# Patient Record
Sex: FEMALE | Race: WHITE | NOT HISPANIC OR LATINO | URBAN - METROPOLITAN AREA
[De-identification: names, ages, dates, MRNs, and addresses within clinical notes are randomized per-mention and may not be internally consistent; named-entity substitution may affect disease eponyms.]

---

## 2019-11-22 ENCOUNTER — OUTPATIENT (OUTPATIENT)
Dept: OUTPATIENT SERVICES | Facility: HOSPITAL | Age: 34
LOS: 1 days | End: 2019-11-22

## 2019-11-22 ENCOUNTER — INPATIENT (INPATIENT)
Facility: HOSPITAL | Age: 34
LOS: 1 days | Discharge: ANOTHER HOSPITAL | End: 2019-11-24
Attending: INTERNAL MEDICINE
Payer: MEDICAID

## 2019-11-22 PROCEDURE — 99291 CRITICAL CARE FIRST HOUR: CPT

## 2019-11-22 PROCEDURE — 71045 X-RAY EXAM CHEST 1 VIEW: CPT | Mod: 26

## 2019-11-22 PROCEDURE — 93010 ELECTROCARDIOGRAM REPORT: CPT

## 2019-11-22 PROCEDURE — 71045 X-RAY EXAM CHEST 1 VIEW: CPT | Mod: 26,77

## 2019-11-22 PROCEDURE — 71275 CT ANGIOGRAPHY CHEST: CPT | Mod: 26

## 2019-11-23 ENCOUNTER — OUTPATIENT (OUTPATIENT)
Dept: OUTPATIENT SERVICES | Facility: HOSPITAL | Age: 34
LOS: 1 days | End: 2019-11-23

## 2019-11-23 PROCEDURE — 71260 CT THORAX DX C+: CPT | Mod: 26

## 2019-11-23 PROCEDURE — 74177 CT ABD & PELVIS W/CONTRAST: CPT | Mod: 26

## 2019-11-23 PROCEDURE — 71045 X-RAY EXAM CHEST 1 VIEW: CPT | Mod: 26

## 2019-11-24 ENCOUNTER — OUTPATIENT (OUTPATIENT)
Dept: OUTPATIENT SERVICES | Facility: HOSPITAL | Age: 34
LOS: 1 days | End: 2019-11-24

## 2019-11-24 ENCOUNTER — INPATIENT (INPATIENT)
Facility: HOSPITAL | Age: 34
LOS: 43 days | Discharge: HOME | DRG: 163 | End: 2020-01-07
Attending: HOSPITALIST | Admitting: THORACIC SURGERY (CARDIOTHORACIC VASCULAR SURGERY)
Payer: MEDICAID

## 2019-11-24 VITALS
HEIGHT: 66 IN | DIASTOLIC BLOOD PRESSURE: 63 MMHG | OXYGEN SATURATION: 99 % | RESPIRATION RATE: 26 BRPM | SYSTOLIC BLOOD PRESSURE: 94 MMHG | HEART RATE: 93 BPM | WEIGHT: 121.7 LBS

## 2019-11-24 DIAGNOSIS — J90 PLEURAL EFFUSION, NOT ELSEWHERE CLASSIFIED: ICD-10-CM

## 2019-11-24 LAB
ALBUMIN SERPL ELPH-MCNC: 2 G/DL — LOW (ref 3.3–5.2)
ALP SERPL-CCNC: 250 U/L — HIGH (ref 40–120)
ALT FLD-CCNC: 34 U/L — HIGH
ANION GAP SERPL CALC-SCNC: 14 MMOL/L — SIGNIFICANT CHANGE UP (ref 5–17)
ANISOCYTOSIS BLD QL: SLIGHT — SIGNIFICANT CHANGE UP
APTT BLD: 52.4 SEC — HIGH (ref 27.5–36.3)
AST SERPL-CCNC: 51 U/L — HIGH
BASOPHILS # BLD AUTO: 0 K/UL — SIGNIFICANT CHANGE UP (ref 0–0.2)
BASOPHILS NFR BLD AUTO: 0 % — SIGNIFICANT CHANGE UP (ref 0–2)
BILIRUB SERPL-MCNC: 0.4 MG/DL — SIGNIFICANT CHANGE UP (ref 0.4–2)
BLD GP AB SCN SERPL QL: SIGNIFICANT CHANGE UP
BUN SERPL-MCNC: 7 MG/DL — LOW (ref 8–20)
CALCIUM SERPL-MCNC: 6.3 MG/DL — CRITICAL LOW (ref 8.6–10.2)
CHLORIDE SERPL-SCNC: 92 MMOL/L — LOW (ref 98–107)
CO2 SERPL-SCNC: 25 MMOL/L — SIGNIFICANT CHANGE UP (ref 22–29)
CREAT SERPL-MCNC: 0.5 MG/DL — SIGNIFICANT CHANGE UP (ref 0.5–1.3)
EOSINOPHIL # BLD AUTO: 0 K/UL — SIGNIFICANT CHANGE UP (ref 0–0.5)
EOSINOPHIL NFR BLD AUTO: 0 % — SIGNIFICANT CHANGE UP (ref 0–6)
GIANT PLATELETS BLD QL SMEAR: PRESENT — SIGNIFICANT CHANGE UP
GLUCOSE SERPL-MCNC: 85 MG/DL — SIGNIFICANT CHANGE UP (ref 70–115)
HCT VFR BLD CALC: 30.1 % — LOW (ref 34.5–45)
HGB BLD-MCNC: 10.1 G/DL — LOW (ref 11.5–15.5)
LYMPHOCYTES # BLD AUTO: 2.29 K/UL — SIGNIFICANT CHANGE UP (ref 1–3.3)
LYMPHOCYTES # BLD AUTO: 9.6 % — LOW (ref 13–44)
MACROCYTES BLD QL: SLIGHT — SIGNIFICANT CHANGE UP
MAGNESIUM SERPL-MCNC: 1.8 MG/DL — SIGNIFICANT CHANGE UP (ref 1.6–2.6)
MANUAL SMEAR VERIFICATION: SIGNIFICANT CHANGE UP
MCHC RBC-ENTMCNC: 33.4 PG — SIGNIFICANT CHANGE UP (ref 27–34)
MCHC RBC-ENTMCNC: 33.6 GM/DL — SIGNIFICANT CHANGE UP (ref 32–36)
MCV RBC AUTO: 99.7 FL — SIGNIFICANT CHANGE UP (ref 80–100)
MONOCYTES # BLD AUTO: 1.67 K/UL — HIGH (ref 0–0.9)
MONOCYTES NFR BLD AUTO: 7 % — SIGNIFICANT CHANGE UP (ref 2–14)
MYELOCYTES NFR BLD: 1.7 % — HIGH (ref 0–0)
NEUTROPHILS # BLD AUTO: 19.46 K/UL — HIGH (ref 1.8–7.4)
NEUTROPHILS NFR BLD AUTO: 76.5 % — SIGNIFICANT CHANGE UP (ref 43–77)
NEUTS BAND # BLD: 5.2 % — SIGNIFICANT CHANGE UP (ref 0–8)
OVALOCYTES BLD QL SMEAR: SLIGHT — SIGNIFICANT CHANGE UP
PHOSPHATE SERPL-MCNC: 2.4 MG/DL — SIGNIFICANT CHANGE UP (ref 2.4–4.7)
PLAT MORPH BLD: NORMAL — SIGNIFICANT CHANGE UP
PLATELET # BLD AUTO: 415 K/UL — HIGH (ref 150–400)
POLYCHROMASIA BLD QL SMEAR: SLIGHT — SIGNIFICANT CHANGE UP
POTASSIUM SERPL-MCNC: 4.5 MMOL/L — SIGNIFICANT CHANGE UP (ref 3.5–5.3)
POTASSIUM SERPL-SCNC: 4.5 MMOL/L — SIGNIFICANT CHANGE UP (ref 3.5–5.3)
PROT SERPL-MCNC: 4.7 G/DL — LOW (ref 6.6–8.7)
RBC # BLD: 3.02 M/UL — LOW (ref 3.8–5.2)
RBC # FLD: 13.1 % — SIGNIFICANT CHANGE UP (ref 10.3–14.5)
RBC BLD AUTO: SIGNIFICANT CHANGE UP
SODIUM SERPL-SCNC: 131 MMOL/L — LOW (ref 135–145)
TROPONIN T SERPL-MCNC: <0.01 NG/ML — SIGNIFICANT CHANGE UP (ref 0–0.06)
WBC # BLD: 23.82 K/UL — HIGH (ref 3.8–10.5)
WBC # FLD AUTO: 23.82 K/UL — HIGH (ref 3.8–10.5)

## 2019-11-24 PROCEDURE — 93970 EXTREMITY STUDY: CPT | Mod: 26

## 2019-11-24 PROCEDURE — 32557 INSERT CATH PLEURA W/ IMAGE: CPT | Mod: RT

## 2019-11-24 PROCEDURE — 71045 X-RAY EXAM CHEST 1 VIEW: CPT | Mod: 26,77

## 2019-11-24 PROCEDURE — 71045 X-RAY EXAM CHEST 1 VIEW: CPT | Mod: 26

## 2019-11-24 RX ORDER — HEPARIN SODIUM 5000 [USP'U]/ML
500 INJECTION INTRAVENOUS; SUBCUTANEOUS
Qty: 25000 | Refills: 0 | Status: DISCONTINUED | OUTPATIENT
Start: 2019-11-24 | End: 2019-11-27

## 2019-11-24 RX ORDER — CHLORHEXIDINE GLUCONATE 213 G/1000ML
1 SOLUTION TOPICAL EVERY 12 HOURS
Refills: 0 | Status: COMPLETED | OUTPATIENT
Start: 2019-11-24 | End: 2019-11-25

## 2019-11-24 RX ORDER — AZITHROMYCIN 500 MG/1
TABLET, FILM COATED ORAL
Refills: 0 | Status: DISCONTINUED | OUTPATIENT
Start: 2019-11-24 | End: 2019-11-24

## 2019-11-24 RX ORDER — ACETAMINOPHEN 500 MG
1000 TABLET ORAL ONCE
Refills: 0 | Status: COMPLETED | OUTPATIENT
Start: 2019-11-24 | End: 2019-11-24

## 2019-11-24 RX ORDER — AZITHROMYCIN 500 MG/1
TABLET, FILM COATED ORAL
Refills: 0 | Status: DISCONTINUED | OUTPATIENT
Start: 2019-11-24 | End: 2019-11-25

## 2019-11-24 RX ORDER — PIPERACILLIN AND TAZOBACTAM 4; .5 G/20ML; G/20ML
3.38 INJECTION, POWDER, LYOPHILIZED, FOR SOLUTION INTRAVENOUS ONCE
Refills: 0 | Status: COMPLETED | OUTPATIENT
Start: 2019-11-24 | End: 2019-11-24

## 2019-11-24 RX ORDER — AZITHROMYCIN 500 MG/1
500 TABLET, FILM COATED ORAL EVERY 24 HOURS
Refills: 0 | Status: DISCONTINUED | OUTPATIENT
Start: 2019-11-25 | End: 2019-11-25

## 2019-11-24 RX ORDER — PIPERACILLIN AND TAZOBACTAM 4; .5 G/20ML; G/20ML
3.38 INJECTION, POWDER, LYOPHILIZED, FOR SOLUTION INTRAVENOUS EVERY 8 HOURS
Refills: 0 | Status: DISCONTINUED | OUTPATIENT
Start: 2019-11-24 | End: 2019-11-25

## 2019-11-24 RX ORDER — AZITHROMYCIN 500 MG/1
500 TABLET, FILM COATED ORAL ONCE
Refills: 0 | Status: COMPLETED | OUTPATIENT
Start: 2019-11-24 | End: 2019-11-24

## 2019-11-24 RX ORDER — SACCHAROMYCES BOULARDII 250 MG
250 POWDER IN PACKET (EA) ORAL
Refills: 0 | Status: DISCONTINUED | OUTPATIENT
Start: 2019-11-24 | End: 2019-11-27

## 2019-11-24 RX ORDER — AZITHROMYCIN 500 MG/1
500 TABLET, FILM COATED ORAL ONCE
Refills: 0 | Status: DISCONTINUED | OUTPATIENT
Start: 2019-11-24 | End: 2019-11-24

## 2019-11-24 RX ADMIN — Medication 400 MILLIGRAM(S): at 20:00

## 2019-11-24 RX ADMIN — Medication 1000 MILLIGRAM(S): at 20:15

## 2019-11-25 DIAGNOSIS — R07.89 OTHER CHEST PAIN: ICD-10-CM

## 2019-11-25 DIAGNOSIS — I82.C11 ACUTE EMBOLISM AND THROMBOSIS OF RIGHT INTERNAL JUGULAR VEIN: ICD-10-CM

## 2019-11-25 DIAGNOSIS — F41.9 ANXIETY DISORDER, UNSPECIFIED: ICD-10-CM

## 2019-11-25 DIAGNOSIS — J86.9 PYOTHORAX WITHOUT FISTULA: ICD-10-CM

## 2019-11-25 DIAGNOSIS — E87.1 HYPO-OSMOLALITY AND HYPONATREMIA: ICD-10-CM

## 2019-11-25 DIAGNOSIS — K86.2 CYST OF PANCREAS: ICD-10-CM

## 2019-11-25 LAB
ABO RH CONFIRMATION: SIGNIFICANT CHANGE UP
ALBUMIN SERPL ELPH-MCNC: 1.8 G/DL — LOW (ref 3.3–5.2)
ALP SERPL-CCNC: 252 U/L — HIGH (ref 40–120)
ALT FLD-CCNC: 32 U/L — SIGNIFICANT CHANGE UP
ANION GAP SERPL CALC-SCNC: 11 MMOL/L — SIGNIFICANT CHANGE UP (ref 5–17)
ANISOCYTOSIS BLD QL: SLIGHT — SIGNIFICANT CHANGE UP
APPEARANCE UR: CLEAR — SIGNIFICANT CHANGE UP
APTT BLD: 51.3 SEC — HIGH (ref 27.5–36.3)
APTT BLD: 71 SEC — HIGH (ref 27.5–36.3)
APTT BLD: 71.8 SEC — HIGH (ref 27.5–36.3)
AST SERPL-CCNC: 46 U/L — HIGH
BACTERIA # UR AUTO: ABNORMAL
BASOPHILS # BLD AUTO: 0 K/UL — SIGNIFICANT CHANGE UP (ref 0–0.2)
BASOPHILS NFR BLD AUTO: 0 % — SIGNIFICANT CHANGE UP (ref 0–2)
BILIRUB SERPL-MCNC: 0.4 MG/DL — SIGNIFICANT CHANGE UP (ref 0.4–2)
BILIRUB UR-MCNC: NEGATIVE — SIGNIFICANT CHANGE UP
BUN SERPL-MCNC: 5 MG/DL — LOW (ref 8–20)
CALCIUM SERPL-MCNC: 6.6 MG/DL — LOW (ref 8.6–10.2)
CHLORIDE SERPL-SCNC: 95 MMOL/L — LOW (ref 98–107)
CHOLEST SERPL-MCNC: 68 MG/DL — LOW (ref 110–199)
CO2 SERPL-SCNC: 26 MMOL/L — SIGNIFICANT CHANGE UP (ref 22–29)
COLOR SPEC: YELLOW — SIGNIFICANT CHANGE UP
CREAT SERPL-MCNC: 0.37 MG/DL — LOW (ref 0.5–1.3)
CRP SERPL-MCNC: 17.92 MG/DL — HIGH (ref 0–0.4)
DIFF PNL FLD: ABNORMAL
EOSINOPHIL # BLD AUTO: 0.38 K/UL — SIGNIFICANT CHANGE UP (ref 0–0.5)
EOSINOPHIL NFR BLD AUTO: 1.7 % — SIGNIFICANT CHANGE UP (ref 0–6)
EPI CELLS # UR: SIGNIFICANT CHANGE UP
ERYTHROCYTE [SEDIMENTATION RATE] IN BLOOD: 56 MM/HR — HIGH (ref 0–20)
GIANT PLATELETS BLD QL SMEAR: PRESENT — SIGNIFICANT CHANGE UP
GLUCOSE SERPL-MCNC: 83 MG/DL — SIGNIFICANT CHANGE UP (ref 70–115)
GLUCOSE UR QL: NEGATIVE MG/DL — SIGNIFICANT CHANGE UP
HCT VFR BLD CALC: 30.8 % — LOW (ref 34.5–45)
HDLC SERPL-MCNC: 27 MG/DL — LOW
HGB BLD-MCNC: 10.2 G/DL — LOW (ref 11.5–15.5)
HIV 1 & 2 AB SERPL IA.RAPID: SIGNIFICANT CHANGE UP
KETONES UR-MCNC: NEGATIVE — SIGNIFICANT CHANGE UP
LACTATE BLDV-MCNC: 2 MMOL/L — SIGNIFICANT CHANGE UP (ref 0.5–2)
LACTATE SERPL-SCNC: 1.7 MMOL/L — SIGNIFICANT CHANGE UP (ref 0.5–2)
LDH SERPL L TO P-CCNC: 353 U/L — HIGH (ref 98–192)
LEUKOCYTE ESTERASE UR-ACNC: NEGATIVE — SIGNIFICANT CHANGE UP
LIPID PNL WITH DIRECT LDL SERPL: 12 MG/DL — SIGNIFICANT CHANGE UP
LYMPHOCYTES # BLD AUTO: 12.2 % — LOW (ref 13–44)
LYMPHOCYTES # BLD AUTO: 2.73 K/UL — SIGNIFICANT CHANGE UP (ref 1–3.3)
MACROCYTES BLD QL: SLIGHT — SIGNIFICANT CHANGE UP
MAGNESIUM SERPL-MCNC: 1.5 MG/DL — LOW (ref 1.6–2.6)
MANUAL SMEAR VERIFICATION: SIGNIFICANT CHANGE UP
MCHC RBC-ENTMCNC: 33.1 GM/DL — SIGNIFICANT CHANGE UP (ref 32–36)
MCHC RBC-ENTMCNC: 33.2 PG — SIGNIFICANT CHANGE UP (ref 27–34)
MCV RBC AUTO: 100.3 FL — HIGH (ref 80–100)
METAMYELOCYTES # FLD: 1.7 % — HIGH (ref 0–0)
MONOCYTES # BLD AUTO: 1.56 K/UL — HIGH (ref 0–0.9)
MONOCYTES NFR BLD AUTO: 7 % — SIGNIFICANT CHANGE UP (ref 2–14)
MRSA PCR RESULT.: SIGNIFICANT CHANGE UP
MYELOCYTES NFR BLD: 1.7 % — HIGH (ref 0–0)
NEUTROPHILS # BLD AUTO: 16.14 K/UL — HIGH (ref 1.8–7.4)
NEUTROPHILS NFR BLD AUTO: 72.2 % — SIGNIFICANT CHANGE UP (ref 43–77)
NITRITE UR-MCNC: NEGATIVE — SIGNIFICANT CHANGE UP
NRBC # BLD: 1 /100 — HIGH (ref 0–0)
OSMOLALITY SERPL: 276 MOSMOL/KG — SIGNIFICANT CHANGE UP (ref 275–300)
PH UR: 7 — SIGNIFICANT CHANGE UP (ref 5–8)
PHOSPHATE SERPL-MCNC: 1.2 MG/DL — LOW (ref 2.4–4.7)
PLAT MORPH BLD: NORMAL — SIGNIFICANT CHANGE UP
PLATELET # BLD AUTO: 448 K/UL — HIGH (ref 150–400)
POLYCHROMASIA BLD QL SMEAR: SLIGHT — SIGNIFICANT CHANGE UP
POTASSIUM SERPL-MCNC: 3.8 MMOL/L — SIGNIFICANT CHANGE UP (ref 3.5–5.3)
POTASSIUM SERPL-SCNC: 3.8 MMOL/L — SIGNIFICANT CHANGE UP (ref 3.5–5.3)
PROCALCITONIN SERPL-MCNC: 4.75 NG/ML — HIGH (ref 0.02–0.1)
PROMYELOCYTES # FLD: 3.5 % — HIGH (ref 0–0)
PROT SERPL-MCNC: 5 G/DL — LOW (ref 6.6–8.7)
PROT UR-MCNC: NEGATIVE MG/DL — SIGNIFICANT CHANGE UP
RAPID RVP RESULT: SIGNIFICANT CHANGE UP
RBC # BLD: 3.07 M/UL — LOW (ref 3.8–5.2)
RBC # FLD: 13.2 % — SIGNIFICANT CHANGE UP (ref 10.3–14.5)
RBC BLD AUTO: ABNORMAL
RBC CASTS # UR COMP ASSIST: SIGNIFICANT CHANGE UP /HPF (ref 0–4)
S AUREUS DNA NOSE QL NAA+PROBE: DETECTED
SODIUM SERPL-SCNC: 132 MMOL/L — LOW (ref 135–145)
SP GR SPEC: 1 — LOW (ref 1.01–1.02)
TOTAL CHOLESTEROL/HDL RATIO MEASUREMENT: 3 RATIO — LOW (ref 3.3–7.1)
TRIGL SERPL-MCNC: 144 MG/DL — SIGNIFICANT CHANGE UP (ref 10–200)
TSH SERPL-MCNC: 4.76 UIU/ML — HIGH (ref 0.27–4.2)
UROBILINOGEN FLD QL: 1 MG/DL
WBC # BLD: 22.35 K/UL — HIGH (ref 3.8–10.5)
WBC # FLD AUTO: 22.35 K/UL — HIGH (ref 3.8–10.5)
WBC UR QL: SIGNIFICANT CHANGE UP

## 2019-11-25 PROCEDURE — 99222 1ST HOSP IP/OBS MODERATE 55: CPT

## 2019-11-25 PROCEDURE — 71045 X-RAY EXAM CHEST 1 VIEW: CPT | Mod: 26

## 2019-11-25 PROCEDURE — 71250 CT THORAX DX C-: CPT | Mod: 26

## 2019-11-25 PROCEDURE — 99221 1ST HOSP IP/OBS SF/LOW 40: CPT

## 2019-11-25 PROCEDURE — 90792 PSYCH DIAG EVAL W/MED SRVCS: CPT

## 2019-11-25 RX ORDER — ACETAMINOPHEN 500 MG
650 TABLET ORAL EVERY 6 HOURS
Refills: 0 | Status: DISCONTINUED | OUTPATIENT
Start: 2019-11-25 | End: 2019-11-27

## 2019-11-25 RX ORDER — CEFTRIAXONE 500 MG/1
1000 INJECTION, POWDER, FOR SOLUTION INTRAMUSCULAR; INTRAVENOUS EVERY 24 HOURS
Refills: 0 | Status: DISCONTINUED | OUTPATIENT
Start: 2019-11-25 | End: 2019-11-27

## 2019-11-25 RX ORDER — OXYCODONE AND ACETAMINOPHEN 5; 325 MG/1; MG/1
1 TABLET ORAL EVERY 4 HOURS
Refills: 0 | Status: DISCONTINUED | OUTPATIENT
Start: 2019-11-25 | End: 2019-11-25

## 2019-11-25 RX ORDER — OXYCODONE AND ACETAMINOPHEN 5; 325 MG/1; MG/1
1 TABLET ORAL EVERY 6 HOURS
Refills: 0 | Status: DISCONTINUED | OUTPATIENT
Start: 2019-11-25 | End: 2019-11-27

## 2019-11-25 RX ORDER — THIAMINE MONONITRATE (VIT B1) 100 MG
100 TABLET ORAL DAILY
Refills: 0 | Status: DISCONTINUED | OUTPATIENT
Start: 2019-11-25 | End: 2019-11-27

## 2019-11-25 RX ORDER — FOLIC ACID 0.8 MG
1 TABLET ORAL DAILY
Refills: 0 | Status: DISCONTINUED | OUTPATIENT
Start: 2019-11-25 | End: 2019-11-27

## 2019-11-25 RX ORDER — PSYLLIUM SEED (WITH DEXTROSE)
1 POWDER (GRAM) ORAL
Refills: 0 | Status: DISCONTINUED | OUTPATIENT
Start: 2019-11-25 | End: 2019-11-27

## 2019-11-25 RX ORDER — METHOCARBAMOL 500 MG/1
500 TABLET, FILM COATED ORAL THREE TIMES A DAY
Refills: 0 | Status: DISCONTINUED | OUTPATIENT
Start: 2019-11-25 | End: 2019-11-27

## 2019-11-25 RX ORDER — TRAMADOL HYDROCHLORIDE 50 MG/1
50 TABLET ORAL EVERY 6 HOURS
Refills: 0 | Status: DISCONTINUED | OUTPATIENT
Start: 2019-11-25 | End: 2019-11-27

## 2019-11-25 RX ORDER — INFLUENZA VIRUS VACCINE 15; 15; 15; 15 UG/.5ML; UG/.5ML; UG/.5ML; UG/.5ML
0.5 SUSPENSION INTRAMUSCULAR ONCE
Refills: 0 | Status: COMPLETED | OUTPATIENT
Start: 2019-11-25 | End: 2019-11-25

## 2019-11-25 RX ORDER — MAGNESIUM SULFATE 500 MG/ML
2 VIAL (ML) INJECTION ONCE
Refills: 0 | Status: COMPLETED | OUTPATIENT
Start: 2019-11-25 | End: 2019-11-25

## 2019-11-25 RX ORDER — LIDOCAINE 4 G/100G
1 CREAM TOPICAL DAILY
Refills: 0 | Status: DISCONTINUED | OUTPATIENT
Start: 2019-11-25 | End: 2019-11-27

## 2019-11-25 RX ORDER — THIAMINE MONONITRATE (VIT B1) 100 MG
100 TABLET ORAL ONCE
Refills: 0 | Status: COMPLETED | OUTPATIENT
Start: 2019-11-25 | End: 2019-11-26

## 2019-11-25 RX ORDER — POTASSIUM CHLORIDE 20 MEQ
20 PACKET (EA) ORAL ONCE
Refills: 0 | Status: COMPLETED | OUTPATIENT
Start: 2019-11-25 | End: 2019-11-25

## 2019-11-25 RX ORDER — KETOROLAC TROMETHAMINE 30 MG/ML
15 SYRINGE (ML) INJECTION EVERY 6 HOURS
Refills: 0 | Status: DISCONTINUED | OUTPATIENT
Start: 2019-11-25 | End: 2019-11-25

## 2019-11-25 RX ORDER — KETOROLAC TROMETHAMINE 30 MG/ML
15 SYRINGE (ML) INJECTION EVERY 6 HOURS
Refills: 0 | Status: DISCONTINUED | OUTPATIENT
Start: 2019-11-25 | End: 2019-11-27

## 2019-11-25 RX ADMIN — OXYCODONE AND ACETAMINOPHEN 1 TABLET(S): 5; 325 TABLET ORAL at 21:41

## 2019-11-25 RX ADMIN — CHLORHEXIDINE GLUCONATE 1 APPLICATION(S): 213 SOLUTION TOPICAL at 05:39

## 2019-11-25 RX ADMIN — OXYCODONE AND ACETAMINOPHEN 1 TABLET(S): 5; 325 TABLET ORAL at 06:00

## 2019-11-25 RX ADMIN — Medication 250 MILLIGRAM(S): at 05:41

## 2019-11-25 RX ADMIN — Medication 15 MILLIGRAM(S): at 14:50

## 2019-11-25 RX ADMIN — LIDOCAINE 1 PATCH: 4 CREAM TOPICAL at 19:41

## 2019-11-25 RX ADMIN — Medication 50 GRAM(S): at 06:55

## 2019-11-25 RX ADMIN — Medication 15 MILLIGRAM(S): at 14:35

## 2019-11-25 RX ADMIN — LIDOCAINE 1 PATCH: 4 CREAM TOPICAL at 17:36

## 2019-11-25 RX ADMIN — OXYCODONE AND ACETAMINOPHEN 1 TABLET(S): 5; 325 TABLET ORAL at 11:25

## 2019-11-25 RX ADMIN — OXYCODONE AND ACETAMINOPHEN 1 TABLET(S): 5; 325 TABLET ORAL at 10:25

## 2019-11-25 RX ADMIN — Medication 1 PACKET(S): at 17:38

## 2019-11-25 RX ADMIN — HEPARIN SODIUM 11 UNIT(S)/HR: 5000 INJECTION INTRAVENOUS; SUBCUTANEOUS at 06:42

## 2019-11-25 RX ADMIN — METHOCARBAMOL 500 MILLIGRAM(S): 500 TABLET, FILM COATED ORAL at 17:38

## 2019-11-25 RX ADMIN — Medication 15 MILLIGRAM(S): at 20:45

## 2019-11-25 RX ADMIN — OXYCODONE AND ACETAMINOPHEN 1 TABLET(S): 5; 325 TABLET ORAL at 05:00

## 2019-11-25 RX ADMIN — OXYCODONE AND ACETAMINOPHEN 1 TABLET(S): 5; 325 TABLET ORAL at 20:45

## 2019-11-25 RX ADMIN — CEFTRIAXONE 100 MILLIGRAM(S): 500 INJECTION, POWDER, FOR SOLUTION INTRAMUSCULAR; INTRAVENOUS at 17:37

## 2019-11-25 RX ADMIN — OXYCODONE AND ACETAMINOPHEN 1 TABLET(S): 5; 325 TABLET ORAL at 14:35

## 2019-11-25 RX ADMIN — PIPERACILLIN AND TAZOBACTAM 25 GRAM(S): 4; .5 INJECTION, POWDER, LYOPHILIZED, FOR SOLUTION INTRAVENOUS at 09:45

## 2019-11-25 RX ADMIN — OXYCODONE AND ACETAMINOPHEN 1 TABLET(S): 5; 325 TABLET ORAL at 15:35

## 2019-11-25 RX ADMIN — Medication 20 MILLIEQUIVALENT(S): at 06:46

## 2019-11-25 RX ADMIN — AZITHROMYCIN 255 MILLIGRAM(S): 500 TABLET, FILM COATED ORAL at 05:37

## 2019-11-25 RX ADMIN — Medication 15 MILLIGRAM(S): at 21:40

## 2019-11-25 RX ADMIN — Medication 250 MILLIGRAM(S): at 17:38

## 2019-11-25 NOTE — DIETITIAN INITIAL EVALUATION ADULT. - MALNUTRITION
Severe-acute NFPE: muscle mass loss in shoulder/clavicle and subcutaneous fat loss in triceps Severe-acute

## 2019-11-25 NOTE — H&P ADULT - ASSESSMENT
[place] [facility]  [place] with weakness, 25lb weight loss of several months, flu like symptoms, on work up found to have a large right pleural effusion.  Pulmonology contact for thoracentesis, 1.5 L of dark serous fluid was removed.  Chest tube placed shortly thereafter, 3 liters was removed at that point in time, due to air leak.  Patient had some abnormalities in their lab work, found to have a leukocytosis with wbc of 40, platelet count of 793, Na 120, chloride 73, lactate of 4.7, amylase 268,  and CTA imaging also revealed a 2.1cm partially cystic mass in the body/tail of the pancreas with possible upstream ductal dilatation. 11/23 patient had CT chest/abdomen/pelvis and was also found to have a clot in the right IJ and was started on a heparin gtt. Patient does state that she has had a recent cough, and complains of right sided neck pain. 11/14 Patient transferred to [facility]. Comes in to [facility]  11/22 with weakness, 25lb weight loss of several months, flu like symptoms, on work up found to have a large right pleural effusion.  Pulmonology contact for thoracentesis, 1.5 L of dark serous fluid was removed.  Chest tube placed shortly thereafter, 3 liters was removed at that point in time. Patient had some abnormalities in their lab work, found to have a leukocytosis with wbc of 40, platelet count of 793, Na 120, chloride 73, lactate of 4.7, amylase 268,  and CTA imaging also revealed a 2.1cm partially cystic mass in the body/tail of the pancreas with possible upstream ductal dilatation. 11/23 patient had CT chest/abdomen/pelvis and was also found to have a clot in the right IJ and was started on a heparin gtt. Patient does state that she has had a recent cough, and complains of right sided neck pain. 11/14 Patient transferred to [facility].

## 2019-11-25 NOTE — BEHAVIORAL HEALTH ASSESSMENT NOTE - NSBHCHARTREVIEWLAB_PSY_A_CORE FT
Comprehensive Metabolic Panel (11.25.19 @ 05:29)    Sodium, Serum: 132 mmol/L    Potassium, Serum: 3.8 mmol/L    Chloride, Serum: 95 mmol/L    Carbon Dioxide, Serum: 26.0 mmol/L    Anion Gap, Serum: 11 mmol/L    Blood Urea Nitrogen, Serum: 5.0 mg/dL    Creatinine, Serum: 0.37 mg/dL    Glucose, Serum: 83 mg/dL    Calcium, Total Serum: 6.6 mg/dL    Protein Total, Serum: 5.0 g/dL    Albumin, Serum: 1.8 g/dL    Bilirubin Total, Serum: 0.4 mg/dL    Alkaline Phosphatase, Serum: 252 U/L    Aspartate Aminotransferase (AST/SGOT): 46 U/L    Alanine Aminotransferase (ALT/SGPT): 32 U/L    eGFR if Non : 139: Interpretative comment  The units for eGFR are mL/min/1.73M2 (normalized body surface area). The  eGFR is calculated from a serum creatinine using the CKD-EPI equation.  Other variables required for calculation are race, age and sex. Among  patients with chronic kidney disease (CKD), the eGFR is useful in  determining the stage of disease according to KDOQI CKD classification.  All eGFR results are reported numerically with the following  interpretation.          GFR                    With                 Without     (ml/min/1.73 m2)    Kidney Damage       Kidney Damage        >= 90                    Stage 1                     Normal        60-89                    Stage 2                     Decreased GFR        30-59     Stage 3                     Stage 3        15-29                    Stage 4                     Stage 4        < 15                      Stage 5                     Stage 5  Each stage of CKD assumes that the associated GFR level has been in  effect for at least 3 months. Determination of stages one and two (with  eGFR > 59 ml/min/m2) requires estimation of kidney damage for at least 3  months as defined by structural or functional abnormalities.  Limitations: All estimates of GFR will be less accurate for patients at  extremes of muscle mass (including but not limited to frail elderly,  critically ill, or cancer patients), those with unusual diets, and those  with conditions associated with reduced secretion or extrarenal  elimination of creatinine. The eGFR equation is not recommended for use  in patients with unstable creatinine levels. mL/min/1.73M2    eGFR if African American: 162 mL/min/1.73M2

## 2019-11-25 NOTE — H&P ADULT - NSHPLABSRESULTS_GEN_ALL_CORE
Labs              10.1   23.82 )-----------( 415      ( 24 Nov 2019 20:27 )             30.1   11-24    131<L>  |  92<L>  |  7.0<L>  ----------------------------<  85  4.5   |  25.0  |  0.50    Ca    6.3<LL>      24 Nov 2019 20:27  Phos  2.4     11-24  Mg     1.8     11-24    TPro  4.7<L>  /  Alb  2.0<L>  /  TBili  0.4  /  DBili  x   /  AST  51<H>  /  ALT  34<H>  /  AlkPhos  250<H>  11-24    Radiology    Xray 11/23- shows interval decrease in the volume of right sided pneumothorax. Continued advanced consolidative change of the right lung parenchyma, with patchy airspace opacities identified within the left lower lung field. Bilateral pleural effusions, right greater than left     CT 11/23- Right internal jugular vein thrombosis, right pneumothorax, trace right pleural effusion, extensive right lung consolidation. Small infiltrates in the left upper lobe and left lower lobe. Cystic lesions in the body and the tail of the pancrease

## 2019-11-25 NOTE — DIETITIAN INITIAL EVALUATION ADULT. - ETIOLOGY
Related to inadequate protein energy intake with decreased appetite and altered GI function in setting of large right pleural effusion/Empyema

## 2019-11-25 NOTE — BEHAVIORAL HEALTH ASSESSMENT NOTE - [employer]IVE FACTORS:
ANTICOAGULATION FOLLOW-UP CLINIC VISIT    Patient Name:  Shanika Stahl  Date:  2019  Contact Type:  Fax [facility]    SUBJECTIVE:  Patient Findings     Positives:   Extra doses    Comments:   Patient will continue weekly maintenance dose. INR is therapeutic.   Recheck in 1 week.        Clinical Outcomes     Comments:   Patient will continue weekly maintenance dose. INR is therapeutic.   Recheck in 1 week.           OBJECTIVE    INR   Date Value Ref Range Status   2019 2.17 (A) 0.86 - 1.14 Final       ASSESSMENT / PLAN  INR assessment THER    Recheck INR In: 1 WEEK    INR Location Outside lab      Anticoagulation Summary  As of 2019    INR goal:   2.0-3.0   TTR:   84.5 % (4.1 y)   INR used for dosin.17 (2019)   Warfarin maintenance plan:   2.5 mg (2.5 mg x 1) every Mon, Fri; 5 mg (2.5 mg x 2) all other days   Full warfarin instructions:   2.5 mg every Mon, Fri; 5 mg all other days   Weekly warfarin total:   30 mg   No change documented:   Dre Phillips RN   Plan last modified:   Verenice Morillo RN (10/12/2018)   Next INR check:   2019   Priority:   INR   Target end date:   Indefinite    Indications    Atrial fibrillation (H) (Resolved) [I48.91]  History of cerebral embolism with cerebral infarction -  [I63.40]  Long term current use of anticoagulant therapy [Z79.01]             Anticoagulation Episode Summary     INR check location:       Preferred lab:       Send INR reminders to:   [place] PHONE ANTICOAG POOL    Comments:   Keep on low end of therapeutic range. Uses 2.5mg tablets. Resides at [facility] [phone]. Fax AVS to [phone] (facility administers meds but cannot do half tabs)      Anticoagulation Care Providers     Provider Role Specialty Phone number    Milena Jaime,  [facility] [phone]            See the Encounter Report to view Anticoagulation Flowsheet and Dosing Calendar (Go to Encounters tab  in chart review, and find the Anticoagulation Therapy Visit)        Dre Phillips RN                  None Known

## 2019-11-25 NOTE — CONSULT NOTE ADULT - PROBLEM SELECTOR RECOMMENDATION 9
1. Offer Ketorolac 15mg IV q6hrs ATC x 2 days  - Unless otherwise contraindicated  2. Tylenol 650mg PO q6hrs PRN mild pain  3. Tramadol 50mg PO q6hrs PRN moderate pain  4. Percocet 1 tablet q6 hrs PRN severe pain  5. Robaxin 500mg PO TID PRN spasms  6. Lidoderm Patches adjacent to affected area, over intact skin only.

## 2019-11-25 NOTE — CONSULT NOTE ADULT - CONSULT REASON
HPI:  Com[place] in [place] [facility]  [place]22 with weakness, 25lb weight loss of several months, flu like symptoms, on work up found to have a large right pleural effusion.  Pulmonology contact for thoracentesis, 1.5 L of dark serous fluid was removed.  Chest tube placed shortly thereafter, 3 liters was removed at that point in time. Patient had some abnormalities in their lab work, found to have a leukocytosis with wbc of 40, platelet count of 793, Na 120, chloride 73, lactate of 4.7, amylase 268,  and CTA imaging also revealed a 2.1cm partially cystic mass in the body/tail of the pancreas with possible upstream ductal dilatation. 11/23 patient had CT chest/abdomen/pelvis and was also found to have a clot in the right IJ and was started on a heparin gtt. Patient does state that she has had a recent cough, and complains of right sided neck pain. 11/14 Patient transferred to [facility]. (25 Nov 2019 00:10)      Pain Service:  MICU team requests assistance with her pain regimen. She has a chest tube, being offered Percocet PRN. Suggestions for alternatives requested.

## 2019-11-25 NOTE — H&P ADULT - PROBLEM SELECTOR PLAN 2
Patient does complains of some right neck pain   Continue on heparin gtt, patient specific, PTT q6 hours

## 2019-11-25 NOTE — CONSULT NOTE ADULT - SUBJECTIVE AND OBJECTIVE BOX
Surgical Oncology Consult Note    HPI:   35yo female who [place] [facility] [place] w/ complaint of weight loss and SOB found to have right pleural effusion and subsequently transferred to [facility] on 11/22 on whom surgical oncology is consulted for evaluation of pancreatic cyst. Pt initially [place] [facility] [place] workup of patient's SOB revealed large right pleural effusion. CTA of the chest done to r/o PE incidentally revealed a 2.1 cm cystic mass in body of pancreas w/ upstream PD dilatation.  In addition, patient was found to have RIJ thrombus and was hyponatremic to 120. Patient effusion treated w/ chest tube, which drained multiple liters. Pt reports 25lbs of unintended weight loss over last several months. 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  Pt denies abdominal pain, diarrhea, or constipation.       PMH:  Denies any significant past medical history    PSH:  denies any significant past surgical history    Home Medications:  Denies taking any home medications    ALL:  Ensure TID (Unknown)    FMH:  Denies family history of gastrointestinal malignancy     SOC Hx:   Denies tobacco or drug use. Drinks 5-6 drinks per night.     Physical Exam:   Gen: adult female, NAD  Neuro: AOx3, EOMI, PERRLA, no gross deficit on exam  RESP: coarse breath sounds w/ Right < Left, nonlabored breathing. right chest tube to pleurovac w/ suction  CVS: RRR,   GI: abd soft, NT, ND, no rebound/guarding  Extremities: 2+ peripheral pulses

## 2019-11-25 NOTE — H&P ADULT - PROBLEM SELECTOR PLAN 1
Status post chest tube placement, drainage 11/22  No air leak noted, small residual pneumothorax   Continue to keep chest tube to suction

## 2019-11-25 NOTE — DIETITIAN INITIAL EVALUATION ADULT. - OTHER INFO
Pt admitted with  weakness, 25lb weight loss of several months, flu like symptoms, on work up found to have a large right pleural effusion.  Pulmonology contact for thoracentesis, 1.5 L of dark serous fluid was removed.  Chest tube placed shortly thereafter, 3 liters was removed at that point in time. Patient had some abnormalities in their lab work, found to have a leukocytosis with wbc of 40, platelet count of 793, Na 120, chloride 73, lactate of 4.7, amylase 268,  and CTA imaging also revealed a 2.1cm partially cystic mass in the body/tail of the pancreas with possible upstream ductal dilatation. 11/23 patient had CT chest/abdomen/pelvis and was also found to have a clot in the right IJ and was started on a heparin gtt. Patient does state that she has had a recent cough, and complains of right sided neck pain. 11/14 Patient transferred to [facility].   Pt reports having poor appetite over past 3 months, wt loss of 25# noted. Pt complains of mild abd discomfort and diarrhea. Pt reports having poor dietary habits, does not at breakfast and some of the time skips lunch and snacks at night. Pt continues to complain of "upset" stomach and poor appetite. Pt agreeable to trying Ensure.  NFPE conducted.

## 2019-11-25 NOTE — CONSULT NOTE ADULT - SUBJECTIVE AND OBJECTIVE BOX
VERBAL REPORT: "I don't want the Percocet during the day when I have to get up."  Patient describes thoracic pain, mostly surrounding her chest tube site, but it intensifies to other areas with movement. She admits adequate analgesia with Percocet being offered. She denies side effect. She inquires about alternative medications to help reduce her opioid use.    PAIN SCORE: 3-4/10 at rest                  SCALE USED: VNRS    Allergies  No Known Allergies      PAST MEDICAL & SURGICAL HISTORY:      MEDICATIONS  (STANDING):  azithromycin  IVPB      azithromycin  IVPB 500 milliGRAM(s) IV Intermittent every 24 hours  chlorhexidine 2% Cloths 1 Application(s) Topical every 12 hours  chlorhexidine 4% Liquid 1 Application(s) Topical every 12 hours  heparin  Infusion 500 Unit(s)/Hr (11 mL/Hr) IV Continuous <Continuous>  piperacillin/tazobactam IVPB.. 3.375 Gram(s) IV Intermittent every 8 hours  saccharomyces boulardii 250 milliGRAM(s) Oral two times a day    MEDICATIONS  (PRN):  ketorolac   Injectable 15 milliGRAM(s) IV Push every 6 hours PRN Mild Pain (1 - 3)  oxycodone    5 mG/acetaminophen 325 mG 1 Tablet(s) Oral every 4 hours PRN Moderate Pain (4 - 6)      PHYSICAL EXAM:  GENERAL: NAD, well-developed  HEAD:  Atraumatic, Normocephalic  EYES: EOMI, PERRLA, conjunctiva and sclera clear  NERVOUS SYSTEM:  Alert & Oriented X3, Good concentration; Motor Strength 5/5 B/L upper and lower extremities  CHEST/LUNG: Clear speech, no SOB evident at rest; +right sided chest tube  ABDOMEN: Nondistended; Bowel sounds present  SKIN: hematoma on the interior aspect of right elbow      Vital Signs Last 24 Hrs  T(C): 36.7 (25 Nov 2019 07:00), Max: 36.7 (24 Nov 2019 20:27)  T(F): 98.1 (25 Nov 2019 07:00), Max: 98.1 (25 Nov 2019 07:00)  HR: 94 (25 Nov 2019 11:00) (77 - 98)  BP: 79/47 (25 Nov 2019 11:00) (78/49 - 126/79)  BP(mean): 59 (25 Nov 2019 11:00) (58 - 75)  RR: 25 (25 Nov 2019 11:00) (16 - 39)  SpO2: 93% (25 Nov 2019 11:00) (92% - 99%)                          10.2   22.35 )-----------( 448      ( 25 Nov 2019 05:29 )             30.8       LIVER FUNCTIONS - ( 25 Nov 2019 05:29 )  Alb: 1.8 g/dL / Pro: 5.0 g/dL / ALK PHOS: 252 U/L / ALT: 32 U/L / AST: 46 U/L / GGT: x             PTT - ( 25 Nov 2019 05:30 )  PTT:51.3 sec    Pain Service   Text Page via [place]  PIN: [phone]

## 2019-11-25 NOTE — BEHAVIORAL HEALTH ASSESSMENT NOTE - SUMMARY
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 Psych referral requested for anxiety.  Due to hyponatremia, SSRI or antidepressant not appropriate at this time as can worsen Hyponatremia.  Benzo is not recommended secondary to alcohol abuse and respiratory depression, however for severe anxiety or panic give low dose Ativan 0.25 prn in hospital only bid.  Pt would benefit from [facility] referral after discharged from hospital.  Psych to follow prn.

## 2019-11-25 NOTE — H&P ADULT - PROBLEM SELECTOR PLAN 3
Hyponatremic, hypochloremic, euvolemic on admission   Consider r/o of SIADH will send urine lytes, serum osms, UA for specific gravity

## 2019-11-25 NOTE — CONSULT NOTE ADULT - SUBJECTIVE AND OBJECTIVE BOX
[facility]  INFECTIOUS DISEASES AND INTERNAL MEDICINE at [place]  =======================================================  Carlos Alberto Soto MD  Diplomates American Board of Internal Medicine and Infectious Diseases  Telephone [phone]  Fax            [phone]  =======================================================    [id number]  HERACLIO MOLINA   HPI:  This 34 y.o. F who was admitted to [facility] 11/22 with weakness, 25lb weight loss of several months, flu like symptoms, on work up found to have a large right pleural effusion.  Pulmonology contact for thoracentesis, 1.5 L of dark serous fluid was removed.  Chest tube placed shortly thereafter, 3 liters was removed at that point in time. Patient had some abnormalities in their lab work, found to have a leukocytosis with wbc of 40, platelet count of 793, Na 120, chloride 73, lactate of 4.7, amylase 268,  and CTA imaging also revealed a 2.1cm partially cystic mass in the body/tail of the pancreas with possible upstream ductal dilatation. 11/23 patient had CT chest/abdomen/pelvis and was also found to have a clot in the right IJ and was started on a heparin gtt. Patient does state that she has had a recent cough, and complains of right sided neck pain. 11/14 Patient transferred to [facility] for further care (25 Nov 2019 00:10)    at [facility] she had WBC of 40 K on 11/22, but since down to 22K here.  She had hyponatremia of 120 on admission to [facility], now at 132 at [facility].     Cultures reviewed on [facility] HIE:  11/22/19 blood cx negative x 2  11/22/19 - body fluid cx with Moderate Alpha hemolytic strep x 2 sets  Alpha hemolytic strep  		Sierra-Sood	E Test  Ceftriaxone  			S (0.19)   Clindamycin 	S    Erythromycin 	S   Penicillin 	S   Vancomycin 	S       I have personally reviewed the labs and data; pertinent labs and data are listed in this note; please see below.   =======================================================  Past Medical & Surgical Hx:  =====================  PAST MEDICAL & SURGICAL HISTORY:    Problem List:  ==========  HEALTH ISSUES - PROBLEM Dx:  Right-sided chest wall pain: Right-sided chest wall pain  Pancreatic cyst: Pancreatic cyst  Hyponatremia: Hyponatremia  Internal jugular vein thrombosis, right: Internal jugular vein thrombosis, right  Empyema: Empyema        Social Hx:  =======  no toxic habits currently    FAMILY HISTORY:  no significant family history of immunosuppressive disorders in mother or father   =======================================================  REVIEW OF SYSTEMS:  CONSTITUTIONAL:  No Fever or chills  HEENT:  No diplopia or blurred vision.  No earache, sore throat or runny nose.  CARDIOVASCULAR:  No pressure, squeezing, strangling, tightness, heaviness or aching about the chest, neck, axilla or epigastrium.  RESPIRATORY:  No cough, shortness of breath  GASTROINTESTINAL:  No nausea, vomiting or diarrhea.  GENITOURINARY:  No dysuria, frequency or urgency. No Blood in urine  MUSCULOSKELETAL:  no joint aches, no muscle pain  SKIN:  No change in skin, hair or nails.  NEUROLOGIC:  No Headaches, seizures or weakness.  PSYCHIATRIC:  No disorder of thought or mood.  ENDOCRINE:  No heat or cold intolerance  HEMATOLOGICAL:  No easy bruising or bleeding.   =======================================================  Allergies    No Known Allergies    Intolerances    Ensure TID (Unknown)  Antibiotics:  azithromycin  IVPB      azithromycin  IVPB 500 milliGRAM(s) IV Intermittent every 24 hours  piperacillin/tazobactam IVPB.. 3.375 Gram(s) IV Intermittent every 8 hours    Other medications:  chlorhexidine 2% Cloths 1 Application(s) Topical every 12 hours  chlorhexidine 4% Liquid 1 Application(s) Topical every 12 hours  heparin  Infusion 500 Unit(s)/Hr IV Continuous <Continuous>  saccharomyces boulardii 250 milliGRAM(s) Oral two times a day     azithromycin  IVPB   255 mL/Hr IV Intermittent (11-25-19 @ 05:37)    piperacillin/tazobactam IVPB..   25 mL/Hr IV Intermittent (11-25-19 @ 09:45)      ======================================================  Physical Exam:  ============  T(F): 97.5 (25 Nov 2019 12:00), Max: 98.1 (25 Nov 2019 07:00)  HR: 84 (25 Nov 2019 12:00)  BP: 79/45 (25 Nov 2019 12:00)  RR: 16 (25 Nov 2019 12:00)  SpO2: 94% (25 Nov 2019 12:00) (92% - 99%)  temp max in last 48H T(F): , Max: 98.1 (11-25-19 @ 07:00)Height (cm): 167.6 (11-24-19 @ 18:43)  Weight (kg): 55.2 (11-24-19 @ 18:43)  BMI (kg/m2): 19.7 (11-24-19 @ 18:43)  BSA (m2): 1.62 (11-24-19 @ 18:43)    General:  No acute distress.   Eye: Pupils are equal, round and reactive to light, Extraocular movements are intact, Normal conjunctiva.  HENT: Normocephalic, Oral mucosa is moist, No pharyngeal erythema, No sinus tenderness.  Neck: Supple, No lymphadenopathy.  Respiratory: decreased breath sounds right lung  RIGHT chest with chest tube in place; YELLOW fluid  Cardiovascular: Normal rate, Regular rhythm,   TRACE edema of legs  Gastrointestinal: Soft, Non-tender, Non-distended, Normal bowel sounds.  Genitourinary: No costovertebral angle tenderness.  Lymphatics: No lymphadenopathy neck,   Musculoskeletal: Normal range of motion, Normal strength.  Integumentary: No rash.  Neurologic: Alert, Oriented, No focal deficits, Cranial Nerves II-XII are grossly intact.  Psychiatric: Appropriate mood & affect.    =======================================================  11/22/19 blood cx negative x 2  11/22/19 - body fluid cx with Moderate Alpha hemolytic strep x 2 sets  Alpha hemolytic strep  		Sierra-Sood	E Test  Ceftriaxone  			S (0.19)   Clindamycin 	S    Erythromycin 	S   Penicillin 	S   Vancomycin 	S     ======================================================  Labs:                        10.2   22.35 )-----------( 448      ( 25 Nov 2019 05:29 )             30.8       WBC Count: 22.35 K/uL (11-25-19 @ 05:29)  WBC Count: 23.82 K/uL (11-24-19 @ 20:27)      11-25    132<L>  |  95<L>  |  5.0<L>  ----------------------------<  83  3.8   |  26.0  |  0.37<L>    Ca    6.6<L>      25 Nov 2019 05:29  Phos  1.2     11-25  Mg     1.5     11-25    TPro  5.0<L>  /  Alb  1.8<L>  /  TBili  0.4  /  DBili  x   /  AST  46<H>  /  ALT  32  /  AlkPhos  252<H>  11-25      Creatinine, Serum: 0.37 mg/dL (11-25-19 @ 05:29)  Creatinine, Serum: 0.50 mg/dL (11-24-19 @ 20:27)    C-Reactive Protein, Serum: 17.92 mg/dL (11-25-19 @ 05:29)    Sedimentation Rate, Erythrocyte: 56 mm/hr (11-25-19 @ 05:29)

## 2019-11-25 NOTE — CHART NOTE - NSCHARTNOTEFT_GEN_A_CORE
Upon Nutritional Assessment by the Registered Dietitian your patient was determined to meet criteria / has evidence of the following diagnosis/diagnoses:          [ ]  Mild Protein Calorie Malnutrition        [ ]  Moderate Protein Calorie Malnutrition        [x ] Severe Protein Calorie Malnutrition        [ ] Unspecified Protein Calorie Malnutrition        [ ] Underweight / BMI <19        [ ] Morbid Obesity / BMI > 40      Findings as based on:  •  Comprehensive nutrition assessment and consultation  •  Calorie counts (nutrient intake analysis)  •  Food acceptance and intake status from observations by staff  •  Follow up  •  Patient education  •  Intervention secondary to interdisciplinary rounds  •   concerns      Treatment:    The following diet has been recommended:    Ensure TID     PROVIDER Section:     By signing this assessment you are acknowledging and agree with the diagnosis/diagnoses assigned by the Registered Dietitian    Comments:

## 2019-11-25 NOTE — BEHAVIORAL HEALTH ASSESSMENT NOTE - NSBHCHARTREVIEWVS_PSY_A_CORE FT
ICU Vital Signs Last 24 Hrs  T(C): 36.4 (25 Nov 2019 12:00), Max: 36.7 (24 Nov 2019 20:27)  T(F): 97.5 (25 Nov 2019 12:00), Max: 98.1 (25 Nov 2019 07:00)  HR: 90 (25 Nov 2019 16:00) (77 - 98)  BP: 88/55 (25 Nov 2019 16:00) (78/49 - 126/79)  BP(mean): 67 (25 Nov 2019 16:00) (56 - 75)  ABP: --  ABP(mean): --  RR: 29 (25 Nov 2019 16:00) (16 - 39)  SpO2: 95% (25 Nov 2019 16:00) (92% - 99%)

## 2019-11-25 NOTE — BEHAVIORAL HEALTH ASSESSMENT NOTE - HPI (INCLUDE ILLNESS QUALITY, SEVERITY, DURATION, TIMING, CONTEXT, MODIFYING FACTORS, ASSOCIATED SIGNS AND SYMPTOMS)
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 Psych referral requested for anxiety.  Pt reports h/o "panic  attacks", untreated and not diagnosed.  She reports h/o avoidant and maladaptive coping mechanistics, ie alcohol use, avoided certain classes and avoids treatment.  Denies depressed mood SI or h/o self harm.  Pt minimizes alcohol use and  admits to smoking cigarettes and vaping.   Pt reports she has tried Xanax in past which is the only effective meds for anxiety.  Pt c/o impaired sleep but denies other depressive symptoms.  States she feel happy to be taking care of her needs first as she has always placed others needs ahead of her own, specifically family needs.   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  Pt is well related, good eye contact, pale, denies depression, SI/HI/AH and no evidence of merline or psychotic symptoms.  Pt does not present evidence of acute anxiety at this time and  agrees anxiety is now manageable.  Discussed options of starting SSRI for anxiety, however cannot due to hyponatremia.

## 2019-11-25 NOTE — BEHAVIORAL HEALTH ASSESSMENT NOTE - NSBHREFERDETAILS_PSY_A_CORE_FT
c/p from h and p:  "History of Present Illness:   Comes in to [facility] [place]  11/22 with weakness, 25lb weight loss of several months, flu like symptoms, on work up found to have a large right pleural effusion.  Pulmonology contact for thoracentesis, 1.5 L of dark serous fluid was removed.  Chest tube placed shortly thereafter, 3 liters was removed at that point in time. Patient had some abnormalities in their lab work, found to have a leukocytosis with wbc of 40, platelet count of 793, Na 120, chloride 73, lactate of 4.7, amylase 268,  and CTA imaging also revealed a 2.1cm partially cystic mass in the body/tail of the pancreas with possible upstream ductal dilatation. 11/23 patient had CT chest/abdomen/pelvis and was also found to have a clot in the right IJ and was started on a heparin gtt. Patient does state that she has had a recent cough, and complains of right sided neck pain. "

## 2019-11-25 NOTE — H&P ADULT - HISTORY OF PRESENT ILLNESS
Comes in to [facility] [place]  11/22 with weakness, 25lb weight loss of several months, flu like symptoms, on work up found to have a large right pleural effusion.  Pulmonology contact for thoracentesis, 1.5 L of dark serous fluid was removed.  Chest tube placed shortly thereafter, 3 liters was removed at that point in time, due to air leak.  Patient had some abnormalities in their lab work, found to have a leukocytosis with wbc of 40, platelet count of 793, Na 120, chloride 73, lactate of 4.7, amylase 268,  and CTA imaging also revealed a 2.1cm partially cystic mass in the body/tail of the pancreas with possible upstream ductal dilatation. 11/23 patient had CT chest/abdomen/pelvis and was also found to have a clot in the right IJ and was started on a heparin gtt. Patient does state that she has had a recent cough, and complains of right sided neck pain. 11/14 Patient transferred to [facility]. Comes in to [facility] [place]  11/22 with weakness, 25lb weight loss of several months, flu like symptoms, on work up found to have a large right pleural effusion.  Pulmonology contact for thoracentesis, 1.5 L of dark serous fluid was removed.  Chest tube placed shortly thereafter, 3 liters was removed at that point in time. Patient had some abnormalities in their lab work, found to have a leukocytosis with wbc of 40, platelet count of 793, Na 120, chloride 73, lactate of 4.7, amylase 268,  and CTA imaging also revealed a 2.1cm partially cystic mass in the body/tail of the pancreas with possible upstream ductal dilatation. 11/23 patient had CT chest/abdomen/pelvis and was also found to have a clot in the right IJ and was started on a heparin gtt. Patient does state that she has had a recent cough, and complains of right sided neck pain. 11/14 Patient transferred to [facility].

## 2019-11-25 NOTE — DIETITIAN INITIAL EVALUATION ADULT. - PERTINENT LABORATORY DATA
11-25 Na132 mmol/L<L> Glu 83 mg/dL K+ 3.8 mmol/L Cr  0.37 mg/dL<L> BUN 5.0 mg/dL<L> Phos 1.2 mg/dL<L> Alb 1.8 g/dL<L> PAB n/a

## 2019-11-26 ENCOUNTER — TRANSCRIPTION ENCOUNTER (OUTPATIENT)
Age: 34
End: 2019-11-26

## 2019-11-26 PROBLEM — Z00.00 ENCOUNTER FOR PREVENTIVE HEALTH EXAMINATION: Status: ACTIVE | Noted: 2019-11-26

## 2019-11-26 LAB
ANION GAP SERPL CALC-SCNC: 12 MMOL/L — SIGNIFICANT CHANGE UP (ref 5–17)
ANISOCYTOSIS BLD QL: SIGNIFICANT CHANGE UP
APPEARANCE UR: CLEAR — SIGNIFICANT CHANGE UP
APTT BLD: 75 SEC — HIGH (ref 27.5–36.3)
BACTERIA # UR AUTO: ABNORMAL
BASOPHILS # BLD AUTO: 0 K/UL — SIGNIFICANT CHANGE UP (ref 0–0.2)
BASOPHILS NFR BLD AUTO: 0 % — SIGNIFICANT CHANGE UP (ref 0–2)
BILIRUB UR-MCNC: NEGATIVE — SIGNIFICANT CHANGE UP
BUN SERPL-MCNC: 6 MG/DL — LOW (ref 8–20)
CALCIUM SERPL-MCNC: 7.2 MG/DL — LOW (ref 8.6–10.2)
CANCER AG19-9 SERPL-ACNC: 31 U/ML — SIGNIFICANT CHANGE UP
CHLORIDE SERPL-SCNC: 97 MMOL/L — LOW (ref 98–107)
CHLORIDE UR-SCNC: <27 MMOL/L — SIGNIFICANT CHANGE UP
CO2 SERPL-SCNC: 24 MMOL/L — SIGNIFICANT CHANGE UP (ref 22–29)
COLOR SPEC: YELLOW — SIGNIFICANT CHANGE UP
CREAT SERPL-MCNC: 0.38 MG/DL — LOW (ref 0.5–1.3)
DIFF PNL FLD: ABNORMAL
EOSINOPHIL # BLD AUTO: 0.24 K/UL — SIGNIFICANT CHANGE UP (ref 0–0.5)
EOSINOPHIL NFR BLD AUTO: 0.9 % — SIGNIFICANT CHANGE UP (ref 0–6)
EPI CELLS # UR: SIGNIFICANT CHANGE UP
GLUCOSE SERPL-MCNC: 96 MG/DL — SIGNIFICANT CHANGE UP (ref 70–115)
GLUCOSE UR QL: NEGATIVE MG/DL — SIGNIFICANT CHANGE UP
HCG UR QL: NEGATIVE — SIGNIFICANT CHANGE UP
HCT VFR BLD CALC: 33.7 % — LOW (ref 34.5–45)
HGB BLD-MCNC: 11 G/DL — LOW (ref 11.5–15.5)
INR BLD: 1.06 RATIO — SIGNIFICANT CHANGE UP (ref 0.88–1.16)
KETONES UR-MCNC: NEGATIVE — SIGNIFICANT CHANGE UP
LEUKOCYTE ESTERASE UR-ACNC: NEGATIVE — SIGNIFICANT CHANGE UP
LYMPHOCYTES # BLD AUTO: 19.1 % — SIGNIFICANT CHANGE UP (ref 13–44)
LYMPHOCYTES # BLD AUTO: 4.99 K/UL — HIGH (ref 1–3.3)
MACROCYTES BLD QL: SIGNIFICANT CHANGE UP
MAGNESIUM SERPL-MCNC: 1.6 MG/DL — SIGNIFICANT CHANGE UP (ref 1.6–2.6)
MANUAL SMEAR VERIFICATION: SIGNIFICANT CHANGE UP
MCHC RBC-ENTMCNC: 32.6 GM/DL — SIGNIFICANT CHANGE UP (ref 32–36)
MCHC RBC-ENTMCNC: 33.3 PG — SIGNIFICANT CHANGE UP (ref 27–34)
MCV RBC AUTO: 102.1 FL — HIGH (ref 80–100)
MONOCYTES # BLD AUTO: 2.72 K/UL — HIGH (ref 0–0.9)
MONOCYTES NFR BLD AUTO: 10.4 % — SIGNIFICANT CHANGE UP (ref 2–14)
NEUTROPHILS # BLD AUTO: 17.72 K/UL — HIGH (ref 1.8–7.4)
NEUTROPHILS NFR BLD AUTO: 67.8 % — SIGNIFICANT CHANGE UP (ref 43–77)
NITRITE UR-MCNC: NEGATIVE — SIGNIFICANT CHANGE UP
OSMOLALITY UR: 261 MOSM/KG — LOW (ref 300–1000)
OVALOCYTES BLD QL SMEAR: SLIGHT — SIGNIFICANT CHANGE UP
PH UR: 8 — SIGNIFICANT CHANGE UP (ref 5–8)
PLAT MORPH BLD: NORMAL — SIGNIFICANT CHANGE UP
PLATELET # BLD AUTO: 431 K/UL — HIGH (ref 150–400)
PLATELET COUNT - ESTIMATE: ABNORMAL
POLYCHROMASIA BLD QL SMEAR: SIGNIFICANT CHANGE UP
POTASSIUM SERPL-MCNC: 3.8 MMOL/L — SIGNIFICANT CHANGE UP (ref 3.5–5.3)
POTASSIUM SERPL-SCNC: 3.8 MMOL/L — SIGNIFICANT CHANGE UP (ref 3.5–5.3)
POTASSIUM UR-SCNC: 18 MMOL/L — SIGNIFICANT CHANGE UP
PROCALCITONIN SERPL-MCNC: 2.24 NG/ML — HIGH (ref 0.02–0.1)
PROMYELOCYTES # FLD: 1.8 % — HIGH (ref 0–0)
PROT UR-MCNC: NEGATIVE MG/DL — SIGNIFICANT CHANGE UP
PROTHROM AB SERPL-ACNC: 12.2 SEC — SIGNIFICANT CHANGE UP (ref 10–12.9)
RBC # BLD: 3.3 M/UL — LOW (ref 3.8–5.2)
RBC # FLD: 13.6 % — SIGNIFICANT CHANGE UP (ref 10.3–14.5)
RBC BLD AUTO: ABNORMAL
RBC CASTS # UR COMP ASSIST: SIGNIFICANT CHANGE UP /HPF (ref 0–4)
RHEUMATOID FACT SERPL-ACNC: <10 IU/ML — SIGNIFICANT CHANGE UP (ref 0–13)
SODIUM SERPL-SCNC: 133 MMOL/L — LOW (ref 135–145)
SODIUM UR-SCNC: 56 MMOL/L — SIGNIFICANT CHANGE UP
SP GR SPEC: 1.01 — SIGNIFICANT CHANGE UP (ref 1.01–1.02)
T3 SERPL-MCNC: 70 NG/DL — LOW (ref 80–200)
T4 AB SER-ACNC: 5.2 UG/DL — SIGNIFICANT CHANGE UP (ref 4.5–12)
THYROPEROXIDASE AB SERPL-ACNC: <10 IU/ML — SIGNIFICANT CHANGE UP
UROBILINOGEN FLD QL: NEGATIVE MG/DL — SIGNIFICANT CHANGE UP
WBC # BLD: 26.13 K/UL — HIGH (ref 3.8–10.5)
WBC # FLD AUTO: 26.13 K/UL — HIGH (ref 3.8–10.5)
WBC UR QL: SIGNIFICANT CHANGE UP

## 2019-11-26 PROCEDURE — 93010 ELECTROCARDIOGRAM REPORT: CPT

## 2019-11-26 PROCEDURE — 99233 SBSQ HOSP IP/OBS HIGH 50: CPT

## 2019-11-26 PROCEDURE — 71045 X-RAY EXAM CHEST 1 VIEW: CPT | Mod: 26

## 2019-11-26 PROCEDURE — 93970 EXTREMITY STUDY: CPT | Mod: 26

## 2019-11-26 PROCEDURE — 74183 MRI ABD W/O CNTR FLWD CNTR: CPT | Mod: 26

## 2019-11-26 PROCEDURE — 73620 X-RAY EXAM OF FOOT: CPT | Mod: 26,RT

## 2019-11-26 PROCEDURE — 99223 1ST HOSP IP/OBS HIGH 75: CPT

## 2019-11-26 PROCEDURE — 99232 SBSQ HOSP IP/OBS MODERATE 35: CPT

## 2019-11-26 RX ORDER — MAGNESIUM SULFATE 500 MG/ML
2 VIAL (ML) INJECTION
Refills: 0 | Status: COMPLETED | OUTPATIENT
Start: 2019-11-26 | End: 2019-11-26

## 2019-11-26 RX ORDER — MAGNESIUM SULFATE 500 MG/ML
2 VIAL (ML) INJECTION ONCE
Refills: 0 | Status: DISCONTINUED | OUTPATIENT
Start: 2019-11-26 | End: 2019-11-26

## 2019-11-26 RX ADMIN — Medication 100 MILLIGRAM(S): at 15:20

## 2019-11-26 RX ADMIN — OXYCODONE AND ACETAMINOPHEN 1 TABLET(S): 5; 325 TABLET ORAL at 22:00

## 2019-11-26 RX ADMIN — OXYCODONE AND ACETAMINOPHEN 1 TABLET(S): 5; 325 TABLET ORAL at 04:32

## 2019-11-26 RX ADMIN — CEFTRIAXONE 100 MILLIGRAM(S): 500 INJECTION, POWDER, FOR SOLUTION INTRAMUSCULAR; INTRAVENOUS at 16:53

## 2019-11-26 RX ADMIN — OXYCODONE AND ACETAMINOPHEN 1 TABLET(S): 5; 325 TABLET ORAL at 15:18

## 2019-11-26 RX ADMIN — Medication 15 MILLIGRAM(S): at 23:00

## 2019-11-26 RX ADMIN — TRAMADOL HYDROCHLORIDE 50 MILLIGRAM(S): 50 TABLET ORAL at 20:48

## 2019-11-26 RX ADMIN — Medication 15 MILLIGRAM(S): at 08:31

## 2019-11-26 RX ADMIN — OXYCODONE AND ACETAMINOPHEN 1 TABLET(S): 5; 325 TABLET ORAL at 16:18

## 2019-11-26 RX ADMIN — TRAMADOL HYDROCHLORIDE 50 MILLIGRAM(S): 50 TABLET ORAL at 00:30

## 2019-11-26 RX ADMIN — TRAMADOL HYDROCHLORIDE 50 MILLIGRAM(S): 50 TABLET ORAL at 21:48

## 2019-11-26 RX ADMIN — Medication 100 MILLIGRAM(S): at 04:33

## 2019-11-26 RX ADMIN — OXYCODONE AND ACETAMINOPHEN 1 TABLET(S): 5; 325 TABLET ORAL at 23:00

## 2019-11-26 RX ADMIN — Medication 50 GRAM(S): at 15:19

## 2019-11-26 RX ADMIN — Medication 15 MILLIGRAM(S): at 22:00

## 2019-11-26 RX ADMIN — Medication 50 GRAM(S): at 11:23

## 2019-11-26 RX ADMIN — Medication 0.5 MILLIGRAM(S): at 13:20

## 2019-11-26 RX ADMIN — Medication 15 MILLIGRAM(S): at 04:00

## 2019-11-26 RX ADMIN — Medication 1 MILLIGRAM(S): at 15:19

## 2019-11-26 RX ADMIN — Medication 15 MILLIGRAM(S): at 17:09

## 2019-11-26 RX ADMIN — OXYCODONE AND ACETAMINOPHEN 1 TABLET(S): 5; 325 TABLET ORAL at 05:32

## 2019-11-26 RX ADMIN — Medication 15 MILLIGRAM(S): at 03:00

## 2019-11-26 RX ADMIN — LIDOCAINE 1 PATCH: 4 CREAM TOPICAL at 07:01

## 2019-11-26 RX ADMIN — METHOCARBAMOL 500 MILLIGRAM(S): 500 TABLET, FILM COATED ORAL at 00:30

## 2019-11-26 RX ADMIN — METHOCARBAMOL 500 MILLIGRAM(S): 500 TABLET, FILM COATED ORAL at 19:41

## 2019-11-26 RX ADMIN — Medication 15 MILLIGRAM(S): at 16:54

## 2019-11-26 RX ADMIN — Medication 15 MILLIGRAM(S): at 08:45

## 2019-11-26 RX ADMIN — TRAMADOL HYDROCHLORIDE 50 MILLIGRAM(S): 50 TABLET ORAL at 01:30

## 2019-11-26 RX ADMIN — Medication 1 TABLET(S): at 15:18

## 2019-11-26 RX ADMIN — Medication 250 MILLIGRAM(S): at 15:19

## 2019-11-26 NOTE — CONSULT NOTE ADULT - SUBJECTIVE AND OBJECTIVE BOX
HPI:  Comes in to [facility] [place]   with weakness, 25lb weight loss of several months, flu like symptoms, on work up found to have a large right pleural effusion.  Pulmonology contact for thoracentesis, 1.5 L of dark serous fluid was removed.  Chest tube placed shortly thereafter, 3 liters was removed at that point in time. Patient had some abnormalities in their lab work, found to have a leukocytosis with wbc of 40, platelet count of 793, Na 120, chloride 73, lactate of 4.7, amylase 268,  and CTA imaging also revealed a 2.1cm partially cystic mass in the body/tail of the pancreas with possible upstream ductal dilatation.  patient had CT chest/abdomen/pelvis and was also found to have a clot in the right IJ and was started on a heparin gtt. Patient does state that she has had a recent cough, and complains of right sided neck pain.  Patient transferred to [facility]. (2019 00:10)      PAST MEDICAL & SURGICAL HISTORY:      FAMILY HISTORY:    SOCIAL HISTORY:    REVIEW OF SYSTEMS:  Constitutional: lost 25 lbs in the last 2 mos, fatigue, fevers/chills, weakness+  Eyes: No eye swelling,no  blurry vision, no redness, no loss of vision.  Neck: No neck pain, no change in voice.  Lungs: HAS shortness of breath, has cough.   CV: No chest pain, no palpitations  GI: No nausea, no vomiting, no constipation, no diarrhea, no abdominal pain  : No urinary frequency, no blood in urine  Skin: No rash  Neurologic: No headaches, no weakness, no burning or pain in feet, no tremor.  Endocrine: Always hot and sweating, no cold intolerance  Psych: No depression, no anxiety    MEDICATIONS  (STANDING):  cefTRIAXone   IVPB 1000 milliGRAM(s) IV Intermittent every 24 hours  folic acid 1 milliGRAM(s) Oral daily  heparin  Infusion 500 Unit(s)/Hr (11 mL/Hr) IV Continuous <Continuous>  influenza   Vaccine 0.5 milliLiter(s) IntraMuscular once  ketorolac   Injectable 15 milliGRAM(s) IV Push every 6 hours  lidocaine   Patch 1 Patch Transdermal daily  LORazepam     Tablet 0.5 milliGRAM(s) Oral once  magnesium sulfate  IVPB 2 Gram(s) IV Intermittent every 1 hour  multivitamin 1 Tablet(s) Oral daily  psyllium Powder 1 Packet(s) Oral two times a day  saccharomyces boulardii 250 milliGRAM(s) Oral two times a day  thiamine 100 milliGRAM(s) Oral daily    MEDICATIONS  (PRN):  acetaminophen   Tablet .. 650 milliGRAM(s) Oral every 6 hours PRN Mild Pain (1 - 3)  methocarbamol 500 milliGRAM(s) Oral three times a day PRN Muscle Spasm  oxycodone    5 mG/acetaminophen 325 mG 1 Tablet(s) Oral every 6 hours PRN Severe Pain (7 - 10)  traMADol 50 milliGRAM(s) Oral every 6 hours PRN Moderate Pain (4 - 6)    Allergies  No Known Allergies    Intolerances  Ensure TID (Unknown)    PHYSICAL EXAM:    Vital Signs Last 24 Hrs  T(C): 36.7 (2019 14:00), Max: 36.8 (2019 00:30)  T(F): 98.1 (2019 14:00), Max: 98.3 (2019 00:30)  HR: 75 (2019 12:00) (70 - 100)  BP: 98/68 (2019 12:00) (82/51 - 98/68)  BP(mean): 72 (2019 10:00) (61 - 79)  RR: 16 (2019 12:00) (12 - 53)  SpO2: 95% (2019 12:00) (92% - 100%)  Gen: NAD, thin, pale  	HEENT: supple neck, clear oropharynx  	Pulm; absent breath sounds in rt lung field  	CV: RRR, S1S2; no rub  	Back: No spinal or CVA tenderness; no sacral edema  	Abd: +BS, soft, nontender/nondistended  	: No suprapubic tenderness  	UE: Warm, no edema; no asterixis  	LE: Warm,  no edema  	Neuro: No focal deficits  	Psych: Normal affect and mood  	Skin: Warm, Rt arm bruising  	Vascular access: none            LABS:                        11.0   26.13 )-----------( 431      ( 2019 05:17 )             33.7         133<L>  |  97<L>  |  6.0<L>  ----------------------------<  96  3.8   |  24.0  |  0.38<L>    Ca    7.2<L>      2019 05:17  Phos  1.2       Mg     1.6         TPro  5.0<L>  /  Alb  1.8<L>  /  TBili  0.4  /  DBili  x   /  AST  46<H>  /  ALT  32  /  AlkPhos  252<H>      Urinalysis Basic - ( 2019 06:39 )    Color: Yellow / Appearance: Clear / S.005 / pH: x  Gluc: x / Ketone: Negative  / Bili: Negative / Urobili: 1 mg/dL   Blood: x / Protein: Negative mg/dL / Nitrite: Negative   Leuk Esterase: Negative / RBC: 0-2 /HPF / WBC 3-5   Sq Epi: x / Non Sq Epi: Occasional / Bacteria: Occasional      LIVER FUNCTIONS - ( 2019 05:29 )  Alb: 1.8 g/dL / Pro: 5.0 g/dL / ALK PHOS: 252 U/L / ALT: 32 U/L / AST: 46 U/L / GGT: x             T4, Serum: 5.2 ug/dL ( @ 05:17)      CAPILLARY BLOOD GLUCOSE      RADIOLOGY & ADDITIONAL STUDIES:

## 2019-11-26 NOTE — PROGRESS NOTE ADULT - PROBLEM SELECTOR PLAN 1
1. Continue Ketorolac 15mg IV q6hrs ATC x 2 days  - Unless otherwise contraindicated  2. Tylenol 650mg PO q6hrs PRN mild pain  3. Tramadol 50mg PO q6hrs PRN moderate pain  4. Percocet 1 tablet q6 hrs PRN severe pain  5. Robaxin 500mg PO TID PRN spasms  6. Lidoderm Patches adjacent to affected area, over intact skin only.

## 2019-11-26 NOTE — PROGRESS NOTE ADULT - SUBJECTIVE AND OBJECTIVE BOX
HPI/OVERNIGHT EVENTS:    Pt was seen and examined overnight. No acute complaints, and pt is in NAD. Pt is a transfer from [facility] [place] with a right pleural effusion. Pt is being evaluated at [facility] for a pancreatic cyst by surgical oncology. Pt's chest tube drained 480mL over past 24 hrs. Patient denies dizziness, headache, fever, chills, nausea, vomiting, tremors, chest pain, Sob.     MEDICATIONS  (STANDING):  cefTRIAXone   IVPB 1000 milliGRAM(s) IV Intermittent every 24 hours  folic acid 1 milliGRAM(s) Oral daily  heparin  Infusion 500 Unit(s)/Hr (11 mL/Hr) IV Continuous <Continuous>  influenza   Vaccine 0.5 milliLiter(s) IntraMuscular once  ketorolac   Injectable 15 milliGRAM(s) IV Push every 6 hours  lidocaine   Patch 1 Patch Transdermal daily  multivitamin 1 Tablet(s) Oral daily  psyllium Powder 1 Packet(s) Oral two times a day  saccharomyces boulardii 250 milliGRAM(s) Oral two times a day  thiamine 100 milliGRAM(s) Oral daily    MEDICATIONS  (PRN):  acetaminophen   Tablet .. 650 milliGRAM(s) Oral every 6 hours PRN Mild Pain (1 - 3)  methocarbamol 500 milliGRAM(s) Oral three times a day PRN Muscle Spasm  oxycodone    5 mG/acetaminophen 325 mG 1 Tablet(s) Oral every 6 hours PRN Severe Pain (7 - 10)  traMADol 50 milliGRAM(s) Oral every 6 hours PRN Moderate Pain (4 - 6)      Vital Signs Last 24 Hrs  T(C): 36.7 (2019 07:19), Max: 36.8 (2019 00:30)  T(F): 98.1 (2019 07:19), Max: 98.3 (2019 00:30)  HR: 74 (2019 08:00) (70 - 100)  BP: 86/56 (2019 08:00) (78/49 - 96/63)  BP(mean): 66 (2019 08:00) (56 - 79)  RR: 16 (2019 08:00) (12 - 34)  SpO2: 98% (2019 08:00) (92% - 100%)      Physical Exam:   Constitutional: NAD, well-groomed, well-developed  HEENT: PERRLA, EOMI, no drainage or redness  Neck: supple,  No JVD  Respiratory: Breath Sounds equal & clear bilaterally to auscultation, no rales/rhonchi/wheezing, no accessory muscle use noted, right chest tube with drainage   Cardiovascular: Regular rate, regular rhythm, normal S1, S2; no murmurs or rub  Gastrointestinal: Soft, non-tender, non distended, + bowel sounds  Extremities: THACKER x 4, no peripheral edema, no cyanosis, no clubbing   Neurological: A+O x 3  Skin: warm, dry, well perfused      I&O's Detail    2019 07:01  -  2019 07:00  --------------------------------------------------------  IN:    heparin Infusion: 264 mL    Oral Fluid: 720 mL    Solution: 50 mL    Solution: 100 mL  Total IN: 1134 mL    OUT:    Chest Tube: 480 mL    Voided: 600 mL  Total OUT: 1080 mL    Total NET: 54 mL          LABS:                        11.0   26.13 )-----------( 431      ( 2019 05:17 )             33.7         133<L>  |  97<L>  |  6.0<L>  ----------------------------<  96  3.8   |  24.0  |  0.38<L>    Ca    7.2<L>      2019 05:17  Phos  1.2       Mg     1.6         TPro  5.0<L>  /  Alb  1.8<L>  /  TBili  0.4  /  DBili  x   /  AST  46<H>  /  ALT  32  /  AlkPhos  252<H>      PT/INR - ( 2019 05:17 )   PT: 12.2 sec;   INR: 1.06 ratio         PTT - ( 2019 05:17 )  PTT:75.0 sec  Urinalysis Basic - ( 2019 06:39 )    Color: Yellow / Appearance: Clear / S.005 / pH: x  Gluc: x / Ketone: Negative  / Bili: Negative / Urobili: 1 mg/dL   Blood: x / Protein: Negative mg/dL / Nitrite: Negative   Leuk Esterase: Negative / RBC: 0-2 /HPF / WBC 3-5   Sq Epi: x / Non Sq Epi: Occasional / Bacteria: Occasional HPI/OVERNIGHT EVENTS:    Pt was seen and examined overnight. No acute complaints, patient in NAD. Pt is a transfer from [facility] [place] with a right pleural effusion. Pt is being evaluated at [facility] for a pancreatic cyst by surgical oncology. Pt's chest tube drained 480mL over past 24 hrs. Patient denies dizziness, headache, fever, chills, nausea, vomiting, tremors, chest pain, Sob.     MEDICATIONS  (STANDING):  cefTRIAXone   IVPB 1000 milliGRAM(s) IV Intermittent every 24 hours  folic acid 1 milliGRAM(s) Oral daily  heparin  Infusion 500 Unit(s)/Hr (11 mL/Hr) IV Continuous <Continuous>  influenza   Vaccine 0.5 milliLiter(s) IntraMuscular once  ketorolac   Injectable 15 milliGRAM(s) IV Push every 6 hours  lidocaine   Patch 1 Patch Transdermal daily  multivitamin 1 Tablet(s) Oral daily  psyllium Powder 1 Packet(s) Oral two times a day  saccharomyces boulardii 250 milliGRAM(s) Oral two times a day  thiamine 100 milliGRAM(s) Oral daily    MEDICATIONS  (PRN):  acetaminophen   Tablet .. 650 milliGRAM(s) Oral every 6 hours PRN Mild Pain (1 - 3)  methocarbamol 500 milliGRAM(s) Oral three times a day PRN Muscle Spasm  oxycodone    5 mG/acetaminophen 325 mG 1 Tablet(s) Oral every 6 hours PRN Severe Pain (7 - 10)  traMADol 50 milliGRAM(s) Oral every 6 hours PRN Moderate Pain (4 - 6)      Vital Signs Last 24 Hrs  T(C): 36.7 (2019 07:19), Max: 36.8 (2019 00:30)  T(F): 98.1 (2019 07:19), Max: 98.3 (2019 00:30)  HR: 74 (2019 08:00) (70 - 100)  BP: 86/56 (2019 08:00) (78/49 - 96/63)  BP(mean): 66 (2019 08:00) (56 - 79)  RR: 16 (2019 08:00) (12 - 34)  SpO2: 98% (2019 08:00) (92% - 100%)      Physical Exam:   Constitutional: NAD, well-groomed, well-developed  HEENT: PERRLA, EOMI, no drainage or redness  Neck: supple,  No JVD  Respiratory: Breath Sounds equal & clear bilaterally to auscultation, no rales/rhonchi/wheezing, no accessory muscle use noted, right chest tube with drainage   Cardiovascular: Regular rate, regular rhythm, normal S1, S2; no murmurs or rub  Gastrointestinal: Soft, non-tender, non distended, + bowel sounds  Extremities: THACKER x 4, no peripheral edema, no cyanosis, no clubbing   Neurological: A+O x 3  Skin: warm, dry, well perfused      I&O's Detail    2019 07:01  -  2019 07:00  --------------------------------------------------------  IN:    heparin Infusion: 264 mL    Oral Fluid: 720 mL    Solution: 50 mL    Solution: 100 mL  Total IN: 1134 mL    OUT:    Chest Tube: 480 mL    Voided: 600 mL  Total OUT: 1080 mL    Total NET: 54 mL          LABS:                        11.0   26.13 )-----------( 431      ( 2019 05:17 )             33.7         133<L>  |  97<L>  |  6.0<L>  ----------------------------<  96  3.8   |  24.0  |  0.38<L>    Ca    7.2<L>      2019 05:17  Phos  1.2       Mg     1.6         TPro  5.0<L>  /  Alb  1.8<L>  /  TBili  0.4  /  DBili  x   /  AST  46<H>  /  ALT  32  /  AlkPhos  252<H>      PT/INR - ( 2019 05:17 )   PT: 12.2 sec;   INR: 1.06 ratio         PTT - ( 2019 05:17 )  PTT:75.0 sec  Urinalysis Basic - ( 2019 06:39 )    Color: Yellow / Appearance: Clear / S.005 / pH: x  Gluc: x / Ketone: Negative  / Bili: Negative / Urobili: 1 mg/dL   Blood: x / Protein: Negative mg/dL / Nitrite: Negative   Leuk Esterase: Negative / RBC: 0-2 /HPF / WBC 3-5   Sq Epi: x / Non Sq Epi: Occasional / Bacteria: Occasional

## 2019-11-26 NOTE — CONSULT NOTE ADULT - SUBJECTIVE AND OBJECTIVE BOX
HPI: 34 year old woman who is transferred from [facility] [place]. She comes in to [facility] [place]   with weakness, 25lb weight loss of several months, flu like symptoms, on work up found to have a large right pleural effusion.  Pulmonology contact for thoracentesis, 1.5 L of dark serous fluid was removed.  Chest tube placed shortly thereafter, 3 liters was removed at that point in time. Patient had some abnormalities in their lab work, found to have a leukocytosis with wbc of 40, platelet count of 793, Na 120, chloride 73, lactate of 4.7, amylase 268,  and CTA imaging also revealed a 2.1cm partially cystic mass in the body/tail of the pancreas with possible upstream ductal dilatation.  patient had CT chest/abdomen/pelvis and was also found to have a clot in the right IJ and was started on a heparin gtt. Patient does state that she has had a recent cough, and complains of right sided neck pain.  Patient transferred to [facility].       PAST MEDICAL & SURGICAL HISTORY:      ROS:  No Heartburn, regurgitation, dysphagia, odynophagia.  No dyspepsia  No abdominal pain.    No Nausea, vomiting.  No Bleeding.  No hematemesis.   No diarrhea.    No hematochesia.  No weight loss, anorexia.  No edema.      MEDICATIONS  (STANDING):  cefTRIAXone   IVPB 1000 milliGRAM(s) IV Intermittent every 24 hours  folic acid 1 milliGRAM(s) Oral daily  heparin  Infusion 500 Unit(s)/Hr (11 mL/Hr) IV Continuous <Continuous>  influenza   Vaccine 0.5 milliLiter(s) IntraMuscular once  ketorolac   Injectable 15 milliGRAM(s) IV Push every 6 hours  lidocaine   Patch 1 Patch Transdermal daily  multivitamin 1 Tablet(s) Oral daily  psyllium Powder 1 Packet(s) Oral two times a day  saccharomyces boulardii 250 milliGRAM(s) Oral two times a day  thiamine 100 milliGRAM(s) Oral daily    MEDICATIONS  (PRN):  acetaminophen   Tablet .. 650 milliGRAM(s) Oral every 6 hours PRN Mild Pain (1 - 3)  methocarbamol 500 milliGRAM(s) Oral three times a day PRN Muscle Spasm  oxycodone    5 mG/acetaminophen 325 mG 1 Tablet(s) Oral every 6 hours PRN Severe Pain (7 - 10)  traMADol 50 milliGRAM(s) Oral every 6 hours PRN Moderate Pain (4 - 6)      Allergies    No Known Allergies    Intolerances    Ensure TID (Unknown)      SOCIAL HISTORY:    ENDOSCOPIC/GI HISTORY:    FAMILY HISTORY:      Vital Signs Last 24 Hrs  T(C): 36.8 (2019 00:30), Max: 36.8 (2019 00:30)  T(F): 98.3 (2019 00:30), Max: 98.3 (2019 00:30)  HR: 98 (2019 05:20) (71 - 100)  BP: 95/72 (2019 05:20) (78/49 - 96/63)  BP(mean): 79 (2019 05:20) (56 - 79)  RR: 32 (2019 05:20) (14 - 34)  SpO2: 99% (2019 05:20) (92% - 100%)    PHYSICAL EXAM:    GENERAL: NAD, well-groomed, well-developed  HEAD:  Atraumatic, Normocephalic  EYES: EOMI, PERRLA, conjunctiva and sclera clear  ENMT: No tonsillar erythema, exudates, or enlargement; Moist mucous membranes, Good dentition, No lesions  NECK: Supple, No JVD, Normal thyroid  CHEST/LUNG: Clear to percussion bilaterally; No rales, rhonchi, wheezing, or rubs  HEART: Regular rate and rhythm; No murmurs, rubs, or gallops  ABDOMEN: Soft, Nontender, Nondistended; Bowel sounds present  EXTREMITIES:  2+ Peripheral Pulses, No clubbing, cyanosis, or edema  LYMPH: No lymphadenopathy noted  SKIN: No rashes or lesions      LABS:                        11.0   .13 )-----------( 431      ( 2019 05:17 )             33.7         133<L>  |  97<L>  |  6.0<L>  ----------------------------<  96  3.8   |  24.0  |  0.38<L>    Ca    7.2<L>      2019 05:17  Phos  1.2     11-  Mg     1.6         TPro  5.0<L>  /  Alb  1.8<L>  /  TBili  0.4  /  DBili  x   /  AST  46<H>  /  ALT  32  /  AlkPhos  252<H>  11-25    PT/INR - ( 2019 05:17 )   PT: 12.2 sec;   INR: 1.06 ratio         PTT - ( 2019 05:17 )  PTT:75.0 sec   Urinalysis Basic - ( 2019 06:39 )    Color: Yellow / Appearance: Clear / S.005 / pH: x  Gluc: x / Ketone: Negative  / Bili: Negative / Urobili: 1 mg/dL   Blood: x / Protein: Negative mg/dL / Nitrite: Negative   Leuk Esterase: Negative / RBC: 0-2 /HPF / WBC 3-5   Sq Epi: x / Non Sq Epi: Occasional / Bacteria: Occasional        LIVER FUNCTIONS - ( 2019 05:29 )  Alb: 1.8 g/dL / Pro: 5.0 g/dL / ALK PHOS: 252 U/L / ALT: 32 U/L / AST: 46 U/L / GGT: x               RADIOLOGY & ADDITIONAL STUDIES: HPI: 34 year old woman who is transferred from [facility] [place]. She comes in to [facility] [place]   with weakness, 25lb weight loss of several months, flu like symptoms, on work up found to have a large right pleural effusion.  Pulmonology contact for thoracentesis, 1.5 L of dark serous fluid was removed.  Chest tube placed shortly thereafter, 3 liters was removed at that point in time. Patient had some abnormalities in their lab work, found to have a leukocytosis with wbc of 40, platelet count of 793, Na 120, chloride 73, lactate of 4.7, amylase 268,  and CTA imaging also revealed a 2.1cm partially cystic mass in the body/tail of the pancreas with possible upstream ductal dilatation.  patient had CT chest/abdomen/pelvis and was also found to have a clot in the right IJ and was started on a heparin gtt. Patient does state that she has had a recent cough, and complains of right sided neck pain.     She was evaluated by surgical oncology and MRI being planned.      PAST MEDICAL & SURGICAL HISTORY:  No significant surgical history.  Alcohol abuse  Tobacco use.      ROS:  No Heartburn, regurgitation, dysphagia, odynophagia.  No dyspepsia  No abdominal pain.    No Nausea, vomiting.  No Bleeding.  No hematemesis.   No diarrhea.    No hematochezia  +weight loss, anorexia.  No edema.      MEDICATIONS  (STANDING):  cefTRIAXone   IVPB 1000 milliGRAM(s) IV Intermittent every 24 hours  folic acid 1 milliGRAM(s) Oral daily  heparin  Infusion 500 Unit(s)/Hr (11 mL/Hr) IV Continuous <Continuous>  influenza   Vaccine 0.5 milliLiter(s) IntraMuscular once  ketorolac   Injectable 15 milliGRAM(s) IV Push every 6 hours  lidocaine   Patch 1 Patch Transdermal daily  multivitamin 1 Tablet(s) Oral daily  psyllium Powder 1 Packet(s) Oral two times a day  saccharomyces boulardii 250 milliGRAM(s) Oral two times a day  thiamine 100 milliGRAM(s) Oral daily    MEDICATIONS  (PRN):  acetaminophen   Tablet .. 650 milliGRAM(s) Oral every 6 hours PRN Mild Pain (1 - 3)  methocarbamol 500 milliGRAM(s) Oral three times a day PRN Muscle Spasm  oxycodone    5 mG/acetaminophen 325 mG 1 Tablet(s) Oral every 6 hours PRN Severe Pain (7 - 10)  traMADol 50 milliGRAM(s) Oral every 6 hours PRN Moderate Pain (4 - 6)      Allergies    No Known Allergies    Intolerances    Ensure TID (Unknown)      SOCIAL HISTORY:Alcohol use 5-6 drinks/day. Smoker-tobacco and vaping. Remote marijuana use.    ENDOSCOPIC/GI HISTORY:No previous EGD or colonoscopy    FAMILY HISTORY:Denies any GI pathology      Vital Signs Last 24 Hrs  T(C): 36.8 (2019 00:30), Max: 36.8 (2019 00:30)  T(F): 98.3 (2019 00:30), Max: 98.3 (2019 00:30)  HR: 98 (2019 05:20) (71 - 100)  BP: 95/72 (2019 05:20) (78/49 - 96/63)  BP(mean): 79 (2019 05:20) (56 - 79)  RR: 32 (2019 05:20) (14 - 34)  SpO2: 99% (2019 05:20) (92% - 100%)    PHYSICAL EXAM:    GENERAL: NAD, well-groomed, well-developed  HEAD:  Atraumatic, Normocephalic  EYES: EOMI, PERRLA, conjunctiva and sclera clear  ENMT: No tonsillar erythema, exudates, or enlargement; Moist mucous membranes, Good dentition, No lesions  NECK: Supple, No JVD, Normal thyroid  CHEST/LUNG: Clear to percussion bilaterally; No rales, rhonchi, wheezing, or rubs. Decreased breath sounds on right side. Right sided chest tube in place  HEART: Regular rate and rhythm; No murmurs, rubs, or gallops  ABDOMEN: Soft, Nontender, Nondistended; Bowel sounds present  EXTREMITIES:  2+ Peripheral Pulses, No clubbing, cyanosis, or edema  LYMPH: No lymphadenopathy noted  SKIN: No rashes or lesions      LABS:                        11.0   26.13 )-----------( 431      ( 2019 05:17 )             33.7     11-26    133<L>  |  97<L>  |  6.0<L>  ----------------------------<  96  3.8   |  24.0  |  0.38<L>    Ca    7.2<L>      2019 05:17  Phos  1.2       Mg     1.6         TPro  5.0<L>  /  Alb  1.8<L>  /  TBili  0.4  /  DBili  x   /  AST  46<H>  /  ALT  32  /  AlkPhos  252<H>      PT/INR - ( 2019 05:17 )   PT: 12.2 sec;   INR: 1.06 ratio         PTT - ( 2019 05:17 )  PTT:75.0 sec   Urinalysis Basic - ( 2019 06:39 )    Color: Yellow / Appearance: Clear / S.005 / pH: x  Gluc: x / Ketone: Negative  / Bili: Negative / Urobili: 1 mg/dL   Blood: x / Protein: Negative mg/dL / Nitrite: Negative   Leuk Esterase: Negative / RBC: 0-2 /HPF / WBC 3-5   Sq Epi: x / Non Sq Epi: Occasional / Bacteria: Occasional        LIVER FUNCTIONS - ( 2019 05:29 )  Alb: 1.8 g/dL / Pro: 5.0 g/dL / ALK PHOS: 252 U/L / ALT: 32 U/L / AST: 46 U/L / GGT: x               RADIOLOGY & ADDITIONAL STUDIES:< from: CT Chest No Cont (19 @ 01:14) >     EXAM:  CT CHEST                          PROCEDURE DATE:  2019          INTERPRETATION:  CLINICAL INFORMATION: Pleural effusion follow-up    COMPARISON: None.    PROCEDURE:   CT of the Chest was performed without intravenous contrast.  Sagittal and coronal reformats were performed.      FINDINGS:    LUNGS AND AIRWAYS: Patent central airways.  Right upper, middle, and   lower lobe consolidations. Left upper and lower lobe airspace opacities.    PLEURA: Small right hydropneumothorax. Rightlateral chest tube. Trace   left pleural fluid.    MEDIASTINUM AND TETO: Several prominent lymph nodes.    VESSELS: Within normal limits.    HEART: Heart size is normal. Small pericardial effusion.    CHEST WALL AND LOWER NECK: Right lateral chest wall subcutaneous edema.    VISUALIZED UPPER ABDOMEN: Trace ascites. Nonspecific perinephric   stranding.    BONES: Within normal limits.    IMPRESSION:     Left lung opacities and right lung consolidations, suspicious for   multifocal pneumonia.  Smallright hydropneumothorax. Right chest tube noted.                TERESA HARRINGTON M.D., ATTENDING RADIOLOGIST  This document has been electronically signed. Nov 25 2019  8:52AM                  < end of copied text >    CT abdomen/pelvis: Images reviewed from [facility]. 2.1 cm cystic lesion along with other small cystic lesions in the body.

## 2019-11-26 NOTE — PHYSICAL THERAPY INITIAL EVALUATION ADULT - DISCHARGE DISPOSITION, PT EVAL
anticipate home, no skilled PT needs. Pt deferred stair assessment at this time. Pt will benefit from daily ambulation with RN/floor staff.

## 2019-11-26 NOTE — PROGRESS NOTE ADULT - ASSESSMENT
34y old Female transferred to [facility] for management of empyema. She has a right sided chest tube and ongoing pain, relieved with use of Percocet. Found A&Ox3, NAD, sitting up in bed. she presently reports intense pain s/p lying on MRI table. Discussed continued use of the current. Patient agreeable to plan.

## 2019-11-26 NOTE — PROGRESS NOTE ADULT - SUBJECTIVE AND OBJECTIVE BOX
34y Female    Ensure TID (Unknown)  No Known Allergies      HPI:  [place] [facility] [place], 25lb weight loss of several months, flu like symptoms, on work up found to have a large right pleural effusion.  Pulmonology contact for thoracentesis, 1.5 L of dark serous fluid was removed.  Chest tube placed shortly thereafter, 3 liters was removed at that point in time. Patient had some abnormalities in their lab work, found to have a leukocytosis with wbc of 40, platelet count of 793, Na 120, chloride 73, lactate of 4.7, amylase 268,  and CTA imaging also revealed a 2.1cm partially cystic mass in the body/tail of the pancreas with possible upstream ductal dilatation.  patient had CT chest/abdomen/pelvis and was also found to have a clot in the right IJ and was started on a heparin gtt. Patient does state that she has had a recent cough, and complains of right sided neck pain.  Patient transferred to [facility]. (2019 00:10)      PAST MEDICAL & SURGICAL HISTORY:        MEDICATIONS  (STANDING):  cefTRIAXone   IVPB 1000 milliGRAM(s) IV Intermittent every 24 hours  folic acid 1 milliGRAM(s) Oral daily  heparin  Infusion 500 Unit(s)/Hr (11 mL/Hr) IV Continuous <Continuous>  influenza   Vaccine 0.5 milliLiter(s) IntraMuscular once  ketorolac   Injectable 15 milliGRAM(s) IV Push every 6 hours  lidocaine   Patch 1 Patch Transdermal daily  LORazepam     Tablet 0.5 milliGRAM(s) Oral once  magnesium sulfate  IVPB 2 Gram(s) IV Intermittent every 1 hour  multivitamin 1 Tablet(s) Oral daily  psyllium Powder 1 Packet(s) Oral two times a day  saccharomyces boulardii 250 milliGRAM(s) Oral two times a day  thiamine 100 milliGRAM(s) Oral daily    MEDICATIONS  (PRN):  acetaminophen   Tablet .. 650 milliGRAM(s) Oral every 6 hours PRN Mild Pain (1 - 3)  methocarbamol 500 milliGRAM(s) Oral three times a day PRN Muscle Spasm  oxycodone    5 mG/acetaminophen 325 mG 1 Tablet(s) Oral every 6 hours PRN Severe Pain (7 - 10)  traMADol 50 milliGRAM(s) Oral every 6 hours PRN Moderate Pain (4 - 6)      Allergies    No Known Allergies    Intolerances    Ensure TID (Unknown)      SOCIAL HISTORY:    FAMILY HISTORY:      Vital Signs Last 24 Hrs  T(C): 36.7 (2019 14:00), Max: 36.8 (2019 00:30)  T(F): 98.1 (2019 14:00), Max: 98.3 (2019 00:30)  HR: 75 (2019 12:00) (70 - 100)  BP: 98/68 (2019 12:00) (82/51 - 98/68)  BP(mean): 72 (2019 10:00) (61 - 79)  RR: 16 (2019 12:00) (12 - 53)  SpO2: 95% (2019 12:00) (92% - 100%)    Exam:  MP:  Teeth:  Mouth opening:    LABS:                        11.0   26.13 )-----------( 431      ( 2019 05:17 )             33.7         133<L>  |  97<L>  |  6.0<L>  ----------------------------<  96  3.8   |  24.0  |  0.38<L>    Ca    7.2<L>      2019 05:17  Phos  1.2       Mg     1.6         TPro  5.0<L>  /  Alb  1.8<L>  /  TBili  0.4  /  DBili  x   /  AST  46<H>  /  ALT  32  /  AlkPhos  252<H>  1125    PT/INR - ( 2019 05:17 )   PT: 12.2 sec;   INR: 1.06 ratio         PTT - ( 2019 05:17 )  PTT:75.0 sec  Urinalysis Basic - ( 2019 06:39 )    Color: Yellow / Appearance: Clear / S.005 / pH: x  Gluc: x / Ketone: Negative  / Bili: Negative / Urobili: 1 mg/dL   Blood: x / Protein: Negative mg/dL / Nitrite: Negative   Leuk Esterase: Negative / RBC: 0-2 /HPF / WBC 3-5   Sq Epi: x / Non Sq Epi: Occasional / Bacteria: Occasional        RADIOLOGY & ADDITIONAL STUDIES:

## 2019-11-26 NOTE — PROGRESS NOTE ADULT - SUBJECTIVE AND OBJECTIVE BOX
[facility]  INFECTIOUS DISEASES AND INTERNAL MEDICINE at [place]  =======================================================  Carlos Alberto Miller MD [facility] HealthBHAVIK Soto MD  Diplomates American Board of Internal Medicine and Infectious Diseases  Telephone [phone]  Fax            [phone]  =======================================================    [facility]-[id number]  HERACLIO MOLINA   follow up:  infected pleural effusion, empyema    still with chest tube in place  no fevers  pending MRCP      I have personally reviewed the labs and data; pertinent labs and data are listed in this note; please see below.   =======================================================    REVIEW OF SYSTEMS:  CONSTITUTIONAL:  No Fever or chills  HEENT:  No diplopia or blurred vision.  No earache, sore throat or runny nose.  CARDIOVASCULAR:  No pressure, squeezing, strangling, tightness, heaviness or aching about the chest, neck, axilla or epigastrium.  RESPIRATORY:  No cough, shortness of breath  GASTROINTESTINAL:  No nausea, vomiting or diarrhea.  GENITOURINARY:  No dysuria, frequency or urgency. No Blood in urine  MUSCULOSKELETAL:  no joint aches, no muscle pain  SKIN:  No change in skin, hair or nails.  NEUROLOGIC:  No Headaches, seizures or weakness.  PSYCHIATRIC:  No disorder of thought or mood.  ENDOCRINE:  No heat or cold intolerance  HEMATOLOGICAL:  No easy bruising or bleeding.   =======================================================  Allergies  No Known Allergies      Antibiotics:  cefTRIAXone   IVPB 1000 milliGRAM(s) IV Intermittent every 24 hours    Other medications:  folic acid 1 milliGRAM(s) Oral daily  heparin  Infusion 500 Unit(s)/Hr IV Continuous <Continuous>  influenza   Vaccine 0.5 milliLiter(s) IntraMuscular once  ketorolac   Injectable 15 milliGRAM(s) IV Push every 6 hours  lidocaine   Patch 1 Patch Transdermal daily  multivitamin 1 Tablet(s) Oral daily  psyllium Powder 1 Packet(s) Oral two times a day  saccharomyces boulardii 250 milliGRAM(s) Oral two times a day  thiamine 100 milliGRAM(s) Oral daily      ======================================================  Physical Exam:  ============  T(F): 98.1 (26 Nov 2019 07:19), Max: 98.3 (26 Nov 2019 00:30)  HR: 98 (26 Nov 2019 05:20)  BP: 95/72 (26 Nov 2019 05:20)  RR: 32 (26 Nov 2019 05:20)  SpO2: 99% (26 Nov 2019 05:20) (92% - 100%)  temp max in last 48H T(F): , Max: 98.3 (11-26-19 @ 00:30)    General:  No acute distress.   Eye: Pupils are equal, round and reactive to light, Extraocular movements are intact, Normal conjunctiva.  HENT: Normocephalic, Oral mucosa is moist, No pharyngeal erythema, No sinus tenderness.  Neck: Supple, No lymphadenopathy.  Respiratory: decreased breath sounds right lung  RIGHT chest with chest tube in place; YELLOW fluid  Cardiovascular: Normal rate, Regular rhythm,   TRACE edema of legs  Gastrointestinal: Soft, Non-tender, Non-distended, Normal bowel sounds.  Genitourinary: No costovertebral angle tenderness.  Lymphatics: No lymphadenopathy neck,   Musculoskeletal: Normal range of motion, Normal strength.  Integumentary: No rash.  Neurologic: Alert, Oriented, No focal deficits, Cranial Nerves II-XII are grossly intact.  Psychiatric: Appropriate mood & affect.    =======================================================  11/22/19 blood cx negative x 2  11/22/19 - body fluid cx with Moderate Alpha hemolytic strep x 2 sets  Alpha hemolytic strep  		[facility]	E Test  Ceftriaxone  			S (0.19)   Clindamycin 	S    Erythromycin 	S   Penicillin 	S   Vancomycin 	S     ======================================================  Labs:                        11.0   26.13 )-----------( 431      ( 26 Nov 2019 05:17 )             33.7       WBC Count: 26.13 K/uL (11-26-19 @ 05:17)  WBC Count: 22.35 K/uL (11-25-19 @ 05:29)  WBC Count: 23.82 K/uL (11-24-19 @ 20:27)      11-26    133<L>  |  97<L>  |  6.0<L>  ----------------------------<  96  3.8   |  24.0  |  0.38<L>    Ca    7.2<L>      26 Nov 2019 05:17  Phos  1.2     11-25  Mg     1.6     11-26    TPro  5.0<L>  /  Alb  1.8<L>  /  TBili  0.4  /  DBili  x   /  AST  46<H>  /  ALT  32  /  AlkPhos  252<H>  11-25      < from: CT Chest No Cont (11.25.19 @ 01:14) >     EXAM:  CT CHEST                          PROCEDURE DATE:  11/25/2019          INTERPRETATION:  CLINICAL INFORMATION: Pleural effusion follow-up    COMPARISON: None.    PROCEDURE:   CT of the Chest was performed without intravenous contrast.  Sagittal and coronal reformats were performed.      FINDINGS:    LUNGS AND AIRWAYS: Patent central airways.  Right upper, middle, and   lower lobe consolidations. Left upper and lower lobe airspace opacities.    PLEURA: Small right hydropneumothorax. Rightlateral chest tube. Trace   left pleural fluid.    MEDIASTINUM AND TETO: Several prominent lymph nodes.    VESSELS: Within normal limits.    HEART: Heart size is normal. Small pericardial effusion.    CHEST WALL AND LOWER NECK: Right lateral chest wall subcutaneous edema.    VISUALIZED UPPER ABDOMEN: Trace ascites. Nonspecific perinephric   stranding.    BONES: Within normal limits.    IMPRESSION:     Left lung opacities and right lung consolidations, suspicious for multifocal pneumonia.  Smallright hydropneumothorax. Right chest tube noted.        TERESA HARRINGTON M.D., ATTENDING RADIOLOGIST  This document has been electronically signed. Nov 25 2019  8:52AM          < end of copied text >

## 2019-11-26 NOTE — CONSULT NOTE ADULT - SUBJECTIVE AND OBJECTIVE BOX
[facility] DIVISION OF KIDNEY DISEASES AND HYPERTENSION -- INITIAL CONSULT NOTE  --------------------------------------------------------------------------------  HPI:    34 year old woman  with no medical follow up for ~15 years  transferred from [facility]. She initially presented to [facility] (11/22) with weakness, 25lb weight loss of several months, flu like symptoms. On work up,  found to have a large right pleural effusion.  Pulmonology contact for thoracentesis, 1.5 L of dark serous fluid was removed.  Chest tube placed shortly thereafter, 3 liters was removed at that point in time. Patient had some abnormalities in their lab work, found to have a leukocytosis with wbc of 40, platelet count of 793, Na 120, chloride 73, lactate of 4.7, amylase 268,  and CTA imaging also revealed a 2.1cm partially cystic mass in the body/tail of the pancreas with possible upstream ductal dilatation. 11/23 patient had CT chest/abdomen/pelvis and was also found to have a clot in the right IJ and was started on a heparin gtt. Patient does state that she has had a recent cough, and complains of right sided neck pain. Pt evaluated by surgical oncology and is planned for MRCP. Nephrology consulted for hyponatremia. Pt with serum Na+ 121 on presentation, now corrected to 133 over the past 4 days. Pt seen and examined; does not have any acute complaint. Reports poor food intake for weeks prior to presentation but was drinking water. Pt  has a history of alcohol abuse. Reports last drink was a few hours before presenting to hospital. Denies diuretics use; endorses daily Ibuprofen use for ongoing symptoms for over a month.          PAST HISTORY  --------------------------------------------------------------------------------  PAST MEDICAL & SURGICAL HISTORY:    FAMILY HISTORY:    PAST SOCIAL HISTORY:    ALLERGIES & MEDICATIONS  --------------------------------------------------------------------------------  Allergies    No Known Allergies    Intolerances    Ensure TID (Unknown)    Standing Inpatient Medications  cefTRIAXone   IVPB 1000 milliGRAM(s) IV Intermittent every 24 hours  folic acid 1 milliGRAM(s) Oral daily  heparin  Infusion 500 Unit(s)/Hr IV Continuous <Continuous>  influenza   Vaccine 0.5 milliLiter(s) IntraMuscular once  ketorolac   Injectable 15 milliGRAM(s) IV Push every 6 hours  lidocaine   Patch 1 Patch Transdermal daily  LORazepam     Tablet 0.5 milliGRAM(s) Oral once  magnesium sulfate  IVPB 2 Gram(s) IV Intermittent every 1 hour  multivitamin 1 Tablet(s) Oral daily  psyllium Powder 1 Packet(s) Oral two times a day  saccharomyces boulardii 250 milliGRAM(s) Oral two times a day  thiamine 100 milliGRAM(s) Oral daily    PRN Inpatient Medications  acetaminophen   Tablet .. 650 milliGRAM(s) Oral every 6 hours PRN  methocarbamol 500 milliGRAM(s) Oral three times a day PRN  oxycodone    5 mG/acetaminophen 325 mG 1 Tablet(s) Oral every 6 hours PRN  traMADol 50 milliGRAM(s) Oral every 6 hours PRN      REVIEW OF SYSTEMS  --------------------------------------------------------------------------------  Gen: Weight loss, fatigue, fevers/chills, weakness+  Skin: No rashes  Head/Eyes/Ears/Mouth: No headache; Normal hearing; Normal vision w/o blurriness; No sinus pain/discomfort, sore throat  Respiratory: dyspnea, cough+  CV: No chest pain, PND, orthopnea  GI: No abdominal pain, diarrhea+ no nausea, vomiting  : No increased frequency, dysuria, hematuria, nocturia  MSK: No joint pain/swelling; no back pain; no edema  Neuro: No dizziness/lightheadedness  Heme: No easy bruising or bleeding  Endo: No heat/cold intolerance  Psych: No significant nervousness, anxiety, stress, depression    All other systems were reviewed and are negative, except as noted.    VITALS/PHYSICAL EXAM  --------------------------------------------------------------------------------  T(C): 36.7 (11-26-19 @ 07:19), Max: 36.8 (11-26-19 @ 00:30)  HR: 75 (11-26-19 @ 12:00) (70 - 100)  BP: 93/60 (11-26-19 @ 10:00) (82/51 - 96/63)  RR: 16 (11-26-19 @ 12:00) (12 - 53)  SpO2: 95% (11-26-19 @ 12:00) (92% - 100%)  Wt(kg): --  Height (cm): 167.6 (11-24-19 @ 18:43)  Weight (kg): 55.2 (11-24-19 @ 18:43)  BMI (kg/m2): 19.7 (11-24-19 @ 18:43)  BSA (m2): 1.62 (11-24-19 @ 18:43)      11-25-19 @ 07:01  -  11-26-19 @ 07:00  --------------------------------------------------------  IN: 1134 mL / OUT: 1080 mL / NET: 54 mL    11-26-19 @ 07:01  -  11-26-19 @ 13:04  --------------------------------------------------------  IN: 0 mL / OUT: 110 mL / NET: -110 mL      Physical Exam:  	Gen: NAD, thin, pale  	HEENT: supple neck, clear oropharynx  	Pulm; absent breath sounds in rt lung field  	CV: RRR, S1S2; no rub  	Back: No spinal or CVA tenderness; no sacral edema  	Abd: +BS, soft, nontender/nondistended  	: No suprapubic tenderness  	UE: Warm, no edema; no asterixis  	LE: Warm,  no edema  	Neuro: No focal deficits  	Psych: Normal affect and mood  	Skin: Warm, Rt arm bruising  	Vascular access: none    LABS/STUDIES  --------------------------------------------------------------------------------              11.0   26.13 >-----------<  431      [11-26-19 @ 05:17]              33.7     133  |  97  |  6.0  ----------------------------<  96      [11-26-19 @ 05:17]  3.8   |  24.0  |  0.38        Ca     7.2     [11-26-19 @ 05:17]      Mg     1.6     [11-26-19 @ 05:17]      Phos  1.2     [11-25-19 @ 05:29]    TPro  5.0  /  Alb  1.8  /  TBili  0.4  /  DBili  x   /  AST  46  /  ALT  32  /  AlkPhos  252  [11-25-19 @ 05:29]    PT/INR: PT 12.2 , INR 1.06       [11-26-19 @ 05:17]  PTT: 75.0       [11-26-19 @ 05:17]    Troponin <0.01      [11-24-19 @ 20:27]        [11-25-19 @ 05:29]  Serum Osmolality 276      [11-25-19 @ 05:31]    Creatinine Trend:  SCr 0.38 [11-26 @ 05:17]  SCr 0.37 [11-25 @ 05:29]  SCr 0.50 [11-24 @ 20:27]    Urinalysis - [11-25-19 @ 06:39]      Color Yellow / Appearance Clear / SG 1.005 / pH 7.0      Gluc Negative / Ketone Negative  / Bili Negative / Urobili 1       Blood Trace / Protein Negative / Leuk Est Negative / Nitrite Negative      RBC 0-2 / WBC 3-5 / Hyaline  / Gran  / Sq Epi  / Non Sq Epi Occasional / Bacteria Occasional      TSH 4.76      [11-25-19 @ 05:29]  Lipid: chol 68, , HDL 27, LDL 12      [11-25-19 @ 05:29]    HIV Nonreact      [11-25-19 @ 14:08]

## 2019-11-26 NOTE — PROGRESS NOTE ADULT - ASSESSMENT
Comes in to [facility]  11/22 with weakness, 25lb weight loss of several months, flu like symptoms, on work up found to have a large right pleural effusion.  Pulmonology contact for thoracentesis, 1.5 L of dark serous fluid was removed.  Chest tube placed shortly thereafter, 3 liters was removed at that point in time. Patient had some abnormalities in their lab work, found to have a leukocytosis with wbc of 40, platelet count of 793, Na 120, chloride 73, lactate of 4.7, amylase 268,  and CTA imaging also revealed a 2.1cm partially cystic mass in the body/tail of the pancreas with possible upstream ductal dilatation. 11/23 patient had CT chest/abdomen/pelvis and was also found to have a clot in the right IJ and was started on a heparin gtt. Patient does state that she has had a recent cough, and complains of right sided neck pain. 11/14 Patient transferred to [facility].

## 2019-11-26 NOTE — PROGRESS NOTE ADULT - SUBJECTIVE AND OBJECTIVE BOX
Significant recent/past 24 hr events: Patient has no acute events during the day. Seen and evaluated by both pysch and pain management. We added a CIWA protocol based on that she admits drinking 4-5 drinks a day. May also explain her hyponatremia. Patient     Subjective:  Review of Systems no complaints       Patient is a 34y old  Female who presents with a chief complaint of   HPI:  Comes in to [facility] [place]   with weakness, 25lb weight loss of several months, flu like symptoms, on work up found to have a large right pleural effusion.  Pulmonology contact for thoracentesis, 1.5 L of dark serous fluid was removed.  Chest tube placed shortly thereafter, 3 liters was removed at that point in time. Patient had some abnormalities in their lab work, found to have a leukocytosis with wbc of 40, platelet count of 793, Na 120, chloride 73, lactate of 4.7, amylase 268,  and CTA imaging also revealed a 2.1cm partially cystic mass in the body/tail of the pancreas with possible upstream ductal dilatation.  patient had CT chest/abdomen/pelvis and was also found to have a clot in the right IJ and was started on a heparin gtt. Patient does state that she has had a recent cough, and complains of right sided neck pain.  Patient transferred to [facility]. (2019 00:10)    PAST MEDICAL & SURGICAL HISTORY:    FAMILY HISTORY:      Vitals   ICU Vital Signs Last 24 Hrs  T(C): 36.8 (2019 00:30), Max: 36.8 (2019 00:30)  T(F): 98.3 (2019 00:30), Max: 98.3 (2019 00:30)  HR: 80 (2019 00:00) (71 - 97)  BP: 86/54 (2019 00:00) (78/49 - 119/69)  BP(mean): 66 (2019 00:00) (56 - 74)  ABP: --  ABP(mean): --  RR: 16 (2019 00:00) (14 - 29)  SpO2: 96% (2019 00:00) (92% - 99%)      VENT SETTINGS         I&O's Detail    2019 07:01  -  2019 07:00  --------------------------------------------------------  IN:    heparin Infusion: 121 mL    IV PiggyBack: 250 mL    Oral Fluid: 360 mL  Total IN: 731 mL    OUT:    Chest Tube: 200 mL    Voided: 550 mL  Total OUT: 750 mL    Total NET: -19 mL      2019 07:01  -  2019 01:34  --------------------------------------------------------  IN:    heparin Infusion: 198 mL    Oral Fluid: 720 mL    Solution: 100 mL    Solution: 50 mL  Total IN: 1068 mL    OUT:    Chest Tube: 360 mL    Voided: 300 mL  Total OUT: 660 mL    Total NET: 408 mL          LABS                        10.2   22.35 )-----------( 448      ( 2019 05:29 )             30.8         132<L>  |  95<L>  |  5.0<L>  ----------------------------<  83  3.8   |  26.0  |  0.37<L>    Ca    6.6<L>      2019 05:29  Phos  1.2       Mg     1.5         TPro  5.0<L>  /  Alb  1.8<L>  /  TBili  0.4  /  DBili  x   /  AST  46<H>  /  ALT  32  /  AlkPhos  252<H>      PTT - ( 2019 20:42 )  PTT:71.8 sec        Urinalysis Basic - ( 2019 06:39 )    Color: Yellow / Appearance: Clear / S.005 / pH: x  Gluc: x / Ketone: Negative  / Bili: Negative / Urobili: 1 mg/dL   Blood: x / Protein: Negative mg/dL / Nitrite: Negative   Leuk Esterase: Negative / RBC: 0-2 /HPF / WBC 3-5   Sq Epi: x / Non Sq Epi: Occasional / Bacteria: Occasional            MEDICATIONS  (STANDING):  cefTRIAXone   IVPB 1000 milliGRAM(s) IV Intermittent every 24 hours  folic acid 1 milliGRAM(s) Oral daily  heparin  Infusion 500 Unit(s)/Hr (11 mL/Hr) IV Continuous <Continuous>  influenza   Vaccine 0.5 milliLiter(s) IntraMuscular once  ketorolac   Injectable 15 milliGRAM(s) IV Push every 6 hours  lidocaine   Patch 1 Patch Transdermal daily  multivitamin 1 Tablet(s) Oral daily  psyllium Powder 1 Packet(s) Oral two times a day  saccharomyces boulardii 250 milliGRAM(s) Oral two times a day  thiamine 100 milliGRAM(s) Oral daily  thiamine Injectable 100 milliGRAM(s) IntraMuscular once    MEDICATIONS  (PRN):  acetaminophen   Tablet .. 650 milliGRAM(s) Oral every 6 hours PRN Mild Pain (1 - 3)  methocarbamol 500 milliGRAM(s) Oral three times a day PRN Muscle Spasm  oxycodone    5 mG/acetaminophen 325 mG 1 Tablet(s) Oral every 6 hours PRN Severe Pain (7 - 10)  traMADol 50 milliGRAM(s) Oral every 6 hours PRN Moderate Pain (4 - 6)    Allergies:  Ensure TID (Unknown)  No Known Allergies      Physical Exam:   Constitutional: NAD, well-groomed, well-developed  HEENT: PERRLA, EOMI, no drainage or redness  Neck: supple,  No JVD  Respiratory: Breath Sounds equal & clear bilaterally to auscultation, no rales/rhonchi/wheezing, no accessory muscle use noted, right chest tube with drainage   Cardiovascular: Regular rate, regular rhythm, normal S1, S2; no murmurs or rub  Gastrointestinal: Soft, non-tender, non distended, + bowel sounds  Extremities: THACKER x 4, no peripheral edema, no cyanosis, no clubbing   Neurological: A+O x 3  Skin: warm, dry, well perfused

## 2019-11-26 NOTE — PROGRESS NOTE ADULT - PROBLEM SELECTOR PLAN 1
Status post chest tube placement, drainage 11/22  No air leak noted, small residual pneumothorax   Continue to keep chest tube to suction  continue ceftriaxone

## 2019-11-26 NOTE — PROGRESS NOTE ADULT - ASSESSMENT
34 Female scheduled for Flex Bronch Right VATS Decortication tommorow  ASA2 Mallampati 2  Anesthetic plan to be discussed with patient by Anesthesia DOS.  RN notified to get Pregnancy test if not done yet

## 2019-11-26 NOTE — PROGRESS NOTE ADULT - ASSESSMENT
This 34 y.o. F who was [place] [facility] [place] with weakness,   found with large right pleural effusion   s/p chest tube drainage    Hyponatremia - resolving  WBC elevation - resolving  alpha hemolytic strep infection  multi focal PNA  infected pleural effusion - empyema  Pancreatic cyst    - continue Ceftriaxone 1 gram Q 24H  - Legionella Ag from [facility] 11/24/19 - negative, off azithromycin    - chest tube per ICU team.         Workup for pancreatic cyst found incidentally in progress    - follow up all outstanding cultures (FROM [facility])  - trend temperature and WBC curve  - repeat cultures from blood and all sources if febrile.

## 2019-11-26 NOTE — PHYSICAL THERAPY INITIAL EVALUATION ADULT - ADDITIONAL COMMENTS
per reporting that she lives with her mother and is her primary caretaker. Pt has ~3 steps to enter the home with a single hand rail. Pt owns a RW that was a family member's but she does not use an AD prior.

## 2019-11-26 NOTE — PROGRESS NOTE ADULT - PROBLEM SELECTOR PLAN 3
Hyponatremic, hypochloremic, euvolemic on admission   Consider r/o of SIADH will send urine lytes, serum osms, UA for specific gravity  May be component from ETOH abuse, patient does admit to drinking  Ammonia tomorrow  Thyroid studies sent as well

## 2019-11-26 NOTE — PROGRESS NOTE ADULT - SUBJECTIVE AND OBJECTIVE BOX
Patient is a 34y old  Female who presents with a chief complaint of SOB (26 Nov 2019 15:02)  She was transferred from an OSH for management of empyema. MICU team requested assistance with her pain regimen. She has a chest tube, was being offered Percocet PRN. Tramadol. Toradol and Methocarbamol suggested as alternatives to Percocet. Patient has utilized a dose of each since last seen.    VERBAL REPORT: "The combination works well. I can breathe a little more easily."  Patient reports relief with Tramadol and Methocarbamol. She denies any difference with use of Toradol and Lidoderm Patch.   PAIN SCORE: see charted pain scores                  SCALE USED: VNRS    Allergies  No Known Allergies      MEDICATIONS  (STANDING):  cefTRIAXone   IVPB 1000 milliGRAM(s) IV Intermittent every 24 hours  folic acid 1 milliGRAM(s) Oral daily  heparin  Infusion 500 Unit(s)/Hr (11 mL/Hr) IV Continuous <Continuous>  influenza   Vaccine 0.5 milliLiter(s) IntraMuscular once  ketorolac   Injectable 15 milliGRAM(s) IV Push every 6 hours  lidocaine   Patch 1 Patch Transdermal daily  multivitamin 1 Tablet(s) Oral daily  psyllium Powder 1 Packet(s) Oral two times a day  saccharomyces boulardii 250 milliGRAM(s) Oral two times a day  thiamine 100 milliGRAM(s) Oral daily    MEDICATIONS  (PRN):  acetaminophen   Tablet .. 650 milliGRAM(s) Oral every 6 hours PRN Mild Pain (1 - 3)  methocarbamol 500 milliGRAM(s) Oral three times a day PRN Muscle Spasm  oxycodone    5 mG/acetaminophen 325 mG 1 Tablet(s) Oral every 6 hours PRN Severe Pain (7 - 10)  traMADol 50 milliGRAM(s) Oral every 6 hours PRN Moderate Pain (4 - 6)      PHYSICAL EXAM:  GENERAL: NAD, well-developed  HEAD:  Atraumatic, Normocephalic  EYES: EOMI, PERRLA, conjunctiva and sclera clear  NERVOUS SYSTEM:  Alert & Oriented X3, Good concentration; Motor Strength 5/5 B/L upper and lower extremities  CHEST/LUNG: Clear speech, no SOB evident at rest; +right sided chest tube  ABDOMEN: Nondistended; Bowel sounds present  SKIN: hematoma on the interior aspect of right elbow      Vital Signs Last 24 Hrs  T(C): 36.7 (26 Nov 2019 14:00), Max: 36.8 (26 Nov 2019 00:30)  T(F): 98.1 (26 Nov 2019 14:00), Max: 98.3 (26 Nov 2019 00:30)  HR: 75 (26 Nov 2019 12:00) (70 - 100)  BP: 98/68 (26 Nov 2019 12:00) (82/51 - 98/68)  BP(mean): 72 (26 Nov 2019 10:00) (61 - 79)  RR: 16 (26 Nov 2019 12:00) (12 - 53)  SpO2: 95% (26 Nov 2019 12:00) (92% - 100%)                          11.0   26.13 )-----------( 431      ( 26 Nov 2019 05:17 )             33.7       LIVER FUNCTIONS - ( 25 Nov 2019 05:29 )  Alb: 1.8 g/dL / Pro: 5.0 g/dL / ALK PHOS: 252 U/L / ALT: 32 U/L / AST: 46 U/L / GGT: x             PT/INR - ( 26 Nov 2019 05:17 )   PT: 12.2 sec;   INR: 1.06 ratio    PTT - ( 26 Nov 2019 05:17 )  PTT:75.0 sec      Pain Service   Text Page via [employer]  PIN: [phone]

## 2019-11-27 ENCOUNTER — APPOINTMENT (OUTPATIENT)
Dept: THORACIC SURGERY | Facility: HOSPITAL | Age: 34
End: 2019-11-27

## 2019-11-27 LAB
ANION GAP SERPL CALC-SCNC: 11 MMOL/L — SIGNIFICANT CHANGE UP (ref 5–17)
AUTO DIFF PNL BLD: NEGATIVE — SIGNIFICANT CHANGE UP
BUN SERPL-MCNC: 7 MG/DL — LOW (ref 8–20)
C-ANCA SER-ACNC: NEGATIVE — SIGNIFICANT CHANGE UP
CALCIUM SERPL-MCNC: 7.5 MG/DL — LOW (ref 8.6–10.2)
CCP IGG SERPL-ACNC: <8 UNITS — SIGNIFICANT CHANGE UP (ref 0–19)
CHLORIDE SERPL-SCNC: 99 MMOL/L — SIGNIFICANT CHANGE UP (ref 98–107)
CO2 SERPL-SCNC: 24 MMOL/L — SIGNIFICANT CHANGE UP (ref 22–29)
CREAT SERPL-MCNC: 0.3 MG/DL — LOW (ref 0.5–1.3)
GLUCOSE SERPL-MCNC: 74 MG/DL — SIGNIFICANT CHANGE UP (ref 70–115)
GRAM STN FLD: SIGNIFICANT CHANGE UP
HCG SERPL QL: NEGATIVE — SIGNIFICANT CHANGE UP
HCT VFR BLD CALC: 28.7 % — LOW (ref 34.5–45)
HGB BLD-MCNC: 9.2 G/DL — LOW (ref 11.5–15.5)
MAGNESIUM SERPL-MCNC: 1.9 MG/DL — SIGNIFICANT CHANGE UP (ref 1.6–2.6)
MCHC RBC-ENTMCNC: 32.1 GM/DL — SIGNIFICANT CHANGE UP (ref 32–36)
MCHC RBC-ENTMCNC: 33.2 PG — SIGNIFICANT CHANGE UP (ref 27–34)
MCV RBC AUTO: 103.6 FL — HIGH (ref 80–100)
P-ANCA SER-ACNC: NEGATIVE — SIGNIFICANT CHANGE UP
PLATELET # BLD AUTO: 367 K/UL — SIGNIFICANT CHANGE UP (ref 150–400)
POTASSIUM SERPL-MCNC: 4.5 MMOL/L — SIGNIFICANT CHANGE UP (ref 3.5–5.3)
POTASSIUM SERPL-SCNC: 4.5 MMOL/L — SIGNIFICANT CHANGE UP (ref 3.5–5.3)
RBC # BLD: 2.77 M/UL — LOW (ref 3.8–5.2)
RBC # FLD: 13.9 % — SIGNIFICANT CHANGE UP (ref 10.3–14.5)
RF+CCP IGG SER-IMP: NEGATIVE — SIGNIFICANT CHANGE UP
SODIUM SERPL-SCNC: 134 MMOL/L — LOW (ref 135–145)
SPECIMEN SOURCE: SIGNIFICANT CHANGE UP
UUN UR-MCNC: 164 MG/DL — SIGNIFICANT CHANGE UP
WBC # BLD: 26.98 K/UL — HIGH (ref 3.8–10.5)
WBC # FLD AUTO: 26.98 K/UL — HIGH (ref 3.8–10.5)

## 2019-11-27 PROCEDURE — 71045 X-RAY EXAM CHEST 1 VIEW: CPT | Mod: 26

## 2019-11-27 PROCEDURE — 99232 SBSQ HOSP IP/OBS MODERATE 35: CPT

## 2019-11-27 PROCEDURE — 32652 THORACOSCOPY REM TOTL CORTEX: CPT | Mod: AS

## 2019-11-27 PROCEDURE — 31622 DX BRONCHOSCOPE/WASH: CPT

## 2019-11-27 PROCEDURE — 32652 THORACOSCOPY REM TOTL CORTEX: CPT

## 2019-11-27 PROCEDURE — 99233 SBSQ HOSP IP/OBS HIGH 50: CPT

## 2019-11-27 RX ORDER — ONDANSETRON 8 MG/1
4 TABLET, FILM COATED ORAL ONCE
Refills: 0 | Status: DISCONTINUED | OUTPATIENT
Start: 2019-11-27 | End: 2019-11-27

## 2019-11-27 RX ORDER — ONDANSETRON 8 MG/1
4 TABLET, FILM COATED ORAL EVERY 6 HOURS
Refills: 0 | Status: DISCONTINUED | OUTPATIENT
Start: 2019-11-27 | End: 2019-11-28

## 2019-11-27 RX ORDER — SODIUM CHLORIDE 9 MG/ML
1000 INJECTION, SOLUTION INTRAVENOUS
Refills: 0 | Status: DISCONTINUED | OUTPATIENT
Start: 2019-11-27 | End: 2019-11-27

## 2019-11-27 RX ORDER — SACCHAROMYCES BOULARDII 250 MG
250 POWDER IN PACKET (EA) ORAL
Refills: 0 | Status: DISCONTINUED | OUTPATIENT
Start: 2019-11-27 | End: 2019-12-11

## 2019-11-27 RX ORDER — FENTANYL CITRATE 50 UG/ML
30 INJECTION INTRAVENOUS
Refills: 0 | Status: DISCONTINUED | OUTPATIENT
Start: 2019-11-27 | End: 2019-11-28

## 2019-11-27 RX ORDER — HEPARIN SODIUM 5000 [USP'U]/ML
1100 INJECTION INTRAVENOUS; SUBCUTANEOUS
Qty: 25000 | Refills: 0 | Status: DISCONTINUED | OUTPATIENT
Start: 2019-11-27 | End: 2019-12-02

## 2019-11-27 RX ORDER — PSYLLIUM SEED (WITH DEXTROSE)
1 POWDER (GRAM) ORAL
Refills: 0 | Status: DISCONTINUED | OUTPATIENT
Start: 2019-11-27 | End: 2019-11-30

## 2019-11-27 RX ORDER — THIAMINE MONONITRATE (VIT B1) 100 MG
100 TABLET ORAL DAILY
Refills: 0 | Status: COMPLETED | OUTPATIENT
Start: 2019-11-27 | End: 2019-11-30

## 2019-11-27 RX ORDER — METHOCARBAMOL 500 MG/1
500 TABLET, FILM COATED ORAL THREE TIMES A DAY
Refills: 0 | Status: DISCONTINUED | OUTPATIENT
Start: 2019-11-27 | End: 2019-11-28

## 2019-11-27 RX ORDER — LIDOCAINE 4 G/100G
1 CREAM TOPICAL DAILY
Refills: 0 | Status: DISCONTINUED | OUTPATIENT
Start: 2019-11-27 | End: 2020-01-02

## 2019-11-27 RX ORDER — TRAMADOL HYDROCHLORIDE 50 MG/1
50 TABLET ORAL EVERY 6 HOURS
Refills: 0 | Status: DISCONTINUED | OUTPATIENT
Start: 2019-11-27 | End: 2019-11-30

## 2019-11-27 RX ORDER — CEFAZOLIN SODIUM 1 G
2000 VIAL (EA) INJECTION ONCE
Refills: 0 | Status: DISCONTINUED | OUTPATIENT
Start: 2019-11-27 | End: 2019-11-27

## 2019-11-27 RX ORDER — SODIUM CHLORIDE 9 MG/ML
3 INJECTION INTRAMUSCULAR; INTRAVENOUS; SUBCUTANEOUS EVERY 8 HOURS
Refills: 0 | Status: DISCONTINUED | OUTPATIENT
Start: 2019-11-27 | End: 2019-11-27

## 2019-11-27 RX ORDER — NALOXONE HYDROCHLORIDE 4 MG/.1ML
0.1 SPRAY NASAL
Refills: 0 | Status: DISCONTINUED | OUTPATIENT
Start: 2019-11-27 | End: 2019-11-28

## 2019-11-27 RX ORDER — FOLIC ACID 0.8 MG
1 TABLET ORAL DAILY
Refills: 0 | Status: DISCONTINUED | OUTPATIENT
Start: 2019-11-27 | End: 2019-12-24

## 2019-11-27 RX ORDER — CEFTRIAXONE 500 MG/1
1000 INJECTION, POWDER, FOR SOLUTION INTRAMUSCULAR; INTRAVENOUS EVERY 12 HOURS
Refills: 0 | Status: DISCONTINUED | OUTPATIENT
Start: 2019-11-27 | End: 2019-12-03

## 2019-11-27 RX ORDER — ACETAMINOPHEN 500 MG
650 TABLET ORAL EVERY 6 HOURS
Refills: 0 | Status: DISCONTINUED | OUTPATIENT
Start: 2019-11-27 | End: 2019-12-03

## 2019-11-27 RX ORDER — FENTANYL CITRATE 50 UG/ML
25 INJECTION INTRAVENOUS
Refills: 0 | Status: DISCONTINUED | OUTPATIENT
Start: 2019-11-27 | End: 2019-11-27

## 2019-11-27 RX ORDER — OXYCODONE AND ACETAMINOPHEN 5; 325 MG/1; MG/1
1 TABLET ORAL EVERY 6 HOURS
Refills: 0 | Status: DISCONTINUED | OUTPATIENT
Start: 2019-11-27 | End: 2019-11-28

## 2019-11-27 RX ORDER — DIPHENHYDRAMINE HCL 50 MG
12.5 CAPSULE ORAL EVERY 4 HOURS
Refills: 0 | Status: DISCONTINUED | OUTPATIENT
Start: 2019-11-27 | End: 2019-11-28

## 2019-11-27 RX ADMIN — OXYCODONE AND ACETAMINOPHEN 1 TABLET(S): 5; 325 TABLET ORAL at 09:26

## 2019-11-27 RX ADMIN — Medication 250 MILLIGRAM(S): at 09:30

## 2019-11-27 RX ADMIN — CEFTRIAXONE 100 MILLIGRAM(S): 500 INJECTION, POWDER, FOR SOLUTION INTRAMUSCULAR; INTRAVENOUS at 19:49

## 2019-11-27 RX ADMIN — Medication 1 MILLIGRAM(S): at 09:30

## 2019-11-27 RX ADMIN — FENTANYL CITRATE 25 MICROGRAM(S): 50 INJECTION INTRAVENOUS at 17:55

## 2019-11-27 RX ADMIN — FENTANYL CITRATE 25 MICROGRAM(S): 50 INJECTION INTRAVENOUS at 20:30

## 2019-11-27 RX ADMIN — Medication 1 TABLET(S): at 09:30

## 2019-11-27 RX ADMIN — METHOCARBAMOL 500 MILLIGRAM(S): 500 TABLET, FILM COATED ORAL at 05:11

## 2019-11-27 RX ADMIN — OXYCODONE AND ACETAMINOPHEN 1 TABLET(S): 5; 325 TABLET ORAL at 13:59

## 2019-11-27 RX ADMIN — Medication 250 MILLIGRAM(S): at 21:34

## 2019-11-27 RX ADMIN — TRAMADOL HYDROCHLORIDE 50 MILLIGRAM(S): 50 TABLET ORAL at 03:00

## 2019-11-27 RX ADMIN — FENTANYL CITRATE 30 MILLILITER(S): 50 INJECTION INTRAVENOUS at 21:16

## 2019-11-27 RX ADMIN — Medication 15 MILLIGRAM(S): at 06:00

## 2019-11-27 RX ADMIN — Medication 15 MILLIGRAM(S): at 05:00

## 2019-11-27 RX ADMIN — METHOCARBAMOL 500 MILLIGRAM(S): 500 TABLET, FILM COATED ORAL at 21:34

## 2019-11-27 RX ADMIN — FENTANYL CITRATE 30 MILLILITER(S): 50 INJECTION INTRAVENOUS at 17:59

## 2019-11-27 RX ADMIN — SODIUM CHLORIDE 100 MILLILITER(S): 9 INJECTION, SOLUTION INTRAVENOUS at 17:55

## 2019-11-27 RX ADMIN — FENTANYL CITRATE 30 MILLILITER(S): 50 INJECTION INTRAVENOUS at 20:24

## 2019-11-27 RX ADMIN — TRAMADOL HYDROCHLORIDE 50 MILLIGRAM(S): 50 TABLET ORAL at 04:00

## 2019-11-27 RX ADMIN — Medication 100 MILLIGRAM(S): at 09:29

## 2019-11-27 NOTE — PROGRESS NOTE ADULT - SUBJECTIVE AND OBJECTIVE BOX
[facility] DIVISION OF KIDNEY DISEASES AND HYPERTENSION -- FOLLOW UP NOTE  --------------------------------------------------------------------------------  Chief Complaint:  Hyponatremia    24 hour events/subjective:  Seen/examined this AM;  Na improving;      PAST HISTORY  --------------------------------------------------------------------------------  No significant changes to PMH, PSH, FHx, SHx, unless otherwise noted    ALLERGIES & MEDICATIONS  --------------------------------------------------------------------------------  Allergies    No Known Allergies    Intolerances    Ensure TID (Unknown)    Standing Inpatient Medications  cefTRIAXone   IVPB 1000 milliGRAM(s) IV Intermittent every 24 hours  folic acid 1 milliGRAM(s) Oral daily  influenza   Vaccine 0.5 milliLiter(s) IntraMuscular once  ketorolac   Injectable 15 milliGRAM(s) IV Push every 6 hours  lidocaine   Patch 1 Patch Transdermal daily  multivitamin 1 Tablet(s) Oral daily  psyllium Powder 1 Packet(s) Oral two times a day  saccharomyces boulardii 250 milliGRAM(s) Oral two times a day  thiamine 100 milliGRAM(s) Oral daily    PRN Inpatient Medications  acetaminophen   Tablet .. 650 milliGRAM(s) Oral every 6 hours PRN  methocarbamol 500 milliGRAM(s) Oral three times a day PRN  oxycodone    5 mG/acetaminophen 325 mG 1 Tablet(s) Oral every 6 hours PRN  traMADol 50 milliGRAM(s) Oral every 6 hours PRN      REVIEW OF SYSTEMS  --------------------------------------------------------------------------------  Gen: Weight loss, fatigue, fevers/chills, weakness+  Skin: No rashes  Head/Eyes/Ears/Mouth: No headache; Normal hearing; Normal vision w/o blurriness; No sinus pain/discomfort, sore throat  Respiratory: dyspnea, cough+  CV: No chest pain, PND, orthopnea  GI: No abdominal pain, diarrhea+ no nausea, vomiting  : No increased frequency, dysuria, hematuria, nocturia  MSK: No joint pain/swelling; no back pain; no edema  Neuro: No dizziness/lightheadedness  Heme: No easy bruising or bleeding  Endo: No heat/cold intolerance  Psych: No significant nervousness, anxiety, stress, depression    All other systems were reviewed and are negative, except as noted.    VITALS/PHYSICAL EXAM  --------------------------------------------------------------------------------  T(C): 36.4 (11-27-19 @ 10:45), Max: 36.7 (11-26-19 @ 14:00)  HR: 86 (11-27-19 @ 10:45) (64 - 97)  BP: 96/65 (11-27-19 @ 10:45) (89/56 - 100/66)  RR: 16 (11-27-19 @ 10:45) (11 - 31)  SpO2: 99% (11-27-19 @ 10:45) (96% - 100%)  Wt(kg): --  Height (cm): 167 (11-27-19 @ 10:14)  Weight (kg): 55.2 (11-27-19 @ 10:14)  BMI (kg/m2): 19.8 (11-27-19 @ 10:14)  BSA (m2): 1.61 (11-27-19 @ 10:14)      11-26-19 @ 07:01  -  11-27-19 @ 07:00  --------------------------------------------------------  IN: 872 mL / OUT: 1120 mL / NET: -248 mL    11-27-19 @ 07:01  -  11-27-19 @ 13:22  --------------------------------------------------------  IN: 0 mL / OUT: 60 mL / NET: -60 mL      Physical Exam:              Gen: NAD, thin, pale  	HEENT: supple neck, clear oropharynx  	Pulm; absent breath sounds in rt lung field  	CV: RRR, S1S2; no rub  	Back: No spinal or CVA tenderness; no sacral edema  	Abd: +BS, soft, nontender/nondistended  	: No suprapubic tenderness  	UE: Warm, no edema; no asterixis  	LE: Warm,  no edema  	Neuro: No focal deficits  	Psych: Normal affect and mood  	Skin: Warm, Rt arm bruising  	Vascular access: none      LABS/STUDIES  --------------------------------------------------------------------------------              9.2    26.98 >-----------<  367      [11-27-19 @ 04:23]              28.7     134  |  99  |  7.0  ----------------------------<  74      [11-27-19 @ 04:23]  4.5   |  24.0  |  0.30        Ca     7.5     [11-27-19 @ 04:23]      Mg     1.9     [11-27-19 @ 04:23]      PT/INR: PT 12.2 , INR 1.06       [11-26-19 @ 05:17]  PTT: 75.0       [11-26-19 @ 05:17]      Creatinine Trend:  SCr 0.30 [11-27 @ 04:23]  SCr 0.38 [11-26 @ 05:17]  SCr 0.37 [11-25 @ 05:29]  SCr 0.50 [11-24 @ 20:27]    Urinalysis - [11-26-19 @ 19:42]      Color Yellow / Appearance Clear / SG 1.015 / pH 8.0      Gluc Negative / Ketone Negative  / Bili Negative / Urobili Negative       Blood Trace / Protein Negative / Leuk Est Negative / Nitrite Negative      RBC 0-2 / WBC 0-2 / Hyaline  / Gran  / Sq Epi  / Non Sq Epi Occasional / Bacteria Occasional    Urine Sodium 56      [11-26-19 @ 19:42]  Urine Urea Nitrogen 164      [11-27-19 @ 01:53]  Urine Potassium 18      [11-26-19 @ 19:42]  Urine Chloride <27      [11-26-19 @ 19:42]  Urine Osmolality 261      [11-26-19 @ 19:42]    TSH 4.76      [11-25-19 @ 05:29]  Lipid: chol 68, , HDL 27, LDL 12      [11-25-19 @ 05:29]    HIV Nonreact      [11-25-19 @ 14:08]    Rheumatoid Factor <10      [11-26-19 @ 17:34]

## 2019-11-27 NOTE — PROGRESS NOTE ADULT - SUBJECTIVE AND OBJECTIVE BOX
INTERVAL HPI/OVERNIGHT EVENTS:FU for pancreatic cyst. No GI complaints. MRCP performed.     MEDICATIONS  (STANDING):  ceFAZolin   IVPB 2000 milliGRAM(s) IV Intermittent once  cefTRIAXone   IVPB 1000 milliGRAM(s) IV Intermittent every 24 hours  folic acid 1 milliGRAM(s) Oral daily  influenza   Vaccine 0.5 milliLiter(s) IntraMuscular once  lidocaine   Patch 1 Patch Transdermal daily  multivitamin 1 Tablet(s) Oral daily  psyllium Powder 1 Packet(s) Oral two times a day  saccharomyces boulardii 250 milliGRAM(s) Oral two times a day  thiamine 100 milliGRAM(s) Oral daily    MEDICATIONS  (PRN):  acetaminophen   Tablet .. 650 milliGRAM(s) Oral every 6 hours PRN Mild Pain (1 - 3)  methocarbamol 500 milliGRAM(s) Oral three times a day PRN Muscle Spasm  oxycodone    5 mG/acetaminophen 325 mG 1 Tablet(s) Oral every 6 hours PRN Severe Pain (7 - 10)  traMADol 50 milliGRAM(s) Oral every 6 hours PRN Moderate Pain (4 - 6)      Allergies    No Known Allergies    Intolerances    Ensure TID (Unknown)      Vital Signs Last 24 Hrs  T(C): 36.4 (2019 10:45), Max: 36.6 (2019 18:00)  T(F): 97.5 (2019 10:45), Max: 97.9 (2019 18:00)  HR: 92 (2019 14:41) (64 - 97)  BP: 102/50 (2019 14:41) (89/56 - 102/50)  BP(mean): 78 (2019 08:45) (68 - 79)  RR: 16 (2019 14:41) (11 - 31)  SpO2: 90% (2019 14:41) (90% - 100%)    LABS:                        9.2    26.98 )-----------( 367      ( 2019 04:23 )             28.7     11    134<L>  |  99  |  7.0<L>  ----------------------------<  74  4.5   |  24.0  |  0.30<L>    Ca    7.5<L>      2019 04:23  Mg     1.9           PT/INR - ( 2019 05:17 )   PT: 12.2 sec;   INR: 1.06 ratio         PTT - ( 2019 05:17 )  PTT:75.0 sec  Urinalysis Basic - ( 2019 19:42 )    Color: Yellow / Appearance: Clear / S.015 / pH: x  Gluc: x / Ketone: Negative  / Bili: Negative / Urobili: Negative mg/dL   Blood: x / Protein: Negative mg/dL / Nitrite: Negative   Leuk Esterase: Negative / RBC: 0-2 /HPF / WBC 0-2   Sq Epi: x / Non Sq Epi: Occasional / Bacteria: Occasional        RADIOLOGY & ADDITIONAL TESTS:< from: MR MRCP w/wo IV Cont (19 @ 15:50) >     EXAM:  MR MRCP WAW IC                          PROCEDURE DATE:  2019          INTERPRETATION:  MR CHOLANGIOPANCREATOGRAPHY WITHOUT AND WITH IV CONTRAST    CLINICAL INFORMATION: Pancreatic Cyst/Pseudocyst    TECHNIQUE: Multiplanar T1-weighted, T2-weighted, and diffusion weighted   sequences were obtained of the abdomen, including dynamic post-contrast   T1-weighted sequences.  3D heavily T2-weighted thin-section MRCP was also   performed.    Field Strength: 1.5T    Contrast: 10 cc Gadavist.    COMPARISON: Chest CT 2019.    FINDINGS:    LOWER CHEST: Please refer to separately reported chest CT from one day   prior.    LIVER: Diffuse steatosis.  BILE DUCTS: Nondilated. Normal distal tapering. No filling defect. No   intrahepatic dilatation.  GALLBLADDER: Normal.  SPLEEN: Normal.    PANCREAS: Nonenhancing cystic lesion in the mid body is suggested   measuring 1.1 cm (1104; 47). Additional 2 nonenhancing lesions in the   head measuring 0.8 cm and 0.6 cm showing focal restricted diffusion   (1104; 60, 10; 49) are indeterminate. No main duct dilatation.    ADRENALS: Normal.  KIDNEYS/URETERS: Symmetric nephrograms. No hydronephrosis. Mild   nonspecific left perinephric stranding again noted.    VISUALIZED PORTIONS:    BOWEL: Nobowel-related abnormality.  PERITONEUM: No ascites.  VESSELS:  Normal caliber aorta.  RETROPERITONEUM/LYMPH NODES: No adenopathy or hematoma.    ABDOMINAL WALL: Normal.  BONES: No aggressive lesion.    IMPRESSION:    Study limited by motion.    Indeterminate pancreatic lesions as above without main duct dilatation.   Repeat short interval follow-up exam is recommended when the patient is   able to maintain adequate breath holds. The patient's underlying lung   disease may be prohibitive.                 SARAH BAÑUELOS M.D., ATTENDING RADIOLOGIST  This document has been electronically signed. 2019  5:00PM                  < end of copied text >

## 2019-11-27 NOTE — BRIEF OPERATIVE NOTE - NSICDXBRIEFPROCEDURE_GEN_ALL_CORE_FT
PROCEDURES:  Debridement, wound, with dressing replacement 27-Nov-2019 17:24:08 right chest tube site- with chest tube in situ Nickie Jones  Wound VAC placement 27-Nov-2019 17:21:25  Nickie Jones  Pleural biopsy 27-Nov-2019 17:21:13  Nickie Jones  Lung decortication 27-Nov-2019 17:21:06  Nickie Jones  Bronchoscopy, with lung decortication using VATS 27-Nov-2019 17:20:40  Nickie Jones

## 2019-11-27 NOTE — BRIEF OPERATIVE NOTE - COMMENTS
Patient tx to PACU    Gtts: none   Blood products given: none  Chest tube: RIGHT x 2 -- suction   Extubated     report given to thoracic midlevel   CXR ordered in PACU / to be followed up by team

## 2019-11-27 NOTE — PROGRESS NOTE ADULT - ASSESSMENT
1) Hyponatremia  2) Rt empyema  3) Pancreatic mass  4) RIJ thrombosis    Serum Na improving daily;  Recent UA with low sp gravity; goes against SIADH  ? low solute intake; UCl < 27;  Increase protein intake ; improving;    Discussed with 4 TWR DONNIE PA

## 2019-11-27 NOTE — PROGRESS NOTE ADULT - SUBJECTIVE AND OBJECTIVE BOX
HPI/OVERNIGHT EVENTS:    Pt was seen and examined overnight. No acute complaints, patient in NAD. Pt is a transfer from [facility] [place] with a right pleural effusion. Pt is being evaluated at [facility] for a pancreatic cyst by surgical oncology. Pt's chest tube drained 480mL over past 24 hrs. Patient denies dizziness, headache, fever, chills, nausea, vomiting, tremors, chest pain, Sob.       MEDICATIONS  (STANDING):  cefTRIAXone   IVPB 1000 milliGRAM(s) IV Intermittent every 24 hours  folic acid 1 milliGRAM(s) Oral daily  heparin  Infusion 500 Unit(s)/Hr (11 mL/Hr) IV Continuous <Continuous>  influenza   Vaccine 0.5 milliLiter(s) IntraMuscular once  ketorolac   Injectable 15 milliGRAM(s) IV Push every 6 hours  lidocaine   Patch 1 Patch Transdermal daily  multivitamin 1 Tablet(s) Oral daily  psyllium Powder 1 Packet(s) Oral two times a day  saccharomyces boulardii 250 milliGRAM(s) Oral two times a day  thiamine 100 milliGRAM(s) Oral daily    MEDICATIONS  (PRN):  acetaminophen   Tablet .. 650 milliGRAM(s) Oral every 6 hours PRN Mild Pain (1 - 3)  methocarbamol 500 milliGRAM(s) Oral three times a day PRN Muscle Spasm  oxycodone    5 mG/acetaminophen 325 mG 1 Tablet(s) Oral every 6 hours PRN Severe Pain (7 - 10)  traMADol 50 milliGRAM(s) Oral every 6 hours PRN Moderate Pain (4 - 6)      Vital Signs Last 24 Hrs  T(C): 36.6 (2019 18:00), Max: 36.7 (2019 07:19)  T(F): 97.9 (2019 18:00), Max: 98.1 (2019 07:19)  HR: 80 (2019 01:53) (70 - 100)  BP: 95/62 (2019 01:53) (82/56 - 99/64)  BP(mean): 74 (2019 01:53) (61 - 79)  RR: 19 (2019 01:53) (12 - 53)  SpO2: 99% (2019 01:53) (95% - 99%)    Constitutional: patient resting comfortably in bed, in no acute distress  HEENT: EOMI / PERRL b/l, no active drainage or redness  Neck: No JVD, full ROM without pain  Respiratory: respirations are unlabored, no accessory muscle use, no conversational dyspnea  Cardiovascular: regular rate & rhythm  Gastrointestinal: Abdomen soft, non-tender, non-distended, no rebound tenderness / guarding  Neurological: GCS: 15 (4/5/6). A&O x 3; no gross sensory / motor / coordination deficits  Psychiatric: Normal mood, normal affect  Musculoskeletal: No joint pain, swelling or deformity; no limitation of movement    I&O's Detail    2019 07:01  -  2019 07:00  --------------------------------------------------------  IN:    heparin Infusion: 264 mL    Oral Fluid: 720 mL    Solution: 50 mL    Solution: 100 mL  Total IN: 1134 mL    OUT:    Chest Tube: 480 mL    Voided: 600 mL  Total OUT: 1080 mL    Total NET: 54 mL      2019 07:01  -  2019 03:01  --------------------------------------------------------  IN:    heparin Infusion: 220 mL    Oral Fluid: 480 mL    Solution: 100 mL    Solution: 50 mL  Total IN: 850 mL    OUT:    Chest Tube: 350 mL    Voided: 700 mL  Total OUT: 1050 mL    Total NET: -200 mL          LABS:                        11.0   .13 )-----------( 431      ( 2019 05:17 )             33.7         133<L>  |  97<L>  |  6.0<L>  ----------------------------<  96  3.8   |  24.0  |  0.38<L>    Ca    7.2<L>      2019 05:17  Phos  1.2       Mg     1.6         TPro  5.0<L>  /  Alb  1.8<L>  /  TBili  0.4  /  DBili  x   /  AST  46<H>  /  ALT  32  /  AlkPhos  252<H>  11-25    PT/INR - ( 2019 05:17 )   PT: 12.2 sec;   INR: 1.06 ratio         PTT - ( 2019 05:17 )  PTT:75.0 sec  Urinalysis Basic - ( 2019 19:42 )    Color: Yellow / Appearance: Clear / S.015 / pH: x  Gluc: x / Ketone: Negative  / Bili: Negative / Urobili: Negative mg/dL   Blood: x / Protein: Negative mg/dL / Nitrite: Negative   Leuk Esterase: Negative / RBC: 0-2 /HPF / WBC 0-2   Sq Epi: x / Non Sq Epi: Occasional / Bacteria: Occasional HPI/OVERNIGHT EVENTS:    Pt was seen and examined overnight. No acute complaints, patient in NAD. Pt is a transfer from [facility] [place] with a right pleural effusion. Pt is being evaluated at [facility] for a pancreatic cyst by surgical oncology. Patient has a chest tube in, which we are monitoring. Patient denies dizziness, headache, fever, chills, nausea, vomiting, tremors, chest pain, Sob.       MEDICATIONS  (STANDING):  cefTRIAXone   IVPB 1000 milliGRAM(s) IV Intermittent every 24 hours  folic acid 1 milliGRAM(s) Oral daily  heparin  Infusion 500 Unit(s)/Hr (11 mL/Hr) IV Continuous <Continuous>  influenza   Vaccine 0.5 milliLiter(s) IntraMuscular once  ketorolac   Injectable 15 milliGRAM(s) IV Push every 6 hours  lidocaine   Patch 1 Patch Transdermal daily  multivitamin 1 Tablet(s) Oral daily  psyllium Powder 1 Packet(s) Oral two times a day  saccharomyces boulardii 250 milliGRAM(s) Oral two times a day  thiamine 100 milliGRAM(s) Oral daily    MEDICATIONS  (PRN):  acetaminophen   Tablet .. 650 milliGRAM(s) Oral every 6 hours PRN Mild Pain (1 - 3)  methocarbamol 500 milliGRAM(s) Oral three times a day PRN Muscle Spasm  oxycodone    5 mG/acetaminophen 325 mG 1 Tablet(s) Oral every 6 hours PRN Severe Pain (7 - 10)  traMADol 50 milliGRAM(s) Oral every 6 hours PRN Moderate Pain (4 - 6)      Vital Signs Last 24 Hrs  T(C): 36.6 (2019 18:00), Max: 36.7 (2019 07:19)  T(F): 97.9 (2019 18:00), Max: 98.1 (2019 07:19)  HR: 80 (2019 01:53) (70 - 100)  BP: 95/62 (2019 01:53) (82/56 - 99/64)  BP(mean): 74 (2019 01:53) (61 - 79)  RR: 19 (2019 01:53) (12 - 53)  SpO2: 99% (2019 01:53) (95% - 99%)    Constitutional: patient resting comfortably in bed, in no acute distress  HEENT: EOMI / PERRL b/l, no active drainage or redness  Neck: No JVD, full ROM without pain  Respiratory: respirations are unlabored, no accessory muscle use, no conversational dyspnea  Cardiovascular: regular rate & rhythm  Gastrointestinal: Abdomen soft, non-tender, non-distended, no rebound tenderness / guarding  Neurological: GCS: 15 (4/5/6). A&O x 3; no gross sensory / motor / coordination deficits  Psychiatric: Normal mood, normal affect  Musculoskeletal: No joint pain, swelling or deformity; no limitation of movement    I&O's Detail    2019 07:01  -  2019 07:00  --------------------------------------------------------  IN:    heparin Infusion: 264 mL    Oral Fluid: 720 mL    Solution: 50 mL    Solution: 100 mL  Total IN: 1134 mL    OUT:    Chest Tube: 480 mL    Voided: 600 mL  Total OUT: 1080 mL    Total NET: 54 mL      2019 07:01  -  2019 03:01  --------------------------------------------------------  IN:    heparin Infusion: 220 mL    Oral Fluid: 480 mL    Solution: 100 mL    Solution: 50 mL  Total IN: 850 mL    OUT:    Chest Tube: 350 mL    Voided: 700 mL  Total OUT: 1050 mL    Total NET: -200 mL          LABS:                        11.0   26.13 )-----------( 431      ( 2019 05:17 )             33.7         133<L>  |  97<L>  |  6.0<L>  ----------------------------<  96  3.8   |  24.0  |  0.38<L>    Ca    7.2<L>      2019 05:17  Phos  1.2       Mg     1.6         TPro  5.0<L>  /  Alb  1.8<L>  /  TBili  0.4  /  DBili  x   /  AST  46<H>  /  ALT  32  /  AlkPhos  252<H>  11-25    PT/INR - ( 2019 05:17 )   PT: 12.2 sec;   INR: 1.06 ratio         PTT - ( 2019 05:17 )  PTT:75.0 sec  Urinalysis Basic - ( 2019 19:42 )    Color: Yellow / Appearance: Clear / S.015 / pH: x  Gluc: x / Ketone: Negative  / Bili: Negative / Urobili: Negative mg/dL   Blood: x / Protein: Negative mg/dL / Nitrite: Negative   Leuk Esterase: Negative / RBC: 0-2 /HPF / WBC 0-2   Sq Epi: x / Non Sq Epi: Occasional / Bacteria: Occasional

## 2019-11-27 NOTE — BRIEF OPERATIVE NOTE - NSICDXBRIEFPOSTOP_GEN_ALL_CORE_FT
POST-OP DIAGNOSIS:  Lung infection draining through chest wall 27-Nov-2019 17:23:17  Nickie Jones  Empyema 27-Nov-2019 17:22:27  Nickie Jones

## 2019-11-27 NOTE — PROGRESS NOTE ADULT - SUBJECTIVE AND OBJECTIVE BOX
HPI/OVERNIGHT EVENTS:    Pt was seen and examined overnight. No acute complaints, patient in NAD. [place] [facility] [place]/ Right empyema. Pt is being evaluated at [facility] for a pancreatic cyst by surgical oncology. Patient has a chest tube in, which we are monitoring. Patient denies dizziness, headache, fever, chills, nausea, vomiting, tremors, chest pain, Sob.       MEDICATIONS  (STANDING):  cefTRIAXone   IVPB 1000 milliGRAM(s) IV Intermittent every 24 hours  folic acid 1 milliGRAM(s) Oral daily  heparin  Infusion 500 Unit(s)/Hr (11 mL/Hr) IV Continuous <Continuous>  influenza   Vaccine 0.5 milliLiter(s) IntraMuscular once  ketorolac   Injectable 15 milliGRAM(s) IV Push every 6 hours  lidocaine   Patch 1 Patch Transdermal daily  multivitamin 1 Tablet(s) Oral daily  psyllium Powder 1 Packet(s) Oral two times a day  saccharomyces boulardii 250 milliGRAM(s) Oral two times a day  thiamine 100 milliGRAM(s) Oral daily    MEDICATIONS  (PRN):  acetaminophen   Tablet .. 650 milliGRAM(s) Oral every 6 hours PRN Mild Pain (1 - 3)  methocarbamol 500 milliGRAM(s) Oral three times a day PRN Muscle Spasm  oxycodone    5 mG/acetaminophen 325 mG 1 Tablet(s) Oral every 6 hours PRN Severe Pain (7 - 10)  traMADol 50 milliGRAM(s) Oral every 6 hours PRN Moderate Pain (4 - 6)      Vital Signs Last 24 Hrs  T(C): 36.6 (2019 18:00), Max: 36.7 (2019 07:19)  T(F): 97.9 (2019 18:00), Max: 98.1 (2019 07:19)  HR: 80 (2019 01:53) (70 - 100)  BP: 95/62 (2019 01:53) (82/56 - 99/64)  BP(mean): 74 (2019 01:53) (61 - 79)  RR: 19 (2019 01:53) (12 - 53)  SpO2: 99% (2019 01:53) (95% - 99%)    Constitutional: patient resting comfortably in bed, in no acute distress  HEENT: EOMI / PERRL b/l, no active drainage or redness  Neck: No JVD, full ROM without pain  Respiratory: respirations are unlabored, no accessory muscle use, no conversational dyspnea  Cardiovascular: regular rate & rhythm  Chest: Chest tube in place  Gastrointestinal: Abdomen soft, non-tender, non-distended, no rebound tenderness / guarding  Neurological: GCS: 15 (4/5/6). A&O x 3; no gross sensory / motor / coordination deficits  Psychiatric: Normal mood, normal affect  Musculoskeletal: No joint pain, swelling or deformity; no limitation of movement    I&O's Detail    2019 07:01  -  2019 07:00  --------------------------------------------------------  IN:    heparin Infusion: 264 mL    Oral Fluid: 720 mL    Solution: 50 mL    Solution: 100 mL  Total IN: 1134 mL    OUT:    Chest Tube: 480 mL    Voided: 600 mL  Total OUT: 1080 mL    Total NET: 54 mL      2019 07:01  -  2019 03:22  --------------------------------------------------------  IN:    heparin Infusion: 220 mL    Oral Fluid: 480 mL    Solution: 100 mL    Solution: 50 mL  Total IN: 850 mL    OUT:    Chest Tube: 350 mL    Voided: 700 mL  Total OUT: 1050 mL    Total NET: -200 mL          LABS:                        11.0   26.13 )-----------( 431      ( 2019 05:17 )             33.7         133<L>  |  97<L>  |  6.0<L>  ----------------------------<  96  3.8   |  24.0  |  0.38<L>    Ca    7.2<L>      2019 05:17  Phos  1.2       Mg     1.6         TPro  5.0<L>  /  Alb  1.8<L>  /  TBili  0.4  /  DBili  x   /  AST  46<H>  /  ALT  32  /  AlkPhos  252<H>  11-25    PT/INR - ( 2019 05:17 )   PT: 12.2 sec;   INR: 1.06 ratio         PTT - ( 2019 05:17 )  PTT:75.0 sec  Urinalysis Basic - ( 2019 19:42 )    Color: Yellow / Appearance: Clear / S.015 / pH: x  Gluc: x / Ketone: Negative  / Bili: Negative / Urobili: Negative mg/dL   Blood: x / Protein: Negative mg/dL / Nitrite: Negative   Leuk Esterase: Negative / RBC: 0-2 /HPF / WBC 0-2   Sq Epi: x / Non Sq Epi: Occasional / Bacteria: Occasional

## 2019-11-27 NOTE — PROGRESS NOTE ADULT - ASSESSMENT
Assessment:  35yo female admitted for tx of empyema w/ recent imaging showing a cystic lesion in her pancreas. Patient was in need of further imaging to characterize her pancreatic cyst. MRCP done yesterday: showed Indeterminate pancreatic lesions without main duct dilatation. Repeat short interval follow-up exam is recommended when the patient is able to maintain adequate breath holds. The patient's underlying lung disease may be prohibitive.     Plan:    -Continue to monitar WBC  -Continue to monitor chest tube output  -Follow vitals   -Recommend lifestyle modification, regarding reduction of ETOH intake  -F/u Ca 19-9 level   -Repeat short interval follow-up exam is recommended when the patient is able   to maintain adequate breath holds. The patient's underlying lung disease may   be prohibitive.   -Surgical oncology will continue to follow

## 2019-11-27 NOTE — PROGRESS NOTE ADULT - ASSESSMENT
Assessment:  35yo female admitted for tx of empyema w/ recent imaging showing a cystic lesion in her pancreas. Patient was in need of further imaging to characterize her pancreatic cyst. MRCP done yesterday: showed Indeterminate pancreatic lesions without main duct dilatation. Repeat short interval follow-up exam is recommended when the patient is able to maintain adequate breath holds. The patient's underlying lung disease may be prohibitive.     Plan:    -Continue to monitor WBC  -Continue to monitor chest tube output  -Follow vitals   -Recommend lifestyle modification, regarding reduction of ETOH intake  -F/u Ca 19-9 level   -Repeat short interval follow-up exam is recommended when the patient is able   to maintain adequate breath holds. The patient's underlying lung disease may   be prohibitive.   -Surgical oncology will continue to follow

## 2019-11-27 NOTE — PROGRESS NOTE ADULT - ASSESSMENT
Patient with pleural effusion, multifocal pneumonia and now with pancreatic cystic lesion. Most likely pancreatic pseudocyst in setting of alcohol abuse. Will recommend outpatient FU with imaging.  No inpatient GI work up planned.  Call as on needed basis

## 2019-11-28 ENCOUNTER — RESULT REVIEW (OUTPATIENT)
Age: 34
End: 2019-11-28

## 2019-11-28 DIAGNOSIS — R52 PAIN, UNSPECIFIED: ICD-10-CM

## 2019-11-28 LAB
ALBUMIN SERPL ELPH-MCNC: 1.9 G/DL — LOW (ref 3.3–5.2)
ALP SERPL-CCNC: 211 U/L — HIGH (ref 40–120)
ALT FLD-CCNC: 19 U/L — SIGNIFICANT CHANGE UP
ANION GAP SERPL CALC-SCNC: 10 MMOL/L — SIGNIFICANT CHANGE UP (ref 5–17)
APTT BLD: 66.7 SEC — HIGH (ref 27.5–36.3)
AST SERPL-CCNC: 27 U/L — SIGNIFICANT CHANGE UP
BILIRUB DIRECT SERPL-MCNC: 0.1 MG/DL — SIGNIFICANT CHANGE UP (ref 0–0.3)
BILIRUB INDIRECT FLD-MCNC: 0.1 MG/DL — LOW (ref 0.2–1)
BILIRUB SERPL-MCNC: 0.2 MG/DL — LOW (ref 0.4–2)
BUN SERPL-MCNC: 5 MG/DL — LOW (ref 8–20)
CALCIUM SERPL-MCNC: 7.5 MG/DL — LOW (ref 8.6–10.2)
CHLORIDE SERPL-SCNC: 100 MMOL/L — SIGNIFICANT CHANGE UP (ref 98–107)
CO2 SERPL-SCNC: 21 MMOL/L — LOW (ref 22–29)
CREAT SERPL-MCNC: 0.38 MG/DL — LOW (ref 0.5–1.3)
GLUCOSE SERPL-MCNC: 143 MG/DL — HIGH (ref 70–115)
HCT VFR BLD CALC: 27 % — LOW (ref 34.5–45)
HGB BLD-MCNC: 8.9 G/DL — LOW (ref 11.5–15.5)
MAGNESIUM SERPL-MCNC: 1.3 MG/DL — LOW (ref 1.6–2.6)
MCHC RBC-ENTMCNC: 33 GM/DL — SIGNIFICANT CHANGE UP (ref 32–36)
MCHC RBC-ENTMCNC: 34 PG — SIGNIFICANT CHANGE UP (ref 27–34)
MCV RBC AUTO: 103.1 FL — HIGH (ref 80–100)
OSMOLALITY UR: 339 MOSM/KG — SIGNIFICANT CHANGE UP (ref 300–1000)
PHOSPHATE 24H UR-MCNC: 6.6 MG/DL — SIGNIFICANT CHANGE UP
PLATELET # BLD AUTO: 326 K/UL — SIGNIFICANT CHANGE UP (ref 150–400)
POTASSIUM SERPL-MCNC: 4.6 MMOL/L — SIGNIFICANT CHANGE UP (ref 3.5–5.3)
POTASSIUM SERPL-SCNC: 4.6 MMOL/L — SIGNIFICANT CHANGE UP (ref 3.5–5.3)
PROT SERPL-MCNC: 5 G/DL — LOW (ref 6.6–8.7)
RBC # BLD: 2.62 M/UL — LOW (ref 3.8–5.2)
RBC # FLD: 14.4 % — SIGNIFICANT CHANGE UP (ref 10.3–14.5)
SODIUM SERPL-SCNC: 131 MMOL/L — LOW (ref 135–145)
SODIUM UR-SCNC: <30 MMOL/L — SIGNIFICANT CHANGE UP
WBC # BLD: 42.42 K/UL — CRITICAL HIGH (ref 3.8–10.5)
WBC # FLD AUTO: 42.42 K/UL — CRITICAL HIGH (ref 3.8–10.5)

## 2019-11-28 PROCEDURE — 88305 TISSUE EXAM BY PATHOLOGIST: CPT | Mod: 26

## 2019-11-28 PROCEDURE — 93971 EXTREMITY STUDY: CPT | Mod: 26,RT

## 2019-11-28 PROCEDURE — 93010 ELECTROCARDIOGRAM REPORT: CPT

## 2019-11-28 PROCEDURE — 71045 X-RAY EXAM CHEST 1 VIEW: CPT | Mod: 26

## 2019-11-28 PROCEDURE — 99232 SBSQ HOSP IP/OBS MODERATE 35: CPT

## 2019-11-28 PROCEDURE — 99233 SBSQ HOSP IP/OBS HIGH 50: CPT

## 2019-11-28 RX ORDER — SODIUM CHLORIDE 9 MG/ML
1000 INJECTION, SOLUTION INTRAVENOUS
Refills: 0 | Status: DISCONTINUED | OUTPATIENT
Start: 2019-11-28 | End: 2019-11-29

## 2019-11-28 RX ORDER — MAGNESIUM SULFATE 500 MG/ML
4 VIAL (ML) INJECTION ONCE
Refills: 0 | Status: COMPLETED | OUTPATIENT
Start: 2019-11-28 | End: 2019-11-28

## 2019-11-28 RX ORDER — OXYCODONE AND ACETAMINOPHEN 5; 325 MG/1; MG/1
1 TABLET ORAL EVERY 4 HOURS
Refills: 0 | Status: DISCONTINUED | OUTPATIENT
Start: 2019-11-28 | End: 2019-11-30

## 2019-11-28 RX ORDER — METHOCARBAMOL 500 MG/1
500 TABLET, FILM COATED ORAL EVERY 6 HOURS
Refills: 0 | Status: DISCONTINUED | OUTPATIENT
Start: 2019-11-28 | End: 2019-11-30

## 2019-11-28 RX ORDER — OXYCODONE AND ACETAMINOPHEN 5; 325 MG/1; MG/1
2 TABLET ORAL EVERY 4 HOURS
Refills: 0 | Status: DISCONTINUED | OUTPATIENT
Start: 2019-11-28 | End: 2019-11-30

## 2019-11-28 RX ORDER — HYDROMORPHONE HYDROCHLORIDE 2 MG/ML
0.5 INJECTION INTRAMUSCULAR; INTRAVENOUS; SUBCUTANEOUS EVERY 6 HOURS
Refills: 0 | Status: DISCONTINUED | OUTPATIENT
Start: 2019-11-28 | End: 2019-11-30

## 2019-11-28 RX ADMIN — OXYCODONE AND ACETAMINOPHEN 2 TABLET(S): 5; 325 TABLET ORAL at 17:32

## 2019-11-28 RX ADMIN — Medication 650 MILLIGRAM(S): at 10:38

## 2019-11-28 RX ADMIN — Medication 1 PACKET(S): at 17:02

## 2019-11-28 RX ADMIN — OXYCODONE AND ACETAMINOPHEN 2 TABLET(S): 5; 325 TABLET ORAL at 17:02

## 2019-11-28 RX ADMIN — LIDOCAINE 1 PATCH: 4 CREAM TOPICAL at 22:41

## 2019-11-28 RX ADMIN — OXYCODONE AND ACETAMINOPHEN 2 TABLET(S): 5; 325 TABLET ORAL at 22:39

## 2019-11-28 RX ADMIN — Medication 250 MILLIGRAM(S): at 17:04

## 2019-11-28 RX ADMIN — Medication 100 GRAM(S): at 10:39

## 2019-11-28 RX ADMIN — LIDOCAINE 1 PATCH: 4 CREAM TOPICAL at 10:40

## 2019-11-28 RX ADMIN — SODIUM CHLORIDE 75 MILLILITER(S): 9 INJECTION, SOLUTION INTRAVENOUS at 10:37

## 2019-11-28 RX ADMIN — CEFTRIAXONE 100 MILLIGRAM(S): 500 INJECTION, POWDER, FOR SOLUTION INTRAMUSCULAR; INTRAVENOUS at 18:45

## 2019-11-28 RX ADMIN — Medication 1 MILLIGRAM(S): at 10:39

## 2019-11-28 RX ADMIN — FENTANYL CITRATE 30 MILLILITER(S): 50 INJECTION INTRAVENOUS at 07:13

## 2019-11-28 RX ADMIN — LIDOCAINE 1 PATCH: 4 CREAM TOPICAL at 19:00

## 2019-11-28 RX ADMIN — Medication 1 TABLET(S): at 10:39

## 2019-11-28 RX ADMIN — OXYCODONE AND ACETAMINOPHEN 2 TABLET(S): 5; 325 TABLET ORAL at 23:39

## 2019-11-28 RX ADMIN — Medication 250 MILLIGRAM(S): at 05:48

## 2019-11-28 RX ADMIN — Medication 650 MILLIGRAM(S): at 04:49

## 2019-11-28 RX ADMIN — Medication 100 MILLIGRAM(S): at 10:38

## 2019-11-28 RX ADMIN — CEFTRIAXONE 100 MILLIGRAM(S): 500 INJECTION, POWDER, FOR SOLUTION INTRAMUSCULAR; INTRAVENOUS at 07:02

## 2019-11-28 RX ADMIN — Medication 650 MILLIGRAM(S): at 03:18

## 2019-11-28 RX ADMIN — Medication 650 MILLIGRAM(S): at 11:08

## 2019-11-28 RX ADMIN — HEPARIN SODIUM 11 UNIT(S)/HR: 5000 INJECTION INTRAVENOUS; SUBCUTANEOUS at 00:15

## 2019-11-28 NOTE — PROGRESS NOTE ADULT - SUBJECTIVE AND OBJECTIVE BOX
[facility] DIVISION OF KIDNEY DISEASES AND HYPERTENSION -- FOLLOW UP NOTE  --------------------------------------------------------------------------------  Chief Complaint: hyponatremia    24 hour events/subjective:  Pt seen and examined  She admits to drinking a lot of water+ juices        PAST HISTORY  --------------------------------------------------------------------------------  No significant changes to PMH, PSH, FHx, SHx, unless otherwise noted    ALLERGIES & MEDICATIONS  --------------------------------------------------------------------------------  Allergies    No Known Allergies    Intolerances    Ensure TID (Unknown)    Standing Inpatient Medications  cefTRIAXone   IVPB 1000 milliGRAM(s) IV Intermittent every 12 hours  folic acid 1 milliGRAM(s) Oral daily  heparin  Infusion 1100 Unit(s)/Hr IV Continuous <Continuous>  influenza   Vaccine 0.5 milliLiter(s) IntraMuscular once  lactated ringers. 1000 milliLiter(s) IV Continuous <Continuous>  lidocaine   Patch 1 Patch Transdermal daily  multivitamin 1 Tablet(s) Oral daily  psyllium Powder 1 Packet(s) Oral two times a day  saccharomyces boulardii 250 milliGRAM(s) Oral two times a day  thiamine 100 milliGRAM(s) Oral daily    PRN Inpatient Medications  acetaminophen   Tablet .. 650 milliGRAM(s) Oral every 6 hours PRN  HYDROmorphone  Injectable 0.5 milliGRAM(s) IV Push every 6 hours PRN  methocarbamol 500 milliGRAM(s) Oral every 6 hours PRN  oxycodone    5 mG/acetaminophen 325 mG 1 Tablet(s) Oral every 4 hours PRN  traMADol 50 milliGRAM(s) Oral every 6 hours PRN      REVIEW OF SYSTEMS  --------------------------------------------------------------------------------  Gen: Weight loss, fatigue, fevers/chills, weakness+  Skin: No rashes  Head/Eyes/Ears/Mouth: No headache; Normal hearing; Normal vision w/o blurriness; No sinus pain/discomfort, sore throat  Respiratory: dyspnea, cough+  CV: No chest pain, PND, orthopnea  GI: No abdominal pain, diarrhea+ no nausea, vomiting  : No increased frequency, dysuria, hematuria, nocturia  MSK: No joint pain/swelling; no back pain; no edema  Neuro: No dizziness/lightheadedness  Heme: No easy bruising or bleeding  Endo: No heat/cold intolerance  Psych: No significant nervousness, anxiety, stress, depression    VITALS/PHYSICAL EXAM  --------------------------------------------------------------------------------  T(C): 36.7 (11-28-19 @ 10:00), Max: 36.8 (11-28-19 @ 05:43)  HR: 100 (11-28-19 @ 10:00) (75 - 100)  BP: 100/67 (11-28-19 @ 10:00) (82/36 - 107/69)  RR: 20 (11-28-19 @ 10:00) (15 - 20)  SpO2: 100% (11-28-19 @ 10:00) (90% - 100%)  Wt(kg): --  Height (cm): 167 (11-27-19 @ 10:14)  Weight (kg): 55.2 (11-27-19 @ 10:14)  BMI (kg/m2): 19.8 (11-27-19 @ 10:14)  BSA (m2): 1.61 (11-27-19 @ 10:14)      11-27-19 @ 07:01  -  11-28-19 @ 07:00  --------------------------------------------------------  IN: 706 mL / OUT: 1330 mL / NET: -624 mL    11-28-19 @ 07:01  -  11-28-19 @ 13:41  --------------------------------------------------------  IN: 180 mL / OUT: 60 mL / NET: 120 mL      Physical Exam:  	          Gen: NAD, thin, pale  	HEENT: supple neck  	Pulm; decreases bs in rt lung field  	CV: RRR, S1S2; no rub  	Back: No spinal or CVA tenderness; no sacral edema  	Abd: +BS, soft, nontender/nondistended  	: No suprapubic tenderness  	UE: Warm, no edema; no asterixis  	LE: Warm,  no edema  	Neuro: No focal deficits  	Psych: Normal affect and mood  	Skin: Warm, Rt arm bruise (improving)  	Vascular access: none  LABS/STUDIES  --------------------------------------------------------------------------------              8.9    42.42 >-----------<  326      [11-28-19 @ 06:12]              27.0     131  |  100  |  5.0  ----------------------------<  143      [11-28-19 @ 06:09]  4.6   |  21.0  |  0.38        Ca     7.5     [11-28-19 @ 06:09]      Mg     1.3     [11-28-19 @ 06:09]    TPro  5.0  /  Alb  1.9  /  TBili  0.2  /  DBili  0.1  /  AST  27  /  ALT  19  /  AlkPhos  211  [11-28-19 @ 06:09]      PTT: 66.7       [11-28-19 @ 06:12]      Creatinine Trend:  SCr 0.38 [11-28 @ 06:09]  SCr 0.30 [11-27 @ 04:23]  SCr 0.38 [11-26 @ 05:17]  SCr 0.37 [11-25 @ 05:29]  SCr 0.50 [11-24 @ 20:27]    Urinalysis - [11-26-19 @ 19:42]      Color Yellow / Appearance Clear / SG 1.015 / pH 8.0      Gluc Negative / Ketone Negative  / Bili Negative / Urobili Negative       Blood Trace / Protein Negative / Leuk Est Negative / Nitrite Negative      RBC 0-2 / WBC 0-2 / Hyaline  / Gran  / Sq Epi  / Non Sq Epi Occasional / Bacteria Occasional    Urine Sodium 56      [11-26-19 @ 19:42]  Urine Urea Nitrogen 164      [11-27-19 @ 01:53]  Urine Potassium 18      [11-26-19 @ 19:42]  Urine Chloride <27      [11-26-19 @ 19:42]  Urine Phosphorus 6.6      [11-27-19 @ 22:17]  Urine Osmolality 261      [11-26-19 @ 19:42]    TSH 4.76      [11-25-19 @ 05:29]  Lipid: chol 68, , HDL 27, LDL 12      [11-25-19 @ 05:29]    HIV Nonreact      [11-25-19 @ 14:08]    Rheumatoid Factor <10      [11-26-19 @ 17:34]  ANCA: cANCA Negative, pANCA Negative, atypical ANCA Negative      [11-26-19 @ 18:05]

## 2019-11-28 NOTE — PROGRESS NOTE ADULT - ASSESSMENT
This 34 y.o. F who was admitted to [facility] 11/22 with weakness,   found with large right pleural effusion   s/p chest tube drainage    Hyponatremia - resolving  WBC elevation - elevated  alpha hemolytic strep infection  multi focal PNA  infected pleural effusion - empyema  Pancreatic cyst    - continue Ceftriaxone 1 gram Q 24H  - Legionella Ag from [facility] 11/24/19 - negative, off azithromycin    s/p VATS 11/27/19  - follow up on cultures    - WBC elevation post op, reactive.       Workup for pancreatic cyst found incidentally in progress    - follow up all outstanding cultures (FROM [facility])  - trend temperature and WBC curve  - repeat cultures from blood and all sources if febrile.

## 2019-11-28 NOTE — PROGRESS NOTE ADULT - ASSESSMENT
Assessment   Patient is a 34y old  Female who presents with a chief complaint of weakness, weight loss and flu like symptoms found to have large right pleural effusion on 11/22. Pleural fluid drained and culture + for group A strep. Additional preop findings includ a partially cystic mass in pancreas and RIJ thrombus, started on anticoagulation. 11/24 patient transferred to [facility] for surgical evaluation. MRCP complete.    On 11/27, patient underwent Debridement, wound, with dressing replacement  Wound VAC placement  Pleural biopsy  Lung decortication  Bronchoscopy, with lung decortication using VATS.    Postoperative course/issues:    PLAN  Neuro: Pain management, Fentanyl PCA until tubes removed  Pulm: Encourage coughing, deep breathing and use of incentive spirometry. Wean off supplemental oxygen as able. Daily CXR.   Cardio: Monitor telemetry/alarms  GI: Tolerating diet. Continue stool softeners.  Renal: Daily BMP, supplement electrolytes as needed  Vasc: On Heparin gtt for RIJ thrombus. Ultrasound complete.  Heme: Stable H/H. Trend CBC daily.   ID: Rocephin for empyema/PNA  Therapy: OOB/ambulate  Tubes: Maintain anterior and posterior tubes  Disposition: No discharge plan at this time  Discussed with Cardiothoracic Team at AM rounds. Assessment   Patient is a 34y old  Female who presents with a chief complaint of weakness, weight loss and flu like symptoms found to have large right pleural effusion on 11/22. Pleural fluid drained and culture + for group A strep. Additional preop findings includ a partially cystic mass in pancreas and RIJ thrombus, started on anticoagulation. 11/24 patient transferred to [facility] for surgical evaluation. MRCP complete.    On 11/27, patient underwent Debridement, wound, with dressing replacement  Wound VAC placement  Pleural biopsy  Lung decortication  Bronchoscopy, with lung decortication using VATS.    Postoperative course/issues: Pain control    PLAN  Neuro: Pain management, Fentanyl PCA d/c per Pain Mgmt, start percocet, dilaudid IV, robaxin  Pulm: Encourage coughing, deep breathing and use of incentive spirometry. Wean off supplemental oxygen as able. Daily CXR.   Cardio: Monitor telemetry/alarms  GI: Tolerating diet. Continue stool softeners.  Renal: Daily BMP, supplement electrolytes as needed  Vasc: On Heparin gtt for RIJ thrombus. Ultrasound complete.  Heme: Stable H/H. Trend CBC daily.   ID: Rocephin for empyema/PNA  Therapy: OOB/ambulate  Tubes: Maintain anterior and posterior tubes  Disposition: No discharge plan at this time  Discussed with Cardiothoracic Team at AM rounds.

## 2019-11-28 NOTE — PROGRESS NOTE ADULT - PROBLEM SELECTOR PLAN 2
1. Consider resuming Ketorolac 15mg IV q6hrs ATC x 2 days  - Unless otherwise contraindicated  2. Tylenol 650mg PO q6hrs PRN mild pain  3. Tramadol 50mg PO q6hrs PRN moderate pain  4. Percocet 1 tablet q6 hrs PRN severe pain  - May increase to 2 tablets if lower dose insufficient for pos-op pain  5. Robaxin 500mg PO TID PRN spasms  6. Lidoderm Patches adjacent to affected area, over intact skin only.  7. Offer IV Dilaudid 0.5mg q4hrs PRN severe breakthrough pain, 1 hour after oral opioids

## 2019-11-28 NOTE — PROGRESS NOTE ADULT - SUBJECTIVE AND OBJECTIVE BOX
HPI:  Patient is a 34y old  Female who presents with a chief complaint of SOB (26 Nov 2019 15:02)  She was transferred from an OSH for management of empyema. MICU team requested assistance with her pain regimen. She was being offered Percocet PRN. Tramadol, Toradol and Methocarbamol suggested as alternatives to Percocet. Patient is now POD #1 VATS, lung decortication, pleural biopsy.    VERBAL REPORT: "The pump isn't helping."  Patient describes post-surgical pain, worst at chest tube sites, radiating into her abdomen. She reports best relief with oral analgesics.   PAIN SCORE: 5-6/10 at rest   (VNRS)      Allergies  No Known Allergies      THERAPY:  [ ] IV PCA Morphine		[ ] 5 mg/mL	[ ] 1 mg/mL  [ ] IV PCA Hydromorphone	[ ] 5 mg/mL	[ ] 1 mg/mL  [x] IV PCA Fentanyl		[ ] 50 micrograms/mL  Demand dose _10mcg_ lockout _6_ (minutes) Continuous Rate _0_ Total: _590mcg_  Daily      MEDICATIONS  (STANDING):  cefTRIAXone   IVPB 1000 milliGRAM(s) IV Intermittent every 12 hours  fentaNYL PCA (50 MICROgram(s)/mL) 30 milliLiter(s) PCA Continuous PCA Continuous  folic acid 1 milliGRAM(s) Oral daily  heparin  Infusion 1100 Unit(s)/Hr (11 mL/Hr) IV Continuous <Continuous>  influenza   Vaccine 0.5 milliLiter(s) IntraMuscular once  lactated ringers. 1000 milliLiter(s) (75 mL/Hr) IV Continuous <Continuous>  lidocaine   Patch 1 Patch Transdermal daily  multivitamin 1 Tablet(s) Oral daily  psyllium Powder 1 Packet(s) Oral two times a day  saccharomyces boulardii 250 milliGRAM(s) Oral two times a day  thiamine 100 milliGRAM(s) Oral daily    MEDICATIONS  (PRN):  acetaminophen   Tablet .. 650 milliGRAM(s) Oral every 6 hours PRN Mild Pain (1 - 3)  diphenhydrAMINE   Injectable 12.5 milliGRAM(s) IV Push every 4 hours PRN Pruritus  methocarbamol 500 milliGRAM(s) Oral three times a day PRN Muscle Spasm  naloxone Injectable 0.1 milliGRAM(s) IV Push every 3 minutes PRN For ANY of the following changes in patient status:  A. RR LESS THAN 10 breaths per minute, B. Oxygen saturation LESS THAN 90%, C. Sedation score of 6  ondansetron Injectable 4 milliGRAM(s) IV Push every 6 hours PRN Nausea  oxycodone    5 mG/acetaminophen 325 mG 1 Tablet(s) Oral every 6 hours PRN Severe Pain (7 - 10)  traMADol 50 milliGRAM(s) Oral every 6 hours PRN Moderate Pain (4 - 6)        OBJECTIVE:  Sedation Score:	[X] Alert	[ ] Drowsy	[ ] Arousable	[ ] Asleep	[ ] Unresponsive  Side Effects:	[X] None	[ ] Nausea	[ ] Vomiting	[ ] Pruritus	  [ ] Weakness		[ ] Numbness	[ ] Other:      PHYSICAL EXAM:  GENERAL: NAD, well-developed  HEAD:  Atraumatic, Normocephalic  EYES: EOMI, PERRLA, conjunctiva and sclera clear  NERVOUS SYSTEM:  Alert & Oriented X3, Good concentration; Motor Strength 5/5 B/L upper and lower extremities  CHEST/LUNG: Clear speech, no SOB evident at rest; +right sided chest tube  ABDOMEN: Nondistended; Bowel sounds present  SKIN: hematoma on the interior aspect of right elbow        LABS:                        8.9    42.42 )-----------( 326      ( 28 Nov 2019 06:12 )             27.0       11-28    131<L>  |  100  |  5.0<L>  ----------------------------<  143<H>  4.6   |  21.0<L>  |  0.38<L>    Ca    7.5<L>      28 Nov 2019 06:09  Mg     1.3     11-28    TPro  5.0<L>  /  Alb  1.9<L>  /  TBili  0.2<L>  /  DBili  0.1  /  AST  27  /  ALT  19  /  AlkPhos  211<H>  11-28

## 2019-11-28 NOTE — PROGRESS NOTE ADULT - SUBJECTIVE AND OBJECTIVE BOX
INTERVAL HPI/OVERNIGHT EVENTS:    No acute events overnight. Patient hemodynamically stable and in no acute distress. No acute complaints at the moment. Denies fevers, chills, nausea, vomiting.    SUBJECTIVE:    MEDICATIONS  (STANDING):  cefTRIAXone   IVPB 1000 milliGRAM(s) IV Intermittent every 12 hours  folic acid 1 milliGRAM(s) Oral daily  heparin  Infusion 1100 Unit(s)/Hr (11 mL/Hr) IV Continuous <Continuous>  influenza   Vaccine 0.5 milliLiter(s) IntraMuscular once  lactated ringers. 1000 milliLiter(s) (75 mL/Hr) IV Continuous <Continuous>  lidocaine   Patch 1 Patch Transdermal daily  multivitamin 1 Tablet(s) Oral daily  psyllium Powder 1 Packet(s) Oral two times a day  saccharomyces boulardii 250 milliGRAM(s) Oral two times a day  thiamine 100 milliGRAM(s) Oral daily    MEDICATIONS  (PRN):  acetaminophen   Tablet .. 650 milliGRAM(s) Oral every 6 hours PRN Mild Pain (1 - 3)  HYDROmorphone  Injectable 0.5 milliGRAM(s) IV Push every 6 hours PRN Severe Pain persisting 1 hour after oral opioids  methocarbamol 500 milliGRAM(s) Oral every 6 hours PRN Muscle Spasm  oxycodone    5 mG/acetaminophen 325 mG 1 Tablet(s) Oral every 4 hours PRN Moderate Pain (4 - 6)  oxycodone    5 mG/acetaminophen 325 mG 2 Tablet(s) Oral every 4 hours PRN Severe Pain (7 - 10)  traMADol 50 milliGRAM(s) Oral every 6 hours PRN Moderate Pain (4 - 6)      Vital Signs Last 24 Hrs  T(C): 36.7 (2019 10:00), Max: 36.8 (2019 05:43)  T(F): 98.1 (2019 10:00), Max: 98.3 (2019 05:43)  HR: 100 (2019 10:00) (75 - 100)  BP: 100/67 (2019 10:00) (82/36 - 107/69)  BP(mean): --  RR: 20 (2019 10:00) (15 - 20)  SpO2: 100% (2019 10:00) (94% - 100%)    PHYSICAL EXAM:    General: lying comfortably in bed in no acute distress  Chest: nonlabored breathing CT in place  Abdomen: soft, non-tender, non-distended, no rebound tenderness / guarding        I&O's Detail    2019 07:  -  2019 07:00  --------------------------------------------------------  IN:    heparin Infusion: 66 mL    lactated ringers.: 400 mL    Oral Fluid: 240 mL  Total IN: 706 mL    OUT:    Chest Tube: 120 mL    Chest Tube: 125 mL    Chest Tube: 185 mL    Voided: 900 mL  Total OUT: 1330 mL    Total NET: -624 mL      2019 07:  -  2019 16:38  --------------------------------------------------------  IN:    Oral Fluid: 360 mL  Total IN: 360 mL    OUT:    Chest Tube: 60 mL    Voided: 500 mL  Total OUT: 560 mL    Total NET: -200 mL          LABS:                        8.9    42.42 )-----------( 326      ( 2019 06:12 )             27.0         131<L>  |  100  |  5.0<L>  ----------------------------<  143<H>  4.6   |  21.0<L>  |  0.38<L>    Ca    7.5<L>      2019 06:09  Mg     1.3         TPro  5.0<L>  /  Alb  1.9<L>  /  TBili  0.2<L>  /  DBili  0.1  /  AST  27  /  ALT  19  /  AlkPhos  211<H>      PTT - ( 2019 06:12 )  PTT:66.7 sec  Urinalysis Basic - ( 2019 19:42 )    Color: Yellow / Appearance: Clear / S.015 / pH: x  Gluc: x / Ketone: Negative  / Bili: Negative / Urobili: Negative mg/dL   Blood: x / Protein: Negative mg/dL / Nitrite: Negative   Leuk Esterase: Negative / RBC: 0-2 /HPF / WBC 0-2   Sq Epi: x / Non Sq Epi: Occasional / Bacteria: Occasional        RADIOLOGY & ADDITIONAL STUDIES:    ASSESSMENT AND PLAN:

## 2019-11-28 NOTE — PROGRESS NOTE ADULT - ASSESSMENT
34y old Female transferred to [facility] for management of empyema. Now POD #1 Thoracic surgery with wound vac placement. She has right sided chest tubes and ongoing post-op pain with use of Fentanyl PCA. Found A&Ox3, NAD, sitting up in bed. She inquires about an oral regimen instead of IV PCA use. Discussed changes below with CT surgery team. Patient agreeable to plan.      PLAN    Therapy to  be:	[X] Continue   [X] Discontinued   [X] Change to prn Analgesics    Documentation and Verification of current medications:  [X] Done	[ ] Not done, not eligible  [ ] Not done, reason not given    [x]  [place]  Reviewed

## 2019-11-28 NOTE — PROGRESS NOTE ADULT - ASSESSMENT
33yo female admitted for tx of empyema w/ recent imaging pancreatic cyst. MRCP showed Indeterminate pancreatic lesions without main duct dilatation.     Plan:  -Care by primary team   -Recommend lifestyle modification, regarding reduction of ETOH intake  -Pancreatic cyst most likely 2/2 due to EtOH abuse, no surgical intervention required   -Will sign off, please call with questions or concerns

## 2019-11-28 NOTE — PROGRESS NOTE ADULT - SUBJECTIVE AND OBJECTIVE BOX
[facility]  INFECTIOUS DISEASES AND INTERNAL MEDICINE at [facility]  =======================================================  Carlos Alberto Soto MD  Diplomates American Board of Internal Medicine and Infectious Diseases  Telephone [phone]  Fax            [phone]  =======================================================    [id number]  HERACLIO MOLINA   follow up:  infected pleural effusion, empyema    s/p VATS on 11/27/19  pus from Right side chest site, and in thorax cavity  c/o pain this AM     I have personally reviewed the labs and data; pertinent labs and data are listed in this note; please see below.   =======================================================    REVIEW OF SYSTEMS:  CONSTITUTIONAL:  No Fever or chills  HEENT:  No diplopia or blurred vision.  No earache, sore throat or runny nose.  CARDIOVASCULAR:  No pressure, squeezing, strangling, tightness, heaviness or aching about the chest, neck, axilla or epigastrium.  RESPIRATORY:  No cough, shortness of breath  GASTROINTESTINAL:  No nausea, vomiting or diarrhea.  GENITOURINARY:  No dysuria, frequency or urgency. No Blood in urine  MUSCULOSKELETAL:  no joint aches, no muscle pain  SKIN:  No change in skin, hair or nails.  NEUROLOGIC:  No Headaches, seizures or weakness.  PSYCHIATRIC:  No disorder of thought or mood.  ENDOCRINE:  No heat or cold intolerance  HEMATOLOGICAL:  No easy bruising or bleeding.   =======================================================  Allergies  No Known Allergies    Antibiotics:  cefTRIAXone   IVPB 1000 milliGRAM(s) IV Intermittent every 12 hours    Other medications:  fentaNYL PCA (50 MICROgram(s)/mL) 30 milliLiter(s) PCA Continuous PCA Continuous  folic acid 1 milliGRAM(s) Oral daily  heparin  Infusion 1100 Unit(s)/Hr IV Continuous <Continuous>  influenza   Vaccine 0.5 milliLiter(s) IntraMuscular once  lidocaine   Patch 1 Patch Transdermal daily  multivitamin 1 Tablet(s) Oral daily  psyllium Powder 1 Packet(s) Oral two times a day  saccharomyces boulardii 250 milliGRAM(s) Oral two times a day  thiamine 100 milliGRAM(s) Oral daily      ======================================================  Physical Exam:  ============  T(F): 98.3 (28 Nov 2019 05:43), Max: 98.3 (28 Nov 2019 05:43)  HR: 92 (28 Nov 2019 05:43)  BP: 96/67 (28 Nov 2019 05:56)  RR: 18 (28 Nov 2019 05:43)  SpO2: 100% (28 Nov 2019 05:43) (90% - 100%)  temp max in last 48H T(F): , Max: 98.3 (11-28-19 @ 05:43)    General:  No acute distress.   Eye: Pupils are equal, round and reactive to light, Extraocular movements are intact, Normal conjunctiva.  HENT: Normocephalic, Oral mucosa is moist, No pharyngeal erythema, No sinus tenderness.  Neck: Supple, No lymphadenopathy.  Respiratory: decreased breath sounds right lung  RIGHT chest tube x 2  + right chest wound vac  Cardiovascular: Normal rate, Regular rhythm,   TRACE edema of legs  Gastrointestinal: Soft, Non-tender, Non-distended, Normal bowel sounds.  Genitourinary: No costovertebral angle tenderness.  Lymphatics: No lymphadenopathy neck,   Musculoskeletal: Normal range of motion, Normal strength.  Integumentary: No rash.  Neurologic: Alert, Oriented, No focal deficits, Cranial Nerves II-XII are grossly intact.  Psychiatric: Appropriate mood & affect.    =======================================================  11/22/19 blood cx negative x 2  11/22/19 - body fluid cx with Moderate Alpha hemolytic strep x 2 sets  Alpha hemolytic strep  		[facility]	E Test  Ceftriaxone  			S (0.19)   Clindamycin 	S    Erythromycin 	S   Penicillin 	S   Vancomycin 	S     ======================================================  Labs:                        8.9    42.42 )-----------( 326      ( 28 Nov 2019 06:12 )             27.0       WBC Count: 42.42 K/uL (11-28-19 @ 06:12)  WBC Count: 26.98 K/uL (11-27-19 @ 04:23)  WBC Count: 26.13 K/uL (11-26-19 @ 05:17)  WBC Count: 22.35 K/uL (11-25-19 @ 05:29)  WBC Count: 23.82 K/uL (11-24-19 @ 20:27)      11-28    131<L>  |  100  |  5.0<L>  ----------------------------<  143<H>  4.6   |  21.0<L>  |  0.38<L>    Ca    7.5<L>      28 Nov 2019 06:09  Mg     1.3     11-28        Culture - Tissue with Gram Stain (collected 11-27-19 @ 20:12)  Source: .Tissue chest wound  Gram Stain (11-27-19 @ 21:27):    Few White blood cells    No organisms seen    Culture - Blood (collected 11-25-19 @ 05:20)  Source: .Blood    Culture - Blood (collected 11-25-19 @ 05:19)  Source: .Blood

## 2019-11-28 NOTE — PROGRESS NOTE ADULT - SUBJECTIVE AND OBJECTIVE BOX
Subjective: "I have so much pain" Patient in tears, appears in distress.    Vital Signs:  Vital Signs Last 24 Hrs  T(C): 36.7 (11-28-19 @ 10:00), Max: 36.8 (11-28-19 @ 05:43)  T(F): 98.1 (11-28-19 @ 10:00), Max: 98.3 (11-28-19 @ 05:43)  HR: 100 (11-28-19 @ 10:00) (75 - 100)  BP: 100/67 (11-28-19 @ 10:00) (82/36 - 107/69)  RR: 20 (11-28-19 @ 10:00) (15 - 20)  SpO2: 100% (11-28-19 @ 10:00) (90% - 100%) on RA    Telemetry/Alarms: SR PACs, desats to 80s    Relevant labs, radiology and Medications reviewed    Pertinent Physical Exam  General: WN/WD NAD  Neurology: A&Ox3, nonfocal, THACKER x 4  Respiratory: diminished right base  CV: RRR, S1S2, no murmurs, rubs or gallops  Abdominal: Soft, NT, ND +BS, Last BM preop  Extremities: No edema, + peripheral pulses  Tubes: dark suly fluid noted no air leak    11-27 @ 07:01  -  11-28 @ 07:00  --------------------------------------------------------  IN:    heparin Infusion: 66 mL    lactated ringers.: 400 mL    Oral Fluid: 240 mL  Total IN: 706 mL    OUT:    Chest Tube: 125 mL    Chest Tube: 185 mL    Chest Tube: 120 mL    Voided: 900 mL  Total OUT: 1330 mL    Total NET: -624 mL      11-28 @ 07:01  -  11-28 @ 14:05  --------------------------------------------------------  IN:    Oral Fluid: 180 mL  Total IN: 180 mL    OUT:    Chest Tube: 60 mL  Total OUT: 60 mL    Total NET: 120 mL Subjective: "I have so much pain" Patient in tears, appears in distress.    Vital Signs:  Vital Signs Last 24 Hrs  T(C): 36.7 (11-28-19 @ 10:00), Max: 36.8 (11-28-19 @ 05:43)  T(F): 98.1 (11-28-19 @ 10:00), Max: 98.3 (11-28-19 @ 05:43)  HR: 100 (11-28-19 @ 10:00) (75 - 100)  BP: 100/67 (11-28-19 @ 10:00) (82/36 - 107/69)  RR: 20 (11-28-19 @ 10:00) (15 - 20)  SpO2: 100% (11-28-19 @ 10:00) (90% - 100%) on RA    Telemetry/Alarms: SR PACs, desats to 80s    Relevant labs, radiology and Medications reviewed    Pertinent Physical Exam  General: WN/WD NAD  Neurology: A&Ox3, nonfocal, THACKER x 4  Respiratory: diminished right base  CV: RRR, S1S2, no murmurs, rubs or gallops  Abdominal: Soft, NT, ND +BS, Last BM preop  Extremities: No edema, + peripheral pulses  Tubes: dark suly fluid noted no air leak  wound vac intact    11-27 @ 07:01  -  11-28 @ 07:00  --------------------------------------------------------  IN:    heparin Infusion: 66 mL    lactated ringers.: 400 mL    Oral Fluid: 240 mL  Total IN: 706 mL    OUT:    Chest Tube: 125 mL    Chest Tube: 185 mL    Chest Tube: 120 mL    Voided: 900 mL  Total OUT: 1330 mL    Total NET: -624 mL      11-28 @ 07:01  -  11-28 @ 14:05  --------------------------------------------------------  IN:    Oral Fluid: 180 mL  Total IN: 180 mL    OUT:    Chest Tube: 60 mL  Total OUT: 60 mL    Total NET: 120 mL

## 2019-11-28 NOTE — PROGRESS NOTE ADULT - ASSESSMENT
1) Hyponatremia; in setting of low solute and increased free water intake  2) Rt empyema  3) Pancreatic mass  4) RIJ thrombosis    Serum Na + worse at 130 today  Continue protein supplementation  Free water restriction to 1 L  Repeat Uosm and Tyler+(ordered)  Will follow

## 2019-11-29 LAB
ANA TITR SER: NEGATIVE — SIGNIFICANT CHANGE UP
ANION GAP SERPL CALC-SCNC: 12 MMOL/L — SIGNIFICANT CHANGE UP (ref 5–17)
APTT BLD: 58.5 SEC — HIGH (ref 27.5–36.3)
BUN SERPL-MCNC: 5 MG/DL — LOW (ref 8–20)
CALCIUM SERPL-MCNC: 7.5 MG/DL — LOW (ref 8.6–10.2)
CHLORIDE SERPL-SCNC: 100 MMOL/L — SIGNIFICANT CHANGE UP (ref 98–107)
CO2 SERPL-SCNC: 21 MMOL/L — LOW (ref 22–29)
CREAT SERPL-MCNC: 0.42 MG/DL — LOW (ref 0.5–1.3)
GLUCOSE SERPL-MCNC: 96 MG/DL — SIGNIFICANT CHANGE UP (ref 70–115)
HCT VFR BLD CALC: 29.3 % — LOW (ref 34.5–45)
HGB BLD-MCNC: 9.3 G/DL — LOW (ref 11.5–15.5)
MAGNESIUM SERPL-MCNC: 1.7 MG/DL — SIGNIFICANT CHANGE UP (ref 1.6–2.6)
MCHC RBC-ENTMCNC: 31.7 GM/DL — LOW (ref 32–36)
MCHC RBC-ENTMCNC: 33 PG — SIGNIFICANT CHANGE UP (ref 27–34)
MCV RBC AUTO: 103.9 FL — HIGH (ref 80–100)
NIGHT BLUE STAIN TISS: SIGNIFICANT CHANGE UP
PHOSPHATE SERPL-MCNC: 2.7 MG/DL — SIGNIFICANT CHANGE UP (ref 2.4–4.7)
PLATELET # BLD AUTO: 339 K/UL — SIGNIFICANT CHANGE UP (ref 150–400)
POTASSIUM SERPL-MCNC: 4.2 MMOL/L — SIGNIFICANT CHANGE UP (ref 3.5–5.3)
POTASSIUM SERPL-SCNC: 4.2 MMOL/L — SIGNIFICANT CHANGE UP (ref 3.5–5.3)
RBC # BLD: 2.82 M/UL — LOW (ref 3.8–5.2)
RBC # FLD: 14.6 % — HIGH (ref 10.3–14.5)
SODIUM SERPL-SCNC: 133 MMOL/L — LOW (ref 135–145)
SPECIMEN SOURCE: SIGNIFICANT CHANGE UP
WBC # BLD: 35.3 K/UL — HIGH (ref 3.8–10.5)
WBC # FLD AUTO: 35.3 K/UL — HIGH (ref 3.8–10.5)

## 2019-11-29 PROCEDURE — 71045 X-RAY EXAM CHEST 1 VIEW: CPT | Mod: 26

## 2019-11-29 PROCEDURE — 99233 SBSQ HOSP IP/OBS HIGH 50: CPT

## 2019-11-29 PROCEDURE — 99232 SBSQ HOSP IP/OBS MODERATE 35: CPT

## 2019-11-29 PROCEDURE — 99024 POSTOP FOLLOW-UP VISIT: CPT

## 2019-11-29 RX ORDER — MAGNESIUM SULFATE 500 MG/ML
2 VIAL (ML) INJECTION ONCE
Refills: 0 | Status: COMPLETED | OUTPATIENT
Start: 2019-11-29 | End: 2019-11-29

## 2019-11-29 RX ORDER — SODIUM CHLORIDE 9 MG/ML
1000 INJECTION INTRAMUSCULAR; INTRAVENOUS; SUBCUTANEOUS
Refills: 0 | Status: DISCONTINUED | OUTPATIENT
Start: 2019-11-29 | End: 2019-11-29

## 2019-11-29 RX ORDER — POLYETHYLENE GLYCOL 3350 17 G/17G
17 POWDER, FOR SOLUTION ORAL AT BEDTIME
Refills: 0 | Status: DISCONTINUED | OUTPATIENT
Start: 2019-11-29 | End: 2019-12-03

## 2019-11-29 RX ORDER — SENNA PLUS 8.6 MG/1
2 TABLET ORAL AT BEDTIME
Refills: 0 | Status: DISCONTINUED | OUTPATIENT
Start: 2019-11-29 | End: 2019-12-03

## 2019-11-29 RX ADMIN — OXYCODONE AND ACETAMINOPHEN 2 TABLET(S): 5; 325 TABLET ORAL at 03:15

## 2019-11-29 RX ADMIN — OXYCODONE AND ACETAMINOPHEN 1 TABLET(S): 5; 325 TABLET ORAL at 12:44

## 2019-11-29 RX ADMIN — SODIUM CHLORIDE 30 MILLILITER(S): 9 INJECTION INTRAMUSCULAR; INTRAVENOUS; SUBCUTANEOUS at 09:29

## 2019-11-29 RX ADMIN — LIDOCAINE 1 PATCH: 4 CREAM TOPICAL at 22:15

## 2019-11-29 RX ADMIN — Medication 250 MILLIGRAM(S): at 17:51

## 2019-11-29 RX ADMIN — SENNA PLUS 2 TABLET(S): 8.6 TABLET ORAL at 21:31

## 2019-11-29 RX ADMIN — Medication 1 PACKET(S): at 17:50

## 2019-11-29 RX ADMIN — HYDROMORPHONE HYDROCHLORIDE 0.5 MILLIGRAM(S): 2 INJECTION INTRAMUSCULAR; INTRAVENOUS; SUBCUTANEOUS at 14:24

## 2019-11-29 RX ADMIN — Medication 100 MILLIGRAM(S): at 09:36

## 2019-11-29 RX ADMIN — OXYCODONE AND ACETAMINOPHEN 1 TABLET(S): 5; 325 TABLET ORAL at 13:14

## 2019-11-29 RX ADMIN — HYDROMORPHONE HYDROCHLORIDE 0.5 MILLIGRAM(S): 2 INJECTION INTRAMUSCULAR; INTRAVENOUS; SUBCUTANEOUS at 23:35

## 2019-11-29 RX ADMIN — LIDOCAINE 1 PATCH: 4 CREAM TOPICAL at 09:34

## 2019-11-29 RX ADMIN — Medication 250 MILLIGRAM(S): at 05:47

## 2019-11-29 RX ADMIN — OXYCODONE AND ACETAMINOPHEN 2 TABLET(S): 5; 325 TABLET ORAL at 18:20

## 2019-11-29 RX ADMIN — OXYCODONE AND ACETAMINOPHEN 2 TABLET(S): 5; 325 TABLET ORAL at 04:15

## 2019-11-29 RX ADMIN — Medication 50 GRAM(S): at 06:53

## 2019-11-29 RX ADMIN — OXYCODONE AND ACETAMINOPHEN 1 TABLET(S): 5; 325 TABLET ORAL at 23:39

## 2019-11-29 RX ADMIN — HYDROMORPHONE HYDROCHLORIDE 0.5 MILLIGRAM(S): 2 INJECTION INTRAMUSCULAR; INTRAVENOUS; SUBCUTANEOUS at 07:56

## 2019-11-29 RX ADMIN — CEFTRIAXONE 100 MILLIGRAM(S): 500 INJECTION, POWDER, FOR SOLUTION INTRAMUSCULAR; INTRAVENOUS at 05:49

## 2019-11-29 RX ADMIN — OXYCODONE AND ACETAMINOPHEN 2 TABLET(S): 5; 325 TABLET ORAL at 09:34

## 2019-11-29 RX ADMIN — HYDROMORPHONE HYDROCHLORIDE 0.5 MILLIGRAM(S): 2 INJECTION INTRAMUSCULAR; INTRAVENOUS; SUBCUTANEOUS at 07:41

## 2019-11-29 RX ADMIN — Medication 1 TABLET(S): at 09:34

## 2019-11-29 RX ADMIN — OXYCODONE AND ACETAMINOPHEN 2 TABLET(S): 5; 325 TABLET ORAL at 10:04

## 2019-11-29 RX ADMIN — LIDOCAINE 1 PATCH: 4 CREAM TOPICAL at 18:22

## 2019-11-29 RX ADMIN — METHOCARBAMOL 500 MILLIGRAM(S): 500 TABLET, FILM COATED ORAL at 03:17

## 2019-11-29 RX ADMIN — Medication 1 MILLIGRAM(S): at 09:34

## 2019-11-29 RX ADMIN — CEFTRIAXONE 100 MILLIGRAM(S): 500 INJECTION, POWDER, FOR SOLUTION INTRAMUSCULAR; INTRAVENOUS at 17:51

## 2019-11-29 RX ADMIN — HEPARIN SODIUM 11 UNIT(S)/HR: 5000 INJECTION INTRAVENOUS; SUBCUTANEOUS at 06:54

## 2019-11-29 RX ADMIN — OXYCODONE AND ACETAMINOPHEN 1 TABLET(S): 5; 325 TABLET ORAL at 22:22

## 2019-11-29 RX ADMIN — OXYCODONE AND ACETAMINOPHEN 2 TABLET(S): 5; 325 TABLET ORAL at 17:50

## 2019-11-29 RX ADMIN — HYDROMORPHONE HYDROCHLORIDE 0.5 MILLIGRAM(S): 2 INJECTION INTRAMUSCULAR; INTRAVENOUS; SUBCUTANEOUS at 14:09

## 2019-11-29 NOTE — PROGRESS NOTE ADULT - SUBJECTIVE AND OBJECTIVE BOX
[facility]  INFECTIOUS DISEASES AND INTERNAL MEDICINE at [place]  =======================================================  Carlos Alberto Soto MD  Diplomates American Board of Internal Medicine and Infectious Diseases  Telephone [phone]  Fax            [phone]  =======================================================    [id number]  HERACLIO MOLINA   follow up:  infected pleural effusion, empyema    s/p VATS on 11/27/19  cultures from OR in process.     I have personally reviewed the labs and data; pertinent labs and data are listed in this note; please see below.   =======================================================    REVIEW OF SYSTEMS:  CONSTITUTIONAL:  No Fever or chills  HEENT:  No diplopia or blurred vision.  No earache, sore throat or runny nose.  CARDIOVASCULAR:  No pressure, squeezing, strangling, tightness, heaviness or aching about the chest, neck, axilla or epigastrium.  RESPIRATORY:  No cough, shortness of breath  GASTROINTESTINAL:  No nausea, vomiting or diarrhea.  GENITOURINARY:  No dysuria, frequency or urgency. No Blood in urine  MUSCULOSKELETAL:  no joint aches, no muscle pain  SKIN:  No change in skin, hair or nails.  NEUROLOGIC:  No Headaches, seizures or weakness.  PSYCHIATRIC:  No disorder of thought or mood.  ENDOCRINE:  No heat or cold intolerance  HEMATOLOGICAL:  No easy bruising or bleeding.   =======================================================  Allergies  No Known Allergies    Antibiotics:  cefTRIAXone   IVPB 1000 milliGRAM(s) IV Intermittent every 12 hours    Other medications:  folic acid 1 milliGRAM(s) Oral daily  heparin  Infusion 1100 Unit(s)/Hr IV Continuous <Continuous>  influenza   Vaccine 0.5 milliLiter(s) IntraMuscular once  lidocaine   Patch 1 Patch Transdermal daily  multivitamin 1 Tablet(s) Oral daily  polyethylene glycol 3350 17 Gram(s) Oral at bedtime  psyllium Powder 1 Packet(s) Oral two times a day  saccharomyces boulardii 250 milliGRAM(s) Oral two times a day  senna 2 Tablet(s) Oral at bedtime  thiamine 100 milliGRAM(s) Oral daily  ureNa 15 Gram(s) 15 Gram(s) Oral two times a day      ======================================================  Physical Exam:  ============  T(F): 98.4 (29 Nov 2019 10:48), Max: 98.6 (29 Nov 2019 05:44)  HR: 68 (29 Nov 2019 10:48)  BP: 96/62 (29 Nov 2019 10:48)  RR: 18 (29 Nov 2019 10:48)  SpO2: 99% (29 Nov 2019 10:48) (95% - 100%)  temp max in last 48H T(F): , Max: 98.6 (11-29-19 @ 05:44)    General:  No acute distress.   Eye: Pupils are equal, round and reactive to light, Extraocular movements are intact, Normal conjunctiva.  HENT: Normocephalic, Oral mucosa is moist, No pharyngeal erythema, No sinus tenderness.  Neck: Supple, No lymphadenopathy.  Respiratory: decreased breath sounds right lung  RIGHT chest tube x 2  + right chest wound vac  Cardiovascular: Normal rate, Regular rhythm,   TRACE edema of legs  Gastrointestinal: Soft, Non-tender, Non-distended, Normal bowel sounds.  Genitourinary: No costovertebral angle tenderness.  Lymphatics: No lymphadenopathy neck,   Musculoskeletal: Normal range of motion, Normal strength.  Integumentary: No rash.  Neurologic: Alert, Oriented, No focal deficits, Cranial Nerves II-XII are grossly intact.  Psychiatric: Appropriate mood & affect.    =======================================================  11/22/19 blood cx negative x 2  11/22/19 - body fluid cx with Moderate Alpha hemolytic strep x 2 sets  Alpha hemolytic strep  		[facility]	E Test  Ceftriaxone  			S (0.19)   Clindamycin 	S    Erythromycin 	S   Penicillin 	S   Vancomycin 	S     ======================================================  Labs:                        9.3    35.30 )-----------( 339      ( 29 Nov 2019 06:02 )             29.3       WBC Count: 35.30 K/uL (11-29-19 @ 06:02)  WBC Count: 42.42 K/uL (11-28-19 @ 06:12)  WBC Count: 26.98 K/uL (11-27-19 @ 04:23)  WBC Count: 26.13 K/uL (11-26-19 @ 05:17)  WBC Count: 22.35 K/uL (11-25-19 @ 05:29)  WBC Count: 23.82 K/uL (11-24-19 @ 20:27)      11-29    133<L>  |  100  |  5.0<L>  ----------------------------<  96  4.2   |  21.0<L>  |  0.42<L>    Ca    7.5<L>      29 Nov 2019 06:02  Phos  2.7     11-29  Mg     1.7     11-29    TPro  5.0<L>  /  Alb  1.9<L>  /  TBili  0.2<L>  /  DBili  0.1  /  AST  27  /  ALT  19  /  AlkPhos  211<H>  11-28      Culture - Tissue with Gram Stain (collected 11-27-19 @ 20:12)  Source: .Tissue chest wound  Gram Stain (11-27-19 @ 21:27):    Few White blood cells    No organisms seen  Culture - Tissue with Gram Stain (11.27.19 @ 20:12)    Gram Stain:   Few White blood cells  No organisms seen    Specimen Source: .Tissue chest wound    Culture Results:   No growth at 1 day.  Culture in progress          Culture - Blood (collected 11-25-19 @ 05:20)  Source: .Blood    Culture - Blood (collected 11-25-19 @ 05:19)  Source: .Blood

## 2019-11-29 NOTE — PROGRESS NOTE ADULT - SUBJECTIVE AND OBJECTIVE BOX
[facility] DIVISION OF KIDNEY DISEASES AND HYPERTENSION -- FOLLOW UP NOTE  --------------------------------------------------------------------------------  Chief Complaint:  hyponatremia    24 hour events/subjective:  Pt seen and examined  She admits to drinking a lot of water+ juices      PAST HISTORY  --------------------------------------------------------------------------------  No significant changes to PMH, PSH, FHx, SHx, unless otherwise noted    ALLERGIES & MEDICATIONS  --------------------------------------------------------------------------------  Allergies    No Known Allergies    Intolerances    Ensure TID (Unknown)    Standing Inpatient Medications  cefTRIAXone   IVPB 1000 milliGRAM(s) IV Intermittent every 12 hours  folic acid 1 milliGRAM(s) Oral daily  heparin  Infusion 1100 Unit(s)/Hr IV Continuous <Continuous>  influenza   Vaccine 0.5 milliLiter(s) IntraMuscular once  lidocaine   Patch 1 Patch Transdermal daily  multivitamin 1 Tablet(s) Oral daily  polyethylene glycol 3350 17 Gram(s) Oral at bedtime  psyllium Powder 1 Packet(s) Oral two times a day  saccharomyces boulardii 250 milliGRAM(s) Oral two times a day  senna 2 Tablet(s) Oral at bedtime  thiamine 100 milliGRAM(s) Oral daily    PRN Inpatient Medications  acetaminophen   Tablet .. 650 milliGRAM(s) Oral every 6 hours PRN  HYDROmorphone  Injectable 0.5 milliGRAM(s) IV Push every 6 hours PRN  methocarbamol 500 milliGRAM(s) Oral every 6 hours PRN  oxycodone    5 mG/acetaminophen 325 mG 1 Tablet(s) Oral every 4 hours PRN  oxycodone    5 mG/acetaminophen 325 mG 2 Tablet(s) Oral every 4 hours PRN  traMADol 50 milliGRAM(s) Oral every 6 hours PRN      REVIEW OF SYSTEMS  --------------------------------------------------------------------------------  Gen: No weight changes, fatigue, fevers/chills, weakness  Skin: No rashes  Head/Eyes/Ears/Mouth: No headache; Normal hearing; Normal vision w/o blurriness; No sinus pain/discomfort, sore throat  Respiratory: No dyspnea, cough, wheezing, hemoptysis  CV: No chest pain, PND, orthopnea  GI: No abdominal pain, diarrhea, constipation, nausea, vomiting, melena, hematochezia  : No increased frequency, dysuria, hematuria, nocturia  MSK: No joint pain/swelling; no back pain; no edema  Neuro: No dizziness/lightheadedness, weakness, seizures, numbness, tingling  Heme: No easy bruising or bleeding  Endo: No heat/cold intolerance  Psych: No significant nervousness, anxiety, stress, depression    All other systems were reviewed and are negative, except as noted.    VITALS/PHYSICAL EXAM  --------------------------------------------------------------------------------  T(C): 36.9 (11-29-19 @ 10:48), Max: 37 (11-29-19 @ 05:44)  HR: 68 (11-29-19 @ 10:48) (68 - 101)  BP: 96/62 (11-29-19 @ 10:48) (94/59 - 107/70)  RR: 18 (11-29-19 @ 10:48) (17 - 20)  SpO2: 99% (11-29-19 @ 10:48) (95% - 100%)  Wt(kg): --        11-28-19 @ 07:01  -  11-29-19 @ 07:00  --------------------------------------------------------  IN: 693 mL / OUT: 1842 mL / NET: -1149 mL      Physical Exam:  	 Gen: NAD, thin, pale  	HEENT: supple neck  	Pulm; decreases bs in rt lung field  	CV: RRR, S1S2; no rub  	Back: No spinal or CVA tenderness; no sacral edema  	Abd: +BS, soft, nontender/nondistended  	: No suprapubic tenderness  	UE: Warm, no edema; no asterixis  	LE: Warm,  no edema  	Neuro: No focal deficits  	Psych: Normal affect and mood  LABS/STUDIES  --------------------------------------------------------------------------------              9.3    35.30 >-----------<  339      [11-29-19 @ 06:02]              29.3     133  |  100  |  5.0  ----------------------------<  96      [11-29-19 @ 06:02]  4.2   |  21.0  |  0.42        Ca     7.5     [11-29-19 @ 06:02]      Mg     1.7     [11-29-19 @ 06:02]      Phos  2.7     [11-29-19 @ 06:02]    TPro  5.0  /  Alb  1.9  /  TBili  0.2  /  DBili  0.1  /  AST  27  /  ALT  19  /  AlkPhos  211  [11-28-19 @ 06:09]      PTT: 58.5       [11-29-19 @ 06:10]      Creatinine Trend:  SCr 0.42 [11-29 @ 06:02]  SCr 0.38 [11-28 @ 06:09]  SCr 0.30 [11-27 @ 04:23]  SCr 0.38 [11-26 @ 05:17]  SCr 0.37 [11-25 @ 05:29]    Urinalysis - [11-26-19 @ 19:42]      Color Yellow / Appearance Clear / SG 1.015 / pH 8.0      Gluc Negative / Ketone Negative  / Bili Negative / Urobili Negative       Blood Trace / Protein Negative / Leuk Est Negative / Nitrite Negative      RBC 0-2 / WBC 0-2 / Hyaline  / Gran  / Sq Epi  / Non Sq Epi Occasional / Bacteria Occasional    Urine Sodium <30      [11-28-19 @ 22:14]  Urine Urea Nitrogen 164      [11-27-19 @ 01:53]  Urine Potassium 18      [11-26-19 @ 19:42]  Urine Chloride <27      [11-26-19 @ 19:42]  Urine Phosphorus 6.6      [11-27-19 @ 22:17]  Urine Osmolality 339      [11-28-19 @ 22:15]    TSH 4.76      [11-25-19 @ 05:29]  Lipid: chol 68, , HDL 27, LDL 12      [11-25-19 @ 05:29]    HIV Nonreact      [11-25-19 @ 14:08]    ROMAN: titer Negative, pattern --      [11-26-19 @ 18:05]  Rheumatoid Factor <10      [11-26-19 @ 17:34]  ANCA: cANCA Negative, pANCA Negative, atypical ANCA Negative      [11-26-19 @ 18:05]

## 2019-11-29 NOTE — PROGRESS NOTE ADULT - ASSESSMENT
This 34 y.o. F who was [place] [facility] [place],   found with large right pleural effusion   s/p chest tube drainage    Hyponatremia - resolving  WBC elevation   alpha hemolytic strep infection  multi focal PNA  infected pleural effusion - empyema  Pancreatic cyst    - continue Ceftriaxone 1 gram Q 24H  - Legionella Ag from [facility] [place]24[place]19 - negative, off azithromycin    s/p VATS 11/27/19  - follow up on cultures    - WBC elevation post op, reactive.   - will follow    Workup for pancreatic cyst found incidentally in progress    - follow up all outstanding cultures (FROM [facility])  - trend temperature and WBC curve  - repeat cultures from blood and all sources if febrile.

## 2019-11-29 NOTE — PHYSICAL THERAPY INITIAL EVALUATION ADULT - PHYSICAL ASSIST/NONPHYSICAL ASSIST: STAND/SIT, REHAB EVAL
2 person assist for tubes and lines Assist needed to manage tubes and lines and supervision secondary to pain to maintain safety

## 2019-11-29 NOTE — CHART NOTE - NSCHARTNOTEFT_GEN_A_CORE
Source: Patient [x ]  Family [ ]   other [ ]    Current Diet: Diet, Regular (11-27-19 @ 19:00)    PO intake:  Pt reports good po intake at meals     Current Weight:   (11/29) 129#  (11/24) 121#    % Weight Change - no wt loss noted-- pt states she has gained wt since admission, will continue to monitor.  Aware 1+ trace edema b/l legs    Pertinent Medications: MEDICATIONS  (STANDING):  cefTRIAXone   IVPB 1000 milliGRAM(s) IV Intermittent every 12 hours  folic acid 1 milliGRAM(s) Oral daily  heparin  Infusion 1100 Unit(s)/Hr (11 mL/Hr) IV Continuous <Continuous>  influenza   Vaccine 0.5 milliLiter(s) IntraMuscular once  lidocaine   Patch 1 Patch Transdermal daily  multivitamin 1 Tablet(s) Oral daily  psyllium Powder 1 Packet(s) Oral two times a day  saccharomyces boulardii 250 milliGRAM(s) Oral two times a day  sodium chloride 0.9%. 1000 milliLiter(s) (30 mL/Hr) IV Continuous <Continuous>  thiamine 100 milliGRAM(s) Oral daily    MEDICATIONS  (PRN):  acetaminophen   Tablet .. 650 milliGRAM(s) Oral every 6 hours PRN Mild Pain (1 - 3)  HYDROmorphone  Injectable 0.5 milliGRAM(s) IV Push every 6 hours PRN Severe Pain persisting 1 hour after oral opioids  methocarbamol 500 milliGRAM(s) Oral every 6 hours PRN Muscle Spasm  oxycodone    5 mG/acetaminophen 325 mG 1 Tablet(s) Oral every 4 hours PRN Moderate Pain (4 - 6)  oxycodone    5 mG/acetaminophen 325 mG 2 Tablet(s) Oral every 4 hours PRN Severe Pain (7 - 10)  traMADol 50 milliGRAM(s) Oral every 6 hours PRN Moderate Pain (4 - 6)    Pertinent Labs: CBC Full  -  ( 29 Nov 2019 06:02 )  WBC Count : 35.30 K/uL  RBC Count : 2.82 M/uL  Hemoglobin : 9.3 g/dL  Hematocrit : 29.3 %  Platelet Count - Automated : 339 K/uL  Mean Cell Volume : 103.9 fl  Mean Cell Hemoglobin : 33.0 pg  Mean Cell Hemoglobin Concentration : 31.7 gm/dL  11-29 Na133 mmol/L<L> Glu 96 mg/dL K+ 4.2 mmol/L Cr  0.42 mg/dL<L> BUN 5.0 mg/dL<L> Phos 2.7 mg/dL Alb n/a   PAB n/a       Skin: s/p VATS 11/27 with debridement and wound vac    Nutrition focused physical exam conducted - found signs of malnutrition [ ]absent [x ]present    Subcutaneous fat loss: MILD [ ] Orbital fat pads region, [ ]Buccal fat region, [x ]Triceps region,  [ ]Ribs region    Muscle wasting: MILD [ ]Temples region, [x ]Clavicle region, [x ]Shoulder region, [ ]Scapula region, [ ]Interosseous region,  [ ]thigh region, [ ]Calf region    Estimated Needs:   [x ] no change since previous assessment  [ ] recalculated:     Current Nutrition Diagnosis: Pt remains at nutrition risk secondary to severe protein calorie malnutrition (acute) related to inadequate protein energy intake with decreased appetite and altered GI function in setting of large right pleural effusion/Empyema as evidenced by pt with 25# unintentional wt loss (17%) x3 months, previously meeting <50% estimated energy intake > 7 days.  Pt also with mild muscle wasting and mild fat loss.  Pt reports improving po intake at meals, trying to consume Ensure.      Recommendations:   RX: Ensure TID  Continue with MVI, Thiamine and Folic Acid daily  Monitor daily wts    Monitoring and Evaluation:   [ x] PO intake [x] Tolerance to diet prescription [X] Weights  [X] Follow up per protocol [X] Labs:

## 2019-11-29 NOTE — PHYSICAL THERAPY INITIAL EVALUATION ADULT - PHYSICAL ASSIST/NONPHYSICAL ASSIST: SIT/STAND, REHAB EVAL
2 person assist with tubes and lines Assist needed to manage tubes and lines and supervision secondary to pain to maintain safety

## 2019-11-29 NOTE — PROGRESS NOTE ADULT - SUBJECTIVE AND OBJECTIVE BOX
Subjective:  Patient has poor appetite.  C/O constipation and mild abdominal bloating.      Vital Signs:  Vital Signs Last 24 Hrs  T(F): 98.6  HR: 94   BP: 94/59   RR: 20  SpO2: 100% on room air    Telemetry/Alarms:  sinus rhythm    Relevant labs, radiology and Medications reviewed                        9.3    35.30 )-----------( 339      ( 29 Nov 2019 06:02 )             29.3     11-29    133<L>  |  100  |  5.0<L>  ----------------------------<  96  4.2   |  21.0<L>  |  0.42<L>    Ca    7.5<L>      29 Nov 2019 06:02  Phos  2.7     11-29  Mg     1.7     11-29    TPro  5.0<L>  /  Alb  1.9<L>  /  TBili  0.2<L>  /  DBili  0.1  /  AST  27  /  ALT  19  /  AlkPhos  211<H>  11-28    PTT - ( 29 Nov 2019 06:10 )  PTT:58.5 sec  MEDICATIONS  (STANDING):  cefTRIAXone   IVPB 1000 milliGRAM(s) IV Intermittent every 12 hours  folic acid 1 milliGRAM(s) Oral daily  heparin  Infusion 1100 Unit(s)/Hr (11 mL/Hr) IV Continuous <Continuous>  influenza   Vaccine 0.5 milliLiter(s) IntraMuscular once  lidocaine   Patch 1 Patch Transdermal daily  multivitamin 1 Tablet(s) Oral daily  polyethylene glycol 3350 17 Gram(s) Oral at bedtime  psyllium Powder 1 Packet(s) Oral two times a day  saccharomyces boulardii 250 milliGRAM(s) Oral two times a day  senna 2 Tablet(s) Oral at bedtime  thiamine 100 milliGRAM(s) Oral daily    MEDICATIONS  (PRN):  acetaminophen   Tablet .. 650 milliGRAM(s) Oral every 6 hours PRN Mild Pain (1 - 3)  HYDROmorphone  Injectable 0.5 milliGRAM(s) IV Push every 6 hours PRN Severe Pain persisting 1 hour after oral opioids  methocarbamol 500 milliGRAM(s) Oral every 6 hours PRN Muscle Spasm  oxycodone    5 mG/acetaminophen 325 mG 1 Tablet(s) Oral every 4 hours PRN Moderate Pain (4 - 6)  oxycodone    5 mG/acetaminophen 325 mG 2 Tablet(s) Oral every 4 hours PRN Severe Pain (7 - 10)  traMADol 50 milliGRAM(s) Oral every 6 hours PRN Moderate Pain (4 - 6)      Physical exam  Gen: NAD breathing comfortably  Neuro: AO x 3  Card: S1 S2  Pulm: Decreased right basilar breath wounds with mild expiratory wheeze  Abd: Softly distended, nontender  Ext: no edema    Tubes:  right chest tubes to suction - moderate serous drainage.  No air leak.  VAC on right chest wall wound.    I&O's Summary    28 Nov 2019 07:01  -  29 Nov 2019 07:00  --------------------------------------------------------  IN: 693 mL / OUT: 1842 mL / NET: -1149 mL        Assessment  34y Female  w/ PAST MEDICAL & SURGICAL HISTORY:  ETOH abuse  Presented to [facility] for weakness and unintentional weight loss.  Found to have right pleural effusion, underwent thoracentesis converted to PTC, with group A hemolytic strep.  CT abdomen shows cystic mass in body/tail of pancreas.    Transferred to [facility] on 11/24/19.  MRCP performed on 11/26.  Underwent Debridement, wound, with dressing replacement  Wound VAC placement  Pleural biopsy  Lung decortication  Bronchoscopy, with lung decortication using VATS on 11/27/19.    . Postoperative course/issues:  Postoperative ileus, continued leukocytosis, hyponatremia and current full anticoagulation for Right internal jugular thrombus.        PLAN  Neuro: Pain management with PO pain medications with dilaudid for breakthrough.  Pulm: Encourage coughing, deep breathing and use of incentive spirometry.  Daily CXR.   Cardio: Monitor telemetry/alarms  GI: Tolerating diet. Continue stool softeners - added senna and miralax.  Renal: monitor urine output, supplement electrolytes as needed - tolerating PO diet - d/c'd IVF.  Vasc: awaiting hematology consult for recommendations regarding anticoagulation.   Heme: Stable H/H. Trend WBC.  ID: On Rocephin for group A hemolytic strep in pleural fluid  Therapy: OOB/ambulate, PT  Tubes: Monitor Chest tube output, continue suction - but will transition to water seal tomorrow.    Spoke with social work today.  Patient is pending medicaid - will need to be discharged on A/C for RIJ thrombus - most likely coumadin.  VAC will not be covered.  Will transition to wet-dry before discharge on old chest tube site.    Discussed with Cardiothoracic Team at AM rounds.

## 2019-11-29 NOTE — PHYSICAL THERAPY INITIAL EVALUATION ADULT - GENERAL OBSERVATIONS, REHAB EVAL
Pt received in bed supine, +telemetry, +, +chest tube x2 to LSW, +wound vac, +IV, pt c/o 8/10 incisional chest pain pre and post PT, pt states she is due for pain meds, however pt agreeable to PT.

## 2019-11-29 NOTE — PHYSICAL THERAPY INITIAL EVALUATION ADULT - DISCHARGE DISPOSITION, PT EVAL
rehabilitation facility/Home w/ home PT, pending progress Home w/ home PT, pending progress pt may benefit from outpatient thoracic PT

## 2019-11-29 NOTE — CONSULT NOTE ADULT - SUBJECTIVE AND OBJECTIVE BOX
34 year old woman with no known major comorbidities (had not gone to see a doctor for 15 years) who presented to [facility] on 11/22/2019 with progressive sob, weakness, weight loss, She was found to have a large pleural effusion. Initially 1.5 L of dark serous fluid was removed.  Chest tube placed shortly thereafter, 3 additional liters was removed. CBC showed a leukocytosis of 40k and   CTA imaging revealed a 2.1cm partially cystic mass in the body/tail of the pancreas with possible upstream ductal dilatation. On 11/23/2019 the patient had a CT chest/abdomen/pelvis that found to have a clot in the right IJ and was started on a heparin gtt.         Patient does state that she has had a recent cough, and complains of right sided neck pain. Pt evaluated by surgical oncology and is planned for MRCP. Nephrology consulted for hyponatremia. Pt with serum Na+ 121 on presentation, now corrected to 133 over the past 4 days. Pt seen and examined; does not have any acute complaint. Reports poor food intake for weeks prior to presentation but was drinking water. Pt  has a history of alcohol abuse. Reports last drink was a few hours before presenting to hospital. Denies diuretics use; endorses daily Ibuprofen use for ongoing symptoms for over a month. 34 year old woman with no known major comorbidities (had not gone to see a doctor for 15 years) who presented to [facility] on 11/22/2019 with progressive sob, weakness, weight loss, She was found to have a large pleural effusion. Initially 1.5 L of dark serous fluid was removed.  Chest tube placed shortly thereafter, 3 additional liters was removed. CBC showed a leukocytosis of 40k and   CTA imaging revealed a 2.1cm partially cystic mass in the body/tail of the pancreas with possible upstream ductal dilatation. On 11/23/2019 the patient had a CT chest/abdomen/pelvis that found to have a clot in the right IJ and was started on a heparin gtt.     Transferred to [facility] on 11/24/19.  MRCP performed on 11/26.  Underwent Debridement, wound, with dressing replacement  Wound VAC placement  Pleural biopsy  Lung decortication  Bronchoscopy, with lung decortication using VATS on 11/27/19.  Pt  has a history of alcohol abuse.     We were consulted for evaluation and management of the RIJ DVT.  Clinically, she says that she is feeling somewhat better and is breathing comfortably on room air. Doppler study of her LEs was negative.     MEDICATIONS  (STANDING):  cefTRIAXone   IVPB 1000 milliGRAM(s) IV Intermittent every 12 hours  folic acid 1 milliGRAM(s) Oral daily  heparin  Infusion 1100 Unit(s)/Hr (11 mL/Hr) IV Continuous <Continuous>  influenza   Vaccine 0.5 milliLiter(s) IntraMuscular once  lidocaine   Patch 1 Patch Transdermal daily  multivitamin 1 Tablet(s) Oral daily  polyethylene glycol 3350 17 Gram(s) Oral at bedtime  psyllium Powder 1 Packet(s) Oral two times a day  saccharomyces boulardii 250 milliGRAM(s) Oral two times a day  senna 2 Tablet(s) Oral at bedtime  thiamine 100 milliGRAM(s) Oral daily  ureNa 15 Gram(s) 15 Gram(s) Oral two times a day    MEDICATIONS  (PRN):  acetaminophen   Tablet .. 650 milliGRAM(s) Oral every 6 hours PRN Mild Pain (1 - 3)  HYDROmorphone  Injectable 0.5 milliGRAM(s) IV Push every 6 hours PRN Severe Pain persisting 1 hour after oral opioids  methocarbamol 500 milliGRAM(s) Oral every 6 hours PRN Muscle Spasm  oxycodone    5 mG/acetaminophen 325 mG 1 Tablet(s) Oral every 4 hours PRN Moderate Pain (4 - 6)  oxycodone    5 mG/acetaminophen 325 mG 2 Tablet(s) Oral every 4 hours PRN Severe Pain (7 - 10)  traMADol 50 milliGRAM(s) Oral every 6 hours PRN Moderate Pain (4 - 6)      Vital Signs Last 24 Hrs  T(C): 37.2 (29 Nov 2019 21:24), Max: 37.2 (29 Nov 2019 21:24)  T(F): 98.9 (29 Nov 2019 21:24), Max: 98.9 (29 Nov 2019 21:24)  HR: 99 (29 Nov 2019 21:24) (68 - 99)  BP: 98/60 (29 Nov 2019 21:24) (94/59 - 102/69)  BP(mean): --  RR: 19 (29 Nov 2019 21:24) (18 - 20)  SpO2: 100% (29 Nov 2019 21:24) (97% - 100%)  Gen: nad  Chest: chest tube right side, drain  CV: rrr  Abd: soft, nd, nt                          9.3    35.30 )-----------( 339      ( 29 Nov 2019 06:02 )             29.3     11-29    133<L>  |  100  |  5.0<L>  ----------------------------<  96  4.2   |  21.0<L>  |  0.42<L>    Ca    7.5<L>      29 Nov 2019 06:02  Phos  2.7     11-29  Mg     1.7     11-29    TPro  5.0<L>  /  Alb  1.9<L>  /  TBili  0.2<L>  /  DBili  0.1  /  AST  27  /  ALT  19  /  AlkPhos  211<H>  11-28

## 2019-11-29 NOTE — PROGRESS NOTE ADULT - ASSESSMENT
1) Hyponatremia; in setting of low solute and increased free water intake  2) Rt empyema  3) Pancreatic mass  4) RIJ thrombosis    Na 133;  Ulytes reviewed  Continue protein supplementation  Start urea 15gm BID  Spoke with pharmacy to dispense  Will follow

## 2019-11-29 NOTE — SBIRT NOTE ADULT - NSSBIRTALCPOSREINDET_GEN_A_CORE
LEANDRA met with pt at bedside to complete SBIRT. Pt reports that she was drinking up to 4 drinks of ETOH (shots of vodka) nearly everyday, however stopped all use approx 2 months ago. LEANDRA encouraged pt to continue sobriety and provided pt with community of solutions resource list should pt decide to pursue treatment in the future

## 2019-11-29 NOTE — PHYSICAL THERAPY INITIAL EVALUATION ADULT - ADDITIONAL COMMENTS
Pt lives with boyfriend and mother (when she is not in rehab) in a two story home with 1 step to the front door, and 11 steps to the second floor where her bedroom is, both with hand rails going up. 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 Pt states to have never used or owned any DME. Pt lives with boyfriend and mother "when she is not in rehab" (unknown type of rehab) in a two story home with 1 step to the front door, and 11 steps to the second floor where her bedroom is, both with hand rails going up. 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 Pt states to have never used or owned any DME.

## 2019-11-29 NOTE — PHYSICAL THERAPY INITIAL EVALUATION ADULT - PERTINENT HX OF CURRENT PROBLEM, REHAB EVAL
35 y/o female who presented to [facility] w/ weakness, weight loss and flu like symptoms. Found to have large right pleural effusion on 11/22. Pleural fluid drained and culture + for group A strep. 11/24 patient transferred to [facility] for surgical evaluation. MRCP complete. 11/27 s/p debridement, wound with dressing replacement, wound VAC placement, pleural biopsy, lung decortication with VATS, bronchoscopy. 33 y/o female who presented to [facility] w/ weakness, weight loss and flu like symptoms. Found to have large right pleural effusion on 11/22. Pleural fluid drained and culture + for group A strep. 11/24 patient transferred to [facility] for surgical evaluation. MRCP complete. 11/27 s/p debridement, wound with dressing replacement, wound VAC placement, pleural biopsy, lung decortication with VATS, bronchoscopy. Work up for pancreatic mass found.

## 2019-11-30 LAB
ANION GAP SERPL CALC-SCNC: 13 MMOL/L — SIGNIFICANT CHANGE UP (ref 5–17)
APTT BLD: 68.7 SEC — HIGH (ref 27.5–36.3)
BUN SERPL-MCNC: 12 MG/DL — SIGNIFICANT CHANGE UP (ref 8–20)
CALCIUM SERPL-MCNC: 8 MG/DL — LOW (ref 8.6–10.2)
CHLORIDE SERPL-SCNC: 97 MMOL/L — LOW (ref 98–107)
CO2 SERPL-SCNC: 23 MMOL/L — SIGNIFICANT CHANGE UP (ref 22–29)
CREAT SERPL-MCNC: 0.45 MG/DL — LOW (ref 0.5–1.3)
CULTURE RESULTS: SIGNIFICANT CHANGE UP
CULTURE RESULTS: SIGNIFICANT CHANGE UP
GLUCOSE SERPL-MCNC: 85 MG/DL — SIGNIFICANT CHANGE UP (ref 70–115)
HCT VFR BLD CALC: 26.6 % — LOW (ref 34.5–45)
HGB BLD-MCNC: 8.8 G/DL — LOW (ref 11.5–15.5)
INR BLD: 1.19 RATIO — HIGH (ref 0.88–1.16)
MCHC RBC-ENTMCNC: 33.1 GM/DL — SIGNIFICANT CHANGE UP (ref 32–36)
MCHC RBC-ENTMCNC: 33.7 PG — SIGNIFICANT CHANGE UP (ref 27–34)
MCV RBC AUTO: 101.9 FL — HIGH (ref 80–100)
PLATELET # BLD AUTO: 379 K/UL — SIGNIFICANT CHANGE UP (ref 150–400)
POTASSIUM SERPL-MCNC: 4.3 MMOL/L — SIGNIFICANT CHANGE UP (ref 3.5–5.3)
POTASSIUM SERPL-SCNC: 4.3 MMOL/L — SIGNIFICANT CHANGE UP (ref 3.5–5.3)
PROTHROM AB SERPL-ACNC: 13.7 SEC — HIGH (ref 10–12.9)
RBC # BLD: 2.61 M/UL — LOW (ref 3.8–5.2)
RBC # FLD: 14.6 % — HIGH (ref 10.3–14.5)
SODIUM SERPL-SCNC: 133 MMOL/L — LOW (ref 135–145)
SPECIMEN SOURCE: SIGNIFICANT CHANGE UP
SPECIMEN SOURCE: SIGNIFICANT CHANGE UP
WBC # BLD: 33.37 K/UL — HIGH (ref 3.8–10.5)
WBC # FLD AUTO: 33.37 K/UL — HIGH (ref 3.8–10.5)

## 2019-11-30 PROCEDURE — 99233 SBSQ HOSP IP/OBS HIGH 50: CPT

## 2019-11-30 PROCEDURE — 71045 X-RAY EXAM CHEST 1 VIEW: CPT | Mod: 26

## 2019-11-30 PROCEDURE — 99024 POSTOP FOLLOW-UP VISIT: CPT

## 2019-11-30 RX ORDER — KETOROLAC TROMETHAMINE 30 MG/ML
15 SYRINGE (ML) INJECTION
Refills: 0 | Status: DISCONTINUED | OUTPATIENT
Start: 2019-11-30 | End: 2019-11-30

## 2019-11-30 RX ORDER — OXYCODONE HYDROCHLORIDE 5 MG/1
10 TABLET ORAL
Refills: 0 | Status: DISCONTINUED | OUTPATIENT
Start: 2019-11-30 | End: 2019-12-03

## 2019-11-30 RX ORDER — HYDROMORPHONE HYDROCHLORIDE 2 MG/ML
0.5 INJECTION INTRAMUSCULAR; INTRAVENOUS; SUBCUTANEOUS ONCE
Refills: 0 | Status: DISCONTINUED | OUTPATIENT
Start: 2019-11-30 | End: 2019-11-30

## 2019-11-30 RX ORDER — WARFARIN SODIUM 2.5 MG/1
5 TABLET ORAL ONCE
Refills: 0 | Status: COMPLETED | OUTPATIENT
Start: 2019-11-30 | End: 2019-11-30

## 2019-11-30 RX ORDER — ACETAMINOPHEN 500 MG
1000 TABLET ORAL ONCE
Refills: 0 | Status: COMPLETED | OUTPATIENT
Start: 2019-11-30 | End: 2019-11-30

## 2019-11-30 RX ORDER — OXYCODONE HYDROCHLORIDE 5 MG/1
5 TABLET ORAL
Refills: 0 | Status: DISCONTINUED | OUTPATIENT
Start: 2019-11-30 | End: 2019-12-03

## 2019-11-30 RX ADMIN — HYDROMORPHONE HYDROCHLORIDE 0.5 MILLIGRAM(S): 2 INJECTION INTRAMUSCULAR; INTRAVENOUS; SUBCUTANEOUS at 11:04

## 2019-11-30 RX ADMIN — Medication 250 MILLIGRAM(S): at 17:34

## 2019-11-30 RX ADMIN — LIDOCAINE 1 PATCH: 4 CREAM TOPICAL at 19:00

## 2019-11-30 RX ADMIN — OXYCODONE AND ACETAMINOPHEN 2 TABLET(S): 5; 325 TABLET ORAL at 03:50

## 2019-11-30 RX ADMIN — Medication 100 MILLIGRAM(S): at 08:50

## 2019-11-30 RX ADMIN — CEFTRIAXONE 100 MILLIGRAM(S): 500 INJECTION, POWDER, FOR SOLUTION INTRAMUSCULAR; INTRAVENOUS at 17:33

## 2019-11-30 RX ADMIN — Medication 400 MILLIGRAM(S): at 13:13

## 2019-11-30 RX ADMIN — OXYCODONE HYDROCHLORIDE 10 MILLIGRAM(S): 5 TABLET ORAL at 21:02

## 2019-11-30 RX ADMIN — OXYCODONE HYDROCHLORIDE 5 MILLIGRAM(S): 5 TABLET ORAL at 13:50

## 2019-11-30 RX ADMIN — OXYCODONE HYDROCHLORIDE 10 MILLIGRAM(S): 5 TABLET ORAL at 22:02

## 2019-11-30 RX ADMIN — LIDOCAINE 1 PATCH: 4 CREAM TOPICAL at 08:50

## 2019-11-30 RX ADMIN — OXYCODONE HYDROCHLORIDE 10 MILLIGRAM(S): 5 TABLET ORAL at 18:03

## 2019-11-30 RX ADMIN — HYDROMORPHONE HYDROCHLORIDE 0.5 MILLIGRAM(S): 2 INJECTION INTRAMUSCULAR; INTRAVENOUS; SUBCUTANEOUS at 00:35

## 2019-11-30 RX ADMIN — HEPARIN SODIUM 11 UNIT(S)/HR: 5000 INJECTION INTRAVENOUS; SUBCUTANEOUS at 07:12

## 2019-11-30 RX ADMIN — Medication 250 MILLIGRAM(S): at 06:27

## 2019-11-30 RX ADMIN — OXYCODONE AND ACETAMINOPHEN 2 TABLET(S): 5; 325 TABLET ORAL at 09:14

## 2019-11-30 RX ADMIN — OXYCODONE AND ACETAMINOPHEN 2 TABLET(S): 5; 325 TABLET ORAL at 02:54

## 2019-11-30 RX ADMIN — Medication 1 MILLIGRAM(S): at 08:50

## 2019-11-30 RX ADMIN — HYDROMORPHONE HYDROCHLORIDE 0.5 MILLIGRAM(S): 2 INJECTION INTRAMUSCULAR; INTRAVENOUS; SUBCUTANEOUS at 11:19

## 2019-11-30 RX ADMIN — OXYCODONE HYDROCHLORIDE 10 MILLIGRAM(S): 5 TABLET ORAL at 17:33

## 2019-11-30 RX ADMIN — WARFARIN SODIUM 5 MILLIGRAM(S): 2.5 TABLET ORAL at 21:01

## 2019-11-30 RX ADMIN — OXYCODONE HYDROCHLORIDE 5 MILLIGRAM(S): 5 TABLET ORAL at 13:20

## 2019-11-30 RX ADMIN — HYDROMORPHONE HYDROCHLORIDE 0.5 MILLIGRAM(S): 2 INJECTION INTRAMUSCULAR; INTRAVENOUS; SUBCUTANEOUS at 14:40

## 2019-11-30 RX ADMIN — LIDOCAINE 1 PATCH: 4 CREAM TOPICAL at 20:53

## 2019-11-30 RX ADMIN — CEFTRIAXONE 100 MILLIGRAM(S): 500 INJECTION, POWDER, FOR SOLUTION INTRAMUSCULAR; INTRAVENOUS at 06:28

## 2019-11-30 RX ADMIN — Medication 1 TABLET(S): at 08:50

## 2019-11-30 RX ADMIN — METHOCARBAMOL 500 MILLIGRAM(S): 500 TABLET, FILM COATED ORAL at 08:51

## 2019-11-30 RX ADMIN — HYDROMORPHONE HYDROCHLORIDE 0.5 MILLIGRAM(S): 2 INJECTION INTRAMUSCULAR; INTRAVENOUS; SUBCUTANEOUS at 14:55

## 2019-11-30 RX ADMIN — Medication 1 PACKET(S): at 06:35

## 2019-11-30 RX ADMIN — Medication 1000 MILLIGRAM(S): at 13:50

## 2019-11-30 RX ADMIN — OXYCODONE AND ACETAMINOPHEN 2 TABLET(S): 5; 325 TABLET ORAL at 09:44

## 2019-11-30 NOTE — PROGRESS NOTE ADULT - ASSESSMENT
35 yo M PMH right pleural effusion, empyema with group A hemolytic strep. Right IJ thrombus, on heparin bridge to coumadin. Pt underwent bronchoscopy with lung decortication and VATs, VAC placement on 11/27.  CT abdomen shows cystic mass in body/tail of pancreas, now concern for postop ileus.  11/30 coumadin added will bridge off heparin, daily INR      PLAN  Neuro: Pain management with oxy prn with IV/PO tylenol for break through pain   Pulm: Encourage coughing, deep breathing and use of incentive spirometry. Wean off supplemental oxygen as able. Daily CXR. plan for removal of at least one chest tube tomorrow 12/1  Cardio: Monitor telemetry/alarms  GI: Tolerating diet. Continue stool softeners.  Renal: Monitor urine output, supplement electrolytes as needed  Vasc: Heparin gtt and coumadin SC/SCDs for DVT prophylaxis. Daily INR check   Heme: Stable H/H.  ID: continue ceftriaxone . Stable.  Therapy: OOB/ambulate. PT eval placed.   Tubes: Monitor Chest tube output, maintain to suction

## 2019-11-30 NOTE — PROGRESS NOTE ADULT - SUBJECTIVE AND OBJECTIVE BOX
HPI:  34 y.o. female admitted to [facility] with weakness, weight loss,  Found to have right pleural effusion, underwent thoracentesis converted to PTC, with group A hemolytic strep.  CT abdomen shows cystic mass in body/tail of pancreas.  Transferred to [facility], MRCP performed on 11/26, Underwent Debridement, wound, with dressing replacement, Wound VAC placement, Pleural biopsy, Lung decortication, now POD #3 s/p Bronchoscopy, with lung decortication using VATS on 11/27/19.  Patient describes pain at CT and incision sites.  Easily SOB however able to take deep breaths.  She is using percocet 2 tabs q4-5 hours with 0.5mg IVP dilaudid, states percocet is highly effective and dilaudid "only helps if it is given at the same time as the percocet".  Denies adverse effects with these meds. Reports no relief "at all" with tramadol, robaxin.  Reports +effects during hospitalization with toradol, IV APAP.  Patient also describes worsening apetite, bloating, constipation.  She is prescribed senna, miralax, and metamucil. Concern for ileus noted.  Followed by Nephrology, has decreased Na today.      Vital Signs Last 24 Hrs  T(F): 98.6  HR: 94   BP: 94/59   RR: 20  SpO2: 100% on room air                        9.3    35.30 )-----------( 339      ( 29 Nov 2019 06:02 )             29.3       133<L>  |  100  |  5.0<L>  ----------------------------<  96  4.2   |  21.0<L>  |  0.42<L>    Ca    7.5<L>      29 Nov 2019 06:02  Phos  2.7     11-29  Mg     1.7     11-29    TPro  5.0<L>  /  Alb  1.9<L>  /  TBili  0.2<L>  /  DBili  0.1  /  AST  27  /  ALT  19  /  AlkPhos  211<H>  11-28    PTT - ( 29 Nov 2019 06:10 )  PTT:58.5 sec  MEDICATIONS  (STANDING):  cefTRIAXone   IVPB 1000 milliGRAM(s) IV Intermittent every 12 hours  folic acid 1 milliGRAM(s) Oral daily  heparin  Infusion  lidocaine   Patch 1 Patch Transdermal daily  multivitamin 1 Tablet(s) Oral daily  polyethylene glycol 3350 17 Gram(s) Oral at bedtime  psyllium Powder 1 Packet(s) Oral two times a day  saccharomyces boulardii 250 milliGRAM(s) Oral two times a day  senna 2 Tablet(s) Oral at bedtime  thiamine 100 milliGRAM(s) Oral daily    MEDICATIONS  (PRN):  acetaminophen   Tablet .. 650 milliGRAM(s) Oral every 6 hours PRN Mild Pain (1 - 3)  HYDROmorphone  Injectable 0.5 milliGRAM(s) IV Push every 6 hours PRN Severe Pain persisting 1 hour after oral opioids  methocarbamol 500 milliGRAM(s) Oral every 6 hours PRN Muscle Spasm  oxycodone    5 mG/acetaminophen 325 mG 1 Tablet(s) Oral every 4 hours PRN Moderate Pain (4 - 6)  oxycodone    5 mG/acetaminophen 325 mG 2 Tablet(s) Oral every 4 hours PRN Severe Pain (7 - 10)  traMADol 50 milliGRAM(s) Oral every 6 hours PRN Moderate Pain (4 - 6)    Physical exam  Gen: NAD, walking with PT, appears tired but comfortable.  Neuro: AO x 3  Card: S1 S2  Pulm: Decreased right basilar breath wounds with mild expiratory wheeze  Abd: Softly distended, nontender  Ext: no edema    Assessment:  34y Female, hx ETOH abuse, right pleural effusion, empyema with group A hemolytic strep. Right IJ thrombus, on anti-coagulation.   POD #3 s/p bronchoscopy with lung decortication and VATs, VAC placement.  CT abdomen shows cystic mass in body/tail of pancreas, now concern for postop ileus.    Recommendations:  Pain:  d/c dilaudid and tramadol (both can add to constipation and are not reducing pain).           stop percocet, start oxycodone sans tylenol at same dose 5 or 10mg but q3hrs prn.           Ofirmev, IV tylenol, once/day for additional pain reduction without additional narcotic.           Avoid toradol r/t decortication and renal status but consider oral anti-inflammatory if allowable per surgery:  ibuprofen 600mg QID prn to give additional pain support without additional narcotic.    GI: reports constipation but also tolerating diet. Continue stool softeners, continue senna and miralax, d/c metamucil which adds bulk and tends to slow down bowel.    Will f/u, thank-you!  -Yasemin LEGERP-c

## 2019-11-30 NOTE — PROGRESS NOTE ADULT - SUBJECTIVE AND OBJECTIVE BOX
[facility] DIVISION OF KIDNEY DISEASES AND HYPERTENSION -- FOLLOW UP NOTE  --------------------------------------------------------------------------------  Chief Complaint: Hyponatremia    24 hour events/subjective:  Pt seen and examined  continues to have abdominal pain        PAST HISTORY  --------------------------------------------------------------------------------  No significant changes to PMH, PSH, FHx, SHx, unless otherwise noted    ALLERGIES & MEDICATIONS  --------------------------------------------------------------------------------  Allergies    No Known Allergies    Intolerances    Ensure TID (Unknown)    Standing Inpatient Medications  cefTRIAXone   IVPB 1000 milliGRAM(s) IV Intermittent every 12 hours  folic acid 1 milliGRAM(s) Oral daily  heparin  Infusion 1100 Unit(s)/Hr IV Continuous <Continuous>  influenza   Vaccine 0.5 milliLiter(s) IntraMuscular once  lidocaine   Patch 1 Patch Transdermal daily  multivitamin 1 Tablet(s) Oral daily  polyethylene glycol 3350 17 Gram(s) Oral at bedtime  saccharomyces boulardii 250 milliGRAM(s) Oral two times a day  senna 2 Tablet(s) Oral at bedtime  ureNa 15 Gram(s) 15 Gram(s) Oral two times a day  warfarin 5 milliGRAM(s) Oral once    PRN Inpatient Medications  acetaminophen   Tablet .. 650 milliGRAM(s) Oral every 6 hours PRN  oxyCODONE    IR 5 milliGRAM(s) Oral every 3 hours PRN  oxyCODONE    IR 10 milliGRAM(s) Oral every 3 hours PRN      REVIEW OF SYSTEMS  --------------------------------------------------------------------------------  Gen: No weight changes, fatigue, fevers/chills, weakness+  Skin: No rashes  Head/Eyes/Ears/Mouth: No headache; Normal hearing; Normal vision w/o blurriness  Respiratory: No dyspnea, cough, wheezing, hemoptysis  CV: No chest pain, PND, orthopnea  GI: abdominal pain+, diarrhea, constipation, nausea, vomiting, melena, hematochezia  : No increased frequency, dysuria, hematuria, nocturia  MSK: No joint pain/swelling; no back pain; no edema  Neuro: No dizziness/lightheadedness  Heme: No easy bruising or bleeding  Endo: No heat/cold intolerance  Psych: No significant nervousness  All other systems were reviewed and are negative, except as noted.    VITALS/PHYSICAL EXAM  --------------------------------------------------------------------------------  T(C): 36.4 (11-30-19 @ 15:46), Max: 37.2 (11-29-19 @ 21:24)  HR: 88 (11-30-19 @ 15:46) (88 - 102)  BP: 108/73 (11-30-19 @ 15:46) (97/65 - 122/471)  RR: 18 (11-30-19 @ 15:46) (16 - 19)  SpO2: 99% (11-30-19 @ 15:46) (99% - 100%)  Wt(kg): --        11-29-19 @ 07:01  -  11-30-19 @ 07:00  --------------------------------------------------------  IN: 841 mL / OUT: 1770 mL / NET: -929 mL    11-30-19 @ 07:01  -  11-30-19 @ 19:24  --------------------------------------------------------  IN: 480 mL / OUT: 890 mL / NET: -410 mL      Physical Exam:  	 Gen: NAD, thin, pale  	HEENT: supple neck  	Pulm; decreases bs in rt lung field  	CV: RRR, S1S2; no rub  	Back: No spinal or CVA tenderness; no sacral edema  	Abd: +BS, soft, nontender/nondistended  	: No suprapubic tenderness  	UE: Warm, no edema; no asterixis  	LE: Warm,  no edema  	Neuro: No focal deficits  	Psych: Normal affect and mood    LABS/STUDIES  --------------------------------------------------------------------------------              8.8    33.37 >-----------<  379      [11-30-19 @ 06:08]              26.6     133  |  97  |  12.0  ----------------------------<  85      [11-30-19 @ 06:08]  4.3   |  23.0  |  0.45        Ca     8.0     [11-30-19 @ 06:08]      Mg     1.7     [11-29-19 @ 06:02]      Phos  2.7     [11-29-19 @ 06:02]      PT/INR: PT 13.7 , INR 1.19       [11-30-19 @ 06:08]  PTT: 68.7       [11-30-19 @ 06:08]      Creatinine Trend:  SCr 0.45 [11-30 @ 06:08]  SCr 0.42 [11-29 @ 06:02]  SCr 0.38 [11-28 @ 06:09]  SCr 0.30 [11-27 @ 04:23]  SCr 0.38 [11-26 @ 05:17]    Urinalysis - [11-26-19 @ 19:42]      Color Yellow / Appearance Clear / SG 1.015 / pH 8.0      Gluc Negative / Ketone Negative  / Bili Negative / Urobili Negative       Blood Trace / Protein Negative / Leuk Est Negative / Nitrite Negative      RBC 0-2 / WBC 0-2 / Hyaline  / Gran  / Sq Epi  / Non Sq Epi Occasional / Bacteria Occasional    Urine Sodium <30      [11-28-19 @ 22:14]  Urine Urea Nitrogen 164      [11-27-19 @ 01:53]  Urine Potassium 18      [11-26-19 @ 19:42]  Urine Chloride <27      [11-26-19 @ 19:42]  Urine Phosphorus 6.6      [11-27-19 @ 22:17]  Urine Osmolality 339      [11-28-19 @ 22:15]    TSH 4.76      [11-25-19 @ 05:29]  Lipid: chol 68, , HDL 27, LDL 12      [11-25-19 @ 05:29]    HIV Nonreact      [11-25-19 @ 14:08]    ROMAN: titer Negative, pattern --      [11-26-19 @ 18:05]  Rheumatoid Factor <10      [11-26-19 @ 17:34]  ANCA: cANCA Negative, pANCA Negative, atypical ANCA Negative      [11-26-19 @ 18:05]

## 2019-11-30 NOTE — PROGRESS NOTE ADULT - ASSESSMENT
1) Hyponatremia; multifactorial in setting of low solute and increased free water intake, ? SIADH from pain  2) Rt empyema  3) Pancreatic mass  4) RIJ thrombosis    Na 133;  Continue protein supplementation  Continue urea 15gm BID  Will follow

## 2019-11-30 NOTE — PROGRESS NOTE ADULT - SUBJECTIVE AND OBJECTIVE BOX
Subjective: no c/o incisional pain at this time. Denies CP, SOB, palpitations, N/V, other c/o.    T(C): 36.6 (11-30-19 @ 11:00), Max: 37.2 (11-29-19 @ 21:24)  HR: 102 (11-30-19 @ 11:00) (90 - 102)  BP: 122/471 (11-30-19 @ 11:00) (97/65 - 122/471)  ABP: --  ABP(mean): --  RR: 18 (11-30-19 @ 11:00) (16 - 20)  SpO2: 99% (11-30-19 @ 11:00) (97% - 100%)  Wt(kg): --  CVP(mm Hg): --  CO: --  CI: --  PA: --       I&O's Detail    29 Nov 2019 07:01  -  30 Nov 2019 07:00  --------------------------------------------------------  IN:    heparin Infusion: 121 mL    Oral Fluid: 720 mL  Total IN: 841 mL    OUT:    Chest Tube: 260 mL    Chest Tube: 135 mL    Voided: 1375 mL  Total OUT: 1770 mL    Total NET: -929 mL      30 Nov 2019 07:01  -  30 Nov 2019 13:34  --------------------------------------------------------  IN:  Total IN: 0 mL    OUT:    Chest Tube: 60 mL    Chest Tube: 60 mL  Total OUT: 120 mL    Total NET: -120 mL          LABS: All Lab data reviewed and analyzed                        8.8    33.37 )-----------( 379      ( 30 Nov 2019 06:08 )             26.6     11-30    133<L>  |  97<L>  |  12.0  ----------------------------<  85  4.3   |  23.0  |  0.45<L>    Ca    8.0<L>      30 Nov 2019 06:08  Phos  2.7     11-29  Mg     1.7     11-29      PT/INR - ( 30 Nov 2019 06:08 )   PT: 13.7 sec;   INR: 1.19 ratio         PTT - ( 30 Nov 2019 06:08 )  PTT:68.7 sec        CAPILLARY BLOOD GLUCOSE               RADIOLOGY: - Reviewed and analyzed   CXR: pending    HOSPITAL MEDICATIONS: All medications reviewed and analyzed  MEDICATIONS  (STANDING):  cefTRIAXone   IVPB 1000 milliGRAM(s) IV Intermittent every 12 hours  folic acid 1 milliGRAM(s) Oral daily  heparin  Infusion 1100 Unit(s)/Hr (11 mL/Hr) IV Continuous <Continuous>  influenza   Vaccine 0.5 milliLiter(s) IntraMuscular once  lidocaine   Patch 1 Patch Transdermal daily  multivitamin 1 Tablet(s) Oral daily  polyethylene glycol 3350 17 Gram(s) Oral at bedtime  saccharomyces boulardii 250 milliGRAM(s) Oral two times a day  senna 2 Tablet(s) Oral at bedtime  ureNa 15 Gram(s) 15 Gram(s) Oral two times a day  warfarin 5 milliGRAM(s) Oral once    MEDICATIONS  (PRN):  acetaminophen   Tablet .. 650 milliGRAM(s) Oral every 6 hours PRN Mild Pain (1 - 3)  oxyCODONE    IR 5 milliGRAM(s) Oral every 3 hours PRN Moderate Pain (4 - 6)  oxyCODONE    IR 10 milliGRAM(s) Oral every 3 hours PRN Severe Pain (7 - 10)          Neuro: A+O x 3, non-focal, speech clear and intact  HEENT: PERRL, EOMI, oral mucosa pink and moist  Neck: supple, no JVD  CV: regular rate, regular rhythm, +S1S2, no murmurs or rub  Pulm/chest: lung sounds CTA and equal bilaterally, no accessory muscle use noted, right chest tube x2 in place dressing c.d.i   Abd: soft, NT, ND, +BS  Ext: THACKER x 4, no C/C/E  Skin: warm, well perfused         Case including assessment/plan of care discussed with   CT surgery attending.  Care time: 30     minutes of noncontinuos critical care time spent evaluating/treating, reviewing imaging, labs, discussing case with multidisciplinary team, discussing plans/goals of care with patient/family to prevent further life threatening depreciation of the patient's condition. Non-inclusive is procedure time.       34yFemale with PMH     Debridement, wound, with dressing replacement  Wound VAC placement  Pleural biopsy  Lung decortication  Bronchoscopy, with lung decortication using VATS      PAST MEDICAL & SURGICAL HISTORY:

## 2019-12-01 DIAGNOSIS — E83.42 HYPOMAGNESEMIA: ICD-10-CM

## 2019-12-01 DIAGNOSIS — F10.10 ALCOHOL ABUSE, UNCOMPLICATED: ICD-10-CM

## 2019-12-01 DIAGNOSIS — J90 PLEURAL EFFUSION, NOT ELSEWHERE CLASSIFIED: ICD-10-CM

## 2019-12-01 DIAGNOSIS — D50.0 IRON DEFICIENCY ANEMIA SECONDARY TO BLOOD LOSS (CHRONIC): ICD-10-CM

## 2019-12-01 DIAGNOSIS — Z29.9 ENCOUNTER FOR PROPHYLACTIC MEASURES, UNSPECIFIED: ICD-10-CM

## 2019-12-01 LAB
ANION GAP SERPL CALC-SCNC: 15 MMOL/L — SIGNIFICANT CHANGE UP (ref 5–17)
APTT BLD: 57.7 SEC — HIGH (ref 27.5–36.3)
BUN SERPL-MCNC: 8 MG/DL — SIGNIFICANT CHANGE UP (ref 8–20)
CALCIUM SERPL-MCNC: 7.9 MG/DL — LOW (ref 8.6–10.2)
CHLORIDE SERPL-SCNC: 96 MMOL/L — LOW (ref 98–107)
CO2 SERPL-SCNC: 23 MMOL/L — SIGNIFICANT CHANGE UP (ref 22–29)
CREAT SERPL-MCNC: 0.31 MG/DL — LOW (ref 0.5–1.3)
DSDNA AB SER-ACNC: <12 IU/ML — SIGNIFICANT CHANGE UP
GLUCOSE SERPL-MCNC: 84 MG/DL — SIGNIFICANT CHANGE UP (ref 70–115)
HCT VFR BLD CALC: 27.9 % — LOW (ref 34.5–45)
HGB BLD-MCNC: 8.8 G/DL — LOW (ref 11.5–15.5)
INR BLD: 1.25 RATIO — HIGH (ref 0.88–1.16)
MAGNESIUM SERPL-MCNC: 1.5 MG/DL — LOW (ref 1.6–2.6)
MCHC RBC-ENTMCNC: 31.5 GM/DL — LOW (ref 32–36)
MCHC RBC-ENTMCNC: 32.2 PG — SIGNIFICANT CHANGE UP (ref 27–34)
MCV RBC AUTO: 102.2 FL — HIGH (ref 80–100)
PHOSPHATE SERPL-MCNC: 3.1 MG/DL — SIGNIFICANT CHANGE UP (ref 2.4–4.7)
PLATELET # BLD AUTO: 449 K/UL — HIGH (ref 150–400)
POTASSIUM SERPL-MCNC: 3.9 MMOL/L — SIGNIFICANT CHANGE UP (ref 3.5–5.3)
POTASSIUM SERPL-SCNC: 3.9 MMOL/L — SIGNIFICANT CHANGE UP (ref 3.5–5.3)
PROTHROM AB SERPL-ACNC: 14.5 SEC — HIGH (ref 10–12.9)
RBC # BLD: 2.73 M/UL — LOW (ref 3.8–5.2)
RBC # FLD: 14.7 % — HIGH (ref 10.3–14.5)
SODIUM SERPL-SCNC: 134 MMOL/L — LOW (ref 135–145)
WBC # BLD: 26.95 K/UL — HIGH (ref 3.8–10.5)
WBC # FLD AUTO: 26.95 K/UL — HIGH (ref 3.8–10.5)

## 2019-12-01 PROCEDURE — 99233 SBSQ HOSP IP/OBS HIGH 50: CPT

## 2019-12-01 PROCEDURE — 71045 X-RAY EXAM CHEST 1 VIEW: CPT | Mod: 26

## 2019-12-01 PROCEDURE — 99024 POSTOP FOLLOW-UP VISIT: CPT

## 2019-12-01 RX ORDER — MAGNESIUM SULFATE 500 MG/ML
2 VIAL (ML) INJECTION ONCE
Refills: 0 | Status: COMPLETED | OUTPATIENT
Start: 2019-12-01 | End: 2019-12-01

## 2019-12-01 RX ORDER — FERROUS SULFATE 325(65) MG
325 TABLET ORAL THREE TIMES A DAY
Refills: 0 | Status: DISCONTINUED | OUTPATIENT
Start: 2019-12-01 | End: 2019-12-31

## 2019-12-01 RX ORDER — PANTOPRAZOLE SODIUM 20 MG/1
40 TABLET, DELAYED RELEASE ORAL
Refills: 0 | Status: DISCONTINUED | OUTPATIENT
Start: 2019-12-01 | End: 2020-01-02

## 2019-12-01 RX ORDER — ASCORBIC ACID 60 MG
500 TABLET,CHEWABLE ORAL DAILY
Refills: 0 | Status: DISCONTINUED | OUTPATIENT
Start: 2019-12-01 | End: 2019-12-24

## 2019-12-01 RX ORDER — WARFARIN SODIUM 2.5 MG/1
5 TABLET ORAL ONCE
Refills: 0 | Status: COMPLETED | OUTPATIENT
Start: 2019-12-01 | End: 2019-12-01

## 2019-12-01 RX ORDER — MAGNESIUM OXIDE 400 MG ORAL TABLET 241.3 MG
400 TABLET ORAL
Refills: 0 | Status: COMPLETED | OUTPATIENT
Start: 2019-12-01 | End: 2019-12-02

## 2019-12-01 RX ORDER — THIAMINE MONONITRATE (VIT B1) 100 MG
100 TABLET ORAL DAILY
Refills: 0 | Status: DISCONTINUED | OUTPATIENT
Start: 2019-12-01 | End: 2019-12-18

## 2019-12-01 RX ADMIN — MAGNESIUM OXIDE 400 MG ORAL TABLET 400 MILLIGRAM(S): 241.3 TABLET ORAL at 18:26

## 2019-12-01 RX ADMIN — Medication 325 MILLIGRAM(S): at 21:22

## 2019-12-01 RX ADMIN — OXYCODONE HYDROCHLORIDE 10 MILLIGRAM(S): 5 TABLET ORAL at 05:23

## 2019-12-01 RX ADMIN — Medication 650 MILLIGRAM(S): at 07:32

## 2019-12-01 RX ADMIN — Medication 500 MILLIGRAM(S): at 14:16

## 2019-12-01 RX ADMIN — Medication 650 MILLIGRAM(S): at 08:02

## 2019-12-01 RX ADMIN — OXYCODONE HYDROCHLORIDE 10 MILLIGRAM(S): 5 TABLET ORAL at 06:23

## 2019-12-01 RX ADMIN — HEPARIN SODIUM 11 UNIT(S)/HR: 5000 INJECTION INTRAVENOUS; SUBCUTANEOUS at 07:20

## 2019-12-01 RX ADMIN — Medication 1 MILLIGRAM(S): at 09:40

## 2019-12-01 RX ADMIN — OXYCODONE HYDROCHLORIDE 10 MILLIGRAM(S): 5 TABLET ORAL at 13:16

## 2019-12-01 RX ADMIN — OXYCODONE HYDROCHLORIDE 10 MILLIGRAM(S): 5 TABLET ORAL at 23:49

## 2019-12-01 RX ADMIN — OXYCODONE HYDROCHLORIDE 10 MILLIGRAM(S): 5 TABLET ORAL at 19:40

## 2019-12-01 RX ADMIN — OXYCODONE HYDROCHLORIDE 10 MILLIGRAM(S): 5 TABLET ORAL at 01:55

## 2019-12-01 RX ADMIN — Medication 650 MILLIGRAM(S): at 22:43

## 2019-12-01 RX ADMIN — OXYCODONE HYDROCHLORIDE 10 MILLIGRAM(S): 5 TABLET ORAL at 22:49

## 2019-12-01 RX ADMIN — OXYCODONE HYDROCHLORIDE 10 MILLIGRAM(S): 5 TABLET ORAL at 16:02

## 2019-12-01 RX ADMIN — LIDOCAINE 1 PATCH: 4 CREAM TOPICAL at 18:38

## 2019-12-01 RX ADMIN — Medication 650 MILLIGRAM(S): at 21:22

## 2019-12-01 RX ADMIN — HEPARIN SODIUM 11 UNIT(S)/HR: 5000 INJECTION INTRAVENOUS; SUBCUTANEOUS at 19:27

## 2019-12-01 RX ADMIN — OXYCODONE HYDROCHLORIDE 10 MILLIGRAM(S): 5 TABLET ORAL at 10:15

## 2019-12-01 RX ADMIN — CEFTRIAXONE 100 MILLIGRAM(S): 500 INJECTION, POWDER, FOR SOLUTION INTRAMUSCULAR; INTRAVENOUS at 18:24

## 2019-12-01 RX ADMIN — SENNA PLUS 2 TABLET(S): 8.6 TABLET ORAL at 21:21

## 2019-12-01 RX ADMIN — OXYCODONE HYDROCHLORIDE 10 MILLIGRAM(S): 5 TABLET ORAL at 12:46

## 2019-12-01 RX ADMIN — LIDOCAINE 1 PATCH: 4 CREAM TOPICAL at 18:37

## 2019-12-01 RX ADMIN — OXYCODONE HYDROCHLORIDE 10 MILLIGRAM(S): 5 TABLET ORAL at 20:40

## 2019-12-01 RX ADMIN — PANTOPRAZOLE SODIUM 40 MILLIGRAM(S): 20 TABLET, DELAYED RELEASE ORAL at 14:21

## 2019-12-01 RX ADMIN — OXYCODONE HYDROCHLORIDE 10 MILLIGRAM(S): 5 TABLET ORAL at 00:55

## 2019-12-01 RX ADMIN — WARFARIN SODIUM 5 MILLIGRAM(S): 2.5 TABLET ORAL at 21:22

## 2019-12-01 RX ADMIN — OXYCODONE HYDROCHLORIDE 10 MILLIGRAM(S): 5 TABLET ORAL at 09:40

## 2019-12-01 RX ADMIN — Medication 325 MILLIGRAM(S): at 14:21

## 2019-12-01 RX ADMIN — CEFTRIAXONE 100 MILLIGRAM(S): 500 INJECTION, POWDER, FOR SOLUTION INTRAMUSCULAR; INTRAVENOUS at 05:23

## 2019-12-01 RX ADMIN — Medication 250 MILLIGRAM(S): at 18:24

## 2019-12-01 RX ADMIN — Medication 50 GRAM(S): at 14:13

## 2019-12-01 RX ADMIN — Medication 1 TABLET(S): at 09:40

## 2019-12-01 RX ADMIN — Medication 50 GRAM(S): at 14:21

## 2019-12-01 RX ADMIN — MAGNESIUM OXIDE 400 MG ORAL TABLET 400 MILLIGRAM(S): 241.3 TABLET ORAL at 14:15

## 2019-12-01 RX ADMIN — Medication 100 MILLIGRAM(S): at 14:15

## 2019-12-01 RX ADMIN — Medication 250 MILLIGRAM(S): at 05:23

## 2019-12-01 NOTE — PROGRESS NOTE ADULT - PROBLEM SELECTOR PLAN 6
Protonix ordered for GI prophylaxis  Continue senna & miralax  Continue Heparin for DVT for prophylaxis

## 2019-12-01 NOTE — PROGRESS NOTE ADULT - PROBLEM SELECTOR PLAN 1
s/p Right VATS decortication & washout on 11/27/19. Debridement of CT site & VAC placement  Maintain right chest tubes to suction  Monitor chest tube drainage  Repeat chest xray in morning  Encourage the use of I/S, CDBE, OOB to chair, daily PT, ambulate as tolerated  Discussed plan with Dr. Casatnon

## 2019-12-01 NOTE — PROGRESS NOTE ADULT - PROBLEM SELECTOR PLAN 4
Heme consult appreciated  Follow up with heme as out patient.  Will discharge home on Coumadin  Maintain Heparin drip, will bridge to Coumadin

## 2019-12-01 NOTE — PROGRESS NOTE ADULT - ASSESSMENT
34 year old female with no known major comorbidities who is being treated for right pleural effusion/empyema/multifocal pna found to have a RIJ thrombus currently on heparin drip.    1) RIJ venous thrombosis - History c/w a provoked event that occurred in context of her severe and complex lung illness/infection/empyema that may have led to developing the thrombus. I agree with continuing anticoagulation and for a duration of at least 3 months.  Continue with coumadin as planned.   I will see her as an outpatient for follow up after discharge.

## 2019-12-01 NOTE — PROGRESS NOTE ADULT - PROBLEM SELECTOR PLAN 7
Magnesium replaced, repeat serum mag tomorrow Magnesium replaced, repeat serum magnesium level tomorrow

## 2019-12-01 NOTE — PROGRESS NOTE ADULT - SUBJECTIVE AND OBJECTIVE BOX
34 year old woman with no known major comorbidities (had not gone to see a doctor for 15 years) who presented to [facility] on 11/22/2019 with progressive sob, weakness, weight loss, She was found to have a large pleural effusion. Initially 1.5 L of dark serous fluid was removed.  Chest tube placed shortly thereafter, 3 additional liters was removed. CBC showed a leukocytosis of 40k and   CTA imaging revealed a 2.1cm partially cystic mass in the body/tail of the pancreas with possible upstream ductal dilatation. On 11/23/2019 the patient had a CT chest/abdomen/pelvis that found to have a clot in the right IJ and was started on a heparin gtt.     Transferred to [facility] on 11/24/19.  MRCP performed on 11/26.  Underwent Debridement, wound, with dressing replacement  Wound VAC placement  Pleural biopsy  Lung decortication  Bronchoscopy, with lung decortication using VATS on 11/27/19.  Pt  has a history of alcohol abuse.     Consulted for evaluation and management of the RIJ DVT.      INTERVAL HISTORY:  Clinically, she is improving significantly. 	  No new events.      MEDICATIONS  (STANDING):  ascorbic acid 500 milliGRAM(s) Oral daily  cefTRIAXone   IVPB 1000 milliGRAM(s) IV Intermittent every 12 hours  ferrous    sulfate 325 milliGRAM(s) Oral three times a day  folic acid 1 milliGRAM(s) Oral daily  heparin  Infusion 1100 Unit(s)/Hr (11 mL/Hr) IV Continuous <Continuous>  influenza   Vaccine 0.5 milliLiter(s) IntraMuscular once  lidocaine   Patch 1 Patch Transdermal daily  magnesium oxide 400 milliGRAM(s) Oral three times a day with meals  multivitamin 1 Tablet(s) Oral daily  pantoprazole    Tablet 40 milliGRAM(s) Oral before breakfast  polyethylene glycol 3350 17 Gram(s) Oral at bedtime  saccharomyces boulardii 250 milliGRAM(s) Oral two times a day  senna 2 Tablet(s) Oral at bedtime  thiamine 100 milliGRAM(s) Oral daily  ureNa 15 Gram(s) 15 Gram(s) Oral two times a day  warfarin 5 milliGRAM(s) Oral once    MEDICATIONS  (PRN):  acetaminophen   Tablet .. 650 milliGRAM(s) Oral every 6 hours PRN Mild Pain (1 - 3)  oxyCODONE    IR 5 milliGRAM(s) Oral every 3 hours PRN Moderate Pain (4 - 6)  oxyCODONE    IR 10 milliGRAM(s) Oral every 3 hours PRN Severe Pain (7 - 10)      Vital Signs Last 24 Hrs  T(C): 36.9 (01 Dec 2019 05:21), Max: 37.3 (30 Nov 2019 20:39)  T(F): 98.4 (01 Dec 2019 05:21), Max: 99.1 (30 Nov 2019 20:39)  HR: 103 (01 Dec 2019 05:21) (88 - 103)  BP: 103/70 (01 Dec 2019 05:21) (94/64 - 108/73)  BP(mean): --  RR: 18 (01 Dec 2019 05:21) (18 - 18)  SpO2: 94% (01 Dec 2019 05:21) (94% - 100%)  Gen: nad  Chest: chest tube right side, drain  CV: rrr  Abd: soft, nd, nt                                     8.8    26.95 )-----------( 449      ( 01 Dec 2019 06:38 )             27.9     12-01    134<L>  |  96<L>  |  8.0  ----------------------------<  84  3.9   |  23.0  |  0.31<L>    Ca    7.9<L>      01 Dec 2019 06:38  Phos  3.1     12-01  Mg     1.5     12-01

## 2019-12-01 NOTE — PROGRESS NOTE ADULT - SUBJECTIVE AND OBJECTIVE BOX
[facility] DIVISION OF KIDNEY DISEASES AND HYPERTENSION -- FOLLOW UP NOTE  --------------------------------------------------------------------------------  Chief Complaint: hyponatremia    24 hour events/subjective:  no acute events  pt has no new complaint        PAST HISTORY  --------------------------------------------------------------------------------  No significant changes to PMH, PSH, FHx, SHx, unless otherwise noted    ALLERGIES & MEDICATIONS  --------------------------------------------------------------------------------  Allergies    No Known Allergies    Intolerances    Ensure TID (Unknown)    Standing Inpatient Medications  ascorbic acid 500 milliGRAM(s) Oral daily  cefTRIAXone   IVPB 1000 milliGRAM(s) IV Intermittent every 12 hours  ferrous    sulfate 325 milliGRAM(s) Oral three times a day  folic acid 1 milliGRAM(s) Oral daily  heparin  Infusion 1100 Unit(s)/Hr IV Continuous <Continuous>  influenza   Vaccine 0.5 milliLiter(s) IntraMuscular once  lidocaine   Patch 1 Patch Transdermal daily  magnesium oxide 400 milliGRAM(s) Oral three times a day with meals  multivitamin 1 Tablet(s) Oral daily  pantoprazole    Tablet 40 milliGRAM(s) Oral before breakfast  polyethylene glycol 3350 17 Gram(s) Oral at bedtime  saccharomyces boulardii 250 milliGRAM(s) Oral two times a day  senna 2 Tablet(s) Oral at bedtime  thiamine 100 milliGRAM(s) Oral daily  ureNa 15 Gram(s) 15 Gram(s) Oral two times a day  warfarin 5 milliGRAM(s) Oral once    PRN Inpatient Medications  acetaminophen   Tablet .. 650 milliGRAM(s) Oral every 6 hours PRN  oxyCODONE    IR 5 milliGRAM(s) Oral every 3 hours PRN  oxyCODONE    IR 10 milliGRAM(s) Oral every 3 hours PRN      REVIEW OF SYSTEMS  --------------------------------------------------------------------------------  Gen: No weight changes, fatigue, fevers/chills, weakness+  Skin: No rashes  Head/Eyes/Ears/Mouth: No headache; Normal hearing; Normal vision w/o blurriness  Respiratory: No dyspnea, cough, wheezing, hemoptysis  CV: No chest pain, PND, orthopnea  GI: abdominal pain+, diarrhea, constipation, nausea, vomiting, melena, hematochezia  : No increased frequency, dysuria, hematuria, nocturia  MSK: No joint pain/swelling; no back pain; no edema  Neuro: No dizziness/lightheadedness  Heme: No easy bruising or bleeding  Endo: No heat/cold intolerance  Psych: No significant nervousness  All other systems were reviewed and are negative, except as noted.    VITALS/PHYSICAL EXAM  --------------------------------------------------------------------------------  T(C): 36.9 (12-01-19 @ 05:21), Max: 37.3 (11-30-19 @ 20:39)  HR: 103 (12-01-19 @ 05:21) (88 - 103)  BP: 103/70 (12-01-19 @ 05:21) (94/64 - 108/73)  RR: 18 (12-01-19 @ 05:21) (18 - 18)  SpO2: 94% (12-01-19 @ 05:21) (94% - 100%)  Wt(kg): --        11-30-19 @ 07:01  -  12-01-19 @ 07:00  --------------------------------------------------------  IN: 913 mL / OUT: 1671 mL / NET: -758 mL    12-01-19 @ 07:01  -  12-01-19 @ 14:47  --------------------------------------------------------  IN: 0 mL / OUT: 170 mL / NET: -170 mL      Physical Exam:  	 Gen: NAD, thin, pale  	HEENT: supple neck  	Pulm; decreases bs in rt lung field, chest tube+  	CV: RRR, S1S2; no rub  	Back: No spinal or CVA tenderness; no sacral edema  	Abd: +BS, soft, nontender/nondistended  	: No suprapubic tenderness  	UE: Warm, no edema; no asterixis  	LE: Warm,  no edema  	Neuro: No focal deficits  	Psych: Normal affect and mood    LABS/STUDIES  --------------------------------------------------------------------------------              8.8    26.95 >-----------<  449      [12-01-19 @ 06:38]              27.9     134  |  96  |  8.0  ----------------------------<  84      [12-01-19 @ 06:38]  3.9   |  23.0  |  0.31        Ca     7.9     [12-01-19 @ 06:38]      Mg     1.5     [12-01-19 @ 06:38]      Phos  3.1     [12-01-19 @ 06:38]      PT/INR: PT 14.5 , INR 1.25       [12-01-19 @ 06:38]  PTT: 57.7       [12-01-19 @ 06:38]      Creatinine Trend:  SCr 0.31 [12-01 @ 06:38]  SCr 0.45 [11-30 @ 06:08]  SCr 0.42 [11-29 @ 06:02]  SCr 0.38 [11-28 @ 06:09]  SCr 0.30 [11-27 @ 04:23]    Urinalysis - [11-26-19 @ 19:42]      Color Yellow / Appearance Clear / SG 1.015 / pH 8.0      Gluc Negative / Ketone Negative  / Bili Negative / Urobili Negative       Blood Trace / Protein Negative / Leuk Est Negative / Nitrite Negative      RBC 0-2 / WBC 0-2 / Hyaline  / Gran  / Sq Epi  / Non Sq Epi Occasional / Bacteria Occasional    Urine Sodium <30      [11-28-19 @ 22:14]  Urine Urea Nitrogen 164      [11-27-19 @ 01:53]  Urine Potassium 18      [11-26-19 @ 19:42]  Urine Chloride <27      [11-26-19 @ 19:42]  Urine Phosphorus 6.6      [11-27-19 @ 22:17]  Urine Osmolality 339      [11-28-19 @ 22:15]    TSH 4.76      [11-25-19 @ 05:29]  Lipid: chol 68, , HDL 27, LDL 12      [11-25-19 @ 05:29]    HIV Nonreact      [11-25-19 @ 14:08]    ROMAN: titer Negative, pattern --      [11-26-19 @ 18:05]  dsDNA <12      [11-26-19 @ 18:05]  Rheumatoid Factor <10      [11-26-19 @ 17:34]  ANCA: cANCA Negative, pANCA Negative, atypical ANCA Negative      [11-26-19 @ 18:05]

## 2019-12-01 NOTE — PROGRESS NOTE ADULT - SUBJECTIVE AND OBJECTIVE BOX
Subjective: "I'm feeling much better, hopefully I can go home this week."  Pt. lying in bed, denies any chest pain, NAD noted.    VITAL SIGNS  Vital Signs Last 24 Hrs  T(C): 36.9 (19 @ 05:21), Max: 37.3 (19 @ 20:39)  T(F): 98.4 (19 @ 05:21), Max: 99.1 (19 @ 20:39)  HR: 103 (19 @ 05:21) (88 - 103)  BP: 103/70 (19 @ 05:21) (94/64 - 122/471)  RR: 18 (19 @ 05:21) (18 - 18)  SpO2: 94% (19 @ 05:21) (94% - 100%)  on (O2)              Telemetry:    LVEF:     MEDICATIONS  acetaminophen   Tablet .. 650 milliGRAM(s) Oral every 6 hours PRN  ascorbic acid 500 milliGRAM(s) Oral daily  cefTRIAXone   IVPB 1000 milliGRAM(s) IV Intermittent every 12 hours  ferrous    sulfate 325 milliGRAM(s) Oral three times a day  folic acid 1 milliGRAM(s) Oral daily  heparin  Infusion 1100 Unit(s)/Hr IV Continuous <Continuous>  influenza   Vaccine 0.5 milliLiter(s) IntraMuscular once  lidocaine   Patch 1 Patch Transdermal daily  magnesium oxide 400 milliGRAM(s) Oral three times a day with meals  magnesium sulfate  IVPB 2 Gram(s) IV Intermittent once  magnesium sulfate  IVPB 2 Gram(s) IV Intermittent once  multivitamin 1 Tablet(s) Oral daily  oxyCODONE    IR 5 milliGRAM(s) Oral every 3 hours PRN  oxyCODONE    IR 10 milliGRAM(s) Oral every 3 hours PRN  polyethylene glycol 3350 17 Gram(s) Oral at bedtime  saccharomyces boulardii 250 milliGRAM(s) Oral two times a day  senna 2 Tablet(s) Oral at bedtime  ureNa 15 Gram(s) 15 Gram(s) Oral two times a day      PHYSICAL EXAM  General: well nourished, well developed, no acute distress  Neurology: alert and oriented x 3, nonfocal, no gross deficits  Respiratory: Diminished at the right base  CV: regular rate and rhythm, normal S1, S2  Abdomen: soft, nontender, nondistended, positive bowel sounds, last bowel movement 19  Extremities: warm, well perfused. no edema. + DP pulses  Incisions: Right thoracotomy incision CDI with 4x4 dressing intact  Chest tubes: right chest tube to suction, no air leak detected      I&O's Detail    2019 07:01  -  01 Dec 2019 07:00  --------------------------------------------------------  IN:    heparin Infusion: 143 mL    Oral Fluid: 720 mL    Solution: 50 mL  Total IN: 913 mL    OUT:    Chest Tube: 678 mL    Chest Tube: 243 mL    Voided: 750 mL  Total OUT: 1671 mL    Total NET: -758 mL      01 Dec 2019 07:01  -  01 Dec 2019 10:50  --------------------------------------------------------  IN:  Total IN: 0 mL    OUT:    Chest Tube: 10 mL    Chest Tube: 160 mL  Total OUT: 170 mL    Total NET: -170 mL          Weights:  Daily     Daily Weight in k.1 (01 Dec 2019 04:57)  Admit Wt: Drug Dosing Weight  Height (cm): 167 (2019 10:14)  Weight (kg): 55.2 (2019 10:14)  BMI (kg/m2): 19.8 (2019 10:14)  BSA (m2): 1.61 (2019 10:14)    LABS      134<L>  |  96<L>  |  8.0  ----------------------------<  84  3.9   |  23.0  |  0.31<L>    Ca    7.9<L>      01 Dec 2019 06:38  Mg     1.5                                        8.8    26.95 )-----------( 449      ( 01 Dec 2019 06:38 )             27.9          PT/INR - ( 01 Dec 2019 06:38 )   PT: 14.5 sec;   INR: 1.25 ratio         PTT - ( 01 Dec 2019 06:38 )  PTT:57.7 sec        CAPILLARY BLOOD GLUCOSE               Today's CXR:    Today's EKG:  < from: 12 Lead ECG (19 @ 20:13) >  Ventricular Rate 85 BPM    Atrial Rate 85 BPM    P-R Interval 128 ms    QRS Duration 86 ms    Q-T Interval 330 ms    QTC Calculation(Bezet) 392 ms    P Axis -7 degrees    R Axis 65 degrees    T Axis -19 degrees    Diagnosis Line Normal sinus rhythmwith sinus arrhythmia  Cannot rule out Anterior infarct , age undetermined  T wave abnormality, consider inferior ischemia    Confirmed by NAVEED HOLDEN ([id number]) on 2019 3:55:03 PM    < end of copied text >      PAST MEDICAL & SURGICAL HISTORY: Subjective: "I'm feeling much better, hopefully I can go home this week."  Pt. lying in bed, denies any chest pain, NAD noted.    VITAL SIGNS  Vital Signs Last 24 Hrs  T(C): 36.9 (19 @ 05:21), Max: 37.3 (19 @ 20:39)  T(F): 98.4 (19 @ 05:21), Max: 99.1 (19 @ 20:39)  HR: 103 (19 @ 05:21) (88 - 103)  BP: 103/70 (19 @ 05:21) (94/64 - 122/471)  RR: 18 (19 @ 05:21) (18 - 18)  SpO2: 94% (19 @ 05:21) (94% - 100%)  on (O2)              Telemetry:  Sinus rhythm      MEDICATIONS  acetaminophen   Tablet .. 650 milliGRAM(s) Oral every 6 hours PRN  ascorbic acid 500 milliGRAM(s) Oral daily  cefTRIAXone   IVPB 1000 milliGRAM(s) IV Intermittent every 12 hours  ferrous    sulfate 325 milliGRAM(s) Oral three times a day  folic acid 1 milliGRAM(s) Oral daily  heparin  Infusion 1100 Unit(s)/Hr IV Continuous <Continuous>  influenza   Vaccine 0.5 milliLiter(s) IntraMuscular once  lidocaine   Patch 1 Patch Transdermal daily  magnesium oxide 400 milliGRAM(s) Oral three times a day with meals  magnesium sulfate  IVPB 2 Gram(s) IV Intermittent once  magnesium sulfate  IVPB 2 Gram(s) IV Intermittent once  multivitamin 1 Tablet(s) Oral daily  oxyCODONE    IR 5 milliGRAM(s) Oral every 3 hours PRN  oxyCODONE    IR 10 milliGRAM(s) Oral every 3 hours PRN  polyethylene glycol 3350 17 Gram(s) Oral at bedtime  saccharomyces boulardii 250 milliGRAM(s) Oral two times a day  senna 2 Tablet(s) Oral at bedtime  ureNa 15 Gram(s) 15 Gram(s) Oral two times a day      PHYSICAL EXAM  General: well nourished, well developed, no acute distress  Neurology: alert and oriented x 3, nonfocal, no gross deficits  Respiratory: Diminished at the right base  CV: regular rate and rhythm, normal S1, S2  Abdomen: soft, nontender, nondistended, positive bowel sounds, last bowel movement 19  Extremities: warm, well perfused. no edema. + DP pulses  Incisions: Right thoracotomy incision CDI with 4x4 dressing intact, +right wound VAC CDI  Chest tubes: right chest tube to suction, no air leak detected      I&O's Detail    2019 07:01  -  01 Dec 2019 07:00  --------------------------------------------------------  IN:    heparin Infusion: 143 mL    Oral Fluid: 720 mL    Solution: 50 mL  Total IN: 913 mL    OUT:    Chest Tube: 678 mL    Chest Tube: 243 mL    Voided: 750 mL  Total OUT: 1671 mL    Total NET: -758 mL      01 Dec 2019 07:01  -  01 Dec 2019 10:50  --------------------------------------------------------  IN:  Total IN: 0 mL    OUT:    Chest Tube: 10 mL    Chest Tube: 160 mL  Total OUT: 170 mL    Total NET: -170 mL          Weights:  Daily     Daily Weight in k.1 (01 Dec 2019 04:57)  Admit Wt: Drug Dosing Weight  Height (cm): 167 (2019 10:14)  Weight (kg): 55.2 (2019 10:14)  BMI (kg/m2): 19.8 (2019 10:14)  BSA (m2): 1.61 (2019 10:14)    LABS      134<L>  |  96<L>  |  8.0  ----------------------------<  84  3.9   |  23.0  |  0.31<L>    Ca    7.9<L>      01 Dec 2019 06:38  Mg     1.5                                        8.8    26.95 )-----------( 449      ( 01 Dec 2019 06:38 )             27.9          PT/INR - ( 01 Dec 2019 06:38 )   PT: 14.5 sec;   INR: 1.25 ratio         PTT - ( 01 Dec 2019 06:38 )  PTT:57.7 sec        CAPILLARY BLOOD GLUCOSE               Today's CXR:< from: Xray Chest 1 View- PORTABLE-Routine (19 @ 06:00) >  EXAM:  XR CHEST PORTABLE ROUTINE 1V                          PROCEDURE DATE:  2019        Culture - Acid Fast - Tissue w/Smear (19 @ 20:40)    Specimen Source: .Tissue chest wound    Acid Fast Bacilli Smear:   No acid fast bacilli seen by fluorochrome stain    Culture Results:   Culture is being performed.      INTERPRETATION:  CHEST AP PORTABLE:    History: s/p multiple CT placement.     Date and time of exam: 2019 5:37 AM.    Technique: A single AP view of the chest was obtained.    Comparison exam: 2019 5:39 AM.    Findings:  Again noted are 2 right chest tubes unchanged in position since the prior   study. No evidence of pneumothorax on the current study. Pleural   thickening along the lateral right chest wall and atelectasis or fibrosis   at the right lung base. The left lung remains clear..    Impression:  Stable exam without significant change since the previous study..      DOUGLAS MYERS M.D., ATTENDING RADIOLOGIST  This document has been electronically signed. Dec  1 2019  8:08AM    < end of copied text >    Culture - Acid Fast - Tissue w/Smear (19 @ 20:40)    Specimen Source: .Tissue chest wound    Acid Fast Bacilli Smear:   No acid fast bacilli seen by fluorochrome stain    Culture Results:   Culture is being performed.    Culture - Blood (19 @ 05:19)    Specimen Source: .Blood    Culture Results:   No growth at 5 days.    Culture - Tissue with Gram Stain (19 @ 20:12)    Gram Stain:   Few White blood cells  No organisms seen    Specimen Source: .Tissue chest wound    Culture Results:   No growth at 3 days.  Culture in progress    Culture - Blood in AM (19 @ 05:20)    Specimen Source: .Blood    Culture Results:   No growth at 5 days.      Today's EKG:  < from: 12 Lead ECG (19 @ 20:13) >  Ventricular Rate 85 BPM    Atrial Rate 85 BPM    P-R Interval 128 ms    QRS Duration 86 ms    Q-T Interval 330 ms    QTC Calculation(Bezet) 392 ms    P Axis -7 degrees    R Axis 65 degrees    T Axis -19 degrees    Diagnosis Line Normal sinus rhythmwith sinus arrhythmia  Cannot rule out Anterior infarct , age undetermined  T wave abnormality, consider inferior ischemia    Confirmed by NAVEED HOLDEN ([id number]) on 2019 3:55:03 PM    < end of copied text >      PAST MEDICAL & SURGICAL HISTORY: Subjective: "I'm feeling much better, hopefully I can go home this week."  Pt. lying in bed, denies any chest pain, NAD noted.    VITAL SIGNS  Vital Signs Last 24 Hrs  T(C): 36.9 (19 @ 05:21), Max: 37.3 (19 @ 20:39)  T(F): 98.4 (19 @ 05:21), Max: 99.1 (19 @ 20:39)  HR: 103 (19 @ 05:21) (88 - 103)  BP: 103/70 (19 @ 05:21) (94/64 - 122/471)  RR: 18 (19 @ 05:21) (18 - 18)  SpO2: 94% (19 @ 05:21) (94% - 100%)  on (O2)              Telemetry:  Sinus rhythm      MEDICATIONS  acetaminophen   Tablet .. 650 milliGRAM(s) Oral every 6 hours PRN  ascorbic acid 500 milliGRAM(s) Oral daily  cefTRIAXone   IVPB 1000 milliGRAM(s) IV Intermittent every 12 hours  ferrous    sulfate 325 milliGRAM(s) Oral three times a day  folic acid 1 milliGRAM(s) Oral daily  heparin  Infusion 1100 Unit(s)/Hr IV Continuous <Continuous>  influenza   Vaccine 0.5 milliLiter(s) IntraMuscular once  lidocaine   Patch 1 Patch Transdermal daily  magnesium oxide 400 milliGRAM(s) Oral three times a day with meals  magnesium sulfate  IVPB 2 Gram(s) IV Intermittent once  magnesium sulfate  IVPB 2 Gram(s) IV Intermittent once  multivitamin 1 Tablet(s) Oral daily  oxyCODONE    IR 5 milliGRAM(s) Oral every 3 hours PRN  oxyCODONE    IR 10 milliGRAM(s) Oral every 3 hours PRN  polyethylene glycol 3350 17 Gram(s) Oral at bedtime  saccharomyces boulardii 250 milliGRAM(s) Oral two times a day  senna 2 Tablet(s) Oral at bedtime  ureNa 15 Gram(s) 15 Gram(s) Oral two times a day      PHYSICAL EXAM  General: well nourished, well developed, no acute distress  Neurology: alert and oriented x 3, nonfocal, no gross deficits  Respiratory: Diminished at the right base  CV: regular rate and rhythm, normal S1, S2  Abdomen: soft, nontender, nondistended, positive bowel sounds, last bowel movement 19  Extremities: warm, well perfused. Trace edema LLE>RLE. + DP pulses  Incisions: Right thoracotomy incision CDI with 4x4 dressing intact, +right wound VAC CDI  Chest tubes: right chest tube to suction, no air leak detected      I&O's Detail    2019 07:01  -  01 Dec 2019 07:00  --------------------------------------------------------  IN:    heparin Infusion: 143 mL    Oral Fluid: 720 mL    Solution: 50 mL  Total IN: 913 mL    OUT:    Chest Tube: 678 mL    Chest Tube: 243 mL    Voided: 750 mL  Total OUT: 1671 mL    Total NET: -758 mL      01 Dec 2019 07:01  -  01 Dec 2019 10:50  --------------------------------------------------------  IN:  Total IN: 0 mL    OUT:    Chest Tube: 10 mL    Chest Tube: 160 mL  Total OUT: 170 mL    Total NET: -170 mL          Weights:  Daily     Daily Weight in k.1 (01 Dec 2019 04:57)  Admit Wt: Drug Dosing Weight  Height (cm): 167 (2019 10:14)  Weight (kg): 55.2 (2019 10:14)  BMI (kg/m2): 19.8 (2019 10:14)  BSA (m2): 1.61 (2019 10:14)    LABS      134<L>  |  96<L>  |  8.0  ----------------------------<  84  3.9   |  23.0  |  0.31<L>    Ca    7.9<L>      01 Dec 2019 06:38  Mg     1.5                                        8.8    26.95 )-----------( 449      ( 01 Dec 2019 06:38 )             27.9          PT/INR - ( 01 Dec 2019 06:38 )   PT: 14.5 sec;   INR: 1.25 ratio         PTT - ( 01 Dec 2019 06:38 )  PTT:57.7 sec        CAPILLARY BLOOD GLUCOSE               Today's CXR:< from: Xray Chest 1 View- PORTABLE-Routine (19 @ 06:00) >  EXAM:  XR CHEST PORTABLE ROUTINE 1V                          PROCEDURE DATE:  2019        Culture - Acid Fast - Tissue w/Smear (19 @ 20:40)    Specimen Source: .Tissue chest wound    Acid Fast Bacilli Smear:   No acid fast bacilli seen by fluorochrome stain    Culture Results:   Culture is being performed.      INTERPRETATION:  CHEST AP PORTABLE:    History: s/p multiple CT placement.     Date and time of exam: 2019 5:37 AM.    Technique: A single AP view of the chest was obtained.    Comparison exam: 2019 5:39 AM.    Findings:  Again noted are 2 right chest tubes unchanged in position since the prior   study. No evidence of pneumothorax on the current study. Pleural   thickening along the lateral right chest wall and atelectasis or fibrosis   at the right lung base. The left lung remains clear..    Impression:  Stable exam without significant change since the previous study..      DOUGLAS MYERS M.D., ATTENDING RADIOLOGIST  This document has been electronically signed. Dec  1 2019  8:08AM    < end of copied text >    Culture - Acid Fast - Tissue w/Smear (19 @ 20:40)    Specimen Source: .Tissue chest wound    Acid Fast Bacilli Smear:   No acid fast bacilli seen by fluorochrome stain    Culture Results:   Culture is being performed.    Culture - Blood (19 @ 05:19)    Specimen Source: .Blood    Culture Results:   No growth at 5 days.    Culture - Tissue with Gram Stain (19 @ 20:12)    Gram Stain:   Few White blood cells  No organisms seen    Specimen Source: .Tissue chest wound    Culture Results:   No growth at 3 days.  Culture in progress    Culture - Blood in AM (19 @ 05:20)    Specimen Source: .Blood    Culture Results:   No growth at 5 days.      Today's EKG:  < from: 12 Lead ECG (19 @ 20:13) >  Ventricular Rate 85 BPM    Atrial Rate 85 BPM    P-R Interval 128 ms    QRS Duration 86 ms    Q-T Interval 330 ms    QTC Calculation(Bezet) 392 ms    P Axis -7 degrees    R Axis 65 degrees    T Axis -19 degrees    Diagnosis Line Normal sinus rhythmwith sinus arrhythmia  Cannot rule out Anterior infarct , age undetermined  T wave abnormality, consider inferior ischemia    Confirmed by NAVEED HOLDEN ([id number]) on 2019 3:55:03 PM    < end of copied text >      PAST MEDICAL & SURGICAL HISTORY:

## 2019-12-01 NOTE — PROGRESS NOTE ADULT - ASSESSMENT
1) Hyponatremia; multifactorial in setting of low solute and increased free water intake, ? SIADH from pain  2) Rt empyema  3) Pancreatic mass  4) RIJ thrombosis    Na 134  Continue protein supplementation  Continue urea 15gm BID  Will follow

## 2019-12-01 NOTE — PROGRESS NOTE ADULT - ASSESSMENT
34 year old female who [place] [facility] [place] [facility] on 11/22 for complaints of weakness, 25 pound weight loss & flu like symptoms & found to have a large right pleural effusion. Pt. has a PMH of empyema with group A hemolytic strep & has 4-5 drinks of alcohol per day.  On 11/23 CT of the chest/abd/pelvis indicated a clot in the Right IJ. Pt. on heparin bridge to coumadin. CT abdomen shows cystic mass in body/tail of pancreas, now concern for postop ileus.    Post-op course:  Pt s/p right VATS decort & washout debridement of CT site & VAC placement on 11/27/19.

## 2019-12-02 ENCOUNTER — TRANSCRIPTION ENCOUNTER (OUTPATIENT)
Age: 34
End: 2019-12-02

## 2019-12-02 DIAGNOSIS — F43.0 ACUTE STRESS REACTION: ICD-10-CM

## 2019-12-02 DIAGNOSIS — E87.6 HYPOKALEMIA: ICD-10-CM

## 2019-12-02 DIAGNOSIS — R12 HEARTBURN: ICD-10-CM

## 2019-12-02 LAB
ALBUMIN SERPL ELPH-MCNC: 1.9 G/DL — LOW (ref 3.3–5.2)
ALP SERPL-CCNC: 188 U/L — HIGH (ref 40–120)
ALT FLD-CCNC: 9 U/L — SIGNIFICANT CHANGE UP
ANION GAP SERPL CALC-SCNC: 10 MMOL/L — SIGNIFICANT CHANGE UP (ref 5–17)
APTT BLD: 101.4 SEC — HIGH (ref 27.5–36.3)
APTT BLD: 43.5 SEC — HIGH (ref 27.5–36.3)
AST SERPL-CCNC: 13 U/L — SIGNIFICANT CHANGE UP
BILIRUB SERPL-MCNC: <0.2 MG/DL — LOW (ref 0.4–2)
BUN SERPL-MCNC: 3 MG/DL — LOW (ref 8–20)
CALCIUM SERPL-MCNC: 7.7 MG/DL — LOW (ref 8.6–10.2)
CHLORIDE SERPL-SCNC: 97 MMOL/L — LOW (ref 98–107)
CO2 SERPL-SCNC: 25 MMOL/L — SIGNIFICANT CHANGE UP (ref 22–29)
CREAT SERPL-MCNC: 0.33 MG/DL — LOW (ref 0.5–1.3)
CULTURE RESULTS: SIGNIFICANT CHANGE UP
GLUCOSE SERPL-MCNC: 101 MG/DL — SIGNIFICANT CHANGE UP (ref 70–115)
HCT VFR BLD CALC: 26.1 % — LOW (ref 34.5–45)
HGB BLD-MCNC: 8.3 G/DL — LOW (ref 11.5–15.5)
INR BLD: 3.37 RATIO — HIGH (ref 0.88–1.16)
INR BLD: 3.83 RATIO — HIGH (ref 0.88–1.16)
MAGNESIUM SERPL-MCNC: 1.8 MG/DL — SIGNIFICANT CHANGE UP (ref 1.6–2.6)
MCHC RBC-ENTMCNC: 31.8 GM/DL — LOW (ref 32–36)
MCHC RBC-ENTMCNC: 32 PG — SIGNIFICANT CHANGE UP (ref 27–34)
MCV RBC AUTO: 100.8 FL — HIGH (ref 80–100)
PHOSPHATE SERPL-MCNC: 2.8 MG/DL — SIGNIFICANT CHANGE UP (ref 2.4–4.7)
PLATELET # BLD AUTO: 442 K/UL — HIGH (ref 150–400)
POTASSIUM SERPL-MCNC: 3.5 MMOL/L — SIGNIFICANT CHANGE UP (ref 3.5–5.3)
POTASSIUM SERPL-SCNC: 3.5 MMOL/L — SIGNIFICANT CHANGE UP (ref 3.5–5.3)
PROT SERPL-MCNC: 4.6 G/DL — LOW (ref 6.6–8.7)
PROTHROM AB SERPL-ACNC: 40.3 SEC — HIGH (ref 10–12.9)
PROTHROM AB SERPL-ACNC: 45.9 SEC — HIGH (ref 10–12.9)
RBC # BLD: 2.59 M/UL — LOW (ref 3.8–5.2)
RBC # FLD: 14.6 % — HIGH (ref 10.3–14.5)
SODIUM SERPL-SCNC: 132 MMOL/L — LOW (ref 135–145)
SPECIMEN SOURCE: SIGNIFICANT CHANGE UP
SURGICAL PATHOLOGY STUDY: SIGNIFICANT CHANGE UP
WBC # BLD: 19.33 K/UL — HIGH (ref 3.8–10.5)
WBC # FLD AUTO: 19.33 K/UL — HIGH (ref 3.8–10.5)

## 2019-12-02 PROCEDURE — 71045 X-RAY EXAM CHEST 1 VIEW: CPT | Mod: 26,77

## 2019-12-02 PROCEDURE — 71045 X-RAY EXAM CHEST 1 VIEW: CPT | Mod: 26

## 2019-12-02 PROCEDURE — 99232 SBSQ HOSP IP/OBS MODERATE 35: CPT

## 2019-12-02 PROCEDURE — 99233 SBSQ HOSP IP/OBS HIGH 50: CPT

## 2019-12-02 PROCEDURE — 99024 POSTOP FOLLOW-UP VISIT: CPT

## 2019-12-02 RX ORDER — APIXABAN 2.5 MG/1
1 TABLET, FILM COATED ORAL
Qty: 60 | Refills: 0
Start: 2019-12-02 | End: 2019-12-31

## 2019-12-02 RX ORDER — MAGNESIUM SULFATE 500 MG/ML
2 VIAL (ML) INJECTION ONCE
Refills: 0 | Status: COMPLETED | OUTPATIENT
Start: 2019-12-02 | End: 2019-12-02

## 2019-12-02 RX ORDER — HEPARIN SODIUM 5000 [USP'U]/ML
1000 INJECTION INTRAVENOUS; SUBCUTANEOUS
Qty: 25000 | Refills: 0 | Status: DISCONTINUED | OUTPATIENT
Start: 2019-12-02 | End: 2019-12-02

## 2019-12-02 RX ORDER — ACETAMINOPHEN 500 MG
1000 TABLET ORAL ONCE
Refills: 0 | Status: COMPLETED | OUTPATIENT
Start: 2019-12-02 | End: 2019-12-02

## 2019-12-02 RX ORDER — POTASSIUM CHLORIDE 20 MEQ
40 PACKET (EA) ORAL ONCE
Refills: 0 | Status: COMPLETED | OUTPATIENT
Start: 2019-12-02 | End: 2019-12-02

## 2019-12-02 RX ORDER — PANTOPRAZOLE SODIUM 20 MG/1
40 TABLET, DELAYED RELEASE ORAL ONCE
Refills: 0 | Status: COMPLETED | OUTPATIENT
Start: 2019-12-02 | End: 2019-12-02

## 2019-12-02 RX ADMIN — Medication 650 MILLIGRAM(S): at 04:30

## 2019-12-02 RX ADMIN — OXYCODONE HYDROCHLORIDE 10 MILLIGRAM(S): 5 TABLET ORAL at 03:43

## 2019-12-02 RX ADMIN — Medication 400 MILLIGRAM(S): at 08:16

## 2019-12-02 RX ADMIN — Medication 650 MILLIGRAM(S): at 03:42

## 2019-12-02 RX ADMIN — LIDOCAINE 1 PATCH: 4 CREAM TOPICAL at 20:07

## 2019-12-02 RX ADMIN — OXYCODONE HYDROCHLORIDE 10 MILLIGRAM(S): 5 TABLET ORAL at 10:41

## 2019-12-02 RX ADMIN — LIDOCAINE 1 PATCH: 4 CREAM TOPICAL at 13:22

## 2019-12-02 RX ADMIN — OXYCODONE HYDROCHLORIDE 10 MILLIGRAM(S): 5 TABLET ORAL at 07:23

## 2019-12-02 RX ADMIN — Medication 325 MILLIGRAM(S): at 06:02

## 2019-12-02 RX ADMIN — SENNA PLUS 2 TABLET(S): 8.6 TABLET ORAL at 22:41

## 2019-12-02 RX ADMIN — Medication 250 MILLIGRAM(S): at 06:02

## 2019-12-02 RX ADMIN — OXYCODONE HYDROCHLORIDE 10 MILLIGRAM(S): 5 TABLET ORAL at 18:32

## 2019-12-02 RX ADMIN — Medication 30 MILLILITER(S): at 20:14

## 2019-12-02 RX ADMIN — PANTOPRAZOLE SODIUM 40 MILLIGRAM(S): 20 TABLET, DELAYED RELEASE ORAL at 13:49

## 2019-12-02 RX ADMIN — OXYCODONE HYDROCHLORIDE 10 MILLIGRAM(S): 5 TABLET ORAL at 22:40

## 2019-12-02 RX ADMIN — CEFTRIAXONE 100 MILLIGRAM(S): 500 INJECTION, POWDER, FOR SOLUTION INTRAMUSCULAR; INTRAVENOUS at 06:04

## 2019-12-02 RX ADMIN — Medication 50 GRAM(S): at 08:16

## 2019-12-02 RX ADMIN — PANTOPRAZOLE SODIUM 40 MILLIGRAM(S): 20 TABLET, DELAYED RELEASE ORAL at 06:02

## 2019-12-02 RX ADMIN — OXYCODONE HYDROCHLORIDE 10 MILLIGRAM(S): 5 TABLET ORAL at 06:03

## 2019-12-02 RX ADMIN — OXYCODONE HYDROCHLORIDE 10 MILLIGRAM(S): 5 TABLET ORAL at 06:40

## 2019-12-02 RX ADMIN — OXYCODONE HYDROCHLORIDE 10 MILLIGRAM(S): 5 TABLET ORAL at 09:42

## 2019-12-02 RX ADMIN — HEPARIN SODIUM 10 UNIT(S)/HR: 5000 INJECTION INTRAVENOUS; SUBCUTANEOUS at 07:33

## 2019-12-02 RX ADMIN — Medication 40 MILLIEQUIVALENT(S): at 18:48

## 2019-12-02 RX ADMIN — CEFTRIAXONE 100 MILLIGRAM(S): 500 INJECTION, POWDER, FOR SOLUTION INTRAMUSCULAR; INTRAVENOUS at 18:46

## 2019-12-02 RX ADMIN — OXYCODONE HYDROCHLORIDE 10 MILLIGRAM(S): 5 TABLET ORAL at 01:50

## 2019-12-02 RX ADMIN — Medication 325 MILLIGRAM(S): at 22:42

## 2019-12-02 RX ADMIN — Medication 1000 MILLIGRAM(S): at 10:41

## 2019-12-02 RX ADMIN — LIDOCAINE 1 PATCH: 4 CREAM TOPICAL at 06:07

## 2019-12-02 NOTE — PROGRESS NOTE ADULT - ASSESSMENT
34 year old female who transferred from [facility] [place] [facility] on 11/22 for complaints of weakness, 25 pound weight loss & flu like symptoms & found to have a large right pleural effusion. Pt. has a PMH of empyema with group A hemolytic strep & has 4-5 drinks of alcohol per day.  On 11/23 CT of the chest/abd/pelvis indicated a clot in the Right IJ. Pt. on heparin bridge to coumadin. CT abdomen shows cystic mass in body/tail of pancreas, now concern for postop ileus.    Post-op course:  Pt s/p right VATS decort & washout debridement of CT site & VAC placement on 11/27/19.

## 2019-12-02 NOTE — PROGRESS NOTE ADULT - PROBLEM SELECTOR PLAN 1
1. Hold NSIAIDs  2. Tylenol 650mg PO q6hrs PRN mild pain  3. Continue Oxycodone IR 5/10mg PO PRN as ordered  - Available in solution if patient unable to swallow tablets  - Offer minimal effective dose  - IV opioids at discretion of primary team  4. Robaxin 500mg PO TID PRN spasms  5. Lidoderm Patches adjacent to affected area, over intact skin only.

## 2019-12-02 NOTE — PROGRESS NOTE ADULT - PROBLEM SELECTOR PLAN 2
Pain consult appreciated  Continue Oxy PRN, Tylenol PRN & lidoderm patch for pain management. Pain consult appreciated  Tylenol IV times one dose given prior to D/C of chest tube.  Continue Oxy PRN, Tylenol PRN & lidoderm patch for pain management.

## 2019-12-02 NOTE — PROGRESS NOTE ADULT - SUBJECTIVE AND OBJECTIVE BOX
HPI:  Patient is a 34y old  Female who presents with a chief complaint of SOB (26 Nov 2019 15:02)  She was transferred from an OSH for management of empyema. MICU team requested assistance with her pain regimen. She was being offered Percocet PRN. Tramadol, Toradol and Methocarbamol suggested as alternatives to Percocet. Patient is now s/p VATS, lung decortication, pleural biopsy. She was changed from IV PCA to Oxycodone IR PRN, with good analgesia. Per MAR, she is utilizing every scheduled dose of Oxycodone.       VERBAL REPORT: "I'm not feeling well."  Patient describes a burning pain in her chest that started this morning, as well as mid back pain. She admits prior episode of heartburn, but never this intense. She also has ongoing pain at the chest tube site. She's requesting IV Dilaudid for relief, as she's having a tough time swallowing the pills.  Per RN, patient has been complaining of pain since this morning and has not been eating. The removal of one chest tube has made no difference in her symptoms    PAIN SCORE:  10/10                 SCALE USED: VNRS    Allergies  No Known Allergies      MEDICATIONS  (STANDING):  ascorbic acid 500 milliGRAM(s) Oral daily  cefTRIAXone   IVPB 1000 milliGRAM(s) IV Intermittent every 12 hours  ferrous    sulfate 325 milliGRAM(s) Oral three times a day  folic acid 1 milliGRAM(s) Oral daily  influenza   Vaccine 0.5 milliLiter(s) IntraMuscular once  lidocaine   Patch 1 Patch Transdermal daily  multivitamin 1 Tablet(s) Oral daily  pantoprazole    Tablet 40 milliGRAM(s) Oral before breakfast  polyethylene glycol 3350 17 Gram(s) Oral at bedtime  potassium chloride   Powder 40 milliEquivalent(s) Oral once  saccharomyces boulardii 250 milliGRAM(s) Oral two times a day  senna 2 Tablet(s) Oral at bedtime  thiamine 100 milliGRAM(s) Oral daily  ureNa 15 Gram(s) 15 Gram(s) Oral two times a day    MEDICATIONS  (PRN):  acetaminophen   Tablet .. 650 milliGRAM(s) Oral every 6 hours PRN Mild Pain (1 - 3)  oxyCODONE    IR 5 milliGRAM(s) Oral every 3 hours PRN Moderate Pain (4 - 6)  oxyCODONE    IR 10 milliGRAM(s) Oral every 3 hours PRN Severe Pain (7 - 10)      PHYSICAL EXAM:  GENERAL: NAD, well-developed  HEAD:  Atraumatic, Normocephalic  EYES: EOMI, PERRLA, conjunctiva and sclera clear  NERVOUS SYSTEM:  Alert & Oriented X3, Good concentration; Motor Strength 5/5 B/L upper and lower extremities  CHEST/LUNG: Clear speech, no SOB evident at rest; +right sided chest tube  ABDOMEN: Nondistended; Bowel sounds present  SKIN: resolving hematoma on the interior aspect of right elbow      Vital Signs Last 24 Hrs  T(C): 36.4 (02 Dec 2019 07:45), Max: 37.2 (01 Dec 2019 21:17)  T(F): 97.6 (02 Dec 2019 07:45), Max: 98.9 (01 Dec 2019 21:17)  HR: 114 (02 Dec 2019 07:45) (90 - 114)  BP: 110/76 (02 Dec 2019 07:45) (92/58 - 110/76)  BP(mean): --  RR: 16 (02 Dec 2019 07:45) (16 - 18)  SpO2: 100% (02 Dec 2019 07:45) (100% - 100%)                          8.3    19.33 )-----------( 442      ( 02 Dec 2019 06:11 )             26.1       LIVER FUNCTIONS - ( 02 Dec 2019 06:11 )  Alb: 1.9 g/dL / Pro: 4.6 g/dL / ALK PHOS: 188 U/L / ALT: 9 U/L / AST: 13 U/L / GGT: x             PT/INR - ( 02 Dec 2019 12:47 )   PT: 45.9 sec;   INR: 3.83 ratio    PTT - ( 02 Dec 2019 12:47 )  PTT:43.5 sec      Pain Service   Text Page via [employer]  PIN: [phone]

## 2019-12-02 NOTE — PROGRESS NOTE BEHAVIORAL HEALTH - NSBHFUPINTERVALHXFT_PSY_A_CORE
Psych was requested for reeval secondary to "psychosis", inability to focus, argumentative, agitation and refusing meds/treatment.  Pt interviewed and is much more irritable than initial eval and reports is secondary to pain.  Pt is upset she cannot tolerate her pain meds due to burning pain and was reported to have had 8 dosed of Oxycodone yesterday.  Pt endorsing depression in context of pain/discomfort.  Denies SI/HI and became irritated with questions.  Denies AH/VH and no evidence of psychotic symptoms.  Pt reports poor po intake and weakness.  States she is not refusing meds.    Pt is poorly related today, appears like she is in acute pain.  Unable to recommend SSRI due to hyponatremia and Pt declined on initial eval and today.

## 2019-12-02 NOTE — DISCHARGE NOTE PROVIDER - NSDCMRMEDTOKEN_GEN_ALL_CORE_FT
Eliquis 5 mg oral tablet: 1 tab(s) orally 2 times a day cholecalciferol oral tablet: 2000 unit(s) orally once a day  Eliquis 5 mg oral tablet: 1 tab(s) orally 2 times a day   escitalopram 10 mg oral tablet: 1 tab(s) orally once a day  folic acid 1 mg oral tablet: 1 tab(s) orally once a day  gabapentin 100 mg oral capsule: 1 cap(s) orally 3 times a day  metoprolol tartrate 25 mg oral tablet: 1 tab(s) orally 2 times a day  sucralfate 1 g oral tablet: 1 tab(s) orally 2 times a day  traZODone 50 mg oral tablet: 1 tab(s) orally once a day (at bedtime)  warfarin 6 mg oral tablet: 1 tab(s) orally once a day cholecalciferol oral tablet: 2000 unit(s) orally once a day  escitalopram 10 mg oral tablet: 1 tab(s) orally once a day  folic acid 1 mg oral tablet: 1 tab(s) orally once a day  gabapentin 100 mg oral capsule: 1 cap(s) orally 3 times a day  metoprolol tartrate 25 mg oral tablet: 1 tab(s) orally 2 times a day  sucralfate 1 g oral tablet: 1 tab(s) orally 2 times a day  traZODone 50 mg oral tablet: 1 tab(s) orally once a day (at bedtime)  warfarin 6 mg oral tablet: 1 tab(s) orally once a day

## 2019-12-02 NOTE — PROGRESS NOTE ADULT - PROBLEM SELECTOR PLAN 4
Heme consult appreciated  Follow up with heme as out patient.  Will discharge home on Coumadin  Maintain Heparin drip, will bridge to Coumadin Heme consult appreciated  Follow up with heme as out patient.  Will discharge home on Coumadin.  Heparin drip discontinued as per Dr. Aponte, INR 3.37 .4, will repeat PTT/INR at noon.

## 2019-12-02 NOTE — PROGRESS NOTE ADULT - PROBLEM SELECTOR PLAN 5
FeSO4 & vitamin C ordered  Continue MVI, thiamine, & folate  Trend HCT/HGB Continue FeSO4, vitamin C, MVI, thiamine, & folate  Trend HCT/HGB

## 2019-12-02 NOTE — PROGRESS NOTE ADULT - ASSESSMENT
This 34 y.o. F who was admitted to [facility] 11/22 with weakness,   found with large right pleural effusion   s/p chest tube drainage    Hyponatremia - resolving  WBC elevation   alpha hemolytic strep infection  multi focal PNA  infected pleural effusion - empyema  Pancreatic cyst    - continue Ceftriaxone 1 gram Q 24H  - Legionella Ag from [facility] 11/24/19 - negative, off azithromycin  - remainder of cultures from [facility] are negative    s/p VATS 11/27/19  - cultures remain negative    - WBC elevation post op, reactive. - down trending  WBC Count: 19.33 K/uL (12-02-19 @ 06:11)  WBC Count: 26.95 K/uL (12-01-19 @ 06:38)  WBC Count: 33.37 K/uL (11-30-19 @ 06:08)  WBC Count: 35.30 K/uL (11-29-19 @ 06:02)  WBC Count: 42.42 K/uL (11-28-19 @ 06:12)       Will plan for midline for antibiotics administration    - Case management involved in dispo planning.

## 2019-12-02 NOTE — PROGRESS NOTE ADULT - ASSESSMENT
34y old Female transferred to [facility] for management of empyema. Now s/p Thoracic surgery with wound vac placement. She has a right sided chest tube, one removed this morning, and ongoing post-op pain. Found asleep, supine in bed with head covered under pillow. She awakens to verbal stimuli and moans in discomfort. She inquires about IV Dilaudid for burning chest pain that started this morning. As her symptoms mirror heartburn, not post surgical pain, she was advised that opioids was not the best method of management. She reports difficulty swallowing, and is refusing all oral medications. Discussed use of an IV dose of PPI with CT surgery team. Oxycodone may be changed to solution to allow easier administration.

## 2019-12-02 NOTE — PROGRESS NOTE ADULT - PROBLEM SELECTOR PLAN 8
Chronic lung disease Nephrology following  Trend Sodium levels  Continue protein supplementation  Continue Urena

## 2019-12-02 NOTE — DISCHARGE NOTE PROVIDER - HOSPITAL COURSE
34 year old woman with no known major comorbidities (had not gone to see a doctor for 15 years) who presented to [facility] on 11/22/2019 with progressive sob, weakness, weight loss, She was found to have a large pleural effusion. Initially 1.5 L of dark serous fluid was removed. Chest tube placed shortly thereafter, 3 additional liters was removed. pleural fluid cultures + for group A hemolytic strep, CBC showed a leukocytosis of 40k and CTA imaging revealed a 2.1cm partially cystic mass in the body/tail of the pancreas with possible upstream ductal dilatation. On 11/23/2019 the patient had a CT chest/abdomen/pelvis that found to have a clot in the right IJ and was started on a heparin gtt     s/p right VATS decort & washout debridement of CT site & VAC placement on 11/27/19    Persistent drainage from chest tube site so repeat CT C/A/P performed 12/4 which showed new cystic mass in pancreas and liver concerning for pancreatitis    Zosyn changed to meropenem on 12/6  for broader coverage given pancreatitis    Copious drainage from anterior chest tube site, so VAC removed and ostomy appliance applied for high output on 12/6.     12/7 Right chest tube removed.     Repeat CT scan showed a persistent Right hydropneumothorax with air anteriorly    12/8 Ostomy appliance removed and dressing applied.     12/9 s.p PICC placement & Right anterior pig tail catheter & right lateral wound vac placement by thoracic surgery     MRCP significant for Peripancreatic fluid and edema consistent with pancreatitis. Cystic lesions in the pancreatic head with the largest measuring 2.1 cm, likely acute peripancreatic collections, increased in size since 12/4/2019, with a tract of fluid extending from the 2.1 cm cystic lesion into the mediastinum and towards the pleural space on the right suspicious for a pancreatic pleural fistula.    MRI showed  pancreatic / pleural fistula and Multifocal PNA    Octreotide/ TPN started by GI with pt being on clears PO, Heme- transitioned to Coumadin with  iv heparin bridging     12/9/19 PICC line placed,     antr chest wall- chest tube site wound vac removed on 12/30/19    runs of NSVT on tele for which aggressive lyte supplement started and lopressor started. Seen by EP metoprolol dose increased , stable Rpt MR abdomen showed improved pancreas status and full liq diet started on 1/2/20 and reg on 1/3. , advanced to regular tolerating , kept on TPN till discharge follow up CXR remains stable , INR therepeutic pt is discharged home in stable condition         total time spend coordinating care 35 min

## 2019-12-02 NOTE — PROGRESS NOTE ADULT - SUBJECTIVE AND OBJECTIVE BOX
Subjective: "I'm in so much pain, I didn't feel like this yesterday."  Pt. lying in bed, denies any SOB or chest pain, NAD noted.    VITAL SIGNS  Vital Signs Last 24 Hrs  T(C): 36.9 (19 @ 06:00), Max: 37.2 (19 @ 21:17)  T(F): 98.4 (19 @ 06:00), Max: 98.9 (19 @ 21:17)  HR: 90 (19 @ 06:00) (90 - 109)  BP: 105/71 (19 @ 06:00) (92/58 - 105/71)  RR: 17 (19 @ 06:00) (17 - 18)  SpO2: 100% (19 @ 06:00) (100% - 100%)  on (O2)              Telemetry: Sinus rhythm     MEDICATIONS  acetaminophen   Tablet .. 650 milliGRAM(s) Oral every 6 hours PRN  ascorbic acid 500 milliGRAM(s) Oral daily  cefTRIAXone   IVPB 1000 milliGRAM(s) IV Intermittent every 12 hours  ferrous    sulfate 325 milliGRAM(s) Oral three times a day  folic acid 1 milliGRAM(s) Oral daily  influenza   Vaccine 0.5 milliLiter(s) IntraMuscular once  lidocaine   Patch 1 Patch Transdermal daily  magnesium oxide 400 milliGRAM(s) Oral three times a day with meals  multivitamin 1 Tablet(s) Oral daily  oxyCODONE    IR 5 milliGRAM(s) Oral every 3 hours PRN  oxyCODONE    IR 10 milliGRAM(s) Oral every 3 hours PRN  pantoprazole    Tablet 40 milliGRAM(s) Oral before breakfast  polyethylene glycol 3350 17 Gram(s) Oral at bedtime  potassium chloride   Powder 40 milliEquivalent(s) Oral once  saccharomyces boulardii 250 milliGRAM(s) Oral two times a day  senna 2 Tablet(s) Oral at bedtime  thiamine 100 milliGRAM(s) Oral daily  ureNa 15 Gram(s) 15 Gram(s) Oral two times a day      PHYSICAL EXAM  General: well nourished, well developed, no acute distress  Neurology: alert and oriented x 3, nonfocal, no gross deficits  Respiratory: Diminished at the right base  CV: regular rate and rhythm, normal S1, S2  Abdomen: soft, nontender, nondistended, positive bowel sounds, last bowel movement 19  Extremities: warm, well perfused. Trace edema LLE>RLE. + DP pulses  Incisions: Right thoracotomy incision CDI with 4x4 dressing intact, +right wound VAC CDI  Chest tubes: right chest tube to suction, no air leak detected      Epicardial Wires:    > EPM (settings) / isolated    I&O's Detail    01 Dec 2019 07:01  -  02 Dec 2019 07:00  --------------------------------------------------------  IN:    heparin Infusion: 121 mL  Total IN: 121 mL    OUT:    Chest Tube: 420 mL    Chest Tube: 40 mL  Total OUT: 460 mL    Total NET: -339 mL          Weights:  Daily     Daily Weight in k.8 (02 Dec 2019 04:45)  Admit Wt: Drug Dosing Weight  Height (cm): 167 (2019 10:14)  Weight (kg): 55.2 (2019 10:14)  BMI (kg/m2): 19.8 (2019 10:14)  BSA (m2): 1.61 (2019 10:14)    LABS      132<L>  |  97<L>  |  3.0<L>  ----------------------------<  101  3.5   |  25.0  |  0.33<L>    Ca    7.7<L>      02 Dec 2019 06:11  Phos  2.8       Mg     1.8         TPro  4.6<L>  /  Alb  1.9<L>  /  TBili  <0.2<L>  /  DBili  x   /  AST  13  /  ALT  9   /  AlkPhos  188<H>                                   8.3    19.33 )-----------( 442      ( 02 Dec 2019 06:11 )             26.1          PT/INR - ( 02 Dec 2019 06:11 )   PT: 40.3 sec;   INR: 3.37 ratio         PTT - ( 02 Dec 2019 06:11 )  PTT:101.4 sec      Bilirubin Total, Serum: <0.2 mg/dL ( @ 06:11)    CAPILLARY BLOOD GLUCOSE      CXR:    < from: Xray Chest 1 View- PORTABLE-Routine (19 @ 06:00) >  EXAM:  XR CHEST PORTABLE ROUTINE 1V                          PROCEDURE DATE:  2019          INTERPRETATION:  CHEST AP PORTABLE:    History: s/p multiple CT placement.     Date and time of exam: 2019 5:37 AM.    Technique: A single AP view of the chest was obtained.    Comparison exam: 2019 5:39 AM.    Findings:  Again noted are 2 right chest tubes unchanged in position since the prior   study. No evidence of pneumothorax on the current study. Pleural   thickening along the lateral right chest wall and atelectasis or fibrosis   at the right lung base. The left lung remains clear..    Impression:  Stable exam without significant change since the previous study..        DOUGLAS MYERS M.D., ATTENDING RADIOLOGIST  This document has been electronically signed. Dec  1 2019  8:08AM    < end of copied text >      PAST MEDICAL & SURGICAL HISTORY:

## 2019-12-02 NOTE — DISCHARGE NOTE PROVIDER - CARE PROVIDER_API CALL
Kris Prince (MD)  Gastroenterology; Internal Medicine  [address] [facility], [place] [address]  Phone: [phone]  Fax: [phone]  Follow Up Time: 2 weeks

## 2019-12-02 NOTE — PROGRESS NOTE ADULT - PROBLEM SELECTOR PLAN 2
1. Maximize GI regimen  - r/o underlying pathology if symptoms persist  - GI consult as indicated  2. Psychiatry consultation

## 2019-12-02 NOTE — PROGRESS NOTE ADULT - ASSESSMENT
34 year old female with no known major comorbidities who is being treated for right pleural effusion/empyema/multifocal pna found to have a RIJ thrombus currently on heparin drip.    1) RIJ venous thrombosis - History c/w a provoked event that occurred in context of her severe and complex lung illness/infection/empyema that may have led to developing the thrombus. I agree with continuing anticoagulation and for a duration of at least 3 months.  Continue with coumadin as planned.   Her pain seems to be more related to her thoracic issues, chest tube, positioning. On pain regimen as per CT team.    2) Anemia - multifactorial, blood loss, infection. Transfuse if hgb<7.0.      I will see her as an outpatient for follow up after discharge.

## 2019-12-02 NOTE — PROGRESS NOTE ADULT - SUBJECTIVE AND OBJECTIVE BOX
[facility] DIVISION OF KIDNEY DISEASES AND HYPERTENSION -- FOLLOW UP NOTE  --------------------------------------------------------------------------------  Chief Complaint:  Hyponatremia    24 hour events/subjective:  Seen/examined;   Pt refusing urea; did not take any of her meds besides pain meds last 48 hours  Yelling/crying in room; drinking water in excess;       PAST HISTORY  --------------------------------------------------------------------------------  No significant changes to PMH, PSH, FHx, SHx, unless otherwise noted    ALLERGIES & MEDICATIONS  --------------------------------------------------------------------------------  Allergies    No Known Allergies    Intolerances    Ensure TID (Unknown)    Standing Inpatient Medications  ascorbic acid 500 milliGRAM(s) Oral daily  cefTRIAXone   IVPB 1000 milliGRAM(s) IV Intermittent every 12 hours  ferrous    sulfate 325 milliGRAM(s) Oral three times a day  folic acid 1 milliGRAM(s) Oral daily  influenza   Vaccine 0.5 milliLiter(s) IntraMuscular once  lidocaine   Patch 1 Patch Transdermal daily  magnesium oxide 400 milliGRAM(s) Oral three times a day with meals  multivitamin 1 Tablet(s) Oral daily  pantoprazole    Tablet 40 milliGRAM(s) Oral before breakfast  polyethylene glycol 3350 17 Gram(s) Oral at bedtime  potassium chloride   Powder 40 milliEquivalent(s) Oral once  saccharomyces boulardii 250 milliGRAM(s) Oral two times a day  senna 2 Tablet(s) Oral at bedtime  thiamine 100 milliGRAM(s) Oral daily  ureNa 15 Gram(s) 15 Gram(s) Oral two times a day    PRN Inpatient Medications  acetaminophen   Tablet .. 650 milliGRAM(s) Oral every 6 hours PRN  oxyCODONE    IR 5 milliGRAM(s) Oral every 3 hours PRN  oxyCODONE    IR 10 milliGRAM(s) Oral every 3 hours PRN      REVIEW OF SYSTEMS  --------------------------------------------------------------------------------  Unable to obtain; crying/yelling;    VITALS/PHYSICAL EXAM  --------------------------------------------------------------------------------  T(C): 36.4 (12-02-19 @ 07:45), Max: 37.2 (12-01-19 @ 21:17)  HR: 114 (12-02-19 @ 07:45) (90 - 114)  BP: 110/76 (12-02-19 @ 07:45) (92/58 - 110/76)  RR: 16 (12-02-19 @ 07:45) (16 - 18)  SpO2: 100% (12-02-19 @ 07:45) (100% - 100%)  Wt(kg): --        12-01-19 @ 07:01  -  12-02-19 @ 07:00  --------------------------------------------------------  IN: 121 mL / OUT: 460 mL / NET: -339 mL      Physical Exam:  	Gen: NAD   	HEENT: PERRL, supple neck, clear oropharynx  	Pulm: CTA B/L  	CV: RRR, S1S2; no rub  	Back: No spinal or CVA tenderness; no sacral edema  	Abd: +BS, soft, nontender/nondistended  	: No suprapubic tenderness  	UE: Warm, FROM, no clubbing, intact strength; no edema; no asterixis  	LE: Warm, FROM, no clubbing, intact strength; no edema  	Neuro: No focal deficits, intact gait  	Psych: abnormal affect;  	Skin: Warm, without rashes      LABS/STUDIES  --------------------------------------------------------------------------------              8.3    19.33 >-----------<  442      [12-02-19 @ 06:11]              26.1     132  |  97  |  3.0  ----------------------------<  101      [12-02-19 @ 06:11]  3.5   |  25.0  |  0.33        Ca     7.7     [12-02-19 @ 06:11]      Mg     1.8     [12-02-19 @ 06:11]      Phos  2.8     [12-02-19 @ 06:11]    TPro  4.6  /  Alb  1.9  /  TBili  <0.2  /  DBili  x   /  AST  13  /  ALT  9   /  AlkPhos  188  [12-02-19 @ 06:11]    PT/INR: PT 40.3 , INR 3.37       [12-02-19 @ 06:11]  PTT: 101.4      [12-02-19 @ 06:11]      Creatinine Trend:  SCr 0.33 [12-02 @ 06:11]  SCr 0.31 [12-01 @ 06:38]  SCr 0.45 [11-30 @ 06:08]  SCr 0.42 [11-29 @ 06:02]  SCr 0.38 [11-28 @ 06:09]    Urinalysis - [11-26-19 @ 19:42]      Color Yellow / Appearance Clear / SG 1.015 / pH 8.0      Gluc Negative / Ketone Negative  / Bili Negative / Urobili Negative       Blood Trace / Protein Negative / Leuk Est Negative / Nitrite Negative      RBC 0-2 / WBC 0-2 / Hyaline  / Gran  / Sq Epi  / Non Sq Epi Occasional / Bacteria Occasional    Urine Sodium <30      [11-28-19 @ 22:14]  Urine Urea Nitrogen 164      [11-27-19 @ 01:53]  Urine Potassium 18      [11-26-19 @ 19:42]  Urine Chloride <27      [11-26-19 @ 19:42]  Urine Phosphorus 6.6      [11-27-19 @ 22:17]  Urine Osmolality 339      [11-28-19 @ 22:15]    TSH 4.76      [11-25-19 @ 05:29]  Lipid: chol 68, , HDL 27, LDL 12      [11-25-19 @ 05:29]    HIV Nonreact      [11-25-19 @ 14:08]    ROMAN: titer Negative, pattern --      [11-26-19 @ 18:05]  dsDNA <12      [11-26-19 @ 18:05]  Rheumatoid Factor <10      [11-26-19 @ 17:34]  ANCA: cANCA Negative, pANCA Negative, atypical ANCA Negative      [11-26-19 @ 18:05]

## 2019-12-02 NOTE — PROGRESS NOTE ADULT - SUBJECTIVE AND OBJECTIVE BOX
34 year old woman with no known major comorbidities (had not gone to see a doctor for 15 years) who presented to [facility] on 11/22/2019 with progressive sob, weakness, weight loss, She was found to have a large pleural effusion. Initially 1.5 L of dark serous fluid was removed.  Chest tube placed shortly thereafter, 3 additional liters was removed. CBC showed a leukocytosis of 40k and   CTA imaging revealed a 2.1cm partially cystic mass in the body/tail of the pancreas with possible upstream ductal dilatation. On 11/23/2019 the patient had a CT chest/abdomen/pelvis that found to have a clot in the right IJ and was started on a heparin gtt.     Transferred to [facility] on 11/24/19.  MRCP performed on 11/26.  Underwent Debridement, wound, with dressing replacement  Wound VAC placement  Pleural biopsy  Lung decortication  Bronchoscopy, with lung decortication using VATS on 11/27/19.  Pt  has a history of alcohol abuse.     Consulted for evaluation and management of the RIJ DVT.      INTERVAL HISTORY:  INR reached >2.0 and heparin drop discontinued.  She is complaining of severe back and right side chest pain. Chest tube was removed this morning. She is receiving pain regimen, CT surgery managing. Otherwise, stable.       MEDICATIONS  (STANDING):  ascorbic acid 500 milliGRAM(s) Oral daily  cefTRIAXone   IVPB 1000 milliGRAM(s) IV Intermittent every 12 hours  ferrous    sulfate 325 milliGRAM(s) Oral three times a day  folic acid 1 milliGRAM(s) Oral daily  influenza   Vaccine 0.5 milliLiter(s) IntraMuscular once  lidocaine   Patch 1 Patch Transdermal daily  magnesium oxide 400 milliGRAM(s) Oral three times a day with meals  multivitamin 1 Tablet(s) Oral daily  pantoprazole    Tablet 40 milliGRAM(s) Oral before breakfast  polyethylene glycol 3350 17 Gram(s) Oral at bedtime  potassium chloride   Powder 40 milliEquivalent(s) Oral once  saccharomyces boulardii 250 milliGRAM(s) Oral two times a day  senna 2 Tablet(s) Oral at bedtime  thiamine 100 milliGRAM(s) Oral daily  ureNa 15 Gram(s) 15 Gram(s) Oral two times a day    MEDICATIONS  (PRN):  acetaminophen   Tablet .. 650 milliGRAM(s) Oral every 6 hours PRN Mild Pain (1 - 3)  oxyCODONE    IR 5 milliGRAM(s) Oral every 3 hours PRN Moderate Pain (4 - 6)  oxyCODONE    IR 10 milliGRAM(s) Oral every 3 hours PRN Severe Pain (7 - 10)      Vital Signs Last 24 Hrs  T(C): 36.9 (02 Dec 2019 06:00), Max: 37.2 (01 Dec 2019 21:17)  T(F): 98.4 (02 Dec 2019 06:00), Max: 98.9 (01 Dec 2019 21:17)  HR: 90 (02 Dec 2019 06:00) (90 - 109)  BP: 105/71 (02 Dec 2019 06:00) (92/58 - 105/71)  BP(mean): --  RR: 17 (02 Dec 2019 06:00) (17 - 18)  SpO2: 100% (02 Dec 2019 06:00) (100% - 100%)Gen: nad  Chest: 1 chest tube right side removed, still has 1 in place  CV: rrr  Abd: soft, nd, nt                                  8.3    19.33 )-----------( 442      ( 02 Dec 2019 06:11 )             26.1     12-02    132<L>  |  97<L>  |  3.0<L>  ----------------------------<  101  3.5   |  25.0  |  0.33<L>    Ca    7.7<L>      02 Dec 2019 06:11  Phos  2.8     12-02  Mg     1.8     12-02    TPro  4.6<L>  /  Alb  1.9<L>  /  TBili  <0.2<L>  /  DBili  x   /  AST  13  /  ALT  9   /  AlkPhos  188<H>  12-02

## 2019-12-02 NOTE — PROGRESS NOTE ADULT - ASSESSMENT
1) Hyponatremia; multifactorial in setting of low solute and increased free water intake, ? 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

## 2019-12-02 NOTE — PROGRESS NOTE BEHAVIORAL HEALTH - NSBHCHARTREVIEWINVESTIGATE_PSY_A_CORE FT
Ventricular Rate 85 BPM    Atrial Rate 85 BPM    P-R Interval 128 ms    QRS Duration 86 ms    Q-T Interval 330 ms    QTC Calculation(Bezet) 392 ms    P Axis -7 degrees    R Axis 65 degrees    T Axis -19 degrees    Diagnosis Line Normal sinus rhythmwith sinus arrhythmia  Cannot rule out Anterior infarct , age undetermined  T wave abnormality, consider inferior ischemia    Confirmed by NAVEED HOLDEN ([id number]) on 11/29/2019 3:55:03 PM

## 2019-12-02 NOTE — PROGRESS NOTE ADULT - PROBLEM SELECTOR PLAN 1
s/p Right VATS decortication & washout on 11/27/19. Debridement of CT site & VAC placement  Maintain right chest tubes to suction  Monitor chest tube drainage  Repeat chest xray in morning  Encourage the use of I/S, CDBE, OOB to chair, daily PT, ambulate as tolerated  Discussed plan with Dr. Castanon s/p Right VATS decortication & washout on 11/27/19. Debridement of CT site & VAC placement  D/C #1 chest tube, post removal xray ordered.  Maintain right #2 chest tubes to water seal.  Monitor chest tube drainage  Repeat chest xray in morning  Encourage the use of I/S, CDBE, OOB to chair, daily PT, ambulate as tolerated  Discussed plan with Dr. Castanon

## 2019-12-02 NOTE — PROGRESS NOTE ADULT - SUBJECTIVE AND OBJECTIVE BOX
[facility]  INFECTIOUS DISEASES AND INTERNAL MEDICINE at [place]  =======================================================  Carlos Alberto Soto MD  Diplomates American Board of Internal Medicine and Infectious Diseases  Telephone [phone]  Fax            [phone]  =======================================================    [facility]-[id number]  HERACLIO MOLINA   follow up:  infected pleural effusion, empyema    s/p VATS on 11/27/19  cultures from OR in process.     1 chest tube removed this AM; remaining tube to water seal .   c/o pain in the chest tube site      I have personally reviewed the labs and data; pertinent labs and data are listed in this note; please see below.   =======================================================    REVIEW OF SYSTEMS:  CONSTITUTIONAL:  No Fever or chills  HEENT:  No diplopia or blurred vision.  No earache, sore throat or runny nose.  CARDIOVASCULAR:  No pressure, squeezing, strangling, tightness, heaviness or aching about the chest, neck, axilla or epigastrium.  RESPIRATORY:  No cough, shortness of breath  GASTROINTESTINAL:  No nausea, vomiting or diarrhea.  GENITOURINARY:  No dysuria, frequency or urgency. No Blood in urine  MUSCULOSKELETAL:  no joint aches, no muscle pain  SKIN:  No change in skin, hair or nails.  NEUROLOGIC:  No Headaches, seizures or weakness.  PSYCHIATRIC:  No disorder of thought or mood.  ENDOCRINE:  No heat or cold intolerance  HEMATOLOGICAL:  No easy bruising or bleeding.   =======================================================  Allergies  No Known Allergies    Antibiotics:  cefTRIAXone   IVPB 1000 milliGRAM(s) IV Intermittent every 12 hours    Other medications:  ascorbic acid 500 milliGRAM(s) Oral daily  ferrous    sulfate 325 milliGRAM(s) Oral three times a day  folic acid 1 milliGRAM(s) Oral daily  influenza   Vaccine 0.5 milliLiter(s) IntraMuscular once  lidocaine   Patch 1 Patch Transdermal daily  multivitamin 1 Tablet(s) Oral daily  pantoprazole    Tablet 40 milliGRAM(s) Oral before breakfast  pantoprazole  Injectable 40 milliGRAM(s) IV Push once  polyethylene glycol 3350 17 Gram(s) Oral at bedtime  potassium chloride   Powder 40 milliEquivalent(s) Oral once  saccharomyces boulardii 250 milliGRAM(s) Oral two times a day  senna 2 Tablet(s) Oral at bedtime  thiamine 100 milliGRAM(s) Oral daily  ureNa 15 Gram(s) 15 Gram(s) Oral two times a day        ======================================================  Physical Exam:  ============  T(F): 97.6 (02 Dec 2019 07:45), Max: 98.9 (01 Dec 2019 21:17)  HR: 114 (02 Dec 2019 07:45)  BP: 110/76 (02 Dec 2019 07:45)  RR: 16 (02 Dec 2019 07:45)  SpO2: 100% (02 Dec 2019 07:45) (100% - 100%)  temp max in last 48H T(F): , Max: 99.1 (11-30-19 @ 20:39)    General:  No acute distress.   Eye: Pupils are equal, round and reactive to light, Extraocular movements are intact, Normal conjunctiva.  HENT: Normocephalic, Oral mucosa is moist, No pharyngeal erythema, No sinus tenderness.  Neck: Supple, No lymphadenopathy.  Respiratory: decreased breath sounds right lung  RIGHT chest tube x 1 to water seal  + right chest wound vac  Cardiovascular: Normal rate, Regular rhythm,   TRACE edema of legs  Gastrointestinal: Soft, Non-tender, Non-distended, Normal bowel sounds.  Genitourinary: No costovertebral angle tenderness.  Lymphatics: No lymphadenopathy neck,   Musculoskeletal: Normal range of motion, Normal strength.  Integumentary: No rash.  Neurologic: Alert, Oriented, No focal deficits, Cranial Nerves II-XII are grossly intact.  Psychiatric: Appropriate mood & affect.    =======================================================  11/22/19 blood cx negative x 2  11/22/19 - body fluid cx with Moderate Alpha hemolytic strep x 2 sets  Alpha hemolytic strep  		[facility]	E Test  Ceftriaxone  			S (0.19)   Clindamycin 	S    Erythromycin 	S   Penicillin 	S   Vancomycin 	S     ======================================================  Labs:                        8.3    19.33 )-----------( 442      ( 02 Dec 2019 06:11 )             26.1       WBC Count: 19.33 K/uL (12-02-19 @ 06:11)  WBC Count: 26.95 K/uL (12-01-19 @ 06:38)  WBC Count: 33.37 K/uL (11-30-19 @ 06:08)  WBC Count: 35.30 K/uL (11-29-19 @ 06:02)  WBC Count: 42.42 K/uL (11-28-19 @ 06:12)      12-02    132<L>  |  97<L>  |  3.0<L>  ----------------------------<  101  3.5   |  25.0  |  0.33<L>    Ca    7.7<L>      02 Dec 2019 06:11  Phos  2.8     12-02  Mg     1.8     12-02    TPro  4.6<L>  /  Alb  1.9<L>  /  TBili  <0.2<L>  /  DBili  x   /  AST  13 /  ALT  9   /  AlkPhos  188<H>  12-02      Culture - Acid Fast - Tissue w/Smear (collected 11-28-19 @ 20:40)  Source: .Tissue chest wound    Culture - Tissue with Gram Stain (collected 11-27-19 @ 20:12)  Source: .Tissue chest wound  Gram Stain (11-27-19 @ 21:27):    Few White blood cells    No organisms seen    Culture - Blood (collected 11-25-19 @ 05:20)  Source: .Blood  Final Report (11-30-19 @ 07:00):    No growth at 5 days.    Culture - Blood (collected 11-25-19 @ 05:19)  Source: .Blood  Final Report (11-30-19 @ 07:00):    No growth at 5 days.

## 2019-12-02 NOTE — DISCHARGE NOTE PROVIDER - NSDCCPCAREPLAN_GEN_ALL_CORE_FT
PRINCIPAL DISCHARGE DIAGNOSIS  Diagnosis: Pancreatic pleural effusion  Assessment and Plan of Treatment: follow up with thoracic surgery and gastroenterology team      SECONDARY DISCHARGE DIAGNOSES  Diagnosis: Jugular vein thrombosis  Assessment and Plan of Treatment: continue warfarin   get blood test for INR in 2 days and as needed , see your doctor to manage the warfarin dose and prescrition   need warfarin for 3 months    Diagnosis: Protein-calorie malnutrition  Assessment and Plan of Treatment: Protein-calorie malnutrition    Diagnosis: RONAK (acute kidney injury)  Assessment and Plan of Treatment: RONAK (acute kidney injury)

## 2019-12-03 DIAGNOSIS — G89.18 OTHER ACUTE POSTPROCEDURAL PAIN: ICD-10-CM

## 2019-12-03 LAB
ALBUMIN SERPL ELPH-MCNC: 1.7 G/DL — LOW (ref 3.3–5.2)
ALBUMIN SERPL ELPH-MCNC: 1.7 G/DL — LOW (ref 3.3–5.2)
ALP SERPL-CCNC: 182 U/L — HIGH (ref 40–120)
ALP SERPL-CCNC: 209 U/L — HIGH (ref 40–120)
ALT FLD-CCNC: 7 U/L — SIGNIFICANT CHANGE UP
ALT FLD-CCNC: 8 U/L — SIGNIFICANT CHANGE UP
ANION GAP SERPL CALC-SCNC: 10 MMOL/L — SIGNIFICANT CHANGE UP (ref 5–17)
ANION GAP SERPL CALC-SCNC: 12 MMOL/L — SIGNIFICANT CHANGE UP (ref 5–17)
AST SERPL-CCNC: 11 U/L — SIGNIFICANT CHANGE UP
AST SERPL-CCNC: 16 U/L — SIGNIFICANT CHANGE UP
BILIRUB DIRECT SERPL-MCNC: 0.1 MG/DL — SIGNIFICANT CHANGE UP (ref 0–0.3)
BILIRUB INDIRECT FLD-MCNC: 0.1 MG/DL — LOW (ref 0.2–1)
BILIRUB SERPL-MCNC: 0.2 MG/DL — LOW (ref 0.4–2)
BILIRUB SERPL-MCNC: 0.3 MG/DL — LOW (ref 0.4–2)
BUN SERPL-MCNC: 4 MG/DL — LOW (ref 8–20)
BUN SERPL-MCNC: 4 MG/DL — LOW (ref 8–20)
CALCIUM SERPL-MCNC: 7.8 MG/DL — LOW (ref 8.6–10.2)
CALCIUM SERPL-MCNC: 8.1 MG/DL — LOW (ref 8.6–10.2)
CHLORIDE SERPL-SCNC: 94 MMOL/L — LOW (ref 98–107)
CHLORIDE SERPL-SCNC: 97 MMOL/L — LOW (ref 98–107)
CO2 SERPL-SCNC: 25 MMOL/L — SIGNIFICANT CHANGE UP (ref 22–29)
CO2 SERPL-SCNC: 26 MMOL/L — SIGNIFICANT CHANGE UP (ref 22–29)
CREAT SERPL-MCNC: 0.32 MG/DL — LOW (ref 0.5–1.3)
CREAT SERPL-MCNC: 0.34 MG/DL — LOW (ref 0.5–1.3)
GLUCOSE SERPL-MCNC: 139 MG/DL — HIGH (ref 70–115)
GLUCOSE SERPL-MCNC: 96 MG/DL — SIGNIFICANT CHANGE UP (ref 70–115)
HCT VFR BLD CALC: 26.5 % — LOW (ref 34.5–45)
HCT VFR BLD CALC: 27 % — LOW (ref 34.5–45)
HGB BLD-MCNC: 8.3 G/DL — LOW (ref 11.5–15.5)
HGB BLD-MCNC: 8.7 G/DL — LOW (ref 11.5–15.5)
INR BLD: 5.57 RATIO — CRITICAL HIGH (ref 0.88–1.16)
MAGNESIUM SERPL-MCNC: 1.7 MG/DL — SIGNIFICANT CHANGE UP (ref 1.6–2.6)
MCHC RBC-ENTMCNC: 31.3 GM/DL — LOW (ref 32–36)
MCHC RBC-ENTMCNC: 31.6 PG — SIGNIFICANT CHANGE UP (ref 27–34)
MCHC RBC-ENTMCNC: 32.2 GM/DL — SIGNIFICANT CHANGE UP (ref 32–36)
MCHC RBC-ENTMCNC: 32.7 PG — SIGNIFICANT CHANGE UP (ref 27–34)
MCV RBC AUTO: 100.8 FL — HIGH (ref 80–100)
MCV RBC AUTO: 101.5 FL — HIGH (ref 80–100)
OSMOLALITY UR: 181 MOSM/KG — LOW (ref 300–1000)
PHOSPHATE SERPL-MCNC: 4.2 MG/DL — SIGNIFICANT CHANGE UP (ref 2.4–4.7)
PLATELET # BLD AUTO: 519 K/UL — HIGH (ref 150–400)
PLATELET # BLD AUTO: 521 K/UL — HIGH (ref 150–400)
POTASSIUM SERPL-MCNC: 5.3 MMOL/L — SIGNIFICANT CHANGE UP (ref 3.5–5.3)
POTASSIUM SERPL-MCNC: 5.5 MMOL/L — HIGH (ref 3.5–5.3)
POTASSIUM SERPL-SCNC: 5.3 MMOL/L — SIGNIFICANT CHANGE UP (ref 3.5–5.3)
POTASSIUM SERPL-SCNC: 5.5 MMOL/L — HIGH (ref 3.5–5.3)
PROT SERPL-MCNC: 4.7 G/DL — LOW (ref 6.6–8.7)
PROT SERPL-MCNC: 4.9 G/DL — LOW (ref 6.6–8.7)
PROTHROM AB SERPL-ACNC: 67.6 SEC — HIGH (ref 10–12.9)
RBC # BLD: 2.63 M/UL — LOW (ref 3.8–5.2)
RBC # BLD: 2.66 M/UL — LOW (ref 3.8–5.2)
RBC # FLD: 15 % — HIGH (ref 10.3–14.5)
RBC # FLD: 15.1 % — HIGH (ref 10.3–14.5)
SODIUM SERPL-SCNC: 130 MMOL/L — LOW (ref 135–145)
SODIUM SERPL-SCNC: 134 MMOL/L — LOW (ref 135–145)
SODIUM UR-SCNC: <30 MMOL/L — SIGNIFICANT CHANGE UP
WBC # BLD: 39.58 K/UL — HIGH (ref 3.8–10.5)
WBC # BLD: 43.01 K/UL — CRITICAL HIGH (ref 3.8–10.5)
WBC # FLD AUTO: 39.58 K/UL — HIGH (ref 3.8–10.5)
WBC # FLD AUTO: 43.01 K/UL — CRITICAL HIGH (ref 3.8–10.5)

## 2019-12-03 PROCEDURE — 99233 SBSQ HOSP IP/OBS HIGH 50: CPT

## 2019-12-03 PROCEDURE — 71045 X-RAY EXAM CHEST 1 VIEW: CPT | Mod: 26

## 2019-12-03 PROCEDURE — 99255 IP/OBS CONSLTJ NEW/EST HI 80: CPT

## 2019-12-03 PROCEDURE — 99232 SBSQ HOSP IP/OBS MODERATE 35: CPT

## 2019-12-03 RX ORDER — ACETAMINOPHEN 500 MG
975 TABLET ORAL EVERY 8 HOURS
Refills: 0 | Status: COMPLETED | OUTPATIENT
Start: 2019-12-03 | End: 2019-12-06

## 2019-12-03 RX ORDER — AMITRIPTYLINE HCL 25 MG
10 TABLET ORAL AT BEDTIME
Refills: 0 | Status: DISCONTINUED | OUTPATIENT
Start: 2019-12-03 | End: 2019-12-30

## 2019-12-03 RX ORDER — ALPRAZOLAM 0.25 MG
0.25 TABLET ORAL EVERY 8 HOURS
Refills: 0 | Status: DISCONTINUED | OUTPATIENT
Start: 2019-12-03 | End: 2019-12-10

## 2019-12-03 RX ORDER — HYDROMORPHONE HYDROCHLORIDE 2 MG/ML
2 INJECTION INTRAMUSCULAR; INTRAVENOUS; SUBCUTANEOUS EVERY 4 HOURS
Refills: 0 | Status: DISCONTINUED | OUTPATIENT
Start: 2019-12-03 | End: 2019-12-05

## 2019-12-03 RX ORDER — PHYTONADIONE (VIT K1) 5 MG
5 TABLET ORAL ONCE
Refills: 0 | Status: COMPLETED | OUTPATIENT
Start: 2019-12-03 | End: 2019-12-03

## 2019-12-03 RX ORDER — SODIUM POLYSTYRENE SULFONATE 4.1 MEQ/G
30 POWDER, FOR SUSPENSION ORAL ONCE
Refills: 0 | Status: DISCONTINUED | OUTPATIENT
Start: 2019-12-03 | End: 2019-12-03

## 2019-12-03 RX ORDER — MAGNESIUM SULFATE 500 MG/ML
2 VIAL (ML) INJECTION ONCE
Refills: 0 | Status: COMPLETED | OUTPATIENT
Start: 2019-12-03 | End: 2019-12-03

## 2019-12-03 RX ORDER — HYDROMORPHONE HYDROCHLORIDE 2 MG/ML
4 INJECTION INTRAMUSCULAR; INTRAVENOUS; SUBCUTANEOUS EVERY 4 HOURS
Refills: 0 | Status: DISCONTINUED | OUTPATIENT
Start: 2019-12-03 | End: 2019-12-04

## 2019-12-03 RX ORDER — TRAMADOL HYDROCHLORIDE 50 MG/1
50 TABLET ORAL EVERY 8 HOURS
Refills: 0 | Status: DISCONTINUED | OUTPATIENT
Start: 2019-12-03 | End: 2019-12-10

## 2019-12-03 RX ORDER — METHOCARBAMOL 500 MG/1
500 TABLET, FILM COATED ORAL EVERY 6 HOURS
Refills: 0 | Status: DISCONTINUED | OUTPATIENT
Start: 2019-12-03 | End: 2020-01-01

## 2019-12-03 RX ORDER — PIPERACILLIN AND TAZOBACTAM 4; .5 G/20ML; G/20ML
3.38 INJECTION, POWDER, LYOPHILIZED, FOR SOLUTION INTRAVENOUS ONCE
Refills: 0 | Status: COMPLETED | OUTPATIENT
Start: 2019-12-03 | End: 2019-12-03

## 2019-12-03 RX ORDER — VANCOMYCIN HCL 1 G
1000 VIAL (EA) INTRAVENOUS EVERY 8 HOURS
Refills: 0 | Status: DISCONTINUED | OUTPATIENT
Start: 2019-12-03 | End: 2019-12-05

## 2019-12-03 RX ORDER — GABAPENTIN 400 MG/1
100 CAPSULE ORAL THREE TIMES A DAY
Refills: 0 | Status: DISCONTINUED | OUTPATIENT
Start: 2019-12-03 | End: 2020-01-02

## 2019-12-03 RX ORDER — PIPERACILLIN AND TAZOBACTAM 4; .5 G/20ML; G/20ML
3.38 INJECTION, POWDER, LYOPHILIZED, FOR SOLUTION INTRAVENOUS EVERY 8 HOURS
Refills: 0 | Status: DISCONTINUED | OUTPATIENT
Start: 2019-12-03 | End: 2019-12-05

## 2019-12-03 RX ADMIN — OXYCODONE HYDROCHLORIDE 10 MILLIGRAM(S): 5 TABLET ORAL at 03:07

## 2019-12-03 RX ADMIN — Medication 10 MILLIGRAM(S): at 22:31

## 2019-12-03 RX ADMIN — GABAPENTIN 100 MILLIGRAM(S): 400 CAPSULE ORAL at 22:30

## 2019-12-03 RX ADMIN — OXYCODONE HYDROCHLORIDE 10 MILLIGRAM(S): 5 TABLET ORAL at 10:10

## 2019-12-03 RX ADMIN — TRAMADOL HYDROCHLORIDE 50 MILLIGRAM(S): 50 TABLET ORAL at 15:30

## 2019-12-03 RX ADMIN — LIDOCAINE 1 PATCH: 4 CREAM TOPICAL at 02:06

## 2019-12-03 RX ADMIN — Medication 975 MILLIGRAM(S): at 14:52

## 2019-12-03 RX ADMIN — Medication 325 MILLIGRAM(S): at 22:31

## 2019-12-03 RX ADMIN — OXYCODONE HYDROCHLORIDE 10 MILLIGRAM(S): 5 TABLET ORAL at 06:21

## 2019-12-03 RX ADMIN — HYDROMORPHONE HYDROCHLORIDE 4 MILLIGRAM(S): 2 INJECTION INTRAMUSCULAR; INTRAVENOUS; SUBCUTANEOUS at 20:27

## 2019-12-03 RX ADMIN — PIPERACILLIN AND TAZOBACTAM 200 GRAM(S): 4; .5 INJECTION, POWDER, LYOPHILIZED, FOR SOLUTION INTRAVENOUS at 14:44

## 2019-12-03 RX ADMIN — TRAMADOL HYDROCHLORIDE 50 MILLIGRAM(S): 50 TABLET ORAL at 22:30

## 2019-12-03 RX ADMIN — Medication 325 MILLIGRAM(S): at 06:20

## 2019-12-03 RX ADMIN — OXYCODONE HYDROCHLORIDE 10 MILLIGRAM(S): 5 TABLET ORAL at 13:09

## 2019-12-03 RX ADMIN — OXYCODONE HYDROCHLORIDE 10 MILLIGRAM(S): 5 TABLET ORAL at 02:07

## 2019-12-03 RX ADMIN — PANTOPRAZOLE SODIUM 40 MILLIGRAM(S): 20 TABLET, DELAYED RELEASE ORAL at 06:20

## 2019-12-03 RX ADMIN — LIDOCAINE 1 PATCH: 4 CREAM TOPICAL at 23:00

## 2019-12-03 RX ADMIN — Medication 250 MILLIGRAM(S): at 06:20

## 2019-12-03 RX ADMIN — OXYCODONE HYDROCHLORIDE 10 MILLIGRAM(S): 5 TABLET ORAL at 14:48

## 2019-12-03 RX ADMIN — Medication 650 MILLIGRAM(S): at 02:09

## 2019-12-03 RX ADMIN — Medication 250 MILLIGRAM(S): at 16:04

## 2019-12-03 RX ADMIN — CEFTRIAXONE 100 MILLIGRAM(S): 500 INJECTION, POWDER, FOR SOLUTION INTRAMUSCULAR; INTRAVENOUS at 06:22

## 2019-12-03 RX ADMIN — Medication 1 TABLET(S): at 11:12

## 2019-12-03 RX ADMIN — TRAMADOL HYDROCHLORIDE 50 MILLIGRAM(S): 50 TABLET ORAL at 23:00

## 2019-12-03 RX ADMIN — LIDOCAINE 1 PATCH: 4 CREAM TOPICAL at 11:12

## 2019-12-03 RX ADMIN — Medication 1 MILLIGRAM(S): at 11:12

## 2019-12-03 RX ADMIN — LIDOCAINE 1 PATCH: 4 CREAM TOPICAL at 17:29

## 2019-12-03 RX ADMIN — Medication 250 MILLIGRAM(S): at 17:31

## 2019-12-03 RX ADMIN — GABAPENTIN 100 MILLIGRAM(S): 400 CAPSULE ORAL at 13:17

## 2019-12-03 RX ADMIN — Medication 650 MILLIGRAM(S): at 00:40

## 2019-12-03 RX ADMIN — Medication 325 MILLIGRAM(S): at 11:12

## 2019-12-03 RX ADMIN — Medication 975 MILLIGRAM(S): at 22:30

## 2019-12-03 RX ADMIN — Medication 50 GRAM(S): at 12:32

## 2019-12-03 RX ADMIN — OXYCODONE HYDROCHLORIDE 10 MILLIGRAM(S): 5 TABLET ORAL at 11:03

## 2019-12-03 RX ADMIN — HYDROMORPHONE HYDROCHLORIDE 4 MILLIGRAM(S): 2 INJECTION INTRAMUSCULAR; INTRAVENOUS; SUBCUTANEOUS at 21:30

## 2019-12-03 RX ADMIN — Medication 250 MILLIGRAM(S): at 22:55

## 2019-12-03 RX ADMIN — TRAMADOL HYDROCHLORIDE 50 MILLIGRAM(S): 50 TABLET ORAL at 14:48

## 2019-12-03 RX ADMIN — OXYCODONE HYDROCHLORIDE 10 MILLIGRAM(S): 5 TABLET ORAL at 00:47

## 2019-12-03 RX ADMIN — Medication 5 MILLIGRAM(S): at 20:28

## 2019-12-03 RX ADMIN — Medication 975 MILLIGRAM(S): at 15:28

## 2019-12-03 RX ADMIN — Medication 500 MILLIGRAM(S): at 11:12

## 2019-12-03 RX ADMIN — Medication 975 MILLIGRAM(S): at 23:00

## 2019-12-03 NOTE — PROGRESS NOTE BEHAVIORAL HEALTH - NSBHFUPINTERVALHXFT_PSY_A_CORE
33 y/o  female c/o mild anxiety and sleeplessness due to physical symptoms in hospital. Psych was requested as a consult due to fear of "psychosis" due to family hx of Schizophrenia. The patient reported that she has burning when she breathes in her chest and all the pain is making it difficult to sleep at night. The patient is irritable but cooperative. There were no signs of "psychosis". The patient denies any suicidal ideation. Her only psychiatric complaint is  anxiety with difficulty sleeping. 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

## 2019-12-03 NOTE — PROGRESS NOTE ADULT - PROBLEM SELECTOR PLAN 1
1. Discontinue Oxycodone IR  - Offer Dilaudid 2/4mg PO q4hrs PRN moderate/severe pain, respectively  - Ok to offer Dilaudid 0.5mg IV q8hrs PRN severe BTP x 2-3 days  2. Modify Tylenol to 975mg PO TID x 3 days ATC  3. Add Robaxin 500mg PO q6hrs PRN spasms

## 2019-12-03 NOTE — PROGRESS NOTE ADULT - PROBLEM SELECTOR PLAN 4
Heme consult appreciated  Follow up with heme as out patient.  Will discharge home on Coumadin however INR now over 5 so will hold coumadin.

## 2019-12-03 NOTE — PROGRESS NOTE ADULT - PROBLEM SELECTOR PLAN 2
1. Tramadol 50mg TID for neuropathic pain  2. Continue Gabapentin TID as ordered  3. Add Elavil 10mg PO qhs for neuropathic pain

## 2019-12-03 NOTE — PROGRESS NOTE ADULT - SUBJECTIVE AND OBJECTIVE BOX
Subjective: "I still have a lot of pain in my back. I'm not doing great. Everything hurts." Patient also reports that she feels like she is hyperventilating at time to catch her breath.    Vital Signs:  Vital Signs Last 24 Hrs  T(C): 36.4 (12-03-19 @ 10:42), Max: 37 (12-03-19 @ 06:03)  T(F): 97.5 (12-03-19 @ 10:42), Max: 98.6 (12-03-19 @ 06:03)  HR: 118 (12-03-19 @ 10:42) (110 - 125)  BP: 143/71 (12-03-19 @ 10:42) (100/74 - 143/71)  RR: 18 (12-03-19 @ 10:42) (17 - 20)  SpO2: 95% (12-03-19 @ 10:42) (94% - 97%) on RA    Telemetry/Alarms:  (120s-130s overnight)    Relevant labs, radiology and Medications reviewed    Pertinent Physical Exam  General: WN/WD NAD  Neurology: A&Ox3, nonfocal, THACKER x 4  Respiratory: few rales on the right  CV: RRR, S1S2, no murmurs, rubs or gallops  Abdominal: Soft, NT, ND +BS, Last BM today, large and soft  Extremities: No edema, + peripheral pulses  #2 chest tube dark serous fluid without air leak    12-02 @ 07:01  -  12-03 @ 07:00  --------------------------------------------------------  IN:  Total IN: 0 mL    OUT:    Chest Tube: 120 mL    Voided: 1550 mL  Total OUT: 1670 mL    Total NET: -1670 mL      12-03 @ 07:01  -  12-03 @ 12:16  --------------------------------------------------------  IN:  Total IN: 0 mL    OUT:    Chest Tube: 20 mL  Total OUT: 20 mL    Total NET: -20 mL Subjective: "I still have a lot of pain in my back. I'm not doing great. Everything hurts." Patient also reports that she feels like she is hyperventilating at time to catch her breath.    Vital Signs:  Vital Signs Last 24 Hrs  T(C): 36.4 (12-03-19 @ 10:42), Max: 37 (12-03-19 @ 06:03)  T(F): 97.5 (12-03-19 @ 10:42), Max: 98.6 (12-03-19 @ 06:03)  HR: 118 (12-03-19 @ 10:42) (110 - 125)  BP: 143/71 (12-03-19 @ 10:42) (100/74 - 143/71)  RR: 18 (12-03-19 @ 10:42) (17 - 20)  SpO2: 95% (12-03-19 @ 10:42) (94% - 97%) on RA    Telemetry/Alarms:  (120s-130s overnight)    Relevant labs, radiology and Medications reviewed    Pertinent Physical Exam  General: WN/WD NAD  Neurology: A&Ox3, nonfocal, THACKER x 4  Respiratory: few rales on the right  CV: RRR, S1S2, no murmurs, rubs or gallops  Abdominal: Soft, NT, ND +BS, Last BM today, large and soft  Extremities: mild pedal edema, + peripheral pulses, tender on palpation throughout  #2 chest tube dark serous fluid without air leak    12-02 @ 07:01  -  12-03 @ 07:00  --------------------------------------------------------  IN:  Total IN: 0 mL    OUT:    Chest Tube: 120 mL    Voided: 1550 mL  Total OUT: 1670 mL    Total NET: -1670 mL      12-03 @ 07:01  -  12-03 @ 12:16  --------------------------------------------------------  IN:  Total IN: 0 mL    OUT:    Chest Tube: 20 mL  Total OUT: 20 mL    Total NET: -20 mL

## 2019-12-03 NOTE — CONSULT NOTE ADULT - CONSULT REASON
Multiple medical issues , empyema , multifocal PNA , pain all over her body ,  medicine asked to evaluate

## 2019-12-03 NOTE — PROGRESS NOTE ADULT - ASSESSMENT
34y old Female transferred to [facility] for management of empyema. Now s/p Thoracic surgery with wound vac placement. She has a right sided chest tube and ongoing post-op pain. Found awake, supine in bed, NAD. She appears fatigued. She is s/p wound VAC change and cultures this morning for continued purulent drainage; WBC 43 this AM. Discussed rotation to oral Dilaudid with addition of Elavil QHS. Patient and team agreeable to plan.

## 2019-12-03 NOTE — CHART NOTE - NSCHARTNOTEFT_GEN_A_CORE
CT Surgery    Procedure: Wound VAC change    Patient positioned left lateral position in bed. Wound VAC dressing removed from right lateral chest, revealing a 3x0.5x1 cm open wound with 20 % granluation tissue. No bleeding encountered. No tunneling noted. Further findings: scant mucoid drainage noted from site with sloughing at base of wound. Right existing chest tube also noted to have mucoid yellow-tan drainage from entrance site. Right removed chest tube site with prolene stitch and scant serous drainage.    Chloraprep used on skin surrounding wound. A BLACK SPONGE was carved to the shape and depth of wound and secured to wound with a clear adhesive dressing. Wound VAC connection attached to small opening in top of dressing. Wound VAC suction initiated with good suction, seal and no leak. All edges of the wound are covered. The patient tolerated the procedure well.     Plan for next VAC change in 3 days.

## 2019-12-03 NOTE — PROGRESS NOTE ADULT - SUBJECTIVE AND OBJECTIVE BOX
HPI:  Patient is a 34y old  Female who presents with a chief complaint of SOB (26 Nov 2019 15:02)  She was transferred from an OSH for management of empyema. MICU team requested assistance with her pain regimen. She was being offered Percocet PRN. Tramadol, Toradol and Methocarbamol suggested as alternatives to Percocet. Patient is now s/p VATS, lung decortication, pleural biopsy. She was changed from IV PCA to Oxycodone IR PRN, with good analgesia. Per MAR, she continues to require almost every scheduled dose of Oxycodone IR.      VERBAL REPORT: "I still have a lot of pain in my back."  Patient continues to report insufficient analgesia with use of Oxycodone IR.   PAIN SCORE: 5-6/10 at rest                   SCALE USED: VNRS    Allergies  No Known Allergies      PAST MEDICAL & SURGICAL HISTORY:      MEDICATIONS  (STANDING):  acetaminophen   Tablet .. 975 milliGRAM(s) Oral every 8 hours  amitriptyline 10 milliGRAM(s) Oral at bedtime  ascorbic acid 500 milliGRAM(s) Oral daily  ferrous    sulfate 325 milliGRAM(s) Oral three times a day  folic acid 1 milliGRAM(s) Oral daily  gabapentin 100 milliGRAM(s) Oral three times a day  influenza   Vaccine 0.5 milliLiter(s) IntraMuscular once  lidocaine   Patch 1 Patch Transdermal daily  multivitamin 1 Tablet(s) Oral daily  pantoprazole    Tablet 40 milliGRAM(s) Oral before breakfast  piperacillin/tazobactam IVPB. 3.375 Gram(s) IV Intermittent once  piperacillin/tazobactam IVPB.. 3.375 Gram(s) IV Intermittent every 8 hours  polyethylene glycol 3350 17 Gram(s) Oral at bedtime  saccharomyces boulardii 250 milliGRAM(s) Oral two times a day  senna 2 Tablet(s) Oral at bedtime  sodium polystyrene sulfonate Suspension 30 Gram(s) Oral once  thiamine 100 milliGRAM(s) Oral daily  traMADol 50 milliGRAM(s) Oral every 8 hours  vancomycin  IVPB 1000 milliGRAM(s) IV Intermittent every 8 hours    MEDICATIONS  (PRN):  ALPRAZolam 0.25 milliGRAM(s) Oral every 8 hours PRN anxiety  HYDROmorphone   Tablet 2 milliGRAM(s) Oral every 4 hours PRN Moderate Pain (4 - 6)  HYDROmorphone   Tablet 4 milliGRAM(s) Oral every 4 hours PRN Severe Pain (7 - 10)      PHYSICAL EXAM:  GENERAL: NAD, well-developed  HEAD:  Atraumatic, Normocephalic  EYES: EOMI, PERRLA, conjunctiva and sclera clear  NERVOUS SYSTEM:  Alert & Oriented X3, Good concentration; Motor Strength 5/5 B/L upper and lower extremities  CHEST/LUNG: Clear speech, no SOB evident at rest; +right sided chest tube  ABDOMEN: Nondistended; Bowel sounds present  SKIN: resolving hematoma on the interior aspect of right elbow      Vital Signs Last 24 Hrs  T(C): 36.4 (03 Dec 2019 10:42), Max: 37 (03 Dec 2019 06:03)  T(F): 97.5 (03 Dec 2019 10:42), Max: 98.6 (03 Dec 2019 06:03)  HR: 118 (03 Dec 2019 10:42) (110 - 125)  BP: 143/71 (03 Dec 2019 10:42) (100/74 - 143/71)  BP(mean): --  RR: 18 (03 Dec 2019 10:42) (17 - 20)  SpO2: 95% (03 Dec 2019 10:42) (94% - 97%)                          8.3    39.58 )-----------( 521      ( 03 Dec 2019 10:10 )             26.5       LIVER FUNCTIONS - ( 03 Dec 2019 06:24 )  Alb: 1.7 g/dL / Pro: 4.7 g/dL / ALK PHOS: 182 U/L / ALT: 7 U/L / AST: 11 U/L / GGT: x             PT/INR - ( 03 Dec 2019 10:10 )   PT: 67.6 sec;   INR: 5.57 ratio    PTT - ( 02 Dec 2019 12:47 )  PTT:43.5 sec      Pain Service   Text Page via [employer]  PIN: [phone]

## 2019-12-03 NOTE — PROGRESS NOTE ADULT - ASSESSMENT
34 year old female [place] [facility] [place] [facility] on [place]22 for complaints of weakness, 25 pound weight loss & flu like symptoms & found to have a large right pleural effusion. Pt. has a PMH of empyema with group A hemolytic strep & has 4-5 drinks of alcohol per day.  On 11/23 CT of the chest/abd/pelvis indicated a clot in the Right IJ. Pt. on heparin bridge to coumadin. CT abdomen shows cystic mass in body/tail of pancreas, now concern for postop ileus.    Post-op course:  Pt s/p right VATS decort & washout debridement of CT site & VAC placement on 11/27/19.       Coumadin started for RIJ thrombus x 2 days then INR spike > held, postop anemia > on iron, folate, vit c. Today, patient had elevated WBC, hyperkalemia, hypomagnesemia and reactive thrombocytosis. Patient remains afebrile and pain complaints appear constant. Psych and Pain evaluation requested. Kayexalate ordered for hyperkalemia but held for large BM and recheck bmp.

## 2019-12-03 NOTE — CONSULT NOTE ADULT - SUBJECTIVE AND OBJECTIVE BOX
PMD : None ( last seen by MD 17 yrs ago     Patient is a 34y old  Female who presents with a chief complaint of pleural effusion .   Patient seen and examined , chart reviewed . Patient c/o R sided chest pain , chronic loose stools for about 1 month , non bloody , loose , today had 4 loose BM , C/O B/L  feet swelling  and pain , urine pinkish this am , vac is leaking     HPI:  34 yrs female with no significant PMH , last time seen by doctor 17 yrs ago , smoker , alcohol dependent , lives with mother , has boyfriend , unemployed .   Comes in to [facility] [place]   with weakness, 25lb weight loss of several months, flu like symptoms, on work up found to have a large right pleural effusion.  Pulmonology contact for thoracentesis, 1.5 L of dark serous fluid was removed.  Chest tube placed shortly thereafter, 3 liters was removed at that point in time. Patient had some abnormalities in their lab work, found to have a leukocytosis with wbc of 40, platelet count of 793, Na 120, chloride 73, lactate of 4.7, amylase 268,  and CTA imaging also revealed a 2.1cm partially cystic mass in the body/tail of the pancreas with possible upstream ductal dilatation.  patient had CT chest/abdomen/pelvis and was also found to have a clot in the right IJ and was started on a heparin gtt. Patient does state that she has had a recent cough, and complains of right sided neck pain.  Patient transferred to [facility]. (2019 00:10)  Patient was in usual state till 1 month ago when she started having pain all over her body , back pain / shoulders , then started feeling sob , states having loose stools , appetite wax and wane and her wt too , C/O some abdominal discomfort on and off for last couple months .   Today patient with elevated WBC , noted increased drainage around Vac , c/o b/l  feet and swelling , loose frequent BM       PAST MEDICAL & SURGICAL HISTORY:    Smoker , vapor user , alcohol dependance     Social History:  Tobacco - smokes 1'4 PPD X 15 yrs , use Vapor   ETOH - drinks 4 drinks ( wine / vodka )   Illicit drug abuse - denies    FAMILY HISTORY:    Father with stomach cancer ,   Mother with CVA , bone tumor       Allergies    No Known Allergies    Intolerances    Ensure TID (Unknown)      HOME MEDICATIONS :   None   REVIEW OF SYSTEMS:    As above , all other systems reviewed and are negative     MEDICATIONS  (STANDING):  acetaminophen   Tablet .. 975 milliGRAM(s) Oral every 8 hours  amitriptyline 10 milliGRAM(s) Oral at bedtime  ascorbic acid 500 milliGRAM(s) Oral daily  ferrous    sulfate 325 milliGRAM(s) Oral three times a day  folic acid 1 milliGRAM(s) Oral daily  gabapentin 100 milliGRAM(s) Oral three times a day  influenza   Vaccine 0.5 milliLiter(s) IntraMuscular once  lidocaine   Patch 1 Patch Transdermal daily  multivitamin 1 Tablet(s) Oral daily  pantoprazole    Tablet 40 milliGRAM(s) Oral before breakfast  piperacillin/tazobactam IVPB.. 3.375 Gram(s) IV Intermittent every 8 hours  polyethylene glycol 3350 17 Gram(s) Oral at bedtime  saccharomyces boulardii 250 milliGRAM(s) Oral two times a day  senna 2 Tablet(s) Oral at bedtime  thiamine 100 milliGRAM(s) Oral daily  traMADol 50 milliGRAM(s) Oral every 8 hours  vancomycin  IVPB 1000 milliGRAM(s) IV Intermittent every 8 hours    MEDICATIONS  (PRN):  ALPRAZolam 0.25 milliGRAM(s) Oral every 8 hours PRN anxiety  HYDROmorphone   Tablet 2 milliGRAM(s) Oral every 4 hours PRN Moderate Pain (4 - 6)  HYDROmorphone   Tablet 4 milliGRAM(s) Oral every 4 hours PRN Severe Pain (7 - 10)  methocarbamol 500 milliGRAM(s) Oral every 6 hours PRN Muscle Spasm      Vital Signs Last 24 Hrs  T(C): 36.8 (03 Dec 2019 15:14), Max: 37 (03 Dec 2019 06:03)  T(F): 98.2 (03 Dec 2019 15:14), Max: 98.6 (03 Dec 2019 06:03)  HR: 122 (03 Dec 2019 15:14) (110 - 125)  BP: 96/62 (03 Dec 2019 15:14) (96/62 - 143/71)  BP(mean): --  RR: 18 (03 Dec 2019 15:14) (18 - 20)  SpO2: 93% (03 Dec 2019 15:14) (93% - 95%)    PHYSICAL EXAM:    GENERAL: NAD, well-groomed, well-developed  HEAD:  Atraumatic, Normocephalic  EYES: EOMI, PERRLA, conjunctiva and sclera clear  NECK: Supple, No JVD, Normal thyroid  NERVOUS SYSTEM:  Alert & Oriented X3,  CHEST/LUNG: R base rales + , no  rhonchi, wheezing, or rubs , R chest tube + . Wound vac+ , leaking   HEART: Regular rate and rhythm; No murmurs, rubs, or gallops  ABDOMEN: Soft, mid abdominal tenderness _ , no rebound , no guarding  Nondistended; Bowel sounds present  EXTREMITIES:  2+ Peripheral Pulses, No clubbing, cyanosis, b/l feet non pitting swelling up to ankles / warmer than other part of body , very tender to touch , B/L UE ecchymoses   LYMPH: No lymphadenopathy noted  SKIN: No rashes or lesions    LABS:                        8.3    Sodium, Random Urine (19 @ 04:19)    Sodium, Random Urine: <30: Reference Ranges have NOT been established for random urine analytes due  to variability in fluid intake and concentration. mmol/L    39.58 )-----------( 521      ( 03 Dec 2019 10:10 )             26.5     12-    130<L>  |  94<L>  |  4.0<L>  ----------------------------<  139<H>  5.3   |  26.0  |  0.32<L>    Ca    7.8<L>      03 Dec 2019 13:28  Phos  4.2       Mg     1.7         TPro  4.9<L>  /  Alb  1.7<L>  /  TBili  0.2<L>  /  DBili  0.1  /  AST  16  /  ALT  8   /  AlkPhos  209<H>      PT/INR - ( 03 Dec 2019 10:10 )   PT: 67.6 sec;   INR: 5.57 ratio         PTT - ( 02 Dec 2019 12:47 )  PTT:43.5 sec  C-Reactive Protein, Serum (19 @ 05:29)    C-Reactive Protein, Serum: 17.92 mg/dL  Rapid HIV-1/2 Antibody (19 @ 14:08)    Rapid HIV-1/2 Antibody: Nonreact  Thyroid Stimulating Hormone, Serum (19 @ 05:29)    Thyroid Stimulating Hormone, Serum: 4.76 uIU/mL    Osmolality, Random Urine (19 @ 04:20)    Osmolality, Random Urine: 181: test repeated mosm/kg    Osmolality, Random Urine (19 @ 22:15)    Osmolality, Random Urine: 339 mosm/kg    Culture - Acid Fast - Tissue w/Smear (19 @ 20:40)    Specimen Source: .Tissue chest wound    Acid Fast Bacilli Smear:   No acid fast bacilli seen by fluorochrome stain    Culture Results:   Culture is being performed.    Culture - Tissue with Gram Stain (19 @ 20:12)    Gram Stain:   Few White blood cells  No organisms seen    Specimen Source: .Tissue chest wound    Culture Results:   No growth at 5 days.    Culture - Tissue with Gram Stain (19 @ 20:12)    Gram Stain:   Few White blood cells  No organisms seen    Specimen Source: .Tissue chest wound    Culture Results:   No growth at 5 days.    Culture - Blood in AM (19 @ 05:20)    Specimen Source: .Blood    Culture Results:   No growth at 5 days.    Culture - Blood (19 @ 05:19)    Specimen Source: .Blood    Culture Results:   No growth at 5 days.      RADIOLOGY & ADDITIONAL STUDIES:    < from: Xray Chest 1 View- PORTABLE-Routine (19 @ 06:07) >     EXAM:  XR CHEST PORTABLE ROUTINE 1V                          PROCEDURE DATE:  2019          INTERPRETATION:  Portable chest radiograph        CLINICAL INFORMATION:   RIGHT effusion. Follow-up.    TECHNIQUE:  Portable  AP view of the chest was obtained.    COMPARISON: 2019 available for review.    FINDINGS: There is no significant change.  The lungs  show RIGHT chest tube tip overlying the upper hemithorax.   There is a RIGHT lower lobe airspace consolidation. There is a LEFT lower   lobe retrocardiac airspace consolidation. Upper lobes clear. Heart size   normal.      Visualized osseous structures are intact.        IMPRESSION:   RIGHT chest tube in place with bibasilar infiltrates..      AISHWARYA BUCKLEY M.D., ATTENDING RADIOLOGIST  This document has been electronically signed. Dec  3 2019  6:38AM    < end of copied text >    < from: US Duplex Venous Upper Ext Ltd, Right (19 @ 13:13) >   EXAM:  US DPLX UPR EXT VEINS LTD RT                          PROCEDURE DATE:  2019          INTERPRETATION:  CLINICAL INFORMATION: History of IJ clot.    COMPARISON: None available.    TECHNIQUE: Duplex sonography of the RIGHT UPPER extremity veins with   color and spectral Doppler, with and without compression.      FINDINGS:    Distal right internal jugular vein is noncompressible suggesting   thrombus, with partial flow identified.    The right subclavian, axillary and brachial veins are patent and   compressible where applicable.  The basilic and cephalic veins   (superficial veins) are patent and without thrombus.     Doppler examination shows normal spontaneous and phasic flow.    IMPRESSION:     Distal right internal jugular vein is noncompressible suggesting   thrombus, with partial flow identified.    These findings were discussed with Dr. Carrera at 2019 3:50 PM by Dr. Ryan Griggs with read back confirmation.        RYAN GRIGGS   This document has been electronically signed. 2019  3:50PM    < end of copied text >    < from: MR MRCP w/wo IV Cont (19 @ 15:50) >     EXAM:  MR MRCP WAW IC                          PROCEDURE DATE:  2019          INTERPRETATION:  MR CHOLANGIOPANCREATOGRAPHY WITHOUT AND WITH IV CONTRAST    CLINICAL INFORMATION: Pancreatic Cyst/Pseudocyst    TECHNIQUE: Multiplanar T1-weighted, T2-weighted, and diffusion weighted   sequences were obtained of the abdomen, including dynamic post-contrast   T1-weighted sequences.  3D heavily T2-weighted thin-section MRCP was also   performed.    Field Strength: 1.5T    Contrast: 10 cc Gadavist.    COMPARISON: Chest CT 2019.    FINDINGS:    LOWER CHEST: Please refer to separately reported chest CT from one day   prior.    LIVER: Diffuse steatosis.  BILE DUCTS: Nondilated. Normal distal tapering. No filling defect. No   intrahepatic dilatation.  GALLBLADDER: Normal.  SPLEEN: Normal.    PANCREAS: Nonenhancing cystic lesion in the mid body is suggested   measuring 1.1 cm (1104; 47). Additional 2 nonenhancing lesions in the   head measuring 0.8 cm and 0.6 cm showing focal restricted diffusion   (1104; 60, 10; 49) are indeterminate. No main duct dilatation.    ADRENALS: Normal.  KIDNEYS/URETERS: Symmetric nephrograms. No hydronephrosis. Mild   nonspecific left perinephric stranding again noted.    VISUALIZED PORTIONS:    BOWEL: Nobowel-related abnormality.  PERITONEUM: No ascites.  VESSELS:  Normal caliber aorta.  RETROPERITONEUM/LYMPH NODES: No adenopathy or hematoma.    ABDOMINAL WALL: Normal.  BONES: No aggressive lesion.    IMPRESSION:    Study limited by motion.    Indeterminate pancreatic lesions as above without main duct dilatation.   Repeat short interval follow-up exam is recommended when the patient is   able to maintain adequate breath holds. The patient's underlying lung   disease may be prohibitive.         SARAH BAÑEULOS M.D., ATTENDING RADIOLOGIST  This document has been electronically signed. 2019  5:00PM        < end of copied text >    < from: CT Chest No Cont (19 @ 01:14) >   EXAM:  CT CHEST                          PROCEDURE DATE:  2019          INTERPRETATION:  CLINICAL INFORMATION: Pleural effusion follow-up    COMPARISON: None.    PROCEDURE:   CT of the Chest was performed without intravenous contrast.  Sagittal and coronal reformats were performed.      FINDINGS:    LUNGS AND AIRWAYS: Patent central airways.  Right upper, middle, and   lower lobe consolidations. Left upper and lower lobe airspace opacities.    PLEURA: Small right hydropneumothorax. Rightlateral chest tube. Trace   left pleural fluid.    MEDIASTINUM AND TETO: Several prominent lymph nodes.    VESSELS: Within normal limits.    HEART: Heart size is normal. Small pericardial effusion.    CHEST WALL AND LOWER NECK: Right lateral chest wall subcutaneous edema.    VISUALIZED UPPER ABDOMEN: Trace ascites. Nonspecific perinephric   stranding.    BONES: Within normal limits.    IMPRESSION:     Left lung opacities and right lung consolidations, suspicious for   multifocal pneumonia.  Smallright hydropneumothorax. Right chest tube noted.      TERESA HARRINGTON M.D., ATTENDING RADIOLOGIST  This document has been electronically signed. 2019  8:52AM        < end of copied text >    < from: TTE Echo Complete w/Doppler (19 @ 12:44) >  EXAM:  ECHO TRANSTHORACIC COMP W DOPP      PROCEDURE DATE:  2019   .      INTERPRETATION:  REPORT:    TRANSTHORACIC ECHOCARDIOGRAM REPORT         Patient Name:   HERACLIO MOLINA Patient Location: Inpatient  Medical Rec #:  [id number]      Accession #:      [id number]  Account #:                    Height:           66.1 in 168.0 cm  YOB: 1985     Weight:           121.3 lb 55.00 kg  Patient Age:    34 years      BSA:              1.62 m²  Patient Gender: F             BP:        83/51 mmHg       Date of Exam:        2019 12:44:26 PM  Sonographer:         Eris Saucedo  Referring Physician: Magdi Castanon MD    Procedure:     2D Echo/Doppler/Color Doppler Complete.  Indications:   Palpitations - R00.2  Diagnosis:  Palpitations - R00.2  Study Details: Technically adequate study.         2D AND M-MODE MEASUREMENTS (normal ranges within parentheses):  Left                 Normal   Aorta/Left            Normal  Ventricle:                    Atrium:  IVSd (2D): 0.95  (0.7-1.1) Left Atrium  2.54 cm (1.9-4.0)                 cm             (2D):  LVPWd (2D):   0.98  (0.7-1.1) LA Volume     23.6                 cm             Index         ml/m²  LVIDd (2D):   3.61  (3.4-5.7) Right Ventricle:                 cm             RVd (2D):        2.77 cm  LVIDs (2D):   2.48                 cm  LV FS (2D):   31.3   (>25%)                  %  Relative Wall 0.55   (<0.42)  Thickness    LV SYSTOLIC FUNCTION BY 2D PLANIMETRY (MOD):  EF-Biplane: 80 %    LV DIASTOLIC FUNCTION:  MV Peak E: 0.68 m/s e', MV Shannon: 0.13 m/s  MV Peak A: 0.86 m/s E/e' Ratio: 5.23  E/A Ratio: 0.80    SPECTRAL DOPPLER ANALYSIS (where applicable):  Aortic Valve: AoV Max Bunny: 1.15 m/s AoV Peak P.3 mmHg AoV Mean P.7 mmHg    LVOT Vmax:0.94 m/s LVOT VTI: 0.175 m LVOT Diameter:    AoV Area, Vmax:  AoV Area, VTI:  AoV Area, Vmn:  Ao VTI: 0.194     PHYSICIAN INTERPRETATION:  Left Ventricle: The left ventricular internal cavity size is normal.  Global LV systolic function was hyperdynamic. Left ventricular ejection   fraction, by visual estimation, is >75%. Spectral Doppler shows impaired   relaxation pattern of left ventricular myocardial filling (Grade I   diastolic dysfunction).  Right Ventricle: Normal right ventricular size and function.  Left Atrium: Normal left atrial size.  Right Atrium: Normal right atrial size.  Pericardium: A small pericardial effusion is present. The pericardial   effusion is posterior to the left ventricle. There is no evidence of   cardiac tamponade. There is a small pleural effusion in the right lateral   region.  Mitral Valve: Mild thickening of the anterior and posterior mitral valve   leaflets. Trace mitral valve regurgitation is seen.  Tricuspid Valve: Trivial tricuspid regurgitation is visualized.  Aortic Valve: The aortic valve is trileaflet. No evidence of aortic valve   regurgitation is seen.  Pulmonic Valve: Structurally normal pulmonic valve, with normal leaflet   excursion. Trace pulmonic valve regurgitation.  Aorta: The aorticroot is normal in size and structure.  Pulmonary Artery: The main pulmonary artery is normal in size.  Venous: The inferior vena cava was normal sized, with respiratory size   variation greater than 50%.       Summary:   1. Left ventricular ejection fraction, by visual estimation, is >75%.   2. Hyperdynamic global left ventricular systolic function.   3. Spectral Doppler shows impaired relaxation pattern of left   ventricular myocardial filling (Grade I diastolic dysfunction).   4. Mild thickening of the anterior and posterior mitral valve leaflets.   5. Trace mitral valve regurgitation.    MD Joseph Electronically signed on 2019 at 6:26:21 PM              *** Final ***         NAVEED HOLDEN MD  This document has been electronically signed. 2019 12:44PM        < end of copied text >

## 2019-12-03 NOTE — PROGRESS NOTE ADULT - SUBJECTIVE AND OBJECTIVE BOX
[facility] DIVISION OF KIDNEY DISEASES AND HYPERTENSION -- FOLLOW UP NOTE  --------------------------------------------------------------------------------  Chief Complaint:  Hyponatremia    24 hour events/subjective:  Seen/examined;   Pt refusing urea; did not take any of her meds besides pain meds last 48 hours  Yelling/crying in room; drinking water in excess;       PAST HISTORY  --------------------------------------------------------------------------------  No significant changes to PMH, PSH, FHx, SHx, unless otherwise noted    ALLERGIES & MEDICATIONS  --------------------------------------------------------------------------------  Allergies    No Known Allergies    Intolerances    Ensure TID (Unknown)    Standing Inpatient Medications  ascorbic acid 500 milliGRAM(s) Oral daily  cefTRIAXone   IVPB 1000 milliGRAM(s) IV Intermittent every 12 hours  ferrous    sulfate 325 milliGRAM(s) Oral three times a day  folic acid 1 milliGRAM(s) Oral daily  influenza   Vaccine 0.5 milliLiter(s) IntraMuscular once  lidocaine   Patch 1 Patch Transdermal daily  magnesium oxide 400 milliGRAM(s) Oral three times a day with meals  multivitamin 1 Tablet(s) Oral daily  pantoprazole    Tablet 40 milliGRAM(s) Oral before breakfast  polyethylene glycol 3350 17 Gram(s) Oral at bedtime  potassium chloride   Powder 40 milliEquivalent(s) Oral once  saccharomyces boulardii 250 milliGRAM(s) Oral two times a day  senna 2 Tablet(s) Oral at bedtime  thiamine 100 milliGRAM(s) Oral daily  ureNa 15 Gram(s) 15 Gram(s) Oral two times a day    PRN Inpatient Medications  acetaminophen   Tablet .. 650 milliGRAM(s) Oral every 6 hours PRN  oxyCODONE    IR 5 milliGRAM(s) Oral every 3 hours PRN  oxyCODONE    IR 10 milliGRAM(s) Oral every 3 hours PRN      REVIEW OF SYSTEMS  --------------------------------------------------------------------------------  Unable to obtain; crying/yelling;    VITALS/PHYSICAL EXAM  --------------------------------------------------------------------------------    Vital Signs Last 24 Hrs  T(C): 36.4 (19 @ 10:42), Max: 37 (19 @ 06:03)  T(F): 97.5 (19 @ 10:42), Max: 98.6 (19 @ 06:03)  HR: 118 (19 @ 10:42) (110 - 125)  BP: 143/71 (19 @ 10:42) (100/74 - 143/71)      19 @ 07:01  -  19 @ 07:00  --------------------------------------------------------  IN: 121 mL / OUT: 460 mL / NET: -339 mL      Physical Exam:  Gen: NAD   HEENT: PERRL, supple neck, clear oropharynx  Pulm: crackles bilaterally. chest tube to water seal. +wound vac  CV: RRR, S1S2; no rub  Back: No spinal or CVA tenderness; no sacral edema  Abd: +BS, soft, nontender/nondistended  : No suprapubic tenderness  UE: Warm, FROM, no clubbing, intact strength; no edema; no asterixis  LE: Warm, FROM, no clubbing, intact strength; no edema  Neuro: No focal deficits, intact gait  Psych: abnormal affect;  Skin: Warm, without rashes      LABS/STUDIES  --------------------------------------------------------------------------------    134<L>  |  97<L>  |  4.0<L>  ----------------------------<  96     Ca:8.1<L> (03 Dec 2019 06:24)  5.5<H>   |  25.0   |  0.34<L>      eGFR if Non : 143  eGFR if : 166    TPro  4.7<L>  /  Alb  1.7<L>  /  TBili  0.3<L>  /  DBili  x      /  AST  11     /  ALT  7      /  AlkPhos  182<H>  03 Dec 2019 06:24                               8.3<L>  39.58<H> )-----------( 521<H>    ( 03 Dec 2019 10:10 )                    26.5<L>    Phos:4.2 mg/dL M.7 mg/dL PTH:-- Uric acid:-- Serum Osm:--  Ferritin:-- Iron:-- TIBC:-- Tsat:--  B12:-- TSH:-- ( @ 06:24)

## 2019-12-03 NOTE — PROGRESS NOTE ADULT - ASSESSMENT
34 year old female with no known major comorbidities who is being treated for right pleural effusion/empyema/multifocal pna found to have a RIJ thrombus currently on heparin drip.    1) RIJ venous thrombosis - History c/w a provoked event that occurred in context of her severe and complex lung illness/infection/empyema that may have led to developing the thrombus. I agree with continuing anticoagulation and for a duration of at least 3 months.  Her INR inccreased quickly likely due to relative vit K deficiency. would give oral vitk. resume heparin gtt and overlap heparin and coumadin for at least 5 days with goal INR 2-3  Her pain seems to be more related to her thoracic issues, chest tube, positioning. On pain regimen as per CT team.    2) Anemia - multifactorial, blood loss, infection. Transfuse if hgb<7.0.    3) Leukocytosis: reactive. ID following. will trend. wound vac being changed today per nurse.

## 2019-12-03 NOTE — PROGRESS NOTE ADULT - SUBJECTIVE AND OBJECTIVE BOX
[facility]  INFECTIOUS DISEASES AND INTERNAL MEDICINE at [place]  =======================================================  Carlos Alberto Soto MD  Diplomates American Board of Internal Medicine and Infectious Diseases  Telephone [phone]  Fax            [phone]  =======================================================    [facility]-[id number]  HERACLIO MOLINA   follow up:  infected pleural effusion, empyema    s/p VATS on 11/27/19  cultures from OR in process, negative so far    labs reviewed.  WBC markedly elevated today  no fevers  moving bowels  denies dysuria.        I have personally reviewed the labs and data; pertinent labs and data are listed in this note; please see below.   =======================================================    REVIEW OF SYSTEMS:  CONSTITUTIONAL:  No Fever or chills  HEENT:  No diplopia or blurred vision.  No earache, sore throat or runny nose.  CARDIOVASCULAR:  No pressure, squeezing, strangling, tightness, heaviness or aching about the chest, neck, axilla or epigastrium.  RESPIRATORY:  No cough, shortness of breath  GASTROINTESTINAL:  No nausea, vomiting or diarrhea.  GENITOURINARY:  No dysuria, frequency or urgency. No Blood in urine  MUSCULOSKELETAL:  no joint aches, no muscle pain  SKIN:  No change in skin, hair or nails.  NEUROLOGIC:  No Headaches, seizures or weakness.  PSYCHIATRIC:  No disorder of thought or mood.  ENDOCRINE:  No heat or cold intolerance  HEMATOLOGICAL:  No easy bruising or bleeding.   =======================================================  Allergies  No Known Allergies    Antibiotics:  piperacillin/tazobactam IVPB. 3.375 Gram(s) IV Intermittent once  piperacillin/tazobactam IVPB.. 3.375 Gram(s) IV Intermittent every 8 hours  vancomycin  IVPB 1000 milliGRAM(s) IV Intermittent every 8 hours    Other medications:  acetaminophen   Tablet .. 975 milliGRAM(s) Oral every 8 hours  amitriptyline 10 milliGRAM(s) Oral at bedtime  ascorbic acid 500 milliGRAM(s) Oral daily  ferrous    sulfate 325 milliGRAM(s) Oral three times a day  folic acid 1 milliGRAM(s) Oral daily  gabapentin 100 milliGRAM(s) Oral three times a day  influenza   Vaccine 0.5 milliLiter(s) IntraMuscular once  lidocaine   Patch 1 Patch Transdermal daily  multivitamin 1 Tablet(s) Oral daily  pantoprazole    Tablet 40 milliGRAM(s) Oral before breakfast  polyethylene glycol 3350 17 Gram(s) Oral at bedtime  saccharomyces boulardii 250 milliGRAM(s) Oral two times a day  senna 2 Tablet(s) Oral at bedtime  sodium polystyrene sulfonate Suspension 30 Gram(s) Oral once  thiamine 100 milliGRAM(s) Oral daily  traMADol 50 milliGRAM(s) Oral every 8 hours    ======================================================  Physical Exam:  ============  T(F): 97.5 (03 Dec 2019 10:42), Max: 98.6 (03 Dec 2019 06:03)  HR: 118 (03 Dec 2019 10:42)  BP: 143/71 (03 Dec 2019 10:42)  RR: 18 (03 Dec 2019 10:42)  SpO2: 95% (03 Dec 2019 10:42) (94% - 97%)  temp max in last 48H T(F): , Max: 98.9 (12-01-19 @ 21:17)    General:  No acute distress.   Eye: Pupils are equal, round and reactive to light, Extraocular movements are intact, Normal conjunctiva.  HENT: Normocephalic, Oral mucosa is moist, No pharyngeal erythema, No sinus tenderness.  Neck: Supple, No lymphadenopathy.  Respiratory: decreased breath sounds right lung  RIGHT chest tube insertion site with purulent, mucoid drainage.   + right chest wound vac to prior chest tube site.   Cardiovascular: Normal rate, Regular rhythm,   TRACE edema of legs  Gastrointestinal: Soft, Non-tender, Non-distended, Normal bowel sounds.  Genitourinary: No costovertebral angle tenderness.  Lymphatics: No lymphadenopathy neck,   Musculoskeletal: Normal range of motion, Normal strength.  Integumentary: No rash.  Neurologic: Alert, Oriented, No focal deficits, Cranial Nerves II-XII are grossly intact.  Psychiatric: Appropriate mood & affect.    =======================================================  11/22/19 blood cx negative x 2  11/22/19 - body fluid cx with Moderate Alpha hemolytic strep x 2 sets  Alpha hemolytic strep  		Sierra-Sood	E Test  Ceftriaxone  			S (0.19)   Clindamycin 	S    Erythromycin 	S   Penicillin 	S   Vancomycin 	S     ======================================================  Labs:                        8.3    39.58 )-----------( 521      ( 03 Dec 2019 10:10 )             26.5       WBC Count: 39.58 K/uL (12-03-19 @ 10:10)  WBC Count: 43.01 K/uL (12-03-19 @ 06:24)  WBC Count: 19.33 K/uL (12-02-19 @ 06:11)  WBC Count: 26.95 K/uL (12-01-19 @ 06:38)  WBC Count: 33.37 K/uL (11-30-19 @ 06:08)  WBC Count: 35.30 K/uL (11-29-19 @ 06:02)      12-03    130<L>  |  94<L>  |  4.0<L>  ----------------------------<  139<H>  5.3   |  26.0  |  0.32<L>    Ca    7.8<L>      03 Dec 2019 13:28  Phos  4.2     12-03  Mg     1.7     12-03    TPro  4.9<L>  /  Alb  1.7<L>  /  TBili  0.2<L>  /  DBili  0.1  /  AST  16  /  ALT  8   /  AlkPhos  209<H>  12-03      Culture - Acid Fast - Tissue w/Smear (collected 11-28-19 @ 20:40)  Source: .Tissue chest wound    Culture - Tissue with Gram Stain (collected 11-27-19 @ 20:12)  Source: .Tissue chest wound  Gram Stain (11-27-19 @ 21:27):    Few White blood cells    No organisms seen  Final Report (12-02-19 @ 16:00):    No growth at 5 days.    Culture - Blood (collected 11-25-19 @ 05:20)  Source: .Blood  Final Report (11-30-19 @ 07:00):    No growth at 5 days.    Culture - Blood (collected 11-25-19 @ 05:19)  Source: .Blood  Final Report (11-30-19 @ 07:00):    No growth at 5 days.

## 2019-12-03 NOTE — PROGRESS NOTE ADULT - SUBJECTIVE AND OBJECTIVE BOX
34 year old woman with no known major comorbidities (had not gone to see a doctor for 15 years) who presented to [facility] on 11/22/2019 with progressive sob, weakness, weight loss, She was found to have a large pleural effusion. Initially 1.5 L of dark serous fluid was removed.  Chest tube placed shortly thereafter, 3 additional liters was removed. CBC showed a leukocytosis of 40k and   CTA imaging revealed a 2.1cm partially cystic mass in the body/tail of the pancreas with possible upstream ductal dilatation. On 11/23/2019 the patient had a CT chest/abdomen/pelvis that found to have a clot in the right IJ and was started on a heparin gtt.     Transferred to [facility] on 11/24/19.  MRCP performed on 11/26.  Underwent Debridement, wound, with dressing replacement  Wound VAC placement  Pleural biopsy  Lung decortication  Bronchoscopy, with lung decortication using VATS on 11/27/19.  Pt  has a history of alcohol abuse.     Consulted for evaluation and management of the RIJ DVT.      INTERVAL HISTORY:  INR reached >2.0, with one dose of coumadin and heparin drop discontinued.  She reports her back and right side chest pain have imporved. One Chest tube rmoved. has wound vac. no diarrhea. She is receiving pain regimen, CT surgery managing. WBC increased today      MEDICATIONS  (STANDING):  ascorbic acid 500 milliGRAM(s) Oral daily  cefTRIAXone   IVPB 1000 milliGRAM(s) IV Intermittent every 12 hours  ferrous    sulfate 325 milliGRAM(s) Oral three times a day  folic acid 1 milliGRAM(s) Oral daily  influenza   Vaccine 0.5 milliLiter(s) IntraMuscular once  lidocaine   Patch 1 Patch Transdermal daily  multivitamin 1 Tablet(s) Oral daily  pantoprazole    Tablet 40 milliGRAM(s) Oral before breakfast  polyethylene glycol 3350 17 Gram(s) Oral at bedtime  saccharomyces boulardii 250 milliGRAM(s) Oral two times a day  senna 2 Tablet(s) Oral at bedtime  thiamine 100 milliGRAM(s) Oral daily  ureNa 15 Gram(s) 15 Gram(s) Oral two times a day      Vital Signs Last 24 Hrs  T(C): 37 (03 Dec 2019 06:03), Max: 37 (03 Dec 2019 06:03)  T(F): 98.6 (03 Dec 2019 06:03), Max: 98.6 (03 Dec 2019 06:03)  HR: 110 (03 Dec 2019 06:03) (110 - 125)  BP: 104/73 (03 Dec 2019 06:03) (100/74 - 104/73)  BP(mean): --  RR: 18 (03 Dec 2019 06:03) (17 - 20)  SpO2: 94% (03 Dec 2019 06:03) (94% - 97%)  Chest: 1 chest tube right side removed, still has 1 in place  CV: rrr  Abd: soft, nd, nt                8.7                  134  | 25.0 | 4.0          43.01 >-----------< 519     ------------------------< 96                    27.0                 5.5  | 97   | 0.34                                         Ca 8.1   Mg 1.7   Ph 4.2

## 2019-12-03 NOTE — PROGRESS NOTE ADULT - ASSESSMENT
ASSESSMENT/PLAN    1) Hyponatremia; multifactorial in setting of low solute and increased free water intake, ? 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

## 2019-12-03 NOTE — PROGRESS NOTE BEHAVIORAL HEALTH - NSBHCHARTREVIEWLAB_PSY_A_CORE FT
Comprehensive Metabolic Panel (12.02.19 @ 06:11)    Sodium, Serum: 132 mmol/L    Potassium, Serum: 3.5 mmol/L    Chloride, Serum: 97 mmol/L    Carbon Dioxide, Serum: 25.0 mmol/L    Anion Gap, Serum: 10 mmol/L    Blood Urea Nitrogen, Serum: 3.0 mg/dL    Creatinine, Serum: 0.33 mg/dL    Glucose, Serum: 101 mg/dL    Calcium, Total Serum: 7.7 mg/dL    Protein Total, Serum: 4.6 g/dL    Albumin, Serum: 1.9 g/dL    Bilirubin Total, Serum: <0.2 mg/dL    Alkaline Phosphatase, Serum: 188 U/L    Aspartate Aminotransferase (AST/SGOT): 13 U/L    Alanine Aminotransferase (ALT/SGPT): 9 U/L    eGFR if Non : 145: Interpretative comment  The units for eGFR are mL/min/1.73M2 (normalized body surface area). The  eGFR is calculated from a serum creatinine using the CKD-EPI equation.  Other variables required for calculation are race, age and sex. Among  patients with chronic kidney disease (CKD), the eGFR is useful in  determining the stage of disease according to KDOQI CKD classification.  All eGFR results are reported numerically with the following  interpretation.          GFR                    With                 Without     (ml/min/1.73 m2)    Kidney Damage       Kidney Damage        >= 90                    Stage 1                     Normal        60-89                    Stage 2                     Decreased GFR        30-59     Stage 3                     Stage 3        15-29                    Stage 4                     Stage 4        < 15                      Stage 5                     Stage 5  Each stage of CKD assumes that the associated GFR level has been in  effect for at least 3 months. Determination of stages one and two (with  eGFR > 59 ml/min/m2) requires estimation of kidney damage for at least 3  months as defined by structural or functional abnormalities.  Limitations: All estimates of GFR will be less accurate for patients at  extremes of muscle mass (including but not limited to frail elderly,  critically ill, or cancer patients), those with unusual diets, and those  with conditions associated with reduced secretion or extrarenal  elimination of creatinine. The eGFR equation is not recommended for use  in patients with unstable creatinine levels. mL/min/1.73M2    eGFR if : 168 mL/min/1.73M2
8.3    39.58 )-----------( 521      ( 03 Dec 2019 10:10 )             26.5     12-03    134<L>  |  97<L>  |  4.0<L>  ----------------------------<  96  5.5<H>   |  25.0  |  0.34<L>    Ca    8.1<L>      03 Dec 2019 06:24  Phos  4.2     12-03  Mg     1.7     12-03    TPro  4.7<L>  /  Alb  1.7<L>  /  TBili  0.3<L>  /  DBili  x   /  AST  11  /  ALT  7   /  AlkPhos  182<H>  12-03    LIVER FUNCTIONS - ( 03 Dec 2019 06:24 )  Alb: 1.7 g/dL / Pro: 4.7 g/dL / ALK PHOS: 182 U/L / ALT: 7 U/L / AST: 11 U/L / GGT: x           PT/INR - ( 03 Dec 2019 10:10 )   PT: 67.6 sec;   INR: 5.57 ratio         PTT - ( 02 Dec 2019 12:47 )  PTT:43.5 sec

## 2019-12-03 NOTE — PROGRESS NOTE ADULT - PROBLEM SELECTOR PLAN 6
Continue Protonix for GI prophylaxis  Continue senna & miralax  Continue Heparin for DVT for prophylaxis

## 2019-12-03 NOTE — PROGRESS NOTE ADULT - PROBLEM SELECTOR PLAN 1
s/p Right VATS decortication & washout on 11/27/19. Debridement of CT site & VAC placement  #2 chest tube remains in situ  Maintain right #2 chest tubes to water seal.  Monitor chest tube drainage  Repeat chest xray in morning  Encourage the use of I/S, CDBE, OOB to chair, daily PT, ambulate as tolerated  Discussed plan with Dr. Castanon s/p Right VATS decortication & washout on 11/27/19. Debridement of CT site & VAC placement  #2 chest tube remains in situ, maintain to WS  Monitor chest tube drainage  Repeat chest xray in morning  Encourage the use of I/S, CDBE, OOB to chair, daily PT, ambulate as tolerated  Trend WBC/temps. If fever > blood cultures. May be heading towards septic episode with tachycardia/elevated wbc although tachycardia may also be pain related.  Discussed plan with Dr. Castanon s/p Right VATS decortication & washout on 11/27/19. Debridement of CT site & VAC placement  #2 chest tube remains in situ, maintain to WS  Monitor chest tube drainage  Repeat chest xray in morning  Encourage the use of I/S, CDBE, OOB to chair, daily PT, ambulate as tolerated  Trend WBC/temps. If fever > blood cultures. May be heading towards septic episode with tachycardia/elevated wbc although tachycardia may also be pain related.  Added low dose neurontin for pain/possible fibromyalgia  Change wound VAC today  Discussed plan with Dr. Castanon

## 2019-12-03 NOTE — PROGRESS NOTE ADULT - PROBLEM SELECTOR PLAN 8
Nephrology following  Trend Sodium levels  Continue protein supplementation  Urena > renal recommending to stop.

## 2019-12-03 NOTE — PROGRESS NOTE ADULT - ASSESSMENT
This 34 y.o. F who was admitted to [facility] 11/22 with weakness,   found with large right pleural effusion   s/p chest tube drainage    Hyponatremia - resolving  WBC elevation   alpha hemolytic strep infection  multi focal PNA  infected pleural effusion - empyema  Pancreatic cyst    - Legionella Ag from [facility] 11/24/19 - negative, off azithromycin  - remainder of cultures from [facility] are negative    s/p VATS 11/27/19  - cultures remain negative    - WBC elevation   now markedly elevated  - Chest tube insertion site with drainage.  CULTURES SENT  - blood culture x 2 sets today  -  d/c Ceftriaxone  - start Vancomycin 1 gram Q12H  - start Zosyn 3.375 grams Q 8H    -defer midline until WBC resolving/ resolved  - will follow up on cultures.

## 2019-12-04 LAB
24R-OH-CALCIDIOL SERPL-MCNC: 21.9 NG/ML — LOW (ref 30–80)
ALBUMIN SERPL ELPH-MCNC: 1.6 G/DL — LOW (ref 3.3–5.2)
ALP SERPL-CCNC: 192 U/L — HIGH (ref 40–120)
ALT FLD-CCNC: 8 U/L — SIGNIFICANT CHANGE UP
ANION GAP SERPL CALC-SCNC: 12 MMOL/L — SIGNIFICANT CHANGE UP (ref 5–17)
APPEARANCE UR: CLEAR — SIGNIFICANT CHANGE UP
APTT BLD: 45 SEC — HIGH (ref 27.5–36.3)
AST SERPL-CCNC: 14 U/L — SIGNIFICANT CHANGE UP
BACTERIA # UR AUTO: NEGATIVE — SIGNIFICANT CHANGE UP
BILIRUB SERPL-MCNC: 0.4 MG/DL — SIGNIFICANT CHANGE UP (ref 0.4–2)
BILIRUB UR-MCNC: NEGATIVE — SIGNIFICANT CHANGE UP
BUN SERPL-MCNC: 4 MG/DL — LOW (ref 8–20)
CALCIUM SERPL-MCNC: 7.8 MG/DL — LOW (ref 8.6–10.2)
CHLORIDE SERPL-SCNC: 95 MMOL/L — LOW (ref 98–107)
CO2 SERPL-SCNC: 25 MMOL/L — SIGNIFICANT CHANGE UP (ref 22–29)
COLOR SPEC: YELLOW — SIGNIFICANT CHANGE UP
COMMENT - URINE: SIGNIFICANT CHANGE UP
CREAT SERPL-MCNC: 0.3 MG/DL — LOW (ref 0.5–1.3)
DIFF PNL FLD: ABNORMAL
EPI CELLS # UR: NEGATIVE — SIGNIFICANT CHANGE UP
FOLATE SERPL-MCNC: 6.8 NG/ML — SIGNIFICANT CHANGE UP
GLUCOSE SERPL-MCNC: 117 MG/DL — HIGH (ref 70–115)
GLUCOSE UR QL: NEGATIVE MG/DL — SIGNIFICANT CHANGE UP
HCT VFR BLD CALC: 24.8 % — LOW (ref 34.5–45)
HGB BLD-MCNC: 7.9 G/DL — LOW (ref 11.5–15.5)
INR BLD: 1.88 RATIO — HIGH (ref 0.88–1.16)
IRON SATN MFR SERPL: 22 % — SIGNIFICANT CHANGE UP (ref 14–50)
IRON SATN MFR SERPL: 23 UG/DL — LOW (ref 37–145)
KETONES UR-MCNC: ABNORMAL
LEUKOCYTE ESTERASE UR-ACNC: NEGATIVE — SIGNIFICANT CHANGE UP
MAGNESIUM SERPL-MCNC: 1.6 MG/DL — SIGNIFICANT CHANGE UP (ref 1.6–2.6)
MCHC RBC-ENTMCNC: 31.9 GM/DL — LOW (ref 32–36)
MCHC RBC-ENTMCNC: 31.9 PG — SIGNIFICANT CHANGE UP (ref 27–34)
MCV RBC AUTO: 100 FL — SIGNIFICANT CHANGE UP (ref 80–100)
NITRITE UR-MCNC: NEGATIVE — SIGNIFICANT CHANGE UP
PH UR: 6 — SIGNIFICANT CHANGE UP (ref 5–8)
PLATELET # BLD AUTO: 382 K/UL — SIGNIFICANT CHANGE UP (ref 150–400)
POTASSIUM SERPL-MCNC: 4 MMOL/L — SIGNIFICANT CHANGE UP (ref 3.5–5.3)
POTASSIUM SERPL-SCNC: 4 MMOL/L — SIGNIFICANT CHANGE UP (ref 3.5–5.3)
PREALB SERPL-MCNC: 4 MG/DL — LOW (ref 18–38)
PROCALCITONIN SERPL-MCNC: 17.03 NG/ML — HIGH (ref 0.02–0.1)
PROT SERPL-MCNC: 4.8 G/DL — LOW (ref 6.6–8.7)
PROT UR-MCNC: 30 MG/DL
PROTHROM AB SERPL-ACNC: 22 SEC — HIGH (ref 10–12.9)
RBC # BLD: 2.48 M/UL — LOW (ref 3.8–5.2)
RBC # FLD: 15.4 % — HIGH (ref 10.3–14.5)
RBC CASTS # UR COMP ASSIST: SIGNIFICANT CHANGE UP /HPF (ref 0–4)
SODIUM SERPL-SCNC: 132 MMOL/L — LOW (ref 135–145)
SP GR SPEC: 1.01 — SIGNIFICANT CHANGE UP (ref 1.01–1.02)
T4 AB SER-ACNC: 5.7 UG/DL — SIGNIFICANT CHANGE UP (ref 4.5–12)
TIBC SERPL-MCNC: 104 UG/DL — LOW (ref 220–430)
TRANSFERRIN SERPL-MCNC: <80 MG/DL — LOW (ref 192–382)
TSH SERPL-MCNC: 2.2 UIU/ML — SIGNIFICANT CHANGE UP (ref 0.27–4.2)
UROBILINOGEN FLD QL: NEGATIVE MG/DL — SIGNIFICANT CHANGE UP
VANCOMYCIN TROUGH SERPL-MCNC: 32.1 UG/ML — CRITICAL HIGH (ref 10–20)
VIT B12 SERPL-MCNC: 584 PG/ML — SIGNIFICANT CHANGE UP (ref 232–1245)
WBC # BLD: 27.94 K/UL — HIGH (ref 3.8–10.5)
WBC # FLD AUTO: 27.94 K/UL — HIGH (ref 3.8–10.5)
WBC UR QL: SIGNIFICANT CHANGE UP

## 2019-12-04 PROCEDURE — 99232 SBSQ HOSP IP/OBS MODERATE 35: CPT

## 2019-12-04 PROCEDURE — 71045 X-RAY EXAM CHEST 1 VIEW: CPT | Mod: 26

## 2019-12-04 PROCEDURE — 71260 CT THORAX DX C+: CPT | Mod: 26

## 2019-12-04 PROCEDURE — 99233 SBSQ HOSP IP/OBS HIGH 50: CPT

## 2019-12-04 PROCEDURE — 74177 CT ABD & PELVIS W/CONTRAST: CPT | Mod: 26

## 2019-12-04 RX ORDER — HYDROMORPHONE HYDROCHLORIDE 2 MG/ML
4 INJECTION INTRAMUSCULAR; INTRAVENOUS; SUBCUTANEOUS EVERY 6 HOURS
Refills: 0 | Status: DISCONTINUED | OUTPATIENT
Start: 2019-12-04 | End: 2019-12-10

## 2019-12-04 RX ORDER — ONDANSETRON 8 MG/1
4 TABLET, FILM COATED ORAL ONCE
Refills: 0 | Status: COMPLETED | OUTPATIENT
Start: 2019-12-04 | End: 2019-12-04

## 2019-12-04 RX ORDER — WARFARIN SODIUM 2.5 MG/1
2.5 TABLET ORAL ONCE
Refills: 0 | Status: COMPLETED | OUTPATIENT
Start: 2019-12-04 | End: 2019-12-04

## 2019-12-04 RX ORDER — SODIUM CHLORIDE 9 MG/ML
500 INJECTION INTRAMUSCULAR; INTRAVENOUS; SUBCUTANEOUS ONCE
Refills: 0 | Status: COMPLETED | OUTPATIENT
Start: 2019-12-04 | End: 2019-12-04

## 2019-12-04 RX ADMIN — HYDROMORPHONE HYDROCHLORIDE 4 MILLIGRAM(S): 2 INJECTION INTRAMUSCULAR; INTRAVENOUS; SUBCUTANEOUS at 23:07

## 2019-12-04 RX ADMIN — Medication 100 MILLIGRAM(S): at 13:33

## 2019-12-04 RX ADMIN — TRAMADOL HYDROCHLORIDE 50 MILLIGRAM(S): 50 TABLET ORAL at 14:30

## 2019-12-04 RX ADMIN — TRAMADOL HYDROCHLORIDE 50 MILLIGRAM(S): 50 TABLET ORAL at 22:11

## 2019-12-04 RX ADMIN — TRAMADOL HYDROCHLORIDE 50 MILLIGRAM(S): 50 TABLET ORAL at 13:31

## 2019-12-04 RX ADMIN — GABAPENTIN 100 MILLIGRAM(S): 400 CAPSULE ORAL at 21:12

## 2019-12-04 RX ADMIN — WARFARIN SODIUM 2.5 MILLIGRAM(S): 2.5 TABLET ORAL at 21:12

## 2019-12-04 RX ADMIN — Medication 250 MILLIGRAM(S): at 05:12

## 2019-12-04 RX ADMIN — PIPERACILLIN AND TAZOBACTAM 25 GRAM(S): 4; .5 INJECTION, POWDER, LYOPHILIZED, FOR SOLUTION INTRAVENOUS at 13:33

## 2019-12-04 RX ADMIN — Medication 325 MILLIGRAM(S): at 05:13

## 2019-12-04 RX ADMIN — Medication 975 MILLIGRAM(S): at 21:09

## 2019-12-04 RX ADMIN — GABAPENTIN 100 MILLIGRAM(S): 400 CAPSULE ORAL at 05:12

## 2019-12-04 RX ADMIN — HYDROMORPHONE HYDROCHLORIDE 2 MILLIGRAM(S): 2 INJECTION INTRAMUSCULAR; INTRAVENOUS; SUBCUTANEOUS at 06:32

## 2019-12-04 RX ADMIN — HYDROMORPHONE HYDROCHLORIDE 4 MILLIGRAM(S): 2 INJECTION INTRAMUSCULAR; INTRAVENOUS; SUBCUTANEOUS at 08:51

## 2019-12-04 RX ADMIN — Medication 250 MILLIGRAM(S): at 13:34

## 2019-12-04 RX ADMIN — OXYCODONE HYDROCHLORIDE 10 MILLIGRAM(S): 5 TABLET ORAL at 14:55

## 2019-12-04 RX ADMIN — Medication 1 TABLET(S): at 13:32

## 2019-12-04 RX ADMIN — PIPERACILLIN AND TAZOBACTAM 25 GRAM(S): 4; .5 INJECTION, POWDER, LYOPHILIZED, FOR SOLUTION INTRAVENOUS at 00:04

## 2019-12-04 RX ADMIN — Medication 975 MILLIGRAM(S): at 13:30

## 2019-12-04 RX ADMIN — Medication 250 MILLIGRAM(S): at 15:44

## 2019-12-04 RX ADMIN — TRAMADOL HYDROCHLORIDE 50 MILLIGRAM(S): 50 TABLET ORAL at 06:32

## 2019-12-04 RX ADMIN — Medication 975 MILLIGRAM(S): at 14:29

## 2019-12-04 RX ADMIN — METHOCARBAMOL 500 MILLIGRAM(S): 500 TABLET, FILM COATED ORAL at 20:05

## 2019-12-04 RX ADMIN — SODIUM CHLORIDE 1000 MILLILITER(S): 9 INJECTION INTRAMUSCULAR; INTRAVENOUS; SUBCUTANEOUS at 18:36

## 2019-12-04 RX ADMIN — HYDROMORPHONE HYDROCHLORIDE 2 MILLIGRAM(S): 2 INJECTION INTRAMUSCULAR; INTRAVENOUS; SUBCUTANEOUS at 04:40

## 2019-12-04 RX ADMIN — HYDROMORPHONE HYDROCHLORIDE 4 MILLIGRAM(S): 2 INJECTION INTRAMUSCULAR; INTRAVENOUS; SUBCUTANEOUS at 09:50

## 2019-12-04 RX ADMIN — Medication 10 MILLIGRAM(S): at 21:12

## 2019-12-04 RX ADMIN — PANTOPRAZOLE SODIUM 40 MILLIGRAM(S): 20 TABLET, DELAYED RELEASE ORAL at 05:12

## 2019-12-04 RX ADMIN — TRAMADOL HYDROCHLORIDE 50 MILLIGRAM(S): 50 TABLET ORAL at 05:12

## 2019-12-04 RX ADMIN — Medication 975 MILLIGRAM(S): at 06:31

## 2019-12-04 RX ADMIN — GABAPENTIN 100 MILLIGRAM(S): 400 CAPSULE ORAL at 13:33

## 2019-12-04 RX ADMIN — Medication 500 MILLIGRAM(S): at 13:33

## 2019-12-04 RX ADMIN — Medication 975 MILLIGRAM(S): at 05:13

## 2019-12-04 RX ADMIN — TRAMADOL HYDROCHLORIDE 50 MILLIGRAM(S): 50 TABLET ORAL at 21:11

## 2019-12-04 RX ADMIN — Medication 1 MILLIGRAM(S): at 13:32

## 2019-12-04 RX ADMIN — Medication 325 MILLIGRAM(S): at 21:12

## 2019-12-04 RX ADMIN — HYDROMORPHONE HYDROCHLORIDE 4 MILLIGRAM(S): 2 INJECTION INTRAMUSCULAR; INTRAVENOUS; SUBCUTANEOUS at 17:10

## 2019-12-04 RX ADMIN — PIPERACILLIN AND TAZOBACTAM 25 GRAM(S): 4; .5 INJECTION, POWDER, LYOPHILIZED, FOR SOLUTION INTRAVENOUS at 08:52

## 2019-12-04 RX ADMIN — Medication 325 MILLIGRAM(S): at 13:32

## 2019-12-04 RX ADMIN — ONDANSETRON 4 MILLIGRAM(S): 8 TABLET, FILM COATED ORAL at 09:15

## 2019-12-04 RX ADMIN — PIPERACILLIN AND TAZOBACTAM 25 GRAM(S): 4; .5 INJECTION, POWDER, LYOPHILIZED, FOR SOLUTION INTRAVENOUS at 21:10

## 2019-12-04 RX ADMIN — Medication 975 MILLIGRAM(S): at 20:06

## 2019-12-04 RX ADMIN — HYDROMORPHONE HYDROCHLORIDE 4 MILLIGRAM(S): 2 INJECTION INTRAMUSCULAR; INTRAVENOUS; SUBCUTANEOUS at 22:07

## 2019-12-04 RX ADMIN — HYDROMORPHONE HYDROCHLORIDE 4 MILLIGRAM(S): 2 INJECTION INTRAMUSCULAR; INTRAVENOUS; SUBCUTANEOUS at 16:15

## 2019-12-04 NOTE — PROGRESS NOTE ADULT - SUBJECTIVE AND OBJECTIVE BOX
[facility]  INFECTIOUS DISEASES AND INTERNAL MEDICINE at [place]  =======================================================  Carlos Alberto Soto MD  Diplomates American Board of Internal Medicine and Infectious Diseases  Telephone [phone]  Fax            [phone]  =======================================================    [facility]-[id number]  HERACLIO MOLINA   follow up:  infected pleural effusion, empyema    s/p VATS on 11/27/19  cultures from OR in process, negative so far    WBC down trending today  taking oral contrast for CT scan of chest abd pelvis.    I have personally reviewed the labs and data; pertinent labs and data are listed in this note; please see below.   =======================================================    REVIEW OF SYSTEMS:  CONSTITUTIONAL:  No Fever or chills  HEENT:  No diplopia or blurred vision.  No earache, sore throat or runny nose.  CARDIOVASCULAR:  No pressure, squeezing, strangling, tightness, heaviness or aching about the chest, neck, axilla or epigastrium.  RESPIRATORY:  No cough, shortness of breath  GASTROINTESTINAL:  No nausea, vomiting or diarrhea.  GENITOURINARY:  No dysuria, frequency or urgency. No Blood in urine  MUSCULOSKELETAL:  no joint aches, no muscle pain  SKIN:  No change in skin, hair or nails.  NEUROLOGIC:  No Headaches, seizures or weakness.  PSYCHIATRIC:  No disorder of thought or mood.  ENDOCRINE:  No heat or cold intolerance  HEMATOLOGICAL:  No easy bruising or bleeding.   =======================================================  Allergies  No Known Allergies      Antibiotics:  piperacillin/tazobactam IVPB.. 3.375 Gram(s) IV Intermittent every 8 hours  vancomycin  IVPB 1000 milliGRAM(s) IV Intermittent every 8 hours    Other medications:  acetaminophen   Tablet .. 975 milliGRAM(s) Oral every 8 hours  amitriptyline 10 milliGRAM(s) Oral at bedtime  ascorbic acid 500 milliGRAM(s) Oral daily  ferrous    sulfate 325 milliGRAM(s) Oral three times a day  folic acid 1 milliGRAM(s) Oral daily  gabapentin 100 milliGRAM(s) Oral three times a day  influenza   Vaccine 0.5 milliLiter(s) IntraMuscular once  lidocaine   Patch 1 Patch Transdermal daily  multivitamin 1 Tablet(s) Oral daily  pantoprazole    Tablet 40 milliGRAM(s) Oral before breakfast  saccharomyces boulardii 250 milliGRAM(s) Oral two times a day  thiamine 100 milliGRAM(s) Oral daily  traMADol 50 milliGRAM(s) Oral every 8 hours    ======================================================  Physical Exam:  ============  T(F): 98.7 (04 Dec 2019 05:07), Max: 98.7 (04 Dec 2019 05:07)  HR: 114 (04 Dec 2019 05:07)  BP: 99/66 (04 Dec 2019 05:07)  RR: 19 (04 Dec 2019 05:07)  SpO2: 99% (04 Dec 2019 05:07) (93% - 99%)  temp max in last 48H T(F): , Max: 98.7 (12-04-19 @ 05:07)    General:  No acute distress.   Eye: Pupils are equal, round and reactive to light, Extraocular movements are intact, Normal conjunctiva.  HENT: Normocephalic, Oral mucosa is moist, No pharyngeal erythema, No sinus tenderness.  Neck: Supple, No lymphadenopathy.  Respiratory: decreased breath sounds right lung  RIGHT chest tube in place  Cardiovascular: Normal rate, Regular rhythm,   TRACE edema of legs  Gastrointestinal: Soft, Non-tender, Non-distended, Normal bowel sounds.  Genitourinary: No costovertebral angle tenderness.  Lymphatics: No lymphadenopathy neck,   Musculoskeletal: Normal range of motion, Normal strength.  Integumentary: No rash.  Neurologic: Alert, Oriented, No focal deficits, Cranial Nerves II-XII are grossly intact.  Psychiatric: Appropriate mood & affect.    =======================================================  11/22/19 blood cx negative x 2  11/22/19 - body fluid cx with Moderate Alpha hemolytic strep x 2 sets  Alpha hemolytic strep  		Sierra-Sood	E Test  Ceftriaxone  			S (0.19)   Clindamycin 	S    Erythromycin 	S   Penicillin 	S   Vancomycin 	S     ======================================================  Labs:                        7.9    27.94 )-----------( 382      ( 04 Dec 2019 06:46 )             24.8       WBC Count: 27.94 K/uL (12-04-19 @ 06:46)  WBC Count: 39.58 K/uL (12-03-19 @ 10:10)  WBC Count: 43.01 K/uL (12-03-19 @ 06:24)  WBC Count: 19.33 K/uL (12-02-19 @ 06:11)  WBC Count: 26.95 K/uL (12-01-19 @ 06:38)  WBC Count: 33.37 K/uL (11-30-19 @ 06:08)      12-04    132<L>  |  95<L>  |  4.0<L>  ----------------------------<  117<H>  4.0   |  25.0  |  0.30<L>    Ca    7.8<L>      04 Dec 2019 06:46  Phos  4.2     12-03  Mg     1.6     12-04    TPro  4.8<L>  /  Alb  1.6<L>  /  TBili  0.4  /  DBili  x   /  AST  14  /  ALT  8   /  AlkPhos  192<H>  12-04      Culture - Acid Fast - Tissue w/Smear (collected 11-28-19 @ 20:40)  Source: .Tissue chest wound    Culture - Tissue with Gram Stain (collected 11-27-19 @ 20:12)  Source: .Tissue chest wound  Gram Stain (11-27-19 @ 21:27):    Few White blood cells    No organisms seen  Final Report (12-02-19 @ 16:00):    No growth at 5 days.    Culture - Blood (collected 11-25-19 @ 05:20)  Source: .Blood  Final Report (11-30-19 @ 07:00):    No growth at 5 days.    Culture - Blood (collected 11-25-19 @ 05:19)  Source: .Blood  Final Report (11-30-19 @ 07:00):    No growth at 5 days.

## 2019-12-04 NOTE — PROGRESS NOTE BEHAVIORAL HEALTH - NSBHFUPINTERVALHXFT_PSY_A_CORE
33 y/o  female was seen yesterday complaining of mild anxiety and sleeplessness due to her physical symptoms in hospital. The patient reported that she had burning when she breathed in her chest and all the pain was making it difficult to sleep at night. The patient was ordered Xanax PRN to help her sleep at night. This morning the patient appeared more comfortable and well rested but did not take the Xanax. The patient reported that the pain relief "cocktail "she was given was suitable to control her pain and helped her sleep. She reports no need for the Xanax. The patient had no complaints.

## 2019-12-04 NOTE — PROGRESS NOTE ADULT - PROBLEM SELECTOR PLAN 10
normalized   will trend
now elevated > recheck
Potassium replaced, repeat serum potassium in morning

## 2019-12-04 NOTE — PROGRESS NOTE BEHAVIORAL HEALTH - NSBHATTESTSEENBY_PSY_A_CORE
attending Psychiatrist without NP/Trainee
NP with telephonic supervision from Attending Psychiatrist
attending Psychiatrist without NP/Trainee

## 2019-12-04 NOTE — PROGRESS NOTE BEHAVIORAL HEALTH - SUMMARY
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 Psych referral requested for anxiety and reeval today due to irritability and "psychosis".  Due to hyponatremia, SSRI or antidepressant not appropriate at this time as can worsen Hyponatremia.  Benzo is not recommended secondary to alcohol abuse and respiratory depression, however for severe anxiety or panic give low dose Ativan 0.25- 0.5 mg prn po/IV in hospital only, not to be given after discharge due to addiction history.  There are no acute psych condition which requires in pt psych admissions and her symptoms she is attributing to abdominal burning and pain, not psychiatric.   Pt would benefit from [facility] referral after discharged from hospital.  Pt would benefit from out Pt rehab after discharge.  Psych to follow as needed.
35 y/o  female was seen yesterday c/o of anxiety due to by pain caused by her physical symptoms. The patient was prescribed Xanax PRN to help with the anxiety but the patient denied ever taking it. The patient was instead given a pain medication  and that was suitable to relieve her pain and anxiety to help her sleep. The patient was seen this morning and she appeared comfortable and had no complaints.
judicious use of alprazolam for acute anxiety Pateint is highly anxious H/o ETOH abuse noted ADvise titration of gabapentin with plan not to continue alprazolam after discharge

## 2019-12-04 NOTE — PROGRESS NOTE ADULT - PROBLEM SELECTOR PLAN 9
Monitor for withdrawal  Continue folic, MVI, thiamine, & feSO4  Discussed in length with patient about alcohol cessation, patient verbalizes understanding

## 2019-12-04 NOTE — PROGRESS NOTE ADULT - ASSESSMENT
34 year old female who [place] [facility] [place] [facility] on 11/22 for complaints of weakness, 25 pound weight loss & flu like symptoms & found to have a large right pleural effusion. Pt. has a PMH of empyema with group A hemolytic strep & has 4-5 drinks of alcohol per day.  On 11/23 CT of the chest/abd/pelvis indicated a clot in the Right IJ. Pt. on heparin bridge to coumadin. CT abdomen shows cystic mass in body/tail of pancreas, now concern for postop ileus.  Post-op course:  Pt s/p right VATS decort & washout debridement of CT site & VAC placement on 11/27/19.     Coumadin started for RIJ thrombus x 2 days then INR spike > held, postop anemia > on iron, folate, vit c. Today, patient had elevated WBC, hyperkalemia, hypomagnesemia and reactive thrombocytosis. Patient remains afebrile and pain complaints appear constant. Psych and Pain evaluation requested.   Today noted with elevated WBC , increasing drainage , c/o loose stools , b/l LE pain / swelling . MRI with pancreatic cyst but poor study . Followed by CTS ,  ID , Hem / Psych , seen by GI . CT chest , abd / pelvis noted , ? esophageal fluid collection , ? pancreatitis .            Problem/Plan - 1:  ·  Problem: Pleural effusion on right.  Plan: s/p Right VATS decortication & washout on 11/27/19. Debridement of CT site & VAC placement, as per primary team , increased drainage noted , On iv abx Zosyn / vanco , follow up cultures  , ID on board .         Problem/Plan - 2:  ·  Problem: Empyema.  Plan: On Zosyn / Vanco ,  ID following       Problem/Plan - 3:  ·  Problem: Internal jugular vein thrombosis, right.  Plan: Heme consult appreciated  Follow up with heme as out patient. On Coumadin , keep INR between 2- 3 , follow INR in am      Problem/Plan - 5:  ·  Problem: Anemia likely multifactorial ; Due to anemia of chronic dz , multiple blood drawings , dilutional  , patient is tachycardic , pale  Plan: Transfuse 2 units PRBC      Problem/Plan - 6:  Problem: Prophylactic measure. Plan: Continue Protonix for GI prophylaxis  C     Problem/Plan - 7:  ·  Problem: Hypomagnesemia.  Plan: replaced      Problem/Plan - 8:  ·  Problem: Hyponatremia.  Plan: as per renal , likely SIADH , stable      Problem/Plan - 9:  ·  Problem: Alcohol abuse.  Plan: Monitor for withdrawal  Continue folic, MVI, thiamine, & feSO4    Problem / Plan - 10 : Abdominal tenderness , CT noted , ? pancreatitis , esophageal fluid collection , , check Amylase / Lipase  , recall GI     Problem / Plan - 11; Frequent loose stools x 1 month - will hold on laxatives for now , get stool cultures , O and P , GI PCR ,r/o  C diff     Problem / Plan -12 : B/L feet pain , pain all over her body - consider rheum eval     Problem / Plan -13 ; Hematuria , with blood clots , UTI r/o , likely due to High INR , will monitor     Problem / Plan -14 : Multifocal PNA -  treated with abx     Problem /Plan - 15 : Hypoalbuminemia , prealbumin noted _ 4 , severe protein calorie malnutrition - get Nutrition eval , supplements .

## 2019-12-04 NOTE — PROGRESS NOTE ADULT - PROBLEM SELECTOR PLAN 2
1. Continue Tramadol 50mg TID for neuropathic/ somatic pain  2. Continue Gabapentin TID as ordered  3. Continue Elavil 10mg PO qhs for neuropathic pain.

## 2019-12-04 NOTE — PROGRESS NOTE BEHAVIORAL HEALTH - RISK ASSESSMENT
low risk assessment for dangerous behavior.
no h/o SA, psych tx  supportive family
low imminent risk

## 2019-12-04 NOTE — PROGRESS NOTE ADULT - PROBLEM SELECTOR PLAN 4
Heme consult appreciated  Follow up with heme as out patient.  Will discharge home on Coumadin   2.5 today

## 2019-12-04 NOTE — PROGRESS NOTE BEHAVIORAL HEALTH - PRIMARY DX
Acute stress reaction causing mixed disturbance of emotion and conduct
Anxiety
Acute stress reaction causing mixed disturbance of emotion and conduct

## 2019-12-04 NOTE — PROGRESS NOTE ADULT - SUBJECTIVE AND OBJECTIVE BOX
Subjective:  Pt in bed NAD.  completed Oral contrast.  awaiting to be brought down for CT scan.  WBC down.  afebrile     VITAL SIGNS  T(C): 37.1 (19 @ 05:07), Max: 37.1 (19 @ 05:07)  HR: 114 (19 @ 05:07) (114 - 122)  BP: 99/66 (19 @ 05:07) (96/62 - 143/71)  RR: 19 (19 @ 05:07) (18 - 19)  SpO2: 99% (19 @ 05:07) (93% - 99%) RA         Daily     Daily Weight in k.2 (04 Dec 2019 06:11)  Admit Wt: Drug Dosing Weight  Height (cm): 167 (2019 10:14)  Weight (kg): 55.2 (2019 10:14)    Telemetry:   SR   LVEF:  75%     MEDICATIONS  acetaminophen   Tablet .. 975 milliGRAM(s) Oral every 8 hours  ALPRAZolam 0.25 milliGRAM(s) Oral every 8 hours PRN  amitriptyline 10 milliGRAM(s) Oral at bedtime  ascorbic acid 500 milliGRAM(s) Oral daily  ferrous    sulfate 325 milliGRAM(s) Oral three times a day  folic acid 1 milliGRAM(s) Oral daily  gabapentin 100 milliGRAM(s) Oral three times a day  HYDROmorphone   Tablet 2 milliGRAM(s) Oral every 4 hours PRN  HYDROmorphone   Tablet 4 milliGRAM(s) Oral every 4 hours PRN  influenza   Vaccine 0.5 milliLiter(s) IntraMuscular once  lidocaine   Patch 1 Patch Transdermal daily  methocarbamol 500 milliGRAM(s) Oral every 6 hours PRN  multivitamin 1 Tablet(s) Oral daily  pantoprazole    Tablet 40 milliGRAM(s) Oral before breakfast  piperacillin/tazobactam IVPB.. 3.375 Gram(s) IV Intermittent every 8 hours  saccharomyces boulardii 250 milliGRAM(s) Oral two times a day  thiamine 100 milliGRAM(s) Oral daily  traMADol 50 milliGRAM(s) Oral every 8 hours  vancomycin  IVPB 1000 milliGRAM(s) IV Intermittent every 8 hours  warfarin 2.5 milliGRAM(s) Oral once    MEDICATIONS  (PRN):  ALPRAZolam 0.25 milliGRAM(s) Oral every 8 hours PRN anxiety  HYDROmorphone   Tablet 2 milliGRAM(s) Oral every 4 hours PRN Moderate Pain (4 - 6)  HYDROmorphone   Tablet 4 milliGRAM(s) Oral every 4 hours PRN Severe Pain (7 - 10)  methocarbamol 500 milliGRAM(s) Oral every 6 hours PRN Muscle Spasm        PHYSICAL EXAM  General: Alert Awake NAD  Respiratory: decreased at bases b/l + CT noted with brown/green discharge on dressing   CV:  RRR S1 S2   Abdomen: Soft NT ND + BS + BM  Extremities: 1+ edema b/l LE   Incisions:  Rt Thoracic + VAC noted C/D/I        Chest tubes: RT 475cc x 24 hours       I&O's Detail    03 Dec 2019 07:  -  04 Dec 2019 07:00  --------------------------------------------------------  IN:    Oral Fluid: 237 mL  Total IN: 237 mL    OUT:    Chest Tube: 475 mL  Total OUT: 475 mL    Total NET: -238 mL      04 Dec 2019 07:  -  04 Dec 2019 10:29  --------------------------------------------------------  IN:    Oral Fluid: 450 mL    Solution: 100 mL  Total IN: 550 mL    OUT:    Voided: 400 mL  Total OUT: 400 mL    Total NET: 150 mL          LABS      132<L>  |  95<L>  |  4.0<L>  ----------------------------<  117<H>  4.0   |  25.0  |  0.30<L>    Ca    7.8<L>      04 Dec 2019 06:46  Phos  4.2       Mg     1.6         TPro  4.8<L>  /  Alb  1.6<L>  /  TBili  0.4  /  DBili  x   /  AST  14  /  ALT  8   /  AlkPhos  192<H>                                   7.9    27.94 )-----------( 382      ( 04 Dec 2019 06:46 )             24.8          PT/INR - ( 04 Dec 2019 06:46 )   PT: 22.0 sec;   INR: 1.88 ratio         PTT - ( 04 Dec 2019 06:46 )  PTT:45.0 sec      LIVER FUNCTIONS - ( 04 Dec 2019 06:46 )  Alb: 1.6 g/dL / Pro: 4.8 g/dL / ALK PHOS: 192 U/L / ALT: 8 U/L / AST: 14 U/L / GGT: x              CAPILLARY BLOOD GLUCOSE               Today's CXR: < from: Xray Chest 1 View- PORTABLE-Routine (19 @ 06:49) >  Findings:  Right pleural effusion. Right pleural thickening. Right basilar   atelectasis and/or infiltrate. Right chest tube, unchanged. No evidence   of pneumothorax. Left basilar atelectasis or infiltrate..    Impression:  No change since the prior exam..    < end of copied text >

## 2019-12-04 NOTE — PROGRESS NOTE ADULT - ASSESSMENT
1) Hyponatremia; multifactorial in setting of low solute and increased free water intake, ? 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

## 2019-12-04 NOTE — PROGRESS NOTE ADULT - ASSESSMENT
This 34 y.o. F who was admitted to [facility] [place]22 with weakness,   found with large right pleural effusion   s/p chest tube drainage    Hyponatremia - resolving  WBC elevation   alpha hemolytic strep infection  multi focal PNA  infected pleural effusion - empyema  Pancreatic cyst    - Legionella Ag from [facility] [place] 11/24/19 - negative, off azithromycin  - remainder of cultures from [facility] [place] are negative    s/p VATS 11/27/19  - cultures remain negative    - WBC elevation   now markedly elevated  - Chest tube insertion site with drainage.  CULTURES SENT.; will follow  - blood culture x 2 sets 12/3/19,will follow  - continue Vancomycin 1 gram Q8H  - continue Zosyn 3.375 grams Q 8H    - defer midline until WBC resolving/ resolved  - will follow up on cultures.

## 2019-12-04 NOTE — PROGRESS NOTE ADULT - ASSESSMENT
34 year old female who transferred [place] [facility] [place] to [facility] on 11/22 for complaints of weakness, 25 pound weight loss & flu like symptoms & found to have a large right pleural effusion. Pt. has a PMH of empyema with group A hemolytic strep & has 4-5 drinks of alcohol per day.  On 11/23 CT of the chest/abd/pelvis indicated a clot in the Right IJ. Pt. on heparin bridge to coumadin. CT abdomen shows cystic mass in body/tail of pancreas, now concern for postop ileus.    Post-op course:  Pt s/p right VATS decort & washout debridement of CT site & VAC placement on 11/27/19.       Coumadin started for RIJ thrombus x 2 days then INR spike > held, postop anemia > on iron, folate, vit c. Today, patient had elevated WBC, hyperkalemia, hypomagnesemia and reactive thrombocytosis. Patient remains afebrile and pain complaints appear constant. Psych and Pain evaluation requested. Kayexalate ordered for hyperkalemia but held for large BM and recheck bmp.

## 2019-12-04 NOTE — PROGRESS NOTE ADULT - SUBJECTIVE AND OBJECTIVE BOX
Patient seen and examined . States she feels better , c/o some blood clots in the urine , loose stools , no sob , no cp , afebrile     CC : loose stools , blood clots in urine       MEDICATIONS  (STANDING):  acetaminophen   Tablet .. 975 milliGRAM(s) Oral every 8 hours  amitriptyline 10 milliGRAM(s) Oral at bedtime  ascorbic acid 500 milliGRAM(s) Oral daily  ferrous    sulfate 325 milliGRAM(s) Oral three times a day  folic acid 1 milliGRAM(s) Oral daily  gabapentin 100 milliGRAM(s) Oral three times a day  lidocaine   Patch 1 Patch Transdermal daily  multivitamin 1 Tablet(s) Oral daily  pantoprazole    Tablet 40 milliGRAM(s) Oral before breakfast  piperacillin/tazobactam IVPB.. 3.375 Gram(s) IV Intermittent every 8 hours  saccharomyces boulardii 250 milliGRAM(s) Oral two times a day  thiamine 100 milliGRAM(s) Oral daily  traMADol 50 milliGRAM(s) Oral every 8 hours  vancomycin  IVPB 1000 milliGRAM(s) IV Intermittent every 8 hours  warfarin 2.5 milliGRAM(s) Oral once    MEDICATIONS  (PRN):  ALPRAZolam 0.25 milliGRAM(s) Oral every 8 hours PRN anxiety  HYDROmorphone   Tablet 2 milliGRAM(s) Oral every 4 hours PRN Moderate Pain (4 - 6)  HYDROmorphone   Tablet 4 milliGRAM(s) Oral every 6 hours PRN Severe Pain (7 - 10)  methocarbamol 500 milliGRAM(s) Oral every 6 hours PRN Muscle Spasm      LABS:                          7.9    27.94 )-----------( 382      ( 04 Dec 2019 06:46 )             24.8     12-04    132<L>  |  95<L>  |  4.0<L>  ----------------------------<  117<H>  4.0   |  25.0  |  0.30<L>    Ca    7.8<L>      04 Dec 2019 06:46  Phos  4.2     12-03  Mg     1.6     12-    TPro  4.8<L>  /  Alb  1.6<L>  /  TBili  0.4  /  DBili  x   /  AST  14  /  ALT  8   /  AlkPhos  192<H>  12-04    PT/INR - ( 04 Dec 2019 06:46 )   PT: 22.0 sec;   INR: 1.88 ratio         PTT - ( 04 Dec 2019 06:46 )  PTT:45.0 sec    Culture - Other (19 @ 14:03)    Specimen Source: .Other chest tube insertion site (pus/purulent)    Culture Results:   No growth at 1 day.  Culture in progress      Urinalysis Basic - ( 04 Dec 2019 10:24 )    Color: Yellow / Appearance: Clear / S.010 / pH: x  Gluc: x / Ketone: Trace  / Bili: Negative / Urobili: Negative mg/dL   Blood: x / Protein: 30 mg/dL / Nitrite: Negative   Leuk Esterase: Negative / RBC: 0-2 /HPF / WBC 0-2   Sq Epi: x / Non Sq Epi: Negative / Bacteria: Negative        RADIOLOGY & ADDITIONAL TESTS:    < from: CT Chest w/ IV Cont (19 @ 12:50) >     EXAM:  CT ABDOMEN AND PELVIS OC IC                         EXAM:  CT CHEST IC                          PROCEDURE DATE:  2019          INTERPRETATION:  CLINICAL INFORMATION: Postop. Persistent elevated white   count.    COMPARISON: CT chest 2019 and MRI abdomen 2019 and upper   extremity venous Doppler 2019    PROCEDURE:   CT of the Chest, Abdomen and Pelvis was performed with intravenous   contrast.   Intravenous contrast: 94 ml Omnipaque 300.  Oral contrast: Positive contrast was administered.  Sagittal and coronal reformats were performed.    FINDINGS:    CHEST:     LUNGS AND LARGE AIRWAYS: Patent central airways. Interval improvement in   airspace opacities in the lungs since 2019. Interlobular septal   thickening in the right lung inferior aspect of the left lower lobe,   likely pulmonary edema.  PLEURA: Right chest tube with a small right hydropneumothorax with an   interval increase in the amount of fluid in the pleural space since   2019. There is enhancement of the pleura which can be related to   infection or the prior instrumentation.  VESSELS: Thoracic aorta normal in caliber. Partially imaged nonocclusive   thrombus in the right internal jugular vein.  HEART: Heart size is normal. No pericardial effusion.  MEDIASTINUM AND TETO: Multiple subcentimeter mediastinal lymph nodes.   There is a region of low attenuation with high attenuation peripherally   to the left of the distal esophagus (series 3 image 107) measuring 4.3 x   1.8 cm,likely a fluid collection.  CHEST WALL AND LOWER NECK: No enlarged axillary lymph nodes.    ABDOMEN AND PELVIS:    LIVER: 2 new cystic foci, one at the medial aspect of the caudate and the   second in the posterior aspect of the lateral segment left hepatic lobe   with the larger at the medial aspect of the caudate measuring 1.9 x 1.8   cm, new.  BILE DUCTS: Normal caliber.  GALLBLADDER: Within normal limits.  SPLEEN: Within normal limits.  PANCREAS: Low-attenuation/cystic foci pancreas appear increase in size   and number since 2019 including a 1.6 x 1.3 cm lesion in the   pancreatic body, not previously seen 1.2 x 1.1 cm lesion in the   pancreatic head, previously 0.8 x 0.3 cm.  ADRENALS: Within normal limits.  KIDNEYS/URETERS: No hydronephrosis. Heterogeneous enhancement with no   focal lesion which may be related to the phase of contrast. Infiltration   of the fat at the posterior aspect of the left kidney.    BLADDER: Within normal limits.  REPRODUCTIVE ORGANS: Uterus and adnexa within normal limits.    BOWEL: No bowel obstruction. No evidence of appendicitis.  PERITONEUM: Trace pelvic free fluid.  VESSELS: Abdominal aorta normal in caliber.  RETROPERITONEUM/LYMPH NODES: No lymphadenopathy.    ABDOMINAL WALL: Anasarca.  BONES: No aggressive osseous lesion.    IMPRESSION:     Interval improvement in bilateral airspace disease, greater on the right   since 2019.    Interlobular septal thickening lungs, likely pulmonary edema.    Small right hydropneumothorax with aninterval increase in pleural fluid   since 2019.    Partially imaged nonocclusive deep venous thrombosis in the right   internal jugular vein.    Interval increase in size and number of low-attenuation lesions in the   pancreas since 2019and new cystic lesions at the medial aspect of   the caudate and posterior aspect of the left hepatic lobe of uncertain   etiology; the differential includes pancreatitis with associated acute   peripancreatic fluid collections; correlation is recommended with   pancreatic enzymes.     Region of low density measuring 4.3 cm to the left of the distal   esophagus, likely a fluid collection and  infiltration of the fat at the   posterior aspect of the left kidney which if there is pancreatitis could  also be related to the underlying pancreatitis; follow-up is recommended.          AMIE RAMIREZ   This document has been electronically signed. Dec  4 2019  3:07PM    < end of copied text >        REVIEW OF SYSTEMS:    As above , all other systems are reviewed and are negative .     Vital Signs Last 24 Hrs  T(C): 37.2 (04 Dec 2019 16:14), Max: 37.2 (04 Dec 2019 15:41)  T(F): 98.9 (04 Dec 2019 16:14), Max: 98.9 (04 Dec 2019 15:41)  HR: 112 (04 Dec 2019 16:14) (112 - 127)  BP: 102/64 (04 Dec 2019 16:14) (97/64 - 108/70)  BP(mean): --  RR: 17 (04 Dec 2019 16:14) (17 - 19)  SpO2: 93% (04 Dec 2019 16:14) (93% - 99%)    PHYSICAL EXAM:    GENERAL: NAD, well-groomed, underweight , pale   HEAD:  Atraumatic, Normocephalic  EYES: EOMI, PERRLA, conjunctiva and sclera clear  NECK: Supple, No JVD, Normal thyroid  NERVOUS SYSTEM:  Alert & Oriented X3, no focal deficit  CHEST/LUNG: R lung base decreased b/c with some rales +  , no  rhonchi, wheezing, or rubs , R CT + , vac+   HEART: Regular rate and rhythm; No murmurs, rubs, or gallops  ABDOMEN: Soft, epigastric tenderness , no rebound , no guarding ,  Nondistended; Bowel sounds present  EXTREMITIES:  2+ Peripheral Pulses, No clubbing, cyanosis, or edema  LYMPH: No lymphadenopathy noted  SKIN: No rashes or lesions

## 2019-12-04 NOTE — PROGRESS NOTE ADULT - PROBLEM SELECTOR PLAN 1
s/p Right VATS decortication & washout on 11/27/19. Debridement of CT site & VAC placement  #2 chest tube remains in situ, maintain to WS  will send CT specimen for Bilirubin   Monitor chest tube drainage  Repeat chest xray in morning  Encourage the use of I/S, CDBE, OOB to chair, daily PT, ambulate as tolerated  Trend WBC/temps. If fever > blood cultures.  Added low dose Neurontin for pain/possible fibromyalgia  Change wound VAC 12/6  CT C/A/P IV/PO contrast Pending today   Discussed plan with Dr. Castanon & Fay

## 2019-12-04 NOTE — PROGRESS NOTE ADULT - PROBLEM SELECTOR PLAN 1
1. Continue Dilaudid 2mg PO q4hrs PRN moderate pain  - Offer Dilaudid 4mg PO q6hrs PRN severe pain  2. Continue Tylenol 975mg PO TID x3 days ATC  3. Robaxin 500mg PO q6hrs PRN spasms.

## 2019-12-04 NOTE — PROGRESS NOTE ADULT - SUBJECTIVE AND OBJECTIVE BOX
[facility] DIVISION OF KIDNEY DISEASES AND HYPERTENSION -- FOLLOW UP NOTE  --------------------------------------------------------------------------------  Chief Complaint: HypoNa    24 hour events/subjective:  Seen/examined  Labile hyponatremia; psychogenic polydipsia; noncompliant with meds;      PAST HISTORY  --------------------------------------------------------------------------------  No significant changes to PMH, PSH, FHx, SHx, unless otherwise noted    ALLERGIES & MEDICATIONS  --------------------------------------------------------------------------------  Allergies    No Known Allergies    Intolerances    Ensure TID (Unknown)    Standing Inpatient Medications  acetaminophen   Tablet .. 975 milliGRAM(s) Oral every 8 hours  amitriptyline 10 milliGRAM(s) Oral at bedtime  ascorbic acid 500 milliGRAM(s) Oral daily  ferrous    sulfate 325 milliGRAM(s) Oral three times a day  folic acid 1 milliGRAM(s) Oral daily  gabapentin 100 milliGRAM(s) Oral three times a day  lidocaine   Patch 1 Patch Transdermal daily  multivitamin 1 Tablet(s) Oral daily  pantoprazole    Tablet 40 milliGRAM(s) Oral before breakfast  piperacillin/tazobactam IVPB.. 3.375 Gram(s) IV Intermittent every 8 hours  saccharomyces boulardii 250 milliGRAM(s) Oral two times a day  thiamine 100 milliGRAM(s) Oral daily  traMADol 50 milliGRAM(s) Oral every 8 hours  vancomycin  IVPB 1000 milliGRAM(s) IV Intermittent every 8 hours  warfarin 2.5 milliGRAM(s) Oral once    PRN Inpatient Medications  ALPRAZolam 0.25 milliGRAM(s) Oral every 8 hours PRN  HYDROmorphone   Tablet 2 milliGRAM(s) Oral every 4 hours PRN  HYDROmorphone   Tablet 4 milliGRAM(s) Oral every 6 hours PRN  methocarbamol 500 milliGRAM(s) Oral every 6 hours PRN      REVIEW OF SYSTEMS  --------------------------------------------------------------------------------  Unable to obtain    VITALS/PHYSICAL EXAM  --------------------------------------------------------------------------------  T(C): 36.9 (12-04-19 @ 13:41), Max: 37.1 (12-04-19 @ 05:07)  HR: 118 (12-04-19 @ 13:41) (114 - 127)  BP: 108/70 (12-04-19 @ 13:41) (96/62 - 108/70)  RR: 18 (12-04-19 @ 13:41) (18 - 19)  SpO2: 96% (12-04-19 @ 13:41) (93% - 99%)  Wt(kg): --        12-03-19 @ 07:01  -  12-04-19 @ 07:00  --------------------------------------------------------  IN: 237 mL / OUT: 475 mL / NET: -238 mL    12-04-19 @ 07:01  -  12-04-19 @ 15:08  --------------------------------------------------------  IN: 1350 mL / OUT: 800 mL / NET: 550 mL      Physical Exam:  	Gen: NAD,   	HEENT: PERRL, supple neck, clear oropharynx  	Pulm: CTA B/L  	CV: RRR, S1S2; no rub  	Back: No spinal or CVA tenderness; no sacral edema  	Abd: +BS, soft, nontender/nondistended  	: No suprapubic tenderness  	UE: Warm, FROM, no clubbing, intact strength; no edema; no asterixis  	LE: Warm, FROM, no clubbing, intact strength; no edema  	Neuro: No focal deficits, intact gait  	Psych: Normal affect and mood  	Skin: Warm, without rashes    LABS/STUDIES  --------------------------------------------------------------------------------              7.9    27.94 >-----------<  382      [12-04-19 @ 06:46]              24.8     132  |  95  |  4.0  ----------------------------<  117      [12-04-19 @ 06:46]  4.0   |  25.0  |  0.30        Ca     7.8     [12-04-19 @ 06:46]      Mg     1.6     [12-04-19 @ 06:46]      Phos  4.2     [12-03-19 @ 06:24]    TPro  4.8  /  Alb  1.6  /  TBili  0.4  /  DBili  x   /  AST  14  /  ALT  8   /  AlkPhos  192  [12-04-19 @ 06:46]    PT/INR: PT 22.0 , INR 1.88       [12-04-19 @ 06:46]  PTT: 45.0       [12-04-19 @ 06:46]      Creatinine Trend:  SCr 0.30 [12-04 @ 06:46]  SCr 0.32 [12-03 @ 13:28]  SCr 0.34 [12-03 @ 06:24]  SCr 0.33 [12-02 @ 06:11]  SCr 0.31 [12-01 @ 06:38]    Urinalysis - [12-04-19 @ 10:24]      Color Yellow / Appearance Clear / SG 1.010 / pH 6.0      Gluc Negative / Ketone Trace  / Bili Negative / Urobili Negative       Blood Moderate / Protein 30 / Leuk Est Negative / Nitrite Negative      RBC 0-2 / WBC 0-2 / Hyaline  / Gran  / Sq Epi  / Non Sq Epi Negative / Bacteria Negative    Urine Sodium <30      [12-03-19 @ 04:19]  Urine Phosphorus 6.6      [11-27-19 @ 22:17]  Urine Osmolality 181      [12-03-19 @ 04:20]    Iron 23, TIBC 104, %sat 22      [12-04-19 @ 06:46]  TSH 2.20      [12-04-19 @ 06:46]  Lipid: chol 68, , HDL 27, LDL 12      [11-25-19 @ 05:29]    HIV Nonreact      [11-25-19 @ 14:08]    ROMAN: titer Negative, pattern --      [11-26-19 @ 18:05]  dsDNA <12      [11-26-19 @ 18:05]  Rheumatoid Factor <10      [11-26-19 @ 17:34]  ANCA: cANCA Negative, pANCA Negative, atypical ANCA Negative      [11-26-19 @ 18:05]

## 2019-12-04 NOTE — PROGRESS NOTE BEHAVIORAL HEALTH - NSBHCHARTREVIEWVS_PSY_A_CORE FT
ICU Vital Signs Last 24 Hrs  T(C): 36.3 (02 Dec 2019 15:21), Max: 37.2 (01 Dec 2019 21:17)  T(F): 97.4 (02 Dec 2019 15:21), Max: 98.9 (01 Dec 2019 21:17)  HR: 113 (02 Dec 2019 15:21) (90 - 114)  BP: 104/73 (02 Dec 2019 15:21) (104/71 - 110/76)  BP(mean): --  ABP: --  ABP(mean): --  RR: 17 (02 Dec 2019 15:21) (16 - 17)  SpO2: 97% (02 Dec 2019 15:21) (97% - 100%)
Vital Signs Last 24 Hrs  T(C): 37.1 (04 Dec 2019 05:07), Max: 37.1 (04 Dec 2019 05:07)  T(F): 98.7 (04 Dec 2019 05:07), Max: 98.7 (04 Dec 2019 05:07)  HR: 114 (04 Dec 2019 05:07) (114 - 122)  BP: 99/66 (04 Dec 2019 05:07) (96/62 - 103/73)  BP(mean): --  RR: 19 (04 Dec 2019 05:07) (18 - 19)  SpO2: 99% (04 Dec 2019 05:07) (93% - 99%)
Vital Signs Last 24 Hrs  T(C): 36.4 (03 Dec 2019 10:42), Max: 37 (03 Dec 2019 06:03)  T(F): 97.5 (03 Dec 2019 10:42), Max: 98.6 (03 Dec 2019 06:03)  HR: 118 (03 Dec 2019 10:42) (110 - 125)  BP: 143/71 (03 Dec 2019 10:42) (100/74 - 143/71)  BP(mean): --  RR: 18 (03 Dec 2019 10:42) (17 - 20)  SpO2: 95% (03 Dec 2019 10:42) (94% - 97%)

## 2019-12-04 NOTE — PROGRESS NOTE ADULT - SUBJECTIVE AND OBJECTIVE BOX
HPI:  Patient is a 34y old  Female who presents with a chief complaint of SOB (26 Nov 2019 15:02)  She was transferred from an OSH for management of empyema. Now s/p VATS, lung decortication, pleural biopsy on 11/27/19. She reported worsening pain on 12/02/19, requiring almost every PRN dose of Oxycodone IR. She was transitioned to Tramadol TID ATC, Elavil qhs and oral Dilaudid yesterday. Per MAR, she has used only 3 doses of PRN Dilaudid.      VERBAL REPORT: "The cocktail last night worked very well."  Patient admits much improved analgesia, energy and ability to participate in PT. She reports sleeping well last night. She admits more drowsiness with the Dilaudid tablets than Oxycodone IR, yet her increased energy level allows her to move around more independently. She denies need for any modifications to the current regimen.  PAIN SCORE: 3/10                  SCALE USED: VNRS    Allergies  No Known Allergies      PAST MEDICAL & SURGICAL HISTORY:      MEDICATIONS  (STANDING):  acetaminophen   Tablet .. 975 milliGRAM(s) Oral every 8 hours  amitriptyline 10 milliGRAM(s) Oral at bedtime  ascorbic acid 500 milliGRAM(s) Oral daily  ferrous    sulfate 325 milliGRAM(s) Oral three times a day  folic acid 1 milliGRAM(s) Oral daily  gabapentin 100 milliGRAM(s) Oral three times a day  influenza   Vaccine 0.5 milliLiter(s) IntraMuscular once  lidocaine   Patch 1 Patch Transdermal daily  multivitamin 1 Tablet(s) Oral daily  pantoprazole    Tablet 40 milliGRAM(s) Oral before breakfast  piperacillin/tazobactam IVPB.. 3.375 Gram(s) IV Intermittent every 8 hours  saccharomyces boulardii 250 milliGRAM(s) Oral two times a day  thiamine 100 milliGRAM(s) Oral daily  traMADol 50 milliGRAM(s) Oral every 8 hours  vancomycin  IVPB 1000 milliGRAM(s) IV Intermittent every 8 hours  warfarin 2.5 milliGRAM(s) Oral once    MEDICATIONS  (PRN):  ALPRAZolam 0.25 milliGRAM(s) Oral every 8 hours PRN anxiety  HYDROmorphone   Tablet 2 milliGRAM(s) Oral every 4 hours PRN Moderate Pain (4 - 6)  HYDROmorphone   Tablet 4 milliGRAM(s) Oral every 6 hours PRN Severe Pain (7 - 10)  methocarbamol 500 milliGRAM(s) Oral every 6 hours PRN Muscle Spasm      PHYSICAL EXAM:  GENERAL: NAD, well-developed  HEAD:  Atraumatic, Normocephalic  EYES: EOMI, PERRLA, conjunctiva and sclera clear  NERVOUS SYSTEM:  Alert & Oriented X3, Good concentration; Motor Strength 5/5 B/L upper and lower extremities  CHEST/LUNG: Clear speech, no SOB evident at rest; +right sided chest tube  ABDOMEN: Nondistended; Bowel sounds present  SKIN: resolving bruise on the interior aspect of right elbow      Vital Signs Last 24 Hrs  T(C): 37.1 (04 Dec 2019 05:07), Max: 37.1 (04 Dec 2019 05:07)  T(F): 98.7 (04 Dec 2019 05:07), Max: 98.7 (04 Dec 2019 05:07)  HR: 114 (04 Dec 2019 05:07) (114 - 122)  BP: 99/66 (04 Dec 2019 05:07) (96/62 - 103/73)  BP(mean): --  RR: 19 (04 Dec 2019 05:07) (18 - 19)  SpO2: 99% (04 Dec 2019 05:07) (93% - 99%)                          7.9    27.94 )-----------( 382      ( 04 Dec 2019 06:46 )             24.8       LIVER FUNCTIONS - ( 04 Dec 2019 06:46 )  Alb: 1.6 g/dL / Pro: 4.8 g/dL / ALK PHOS: 192 U/L / ALT: 8 U/L / AST: 14 U/L / GGT: x             PT/INR - ( 04 Dec 2019 06:46 )   PT: 22.0 sec;   INR: 1.88 ratio    PTT - ( 04 Dec 2019 06:46 )  PTT:45.0 sec    Pain Service   Text Page via [employer]  PIN: [phone]

## 2019-12-05 DIAGNOSIS — N17.9 ACUTE KIDNEY FAILURE, UNSPECIFIED: ICD-10-CM

## 2019-12-05 LAB
ALBUMIN SERPL ELPH-MCNC: 1.7 G/DL — LOW (ref 3.3–5.2)
ALBUMIN SERPL ELPH-MCNC: 1.8 G/DL — LOW (ref 3.3–5.2)
ALP SERPL-CCNC: 218 U/L — HIGH (ref 40–120)
ALP SERPL-CCNC: 253 U/L — HIGH (ref 40–120)
ALT FLD-CCNC: 11 U/L — SIGNIFICANT CHANGE UP
ALT FLD-CCNC: 14 U/L — SIGNIFICANT CHANGE UP
AMYLASE P1 CFR SERPL: 69 U/L — SIGNIFICANT CHANGE UP (ref 36–128)
AMYLASE P1 CFR SERPL: 83 U/L — SIGNIFICANT CHANGE UP (ref 36–128)
ANION GAP SERPL CALC-SCNC: 15 MMOL/L — SIGNIFICANT CHANGE UP (ref 5–17)
ANION GAP SERPL CALC-SCNC: 15 MMOL/L — SIGNIFICANT CHANGE UP (ref 5–17)
ANION GAP SERPL CALC-SCNC: 16 MMOL/L — SIGNIFICANT CHANGE UP (ref 5–17)
APPEARANCE UR: CLEAR — SIGNIFICANT CHANGE UP
APTT BLD: 44.5 SEC — HIGH (ref 27.5–36.3)
AST SERPL-CCNC: 31 U/L — SIGNIFICANT CHANGE UP
AST SERPL-CCNC: 33 U/L — HIGH
BACTERIA # UR AUTO: NEGATIVE — SIGNIFICANT CHANGE UP
BILIRUB DIRECT SERPL-MCNC: 0.1 MG/DL — SIGNIFICANT CHANGE UP (ref 0–0.3)
BILIRUB INDIRECT FLD-MCNC: 0.1 MG/DL — LOW (ref 0.2–1)
BILIRUB SERPL-MCNC: 0.2 MG/DL — LOW (ref 0.4–2)
BILIRUB SERPL-MCNC: <0.2 MG/DL — LOW (ref 0.4–2)
BILIRUB UR-MCNC: NEGATIVE — SIGNIFICANT CHANGE UP
BLD GP AB SCN SERPL QL: SIGNIFICANT CHANGE UP
BUN SERPL-MCNC: 13 MG/DL — SIGNIFICANT CHANGE UP (ref 8–20)
BUN SERPL-MCNC: 13 MG/DL — SIGNIFICANT CHANGE UP (ref 8–20)
BUN SERPL-MCNC: 16 MG/DL — SIGNIFICANT CHANGE UP (ref 8–20)
CALCIUM SERPL-MCNC: 7.7 MG/DL — LOW (ref 8.6–10.2)
CALCIUM SERPL-MCNC: 8 MG/DL — LOW (ref 8.6–10.2)
CALCIUM SERPL-MCNC: 8.1 MG/DL — LOW (ref 8.6–10.2)
CHLORIDE SERPL-SCNC: 92 MMOL/L — LOW (ref 98–107)
CHLORIDE SERPL-SCNC: 92 MMOL/L — LOW (ref 98–107)
CHLORIDE SERPL-SCNC: 96 MMOL/L — LOW (ref 98–107)
CHLORIDE UR-SCNC: <27 MMOL/L — SIGNIFICANT CHANGE UP
CO2 SERPL-SCNC: 22 MMOL/L — SIGNIFICANT CHANGE UP (ref 22–29)
CO2 SERPL-SCNC: 23 MMOL/L — SIGNIFICANT CHANGE UP (ref 22–29)
CO2 SERPL-SCNC: 23 MMOL/L — SIGNIFICANT CHANGE UP (ref 22–29)
COLOR SPEC: YELLOW — SIGNIFICANT CHANGE UP
CREAT ?TM UR-MCNC: 26 MG/DL — SIGNIFICANT CHANGE UP
CREAT SERPL-MCNC: 2.11 MG/DL — HIGH (ref 0.5–1.3)
CREAT SERPL-MCNC: 2.17 MG/DL — HIGH (ref 0.5–1.3)
CREAT SERPL-MCNC: 2.62 MG/DL — HIGH (ref 0.5–1.3)
CULTURE RESULTS: NO GROWTH — SIGNIFICANT CHANGE UP
CULTURE RESULTS: SIGNIFICANT CHANGE UP
CULTURE RESULTS: SIGNIFICANT CHANGE UP
DIFF PNL FLD: ABNORMAL
EPI CELLS # UR: SIGNIFICANT CHANGE UP
GLUCOSE SERPL-MCNC: 102 MG/DL — SIGNIFICANT CHANGE UP (ref 70–115)
GLUCOSE SERPL-MCNC: 91 MG/DL — SIGNIFICANT CHANGE UP (ref 70–115)
GLUCOSE SERPL-MCNC: 99 MG/DL — SIGNIFICANT CHANGE UP (ref 70–115)
GLUCOSE UR QL: NEGATIVE MG/DL — SIGNIFICANT CHANGE UP
HCT VFR BLD CALC: 24.6 % — LOW (ref 34.5–45)
HCT VFR BLD CALC: 27.2 % — LOW (ref 34.5–45)
HGB BLD-MCNC: 7.8 G/DL — LOW (ref 11.5–15.5)
HGB BLD-MCNC: 8.4 G/DL — LOW (ref 11.5–15.5)
INR BLD: 1.4 RATIO — HIGH (ref 0.88–1.16)
KETONES UR-MCNC: NEGATIVE — SIGNIFICANT CHANGE UP
LEUKOCYTE ESTERASE UR-ACNC: NEGATIVE — SIGNIFICANT CHANGE UP
LIDOCAIN IGE QN: 152 U/L — HIGH (ref 22–51)
LIDOCAIN IGE QN: 245 U/L — HIGH (ref 22–51)
MAGNESIUM SERPL-MCNC: 1.6 MG/DL — SIGNIFICANT CHANGE UP (ref 1.6–2.6)
MCHC RBC-ENTMCNC: 30.9 GM/DL — LOW (ref 32–36)
MCHC RBC-ENTMCNC: 31.6 PG — SIGNIFICANT CHANGE UP (ref 27–34)
MCHC RBC-ENTMCNC: 31.7 GM/DL — LOW (ref 32–36)
MCHC RBC-ENTMCNC: 32 PG — SIGNIFICANT CHANGE UP (ref 27–34)
MCV RBC AUTO: 100.8 FL — HIGH (ref 80–100)
MCV RBC AUTO: 102.3 FL — HIGH (ref 80–100)
NITRITE UR-MCNC: NEGATIVE — SIGNIFICANT CHANGE UP
OSMOLALITY UR: 116 MOSM/KG — LOW (ref 300–1000)
PH UR: 6 — SIGNIFICANT CHANGE UP (ref 5–8)
PHOSPHATE SERPL-MCNC: 4.1 MG/DL — SIGNIFICANT CHANGE UP (ref 2.4–4.7)
PLATELET # BLD AUTO: 413 K/UL — HIGH (ref 150–400)
PLATELET # BLD AUTO: 575 K/UL — HIGH (ref 150–400)
POTASSIUM SERPL-MCNC: 3.8 MMOL/L — SIGNIFICANT CHANGE UP (ref 3.5–5.3)
POTASSIUM SERPL-MCNC: 3.9 MMOL/L — SIGNIFICANT CHANGE UP (ref 3.5–5.3)
POTASSIUM SERPL-MCNC: 4.3 MMOL/L — SIGNIFICANT CHANGE UP (ref 3.5–5.3)
POTASSIUM SERPL-SCNC: 3.8 MMOL/L — SIGNIFICANT CHANGE UP (ref 3.5–5.3)
POTASSIUM SERPL-SCNC: 3.9 MMOL/L — SIGNIFICANT CHANGE UP (ref 3.5–5.3)
POTASSIUM SERPL-SCNC: 4.3 MMOL/L — SIGNIFICANT CHANGE UP (ref 3.5–5.3)
POTASSIUM UR-SCNC: 17 MMOL/L — SIGNIFICANT CHANGE UP
PROT SERPL-MCNC: 4.9 G/DL — LOW (ref 6.6–8.7)
PROT SERPL-MCNC: 5.1 G/DL — LOW (ref 6.6–8.7)
PROT UR-MCNC: 30 MG/DL
PROTHROM AB SERPL-ACNC: 16.3 SEC — HIGH (ref 10–12.9)
RBC # BLD: 2.44 M/UL — LOW (ref 3.8–5.2)
RBC # BLD: 2.66 M/UL — LOW (ref 3.8–5.2)
RBC # FLD: 15.9 % — HIGH (ref 10.3–14.5)
RBC # FLD: 15.9 % — HIGH (ref 10.3–14.5)
RBC CASTS # UR COMP ASSIST: ABNORMAL /HPF (ref 0–4)
SODIUM SERPL-SCNC: 130 MMOL/L — LOW (ref 135–145)
SODIUM SERPL-SCNC: 131 MMOL/L — LOW (ref 135–145)
SODIUM SERPL-SCNC: 133 MMOL/L — LOW (ref 135–145)
SODIUM UR-SCNC: <30 MMOL/L — SIGNIFICANT CHANGE UP
SP GR SPEC: 1 — LOW (ref 1.01–1.02)
SPECIMEN SOURCE: SIGNIFICANT CHANGE UP
TSH SERPL-MCNC: 2.02 UIU/ML — SIGNIFICANT CHANGE UP (ref 0.27–4.2)
UROBILINOGEN FLD QL: NEGATIVE MG/DL — SIGNIFICANT CHANGE UP
UUN UR-MCNC: 95 MG/DL — SIGNIFICANT CHANGE UP
VANCOMYCIN FLD-MCNC: 21.3 UG/ML — SIGNIFICANT CHANGE UP
VANCOMYCIN TROUGH SERPL-MCNC: 27.5 UG/ML — CRITICAL HIGH (ref 10–20)
WBC # BLD: 20.66 K/UL — HIGH (ref 3.8–10.5)
WBC # BLD: 24.17 K/UL — HIGH (ref 3.8–10.5)
WBC # FLD AUTO: 20.66 K/UL — HIGH (ref 3.8–10.5)
WBC # FLD AUTO: 24.17 K/UL — HIGH (ref 3.8–10.5)
WBC UR QL: SIGNIFICANT CHANGE UP

## 2019-12-05 PROCEDURE — 99233 SBSQ HOSP IP/OBS HIGH 50: CPT

## 2019-12-05 PROCEDURE — 71045 X-RAY EXAM CHEST 1 VIEW: CPT | Mod: 26

## 2019-12-05 PROCEDURE — 99231 SBSQ HOSP IP/OBS SF/LOW 25: CPT

## 2019-12-05 PROCEDURE — 99232 SBSQ HOSP IP/OBS MODERATE 35: CPT

## 2019-12-05 RX ORDER — PIPERACILLIN AND TAZOBACTAM 4; .5 G/20ML; G/20ML
3.38 INJECTION, POWDER, LYOPHILIZED, FOR SOLUTION INTRAVENOUS EVERY 12 HOURS
Refills: 0 | Status: DISCONTINUED | OUTPATIENT
Start: 2019-12-05 | End: 2019-12-06

## 2019-12-05 RX ORDER — HYDROMORPHONE HYDROCHLORIDE 2 MG/ML
2 INJECTION INTRAMUSCULAR; INTRAVENOUS; SUBCUTANEOUS EVERY 6 HOURS
Refills: 0 | Status: DISCONTINUED | OUTPATIENT
Start: 2019-12-05 | End: 2019-12-10

## 2019-12-05 RX ORDER — SODIUM CHLORIDE 9 MG/ML
1000 INJECTION INTRAMUSCULAR; INTRAVENOUS; SUBCUTANEOUS
Refills: 0 | Status: DISCONTINUED | OUTPATIENT
Start: 2019-12-05 | End: 2019-12-07

## 2019-12-05 RX ORDER — HEPARIN SODIUM 5000 [USP'U]/ML
3200 INJECTION INTRAVENOUS; SUBCUTANEOUS EVERY 6 HOURS
Refills: 0 | Status: DISCONTINUED | OUTPATIENT
Start: 2019-12-05 | End: 2019-12-06

## 2019-12-05 RX ORDER — HEPARIN SODIUM 5000 [USP'U]/ML
1100 INJECTION INTRAVENOUS; SUBCUTANEOUS
Qty: 25000 | Refills: 0 | Status: DISCONTINUED | OUTPATIENT
Start: 2019-12-05 | End: 2019-12-06

## 2019-12-05 RX ORDER — HEPARIN SODIUM 5000 [USP'U]/ML
3200 INJECTION INTRAVENOUS; SUBCUTANEOUS ONCE
Refills: 0 | Status: COMPLETED | OUTPATIENT
Start: 2019-12-05 | End: 2019-12-05

## 2019-12-05 RX ADMIN — HYDROMORPHONE HYDROCHLORIDE 4 MILLIGRAM(S): 2 INJECTION INTRAMUSCULAR; INTRAVENOUS; SUBCUTANEOUS at 21:03

## 2019-12-05 RX ADMIN — TRAMADOL HYDROCHLORIDE 50 MILLIGRAM(S): 50 TABLET ORAL at 06:00

## 2019-12-05 RX ADMIN — Medication 325 MILLIGRAM(S): at 05:46

## 2019-12-05 RX ADMIN — Medication 325 MILLIGRAM(S): at 20:13

## 2019-12-05 RX ADMIN — TRAMADOL HYDROCHLORIDE 50 MILLIGRAM(S): 50 TABLET ORAL at 22:47

## 2019-12-05 RX ADMIN — HEPARIN SODIUM 1100 UNIT(S)/HR: 5000 INJECTION INTRAVENOUS; SUBCUTANEOUS at 09:42

## 2019-12-05 RX ADMIN — HYDROMORPHONE HYDROCHLORIDE 4 MILLIGRAM(S): 2 INJECTION INTRAMUSCULAR; INTRAVENOUS; SUBCUTANEOUS at 07:50

## 2019-12-05 RX ADMIN — Medication 0.25 MILLIGRAM(S): at 10:03

## 2019-12-05 RX ADMIN — Medication 100 MILLIGRAM(S): at 11:17

## 2019-12-05 RX ADMIN — Medication 325 MILLIGRAM(S): at 13:06

## 2019-12-05 RX ADMIN — GABAPENTIN 100 MILLIGRAM(S): 400 CAPSULE ORAL at 15:28

## 2019-12-05 RX ADMIN — TRAMADOL HYDROCHLORIDE 50 MILLIGRAM(S): 50 TABLET ORAL at 22:07

## 2019-12-05 RX ADMIN — TRAMADOL HYDROCHLORIDE 50 MILLIGRAM(S): 50 TABLET ORAL at 05:46

## 2019-12-05 RX ADMIN — Medication 975 MILLIGRAM(S): at 13:06

## 2019-12-05 RX ADMIN — Medication 10 MILLIGRAM(S): at 20:13

## 2019-12-05 RX ADMIN — GABAPENTIN 100 MILLIGRAM(S): 400 CAPSULE ORAL at 05:46

## 2019-12-05 RX ADMIN — Medication 975 MILLIGRAM(S): at 22:07

## 2019-12-05 RX ADMIN — Medication 975 MILLIGRAM(S): at 05:46

## 2019-12-05 RX ADMIN — Medication 0.25 MILLIGRAM(S): at 01:45

## 2019-12-05 RX ADMIN — HYDROMORPHONE HYDROCHLORIDE 4 MILLIGRAM(S): 2 INJECTION INTRAMUSCULAR; INTRAVENOUS; SUBCUTANEOUS at 06:55

## 2019-12-05 RX ADMIN — HYDROMORPHONE HYDROCHLORIDE 4 MILLIGRAM(S): 2 INJECTION INTRAMUSCULAR; INTRAVENOUS; SUBCUTANEOUS at 20:13

## 2019-12-05 RX ADMIN — SODIUM CHLORIDE 100 MILLILITER(S): 9 INJECTION INTRAMUSCULAR; INTRAVENOUS; SUBCUTANEOUS at 14:47

## 2019-12-05 RX ADMIN — PIPERACILLIN AND TAZOBACTAM 25 GRAM(S): 4; .5 INJECTION, POWDER, LYOPHILIZED, FOR SOLUTION INTRAVENOUS at 05:53

## 2019-12-05 RX ADMIN — PIPERACILLIN AND TAZOBACTAM 25 GRAM(S): 4; .5 INJECTION, POWDER, LYOPHILIZED, FOR SOLUTION INTRAVENOUS at 15:17

## 2019-12-05 RX ADMIN — PANTOPRAZOLE SODIUM 40 MILLIGRAM(S): 20 TABLET, DELAYED RELEASE ORAL at 05:47

## 2019-12-05 RX ADMIN — Medication 1 MILLIGRAM(S): at 11:17

## 2019-12-05 RX ADMIN — Medication 975 MILLIGRAM(S): at 14:05

## 2019-12-05 RX ADMIN — Medication 975 MILLIGRAM(S): at 12:42

## 2019-12-05 RX ADMIN — HEPARIN SODIUM 1200 UNIT(S)/HR: 5000 INJECTION INTRAVENOUS; SUBCUTANEOUS at 21:19

## 2019-12-05 RX ADMIN — Medication 1 TABLET(S): at 11:16

## 2019-12-05 RX ADMIN — HEPARIN SODIUM 3200 UNIT(S): 5000 INJECTION INTRAVENOUS; SUBCUTANEOUS at 09:44

## 2019-12-05 RX ADMIN — Medication 250 MILLIGRAM(S): at 05:46

## 2019-12-05 RX ADMIN — Medication 500 MILLIGRAM(S): at 11:17

## 2019-12-05 RX ADMIN — Medication 250 MILLIGRAM(S): at 18:04

## 2019-12-05 RX ADMIN — Medication 975 MILLIGRAM(S): at 22:47

## 2019-12-05 RX ADMIN — GABAPENTIN 100 MILLIGRAM(S): 400 CAPSULE ORAL at 20:12

## 2019-12-05 NOTE — PROGRESS NOTE ADULT - PROBLEM SELECTOR PLAN 1
s/p Right VATS decortication & washout on 11/27/19. Debridement of CT site & VAC placement  #2 chest tube remains in situ, maintain to WS  will send CT specimen for Bilirubin   Monitor chest tube drainage  Repeat chest xray in morning  Encourage the use of I/S, CDBE, OOB to chair, daily PT, ambulate as tolerated  Trend WBC/temps. If fever > blood cultures.  Added low dose Neurontin for pain/possible fibromyalgia  Wound VAC changed today  NPO p midnight for VATS washout tomorrow  CT C/A/P IV/PO completed + Collection noted   Discussed plan with Dr. Castanon & Fay

## 2019-12-05 NOTE — PROGRESS NOTE ADULT - ASSESSMENT
1) Hyponatremia; multifactorial in setting of low solute and increased free water intake, ? SIADH from pain  2) RONAK  3)Hyperkalemia- resolved  4) Rt empyema s/p chest tube      Pt with normal baseline kidney function; now with RONAK  Bladder scan without evidence of retention  Vanco trough elevated; Also s/p  IV contrast administration; likely multifactorial RONAK; ATN from Vancomycin and ? CHIQUITA  Obtain UA, urine lytes  Agree with IV fluids  serum sodium slightly worse at 130; restrict free water intake  Will follow  discussed with CTU PA

## 2019-12-05 NOTE — PROGRESS NOTE ADULT - SUBJECTIVE AND OBJECTIVE BOX
34 year old woman with no known major comorbidities (had not gone to see a doctor for 15 years) who presented to [facility] on 11/22/2019 with progressive sob, weakness, weight loss, She was found to have a large pleural effusion. Initially 1.5 L of dark serous fluid was removed.  Chest tube placed shortly thereafter, 3 additional liters was removed. CBC showed a leukocytosis of 40k and   CTA imaging revealed a 2.1cm partially cystic mass in the body/tail of the pancreas with possible upstream ductal dilatation. On 11/23/2019 the patient had a CT chest/abdomen/pelvis that found to have a clot in the right IJ and was started on a heparin gtt.     Transferred to [facility] on 11/24/19.  MRCP performed on 11/26.  Underwent Debridement, wound, with dressing replacement  Wound VAC placement  Pleural biopsy  Lung decortication  Bronchoscopy, with lung decortication using VATS on 11/27/19.  Pt  has a history of alcohol abuse.     We were consulted for evaluation and management of the RIJ DVT.  Clinically, she says that she is feeling somewhat better and is breathing comfortably on room air. Doppler study of her LEs was negative.   Drowsy from medication; otherwise, no new complaints.    MEDICATIONS  (STANDING):  cefTRIAXone   IVPB 1000 milliGRAM(s) IV Intermittent every 12 hours  folic acid 1 milliGRAM(s) Oral daily  heparin  Infusion 1100 Unit(s)/Hr (11 mL/Hr) IV Continuous <Continuous>  influenza   Vaccine 0.5 milliLiter(s) IntraMuscular once  lidocaine   Patch 1 Patch Transdermal daily  multivitamin 1 Tablet(s) Oral daily  polyethylene glycol 3350 17 Gram(s) Oral at bedtime  psyllium Powder 1 Packet(s) Oral two times a day  saccharomyces boulardii 250 milliGRAM(s) Oral two times a day  senna 2 Tablet(s) Oral at bedtime  thiamine 100 milliGRAM(s) Oral daily  ureNa 15 Gram(s) 15 Gram(s) Oral two times a day    MEDICATIONS  (PRN):  acetaminophen   Tablet .. 650 milliGRAM(s) Oral every 6 hours PRN Mild Pain (1 - 3)  HYDROmorphone  Injectable 0.5 milliGRAM(s) IV Push every 6 hours PRN Severe Pain persisting 1 hour after oral opioids  methocarbamol 500 milliGRAM(s) Oral every 6 hours PRN Muscle Spasm  oxycodone    5 mG/acetaminophen 325 mG 1 Tablet(s) Oral every 4 hours PRN Moderate Pain (4 - 6)  oxycodone    5 mG/acetaminophen 325 mG 2 Tablet(s) Oral every 4 hours PRN Severe Pain (7 - 10)  traMADol 50 milliGRAM(s) Oral every 6 hours PRN Moderate Pain (4 - 6)      Vital Signs Last 24 Hrs  T(C): 37.2 (29 Nov 2019 21:24), Max: 37.2 (29 Nov 2019 21:24)  T(F): 98.9 (29 Nov 2019 21:24), Max: 98.9 (29 Nov 2019 21:24)  HR: 99 (29 Nov 2019 21:24) (68 - 99)  BP: 98/60 (29 Nov 2019 21:24) (94/59 - 102/69)  BP(mean): --  RR: 19 (29 Nov 2019 21:24) (18 - 20)  SpO2: 100% (29 Nov 2019 21:24) (97% - 100%)  Gen: nad  Chest: chest tube right side, drain  CV: rrr  Abd: soft, nd, nt    12/5/19:  WBC 24.17,  H/HG 7.8/21.6, plt ct 575,000                        9.3    35.30 )-----------( 339      ( 29 Nov 2019 06:02 )             29.3     11-29    133<L>  |  100  |  5.0<L>  ----------------------------<  96  4.2   |  21.0<L>  |  0.42<L>    Ca    7.5<L>      29 Nov 2019 06:02  Phos  2.7     11-29  Mg     1.7     11-29    TPro  5.0<L>  /  Alb  1.9<L>  /  TBili  0.2<L>  /  DBili  0.1  /  AST  27  /  ALT  19  /  AlkPhos  211<H>  11-28

## 2019-12-05 NOTE — PROGRESS NOTE ADULT - SUBJECTIVE AND OBJECTIVE BOX
Subjective:  Pt in bed.  Noted brown colored fluid draining from old CT insertion site.  Pt c/o pain and feeling tired.  removed old VAC site noted puss in both open wounds right thoracic side. Spoke with Dr Castanon who looked at the wound and the plan is for the patient to go to the OR for a Rt VATS wash out in the am.  Pt is currently on an heparin gtt for which it will be d/c in the am prior to the OR.  pt is currently on NS at 100 cc / hr .  Afbrile      T(C): 36.5 (12-05-19 @ 04:57), Max: 37.2 (12-04-19 @ 15:41)  HR: 111 (12-05-19 @ 04:57) (108 - 148)  BP: 89/58 (12-05-19 @ 04:57) (89/58 - 102/64)  RR: 16 (12-05-19 @ 04:57) (16 - 17)  SpO2: 96% (12-05-19 @ 04:57) (93% - 99%) RA   Tele: ST     CHEST TUBE:     RT                           OUTPUT:     0per 24 hours    AIR LEAKS:  [ ] YES [x ] NO          12-05    131<L>  |  92<L>  |  13.0  ----------------------------<  99  4.3   |  23.0  |  2.17<H>    Ca    8.1<L>      05 Dec 2019 10:37  Phos  4.1     12-05  Mg     1.6     12-05    TPro  4.9<L>  /  Alb  1.8<L>  /  TBili  0.2<L>  /  DBili  0.1  /  AST  31  /  ALT  11  /  AlkPhos  218<H>  12-05                               7.8    24.17 )-----------( 575      ( 05 Dec 2019 06:17 )             24.6        PT/INR - ( 05 Dec 2019 06:17 )   PT: 16.3 sec;   INR: 1.40 ratio         PTT - ( 04 Dec 2019 06:46 )  PTT:45.0 sec         CAPILLARY BLOOD GLUCOSE               CXR:  < from: Xray Chest 1 View- PORTABLE-Routine (12.05.19 @ 05:57) >  RIGHT chest tube overlies RIGHT mid hemithorax.  The lungs  show opacities overlying the RIGHT lower hemithorax. A prior   CT scan showed a RIGHT upper hemithorax anterior extrapleural air fluid   collection.. This is not clearly appreciated on current chest radiograph   however follow-up radiographs and/or CT recommended to confirm resolution   of findings seen on 12/20/2019 chest radiograph.  LEFT medial basilar/paraspinal atelectasis noted.    The heart and mediastinum are within normal limits.    Visualized osseous structures are intact.        IMPRESSION:   No interval change..   Please refer to CT scan 12/4/2019   for further evaluation of lung parenchyma.    < end of copied text >          Assessment  Neuro:  Alert Awake NAD   Pulm:  Decreased at bases b/l R > L + CT Rt side    CV: Tachy S1 S2   Abd:  soft mild distention + BS   Extremities:  trace edema b/l LE   Inc:  Rt Thoracic incision.  + ozzing noted , puss with erythema no odor         Assessment:  34yFemale    with PAST MEDICAL & SURGICAL HISTORY:        Plan:

## 2019-12-05 NOTE — PROGRESS NOTE ADULT - PROBLEM SELECTOR PLAN 4
Heme consult appreciated  Follow up with heme as out patient.  Will discharge home on Coumadin on hold right now for OR in am   On Hep gtt - will be d/c at 2 am 12/6 as per Dr Beach

## 2019-12-05 NOTE — PROGRESS NOTE ADULT - SUBJECTIVE AND OBJECTIVE BOX
[facility] DIVISION OF KIDNEY DISEASES AND HYPERTENSION -- FOLLOW UP NOTE  --------------------------------------------------------------------------------  Chief Complaint: RONAK, hyponatremia    24 hour events/subjective:  s/p CT abdomen with IV contrast  Scr rising  Pt seen and examined; no acute complaint      PAST HISTORY  --------------------------------------------------------------------------------  No significant changes to PMH, PSH, FHx, SHx, unless otherwise noted    ALLERGIES & MEDICATIONS  --------------------------------------------------------------------------------  Allergies    No Known Allergies    Intolerances    Ensure TID (Unknown)    Standing Inpatient Medications  acetaminophen   Tablet .. 975 milliGRAM(s) Oral every 8 hours  amitriptyline 10 milliGRAM(s) Oral at bedtime  ascorbic acid 500 milliGRAM(s) Oral daily  ferrous    sulfate 325 milliGRAM(s) Oral three times a day  folic acid 1 milliGRAM(s) Oral daily  gabapentin 100 milliGRAM(s) Oral three times a day  heparin  Infusion. 1100 Unit(s)/Hr IV Continuous <Continuous>  lidocaine   Patch 1 Patch Transdermal daily  multivitamin 1 Tablet(s) Oral daily  pantoprazole    Tablet 40 milliGRAM(s) Oral before breakfast  piperacillin/tazobactam IVPB.. 3.375 Gram(s) IV Intermittent every 8 hours  saccharomyces boulardii 250 milliGRAM(s) Oral two times a day  sodium chloride 0.9%. 1000 milliLiter(s) IV Continuous <Continuous>  thiamine 100 milliGRAM(s) Oral daily  traMADol 50 milliGRAM(s) Oral every 8 hours    PRN Inpatient Medications  ALPRAZolam 0.25 milliGRAM(s) Oral every 8 hours PRN  heparin  Injectable 3200 Unit(s) IV Push every 6 hours PRN  HYDROmorphone   Tablet 4 milliGRAM(s) Oral every 6 hours PRN  HYDROmorphone   Tablet 2 milliGRAM(s) Oral every 6 hours PRN  methocarbamol 500 milliGRAM(s) Oral every 6 hours PRN      REVIEW OF SYSTEMS  --------------------------------------------------------------------------------  Gen: No weight changes, fatigue, fevers/chills, weakness  Skin: No rashes  Head/Eyes/Ears/Mouth: No headache; Normal hearing; Normal vision w/o blurriness  Respiratory: No dyspnea, cough, wheezing, hemoptysis  CV: No chest pain, PND, orthopnea  GI: No abdominal pain, diarrhea, constipation, nausea, vomiting  : No increased frequency, dysuria, hematuria, nocturia  MSK: No joint pain/swelling; no back pain; no edema  Neuro: No dizziness/lightheadedness, weakness, seizures,  Heme: No easy bruising or bleeding  Endo: No heat/cold intolerance  Psych: No significant nervousness, anxiety, stress, depression    All other systems were reviewed and are negative, except as noted.    VITALS/PHYSICAL EXAM  --------------------------------------------------------------------------------  T(C): 36.5 (12-05-19 @ 04:57), Max: 37.2 (12-04-19 @ 15:41)  HR: 111 (12-05-19 @ 04:57) (108 - 148)  BP: 89/58 (12-05-19 @ 04:57) (89/58 - 102/64)  RR: 16 (12-05-19 @ 04:57) (16 - 17)  SpO2: 96% (12-05-19 @ 04:57) (93% - 99%)  Wt(kg): --        12-04-19 @ 07:01  -  12-05-19 @ 07:00  --------------------------------------------------------  IN: 2670 mL / OUT: 1400 mL / NET: 1270 mL    12-05-19 @ 07:01  -  12-05-19 @ 14:13  --------------------------------------------------------  IN: 240 mL / OUT: 400 mL / NET: -160 mL      Physical Exam:  	Gen:thin, pale  	HEENT:  supple neck  	Pulm: Rt chest tube  	CV: RRR, S1S2; no rub  	Back: No spinal or CVA tenderness; no sacral edema  	Abd: +BS, soft, nontender/nondistended  	: No suprapubic tenderness  	UE: Warm, no edema; no asterixis  	LE: Warm,  no edema  	Neuro: No focal deficits  	Psych: Normal affect and mood  	Skin: Warm, without rashes  	Vascular access: none    LABS/STUDIES  --------------------------------------------------------------------------------              7.8    24.17 >-----------<  575      [12-05-19 @ 06:17]              24.6     131  |  92  |  13.0  ----------------------------<  99      [12-05-19 @ 10:37]  4.3   |  23.0  |  2.17        Ca     8.1     [12-05-19 @ 10:37]      Mg     1.6     [12-05-19 @ 06:17]      Phos  4.1     [12-05-19 @ 06:17]    TPro  4.9  /  Alb  1.8  /  TBili  0.2  /  DBili  0.1  /  AST  31  /  ALT  11  /  AlkPhos  218  [12-05-19 @ 06:17]    PT/INR: PT 16.3 , INR 1.40       [12-05-19 @ 06:17]  PTT: 45.0       [12-04-19 @ 06:46]      Creatinine Trend:  SCr 2.17 [12-05 @ 10:37]  SCr 2.11 [12-05 @ 06:17]  SCr 0.30 [12-04 @ 06:46]  SCr 0.32 [12-03 @ 13:28]  SCr 0.34 [12-03 @ 06:24]    Urinalysis - [12-04-19 @ 10:24]      Color Yellow / Appearance Clear / SG 1.010 / pH 6.0      Gluc Negative / Ketone Trace  / Bili Negative / Urobili Negative       Blood Moderate / Protein 30 / Leuk Est Negative / Nitrite Negative      RBC 0-2 / WBC 0-2 / Hyaline  / Gran  / Sq Epi  / Non Sq Epi Negative / Bacteria Negative    Urine Sodium <30      [12-03-19 @ 04:19]  Urine Osmolality 181      [12-03-19 @ 04:20]    Iron 23, TIBC 104, %sat 22      [12-04-19 @ 06:46]  Vitamin D (25OH) 21.9      [12-04-19 @ 17:51]  TSH 2.20      [12-04-19 @ 06:46]  Lipid: chol 68, , HDL 27, LDL 12      [11-25-19 @ 05:29]    HIV Nonreact      [11-25-19 @ 14:08]    ROMAN: titer Negative, pattern --      [11-26-19 @ 18:05]  dsDNA <12      [11-26-19 @ 18:05]  Rheumatoid Factor <10      [11-26-19 @ 17:34]  ANCA: cANCA Negative, pANCA Negative, atypical ANCA Negative      [11-26-19 @ 18:05]

## 2019-12-05 NOTE — PROGRESS NOTE ADULT - SUBJECTIVE AND OBJECTIVE BOX
HPI:  Patient is a 34y old  Female who presents with a chief complaint of SOB (26 Nov 2019 15:02)  She was transferred from an OSH for management of empyema. Now s/p VATS, lung decortication, pleural biopsy on 11/27/19. She reported worsening pain on 12/02/19, requiring almost every PRN dose of Oxycodone IR. She was transitioned to Tramadol TID ATC, Elavil qhs and oral Dilaudid yesterday. Per MAR, she has used only 3 doses of PRN Dilaudid.      VERBAL REPORT: "I'm doing good, just some abdominal pain."  Patient denies constipation. She reports intermittent daytime drowsiness, but would like to continue with current regimen.  PAIN SCORE: 3/10 with activity                 SCALE USED: VNRS    Allergies  No Known Allergies      PAST MEDICAL & SURGICAL HISTORY:      MEDICATIONS  (STANDING):  acetaminophen   Tablet .. 975 milliGRAM(s) Oral every 8 hours  amitriptyline 10 milliGRAM(s) Oral at bedtime  ascorbic acid 500 milliGRAM(s) Oral daily  ferrous    sulfate 325 milliGRAM(s) Oral three times a day  folic acid 1 milliGRAM(s) Oral daily  gabapentin 100 milliGRAM(s) Oral three times a day  heparin  Infusion. 1100 Unit(s)/Hr (11 mL/Hr) IV Continuous <Continuous>  lidocaine   Patch 1 Patch Transdermal daily  multivitamin 1 Tablet(s) Oral daily  pantoprazole    Tablet 40 milliGRAM(s) Oral before breakfast  piperacillin/tazobactam IVPB.. 3.375 Gram(s) IV Intermittent every 8 hours  saccharomyces boulardii 250 milliGRAM(s) Oral two times a day  sodium chloride 0.9%. 1000 milliLiter(s) (100 mL/Hr) IV Continuous <Continuous>  thiamine 100 milliGRAM(s) Oral daily  traMADol 50 milliGRAM(s) Oral every 8 hours    MEDICATIONS  (PRN):  ALPRAZolam 0.25 milliGRAM(s) Oral every 8 hours PRN anxiety  heparin  Injectable 3200 Unit(s) IV Push every 6 hours PRN For aPTT less than 40  HYDROmorphone   Tablet 4 milliGRAM(s) Oral every 6 hours PRN Severe Pain (7 - 10)  HYDROmorphone   Tablet 2 milliGRAM(s) Oral every 6 hours PRN Moderate Pain (4 - 6)  methocarbamol 500 milliGRAM(s) Oral every 6 hours PRN Muscle Spasm      PHYSICAL EXAM:  GENERAL: NAD, well-developed  HEAD:  Atraumatic, Normocephalic  EYES: EOMI, PERRLA, conjunctiva and sclera clear  NERVOUS SYSTEM:  Alert & Oriented X3, Good concentration; Motor Strength 5/5 B/L upper and lower extremities  CHEST/LUNG: Clear speech, no SOB evident at rest; +right sided chest tube  ABDOMEN: Nondistended; Bowel sounds present  SKIN: Resolving bruise on the interior aspect of right elbow      Vital Signs Last 24 Hrs  T(C): 36.5 (05 Dec 2019 04:57), Max: 37.2 (04 Dec 2019 15:41)  T(F): 97.7 (05 Dec 2019 04:57), Max: 98.9 (04 Dec 2019 15:41)  HR: 111 (05 Dec 2019 04:57) (108 - 148)  BP: 89/58 (05 Dec 2019 04:57) (89/58 - 108/70)  BP(mean): --  RR: 16 (05 Dec 2019 04:57) (16 - 18)  SpO2: 96% (05 Dec 2019 04:57) (93% - 99%)                          7.8    24.17 )-----------( 575      ( 05 Dec 2019 06:17 )             24.6       LIVER FUNCTIONS - ( 05 Dec 2019 06:17 )  Alb: 1.8 g/dL / Pro: 4.9 g/dL / ALK PHOS: 218 U/L / ALT: 11 U/L / AST: 31 U/L / GGT: x             PT/INR - ( 05 Dec 2019 06:17 )   PT: 16.3 sec;   INR: 1.40 ratio     PTT - ( 04 Dec 2019 06:46 )  PTT:45.0 sec      Pain Service Signs off  Text Page via [employer]  PIN: [phone]  Call again as needed

## 2019-12-05 NOTE — PROGRESS NOTE ADULT - ASSESSMENT
This 34 y.o. F who was admitted to [facility] 11/22 with weakness,   found with large right pleural effusion   s/p chest tube drainage    Hyponatremia - resolving  WBC elevation   alpha hemolytic strep infection  multi focal PNA  infected pleural effusion - empyema  Pancreatic cyst    - Legionella Ag from [facility] 11/24/19 - negative, off azithromycin  - remainder of cultures from [facility] are negative    s/p VATS 11/27/19  - cultures remain negative    - WBC elevation   markedly elevated; improving    - Chest tube insertion site with drainage.  CULTURES SENT.; will follow    Labs on 12/5/19 with ACUTE renal failure,.  Cr from 0.3 to 2  - Vanco held  - level elevated, will trend  - watch renal function closely  - IVF hydration    - blood culture x 2 sets 12/3/19,will follow  - HOLD Vancomycin   - continue Zosyn 3.375 grams Q 8H    - defer midline until WBC resolving/ resolved  - will follow up on cultures.

## 2019-12-05 NOTE — PROGRESS NOTE ADULT - ASSESSMENT
34 year old female with no known major comorbidities who is being treated for right pleural effusion/empyema/multifocal pna found to have a RIJ thrombus currently on heparin drip.    1) RIJ venous thrombosis - I feel that this is a provoked event that occurred in context of her severe and complex lung illness/infection/empyema that may have led to developing the thrombus. I agree with continuing anticoagulation and for a duration of at least 3 months. I feel that coumadin or a DOAC can be the agent of choice. If no contraindication from cardiothoracic perspective I would favor a DOAC. The patient said that she would have a difficult time adjusting her diet and that she eats salads/greens "all the time" which would make keeping her INR between 2-3 more difficult. She said that she would prefer not have to have INR checks and modify her diet if not necessary.   Reactive leukocytosis improving  Multifactorial anemia/reactive thrombocytosis-check iron studies

## 2019-12-05 NOTE — PROGRESS NOTE ADULT - PROBLEM SELECTOR PLAN 1
Presently well managed  1. Offer Dilaudid 2mg PO q6hrs PRN moderate pain  - Continue Dilaudid 4mg PO q6hrs PRN severe pain  --- Discontinue higher dose after chest tube removal  2. Continue Tylenol 975mg PO TID x3 days ATC  - Offer PRN mild pain thereafter  3. Continue Robaxin 500mg PO q6hrs PRN spasms.

## 2019-12-05 NOTE — PROGRESS NOTE ADULT - SUBJECTIVE AND OBJECTIVE BOX
[facility]  INFECTIOUS DISEASES AND INTERNAL MEDICINE at [place]  =======================================================  Carlos Alberto Soto MD  Diplomates American Board of Internal Medicine and Infectious Diseases  Telephone [phone]  Fax            [phone]  =======================================================    [facility]-[id number]  HERACLIO MOLINA   follow up:  infected pleural effusion, empyema    s/p VATS on 11/27/19  cultures from OR in process, negative so far    WBC down trending today; no fevers.  Cultures in process    s/p CT scan on 12/4/19:  Interval improvement in bilateral airspace disease, greater on the right since 11/25/2019.  Interlobular septal thickening lungs, likely pulmonary edema.  Small right hydropneumothorax with an interval increase in pleural fluid since 11/25/2019.  Partially imaged nonocclusive deep venous thrombosis in the right internal jugular vein.  Interval increase in size and number of low-attenuation lesions in the pancreas since 11/26/2019 and new cystic lesions at the medial aspect of the caudate and posterior aspect of the left hepatic lobe of uncertain etiology; the differential includes pancreatitis with associated acute peripancreatic fluid collections; correlation is recommended with pancreatic enzymes.   Region of low density measuring 4.3 cm to the left of the distal esophagus, likely a fluid collection and  infiltration of the fat at the posterior aspect of the left kidney which if there is pancreatitis could also be related to the underlying pancreatitis; follow-up is recommended.      I have personally reviewed the labs and data; pertinent labs and data are listed in this note; please see below.   =======================================================    REVIEW OF SYSTEMS:  CONSTITUTIONAL:  No Fever or chills  HEENT:  No diplopia or blurred vision.  No earache, sore throat or runny nose.  CARDIOVASCULAR:  No pressure, squeezing, strangling, tightness, heaviness or aching about the chest, neck, axilla or epigastrium.  RESPIRATORY:  No cough, shortness of breath  GASTROINTESTINAL:  No nausea, vomiting or diarrhea.  GENITOURINARY:  No dysuria, frequency or urgency. No Blood in urine  MUSCULOSKELETAL:  no joint aches, no muscle pain  SKIN:  No change in skin, hair or nails.  NEUROLOGIC:  No Headaches, seizures or weakness.  PSYCHIATRIC:  No disorder of thought or mood.  ENDOCRINE:  No heat or cold intolerance  HEMATOLOGICAL:  No easy bruising or bleeding.   =======================================================  Allergies  No Known Allergies    Antibiotics:  piperacillin/tazobactam IVPB.. 3.375 Gram(s) IV Intermittent every 8 hours    Other medications:  acetaminophen   Tablet .. 975 milliGRAM(s) Oral every 8 hours  amitriptyline 10 milliGRAM(s) Oral at bedtime  ascorbic acid 500 milliGRAM(s) Oral daily  ferrous    sulfate 325 milliGRAM(s) Oral three times a day  folic acid 1 milliGRAM(s) Oral daily  gabapentin 100 milliGRAM(s) Oral three times a day  heparin  Infusion. 1100 Unit(s)/Hr IV Continuous <Continuous>  lidocaine   Patch 1 Patch Transdermal daily  multivitamin 1 Tablet(s) Oral daily  pantoprazole    Tablet 40 milliGRAM(s) Oral before breakfast  saccharomyces boulardii 250 milliGRAM(s) Oral two times a day  thiamine 100 milliGRAM(s) Oral daily  traMADol 50 milliGRAM(s) Oral every 8 hours    ======================================================  Physical Exam:  ============  T(F): 98.7 (04 Dec 2019 05:07), Max: 98.7 (04 Dec 2019 05:07)  HR: 114 (04 Dec 2019 05:07)  BP: 99/66 (04 Dec 2019 05:07)  RR: 19 (04 Dec 2019 05:07)  SpO2: 99% (04 Dec 2019 05:07) (93% - 99%)  temp max in last 48H T(F): , Max: 98.7 (12-04-19 @ 05:07)    General:  No acute distress.   Eye: Pupils are equal, round and reactive to light, Extraocular movements are intact, Normal conjunctiva.  HENT: Normocephalic, Oral mucosa is moist, No pharyngeal erythema, No sinus tenderness.  Neck: Supple, No lymphadenopathy.  Respiratory: decreased breath sounds right lung  RIGHT chest tube in place  Cardiovascular: Normal rate, Regular rhythm,   TRACE edema of legs  Gastrointestinal: Soft, Non-tender, Non-distended, Normal bowel sounds.  Genitourinary: No costovertebral angle tenderness.  Lymphatics: No lymphadenopathy neck,   Musculoskeletal: Normal range of motion, Normal strength.  Integumentary: No rash.  Neurologic: Alert, Oriented, No focal deficits, Cranial Nerves II-XII are grossly intact.  Psychiatric: Appropriate mood & affect.    =======================================================  11/22/19 blood cx negative x 2  11/22/19 - body fluid cx with Moderate Alpha hemolytic strep x 2 sets  Alpha hemolytic strep  		[facility]	E Test  Ceftriaxone  			S (0.19)   Clindamycin 	S    Erythromycin 	S   Penicillin 	S   Vancomycin 	S     ==================================     EXAM:  CT ABDOMEN AND PELVIS OC IC                         EXAM:  CT CHEST IC                          PROCEDURE DATE:  12/04/2019          INTERPRETATION:  CLINICAL INFORMATION: Postop. Persistent elevated white   count.    COMPARISON: CT chest 11/25/2019 and MRI abdomen 11/26/2019 and upper   extremity venous Doppler 11/28/2019    PROCEDURE:   CT of the Chest, Abdomen and Pelvis was performed with intravenous   contrast.   Intravenous contrast: 94 ml Omnipaque 300.  Oral contrast: Positive contrast was administered.  Sagittal and coronal reformats were performed.    FINDINGS:    CHEST:     LUNGS AND LARGE AIRWAYS: Patent central airways. Interval improvement in   airspace opacities in the lungs since 11/25/2019. Interlobular septal   thickening in the right lung inferior aspect of the left lower lobe,   likely pulmonary edema.  PLEURA: Right chest tube with a small right hydropneumothorax with an   interval increase in the amount of fluid in the pleural space since   11/25/2019. There is enhancement of the pleura which can be related to   infection or the prior instrumentation.  VESSELS: Thoracic aorta normal in caliber. Partially imaged nonocclusive   thrombus in the right internal jugular vein.  HEART: Heart size is normal. No pericardial effusion.  MEDIASTINUM AND TETO: Multiple subcentimeter mediastinal lymph nodes.   There is a region of low attenuation with high attenuation peripherally   to the left of the distal esophagus (series 3 image 107) measuring 4.3 x   1.8 cm, likely a fluid collection.  CHEST WALL AND LOWER NECK: No enlarged axillary lymph nodes.    ABDOMEN AND PELVIS:    LIVER: 2 new cystic foci, one at the medial aspect of the caudate and the   second in the posterior aspect of the lateral segment left hepatic lobe   with the larger at the medial aspect of the caudate measuring 1.9 x 1.8   cm, new.  BILE DUCTS: Normal caliber.  GALLBLADDER: Within normal limits.  SPLEEN: Within normal limits.  PANCREAS: Low-attenuation/cystic foci pancreas appear increase in size   and number since 11/26/2019 including a 1.6 x 1.3 cm lesion in the   pancreatic body, not previously seen 1.2 x 1.1 cm lesion in the   pancreatic head, previously 0.8 x 0.3 cm.  ADRENALS: Within normal limits.  KIDNEYS/URETERS: No hydronephrosis. Heterogeneous enhancement with no   focal lesion which may be related to the phase of contrast. Infiltration   of the fat at the posterior aspect of the left kidney.    BLADDER: Within normal limits.  REPRODUCTIVE ORGANS: Uterus and adnexa within normal limits.    BOWEL: No bowel obstruction. No evidence of appendicitis.  PERITONEUM: Trace pelvic free fluid.  VESSELS: Abdominal aorta normal in caliber.  RETROPERITONEUM/LYMPH NODES: No lymphadenopathy.    ABDOMINAL WALL: Anasarca.  BONES: No aggressive osseous lesion.    IMPRESSION:     Interval improvement in bilateral airspace disease, greater on the right since 11/25/2019.    Interlobular septal thickening lungs, likely pulmonary edema.    Small right hydropneumothorax with an interval increase in pleural fluid since 11/25/2019.    Partially imaged nonocclusive deep venous thrombosis in the right internal jugular vein.    Interval increase in size and number of low-attenuation lesions in the pancreas since 11/26/2019 and new cystic lesions at the medial aspect of the caudate and posterior aspect of the left hepatic lobe of uncertain etiology; the differential includes pancreatitis with associated acute peripancreatic fluid collections; correlation is recommended with pancreatic enzymes.     Region of low density measuring 4.3 cm to the left of the distal esophagus, likely a fluid collection and  infiltration of the fat at the posterior aspect of the left kidney which if there is pancreatitis could also be related to the underlying pancreatitis; follow-up is recommended.        MAIE RAMIREZ   This document has been electronically signed. Dec  4 2019  3:07PM      ======================================================  Labs:                        7.8    24.17 )-----------( 575      ( 05 Dec 2019 06:17 )             24.6       WBC Count: 24.17 K/uL (12-05-19 @ 06:17)  WBC Count: 27.94 K/uL (12-04-19 @ 06:46)  WBC Count: 39.58 K/uL (12-03-19 @ 10:10)  WBC Count: 43.01 K/uL (12-03-19 @ 06:24)  WBC Count: 19.33 K/uL (12-02-19 @ 06:11)  WBC Count: 26.95 K/uL (12-01-19 @ 06:38)      12-05    130<L>  |  92<L>  |  13.0  ----------------------------<  91  3.9   |  23.0  |  2.11<H>  Creatinine, Serum: 2.11 mg/dL (12-05-19 @ 06:17)  Creatinine, Serum: 0.30 mg/dL (12-04-19 @ 06:46)  Creatinine, Serum: 0.32 mg/dL (12-03-19 @ 13:28)  Creatinine, Serum: 0.34 mg/dL (12-03-19 @ 06:24)  Creatinine, Serum: 0.33 mg/dL (12-02-19 @ 06:11)  Creatinine, Serum: 0.31 mg/dL (12-01-19 @ 06:38)      Ca    8.0<L>      05 Dec 2019 06:17  Phos  4.1     12-05  Mg     1.6     12-05    TPro  4.9<L>  /  Alb  1.8<L>  /  TBili  0.2<L>  /  DBili  0.1  /  AST  31  /  ALT  11  /  AlkPhos  218<H>  12-05      Culture - Other (collected 12-03-19 @ 14:03)  Source: .Other chest tube insertion site (pus/purulent)  Final Report (12-05-19 @ 09:15):    No growth at 2 days.    Culture - Acid Fast - Tissue w/Smear (collected 11-28-19 @ 20:40)  Source: .Tissue chest wound    Culture - Fungal, Tissue (collected 11-27-19 @ 20:12)  Source: .Tissue chest wound    Culture - Tissue with Gram Stain (collected 11-27-19 @ 20:12)  Source: .Tissue chest wound  Gram Stain (11-27-19 @ 21:27):    Few White blood cells    No organisms seen  Final Report (12-02-19 @ 16:00):    No growth at 5 days.    Culture - Blood (collected 11-25-19 @ 05:20)  Source: .Blood  Final Report (11-30-19 @ 07:00):    No growth at 5 days.    Vancomycin Level, Trough (12.04.19 @ 20:59)    Vancomycin Level, Trough: 32.1: TYPE:(C=Critical, N=Notification, A=Abnormal) C  TESTS: VANCOT  DATE/TIME CALLED: 12/04/19 21:35  CALLED TO: ASHLEY CARPENTER  READ BACK (2 Patient Identifiers)(Y/N): Y  READ BACK VALUES (Y/N): Y  CALLED BY: CT  Vancomycin trough levels should be rapidly reached and maintained at  15-20 ug/ml for life threatening MRSA  infections such as sepsis, endocarditis, osteomyelitis and pneumonia. A  first trough level should be drawn  before the 3rd or 4th dose.  Risk of renal toxicity is increased for levels >15 ug/ml, in patients on  other nephrotoxic drugs, who are  hemodynamically unstable, have unstable renal function, or are on  Vancomycin therapy for >14 days. Renal function with  creatinine levels should be monitored for those patients. ug/mL        Culture - Blood (collected 11-25-19 @ 05:19)  Source: .Blood  Final Report (11-30-19 @ 07:00):    No growth at 5 days.

## 2019-12-05 NOTE — PROGRESS NOTE ADULT - ASSESSMENT
34 year old female who transferred from [facility] to [facility] on 11/22 for complaints of weakness, 25 pound weight loss & flu like symptoms & found to have a large right pleural effusion. Pt. has a PMH of empyema with group A hemolytic strep & has 4-5 drinks of alcohol per day.  On 11/23 CT of the chest/abd/pelvis indicated a clot in the Right IJ. Pt. on heparin bridge to coumadin. CT abdomen shows cystic mass in body/tail of pancreas, now concern for postop ileus.    Post-op course:  Pt s/p right VATS decort & washout debridement of CT site & VAC placement on 11/27/19.       Coumadin started for RIJ thrombus x 2 days then INR spike > held, postop anemia > on iron, folate, vit c. Today, patient had elevated WBC, hyperkalemia, hypomagnesemia and reactive thrombocytosis. Patient remains afebrile and pain complaints appear constant. Psych and Pain evaluation requested. Kayexalate ordered for hyperkalemia but held for large BM and recheck bmp.

## 2019-12-05 NOTE — PROGRESS NOTE ADULT - SUBJECTIVE AND OBJECTIVE BOX
Repeat thyroid function testing normal; prior abnormality either not significant or associated with transient effects of illness. No rx needed.

## 2019-12-05 NOTE — PROGRESS NOTE ADULT - SUBJECTIVE AND OBJECTIVE BOX
Patient seen and examined . S/p  , POD #     CC :     HPI:  Comes in to [facility]   with weakness, 25lb weight loss of several months, flu like symptoms, on work up found to have a large right pleural effusion.  Pulmonology contact for thoracentesis, 1.5 L of dark serous fluid was removed.  Chest tube placed shortly thereafter, 3 liters was removed at that point in time. Patient had some abnormalities in their lab work, found to have a leukocytosis with wbc of 40, platelet count of 793, Na 120, chloride 73, lactate of 4.7, amylase 268,  and CTA imaging also revealed a 2.1cm partially cystic mass in the body/tail of the pancreas with possible upstream ductal dilatation.  patient had CT chest/abdomen/pelvis and was also found to have a clot in the right IJ and was started on a heparin gtt. Patient does state that she has had a recent cough, and complains of right sided neck pain.  Patient transferred to [facility]. (2019 00:10)      PAST MEDICAL & SURGICAL HISTORY:      MEDICATIONS  (STANDING):  acetaminophen   Tablet .. 975 milliGRAM(s) Oral every 8 hours  amitriptyline 10 milliGRAM(s) Oral at bedtime  ascorbic acid 500 milliGRAM(s) Oral daily  ferrous    sulfate 325 milliGRAM(s) Oral three times a day  folic acid 1 milliGRAM(s) Oral daily  gabapentin 100 milliGRAM(s) Oral three times a day  heparin  Infusion. 1100 Unit(s)/Hr (11 mL/Hr) IV Continuous <Continuous>  lidocaine   Patch 1 Patch Transdermal daily  multivitamin 1 Tablet(s) Oral daily  pantoprazole    Tablet 40 milliGRAM(s) Oral before breakfast  piperacillin/tazobactam IVPB.. 3.375 Gram(s) IV Intermittent every 8 hours  saccharomyces boulardii 250 milliGRAM(s) Oral two times a day  sodium chloride 0.9%. 1000 milliLiter(s) (100 mL/Hr) IV Continuous <Continuous>  thiamine 100 milliGRAM(s) Oral daily  traMADol 50 milliGRAM(s) Oral every 8 hours    MEDICATIONS  (PRN):  ALPRAZolam 0.25 milliGRAM(s) Oral every 8 hours PRN anxiety  heparin  Injectable 3200 Unit(s) IV Push every 6 hours PRN For aPTT less than 40  HYDROmorphone   Tablet 4 milliGRAM(s) Oral every 6 hours PRN Severe Pain (7 - 10)  HYDROmorphone   Tablet 2 milliGRAM(s) Oral every 6 hours PRN Moderate Pain (4 - 6)  methocarbamol 500 milliGRAM(s) Oral every 6 hours PRN Muscle Spasm      LABS:                          7.8    24.17 )-----------( 575      ( 05 Dec 2019 06:17 )             24.6     12-    131<L>  |  92<L>  |  13.0  ----------------------------<  99  4.3   |  23.0  |  2.17<H>    Ca    8.1<L>      05 Dec 2019 10:37  Phos  4.1     12-  Mg     1.6     12-    TPro  4.9<L>  /  Alb  1.8<L>  /  TBili  0.2<L>  /  DBili  0.1  /  AST  31  /  ALT  11  /  AlkPhos  218<H>  12-05    PT/INR - ( 05 Dec 2019 06:17 )   PT: 16.3 sec;   INR: 1.40 ratio      Vancomycin Level, Trough (12.04.19 @ 20:59)    Vancomycin Level, Trough: 32.1: TYPE:(C=Critical, N=Notification, A=Abnormal) C    Vancomycin Level, Trough (12.05.19 @ 10:37)    Vancomycin Level, Trough: 27.5: TYPE:(C=Critical, N=Notification, A=Abnormal) C    Vitamin D, 25-Hydroxy (12.04.19 @ 17:51)    Vitamin D, 25-Hydroxy: 21.9: VITD Interpretive Data Result:  30.0-80.0 ng/mL Optimal levels (Reference Range)  >80.0 ng/mL Toxicity possible  20.0-29.0 ng/mL Insufficiency  10.0-19.0 ng/mL Mild to Moderate Deficiency  <10.0 ng/mL Severe Deficiency  Optimal levels for 25-Hydroxy vitamin D are 30.0 ng/mL and above based  upon the Endocrine Society guidelines 2011.  However, there is a lack of  consensus on this and the [place] [employer] recommends 20.0 ng/mL and  above as optimal levels.  Vitamin D results may vary depending on the  method of analysis.  The Roche joy [id number] electrochemiluminescent  immunoassay method measures both D2 and D3. ng/mL    Vitamin B12, Serum in AM (19 @ 06:46)    Vitamin B12, Serum: 584 pg/mL               Culture - Tissue with Gram Stain (19 @ 20:12)    Gram Stain:   Few White blood cells  No organisms seen    Specimen Source: .Tissue chest wound    Culture Results:   No growth at 5 days.    PTT - ( 04 Dec 2019 06:46 )  PTT:45.0 sec  Urinalysis Basic - ( 04 Dec 2019 10:24 )    Ova and Parasites (19 @ 16:28)    Culture Results:   Testing in progress    Culture - Urine (19 @ 10:24)    Specimen Source: .Urine    Culture Results:   No growth    Culture - Other (19 @ 14:03)    Specimen Source: .Other chest tube insertion site (pus/purulent)    Culture Results:   No growth at 2 days.    Culture - Blood (19 @ 05:19)    Specimen Source: .Blood    Culture Results:   No growth at 5 days.      Color: Yellow / Appearance: Clear / S.010 / pH: x  Gluc: x / Ketone: Trace  / Bili: Negative / Urobili: Negative mg/dL   Blood: x / Protein: 30 mg/dL / Nitrite: Negative   Leuk Esterase: Negative / RBC: 0-2 /HPF / WBC 0-2   Sq Epi: x / Non Sq Epi: Negative / Bacteria: Negative        RADIOLOGY & ADDITIONAL TESTS:    < from: Xray Chest 1 View- PORTABLE-Routine (19 @ 05:57) >     EXAM:  XR CHEST PORTABLE ROUTINE 1V                          PROCEDURE DATE:  2019          INTERPRETATION:  Portable chest radiograph        CLINICAL INFORMATION: Status post VATS procedure. Follow-up.    TECHNIQUE:  Portable  AP view of the chest was obtained.    COMPARISON: 2019 available for review.    FINDINGS: There is notable change.  RIGHT chest tube overlies RIGHT mid hemithorax.  The lungs  show opacities overlying the RIGHT lower hemithorax. A prior   CT scan showed a RIGHT upper hemithorax anterior extrapleural air fluid   collection.. This is not clearly appreciated on current chest radiograph   however follow-up radiographs and/or CT recommended to confirm resolution   of findings seen on 2019 chest radiograph.  LEFT medial basilar/paraspinal atelectasis noted.    The heart and mediastinum are within normal limits.    Visualized osseous structures are intact.        IMPRESSION:   No interval change..   Please refer to CT scan 2019   for further evaluation of lung parenchyma.    < end of copied text >    < from: CT Chest w/ IV Cont (19 @ 12:50) >     EXAM:  CT ABDOMEN AND PELVIS OC IC                         EXAM:  CT CHEST IC                          PROCEDURE DATE:  2019          INTERPRETATION:  CLINICAL INFORMATION: Postop. Persistent elevated white   count.    COMPARISON: CT chest 2019 and MRI abdomen 2019 and upper   extremity venous Doppler 2019    PROCEDURE:   CT of the Chest, Abdomen and Pelvis was performed with intravenous   contrast.   Intravenous contrast: 94 ml Omnipaque 300.  Oral contrast: Positive contrast was administered.  Sagittal and coronal reformats were performed.    FINDINGS:    CHEST:     LUNGS AND LARGE AIRWAYS: Patent central airways. Interval improvement in   airspace opacities in the lungs since 2019. Interlobular septal   thickening in the right lung inferior aspect of the left lower lobe,   likely pulmonary edema.  PLEURA: Right chest tube with a small right hydropneumothorax with an   interval increase in the amount of fluid in the pleural space since   2019. There is enhancement of the pleura which can be related to   infection or the prior instrumentation.  VESSELS: Thoracic aorta normal in caliber. Partially imaged nonocclusive   thrombus in the right internal jugular vein.  HEART: Heart size is normal. No pericardial effusion.  MEDIASTINUM AND TETO: Multiple subcentimeter mediastinal lymph nodes.   There is a region of low attenuation with high attenuation peripherally   to the left of the distal esophagus (series 3 image 107) measuring 4.3 x   1.8 cm,likely a fluid collection.  CHEST WALL AND LOWER NECK: No enlarged axillary lymph nodes.    ABDOMEN AND PELVIS:    LIVER: 2 new cystic foci, one at the medial aspect of the caudate and the   second in the posterior aspect of the lateral segment left hepatic lobe   with the larger at the medial aspect of the caudate measuring 1.9 x 1.8   cm, new.  BILE DUCTS: Normal caliber.  GALLBLADDER: Within normal limits.  SPLEEN: Within normal limits.  PANCREAS: Low-attenuation/cystic foci pancreas appear increase in size   and number since 2019 including a 1.6 x 1.3 cm lesion in the   pancreatic body, not previously seen 1.2 x 1.1 cm lesion in the   pancreatic head, previously 0.8 x 0.3 cm.  ADRENALS: Within normal limits.  KIDNEYS/URETERS: No hydronephrosis. Heterogeneous enhancement with no   focal lesion which may be related to the phase of contrast. Infiltration   of the fat at the posterior aspect of the left kidney.    BLADDER: Within normal limits.  REPRODUCTIVE ORGANS: Uterus and adnexa within normal limits.    BOWEL: No bowel obstruction. No evidence of appendicitis.  PERITONEUM: Trace pelvic free fluid.  VESSELS: Abdominal aorta normal in caliber.  RETROPERITONEUM/LYMPH NODES: No lymphadenopathy.    ABDOMINAL WALL: Anasarca.  BONES: No aggressive osseous lesion.    IMPRESSION:     Interval improvement in bilateral airspace disease, greater on the right   since 2019.    Interlobular septal thickening lungs, likely pulmonary edema.    Small right hydropneumothorax with aninterval increase in pleural fluid   since 2019.    Partially imaged nonocclusive deep venous thrombosis in the right   internal jugular vein.    Interval increase in size and number of low-attenuation lesions in the   pancreas since 2019and new cystic lesions at the medial aspect of   the caudate and posterior aspect of the left hepatic lobe of uncertain   etiology; the differential includes pancreatitis with associated acute   peripancreatic fluid collections; correlation is recommended with   pancreatic enzymes.     Region of low density measuring 4.3 cm to the left of the distal   esophagus, likely a fluid collection and  infiltration of the fat at the   posterior aspect of the left kidney which if there is pancreatitis could  also be related to the underlying pancreatitis; follow-up is recommended.      AMIE RAMIREZ   This document has been electronically signed. Dec  4 2019  3:07PM                < end of copied text >        REVIEW OF SYSTEMS:    R sided chest pain , tiered , abdominal discomfort , lose stools x2 , all other systems reviewed and are negative .       Vital Signs Last 24 Hrs  T(C): 36.5 (05 Dec 2019 04:57), Max: 37.2 (04 Dec 2019 15:41)  T(F): 97.7 (05 Dec 2019 04:57), Max: 98.9 (04 Dec 2019 15:41)  HR: 111 (05 Dec 2019 04:57) (108 - 148)  BP: 89/58 (05 Dec 2019 04:57) (89/58 - 102/64)  BP(mean): --  RR: 16 (05 Dec 2019 04:57) (16 - 17)  SpO2: 96% (05 Dec 2019 04:57) (93% - 99%)    PHYSICAL EXAM:    GENERAL: NAD, well-groomed, underweight , pale   HEAD:  Atraumatic, Normocephalic  EYES: EOMI, PERRLA, conjunctiva and sclera clear  NECK: Supple, No JVD, Normal thyroid  NERVOUS SYSTEM:  Alert & Oriented X3, no focal deficit  CHEST/LUNG: b/l bases b/s decreased R > L ,   no  rales, rhonchi, wheezing, or rubs , chest tube + , incision oozing   HEART: Tachycardic ; No murmurs, rubs, or gallops  ABDOMEN: Soft, mid abdominal tenderness , no rebound , no guarding  Nondistended; Bowel sounds present  EXTREMITIES:  2+ Peripheral Pulses, No clubbing, cyanosis, b/l mild non pitting feet edema +   LYMPH: No lymphadenopathy noted  SKIN: No rashes or lesions

## 2019-12-05 NOTE — PROGRESS NOTE ADULT - ASSESSMENT
34 year old female who transferred from [facility] to [facility] on 11/22 for complaints of weakness, 25 pound weight loss & flu like symptoms & found to have a large right pleural effusion. Pt. has a PMH of empyema with group A hemolytic strep & has 4-5 drinks of alcohol per day.  On 11/23 CT of the chest/abd/pelvis indicated a clot in the Right IJ. Pt. on heparin bridge to coumadin. CT abdomen shows cystic mass in body/tail of pancreas, now concern for postop ileus.  Post-op course:  Pt s/p right VATS decort & washout debridement of CT site & VAC placement on 11/27/19.     Coumadin started for RIJ thrombus x 2 days then INR spike > held, postop anemia > on iron, folate, vit c. Today, patient had elevated WBC, hyperkalemia, hypomagnesemia and reactive thrombocytosis. Patient remains afebrile and pain complaints appear constant. Psych and Pain evaluation requested.   Today noted with elevated WBC , increasing drainage , c/o loose stools , b/l LE pain / swelling . MRI with pancreatic cyst but poor study . Followed by CTS ,  ID , Hem / Psych , seen by GI . CT chest , abd / pelvis noted , ? esophageal fluid collection , ? pancreatitis . This am labs noted with RONAK , high Vanco level            Problem/Plan - 1:  ·  Problem: Pleural effusion on right.  Plan: s/p Right VATS decortication & washout on 11/27/19. Debridement of CT site & VAC placement, as per primary team , increased drainage noted , On iv abx Zosyn , Vanco d/c due to high level / RONAK , may consider to decrease Zosyn to BID ,  ID on board . Planned for VATS washout in am .          Problem/Plan - 2:  ·  Problem: Empyema.  Plan: On Zosyn / Vanco  d/c due to high level , BRAD , consider to decrease Zosyn dose .  ID following       Problem/Plan - 3:  ·  Problem: Internal jugular vein thrombosis, right.  Plan: On Heparin drip , Coumadin on hold for planned procedure in am      Problem/Plan - 5:  ·  Problem: Anemia likely multifactorial ; Due to anemia of chronic dz , multiple blood drawings , dilutional  , patient is tachycardic , pale  Plan: Transfuse  PRBC      Problem/Plan - 6:  Problem: Prophylactic measure. Plan: Continue Protonix for GI prophylaxis     Problem/Plan - 7:  ·  Problem: Hypomagnesemia.  Plan: replaced      Problem/Plan - 8:  ·  Problem: Hyponatremia.  Plan: as per renal , likely SIADH , stable      Problem/Plan - 9:  ·  Problem: Alcohol abuse.  Plan: Monitor for withdrawal  Continue folic, MVI, thiamine, & feSO4    Problem / Plan - 10 : Abdominal tenderness , CT noted , ? pancreatitis , esophageal fluid collection ,     Problem / Plan - 11; Frequent loose stools x 1 month - will hold on laxatives for now , follow stool cultures , O and P , GI PCR ,r/o  C diff     Problem / Plan -12 : B/L feet pain , pain all over her body - consider rheum eval     Problem / Plan -13 ; Hematuria , with blood clots , UTI r/o  - resolved     Problem / Plan -14 : Multifocal PNA -  treated with abx     Problem /Plan - 15 : Hypoalbuminemia , prealbumin noted _ 4 , severe protein calorie malnutrition - get Nutrition eval , supplements .     Problem / Plan - 16 : RONAK - likely multifactorial RONAK; ATN from Vancomycin and ? CHIQUITA , on ivf , w/u oin progress , renal noted and appreciated

## 2019-12-05 NOTE — PROGRESS NOTE ADULT - ASSESSMENT
34y old Female transferred to [facility] for management of empyema. Now s/p Thoracic surgery with wound vac placement. Found A&Ox3, NAD, sitting up at edge of bed having a burger with family present. She is in good spirits and visibly comfortable. Right sided chest tube and wound VAC in place; WBC 24.17 this AM. Discussed continuing the current regimen. Patient encouraged to utilize the lowest effective dose of Dilaudid. Goal: to be weaned off prior discharge, with Tramadol remaining PRN. She is agreeable to plan.

## 2019-12-06 ENCOUNTER — APPOINTMENT (OUTPATIENT)
Dept: THORACIC SURGERY | Facility: HOSPITAL | Age: 34
End: 2019-12-06

## 2019-12-06 LAB
ALBUMIN SERPL ELPH-MCNC: 1.9 G/DL — LOW (ref 3.3–5.2)
ALP SERPL-CCNC: 272 U/L — HIGH (ref 40–120)
ALT FLD-CCNC: 13 U/L — SIGNIFICANT CHANGE UP
AMYLASE P1 CFR SERPL: 99 U/L — SIGNIFICANT CHANGE UP (ref 36–128)
ANION GAP SERPL CALC-SCNC: 14 MMOL/L — SIGNIFICANT CHANGE UP (ref 5–17)
APTT BLD: 44.7 SEC — HIGH (ref 27.5–36.3)
AST SERPL-CCNC: 29 U/L — SIGNIFICANT CHANGE UP
BILIRUB DIRECT SERPL-MCNC: 0.2 MG/DL — SIGNIFICANT CHANGE UP (ref 0–0.3)
BILIRUB INDIRECT FLD-MCNC: 0.1 MG/DL — LOW (ref 0.2–1)
BILIRUB SERPL-MCNC: 0.3 MG/DL — LOW (ref 0.4–2)
BUN SERPL-MCNC: 17 MG/DL — SIGNIFICANT CHANGE UP (ref 8–20)
CALCIUM SERPL-MCNC: 8.2 MG/DL — LOW (ref 8.6–10.2)
CHLORIDE SERPL-SCNC: 100 MMOL/L — SIGNIFICANT CHANGE UP (ref 98–107)
CO2 SERPL-SCNC: 22 MMOL/L — SIGNIFICANT CHANGE UP (ref 22–29)
CREAT SERPL-MCNC: 2.42 MG/DL — HIGH (ref 0.5–1.3)
DENV1 AB SER-ACNC: 11.3 UG/DL — LOW (ref 45–270)
FSH SERPL-MCNC: 0.6 IU/L — SIGNIFICANT CHANGE UP
GLUCOSE SERPL-MCNC: 76 MG/DL — SIGNIFICANT CHANGE UP (ref 70–115)
HCG SERPL QL: NEGATIVE — SIGNIFICANT CHANGE UP
HCT VFR BLD CALC: 23.9 % — LOW (ref 34.5–45)
HCT VFR BLD CALC: 26.3 % — LOW (ref 34.5–45)
HGB BLD-MCNC: 7.5 G/DL — LOW (ref 11.5–15.5)
HGB BLD-MCNC: 8.5 G/DL — LOW (ref 11.5–15.5)
INR BLD: 2.29 RATIO — HIGH (ref 0.88–1.16)
LIDOCAIN IGE QN: 95 U/L — HIGH (ref 22–51)
MAGNESIUM SERPL-MCNC: 1.5 MG/DL — LOW (ref 1.6–2.6)
MCHC RBC-ENTMCNC: 31.4 GM/DL — LOW (ref 32–36)
MCHC RBC-ENTMCNC: 31.4 PG — SIGNIFICANT CHANGE UP (ref 27–34)
MCHC RBC-ENTMCNC: 31.7 PG — SIGNIFICANT CHANGE UP (ref 27–34)
MCHC RBC-ENTMCNC: 32.3 GM/DL — SIGNIFICANT CHANGE UP (ref 32–36)
MCV RBC AUTO: 100 FL — SIGNIFICANT CHANGE UP (ref 80–100)
MCV RBC AUTO: 98.1 FL — SIGNIFICANT CHANGE UP (ref 80–100)
PLATELET # BLD AUTO: 536 K/UL — HIGH (ref 150–400)
PLATELET # BLD AUTO: 577 K/UL — HIGH (ref 150–400)
POTASSIUM SERPL-MCNC: 3.7 MMOL/L — SIGNIFICANT CHANGE UP (ref 3.5–5.3)
POTASSIUM SERPL-SCNC: 3.7 MMOL/L — SIGNIFICANT CHANGE UP (ref 3.5–5.3)
PROLACTIN SERPL-MCNC: 40.5 NG/ML — HIGH (ref 3.4–24.1)
PROT SERPL-MCNC: 5.4 G/DL — LOW (ref 6.6–8.7)
PROTHROM AB SERPL-ACNC: 27 SEC — HIGH (ref 10–12.9)
RBC # BLD: 2.39 M/UL — LOW (ref 3.8–5.2)
RBC # BLD: 2.68 M/UL — LOW (ref 3.8–5.2)
RBC # FLD: 16.1 % — HIGH (ref 10.3–14.5)
RBC # FLD: 16.4 % — HIGH (ref 10.3–14.5)
SODIUM SERPL-SCNC: 136 MMOL/L — SIGNIFICANT CHANGE UP (ref 135–145)
WBC # BLD: 15.8 K/UL — HIGH (ref 3.8–10.5)
WBC # BLD: 25.73 K/UL — HIGH (ref 3.8–10.5)
WBC # FLD AUTO: 15.8 K/UL — HIGH (ref 3.8–10.5)
WBC # FLD AUTO: 25.73 K/UL — HIGH (ref 3.8–10.5)

## 2019-12-06 PROCEDURE — 71045 X-RAY EXAM CHEST 1 VIEW: CPT | Mod: 26,77

## 2019-12-06 PROCEDURE — 99233 SBSQ HOSP IP/OBS HIGH 50: CPT

## 2019-12-06 PROCEDURE — 71045 X-RAY EXAM CHEST 1 VIEW: CPT | Mod: 26

## 2019-12-06 PROCEDURE — 99232 SBSQ HOSP IP/OBS MODERATE 35: CPT

## 2019-12-06 PROCEDURE — 93971 EXTREMITY STUDY: CPT | Mod: 26,RT

## 2019-12-06 RX ORDER — MEROPENEM 1 G/30ML
1000 INJECTION INTRAVENOUS EVERY 12 HOURS
Refills: 0 | Status: DISCONTINUED | OUTPATIENT
Start: 2019-12-06 | End: 2019-12-11

## 2019-12-06 RX ADMIN — HYDROMORPHONE HYDROCHLORIDE 4 MILLIGRAM(S): 2 INJECTION INTRAMUSCULAR; INTRAVENOUS; SUBCUTANEOUS at 17:24

## 2019-12-06 RX ADMIN — GABAPENTIN 100 MILLIGRAM(S): 400 CAPSULE ORAL at 13:51

## 2019-12-06 RX ADMIN — PIPERACILLIN AND TAZOBACTAM 25 GRAM(S): 4; .5 INJECTION, POWDER, LYOPHILIZED, FOR SOLUTION INTRAVENOUS at 05:29

## 2019-12-06 RX ADMIN — Medication 100 MILLIGRAM(S): at 17:24

## 2019-12-06 RX ADMIN — Medication 250 MILLIGRAM(S): at 17:24

## 2019-12-06 RX ADMIN — TRAMADOL HYDROCHLORIDE 50 MILLIGRAM(S): 50 TABLET ORAL at 05:28

## 2019-12-06 RX ADMIN — TRAMADOL HYDROCHLORIDE 50 MILLIGRAM(S): 50 TABLET ORAL at 14:50

## 2019-12-06 RX ADMIN — Medication 325 MILLIGRAM(S): at 05:28

## 2019-12-06 RX ADMIN — Medication 1 MILLIGRAM(S): at 17:24

## 2019-12-06 RX ADMIN — Medication 10 MILLIGRAM(S): at 22:10

## 2019-12-06 RX ADMIN — Medication 975 MILLIGRAM(S): at 05:59

## 2019-12-06 RX ADMIN — GABAPENTIN 100 MILLIGRAM(S): 400 CAPSULE ORAL at 05:28

## 2019-12-06 RX ADMIN — TRAMADOL HYDROCHLORIDE 50 MILLIGRAM(S): 50 TABLET ORAL at 13:51

## 2019-12-06 RX ADMIN — Medication 1 TABLET(S): at 17:24

## 2019-12-06 RX ADMIN — Medication 500 MILLIGRAM(S): at 17:24

## 2019-12-06 RX ADMIN — Medication 325 MILLIGRAM(S): at 22:12

## 2019-12-06 RX ADMIN — HYDROMORPHONE HYDROCHLORIDE 4 MILLIGRAM(S): 2 INJECTION INTRAMUSCULAR; INTRAVENOUS; SUBCUTANEOUS at 18:56

## 2019-12-06 RX ADMIN — Medication 325 MILLIGRAM(S): at 17:24

## 2019-12-06 RX ADMIN — TRAMADOL HYDROCHLORIDE 50 MILLIGRAM(S): 50 TABLET ORAL at 22:10

## 2019-12-06 RX ADMIN — Medication 250 MILLIGRAM(S): at 05:28

## 2019-12-06 RX ADMIN — SODIUM CHLORIDE 100 MILLILITER(S): 9 INJECTION INTRAMUSCULAR; INTRAVENOUS; SUBCUTANEOUS at 02:31

## 2019-12-06 RX ADMIN — Medication 975 MILLIGRAM(S): at 05:27

## 2019-12-06 RX ADMIN — TRAMADOL HYDROCHLORIDE 50 MILLIGRAM(S): 50 TABLET ORAL at 06:05

## 2019-12-06 RX ADMIN — MEROPENEM 100 MILLIGRAM(S): 1 INJECTION INTRAVENOUS at 17:26

## 2019-12-06 RX ADMIN — GABAPENTIN 100 MILLIGRAM(S): 400 CAPSULE ORAL at 22:12

## 2019-12-06 NOTE — PROGRESS NOTE ADULT - SUBJECTIVE AND OBJECTIVE BOX
[facility] DIVISION OF KIDNEY DISEASES AND HYPERTENSION -- FOLLOW UP NOTE  --------------------------------------------------------------------------------  Chief Complaint:  RONAK, hyponatremia    24 hour events/subjective:  SCr improving from yesterday;  On IVF;    PAST HISTORY  --------------------------------------------------------------------------------  No significant changes to PMH, PSH, FHx, SHx, unless otherwise noted    ALLERGIES & MEDICATIONS  --------------------------------------------------------------------------------  Allergies    No Known Allergies    Intolerances    Ensure TID (Unknown)    Standing Inpatient Medications  amitriptyline 10 milliGRAM(s) Oral at bedtime  ascorbic acid 500 milliGRAM(s) Oral daily  ferrous    sulfate 325 milliGRAM(s) Oral three times a day  folic acid 1 milliGRAM(s) Oral daily  gabapentin 100 milliGRAM(s) Oral three times a day  heparin  Infusion. 1100 Unit(s)/Hr IV Continuous <Continuous>  lidocaine   Patch 1 Patch Transdermal daily  multivitamin 1 Tablet(s) Oral daily  pantoprazole    Tablet 40 milliGRAM(s) Oral before breakfast  piperacillin/tazobactam IVPB.. 3.375 Gram(s) IV Intermittent every 12 hours  saccharomyces boulardii 250 milliGRAM(s) Oral two times a day  sodium chloride 0.9%. 1000 milliLiter(s) IV Continuous <Continuous>  thiamine 100 milliGRAM(s) Oral daily  traMADol 50 milliGRAM(s) Oral every 8 hours    PRN Inpatient Medications  ALPRAZolam 0.25 milliGRAM(s) Oral every 8 hours PRN  heparin  Injectable 3200 Unit(s) IV Push every 6 hours PRN  HYDROmorphone   Tablet 4 milliGRAM(s) Oral every 6 hours PRN  HYDROmorphone   Tablet 2 milliGRAM(s) Oral every 6 hours PRN  methocarbamol 500 milliGRAM(s) Oral every 6 hours PRN      REVIEW OF SYSTEMS  --------------------------------------------------------------------------------  Gen: No weight changes, fatigue, fevers/chills, weakness  Skin: No rashes  Head/Eyes/Ears/Mouth: No headache; Normal hearing; Normal vision w/o blurriness; No sinus pain/discomfort, sore throat  Respiratory: No dyspnea, cough, wheezing, hemoptysis  CV: No chest pain, PND, orthopnea  GI: No abdominal pain, diarrhea, constipation, nausea, vomiting, melena, hematochezia  : No increased frequency, dysuria, hematuria, nocturia  MSK: No joint pain/swelling; no back pain; no edema  Neuro: No dizziness/lightheadedness, weakness, seizures, numbness, tingling  Heme: No easy bruising or bleeding  Endo: No heat/cold intolerance  Psych: No significant nervousness, anxiety, stress, depression    All other systems were reviewed and are negative, except as noted.    VITALS/PHYSICAL EXAM  --------------------------------------------------------------------------------  T(C): 36.8 (12-06-19 @ 13:23), Max: 36.9 (12-06-19 @ 00:23)  HR: 112 (12-06-19 @ 13:23) (101 - 140)  BP: 108/66 (12-06-19 @ 13:23) (95/67 - 137/84)  RR: 17 (12-06-19 @ 13:23) (16 - 18)  SpO2: 99% (12-06-19 @ 13:23) (93% - 100%)  Wt(kg): --        12-05-19 @ 07:01  -  12-06-19 @ 07:00  --------------------------------------------------------  IN: 2899 mL / OUT: 1055 mL / NET: 1844 mL    12-06-19 @ 07:01  -  12-06-19 @ 13:26  --------------------------------------------------------  IN: 694 mL / OUT: 450 mL / NET: 244 mL      Physical Exam:  	Gen: NAD,   	HEENT: PERRL, supple neck, clear oropharynx  	Pulm: CTA B/L  	CV: RRR, S1S2; no rub  	Back: No spinal or CVA tenderness; no sacral edema  	Abd: +BS, soft, nontender/nondistended  	: No suprapubic tenderness  	UE: Warm, FROM, no clubbing, intact strength; no edema; no asterixis  	LE: Warm, FROM, no clubbing, intact strength; no edema  	Neuro: No focal deficits, intact gait  	Psych: abnormal affect;  	Skin: Warm, without rashes  	Vascular access:    LABS/STUDIES  --------------------------------------------------------------------------------              7.5    25.73 >-----------<  577      [12-06-19 @ 03:55]              23.9     136  |  100  |  17.0  ----------------------------<  76      [12-06-19 @ 11:13]  3.7   |  22.0  |  2.42        Ca     8.2     [12-06-19 @ 11:13]      Mg     1.5     [12-06-19 @ 11:13]      Phos  4.1     [12-05-19 @ 06:17]    TPro  5.4  /  Alb  1.9  /  TBili  0.3  /  DBili  0.2  /  AST  29  /  ALT  13  /  AlkPhos  272  [12-06-19 @ 11:13]    PT/INR: PT 27.0 , INR 2.29       [12-06-19 @ 11:13]  PTT: 44.7       [12-06-19 @ 03:55]      Creatinine Trend:  SCr 2.42 [12-06 @ 11:13]  SCr 2.62 [12-05 @ 19:52]  SCr 2.17 [12-05 @ 10:37]  SCr 2.11 [12-05 @ 06:17]  SCr 0.30 [12-04 @ 06:46]    Urinalysis - [12-05-19 @ 15:15]      Color Yellow / Appearance Clear / SG 1.005 / pH 6.0      Gluc Negative / Ketone Negative  / Bili Negative / Urobili Negative       Blood Moderate / Protein 30 / Leuk Est Negative / Nitrite Negative      RBC 6-10 / WBC 3-5 / Hyaline  / Gran  / Sq Epi  / Non Sq Epi Occasional / Bacteria Negative    Urine Creatinine 26      [12-05-19 @ 15:14]  Urine Sodium <30      [12-05-19 @ 15:14]  Urine Urea Nitrogen 95      [12-05-19 @ 23:30]  Urine Potassium 17      [12-05-19 @ 15:14]  Urine Chloride <27      [12-05-19 @ 15:14]  Urine Osmolality 116      [12-05-19 @ 15:14]    Iron 23, TIBC 104, %sat 22      [12-04-19 @ 06:46]  Vitamin D (25OH) 21.9      [12-04-19 @ 17:51]  TSH 2.02      [12-05-19 @ 19:52]  Lipid: chol 68, , HDL 27, LDL 12      [11-25-19 @ 05:29]    HIV Nonreact      [11-25-19 @ 14:08]    ROMAN: titer Negative, pattern --      [11-26-19 @ 18:05]  dsDNA <12      [11-26-19 @ 18:05]  Rheumatoid Factor <10      [11-26-19 @ 17:34]  ANCA: cANCA Negative, pANCA Negative, atypical ANCA Negative      [11-26-19 @ 18:05]

## 2019-12-06 NOTE — CHART NOTE - NSCHARTNOTEFT_GEN_A_CORE
Source: Patient [x ]  Family [ ]   other [x ] RN    Current Diet: Diet, NPO:   NPO for Procedure/Test     NPO Start Date: 05-Dec-2019,   NPO Start Time: 17:00 (12-05-19 @ 17:01)    PO intake: Pt currently NPO for procedure, but reports good po intake recently.  Pts family brings food from the outside as well.  RN confirmed good po intake at meals.    Current Weight:   (12/6) 121#  (12/5) 119#  (12/1) 114#  (11/30) 127#    % Weight Change - wt fluctuations noted, will continue to monitor.    Pertinent Medications: MEDICATIONS  (STANDING):  amitriptyline 10 milliGRAM(s) Oral at bedtime  ascorbic acid 500 milliGRAM(s) Oral daily  ferrous    sulfate 325 milliGRAM(s) Oral three times a day  folic acid 1 milliGRAM(s) Oral daily  gabapentin 100 milliGRAM(s) Oral three times a day  heparin  Infusion. 1100 Unit(s)/Hr (11 mL/Hr) IV Continuous <Continuous>  lidocaine   Patch 1 Patch Transdermal daily  multivitamin 1 Tablet(s) Oral daily  pantoprazole    Tablet 40 milliGRAM(s) Oral before breakfast  piperacillin/tazobactam IVPB.. 3.375 Gram(s) IV Intermittent every 12 hours  saccharomyces boulardii 250 milliGRAM(s) Oral two times a day  sodium chloride 0.9%. 1000 milliLiter(s) (100 mL/Hr) IV Continuous <Continuous>  thiamine 100 milliGRAM(s) Oral daily  traMADol 50 milliGRAM(s) Oral every 8 hours    MEDICATIONS  (PRN):  ALPRAZolam 0.25 milliGRAM(s) Oral every 8 hours PRN anxiety  heparin  Injectable 3200 Unit(s) IV Push every 6 hours PRN For aPTT less than 40  HYDROmorphone   Tablet 4 milliGRAM(s) Oral every 6 hours PRN Severe Pain (7 - 10)  HYDROmorphone   Tablet 2 milliGRAM(s) Oral every 6 hours PRN Moderate Pain (4 - 6)  methocarbamol 500 milliGRAM(s) Oral every 6 hours PRN Muscle Spasm    Pertinent Labs: CBC Full  -  ( 06 Dec 2019 03:55 )  WBC Count : 25.73 K/uL  RBC Count : 2.39 M/uL  Hemoglobin : 7.5 g/dL  Hematocrit : 23.9 %  Platelet Count - Automated : 577 K/uL  Mean Cell Volume : 100.0 fl  Mean Cell Hemoglobin : 31.4 pg  Mean Cell Hemoglobin Concentration : 31.4 gm/dL  12-05 Na133 mmol/L<L> Glu 102 mg/dL K+ 3.8 mmol/L Cr  2.62 mg/dL<H> BUN 16.0 mg/dL Phos n/a   Alb 1.7 g/dL<L>12/4 prealbumin  4    Skin: no skin breakdown noted    Nutrition focused physical exam conducted - found signs of malnutrition [ ]absent [x ]present    Subcutaneous fat loss: [ ] Orbital fat pads region, [ ]Buccal fat region, [ x]Triceps region,  [ ]Ribs region    Muscle wasting: [ ]Temples region, [x ]Clavicle region, [x ]Shoulder region, [ ]Scapula region, [ ]Interosseous region,  [ ]thigh region, [ ]Calf region    Estimated Needs:   [x ] no change since previous assessment  [ ] recalculated:     Current Nutrition Diagnosis: Pt remains at nutrition risk secondary to severe protein calorie malnutrition (acute) related to inadequate protein energy intake with decreased appetite and altered GI function in setting of large right pleural effusion, s/p Right VATS decortication & washout, empyema as evidenced by pt with previous 25# unintentional wt loss (17%) x3 months, previously meeting <50% estimated energy intake > 7 days.  Pt also with mild muscle wasting and mild fat loss.  Pt reports good po intake at this time- upset that she is NPO.  Aware recent prealbumin 4 on (12/4) and CRP 17.92 (11/25).    Recommendations:   Resume Regular diet with Ensure TID when medically feasible  Continue with MVI, Vit C, Thiamine, Folic Acid and Feso4 daily  Monitor prealbumin q 4 days  Check current CRP  Monitor daily wts     Monitoring and Evaluation:   [x ] PO intake [ x] Tolerance to diet prescription [X] Weights  [X] Follow up per protocol [X] Labs:

## 2019-12-06 NOTE — PROGRESS NOTE ADULT - ASSESSMENT
In short, this is a very acutely ill 34-year-old woman who previously had no significant past medical or surgical history who initially presented to [facility] with weakness, flulike symptoms, and weight loss, was found to have an empyema and pancreatitis.  After reviewing the images and patient's hospital course, it seems that it is possible that pancreatitis may have been the driving force all along, causing her pleural effusion, which became infected.  The lesions on her pancreas and liver are possible small abscesses.      - Recommend repeat noncontrast MRCP (patient is no longer in respiratory distress and can help provide a study with less artifact than her admission MRCP/  - Will discuss the case with our resident pancreatologist about the safety and utility of of EUS  - Antibiosis per ID  - No objection to low fat diet as tolerated    Will follow.  Thank you for the consult.  Please do not hesitate to call with any questions or concerns.    MONE Mendoza MD  [facility]  Division of Gastroenterology  Tel [phone]  Fax [phone]  12-06-19 @ 13:32 In short, this is a very acutely ill 34-year-old woman who previously had no significant past medical or surgical history who initially presented to [facility] with weakness, flulike symptoms, and weight loss, was found to have an empyema and pancreatitis.  After reviewing the images and patient's hospital course, it seems that it is possible that pancreatitis may have been the driving force all along, causing her pleural effusion, which became infected.  The lesions on her pancreas and liver are possible small abscesses.      - Recommend repeat noncontrast MRCP (patient is no longer in respiratory distress and can help provide a study with less artifact than her admission MRCP  - No indication for EUS at this time.  - Antibiosis per ID  - No objection to low fat diet as tolerated    Will follow.  Thank you for the consult.  Please do not hesitate to call with any questions or concerns.    MONE Mendoza MD  [facility]  Division of Gastroenterology  Tel [phone]  Fax [phone]  12-06-19 @ 13:32

## 2019-12-06 NOTE — PROGRESS NOTE ADULT - PROBLEM SELECTOR PLAN 8
Pt on Hydration    will trend Creatinine   Hold all nephrotic medications Pt on Hydration, NS @100cc/hr  cont to trend  hold nephrotoxic agents

## 2019-12-06 NOTE — PROGRESS NOTE ADULT - SUBJECTIVE AND OBJECTIVE BOX
[facility]  INFECTIOUS DISEASES AND INTERNAL MEDICINE at [place]  =======================================================  Carlos Alberto Soto MD  Diplomates American Board of Internal Medicine and Infectious Diseases  Telephone [phone]  Fax            [phone]  =======================================================    [facility]-[id number]  HERACLIO MOLINA   follow up:  infected pleural effusion, empyema    s/p VATS on 11/27/19  culture from OR was negative  still with drainage around Chest tube site.     Concern raised regarding liver lesions, ? infectious        I have personally reviewed the labs and data; pertinent labs and data are listed in this note; please see below.   =======================================================    REVIEW OF SYSTEMS:  CONSTITUTIONAL:  No Fever or chills  HEENT:  No diplopia or blurred vision.  No earache, sore throat or runny nose.  CARDIOVASCULAR:  No pressure, squeezing, strangling, tightness, heaviness or aching about the chest, neck, axilla or epigastrium.  RESPIRATORY:  No cough, shortness of breath  GASTROINTESTINAL:  No nausea, vomiting or diarrhea.  GENITOURINARY:  No dysuria, frequency or urgency. No Blood in urine  MUSCULOSKELETAL:  no joint aches, no muscle pain  SKIN:  No change in skin, hair or nails.  NEUROLOGIC:  No Headaches, seizures or weakness.  PSYCHIATRIC:  No disorder of thought or mood.  ENDOCRINE:  No heat or cold intolerance  HEMATOLOGICAL:  No easy bruising or bleeding.   =======================================================  Allergies  No Known Allergies    Antibiotics:  meropenem  IVPB 1000 milliGRAM(s) IV Intermittent every 12 hours    Other medications:  amitriptyline 10 milliGRAM(s) Oral at bedtime  ascorbic acid 500 milliGRAM(s) Oral daily  ferrous    sulfate 325 milliGRAM(s) Oral three times a day  folic acid 1 milliGRAM(s) Oral daily  gabapentin 100 milliGRAM(s) Oral three times a day  heparin  Infusion. 1100 Unit(s)/Hr IV Continuous <Continuous>  lidocaine   Patch 1 Patch Transdermal daily  multivitamin 1 Tablet(s) Oral daily  pantoprazole    Tablet 40 milliGRAM(s) Oral before breakfast  saccharomyces boulardii 250 milliGRAM(s) Oral two times a day  sodium chloride 0.9%. 1000 milliLiter(s) IV Continuous <Continuous>  thiamine 100 milliGRAM(s) Oral daily  traMADol 50 milliGRAM(s) Oral every 8 hours    ======================================================  Physical Exam:  ============  T(F): 98.3 (06 Dec 2019 13:38), Max: 98.4 (06 Dec 2019 00:23)  HR: 109 (06 Dec 2019 13:38)  BP: 106/62 (06 Dec 2019 13:38)  RR: 17 (06 Dec 2019 13:38)  SpO2: 93% (06 Dec 2019 13:38) (93% - 100%)  temp max in last 48H T(F): , Max: 98.9 (12-04-19 @ 15:41)    General:  No acute distress.   Eye: Pupils are equal, round and reactive to light, Extraocular movements are intact, Normal conjunctiva.  HENT: Normocephalic, Oral mucosa is moist, No pharyngeal erythema, No sinus tenderness.  Neck: Supple, No lymphadenopathy.  Respiratory: decreased breath sounds right lung  RIGHT chest tube in place  Cardiovascular: Normal rate, Regular rhythm,   TRACE edema of legs  Gastrointestinal: Soft, mild right abd tenderness  Normal bowel sounds.  Genitourinary: No costovertebral angle tenderness.  Lymphatics: No lymphadenopathy neck,   Musculoskeletal: Normal range of motion, Normal strength.  Integumentary: No rash.  Neurologic: Alert, Oriented, No focal deficits, Cranial Nerves II-XII are grossly intact.  Psychiatric: Appropriate mood & affect.    =======================================================  11/22/19 blood cx negative x 2  11/22/19 - body fluid cx with Moderate Alpha hemolytic strep x 2 sets  Alpha hemolytic strep  		[facility]	E Test  Ceftriaxone  			S (0.19)   Clindamycin 	S    Erythromycin 	S   Penicillin 	S   Vancomycin 	S     ==================================     EXAM:  CT ABDOMEN AND PELVIS OC IC                         EXAM:  CT CHEST IC                          PROCEDURE DATE:  12/04/2019          INTERPRETATION:  CLINICAL INFORMATION: Postop. Persistent elevated white   count.    COMPARISON: CT chest 11/25/2019 and MRI abdomen 11/26/2019 and upper   extremity venous Doppler 11/28/2019    PROCEDURE:   CT of the Chest, Abdomen and Pelvis was performed with intravenous   contrast.   Intravenous contrast: 94 ml Omnipaque 300.  Oral contrast: Positive contrast was administered.  Sagittal and coronal reformats were performed.    FINDINGS:    CHEST:     LUNGS AND LARGE AIRWAYS: Patent central airways. Interval improvement in   airspace opacities in the lungs since 11/25/2019. Interlobular septal   thickening in the right lung inferior aspect of the left lower lobe,   likely pulmonary edema.  PLEURA: Right chest tube with a small right hydropneumothorax with an   interval increase in the amount of fluid in the pleural space since   11/25/2019. There is enhancement of the pleura which can be related to   infection or the prior instrumentation.  VESSELS: Thoracic aorta normal in caliber. Partially imaged nonocclusive   thrombus in the right internal jugular vein.  HEART: Heart size is normal. No pericardial effusion.  MEDIASTINUM AND TETO: Multiple subcentimeter mediastinal lymph nodes.   There is a region of low attenuation with high attenuation peripherally   to the left of the distal esophagus (series 3 image 107) measuring 4.3 x   1.8 cm, likely a fluid collection.  CHEST WALL AND LOWER NECK: No enlarged axillary lymph nodes.    ABDOMEN AND PELVIS:    LIVER: 2 new cystic foci, one at the medial aspect of the caudate and the second in the posterior aspect of the lateral segment left hepatic lobe with the larger at the medial aspect of the caudate measuring 1.9 x 1.8   cm, new.  BILE DUCTS: Normal caliber.  GALLBLADDER: Within normal limits.  SPLEEN: Within normal limits.  PANCREAS: Low-attenuation/cystic foci pancreas appear increase in size and number since 11/26/2019 including a 1.6 x 1.3 cm lesion in the pancreatic body, not previously seen 1.2 x 1.1 cm lesion in the pancreatic head, previously 0.8 x 0.3 cm.  ADRENALS: Within normal limits.  KIDNEYS/URETERS: No hydronephrosis. Heterogeneous enhancement with no   focal lesion which may be related to the phase of contrast. Infiltration   of the fat at the posterior aspect of the left kidney.    BLADDER: Within normal limits.  REPRODUCTIVE ORGANS: Uterus and adnexa within normal limits.    BOWEL: No bowel obstruction. No evidence of appendicitis.  PERITONEUM: Trace pelvic free fluid.  VESSELS: Abdominal aorta normal in caliber.  RETROPERITONEUM/LYMPH NODES: No lymphadenopathy.    ABDOMINAL WALL: Anasarca.  BONES: No aggressive osseous lesion.    IMPRESSION:     Interval improvement in bilateral airspace disease, greater on the right since 11/25/2019.    Interlobular septal thickening lungs, likely pulmonary edema.    Small right hydropneumothorax with an interval increase in pleural fluid since 11/25/2019.    Partially imaged nonocclusive deep venous thrombosis in the right internal jugular vein.    Interval increase in size and number of low-attenuation lesions in the pancreas since 11/26/2019 and new cystic lesions at the medial aspect of the caudate and posterior aspect of the left hepatic lobe of uncertain etiology; the differential includes pancreatitis with associated acute peripancreatic fluid collections; correlation is recommended with pancreatic enzymes.     Region of low density measuring 4.3 cm to the left of the distal esophagus, likely a fluid collection and  infiltration of the fat at the posterior aspect of the left kidney which if there is pancreatitis could also be related to the underlying pancreatitis; follow-up is recommended.        AMIE RAMIREZ   This document has been electronically signed. Dec  4 2019  3:07PM      ======================================================  Labs:                        7.5    25.73 )-----------( 577      ( 06 Dec 2019 03:55 )             23.9       WBC Count: 25.73 K/uL (12-06-19 @ 03:55)  WBC Count: 20.66 K/uL (12-05-19 @ 19:52)  WBC Count: 24.17 K/uL (12-05-19 @ 06:17)  WBC Count: 27.94 K/uL (12-04-19 @ 06:46)  WBC Count: 39.58 K/uL (12-03-19 @ 10:10)  WBC Count: 43.01 K/uL (12-03-19 @ 06:24)  WBC Count: 19.33 K/uL (12-02-19 @ 06:11)      12-06    136  |  100  |  17.0  ----------------------------<  76  3.7   |  22.0  |  2.42<H>    Creatinine, Serum: 2.42 mg/dL (12-06-19 @ 11:13)  Creatinine, Serum: 2.62 mg/dL (12-05-19 @ 19:52)  Creatinine, Serum: 2.17 mg/dL (12-05-19 @ 10:37)  Creatinine, Serum: 2.11 mg/dL (12-05-19 @ 06:17)  Creatinine, Serum: 0.30 mg/dL (12-04-19 @ 06:46)  Creatinine, Serum: 0.32 mg/dL (12-03-19 @ 13:28)  Creatinine, Serum: 0.34 mg/dL (12-03-19 @ 06:24)  Creatinine, Serum: 0.33 mg/dL (12-02-19 @ 06:11)      Ca    8.2<L>      06 Dec 2019 11:13  Phos  4.1     12-05  Mg     1.5     12-06    TPro  5.4<L>  /  Alb  1.9<L>  /  TBili  0.3<L>  /  DBili  0.2  /  AST  29  /  ALT  13  /  AlkPhos  272<H>  12-06      Culture - Urine (collected 12-04-19 @ 10:24)  Source: .Urine  Final Report (12-05-19 @ 11:51):    No growth    Culture - Other (collected 12-03-19 @ 14:03)  Source: .Other chest tube insertion site (pus/purulent)  Final Report (12-05-19 @ 09:15):    No growth at 2 days.    Culture - Blood (collected 12-03-19 @ 13:30)  Source: .Blood    Culture - Blood (collected 12-03-19 @ 13:30)  Source: .Blood    Culture - Acid Fast - Tissue w/Smear (collected 11-28-19 @ 20:40)  Source: .Tissue chest wound    Culture - Fungal, Tissue (collected 11-27-19 @ 20:12)  Source: .Tissue chest wound    Culture - Tissue with Gram Stain (collected 11-27-19 @ 20:12)  Source: .Tissue chest wound  Gram Stain (11-27-19 @ 21:27):    Few White blood cells    No organisms seen  Final Report (12-02-19 @ 16:00):    No growth at 5 days.    Culture - Blood (collected 11-25-19 @ 05:20)  Source: .Blood  Final Report (11-30-19 @ 07:00):    No growth at 5 days.    Culture - Blood (collected 11-25-19 @ 05:19)  Source: .Blood  Final Report (11-30-19 @ 07:00):    No growth at 5 days.

## 2019-12-06 NOTE — PROGRESS NOTE ADULT - ASSESSMENT
This 34 y.o. F who was admitted to [facility] 11/22 with weakness,   found with large right pleural effusion   s/p chest tube drainage    Hyponatremia - resolving  WBC elevation   alpha hemolytic strep infection  multi focal PNA  infected pleural effusion - empyema  Pancreatic cyst  Liver lesions    - Legionella Ag from [facility] 11/24/19 - negative, off azithromycin  - remainder of cultures from [facility] are negative    s/p VATS 11/27/19  - cultures remain negative    - WBC elevation   markedly elevated; improving    - Chest tube insertion site with drainage.  CULTURES SENT.; will follow    Labs on 12/5/19 with ACUTE renal failure,.  Cr from 0.3 to 2; SLIGHT downtrending  - watch renal function closely    Regarding liver lesions  - change ZOSYN to MERREm 1 gram Q 12H  - SUGGEST MRI of the abd to r/o abscess      - blood culture x 2 sets 12/3/19,will follow

## 2019-12-06 NOTE — PROGRESS NOTE ADULT - SUBJECTIVE AND OBJECTIVE BOX
Chief Complaint: This is a 34y old woman patient being seen in follow-up consultation for pancreatic and liver lesions.    HPI: This a 34-year-old woman on whom GI has been reconsulted for new/worsening pancreas and liver lesions.  Prior to her initial presentation, she had no significant past medical history save alcohol and tobacco abuse who initially presented to [facility] on 22 November with weakness, flulike symptoms and a 25-lb weight loss.  She was found to have a large right-sided pleural effusion for which she underwent a thoracentesis with removal of 1.5 L of dark serous fluid.  Subsequently, a chest tube was placed, by which 3 L were removed.  Her blood work was notable for a leukemoid WBC count of 40K, platelets of 793, serum sodium of 120, chloride of 73, lactate of 4.7, and an amylase of 268.  A CTA revealed a 2.1 cm partially cystic mass in the body and tail of the pancreas with possible upstream ductal dilatation.  She was also found to have an acute thrombus in her RIJ and was started on anticoagulation.  She was consequently transferred to [facility] for a higher level of care.    GI was initially consulted to evaluate the pancreas lesion and we saw the patient on her second hospital day.  At the time, it was thought this was likely pancreatitis/pseduocyst, and endoscopic evaluation including EUS was deferred until the outpatient setting once her respiratory condition had improved.  She underwent an MR/MRCP that showed a nonenhancing cystic lesion in the mid-body, measuring 1.1 cm and two additional nonenhancing lesions in the head measuring 0.8 cm and 0.6 cm showing focal restricted diffusion.  There was no main duct dilatation seen.  Her Ca 19-9 was within normal limits.  On 27 November, she underwent wound debridement, wound vac placement, pleural biopsy, and bronchoscopy with lung decortication via VATS.    On 4 December, she underwent a CT chest/abdomen/pelvis that showed an interval increase in the size and number of low-attenuation lesions in the pancreas since 11/26/2019 and new cystic lesions at the medial aspect of the caudate and posterior aspect of the left hepatic lobe of uncertain etiology but concerning for pancreatitis with associated acute peripancreatic fluid collections.  Additionally noted was a region of low density measuring 4.3 cm to the left of the distal esophagus, likely a fluid collection and infiltration of the fat at the posterior aspect of the left kidney, which if there is pancreatitis, could also be related to the underlying pancreatitis.  After reviewing the images with the interpreting radiologist, a noncontrast MR was recommended to better delineate the overall process.    Currently, the patient denies any nausea or vomiting.  She reports whole body pain and some mild epigastric pain at rest.  She is hungry and frustrated by her NPO status , as she was intended for the OR today.    ROS: A 14-point review of systems was reviewed and was otherwise negative save what was reported in the HPI.    PAST MEDICAL/SURGICAL HISTORY: None prior to hospitalization    MEDICATIONS  (STANDING):  amitriptyline 10 milliGRAM(s) Oral at bedtime  ascorbic acid 500 milliGRAM(s) Oral daily  ferrous    sulfate 325 milliGRAM(s) Oral three times a day  folic acid 1 milliGRAM(s) Oral daily  gabapentin 100 milliGRAM(s) Oral three times a day  heparin  Infusion. 1100 Unit(s)/Hr (11 mL/Hr) IV Continuous <Continuous>  lidocaine   Patch 1 Patch Transdermal daily  multivitamin 1 Tablet(s) Oral daily  pantoprazole    Tablet 40 milliGRAM(s) Oral before breakfast  piperacillin/tazobactam IVPB.. 3.375 Gram(s) IV Intermittent every 12 hours  saccharomyces boulardii 250 milliGRAM(s) Oral two times a day  sodium chloride 0.9%. 1000 milliLiter(s) (100 mL/Hr) IV Continuous <Continuous>  thiamine 100 milliGRAM(s) Oral daily  traMADol 50 milliGRAM(s) Oral every 8 hours    MEDICATIONS  (PRN):  ALPRAZolam 0.25 milliGRAM(s) Oral every 8 hours PRN anxiety  heparin  Injectable 3200 Unit(s) IV Push every 6 hours PRN For aPTT less than 40  HYDROmorphone   Tablet 4 milliGRAM(s) Oral every 6 hours PRN Severe Pain (7 - 10)  HYDROmorphone   Tablet 2 milliGRAM(s) Oral every 6 hours PRN Moderate Pain (4 - 6)  methocarbamol 500 milliGRAM(s) Oral every 6 hours PRN Muscle Spasm    Ensure TID (Unknown)  No Known Allergies    T(C): 36.5 (12-06-19 @ 11:56), Max: 36.9 (12-06-19 @ 00:23)  HR: 113 (12-06-19 @ 11:56) (101 - 140)  BP: 99/66 (12-06-19 @ 11:56) (95/67 - 137/84)  RR: 17 (12-06-19 @ 11:56) (16 - 18)  SpO2: 94% (12-06-19 @ 11:56) (93% - 100%)    I&O's Summary    05 Dec 2019 07:01  -  06 Dec 2019 07:00  --------------------------------------------------------  IN: 2899 mL / OUT: 1055 mL / NET: 1844 mL    06 Dec 2019 07:01  -  06 Dec 2019 12:55  --------------------------------------------------------  IN: 694 mL / OUT: 450 mL / NET: 244 mL      PHYSICAL EXAM:  Constitutional: Poorly nourished, chronically ill-appearing young  woman in no apparent distress  Eyes: Sclerae anicteric, conjunctivae normal  ENMT: Mucus membranes moist, no oropharyngeal thrush noted  Neck: No thyroid nodules appreciated, no significant cervical or supraclavicular lymphadenopathy  Respiratory: Breathing nonlabored  Cardiovascular: Regular rate and rhythm  Gastrointestinal: Soft, diffusely tender to moderate palpation, nondistended, normoactive bowel sounds; no hepatosplenomegaly appreciated; no rebound tenderness or involuntary guarding  Extremities: No clubbing, cyanosis or edema  Neurological: Alert and oriented to person, place and time; no asterixis  Skin: No jaundice  Lymph Nodes: No significant lymphadenopathy  Musculoskeletal: No significant peripheral atrophy  Psychiatric: Affect and mood appropriate                   7.5    25.73 )-----------( 577      ( 12-06 @ 03:55 )             23.9                8.4    20.66 )-----------( 413      ( 12-05 @ 19:52 )             27.2                7.8    24.17 )-----------( 575      ( 12-05 @ 06:17 )             24.6                7.9    27.94 )-----------( 382      ( 12-04 @ 06:46 )             24.8       12-06    136  |  100  |  17.0  ----------------------------<  76  3.7   |  22.0  |  2.42<H>    Ca    8.2<L>      06 Dec 2019 11:13  Phos  4.1     12-05  Mg     1.5     12-06    TPro  5.4<L>  /  Alb  1.9<L>  /  TBili  0.3<L>  /  DBili  0.2  /  AST  29  /  ALT  13  /  AlkPhos  272<H>  12-06    LIVER FUNCTIONS - ( 06 Dec 2019 11:13 )  Alb: 1.9 g/dL / Pro: 5.4 g/dL / ALK PHOS: 272 U/L / ALT: 13 U/L / AST: 29 U/L / GGT: x           Amylase, Serum Total: 99 U/L (12-06-19 @ 11:13)  Amylase, Serum Total: 83 U/L (12-05-19 @ 19:52)  Amylase, Serum Total: 69 U/L (12-05-19 @ 06:17)    Lipase, Serum: 95 U/L (12-06-19 @ 11:13)  Lipase, Serum: 152 U/L (12-05-19 @ 19:52)  Lipase, Serum: 245 U/L (12-05-19 @ 06:17)    PT/INR - ( 06 Dec 2019 11:13 )   PT: 27.0 sec;   INR: 2.29 ratio         PTT - ( 06 Dec 2019 03:55 )  PTT:44.7 sec    Culture - Urine (collected 04 Dec 2019 10:24)  Source: .Urine  Final Report (05 Dec 2019 11:51):    No growth    Culture - Other (collected 03 Dec 2019 14:03)  Source: .Other chest tube insertion site (pus/purulent)  Final Report (05 Dec 2019 09:15):    No growth at 2 days.    Culture - Blood (collected 03 Dec 2019 13:30)  Source: .Blood  Preliminary Report (05 Dec 2019 15:00):    No growth at 48 hours    Culture - Blood (collected 03 Dec 2019 13:30)  Source: .Blood  Preliminary Report (05 Dec 2019 15:00):    No growth at 48 hours      IMAGING: I personally reviewed the CT C/A/P with the interpreting radiologist, and I agree with the radiologist's interpretation as described below:    < from: CT Chest w/ IV Cont (12.04.19 @ 12:50) >  FINDINGS:    CHEST:     LUNGS AND LARGE AIRWAYS: Patent central airways. Interval improvement in airspace opacities in the lungs since 11/25/2019. Interlobular septal thickening in the right lung inferior aspect of the left lower lobe, likely pulmonary edema.  PLEURA: Right chest tube with a small right hydropneumothorax with an interval increase in the amount of fluid in the pleural space since 11/25/2019. There is enhancement of the pleura which can be related to infection or the prior instrumentation.  VESSELS: Thoracic aorta normal in caliber. Partially imaged nonocclusive thrombus in the right internal jugular vein.  HEART: Heart size is normal. No pericardial effusion.  MEDIASTINUM AND TETO: Multiple subcentimeter mediastinal lymph nodes. There is a region of low attenuation with high attenuation peripherally to the left of the distal esophagus (series 3 image 107) measuring 4.3 x 1.8 cm,likely a fluid collection.  CHEST WALL AND LOWER NECK: No enlarged axillary lymph nodes.    ABDOMEN AND PELVIS:    LIVER: 2 new cystic foci, one at the medial aspect of the caudate and the second in the posterior aspect of the lateral segment left hepatic lobe with the larger at the medial aspect of the caudate measuring 1.9 x 1.8 cm, new.  BILE DUCTS: Normal caliber.  GALLBLADDER: Within normal limits.  SPLEEN: Within normal limits.  PANCREAS: Low-attenuation/cystic foci pancreas appear increase in size and number since 11/26/2019 including a 1.6 x 1.3 cm lesion in the pancreatic body, not previously seen 1.2 x 1.1 cm lesion in the pancreatic head, previously 0.8 x 0.3 cm.  ADRENALS: Within normal limits.  KIDNEYS/URETERS: No hydronephrosis. Heterogeneous enhancement with no focal lesion which may be related to the phase of contrast. Infiltration of the fat at the posterior aspect of the left kidney.    BLADDER: Within normal limits.  REPRODUCTIVE ORGANS: Uterus and adnexa within normal limits.    BOWEL: No bowel obstruction. No evidence of appendicitis.  PERITONEUM: Trace pelvic free fluid.  VESSELS: Abdominal aorta normal in caliber.  RETROPERITONEUM/LYMPH NODES: No lymphadenopathy.    ABDOMINAL WALL: Anasarca.  BONES: No aggressive osseous lesion.    IMPRESSION:     Interval improvement in bilateral airspace disease, greater on the right since 11/25/2019.    Interlobular septal thickening lungs, likely pulmonary edema.    Small right hydropneumothorax with aninterval increase in pleural fluid since 11/25/2019.    Partially imaged nonocclusive deep venous thrombosis in the right internal jugular vein.    Interval increase in size and number of low-attenuation lesions in the pancreas since 11/26/2019 and new cystic lesions at the medial aspect of the caudate and posterior aspect of the left hepatic lobe of uncertain etiology; the differential includes pancreatitis with associated acute peripancreatic fluid collections; correlation is recommended with pancreatic enzymes.     Region of low density measuring 4.3 cm to the left of the distal esophagus, likely a fluid collection and  infiltration of the fat at the posterior aspect of the left kidney which if there is pancreatitis could also be related to the underlying pancreatitis; follow-up is recommended.    < end of copied text >

## 2019-12-06 NOTE — PROGRESS NOTE ADULT - ASSESSMENT
1) Hyponatremia; multifactorial in setting of low solute and increased free water intake, ? SIADH from pain  2) RONAK  3)Hyperkalemia- resolved  4) Rt empyema s/p chest tube        Vanco trough elevated; Also s/p  IV contrast administration; likely multifactorial RONAK; ATN from Vancomycin and ? CHIQUITA  Tyler < 30 consistent with CHIQUITA  Continue with IV hydration  dw 4 TWR CTU team

## 2019-12-06 NOTE — PROGRESS NOTE ADULT - SUBJECTIVE AND OBJECTIVE BOX
Patient seen and examined . C/O R sided chest pain at side of chest tube , wound vac removed 2/2 increased drainage around vac , still with loose stools , but less frequent , had on eloose BM this am , urine is clearing , no clots , appetite fair .     CC : weakness , found to have empyema and transferred to [facility] , S/P CT placement , now c/o R sided chest pain , RLE swelling , loose stools         MEDICATIONS  (STANDING):  amitriptyline 10 milliGRAM(s) Oral at bedtime  ascorbic acid 500 milliGRAM(s) Oral daily  ferrous    sulfate 325 milliGRAM(s) Oral three times a day  folic acid 1 milliGRAM(s) Oral daily  gabapentin 100 milliGRAM(s) Oral three times a day  heparin  Infusion. 1100 Unit(s)/Hr (11 mL/Hr) IV Continuous <Continuous>  lidocaine   Patch 1 Patch Transdermal daily  meropenem  IVPB 1000 milliGRAM(s) IV Intermittent every 12 hours  multivitamin 1 Tablet(s) Oral daily  pantoprazole    Tablet 40 milliGRAM(s) Oral before breakfast  saccharomyces boulardii 250 milliGRAM(s) Oral two times a day  sodium chloride 0.9%. 1000 milliLiter(s) (100 mL/Hr) IV Continuous <Continuous>  thiamine 100 milliGRAM(s) Oral daily  traMADol 50 milliGRAM(s) Oral every 8 hours    MEDICATIONS  (PRN):  ALPRAZolam 0.25 milliGRAM(s) Oral every 8 hours PRN anxiety  heparin  Injectable 3200 Unit(s) IV Push every 6 hours PRN For aPTT less than 40  HYDROmorphone   Tablet 4 milliGRAM(s) Oral every 6 hours PRN Severe Pain (7 - 10)  HYDROmorphone   Tablet 2 milliGRAM(s) Oral every 6 hours PRN Moderate Pain (4 - 6)  methocarbamol 500 milliGRAM(s) Oral every 6 hours PRN Muscle Spasm      LABS:                          7.5    25.73 )-----------( 577      ( 06 Dec 2019 03:55 )             23.9     12-    136  |  100  |  17.0  ----------------------------<  76  3.7   |  22.0  |  2.42<H>    Ca    8.2<L>      06 Dec 2019 11:13  Phos  4.1       Mg     1.5         TPro  5.4<L>  /  Alb  1.9<L>  /  TBili  0.3<L>  /  DBili  0.2  /  AST  29  /  ALT  13  /  AlkPhos  272<H>      PT/INR - ( 06 Dec 2019 11:13 )   PT: 27.0 sec;   INR: 2.29 ratio         PTT - ( 06 Dec 2019 03:55 )  PTT:44.7 sec  Urinalysis Basic - ( 05 Dec 2019 15:15 )      Color: Yellow / Appearance: Clear / S.005 / pH: x  Gluc: x / Ketone: Negative  / Bili: Negative / Urobili: Negative mg/dL   Blood: x / Protein: 30 mg/dL / Nitrite: Negative   Leuk Esterase: Negative / RBC: 6-10 /HPF / WBC 3-5   Sq Epi: x / Non Sq Epi: Occasional / Bacteria: Negative        Culture - Urine (collected 19 @ 10:24)  Source: .Urine  Final Report (19 @ 11:51):    No growth    Culture - Other (collected 19 @ 14:03)  Source: .Other chest tube insertion site (pus/purulent)  Final Report (19 @ 09:15):    No growth at 2 days.    Culture - Blood (collected 19 @ 13:30)  Source: .Blood    Culture - Blood (collected 19 @ 13:30)  Source: .Blood    Culture - Acid Fast - Tissue w/Smear (collected 19 @ 20:40)  Source: .Tissue chest wound    Culture - Fungal, Tissue (collected 19 @ 20:12)  Source: .Tissue chest wound    Culture - Tissue with Gram Stain (collected 19 @ 20:12)  Source: .Tissue chest wound  Gram Stain (19 @ 21:27):    Few White blood cells    No organisms seen  Final Report (19 @ 16:00):    No growth at 5 days.    Culture - Blood (collected 19 @ 05:20)  Source: .Blood  Final Report (19 @ 07:00):    No growth at 5 days.    Culture - Blood (collected 19 @ 05:19)  Source: .Blood  Final Report (19 @ 07:00):    No growth at 5 days.      RADIOLOGY & ADDITIONAL TESTS:      < from: US Duplex Venous Lower Ext Ltd, Right (19 @ 12:28) >     EXAM:  US DPLX LWR EXT VEINS LTD RT                          PROCEDURE DATE:  2019          INTERPRETATION:  CLINICAL INFORMATION: Right lower extremity swelling,   right IJ thrombus    COMPARISON: 2019    TECHNIQUE: Duplex sonography of the RIGHT LOWER extremity veins with   color and spectral Doppler, with and without compression.      FINDINGS:    There is normal compressibility of the right common femoral, femoral and   popliteal veins.     The contralateral common femoral veinis patent.    Doppler examination shows normal spontaneous and phasic flow.    No calf vein thrombosis is detected.    There is large subcutaneous edema extending from the right popliteal   fossa into the calf.    IMPRESSION:     No evidence of rightlower extremity deep venous thrombosis.    Large subcutaneous edema extending from the right popliteal fossa into   the calf.          ELISEO SEGOVIA M.D., ATTENDING RADIOLOGIST  This document has been electronically signed. Dec  6 [id number]        < end of copied text >    < from: Xray Chest 1 View- PORTABLE-Routine (19 @ 05:59) >   EXAM:  XR CHEST PORTABLE ROUTINE 1V                          PROCEDURE DATE:  2019          INTERPRETATION:  Clinical information: CHF    Portable study of the chest, 4:30 AM    Comparison study dated 2019, 4:25 AM    Cardiac monitor leads present. Right-sided chest tube present, increasing   opacification right hemithorax. Left lung remains normally expanded.   Heart size could not be evaluated, loss of definition of right cardiac   border. No acute osseous abnormalities.    IMPRESSION: Worsening appearance of the chest, see above report      AMIE CALDERON M.D.,ATTENDING RADIOLOGIST  This document has been electronically signed. Dec  6 2019  2:50PM    < end of copied text >    < from: CT Chest w/ IV Cont (19 @ 12:50) >     EXAM:  CT ABDOMEN AND PELVIS OC IC                         EXAM:  CT CHEST IC                          PROCEDURE DATE:  2019          INTERPRETATION:  CLINICAL INFORMATION: Postop. Persistent elevated white   count.    COMPARISON: CT chest 2019 and MRI abdomen 2019 and upper   extremity venous Doppler 2019    PROCEDURE:   CT of the Chest, Abdomen and Pelvis was performed with intravenous   contrast.   Intravenous contrast: 94 ml Omnipaque 300.  Oral contrast: Positive contrast was administered.  Sagittal and coronal reformats were performed.    FINDINGS:    CHEST:     LUNGS AND LARGE AIRWAYS: Patent central airways. Interval improvement in   airspace opacities in the lungs since 2019. Interlobular septal   thickening in the right lung inferior aspect of the left lower lobe,   likely pulmonary edema.  PLEURA: Right chest tube with a small right hydropneumothorax with an   interval increase in the amount of fluid in the pleural space since   2019. There is enhancement of the pleura which can be related to   infection or the prior instrumentation.  VESSELS: Thoracic aorta normal in caliber. Partially imaged nonocclusive   thrombus in the right internal jugular vein.  HEART: Heart size is normal. No pericardial effusion.  MEDIASTINUM AND TETO: Multiple subcentimeter mediastinal lymph nodes.   There is a region of low attenuation with high attenuation peripherally   to the left of the distal esophagus (series 3 image 107) measuring 4.3 x   1.8 cm,likely a fluid collection.  CHEST WALL AND LOWER NECK: No enlarged axillary lymph nodes.    ABDOMEN AND PELVIS:    LIVER: 2 new cystic foci, one at the medial aspect of the caudate and the   second in the posterior aspect of the lateral segment left hepatic lobe   with the larger at the medial aspect of the caudate measuring 1.9 x 1.8   cm, new.  BILE DUCTS: Normal caliber.  GALLBLADDER: Within normal limits.  SPLEEN: Within normal limits.  PANCREAS: Low-attenuation/cystic foci pancreas appear increase in size   and number since 2019 including a 1.6 x 1.3 cm lesion in the   pancreatic body, not previously seen 1.2 x 1.1 cm lesion in the   pancreatic head, previously 0.8 x 0.3 cm.  ADRENALS: Within normal limits.  KIDNEYS/URETERS: No hydronephrosis. Heterogeneous enhancement with no   focal lesion which may be related to the phase of contrast. Infiltration   of the fat at the posterior aspect of the left kidney.    BLADDER: Within normal limits.  REPRODUCTIVE ORGANS: Uterus and adnexa within normal limits.    BOWEL: No bowel obstruction. No evidence of appendicitis.  PERITONEUM: Trace pelvic free fluid.  VESSELS: Abdominal aorta normal in caliber.  RETROPERITONEUM/LYMPH NODES: No lymphadenopathy.    ABDOMINAL WALL: Anasarca.  BONES: No aggressive osseous lesion.    IMPRESSION:     Interval improvement in bilateral airspace disease, greater on the right   since 2019.    Interlobular septal thickening lungs, likely pulmonary edema.    Small right hydropneumothorax with aninterval increase in pleural fluid   since 2019.    Partially imaged nonocclusive deep venous thrombosis in the right   internal jugular vein.    Interval increase in size and number of low-attenuation lesions in the   pancreas since 2019and new cystic lesions at the medial aspect of   the caudate and posterior aspect of the left hepatic lobe of uncertain   etiology; the differential includes pancreatitis with associated acute   peripancreatic fluid collections; correlation is recommended with   pancreatic enzymes.     Region of low density measuring 4.3 cm to the left of the distal   esophagus, likely a fluid collection and  infiltration of the fat at the   posterior aspect of the left kidney which if there is pancreatitis could  also be related to the underlying pancreatitis; follow-up is recommended.                AMIE RAMIREZ   This document has been electronically signed. Dec  4 2019  3:07PM              < end of copied text >          REVIEW OF SYSTEMS:    As above , all other systems reviewed and are negative     Vital Signs Last 24 Hrs  T(C): 36.8 (06 Dec 2019 13:38), Max: 36.9 (06 Dec 2019 00:23)  T(F): 98.3 (06 Dec 2019 13:38), Max: 98.4 (06 Dec 2019 00:23)  HR: 109 (06 Dec 2019 13:38) (101 - 140)  BP: 106/62 (06 Dec 2019 13:38) (95/67 - 137/84)  BP(mean): --  RR: 17 (06 Dec 2019 13:38) (16 - 18)  SpO2: 93% (06 Dec 2019 13:38) (93% - 100%)    PHYSICAL EXAM:    GENERAL: NAD, well-groomed, underweight , pale   HEAD:  Atraumatic, Normocephalic  EYES: EOMI, PERRLA, conjunctiva and sclera clear  NECK: Supple, No JVD, Normal thyroid  NERVOUS SYSTEM:  Alert & Oriented X3, no focal deficit  CHEST/LUNG: decreased b/s at bases ,   no  rales, rhonchi, wheezing, or rubs , R sided chest tube + , wound vac removed , oozing , dressing soaked , 2 open wounds +   HEART: Regular rate and rhythm; No murmurs, rubs, or gallops  ABDOMEN: Soft, epigastric tenderness , no rebound , no guarding ,  Nondistended; Bowel sounds present  EXTREMITIES:  2+ Peripheral Pulses, No clubbing, cyanosis, LLE non pitting edema + , RLE 1+ pitting edema - new   LYMPH: No lymphadenopathy noted  SKIN: No rashes or lesions

## 2019-12-06 NOTE — PROGRESS NOTE ADULT - ASSESSMENT
34F, pmhx smoker, ETOH use (4-5 drinks/day), transferred from [facility] to [facility] on 11/22 for complaints of weakness, 25 pound weight loss & flu like symptoms & found to have a large right pleural effusion, cultures + for group A hemolytic strep,11/23 CT of the chest/abd/pelvis indicated a clot in the Right IJ, pt started on heparin, 11/23 s/p right VATS decort & washout debridement of CT site & VAC placement on 11/27/19, persistent drainage from CT site so repeat CT C/A/P performed 12/4 which showed new cystic mass in pancreas and liver concerning for pancreatitis, GI consulted (NTD) pt tolerating diet, zosyn changed to meropenem by ID for broader coverage given pancreatitis, pt remains with copious drainage from anterior CT site, additional fluid analysis pending;

## 2019-12-06 NOTE — PROGRESS NOTE ADULT - PROBLEM SELECTOR PLAN 1
s/p Right VATS decortication & washout on 11/27/19. Debridement of CT site & VAC placement  #2 chest tube remains in situ, maintain to WS  CT fluid sent for Bilirubin   minimal drainage but maintain for now  daily CXR  Encourage the use of I/S, CDBE, OOB to chair, daily PT, ambulate as tolerated  Trend WBC/temps. If fever obtain blood cultures.  VAC d/c'd today since not holding seal due to copious drainage, colostomy bag applied with good result  possible return to OR for washout again over weekend  cont supportive care, no tentative d/c at this time  d/w Dr. Aponte

## 2019-12-06 NOTE — PROGRESS NOTE ADULT - PROBLEM SELECTOR PLAN 4
Heme consult appreciated  Follow up with heme as out patient.  Will discharge home on Coumadin, INR 2.3 today, hold coumadin for possible OR over weekend, will need to start heparin gtt if pt becomes subtherapeutic

## 2019-12-06 NOTE — PROGRESS NOTE ADULT - ASSESSMENT
34 year old female who transferred from [facility] to [facility] on 11/22 for complaints of weakness, 25 pound weight loss & flu like symptoms & found to have a large right pleural effusion. Pt. has a PMH of empyema with group A hemolytic strep & has 4-5 drinks of alcohol per day.  On 11/23 CT of the chest/abd/pelvis indicated a clot in the Right IJ. Pt. on heparin bridge to coumadin. CT abdomen shows cystic mass in body/tail of pancreas, now concern for postop ileus.  Post-op course:  Pt s/p right VATS decort & washout debridement of CT site & VAC placement on 11/27/19.  Wound vac was placed and removed today due to increased drainage and leakage around . WBC elevated , abx adjusted by ID , vanco d/c yesterday due to BRAD and high level , procalcitonin high . Was planned for VATS  debridement today ( 12/6) but cancelled . Hg dropped to 7.5 and is getting PRBC ,    MRI with pancreatic cyst but poor study . Followed by CTS ,  ID , Hem / Psych , seen by GI . CT chest , abd / pelvis noted , ? esophageal fluid collection , ? pancreatitis . Yesterday noted with RONAK likely due to Vanco vs CHIQUITA , on ivf .            Problem/Plan - 1:  ·  Problem: Pleural effusion on right.  Plan: s/p Right VATS decortication & washout on 11/27/19. Debridement of CT site & VAC placement , vac removed today due to increased drainage ,  as per primary team . Zosyn / Vanco d/c , started on Meropenem , cultures sent , ID is following .   WBC high , patient is tachycardic , Empyema , procalcitonin high - Sepsis of unknown organism . Follow labs            Problem/Plan - 2:  ·  Problem: Empyema.  Plan: s/p Chest tube , iv abx as per ID ,still with high drainage at surgical site , for VATS debridement as per CTS        Problem/Plan - 3:  ·  Problem: Internal jugular vein thrombosis, right.  Plan: On Heparin drip , Coumadin on hold for planned procedure , Hematology appreciated .      Problem/Plan - 5:  ·  Problem: Anemia likely multifactorial ; Due to anemia of chronic dz , multiple blood drawings , dilutional  , patient is tachycardic , pale  Plan: Transfuse  PRBC        Problem/Plan -6:  ·  Problem: Hyponatremia.  Plan: as per renal , likely SIADH , stable      Problem/Plan - 7:  ·  Problem: Alcohol abuse.  Plan: Monitor for withdrawal  Continue folic, MVI, thiamine    Problem / Plan -8 : Abdominal tenderness , CT noted , ? pancreatitis , esophageal fluid collection  , consider MRI to r/o abscess      Problem / Plan - 9; Frequent loose stools x 1 month - will hold on laxatives for now , follow stool cultures , O and P , GI PCR ,r/o  C diff , less frequent off Miralax     Problem / Plan -10 : B/L feet pain , pain all over her body  , today R foot swollen , u/s negative ror dvt , get XR of foot     Problem / Plan -11 ; Hematuria , with blood clots , UTI r/o  - resolved , likely due to high inr     Problem / Plan -12 : Multifocal PNA -  treated with abx     Problem /Plan - 13 : Hypoalbuminemia , prealbumin noted _ 4 , severe protein calorie malnutrition -  Nutrition eval noted ,supplements .     Problem / Plan - 14 : RONAK - likely multifactorial RONAK; ATN from Vancomycin and ? CHIQUITA , on ivf ,resolving with ivf , renal noted and appreciated , follow BMP in am .     D/W patient / Dr Fay Issa will take over in am .

## 2019-12-06 NOTE — PROGRESS NOTE ADULT - SUBJECTIVE AND OBJECTIVE BOX
Subjective:    T(C): 36.8 (12-06-19 @ 13:38), Max: 36.9 (12-06-19 @ 00:23)  HR: 109 (12-06-19 @ 13:38) (101 - 140)  BP: 106/62 (12-06-19 @ 13:38) (95/67 - 137/84)  RR: 17 (12-06-19 @ 13:38) (16 - 18)  SpO2: 93% (12-06-19 @ 13:38) (93% - 99%)    12-06    136  |  100  |  17.0  ----------------------------<  76  3.7   |  22.0  |  2.42<H>    Ca    8.2<L>      06 Dec 2019 11:13  Phos  4.1     12-05  Mg     1.5     12-06    TPro  5.4<L>  /  Alb  1.9<L>  /  TBili  0.3<L>  /  DBili  0.2  /  AST  29  /  ALT  13  /  AlkPhos  272<H>  12-06                               7.5    25.73 )-----------( 577      ( 06 Dec 2019 03:55 )             23.9   PT/INR - ( 06 Dec 2019 11:13 )   PT: 27.0 sec;   INR: 2.29 ratio    PTT - ( 06 Dec 2019 03:55 )  PTT:44.7 sec            CAPILLARY BLOOD GLUCOSE    I&O's Detail    05 Dec 2019 07:01  -  06 Dec 2019 07:00  --------------------------------------------------------  IN:    heparin  Infusion.: 179 mL    Oral Fluid: 720 mL    sodium chloride 0.9%.: 1800 mL    Solution: 200 mL  Total IN: 2899 mL    OUT:    Chest Tube: 5 mL    VAC (Vacuum Assisted Closure) System: 250 mL    Voided: 800 mL  Total OUT: 1055 mL  Total NET: 1844 mL    06 Dec 2019 07:01  -  06 Dec 2019 16:43  --------------------------------------------------------  IN:    Packed Red Blood Cells: 294 mL    sodium chloride 0.9%.: 800 mL  Total IN: 1094 mL    OUT:    Voided: 750 mL  Total OUT: 750 mL    Total NET: 344 mL    Drug Dosing Weight  Height (cm): 167 (27 Nov 2019 10:14)  Weight (kg): 55.2 (27 Nov 2019 10:14)  BMI (kg/m2): 19.8 (27 Nov 2019 10:14)  BSA (m2): 1.61 (27 Nov 2019 10:14)    MEDICATIONS  (STANDING):  amitriptyline 10 milliGRAM(s) Oral at bedtime  ascorbic acid 500 milliGRAM(s) Oral daily  ferrous    sulfate 325 milliGRAM(s) Oral three times a day  folic acid 1 milliGRAM(s) Oral daily  gabapentin 100 milliGRAM(s) Oral three times a day  heparin  Infusion. 1100 Unit(s)/Hr (11 mL/Hr) IV Continuous <Continuous>  lidocaine   Patch 1 Patch Transdermal daily  meropenem  IVPB 1000 milliGRAM(s) IV Intermittent every 12 hours  multivitamin 1 Tablet(s) Oral daily  pantoprazole    Tablet 40 milliGRAM(s) Oral before breakfast  saccharomyces boulardii 250 milliGRAM(s) Oral two times a day  sodium chloride 0.9%. 1000 milliLiter(s) (100 mL/Hr) IV Continuous <Continuous>  thiamine 100 milliGRAM(s) Oral daily  traMADol 50 milliGRAM(s) Oral every 8 hours    MEDICATIONS  (PRN):  ALPRAZolam 0.25 milliGRAM(s) Oral every 8 hours PRN anxiety  heparin  Injectable 3200 Unit(s) IV Push every 6 hours PRN For aPTT less than 40  HYDROmorphone   Tablet 4 milliGRAM(s) Oral every 6 hours PRN Severe Pain (7 - 10)  HYDROmorphone   Tablet 2 milliGRAM(s) Oral every 6 hours PRN Moderate Pain (4 - 6)  methocarbamol 500 milliGRAM(s) Oral every 6 hours PRN Muscle Spasm      Physical Exam  Neuro:   Pulm:   CV:   Abd:   Extrem/MS:   Incision(s): Subjective: "i didn't sleep well, they changed the dressing 5 times" c/o pain at incision sites, denies CP, palpitations, SOB, cough, itchiness/rash, dizziness/lightheadedness, fever, chills, numbness/tingling, HA, abd pain N/V;    T(C): 36.8 (12-06-19 @ 13:38), Max: 36.9 (12-06-19 @ 00:23)  HR: 109 (12-06-19 @ 13:38) (101 - 140)  BP: 106/62 (12-06-19 @ 13:38) (95/67 - 137/84)  RR: 17 (12-06-19 @ 13:38) (16 - 18)  SpO2: 93% (12-06-19 @ 13:38) (93% - 99%)    12-06    136  |  100  |  17.0  ----------------------------<  76  3.7   |  22.0  |  2.42<H>    Ca    8.2<L>      06 Dec 2019 11:13  Phos  4.1     12-05  Mg     1.5     12-06    TPro  5.4<L>  /  Alb  1.9<L>  /  TBili  0.3<L>  /  DBili  0.2  /  AST  29  /  ALT  13  /  AlkPhos  272<H>  12-06                               7.5    25.73 )-----------( 577      ( 06 Dec 2019 03:55 )             23.9   PT/INR - ( 06 Dec 2019 11:13 )   PT: 27.0 sec;   INR: 2.29 ratio    PTT - ( 06 Dec 2019 03:55 )  PTT:44.7 sec            CAPILLARY BLOOD GLUCOSE    I&O's Detail    05 Dec 2019 07:01  -  06 Dec 2019 07:00  --------------------------------------------------------  IN:    heparin  Infusion.: 179 mL    Oral Fluid: 720 mL    sodium chloride 0.9%.: 1800 mL    Solution: 200 mL  Total IN: 2899 mL    OUT:    Chest Tube: 5 mL    VAC (Vacuum Assisted Closure) System: 250 mL    Voided: 800 mL  Total OUT: 1055 mL  Total NET: 1844 mL    06 Dec 2019 07:01  -  06 Dec 2019 16:43  --------------------------------------------------------  IN:    Packed Red Blood Cells: 294 mL    sodium chloride 0.9%.: 800 mL  Total IN: 1094 mL    OUT:    Voided: 750 mL  Total OUT: 750 mL    Total NET: 344 mL    Drug Dosing Weight  Height (cm): 167 (27 Nov 2019 10:14)  Weight (kg): 55.2 (27 Nov 2019 10:14)  BMI (kg/m2): 19.8 (27 Nov 2019 10:14)  BSA (m2): 1.61 (27 Nov 2019 10:14)    MEDICATIONS  (STANDING):  amitriptyline 10 milliGRAM(s) Oral at bedtime  ascorbic acid 500 milliGRAM(s) Oral daily  ferrous    sulfate 325 milliGRAM(s) Oral three times a day  folic acid 1 milliGRAM(s) Oral daily  gabapentin 100 milliGRAM(s) Oral three times a day  heparin  Infusion. 1100 Unit(s)/Hr (11 mL/Hr) IV Continuous <Continuous>  lidocaine   Patch 1 Patch Transdermal daily  meropenem  IVPB 1000 milliGRAM(s) IV Intermittent every 12 hours  multivitamin 1 Tablet(s) Oral daily  pantoprazole    Tablet 40 milliGRAM(s) Oral before breakfast  saccharomyces boulardii 250 milliGRAM(s) Oral two times a day  sodium chloride 0.9%. 1000 milliLiter(s) (100 mL/Hr) IV Continuous <Continuous>  thiamine 100 milliGRAM(s) Oral daily  traMADol 50 milliGRAM(s) Oral every 8 hours    MEDICATIONS  (PRN):  ALPRAZolam 0.25 milliGRAM(s) Oral every 8 hours PRN anxiety  heparin  Injectable 3200 Unit(s) IV Push every 6 hours PRN For aPTT less than 40  HYDROmorphone   Tablet 4 milliGRAM(s) Oral every 6 hours PRN Severe Pain (7 - 10)  HYDROmorphone   Tablet 2 milliGRAM(s) Oral every 6 hours PRN Moderate Pain (4 - 6)  methocarbamol 500 milliGRAM(s) Oral every 6 hours PRN Muscle Spasm    Physical Exam  Constitutional: NAD, well developed, well nourished  Neuro: A+O x 3, non-focal, speech clear and intact  HEENT: NC/AT, PERRL, EOMI, anicteric sclerae, oral mucosa pink and moist  Neck: supple, no JVD  CV: S1S2 regular, tachy, no murmurs  Pulm/chest: very diminished on right, clear on left, no wheezing  Abd: + BS soft NT ND  Ext: THACKER x 4, 2-3+ pitting edema of ankle/foot, no erythema, no edema of left  Skin: warm, well perfused  Psych: calm, appropriate affect   inc: R chest VATs site open no pus or drainage noted, R anterior CT site open with copious thin tanish liquid draining (soaked multiple gauzes), erythematous and tender to touch  R CT in place to waterseal, no air leak, site intact

## 2019-12-07 DIAGNOSIS — M25.471 EFFUSION, RIGHT ANKLE: ICD-10-CM

## 2019-12-07 LAB
ACTH SER-ACNC: 1.6 PG/ML — LOW (ref 7.2–63.3)
ALBUMIN SERPL ELPH-MCNC: 1.7 G/DL — LOW (ref 3.3–5.2)
ALP SERPL-CCNC: 242 U/L — HIGH (ref 40–120)
ALT FLD-CCNC: 12 U/L — SIGNIFICANT CHANGE UP
AMYLASE FLD-CCNC: SIGNIFICANT CHANGE UP U/L
AMYLASE P1 CFR SERPL: 51 U/L — SIGNIFICANT CHANGE UP (ref 36–128)
ANION GAP SERPL CALC-SCNC: 14 MMOL/L — SIGNIFICANT CHANGE UP (ref 5–17)
ANION GAP SERPL CALC-SCNC: 14 MMOL/L — SIGNIFICANT CHANGE UP (ref 5–17)
APTT BLD: 43.9 SEC — HIGH (ref 27.5–36.3)
AST SERPL-CCNC: 20 U/L — SIGNIFICANT CHANGE UP
BILIRUB DIRECT SERPL-MCNC: 0.1 MG/DL — SIGNIFICANT CHANGE UP (ref 0–0.3)
BILIRUB FLD-MCNC: <0.2 MG/DL — SIGNIFICANT CHANGE UP
BILIRUB FLD-MCNC: <0.2 MG/DL — SIGNIFICANT CHANGE UP
BILIRUB INDIRECT FLD-MCNC: 0.2 MG/DL — SIGNIFICANT CHANGE UP (ref 0.2–1)
BILIRUB SERPL-MCNC: 0.3 MG/DL — LOW (ref 0.4–2)
BUN SERPL-MCNC: 15 MG/DL — SIGNIFICANT CHANGE UP (ref 8–20)
BUN SERPL-MCNC: 17 MG/DL — SIGNIFICANT CHANGE UP (ref 8–20)
CALCIUM SERPL-MCNC: 8.1 MG/DL — LOW (ref 8.6–10.2)
CALCIUM SERPL-MCNC: 8.3 MG/DL — LOW (ref 8.6–10.2)
CHLORIDE SERPL-SCNC: 104 MMOL/L — SIGNIFICANT CHANGE UP (ref 98–107)
CHLORIDE SERPL-SCNC: 105 MMOL/L — SIGNIFICANT CHANGE UP (ref 98–107)
CO2 SERPL-SCNC: 20 MMOL/L — LOW (ref 22–29)
CO2 SERPL-SCNC: 21 MMOL/L — LOW (ref 22–29)
CREAT SERPL-MCNC: 1.98 MG/DL — HIGH (ref 0.5–1.3)
CREAT SERPL-MCNC: 2.29 MG/DL — HIGH (ref 0.5–1.3)
GLUCOSE SERPL-MCNC: 87 MG/DL — SIGNIFICANT CHANGE UP (ref 70–115)
GLUCOSE SERPL-MCNC: 94 MG/DL — SIGNIFICANT CHANGE UP (ref 70–115)
HCT VFR BLD CALC: 28.9 % — LOW (ref 34.5–45)
HCT VFR BLD CALC: 30.2 % — LOW (ref 34.5–45)
HGB BLD-MCNC: 9.2 G/DL — LOW (ref 11.5–15.5)
HGB BLD-MCNC: 9.4 G/DL — LOW (ref 11.5–15.5)
INR BLD: 1.76 RATIO — HIGH (ref 0.88–1.16)
LIDOCAIN IGE QN: 141 U/L — HIGH (ref 22–51)
MAGNESIUM SERPL-MCNC: 1.4 MG/DL — LOW (ref 1.6–2.6)
MAGNESIUM SERPL-MCNC: 2.2 MG/DL — SIGNIFICANT CHANGE UP (ref 1.6–2.6)
MCHC RBC-ENTMCNC: 31.1 GM/DL — LOW (ref 32–36)
MCHC RBC-ENTMCNC: 31.1 PG — SIGNIFICANT CHANGE UP (ref 27–34)
MCHC RBC-ENTMCNC: 31.3 PG — SIGNIFICANT CHANGE UP (ref 27–34)
MCHC RBC-ENTMCNC: 31.8 GM/DL — LOW (ref 32–36)
MCV RBC AUTO: 100 FL — SIGNIFICANT CHANGE UP (ref 80–100)
MCV RBC AUTO: 98.3 FL — SIGNIFICANT CHANGE UP (ref 80–100)
PLATELET # BLD AUTO: 639 K/UL — HIGH (ref 150–400)
PLATELET # BLD AUTO: 679 K/UL — HIGH (ref 150–400)
POTASSIUM SERPL-MCNC: 3.3 MMOL/L — LOW (ref 3.5–5.3)
POTASSIUM SERPL-MCNC: 4.4 MMOL/L — SIGNIFICANT CHANGE UP (ref 3.5–5.3)
POTASSIUM SERPL-SCNC: 3.3 MMOL/L — LOW (ref 3.5–5.3)
POTASSIUM SERPL-SCNC: 4.4 MMOL/L — SIGNIFICANT CHANGE UP (ref 3.5–5.3)
PREALB SERPL-MCNC: 5 MG/DL — LOW (ref 18–38)
PROT SERPL-MCNC: 5.1 G/DL — LOW (ref 6.6–8.7)
PROTHROM AB SERPL-ACNC: 20.6 SEC — HIGH (ref 10–12.9)
RBC # BLD: 2.94 M/UL — LOW (ref 3.8–5.2)
RBC # BLD: 3.02 M/UL — LOW (ref 3.8–5.2)
RBC # FLD: 15.9 % — HIGH (ref 10.3–14.5)
RBC # FLD: 16.5 % — HIGH (ref 10.3–14.5)
SODIUM SERPL-SCNC: 139 MMOL/L — SIGNIFICANT CHANGE UP (ref 135–145)
SODIUM SERPL-SCNC: 139 MMOL/L — SIGNIFICANT CHANGE UP (ref 135–145)
TRIGL FLD-MCNC: <10 MG/DL — SIGNIFICANT CHANGE UP
TRIGL SERPL-MCNC: 181 MG/DL — SIGNIFICANT CHANGE UP (ref 10–200)
WBC # BLD: 13.79 K/UL — HIGH (ref 3.8–10.5)
WBC # BLD: 14.46 K/UL — HIGH (ref 3.8–10.5)
WBC # FLD AUTO: 13.79 K/UL — HIGH (ref 3.8–10.5)
WBC # FLD AUTO: 14.46 K/UL — HIGH (ref 3.8–10.5)

## 2019-12-07 PROCEDURE — 71045 X-RAY EXAM CHEST 1 VIEW: CPT | Mod: 26

## 2019-12-07 PROCEDURE — 99233 SBSQ HOSP IP/OBS HIGH 50: CPT

## 2019-12-07 PROCEDURE — 74220 X-RAY XM ESOPHAGUS 1CNTRST: CPT | Mod: 26

## 2019-12-07 PROCEDURE — 71045 X-RAY EXAM CHEST 1 VIEW: CPT | Mod: 26,77

## 2019-12-07 PROCEDURE — 71250 CT THORAX DX C-: CPT | Mod: 26

## 2019-12-07 PROCEDURE — 99232 SBSQ HOSP IP/OBS MODERATE 35: CPT

## 2019-12-07 RX ORDER — MAGNESIUM SULFATE 500 MG/ML
2 VIAL (ML) INJECTION
Refills: 0 | Status: COMPLETED | OUTPATIENT
Start: 2019-12-07 | End: 2019-12-07

## 2019-12-07 RX ORDER — HEPARIN SODIUM 5000 [USP'U]/ML
2000 INJECTION INTRAVENOUS; SUBCUTANEOUS EVERY 6 HOURS
Refills: 0 | Status: DISCONTINUED | OUTPATIENT
Start: 2019-12-07 | End: 2019-12-29

## 2019-12-07 RX ORDER — HEPARIN SODIUM 5000 [USP'U]/ML
4000 INJECTION INTRAVENOUS; SUBCUTANEOUS EVERY 6 HOURS
Refills: 0 | Status: DISCONTINUED | OUTPATIENT
Start: 2019-12-07 | End: 2019-12-29

## 2019-12-07 RX ORDER — POTASSIUM CHLORIDE 20 MEQ
10 PACKET (EA) ORAL
Refills: 0 | Status: COMPLETED | OUTPATIENT
Start: 2019-12-07 | End: 2019-12-07

## 2019-12-07 RX ORDER — HEPARIN SODIUM 5000 [USP'U]/ML
INJECTION INTRAVENOUS; SUBCUTANEOUS
Qty: 25000 | Refills: 0 | Status: DISCONTINUED | OUTPATIENT
Start: 2019-12-07 | End: 2019-12-08

## 2019-12-07 RX ADMIN — HEPARIN SODIUM 2000 UNIT(S): 5000 INJECTION INTRAVENOUS; SUBCUTANEOUS at 19:38

## 2019-12-07 RX ADMIN — Medication 100 MILLIEQUIVALENT(S): at 10:42

## 2019-12-07 RX ADMIN — MEROPENEM 100 MILLIGRAM(S): 1 INJECTION INTRAVENOUS at 19:00

## 2019-12-07 RX ADMIN — TRAMADOL HYDROCHLORIDE 50 MILLIGRAM(S): 50 TABLET ORAL at 16:44

## 2019-12-07 RX ADMIN — SODIUM CHLORIDE 100 MILLILITER(S): 9 INJECTION INTRAMUSCULAR; INTRAVENOUS; SUBCUTANEOUS at 12:47

## 2019-12-07 RX ADMIN — HYDROMORPHONE HYDROCHLORIDE 4 MILLIGRAM(S): 2 INJECTION INTRAMUSCULAR; INTRAVENOUS; SUBCUTANEOUS at 16:44

## 2019-12-07 RX ADMIN — Medication 325 MILLIGRAM(S): at 16:14

## 2019-12-07 RX ADMIN — LIDOCAINE 1 PATCH: 4 CREAM TOPICAL at 19:00

## 2019-12-07 RX ADMIN — Medication 50 GRAM(S): at 10:07

## 2019-12-07 RX ADMIN — HYDROMORPHONE HYDROCHLORIDE 2 MILLIGRAM(S): 2 INJECTION INTRAMUSCULAR; INTRAVENOUS; SUBCUTANEOUS at 10:37

## 2019-12-07 RX ADMIN — Medication 10 MILLIGRAM(S): at 21:12

## 2019-12-07 RX ADMIN — TRAMADOL HYDROCHLORIDE 50 MILLIGRAM(S): 50 TABLET ORAL at 05:11

## 2019-12-07 RX ADMIN — TRAMADOL HYDROCHLORIDE 50 MILLIGRAM(S): 50 TABLET ORAL at 16:14

## 2019-12-07 RX ADMIN — Medication 0.25 MILLIGRAM(S): at 20:36

## 2019-12-07 RX ADMIN — LIDOCAINE 1 PATCH: 4 CREAM TOPICAL at 22:34

## 2019-12-07 RX ADMIN — HYDROMORPHONE HYDROCHLORIDE 2 MILLIGRAM(S): 2 INJECTION INTRAMUSCULAR; INTRAVENOUS; SUBCUTANEOUS at 05:16

## 2019-12-07 RX ADMIN — Medication 50 GRAM(S): at 11:47

## 2019-12-07 RX ADMIN — TRAMADOL HYDROCHLORIDE 50 MILLIGRAM(S): 50 TABLET ORAL at 00:27

## 2019-12-07 RX ADMIN — HYDROMORPHONE HYDROCHLORIDE 4 MILLIGRAM(S): 2 INJECTION INTRAMUSCULAR; INTRAVENOUS; SUBCUTANEOUS at 21:37

## 2019-12-07 RX ADMIN — GABAPENTIN 100 MILLIGRAM(S): 400 CAPSULE ORAL at 21:12

## 2019-12-07 RX ADMIN — Medication 100 MILLIEQUIVALENT(S): at 11:48

## 2019-12-07 RX ADMIN — GABAPENTIN 100 MILLIGRAM(S): 400 CAPSULE ORAL at 16:14

## 2019-12-07 RX ADMIN — Medication 250 MILLIGRAM(S): at 19:00

## 2019-12-07 RX ADMIN — Medication 100 MILLIGRAM(S): at 11:49

## 2019-12-07 RX ADMIN — Medication 325 MILLIGRAM(S): at 05:11

## 2019-12-07 RX ADMIN — MEROPENEM 100 MILLIGRAM(S): 1 INJECTION INTRAVENOUS at 05:14

## 2019-12-07 RX ADMIN — GABAPENTIN 100 MILLIGRAM(S): 400 CAPSULE ORAL at 05:11

## 2019-12-07 RX ADMIN — HEPARIN SODIUM 1000 UNIT(S)/HR: 5000 INJECTION INTRAVENOUS; SUBCUTANEOUS at 10:39

## 2019-12-07 RX ADMIN — Medication 250 MILLIGRAM(S): at 07:00

## 2019-12-07 RX ADMIN — TRAMADOL HYDROCHLORIDE 50 MILLIGRAM(S): 50 TABLET ORAL at 06:11

## 2019-12-07 RX ADMIN — HEPARIN SODIUM 1100 UNIT(S)/HR: 5000 INJECTION INTRAVENOUS; SUBCUTANEOUS at 19:36

## 2019-12-07 RX ADMIN — Medication 1 TABLET(S): at 11:49

## 2019-12-07 RX ADMIN — Medication 500 MILLIGRAM(S): at 11:49

## 2019-12-07 RX ADMIN — HYDROMORPHONE HYDROCHLORIDE 4 MILLIGRAM(S): 2 INJECTION INTRAMUSCULAR; INTRAVENOUS; SUBCUTANEOUS at 14:57

## 2019-12-07 RX ADMIN — HYDROMORPHONE HYDROCHLORIDE 4 MILLIGRAM(S): 2 INJECTION INTRAMUSCULAR; INTRAVENOUS; SUBCUTANEOUS at 20:37

## 2019-12-07 RX ADMIN — HYDROMORPHONE HYDROCHLORIDE 2 MILLIGRAM(S): 2 INJECTION INTRAMUSCULAR; INTRAVENOUS; SUBCUTANEOUS at 10:07

## 2019-12-07 RX ADMIN — HYDROMORPHONE HYDROCHLORIDE 2 MILLIGRAM(S): 2 INJECTION INTRAMUSCULAR; INTRAVENOUS; SUBCUTANEOUS at 02:04

## 2019-12-07 RX ADMIN — TRAMADOL HYDROCHLORIDE 50 MILLIGRAM(S): 50 TABLET ORAL at 23:52

## 2019-12-07 RX ADMIN — Medication 325 MILLIGRAM(S): at 21:12

## 2019-12-07 RX ADMIN — Medication 1 MILLIGRAM(S): at 11:49

## 2019-12-07 RX ADMIN — LIDOCAINE 1 PATCH: 4 CREAM TOPICAL at 11:48

## 2019-12-07 RX ADMIN — Medication 100 MILLIEQUIVALENT(S): at 12:44

## 2019-12-07 RX ADMIN — PANTOPRAZOLE SODIUM 40 MILLIGRAM(S): 20 TABLET, DELAYED RELEASE ORAL at 05:14

## 2019-12-07 NOTE — PROGRESS NOTE ADULT - ASSESSMENT
34 year old female with no known major comorbidities who is being treated for right pleural effusion/empyema/multifocal pna found to have a RIJ thrombus currently on heparin drip.    1) RIJ venous thrombosis - I feel that this is a provoked event that occurred in context of her severe and complex lung illness/infection/empyema that may have led to developing the thrombus. I agree with continuing anticoagulation and for a duration of at least 3 months. I feel that coumadin or a DOAC can be the agent of choice.     pt currently being transitioned to coumadin  no bleeding reported   goal INR 2-3    Reactive leukocytosis improving    Multifactorial anemia/reactive thrombocytosis-  -cont to monitor     possible return to OR this weekend for washout     will follow prn

## 2019-12-07 NOTE — PROGRESS NOTE ADULT - SUBJECTIVE AND OBJECTIVE BOX
[facility]  INFECTIOUS DISEASES AND INTERNAL MEDICINE at [place]  =======================================================  Carlos Alberto Soto MD  Diplomates American Board of Internal Medicine and Infectious Diseases  Telephone [phone]  Fax            [phone]  =======================================================    [facility]-[id number]  HERACLIO MOLINA   follow up:  infected pleural effusion, empyema    s/p VATS on 11/27/19    chest tube removed  placement of ostomy bag at site of former chest tubes.   copious drainage of fluid.   Elevated fluid amylase    =======================================================    REVIEW OF SYSTEMS:  CONSTITUTIONAL:  No Fever or chills  HEENT:  No diplopia or blurred vision.  No earache, sore throat or runny nose.  CARDIOVASCULAR:  No pressure, squeezing, strangling, tightness, heaviness or aching about the chest, neck, axilla or epigastrium.  RESPIRATORY:  No cough, shortness of breath  GASTROINTESTINAL:  No nausea, vomiting or diarrhea.  GENITOURINARY:  No dysuria, frequency or urgency. No Blood in urine  MUSCULOSKELETAL:  no joint aches, no muscle pain  SKIN:  No change in skin, hair or nails.  NEUROLOGIC:  No Headaches, seizures or weakness.  PSYCHIATRIC:  No disorder of thought or mood.  ENDOCRINE:  No heat or cold intolerance  HEMATOLOGICAL:  No easy bruising or bleeding.   =======================================================  Allergies  No Known Allergies    Antibiotics:  meropenem  IVPB 1000 milliGRAM(s) IV Intermittent every 12 hours    Other medications:  amitriptyline 10 milliGRAM(s) Oral at bedtime  ascorbic acid 500 milliGRAM(s) Oral daily  ferrous    sulfate 325 milliGRAM(s) Oral three times a day  folic acid 1 milliGRAM(s) Oral daily  gabapentin 100 milliGRAM(s) Oral three times a day  heparin  Infusion.  Unit(s)/Hr IV Continuous <Continuous>  lidocaine   Patch 1 Patch Transdermal daily  multivitamin 1 Tablet(s) Oral daily  pantoprazole    Tablet 40 milliGRAM(s) Oral before breakfast  saccharomyces boulardii 250 milliGRAM(s) Oral two times a day  sodium chloride 0.9%. 1000 milliLiter(s) IV Continuous <Continuous>  thiamine 100 milliGRAM(s) Oral daily  traMADol 50 milliGRAM(s) Oral every 8 hours      ======================================================  Physical Exam:  ============  T(F): 97.6 (07 Dec 2019 12:40), Max: 97.9 (06 Dec 2019 20:00)  HR: 107 (07 Dec 2019 12:40)  BP: 10/65 (07 Dec 2019 12:40)  RR: 18 (07 Dec 2019 12:40)  SpO2: 98% (07 Dec 2019 12:40) (96% - 100%)  temp max in last 48H T(F): , Max: 98.4 (12-06-19 @ 00:23)    General:  No acute distress.   Eye: Pupils are equal, round and reactive to light, Extraocular movements are intact, Normal conjunctiva.  HENT: Normocephalic, Oral mucosa is moist, No pharyngeal erythema, No sinus tenderness.  Neck: Supple, No lymphadenopathy.  Respiratory: decreased breath sounds right lung  RIGHT chest tube in place  Cardiovascular: Normal rate, Regular rhythm,   TRACE edema of legs  Gastrointestinal: Soft, mild right abd tenderness  Normal bowel sounds.  Genitourinary: No costovertebral angle tenderness.  Lymphatics: No lymphadenopathy neck,   Musculoskeletal: Normal range of motion, Normal strength.  Integumentary: No rash.  Neurologic: Alert, Oriented, No focal deficits, Cranial Nerves II-XII are grossly intact.  Psychiatric: Appropriate mood & affect.    =======================================================  11/22/19 blood cx negative x 2  11/22/19 - body fluid cx with Moderate Alpha hemolytic strep x 2 sets  Alpha hemolytic strep  		[facility]	E Test  Ceftriaxone  			S (0.19)   Clindamycin 	S    Erythromycin 	S   Penicillin 	S   Vancomycin 	S     ==================================     EXAM:  CT ABDOMEN AND PELVIS OC IC                         EXAM:  CT CHEST IC                          PROCEDURE DATE:  12/04/2019          INTERPRETATION:  CLINICAL INFORMATION: Postop. Persistent elevated white   count.    COMPARISON: CT chest 11/25/2019 and MRI abdomen 11/26/2019 and upper   extremity venous Doppler 11/28/2019    PROCEDURE:   CT of the Chest, Abdomen and Pelvis was performed with intravenous   contrast.   Intravenous contrast: 94 ml Omnipaque 300.  Oral contrast: Positive contrast was administered.  Sagittal and coronal reformats were performed.    FINDINGS:    CHEST:     LUNGS AND LARGE AIRWAYS: Patent central airways. Interval improvement in   airspace opacities in the lungs since 11/25/2019. Interlobular septal   thickening in the right lung inferior aspect of the left lower lobe,   likely pulmonary edema.  PLEURA: Right chest tube with a small right hydropneumothorax with an   interval increase in the amount of fluid in the pleural space since   11/25/2019. There is enhancement of the pleura which can be related to   infection or the prior instrumentation.  VESSELS: Thoracic aorta normal in caliber. Partially imaged nonocclusive   thrombus in the right internal jugular vein.  HEART: Heart size is normal. No pericardial effusion.  MEDIASTINUM AND TETO: Multiple subcentimeter mediastinal lymph nodes.   There is a region of low attenuation with high attenuation peripherally   to the left of the distal esophagus (series 3 image 107) measuring 4.3 x   1.8 cm, likely a fluid collection.  CHEST WALL AND LOWER NECK: No enlarged axillary lymph nodes.    ABDOMEN AND PELVIS:    LIVER: 2 new cystic foci, one at the medial aspect of the caudate and the second in the posterior aspect of the lateral segment left hepatic lobe with the larger at the medial aspect of the caudate measuring 1.9 x 1.8   cm, new.  BILE DUCTS: Normal caliber.  GALLBLADDER: Within normal limits.  SPLEEN: Within normal limits.  PANCREAS: Low-attenuation/cystic foci pancreas appear increase in size and number since 11/26/2019 including a 1.6 x 1.3 cm lesion in the pancreatic body, not previously seen 1.2 x 1.1 cm lesion in the pancreatic head, previously 0.8 x 0.3 cm.  ADRENALS: Within normal limits.  KIDNEYS/URETERS: No hydronephrosis. Heterogeneous enhancement with no   focal lesion which may be related to the phase of contrast. Infiltration   of the fat at the posterior aspect of the left kidney.    BLADDER: Within normal limits.  REPRODUCTIVE ORGANS: Uterus and adnexa within normal limits.    BOWEL: No bowel obstruction. No evidence of appendicitis.  PERITONEUM: Trace pelvic free fluid.  VESSELS: Abdominal aorta normal in caliber.  RETROPERITONEUM/LYMPH NODES: No lymphadenopathy.    ABDOMINAL WALL: Anasarca.  BONES: No aggressive osseous lesion.    IMPRESSION:     Interval improvement in bilateral airspace disease, greater on the right since 11/25/2019.    Interlobular septal thickening lungs, likely pulmonary edema.    Small right hydropneumothorax with an interval increase in pleural fluid since 11/25/2019.    Partially imaged nonocclusive deep venous thrombosis in the right internal jugular vein.    Interval increase in size and number of low-attenuation lesions in the pancreas since 11/26/2019 and new cystic lesions at the medial aspect of the caudate and posterior aspect of the left hepatic lobe of uncertain etiology; the differential includes pancreatitis with associated acute peripancreatic fluid collections; correlation is recommended with pancreatic enzymes.     Region of low density measuring 4.3 cm to the left of the distal esophagus, likely a fluid collection and  infiltration of the fat at the posterior aspect of the left kidney which if there is pancreatitis could also be related to the underlying pancreatitis; follow-up is recommended.        AMIE RAMIREZ   This document has been electronically signed. Dec  4 2019  3:07PM      ======================================================  Labs:                        9.2    13.79 )-----------( [id number]      ( 07 Dec 2019 05:42 )             28.9       WBC Count: 13.79 K/uL (12-07-19 @ 05:42)  WBC Count: 15.80 K/uL (12-06-19 @ 21:13)  WBC Count: 25.73 K/uL (12-06-19 @ 03:55)  WBC Count: 20.66 K/uL (12-05-19 @ 19:52)  WBC Count: 24.17 K/uL (12-05-19 @ 06:17)  WBC Count: 27.94 K/uL (12-04-19 @ 06:46)  WBC Count: 39.58 K/uL (12-03-19 @ 10:10)  WBC Count: 43.01 K/uL (12-03-19 @ 06:24)      12-07    139  |  104  |  17.0  ----------------------------<  87  3.3<L>   |  21.0<L>  |  2.29<H>    Ca    8.1<L>      07 Dec 2019 05:42  Mg     1.4     12-07    TPro  5.1<L>  /  Alb  1.7<L>  /  TBili  0.3<L>  /  DBili  0.1  /  AST  20  /  ALT  12  /  AlkPhos  242<H>  12-07    Amylase, Serum Total (12.07.19 @ 05:42)    Amylase, Serum Total: 51 U/L      Amylase, Body Fluid (12.06.19 @ 22:45)    Amylase, Body Fluid: 83002: Test repeated.  Reference Ranges have NOT been established for analytes in body fluids  because of variability in body fluid composition.  The  has not determined the efficacy of this test when  performed on fluid specimens. The performance characteristics of this  test were determined by [facility]. U/L        Culture - Urine (collected 12-04-19 @ 10:24)  Source: .Urine  Final Report (12-05-19 @ 11:51):    No growth    Culture - Other (collected 12-03-19 @ 14:03)  Source: .Other chest tube insertion site (pus/purulent)  Final Report (12-05-19 @ 09:15):    No growth at 2 days.    Culture - Blood (collected 12-03-19 @ 13:30)  Source: .Blood    Culture - Blood (collected 12-03-19 @ 13:30)  Source: .Blood    Culture - Acid Fast - Tissue w/Smear (collected 11-28-19 @ 20:40)  Source: .Tissue chest wound    Culture - Fungal, Tissue (collected 11-27-19 @ 20:12)  Source: .Tissue chest wound - Fungal culture    Culture - Tissue with Gram Stain (collected 11-27-19 @ 20:12)  Source: .Tissue chest wound  Gram Stain (11-27-19 @ 21:27):    Few White blood cells    No organisms seen  Final Report (12-02-19 @ 16:00):    No growth at 5 days.    Culture - Blood (collected 11-25-19 @ 05:20)  Source: .Blood  Final Report (11-30-19 @ 07:00):    No growth at 5 days.    Culture - Blood (collected 11-25-19 @ 05:19)  Source: .Blood  Final Report (11-30-19 @ 07:00):    No growth at 5 days.    Creatinine, Serum: 2.29 mg/dL (12-07-19 @ 05:42)  Creatinine, Serum: 2.42 mg/dL (12-06-19 @ 11:13)  Creatinine, Serum: 2.62 mg/dL (12-05-19 @ 19:52)  Creatinine, Serum: 2.17 mg/dL (12-05-19 @ 10:37)  Creatinine, Serum: 2.11 mg/dL (12-05-19 @ 06:17)  Creatinine, Serum: 0.30 mg/dL (12-04-19 @ 06:46)  Creatinine, Serum: 0.32 mg/dL (12-03-19 @ 13:28)  Creatinine, Serum: 0.34 mg/dL (12-03-19 @ 06:24)

## 2019-12-07 NOTE — PROGRESS NOTE ADULT - SUBJECTIVE AND OBJECTIVE BOX
MRN-[id number]  HERACLIO MOLINA     follow up: RONAK , Empyema    s/p VATS on 11/27/19    chest tube removed  placement of ostomy bag at the  site of former chest tubes.   copious drainage of fluid.   Elevated fluid amylase  =======================================================    REVIEW OF SYSTEMS:    CONSTITUTIONAL:  No Fever or chills  HEENT:  No diplopia or blurred vision.  No earache, sore throat or runny nose.  CARDIOVASCULAR:  No pressure, squeezing, strangling, tightness, heaviness or aching about the chest, neck, axilla or epigastrium.  RESPIRATORY:  No cough, shortness of breath  GASTROINTESTINAL:  No nausea, vomiting or diarrhea.  GENITOURINARY:  No dysuria, frequency or urgency. No Blood in urine  MUSCULOSKELETAL:  no joint aches, no muscle pain  SKIN:  No change in skin, hair or nails.  NEUROLOGIC:  No Headaches, seizures or weakness.  PSYCHIATRIC:  No disorder of thought or mood.  ENDOCRINE:  No heat or cold intolerance  HEMATOLOGICAL:  No easy bruising or bleeding.   =======================================================  Allergies  No Known Allergies    Antibiotics:  meropenem  IVPB 1000 milliGRAM(s) IV Intermittent every 12 hours    Other medications:  amitriptyline 10 milliGRAM(s) Oral at bedtime  ascorbic acid 500 milliGRAM(s) Oral daily  ferrous    sulfate 325 milliGRAM(s) Oral three times a day  folic acid 1 milliGRAM(s) Oral daily  gabapentin 100 milliGRAM(s) Oral three times a day  heparin  Infusion.  Unit(s)/Hr IV Continuous <Continuous>  lidocaine   Patch 1 Patch Transdermal daily  multivitamin 1 Tablet(s) Oral daily  pantoprazole    Tablet 40 milliGRAM(s) Oral before breakfast  saccharomyces boulardii 250 milliGRAM(s) Oral two times a day  sodium chloride 0.9%. 1000 milliLiter(s) IV Continuous <Continuous>  thiamine 100 milliGRAM(s) Oral daily  traMADol 50 milliGRAM(s) Oral every 8 hours  ======================================================    Physical Exam:  ============  T(F): 97.6 (07 Dec 2019 12:40), Max: 97.9 (06 Dec 2019 20:00)  HR: 107 (07 Dec 2019 12:40)  BP: 10/65 (07 Dec 2019 12:40)  RR: 18 (07 Dec 2019 12:40)  SpO2: 98% (07 Dec 2019 12:40) (96% - 100%)  temp max in last 48H T(F): , Max: 98.4 (12-06-19 @ 00:23)    General:  No acute distress.   Eye: Pupils are equal, round and reactive to light, Extraocular movements are intact, Normal conjunctiva.  HENT: Normocephalic, Oral mucosa is moist, No pharyngeal erythema, No sinus tenderness.  Neck: Supple, No lymphadenopathy.  Respiratory: decreased breath sounds right lung  RIGHT chest tube in place  Cardiovascular: Normal rate, Regular rhythm,   TRACE edema of legs  Gastrointestinal: Soft, mild right abd tenderness  Normal bowel sounds.  Genitourinary: No costovertebral angle tenderness.  Lymphatics: No lymphadenopathy neck,   Musculoskeletal: Normal range of motion, Normal strength.  Integumentary: No rash.  Neurologic: Alert, Oriented, No focal deficits, Cranial Nerves II-XII are grossly intact.  Psychiatric: Appropriate mood & affect.    =======================================================  11/22/19 blood cx negative x 2  11/22/19 - body fluid cx with Moderate Alpha hemolytic strep x 2 sets  Alpha hemolytic strep  		[facility]	E Test  Ceftriaxone  			S (0.19)   Clindamycin 	S    Erythromycin 	S   Penicillin 	S   Vancomycin 	S     ==================================     EXAM:  CT ABDOMEN AND PELVIS OC IC                         EXAM:  CT CHEST IC                          PROCEDURE DATE:  12/04/2019      INTERPRETATION:  CLINICAL INFORMATION: Postop. Persistent elevated white   count.    COMPARISON: CT chest 11/25/2019 and MRI abdomen 11/26/2019 and upper   extremity venous Doppler 11/28/2019    PROCEDURE:   CT of the Chest, Abdomen and Pelvis was performed with intravenous   contrast.   Intravenous contrast: 94 ml Omnipaque 300.  Oral contrast: Positive contrast was administered.  Sagittal and coronal reformats were performed.    FINDINGS:    CHEST:     LUNGS AND LARGE AIRWAYS: Patent central airways. Interval improvement in   airspace opacities in the lungs since 11/25/2019. Interlobular septal   thickening in the right lung inferior aspect of the left lower lobe,   likely pulmonary edema.  PLEURA: Right chest tube with a small right hydropneumothorax with an   interval increase in the amount of fluid in the pleural space since   11/25/2019. There is enhancement of the pleura which can be related to   infection or the prior instrumentation.  VESSELS: Thoracic aorta normal in caliber. Partially imaged nonocclusive   thrombus in the right internal jugular vein.  HEART: Heart size is normal. No pericardial effusion.  MEDIASTINUM AND TETO: Multiple subcentimeter mediastinal lymph nodes.   There is a region of low attenuation with high attenuation peripherally   to the left of the distal esophagus (series 3 image 107) measuring 4.3 x   1.8 cm, likely a fluid collection.  CHEST WALL AND LOWER NECK: No enlarged axillary lymph nodes.    ABDOMEN AND PELVIS:    LIVER: 2 new cystic foci, one at the medial aspect of the caudate and the second in the posterior aspect of the lateral segment left hepatic lobe with the larger at the medial aspect of the caudate measuring 1.9 x 1.8   cm, new.  BILE DUCTS: Normal caliber.  GALLBLADDER: Within normal limits.  SPLEEN: Within normal limits.  PANCREAS: Low-attenuation/cystic foci pancreas appear increase in size and number since 11/26/2019 including a 1.6 x 1.3 cm lesion in the pancreatic body, not previously seen 1.2 x 1.1 cm lesion in the pancreatic head, previously 0.8 x 0.3 cm.  ADRENALS: Within normal limits.  KIDNEYS/URETERS: No hydronephrosis. Heterogeneous enhancement with no   focal lesion which may be related to the phase of contrast. Infiltration   of the fat at the posterior aspect of the left kidney.    BLADDER: Within normal limits.  REPRODUCTIVE ORGANS: Uterus and adnexa within normal limits.    BOWEL: No bowel obstruction. No evidence of appendicitis.  PERITONEUM: Trace pelvic free fluid.  VESSELS: Abdominal aorta normal in caliber.  RETROPERITONEUM/LYMPH NODES: No lymphadenopathy.    ABDOMINAL WALL: Anasarca.  BONES: No aggressive osseous lesion.    IMPRESSION:     Interval improvement in bilateral airspace disease, greater on the right since 11/25/2019.    Interlobular septal thickening lungs, likely pulmonary edema.    Small right hydropneumothorax with an interval increase in pleural fluid since 11/25/2019.    Partially imaged nonocclusive deep venous thrombosis in the right internal jugular vein.    Interval increase in size and number of low-attenuation lesions in the pancreas since 11/26/2019 and new cystic lesions at the medial aspect of the caudate and posterior aspect of the left hepatic lobe of uncertain etiology; the differential includes pancreatitis with associated acute peripancreatic fluid collections; correlation is recommended with pancreatic enzymes.     Region of low density measuring 4.3 cm to the left of the distal esophagus, likely a fluid collection and  infiltration of the fat at the posterior aspect of the left kidney which if there is pancreatitis could also be related to the underlying pancreatitis; follow-up is recommended.    AMIE RAMIREZ   This document has been electronically signed. Dec  4 2019  3:07PM      ======================================================  Labs:                        9.2    13.79 )-----------( 639      ( 07 Dec 2019 05:42 )             28.9     WBC Count: 13.79 K/uL (12-07-19 @ 05:42)  WBC Count: 15.80 K/uL (12-06-19 @ 21:13)  WBC Count: 25.73 K/uL (12-06-19 @ 03:55)  WBC Count: 20.66 K/uL (12-05-19 @ 19:52)  WBC Count: 24.17 K/uL (12-05-19 @ 06:17)  WBC Count: 27.94 K/uL (12-04-19 @ 06:46)  WBC Count: 39.58 K/uL (12-03-19 @ 10:10)  WBC Count: 43.01 K/uL (12-03-19 @ 06:24)    12-07    139  |  104  |  17.0  ----------------------------<  87  3.3<L>   |  21.0<L>  |  2.29<H>    Ca    8.1<L>      07 Dec 2019 05:42  Mg     1.4     12-07    TPro  5.1<L>  /  Alb  1.7<L>  /  TBili  0.3<L>  /  DBili  0.1  /  AST  20  /  ALT  12  /  AlkPhos  242<H>  12-07    Amylase, Serum Total (12.07.19 @ 05:42)    Amylase, Serum Total: 51 U/L  Amylase, Body Fluid (12.06.19 @ 22:45)    Amylase, Body Fluid: 67776: Test repeated.  Reference Ranges have NOT been established for analytes in body fluids  because of variability in body fluid composition.  The  has not determined the efficacy of this test when  performed on fluid specimens. The performance characteristics of this  test were determined by [facility]. U/L    Culture - Urine (collected 12-04-19 @ 10:24)  Source: .Urine  Final Report (12-05-19 @ 11:51):    No growth    Culture - Other (collected 12-03-19 @ 14:03)  Source: .Other chest tube insertion site (pus/purulent)  Final Report (12-05-19 @ 09:15):    No growth at 2 days.    Culture - Blood (collected 12-03-19 @ 13:30)  Source: .Blood    Culture - Blood (collected 12-03-19 @ 13:30)  Source: .Blood    Culture - Acid Fast - Tissue w/Smear (collected 11-28-19 @ 20:40)  Source: .Tissue chest wound    Culture - Fungal, Tissue (collected 11-27-19 @ 20:12)  Source: .Tissue chest wound - Fungal culture    Culture - Tissue with Gram Stain (collected 11-27-19 @ 20:12)  Source: .Tissue chest wound  Gram Stain (11-27-19 @ 21:27):    Few White blood cells    No organisms seen  Final Report (12-02-19 @ 16:00):    No growth at 5 days.    Culture - Blood (collected 11-25-19 @ 05:20)  Source: .Blood  Final Report (11-30-19 @ 07:00):    No growth at 5 days.    Culture - Blood (collected 11-25-19 @ 05:19)  Source: .Blood  Final Report (11-30-19 @ 07:00):    No growth at 5 days.    Creatinine, Serum: 2.29 mg/dL (12-07-19 @ 05:42)  Creatinine, Serum: 2.42 mg/dL (12-06-19 @ 11:13)  Creatinine, Serum: 2.62 mg/dL (12-05-19 @ 19:52)  Creatinine, Serum: 2.17 mg/dL (12-05-19 @ 10:37)  Creatinine, Serum: 2.11 mg/dL (12-05-19 @ 06:17)  Creatinine, Serum: 0.30 mg/dL (12-04-19 @ 06:46)  Creatinine, Serum: 0.32 mg/dL (12-03-19 @ 13:28)  Creatinine, Serum: 0.34 mg/dL (12-03-19 @ 06:24)    This 34 y.o. F who was admitted to [facility] 11/22 with weakness,   found with large right pleural effusion   s/p chest tube drainage    Hyponatremia - resolved,   WBC elevation   alpha hemolytic strep infection  multi focal PNA  infected pleural effusion - empyema  Pancreatic cyst  Liver lesions  chest wall drainage  Acute renal failure    - Legionella Ag from [facility] 11/24/19 - negative, off azithromycin  - remainder of cultures from [facility] are negative    s/p VATS 11/27/19  - cultures remain negative    - WBC elevation   markedly elevated; improving    - Chest incision with copious drainage  elevated amylase level  workup in process  - for Barium studies    Labs on 12/5/19 with ACUTE renal failure,.  Cr from 0.3 to 2; SLIGHT downtrending,   - watch renal function closely    Regarding liver lesions  continue Merrem 1 gram Q 12H    - follow up all outstanding cultures  - trend temperature and WBC curve  - repeat cultures from blood and all sources if febrile.     Plan discussed with CT surgery team.

## 2019-12-07 NOTE — PROGRESS NOTE ADULT - ASSESSMENT
34F, pmhx smoker, ETOH use (4-5 drinks/day), transferred from [facility] to [facility] on 11/22 for complaints of weakness, 25 pound weight loss & flu like symptoms & found to have a large right pleural effusion, cultures + for group A hemolytic strep,11/23 CT of the chest/abd/pelvis indicated a clot in the Right IJ, pt started on heparin, 11/23 s/p right VATS decort & washout debridement of CT site & VAC placement on 11/27/19, persistent drainage from CT site so repeat CT C/A/P performed 12/4 which showed new cystic mass in pancreas and liver concerning for pancreatitis, GI consulted (NTD) pt tolerating diet, zosyn changed to meropenem by ID for broader coverage given pancreatitis, pt remains with copious drainage from anterior CT site. VAC removed and ostomy appliance applied for high output. Pleural fluid amylase 22392 (serum 51). 12/7 CT removed. Esophogram and repeat CT chest pending. All cultures remain NGTD.     Ivana Sahni   Thoracic Sx PA  #[phone]

## 2019-12-07 NOTE — PROGRESS NOTE ADULT - ASSESSMENT
34 year old female with a PMH :  of smoking, ETOH use (4-5 drinks/day), transferred from [facility] [place] to [facility] on 11/22 for complaints of weakness, 25 pound weight loss & flu like symptoms & found to have a large right pleural effusion, cultures + for group A hemolytic strep,11/23 CT of the chest/abd/pelvis indicated a clot in the Right IJ, pt started on heparin gtt, 11/23 s/p right VATS decortication & washout debridement of CT site & VAC placement on 11/27/19, persistent drainage from CT site so repeat CT C/A/P performed 12/4 which showed new cystic mass in pancreas and liver concerning for pancreatitis, GI consulted (NTD). Pt tolerating diet, zosyn changed to meropenem on 12/6 by ID for broader coverage given pancreatitis, pt remains with copious drainage from anterior CT site. VAC removed and ostomy appliance applied for high output on 12/6. Pleural [place] [address] (serum 51) noted. 12/7 CT removed. Esophogram and repeat CT chest pending. All cultures remain negative.     Ostomy appliance removed and dressing applied. Right lower extremity swelling noted to be worsening and patient upgraded to the ICU.     12/8 Venous and arterial USs of b/l LE :      ·  Problem: Pleural effusion on right.  Plan: s/p Right VATS decortication & washout on 11/27/19. Debridement of CT site & VAC placement.  12/6 1 CT removed and VAC removed. Ostomy appliance to anterior incisions placed for high output drainage.   12/7 Remaining Right CT removed. Maintain occlusive dressing to old CT site for 48 H.   Esophogram for high amylase level in fluid as above.       ·  Problem: Empyema. Plan: c/w meropenem.      ·  Problem: Internal jugular vein thrombosis, right.    Continue Heparin gtt for now.     ·  Problem: Anemia due to blood loss.  Plan: Continue FeSO4, vitamin C, MVI, thiamine, & folate.  Trend HCT/HGB.       Problem: RONAK (acute kidney injury). Plan: CT scan from 12/7 showing Persistent renal nephrograms b/l may be seen in acute tubular necrosis given recent intravenous contrast.  Continue to trend BUN/Cr.,         ·  Problem: Right ankle swelling > To CICU,

## 2019-12-07 NOTE — PROGRESS NOTE ADULT - SUBJECTIVE AND OBJECTIVE BOX
Subjective: Patient c/o back, right shoulder and neck pain, as well as incisional pain. VAC D/C'ed and ostomy appliance applied to anterior incisions for high drainage. R Ct without output, removed today. Denies SOB, CP, ABD pain, N/V. Voiding and passing BMs. No fever or chills.     Vital Signs:  Vital Signs Last 24 Hrs  T(C): 36.4 (12-07-19 @ 12:40), Max: 36.8 (12-06-19 @ 13:23)  T(F): 97.6 (12-07-19 @ 12:40), Max: 98.3 (12-06-19 @ 13:38)  HR: 107 (12-07-19 @ 12:40) (107 - 116)  BP: 10/65 (12-07-19 @ 12:40) (10/65 - 116/71)  RR: 18 (12-07-19 @ 12:40) (17 - 18)  SpO2: 98% (12-07-19 @ 12:40) (93% - 100%) on (O2)    I&O's Detail    06 Dec 2019 07:01  -  07 Dec 2019 07:00  --------------------------------------------------------  IN:    Oral Fluid: 360 mL    Other: 400 mL    Packed Red Blood Cells: 294 mL    sodium chloride 0.9%.: 2500 mL    Solution: 100 mL  Total IN: 3654 mL    OUT:    Chest Tube: 5 mL    Other: 500 mL    Voided: 1550 mL  Total OUT: 2055 mL    Total NET: 1599 mL        Telemetry/Alarms: artifact  General: NAD  Neurology: Awake, nonfocal, THACKER x 4  Eyes: Scleras clear, PERRLA/ EOMI, Gross vision intact  ENT: Gross hearing intact, grossly patent pharynx, no stridor  Neck: Neck supple, trachea midline, No JVD  Respiratory: absent BS right mid lung to base  CV: RRR, S1S2, no murmurs, rubs or gallops  Abdominal: Soft, NT, ND  Extremities: R foot edema tracking to popliteal fossa, generalized ankle tenderness but ambulating well without pain, no erythema  Incisions: R chest incisions with merky brown fluid, excessive drainage, communicates with chest   Tubes: R CT with minimal output on WS, no air leak, removed today     Relevant labs, radiology and Medications reviewed                        9.2    13.79 )-----------( 639      ( 07 Dec 2019 05:42 )             28.9     12-07    139  |  104  |  17.0  ----------------------------<  87  3.3<L>   |  21.0<L>  |  2.29<H>    Ca    8.1<L>      07 Dec 2019 05:42  Mg     1.4     12-07    TPro  5.1<L>  /  Alb  1.7<L>  /  TBili  0.3<L>  /  DBili  0.1  /  AST  20  /  ALT  12  /  AlkPhos  242<H>  12-07    PT/INR - ( 07 Dec 2019 05:42 )   PT: 20.6 sec;   INR: 1.76 ratio         PTT - ( 06 Dec 2019 03:55 )  PTT:44.7 sec  MEDICATIONS  (STANDING):  amitriptyline 10 milliGRAM(s) Oral at bedtime  ascorbic acid 500 milliGRAM(s) Oral daily  ferrous    sulfate 325 milliGRAM(s) Oral three times a day  folic acid 1 milliGRAM(s) Oral daily  gabapentin 100 milliGRAM(s) Oral three times a day  heparin  Infusion.  Unit(s)/Hr (10 mL/Hr) IV Continuous <Continuous>  lidocaine   Patch 1 Patch Transdermal daily  meropenem  IVPB 1000 milliGRAM(s) IV Intermittent every 12 hours  multivitamin 1 Tablet(s) Oral daily  pantoprazole    Tablet 40 milliGRAM(s) Oral before breakfast  saccharomyces boulardii 250 milliGRAM(s) Oral two times a day  sodium chloride 0.9%. 1000 milliLiter(s) (100 mL/Hr) IV Continuous <Continuous>  thiamine 100 milliGRAM(s) Oral daily  traMADol 50 milliGRAM(s) Oral every 8 hours    MEDICATIONS  (PRN):  ALPRAZolam 0.25 milliGRAM(s) Oral every 8 hours PRN anxiety  heparin  Injectable 4000 Unit(s) IV Push every 6 hours PRN For aPTT less than 40  heparin  Injectable 2000 Unit(s) IV Push every 6 hours PRN For aPTT between 40 - 57  HYDROmorphone   Tablet 4 milliGRAM(s) Oral every 6 hours PRN Severe Pain (7 - 10)  HYDROmorphone   Tablet 2 milliGRAM(s) Oral every 6 hours PRN Moderate Pain (4 - 6)  methocarbamol 500 milliGRAM(s) Oral every 6 hours PRN Muscle Spasm        Assessment  34y Female  w/ PAST MEDICAL & SURGICAL HISTORY:  admitted with complaints of Patient is a 34y old  Female who presents with a chief complaint of Empyema (06 Dec 2019 16:08)  patient underwent Debridement, wound, with dressing replacement  Wound VAC placement  Pleural biopsy  Lung decortication  Bronchoscopy, with lung decortication using VATS

## 2019-12-07 NOTE — PROGRESS NOTE ADULT - PROBLEM SELECTOR PLAN 4
Heme consult appreciated  Follow up with heme as out patient.  Will discharge home on Coumadin  heparin gtt for now, INR 1.76

## 2019-12-07 NOTE — PROGRESS NOTE ADULT - SUBJECTIVE AND OBJECTIVE BOX
HERACLIO MOLINA    [id number]    34y      Female    Patient is a 34y old  Female who presents with a chief complaint of Empyema (06 Dec 2019 16:08)      INTERVAL HPI/OVERNIGHT EVENTS:    Patient is having pain right chest, where she had chest tube, she also feels like having swelling in both legs but more on the right side, denies fever, chills, chest pain, nausea, vomiting.     REVIEW OF SYSTEMS:    CONSTITUTIONAL: No fever, Some fatigue  RESPIRATORY: No cough, has shortness of breath  CARDIOVASCULAR: No chest pain, palpitations  GASTROINTESTINAL: No abdominal, No nausea, vomiting  NEUROLOGICAL: No headaches,  loss of strength.  MISCELLANEOUS: bodyache, legs swellings       Vital Signs Last 24 Hrs  T(C): 36.4 (07 Dec 2019 12:40), Max: 36.6 (06 Dec 2019 20:00)  T(F): 97.6 (07 Dec 2019 12:40), Max: 97.9 (06 Dec 2019 20:00)  HR: 105 (07 Dec 2019 16:11) (105 - 116)  BP: 112/79 (07 Dec 2019 16:11) (10/65 - 116/71)  RR: 18 (07 Dec 2019 16:11) (17 - 18)  SpO2: 100% (07 Dec 2019 16:11) (96% - 100%)    PHYSICAL EXAM:    GENERAL: Middle age female looking anxious    HEENT: PERRL  NECK: soft, Supple, No JVD,   CHEST/LUNG: Decrease air entry bilaterally; No wheezing  HEART: S1S2+, Regular rate and rhythm; No murmurs  ABDOMEN: Soft, Nontender, Nondistended; Bowel sounds present  EXTREMITIES:  1+ Peripheral Pulses, has edema right more then left.   SKIN: No rashes or lesions  NEURO: AAOX3, no focal deficits, no motor r sensory loss  PSYCH: Anxious mood      LABS:                        9.2    13.79 )-----------( 639      ( 07 Dec 2019 05:42 )             28.9     12-07    139  |  104  |  17.0  ----------------------------<  87  3.3<L>   |  21.0<L>  |  2.29<H>    Ca    8.1<L>      07 Dec 2019 05:42  Mg     1.4     12-07    TPro  5.1<L>  /  Alb  1.7<L>  /  TBili  0.3<L>  /  DBili  0.1  /  AST  20  /  ALT  12  /  AlkPhos  242<H>  12-07    PT/INR - ( 07 Dec 2019 05:42 )   PT: 20.6 sec;   INR: 1.76 ratio         PTT - ( 06 Dec 2019 03:55 )  PTT:44.7 sec        I&O's Summary    06 Dec 2019 07:01  -  07 Dec 2019 07:00  --------------------------------------------------------  IN: 3654 mL / OUT: 2055 mL / NET: 1599 mL    07 Dec 2019 07:01  -  07 Dec 2019 16:44  --------------------------------------------------------  IN: 0 mL / OUT: 225 mL / NET: -225 mL        MEDICATIONS  (STANDING):  amitriptyline 10 milliGRAM(s) Oral at bedtime  ascorbic acid 500 milliGRAM(s) Oral daily  ferrous    sulfate 325 milliGRAM(s) Oral three times a day  folic acid 1 milliGRAM(s) Oral daily  gabapentin 100 milliGRAM(s) Oral three times a day  heparin  Infusion.  Unit(s)/Hr (10 mL/Hr) IV Continuous <Continuous>  lidocaine   Patch 1 Patch Transdermal daily  meropenem  IVPB 1000 milliGRAM(s) IV Intermittent every 12 hours  multivitamin 1 Tablet(s) Oral daily  pantoprazole    Tablet 40 milliGRAM(s) Oral before breakfast  saccharomyces boulardii 250 milliGRAM(s) Oral two times a day  sodium chloride 0.9%. 1000 milliLiter(s) (100 mL/Hr) IV Continuous <Continuous>  thiamine 100 milliGRAM(s) Oral daily  traMADol 50 milliGRAM(s) Oral every 8 hours    MEDICATIONS  (PRN):  ALPRAZolam 0.25 milliGRAM(s) Oral every 8 hours PRN anxiety  heparin  Injectable 4000 Unit(s) IV Push every 6 hours PRN For aPTT less than 40  heparin  Injectable 2000 Unit(s) IV Push every 6 hours PRN For aPTT between 40 - 57  HYDROmorphone   Tablet 4 milliGRAM(s) Oral every 6 hours PRN Severe Pain (7 - 10)  HYDROmorphone   Tablet 2 milliGRAM(s) Oral every 6 hours PRN Moderate Pain (4 - 6)  methocarbamol 500 milliGRAM(s) Oral every 6 hours PRN Muscle Spasm

## 2019-12-07 NOTE — PROGRESS NOTE ADULT - PROBLEM SELECTOR PLAN 1
s/p Right VATS decortication & washout on 11/27/19. Debridement of CT site & VAC placement  CT removed, VAC removed.   maintain ostomy appliance to anterior incisions.   maintain occlusive dressing to old CT site for 48 H.   Esophogram for high amylase level in fluid and repeat CT chest today  Encourage the use of I/S, OOB to chair, daily PT, ambulate as tolerated  cont supportive care, no operative intervention planned at this time  d/w Dr. Aponte

## 2019-12-07 NOTE — PROGRESS NOTE ADULT - ASSESSMENT
This 34 y.o. F who was [facility] 11/22 with weakness,   found with large right pleural effusion   s/p chest tube drainage    Hyponatremia - resolving  WBC elevation   alpha hemolytic strep infection  multi focal PNA  infected pleural effusion - empyema  Pancreatic cyst  Liver lesions  chest wall drainage  Acute renal failure    - Legionella Ag from [facility] 11/24/19 - negative, off azithromycin  - remainder of cultures from [facility] [place] [facility] are negative    s/p VATS 11/27/19  - cultures remain negative    - WBC elevation   markedly elevated; improving    - Chest incision with copious drainage  elevated amylase level  workup in process  - for Barium studies    Labs on 12/5/19 with ACUTE renal failure,.  Cr from 0.3 to 2; SLIGHT downtrending  - watch renal function closely    Regarding liver lesions  continue Merrem 1 gram Q 12H      \  - follow up all outstanding cultures  - trend temperature and WBC curve  - repeat cultures from blood and all sources if febrile.

## 2019-12-07 NOTE — PROGRESS NOTE ADULT - SUBJECTIVE AND OBJECTIVE BOX
34 year old woman with no known major comorbidities (had not gone to see a doctor for 15 years) who presented to [facility] on 11/22/2019 with progressive sob, weakness, weight loss, She was found to have a large pleural effusion. Initially 1.5 L of dark serous fluid was removed.  Chest tube placed shortly thereafter, 3 additional liters was removed. CBC showed a leukocytosis of 40k and   CTA imaging revealed a 2.1cm partially cystic mass in the body/tail of the pancreas with possible upstream ductal dilatation. On 11/23/2019 the patient had a CT chest/abdomen/pelvis that found to have a clot in the right IJ and was started on a heparin gtt.     Transferred to [facility] on 11/24/19.  MRCP performed on 11/26.  Underwent Debridement, wound, with dressing replacement  Wound VAC placement  Pleural biopsy neg for malignancy ; + inflammation   Lung decortication  Bronchoscopy, with lung decortication using VATS on 11/27/19.  Pt  has a history of alcohol abuse.     We were consulted for evaluation and management of the RIJ DVT.  . Doppler study of her LEs was negative.   continues to have right sided chest pain from tube site     MEDICATIONS  (STANDING):  amitriptyline 10 milliGRAM(s) Oral at bedtime  ascorbic acid 500 milliGRAM(s) Oral daily  ferrous    sulfate 325 milliGRAM(s) Oral three times a day  folic acid 1 milliGRAM(s) Oral daily  gabapentin 100 milliGRAM(s) Oral three times a day  heparin  Infusion.  Unit(s)/Hr (10 mL/Hr) IV Continuous <Continuous>  lidocaine   Patch 1 Patch Transdermal daily  meropenem  IVPB 1000 milliGRAM(s) IV Intermittent every 12 hours  multivitamin 1 Tablet(s) Oral daily  pantoprazole    Tablet 40 milliGRAM(s) Oral before breakfast  saccharomyces boulardii 250 milliGRAM(s) Oral two times a day  sodium chloride 0.9%. 1000 milliLiter(s) (100 mL/Hr) IV Continuous <Continuous>  thiamine 100 milliGRAM(s) Oral daily  traMADol 50 milliGRAM(s) Oral every 8 hours    MEDICATIONS  (PRN):  ALPRAZolam 0.25 milliGRAM(s) Oral every 8 hours PRN anxiety  heparin  Injectable 4000 Unit(s) IV Push every 6 hours PRN For aPTT less than 40  heparin  Injectable 2000 Unit(s) IV Push every 6 hours PRN For aPTT between 40 - 57  HYDROmorphone   Tablet 4 milliGRAM(s) Oral every 6 hours PRN Severe Pain (7 - 10)  HYDROmorphone   Tablet 2 milliGRAM(s) Oral every 6 hours PRN Moderate Pain (4 - 6)  methocarbamol 500 milliGRAM(s) Oral every 6 hours PRN Muscle Spasm    Vital Signs Last 24 Hrs    Vital Signs Last 24 Hrs  T(C): 36.4 (07 Dec 2019 12:40), Max: 36.8 (06 Dec 2019 13:23)  T(F): 97.6 (07 Dec 2019 12:40), Max: 98.3 (06 Dec 2019 13:38)  HR: 107 (07 Dec 2019 12:40) (107 - 116)  BP: 10/65 (07 Dec 2019 12:40) (10/65 - 116/71)  BP(mean): --  RR: 18 (07 Dec 2019 12:40) (17 - 18)  SpO2: 98% (07 Dec 2019 12:40) (93% - 100%)  Gen: nad  Chest: chest tube right side, drain  CV: rrr  Abd: soft, nd, nt                          9.2    13.79 )-----------( 639      ( 07 Dec 2019 05:42 )             28.9

## 2019-12-07 NOTE — PROGRESS NOTE ADULT - ASSESSMENT
34 year old female who [place] [facility] [place] [facility] on 11/22 for complaints of weakness, 25 pound weight loss & flu like symptoms & found to have a large right pleural effusion. Pt. has a PMH of empyema with group A hemolytic strep & has 4-5 drinks of alcohol per day.  On 11/23 CT of the chest/abd/pelvis indicated a clot in the Right IJ. Pt. on heparin bridge to coumadin. CT abdomen shows cystic mass in body/tail of pancreas, now concern for postop ileus.  Post-op course:  Pt s/p right VATS decort & washout debridement of CT site & VAC placement on 11/27/19.  Wound vac was placed and removed today due to increased drainage and leakage around . WBC elevated , abx adjusted by ID , vanco d/c yesterday due to BRAD and high level , procalcitonin high . Was planned for VATS  debridement today ( 12/6) but cancelled . Hg dropped to 7.5 and is getting PRBC ,    MRI with pancreatic cyst but poor study . Followed by CTS ,  ID , Hem / Psych , seen by GI . CT chest , abd / pelvis noted , ? esophageal fluid collection , ? pancreatitis, noted with RONAK likely due to Vanco vs CHIQUITA , on ivf.            Problem/Plan - 1:  ·  Problem: Pleural effusion on right.  Plan: s/p Right VATS decortication & washout on 11/27/19. Debridement of CT site & VAC placement , vac removed due to increased drainage ,  as per primary team . Zosyn / Vanco d/c , started on Meropenem , cultures sent and so for negative, ID is following, WBC high , patient is tachycardic , Empyema , procalcitonin high - Sepsis of unknown organism, culture at [facility] +ve for strep as per ID, will continue with current antibiotics, repeat CT chest done and reviewed, will monitor closely.          Problem/Plan - 2:  ·  Problem: Empyema.  Plan: s/p Chest tube removal today, continued with iv abx as per ID, further plan as per CT surgery.        Problem/Plan - 3:  ·  Problem: Internal jugular vein thrombosis, right.  Plan: On Heparin drip , Coumadin on hold for planned procedure , Hematology appreciated.      Problem/Plan - 5:  ·  Problem: Anemia likely multifactorial ; Due to anemia of chronic dz , multiple blood drawings , dilutional  , patient is tachycardic , pale  Plan: s/p PRBCS 2 days ago, Hb is stable, will monitor CBC.        Problem/Plan -6:  ·  Problem: Hyponatremia.  Plan: as per renal , sodium level is ok now.      Problem/Plan - 7:  ·  Problem: Alcohol abuse.  Plan: Monitor for withdrawal, Continue folic, MVI, thiamine.     Problem / Plan -8 : Abdominal tenderness , CT noted, ? pancreatitis , esophageal fluid collection, Esophageogram is normal, CT scan shows thickening, will consider MRI to r/o abscess if spikeing and not improving.       Problem / Plan - 9; Frequent loose stools x 1 month - will hold on laxatives for now , follow stool cultures , O and P , GI PCR ,r/o  C diff , less frequent off Miralax     Problem / Plan -10 : B/L feet pain , pain all over her body  , today R foot swollen , u/s negative ror dvt , get XR of foot     Problem / Plan -11 ; Hematuria , with blood clots , UTI r/o  - resolved , likely due to high inr     Problem / Plan -12 : Multifocal PNA -  treated with abx.     Problem /Plan - 13 : Hypoalbuminemia , prealbumin noted _ 4 , Acute severe protein calorie malnutrition -  Nutrition eval noted ,supplement with high protein shakes.     Problem / Plan - 14 : RONAK - likely multifactorial RONAK; ATN from Vancomycin and ? CHIQUITA , IV fluids are third spacing due to low albumin, would hold off fluids, renal noted and appreciated , follow BMP, I/O and daily weight.     Lower ext edema: Right>left: Will get Doppler US of the lower ext, will get CPK, Uric acid, she has intact pulses, could be from third spacing from the low albumin on in setting of volume over load due to renal failure, will also get an Echo.     D/W patient / CT surgery PA.

## 2019-12-08 LAB
APTT BLD: 40.9 SEC — HIGH (ref 27.5–36.3)
APTT BLD: 52.9 SEC — HIGH (ref 27.5–36.3)
APTT BLD: 91.5 SEC — HIGH (ref 27.5–36.3)
CANCER AG125 SERPL-ACNC: 231 U/ML — HIGH
CULTURE RESULTS: SIGNIFICANT CHANGE UP
CULTURE RESULTS: SIGNIFICANT CHANGE UP
HCT VFR BLD CALC: 27.3 % — LOW (ref 34.5–45)
HGB BLD-MCNC: 8.9 G/DL — LOW (ref 11.5–15.5)
MCHC RBC-ENTMCNC: 31.3 PG — SIGNIFICANT CHANGE UP (ref 27–34)
MCHC RBC-ENTMCNC: 32.6 GM/DL — SIGNIFICANT CHANGE UP (ref 32–36)
MCV RBC AUTO: 96.1 FL — SIGNIFICANT CHANGE UP (ref 80–100)
PLATELET # BLD AUTO: 610 K/UL — HIGH (ref 150–400)
RBC # BLD: 2.84 M/UL — LOW (ref 3.8–5.2)
RBC # FLD: 16.5 % — HIGH (ref 10.3–14.5)
SPECIMEN SOURCE: SIGNIFICANT CHANGE UP
SPECIMEN SOURCE: SIGNIFICANT CHANGE UP
WBC # BLD: 17.07 K/UL — HIGH (ref 3.8–10.5)
WBC # FLD AUTO: 17.07 K/UL — HIGH (ref 3.8–10.5)

## 2019-12-08 PROCEDURE — 99232 SBSQ HOSP IP/OBS MODERATE 35: CPT

## 2019-12-08 PROCEDURE — 99233 SBSQ HOSP IP/OBS HIGH 50: CPT

## 2019-12-08 PROCEDURE — 99024 POSTOP FOLLOW-UP VISIT: CPT

## 2019-12-08 PROCEDURE — 93970 EXTREMITY STUDY: CPT | Mod: 26

## 2019-12-08 PROCEDURE — 93925 LOWER EXTREMITY STUDY: CPT | Mod: 26

## 2019-12-08 PROCEDURE — 97605 NEG PRS WND THER DME<=50SQCM: CPT

## 2019-12-08 PROCEDURE — 71045 X-RAY EXAM CHEST 1 VIEW: CPT | Mod: 26

## 2019-12-08 PROCEDURE — 76775 US EXAM ABDO BACK WALL LIM: CPT | Mod: 26

## 2019-12-08 RX ORDER — ACETAMINOPHEN 500 MG
1000 TABLET ORAL ONCE
Refills: 0 | Status: COMPLETED | OUTPATIENT
Start: 2019-12-08 | End: 2019-12-08

## 2019-12-08 RX ORDER — ONDANSETRON 8 MG/1
4 TABLET, FILM COATED ORAL EVERY 4 HOURS
Refills: 0 | Status: DISCONTINUED | OUTPATIENT
Start: 2019-12-08 | End: 2020-01-07

## 2019-12-08 RX ORDER — ONDANSETRON 8 MG/1
4 TABLET, FILM COATED ORAL ONCE
Refills: 0 | Status: COMPLETED | OUTPATIENT
Start: 2019-12-08 | End: 2019-12-08

## 2019-12-08 RX ORDER — HEPARIN SODIUM 5000 [USP'U]/ML
1300 INJECTION INTRAVENOUS; SUBCUTANEOUS
Qty: 25000 | Refills: 0 | Status: DISCONTINUED | OUTPATIENT
Start: 2019-12-08 | End: 2019-12-29

## 2019-12-08 RX ORDER — HEPARIN SODIUM 5000 [USP'U]/ML
INJECTION INTRAVENOUS; SUBCUTANEOUS
Qty: 25000 | Refills: 0 | Status: DISCONTINUED | OUTPATIENT
Start: 2019-12-08 | End: 2019-12-08

## 2019-12-08 RX ADMIN — HEPARIN SODIUM 2000 UNIT(S): 5000 INJECTION INTRAVENOUS; SUBCUTANEOUS at 11:27

## 2019-12-08 RX ADMIN — METHOCARBAMOL 500 MILLIGRAM(S): 500 TABLET, FILM COATED ORAL at 14:25

## 2019-12-08 RX ADMIN — HYDROMORPHONE HYDROCHLORIDE 4 MILLIGRAM(S): 2 INJECTION INTRAMUSCULAR; INTRAVENOUS; SUBCUTANEOUS at 20:49

## 2019-12-08 RX ADMIN — Medication 400 MILLIGRAM(S): at 19:46

## 2019-12-08 RX ADMIN — LIDOCAINE 1 PATCH: 4 CREAM TOPICAL at 19:15

## 2019-12-08 RX ADMIN — GABAPENTIN 100 MILLIGRAM(S): 400 CAPSULE ORAL at 21:22

## 2019-12-08 RX ADMIN — HYDROMORPHONE HYDROCHLORIDE 4 MILLIGRAM(S): 2 INJECTION INTRAMUSCULAR; INTRAVENOUS; SUBCUTANEOUS at 02:16

## 2019-12-08 RX ADMIN — HEPARIN SODIUM 1000 UNIT(S)/HR: 5000 INJECTION INTRAVENOUS; SUBCUTANEOUS at 11:23

## 2019-12-08 RX ADMIN — Medication 1 TABLET(S): at 11:15

## 2019-12-08 RX ADMIN — TRAMADOL HYDROCHLORIDE 50 MILLIGRAM(S): 50 TABLET ORAL at 23:04

## 2019-12-08 RX ADMIN — TRAMADOL HYDROCHLORIDE 50 MILLIGRAM(S): 50 TABLET ORAL at 21:22

## 2019-12-08 RX ADMIN — HYDROMORPHONE HYDROCHLORIDE 4 MILLIGRAM(S): 2 INJECTION INTRAMUSCULAR; INTRAVENOUS; SUBCUTANEOUS at 12:11

## 2019-12-08 RX ADMIN — METHOCARBAMOL 500 MILLIGRAM(S): 500 TABLET, FILM COATED ORAL at 05:03

## 2019-12-08 RX ADMIN — Medication 10 MILLIGRAM(S): at 21:22

## 2019-12-08 RX ADMIN — Medication 325 MILLIGRAM(S): at 21:22

## 2019-12-08 RX ADMIN — TRAMADOL HYDROCHLORIDE 50 MILLIGRAM(S): 50 TABLET ORAL at 07:29

## 2019-12-08 RX ADMIN — HEPARIN SODIUM 1300 UNIT(S)/HR: 5000 INJECTION INTRAVENOUS; SUBCUTANEOUS at 20:00

## 2019-12-08 RX ADMIN — Medication 1000 MILLIGRAM(S): at 20:04

## 2019-12-08 RX ADMIN — Medication 500 MILLIGRAM(S): at 11:15

## 2019-12-08 RX ADMIN — HYDROMORPHONE HYDROCHLORIDE 4 MILLIGRAM(S): 2 INJECTION INTRAMUSCULAR; INTRAVENOUS; SUBCUTANEOUS at 19:49

## 2019-12-08 RX ADMIN — TRAMADOL HYDROCHLORIDE 50 MILLIGRAM(S): 50 TABLET ORAL at 06:29

## 2019-12-08 RX ADMIN — MEROPENEM 100 MILLIGRAM(S): 1 INJECTION INTRAVENOUS at 06:28

## 2019-12-08 RX ADMIN — Medication 325 MILLIGRAM(S): at 06:29

## 2019-12-08 RX ADMIN — TRAMADOL HYDROCHLORIDE 50 MILLIGRAM(S): 50 TABLET ORAL at 00:28

## 2019-12-08 RX ADMIN — Medication 250 MILLIGRAM(S): at 06:29

## 2019-12-08 RX ADMIN — Medication 30 MILLILITER(S): at 11:42

## 2019-12-08 RX ADMIN — Medication 325 MILLIGRAM(S): at 14:25

## 2019-12-08 RX ADMIN — TRAMADOL HYDROCHLORIDE 50 MILLIGRAM(S): 50 TABLET ORAL at 15:20

## 2019-12-08 RX ADMIN — GABAPENTIN 100 MILLIGRAM(S): 400 CAPSULE ORAL at 06:29

## 2019-12-08 RX ADMIN — ONDANSETRON 4 MILLIGRAM(S): 8 TABLET, FILM COATED ORAL at 02:00

## 2019-12-08 RX ADMIN — LIDOCAINE 1 PATCH: 4 CREAM TOPICAL at 11:14

## 2019-12-08 RX ADMIN — MEROPENEM 100 MILLIGRAM(S): 1 INJECTION INTRAVENOUS at 17:26

## 2019-12-08 RX ADMIN — HEPARIN SODIUM 2000 UNIT(S): 5000 INJECTION INTRAVENOUS; SUBCUTANEOUS at 03:09

## 2019-12-08 RX ADMIN — PANTOPRAZOLE SODIUM 40 MILLIGRAM(S): 20 TABLET, DELAYED RELEASE ORAL at 06:30

## 2019-12-08 RX ADMIN — HEPARIN SODIUM 1200 UNIT(S)/HR: 5000 INJECTION INTRAVENOUS; SUBCUTANEOUS at 03:18

## 2019-12-08 RX ADMIN — TRAMADOL HYDROCHLORIDE 50 MILLIGRAM(S): 50 TABLET ORAL at 14:25

## 2019-12-08 RX ADMIN — Medication 1 MILLIGRAM(S): at 11:15

## 2019-12-08 RX ADMIN — LIDOCAINE 1 PATCH: 4 CREAM TOPICAL at 23:04

## 2019-12-08 RX ADMIN — HYDROMORPHONE HYDROCHLORIDE 4 MILLIGRAM(S): 2 INJECTION INTRAMUSCULAR; INTRAVENOUS; SUBCUTANEOUS at 03:16

## 2019-12-08 RX ADMIN — GABAPENTIN 100 MILLIGRAM(S): 400 CAPSULE ORAL at 14:25

## 2019-12-08 RX ADMIN — Medication 100 MILLIGRAM(S): at 11:15

## 2019-12-08 RX ADMIN — HYDROMORPHONE HYDROCHLORIDE 4 MILLIGRAM(S): 2 INJECTION INTRAMUSCULAR; INTRAVENOUS; SUBCUTANEOUS at 13:10

## 2019-12-08 NOTE — PROGRESS NOTE ADULT - PROBLEM SELECTOR PLAN 4
Heme consult appreciated.  Follow up with heme as out patient.  Will discharge home on Coumadin.  Trending INR.  Continue Heparin gtt for now.

## 2019-12-08 NOTE — PROGRESS NOTE ADULT - PROBLEM SELECTOR PLAN 7
Xray from admission reviewed, no frx  Elevate, wrap  Vascular checks q1hr  F/u stat b/l venous and arterial USs

## 2019-12-08 NOTE — PROGRESS NOTE ADULT - PROBLEM SELECTOR PLAN 3
ID Following, c/w meropenem. Pain consult appreciated.  Continue ultram, elavil, dilaudid, robaxin, gabapentin, xanax as needed.

## 2019-12-08 NOTE — PROGRESS NOTE ADULT - SUBJECTIVE AND OBJECTIVE BOX
[facility]  INFECTIOUS DISEASES AND INTERNAL MEDICINE at [place]  =======================================================  Carlos Alberto Soto MD  Diplomates American Board of Internal Medicine and Infectious Diseases  Telephone [phone]  Fax            [phone]  =======================================================    [id number]  HERACLIO MOLINA   follow up:  infected pleural effusion, empyema    s/p VATS on 11/27/19    patient moved back to CT ICU  s./p placement of Wound vac in old Chest tube site.   c/o pain in chest    =======================================================    REVIEW OF SYSTEMS:  CONSTITUTIONAL:  No Fever or chills  HEENT:  No diplopia or blurred vision.  No earache, sore throat or runny nose.  CARDIOVASCULAR:  No pressure, squeezing, strangling, tightness, heaviness or aching about the chest, neck, axilla or epigastrium.  RESPIRATORY:  No cough, shortness of breath  GASTROINTESTINAL:  No nausea, vomiting or diarrhea.  GENITOURINARY:  No dysuria, frequency or urgency. No Blood in urine  MUSCULOSKELETAL:  no joint aches, no muscle pain  SKIN:  No change in skin, hair or nails.  NEUROLOGIC:  No Headaches, seizures or weakness.  PSYCHIATRIC:  No disorder of thought or mood.  ENDOCRINE:  No heat or cold intolerance  HEMATOLOGICAL:  No easy bruising or bleeding.   =======================================================  Allergies  No Known Allergies    Antibiotics:  meropenem  IVPB 1000 milliGRAM(s) IV Intermittent every 12 hours    Other medications:  amitriptyline 10 milliGRAM(s) Oral at bedtime  ascorbic acid 500 milliGRAM(s) Oral daily  ferrous    sulfate 325 milliGRAM(s) Oral three times a day  folic acid 1 milliGRAM(s) Oral daily  gabapentin 100 milliGRAM(s) Oral three times a day  heparin  Infusion.  Unit(s)/Hr IV Continuous <Continuous>  lidocaine   Patch 1 Patch Transdermal daily  multivitamin 1 Tablet(s) Oral daily  pantoprazole    Tablet 40 milliGRAM(s) Oral before breakfast  saccharomyces boulardii 250 milliGRAM(s) Oral two times a day  thiamine 100 milliGRAM(s) Oral daily  traMADol 50 milliGRAM(s) Oral every 8 hours      ======================================================  Physical Exam:  ============  T(F): 97.7 (08 Dec 2019 08:00), Max: 98.4 (08 Dec 2019 04:00)  HR: 103 (08 Dec 2019 12:00)  BP: 129/87 (08 Dec 2019 12:00)  RR: 20 (08 Dec 2019 12:00)  SpO2: 98% (08 Dec 2019 12:00) (93% - 100%)  temp max in last 48H T(F): , Max: 98.4 (12-08-19 @ 04:00)    General:  No acute distress.   Eye: Pupils are equal, round and reactive to light, Extraocular movements are intact, Normal conjunctiva.  HENT: Normocephalic, Oral mucosa is moist, No pharyngeal erythema, No sinus tenderness.  Neck: Supple, No lymphadenopathy.  Respiratory: decreased breath sounds right lung  RIGHT chest with WOUND VAC in place  Cardiovascular: Normal rate, Regular rhythm,   + edema of legs  Gastrointestinal: Soft, mild right abd tenderness  Normal bowel sounds.  Genitourinary: No costovertebral angle tenderness.  Lymphatics: No lymphadenopathy neck,   Musculoskeletal: Normal range of motion, Normal strength.  Integumentary: No rash.  Neurologic: Alert, Oriented, No focal deficits, Cranial Nerves II-XII are grossly intact.  Psychiatric: Appropriate mood & affect.    =======================================================  11/22/19 blood cx negative x 2  11/22/19 - body fluid cx with Moderate Alpha hemolytic strep x 2 sets  Alpha hemolytic strep  		[facility]	E Test  Ceftriaxone  			S (0.19)   Clindamycin 	S    Erythromycin 	S   Penicillin 	S   Vancomycin 	S     ==================================     EXAM:  CT ABDOMEN AND PELVIS OC IC                         EXAM:  CT CHEST IC                          PROCEDURE DATE:  12/04/2019          INTERPRETATION:  CLINICAL INFORMATION: Postop. Persistent elevated white   count.    COMPARISON: CT chest 11/25/2019 and MRI abdomen 11/26/2019 and upper   extremity venous Doppler 11/28/2019    PROCEDURE:   CT of the Chest, Abdomen and Pelvis was performed with intravenous   contrast.   Intravenous contrast: 94 ml Omnipaque 300.  Oral contrast: Positive contrast was administered.  Sagittal and coronal reformats were performed.    FINDINGS:    CHEST:     LUNGS AND LARGE AIRWAYS: Patent central airways. Interval improvement in   airspace opacities in the lungs since 11/25/2019. Interlobular septal   thickening in the right lung inferior aspect of the left lower lobe,   likely pulmonary edema.  PLEURA: Right chest tube with a small right hydropneumothorax with an   interval increase in the amount of fluid in the pleural space since   11/25/2019. There is enhancement of the pleura which can be related to   infection or the prior instrumentation.  VESSELS: Thoracic aorta normal in caliber. Partially imaged nonocclusive   thrombus in the right internal jugular vein.  HEART: Heart size is normal. No pericardial effusion.  MEDIASTINUM AND TETO: Multiple subcentimeter mediastinal lymph nodes.   There is a region of low attenuation with high attenuation peripherally   to the left of the distal esophagus (series 3 image 107) measuring 4.3 x   1.8 cm, likely a fluid collection.  CHEST WALL AND LOWER NECK: No enlarged axillary lymph nodes.    ABDOMEN AND PELVIS:    LIVER: 2 new cystic foci, one at the medial aspect of the caudate and the second in the posterior aspect of the lateral segment left hepatic lobe with the larger at the medial aspect of the caudate measuring 1.9 x 1.8   cm, new.  BILE DUCTS: Normal caliber.  GALLBLADDER: Within normal limits.  SPLEEN: Within normal limits.  PANCREAS: Low-attenuation/cystic foci pancreas appear increase in size and number since 11/26/2019 including a 1.6 x 1.3 cm lesion in the pancreatic body, not previously seen 1.2 x 1.1 cm lesion in the pancreatic head, previously 0.8 x 0.3 cm.  ADRENALS: Within normal limits.  KIDNEYS/URETERS: No hydronephrosis. Heterogeneous enhancement with no   focal lesion which may be related to the phase of contrast. Infiltration   of the fat at the posterior aspect of the left kidney.    BLADDER: Within normal limits.  REPRODUCTIVE ORGANS: Uterus and adnexa within normal limits.    BOWEL: No bowel obstruction. No evidence of appendicitis.  PERITONEUM: Trace pelvic free fluid.  VESSELS: Abdominal aorta normal in caliber.  RETROPERITONEUM/LYMPH NODES: No lymphadenopathy.    ABDOMINAL WALL: Anasarca.  BONES: No aggressive osseous lesion.    IMPRESSION:     Interval improvement in bilateral airspace disease, greater on the right since 11/25/2019.    Interlobular septal thickening lungs, likely pulmonary edema.    Small right hydropneumothorax with an interval increase in pleural fluid since 11/25/2019.    Partially imaged nonocclusive deep venous thrombosis in the right internal jugular vein.    Interval increase in size and number of low-attenuation lesions in the pancreas since 11/26/2019 and new cystic lesions at the medial aspect of the caudate and posterior aspect of the left hepatic lobe of uncertain etiology; the differential includes pancreatitis with associated acute peripancreatic fluid collections; correlation is recommended with pancreatic enzymes.     Region of low density measuring 4.3 cm to the left of the distal esophagus, likely a fluid collection and  infiltration of the fat at the posterior aspect of the left kidney which if there is pancreatitis could also be related to the underlying pancreatitis; follow-up is recommended.        AMIE RAMIREZ   This document has been electronically signed. Dec  4 2019  3:07PM      ======================================================  Labs:                        9.4    14.46 )-----------( 679      ( 07 Dec 2019 18:53 )             30.2       WBC Count: 14.46 K/uL (12-07-19 @ 18:53)  WBC Count: 13.79 K/uL (12-07-19 @ 05:42)  WBC Count: 15.80 K/uL (12-06-19 @ 21:13)  WBC Count: 25.73 K/uL (12-06-19 @ 03:55)  WBC Count: 20.66 K/uL (12-05-19 @ 19:52)  WBC Count: 24.17 K/uL (12-05-19 @ 06:17)  WBC Count: 27.94 K/uL (12-04-19 @ 06:46)      12-07    139  |  105  |  15.0  ----------------------------<  94  4.4   |  20.0<L>  |  1.98<H>    Ca    8.3<L>      07 Dec 2019 23:07  Mg     2.2     12-07    TPro  5.1<L>  /  Alb  1.7<L>  /  TBili  0.3<L>  /  DBili  0.1  /  AST  20  /  ALT  12  /  AlkPhos  242<H>  12-07      Culture - Urine (collected 12-04-19 @ 10:24)  Source: .Urine  Final Report (12-05-19 @ 11:51):    No growth    Culture - Other (collected 12-03-19 @ 14:03)  Source: .Other chest tube insertion site (pus/purulent)  Final Report (12-05-19 @ 09:15):    No growth at 2 days.    Culture - Blood (collected 12-03-19 @ 13:30)  Source: .Blood  Final Report (12-08-19 @ 15:00):    No growth at 5 days.    Culture - Blood (collected 12-03-19 @ 13:30)  Source: .Blood  Final Report (12-08-19 @ 15:00):    No growth at 5 days.    Culture - Acid Fast - Tissue w/Smear (collected 11-28-19 @ 20:40)  Source: .Tissue chest wound    Culture - Fungal, Tissue (collected 11-27-19 @ 20:12)  Source: .Tissue chest wound - Fungal culture    Culture - Tissue with Gram Stain (collected 11-27-19 @ 20:12)  Source: .Tissue chest wound  Gram Stain (11-27-19 @ 21:27):    Few White blood cells    No organisms seen  Final Report (12-02-19 @ 16:00):    No growth at 5 days.    Culture - Blood (collected 11-25-19 @ 05:20)  Source: .Blood  Final Report (11-30-19 @ 07:00):    No growth at 5 days.    Culture - Blood (collected 11-25-19 @ 05:19)  Source: .Blood  Final Report (11-30-19 @ 07:00):    No growth at 5 days.          EXAM:  ESOPHOGRAM                          PROCEDURE DATE:  12/07/2019          INTERPRETATION:  CLINICAL INDICATION: Assess for tracheoesophageal   fistula or perforation.     TECHNIQUE: Gastrografin Esophagram.     FINDINGS: The patient was given oral contrast (barium) by mouth.   The swallowing reflex is normal. Fluoroscopic and film examination shows   no significant abnormality of the esophagus. No perforation or   tracheoesophageal fistula.  No hiatal hernia is noted.     Impression:   Normal esophagram.                AISHWARYA BUCKLEY M.D., ATTENDING RADIOLOGIST  This document has been electronically signed. Dec  7 2019  1:59PM

## 2019-12-08 NOTE — PROGRESS NOTE ADULT - SUBJECTIVE AND OBJECTIVE BOX
Patient received in ICU late last night in NAD. Upgraded to ICU for neuro checks for concern of worsening Right lower extremity swelling. B/l LE venous and arterial US ordered.  Renal US ordered for worsening kidney function.  Right CT removed yesterday  with dressing in place draining thin brownish discharge. HR sinus with rate in 100s. Patient states pain on right side is not controlled. Denies fevers, chills, lightheadedness, SOB, CP, palpitations, Abd pain, N/V/D. Voiding and passing BMs.     VITAL SIGNS  Vital Signs Last 24 Hrs  T(C): 36.3 (19 @ 20:24), Max: 36.6 (19 @ 05:25)  T(F): 97.4 (19 @ 20:24), Max: 97.8 (19 @ 05:25)  HR: 89 (19 @ 01:00) (89 - 121)  BP: 110/86 (19 @ 01:00) (10/65 - 124/78)  RR: 20 (19 @ 01:00) (17 - 20)  SpO2: 98% (19 @ 01:00) (94% - 100%)  on (O2)                MEDICATIONS  ALPRAZolam 0.25 milliGRAM(s) Oral every 8 hours PRN  amitriptyline 10 milliGRAM(s) Oral at bedtime  ascorbic acid 500 milliGRAM(s) Oral daily  ferrous    sulfate 325 milliGRAM(s) Oral three times a day  folic acid 1 milliGRAM(s) Oral daily  gabapentin 100 milliGRAM(s) Oral three times a day  heparin  Infusion.  Unit(s)/Hr IV Continuous   heparin  Injectable 4000 Unit(s) IV Push every 6 hours PRN  heparin  Injectable 2000 Unit(s) IV Push every 6 hours PRN  HYDROmorphone   Tablet 4 milliGRAM(s) Oral every 6 hours PRN  HYDROmorphone   Tablet 2 milliGRAM(s) Oral every 6 hours PRN  lidocaine   Patch 1 Patch Transdermal daily  meropenem  IVPB 1000 milliGRAM(s) IV Intermittent every 12 hours  methocarbamol 500 milliGRAM(s) Oral every 6 hours PRN  multivitamin 1 Tablet(s) Oral daily  pantoprazole    Tablet 40 milliGRAM(s) Oral before breakfast  saccharomyces boulardii 250 milliGRAM(s) Oral two times a day  thiamine 100 milliGRAM(s) Oral daily  traMADol 50 milliGRAM(s) Oral every 8 hours      PHYSICAL EXAM  General: Lying in bed, NAD  Neurology: Awake, nonfocal, THACKER x 4  Eyes: Scleras clear, PERRLA/ EOMI, Gross vision intact  ENT: Gross hearing intact, grossly patent pharynx, no stridor  Neck: Neck supple, trachea midline, No JVD  Respiratory: absent BS right mid lung to base  CV: RRR, S1S2, no murmurs, rubs or gallops  Abdominal: Soft, NT, ND  Extremities: R foot 1+ pitting edema>left, cool toes on Right, FROM of toes/ankles, no numbness/tingling noted, pulses b/l by doppler, no erythema  On  R CT removed, R chest incisions with thin brownish fluid, excessive drainage, communicates with chest, dressing reinforced.        @ 07:  -   @ 07:00  --------------------------------------------------------  IN: 3654 mL / OUT: 2055 mL / NET: 1599 mL     @ 07:  -  08 @ 01:41  --------------------------------------------------------  IN: 273 mL / OUT: 225 mL / NET: 48 mL        Weights:  Daily     Daily Weight in k.9 (07 Dec 2019 06:20)  Admit Wt: Drug Dosing Weight  Height (cm): 167 (2019 10:14)  Weight (kg): 55.2 (2019 10:14)  BMI (kg/m2): 19.8 (2019 10:14)  BSA (m2): 1.61 (2019 10:14)    All laboratory results, radiology and medications reviewed.    LABS      139  |  105  |  15.0  ----------------------------<  94  4.4   |  20.0<L>  |  1.98<H>    Ca    8.3<L>      07 Dec 2019 23:07  Mg     2.2         TPro  5.1<L>  /  Alb  1.7<L>  /  TBili  0.3<L>  /  DBili  0.1  /  AST  20  /  ALT  12  /  AlkPhos  242<H>                                   9.4    14.46 )-----------( 679      ( 07 Dec 2019 18:53 )             30.2          PT/INR - ( 07 Dec 2019 05:42 )   PT: 20.6 sec;   INR: 1.76 ratio         PTT - ( 07 Dec 2019 18:53 )  PTT:43.9 sec  Bilirubin Total, Serum: 0.3 mg/dL ( @ 05:42)  Bilirubin Direct, Serum: 0.1 mg/dL ( @ 05:42)       CXR: Ordered, pending    < from: Xray Esophagram (19 @ 13:51) >  FINDINGS: The patient was given oral contrast (barium) by mouth. The swallowing reflex is normal. Fluoroscopic and film examination shows no significant abnormality of the esophagus. No perforation or tracheoesophageal fistula. No hiatal hernia is noted.   Impression:   Normal esophagram.  < end of copied text >    < from: CT Chest No Cont (19 @ 13:51) >  IMPRESSION: Interval removal of right chest tube with tract from prior chest tube now containing air.  Right hydropneumothorax again noted. The previously identified loculated fluid and air anteriorly has demonstrated resolution of the fluid component in this region with air now filling the same size space.  Skin defect with subcutaneous air and complex fluid right lateral chest wall again noted.  New skin defect right lower anterolateral chest wallwith air tract to the right pleural space now noted.  Slight increase in fluid tracking in the right major fissure. Areas of atelectasis in the right lung and opacities in the left upper lobe are unchanged.  Thickened distal esophagus again noted may be on the basis of esophagitis. Persistent renal nephrograms bilaterally may be seen in the setting of acute tubular necrosis given recent intravenous contrast administration. Correlation with serum creatinine recommended.  < end of copied text > Patient received in ICU late last night in NAD. Upgraded to ICU for neuro checks for concern of worsening Right lower extremity swelling. B/l LE venous and arterial US ordered.  Renal US ordered for worsening kidney function.  Right CT removed yesterday  with dressing in place draining thin brownish discharge. HR sinus with rate in 100s. Patient states pain on right side is not controlled. Denies fevers, chills, lightheadedness, SOB, CP, palpitations, Abd pain, N/V/D. Voiding and passing BMs.     VITAL SIGNS  Vital Signs Last 24 Hrs  T(C): 36.3 (19 @ 20:24), Max: 36.6 (19 @ 05:25)  T(F): 97.4 (19 @ 20:24), Max: 97.8 (19 @ 05:25)  HR: 89 (19 @ 01:00) (89 - 121)  BP: 110/86 (19 @ 01:00) (10/65 - 124/78)  RR: 20 (19 @ 01:00) (17 - 20)  SpO2: 98% (19 @ 01:00) (94% - 100%)  on (O2)                MEDICATIONS  ALPRAZolam 0.25 milliGRAM(s) Oral every 8 hours PRN  amitriptyline 10 milliGRAM(s) Oral at bedtime  ascorbic acid 500 milliGRAM(s) Oral daily  ferrous    sulfate 325 milliGRAM(s) Oral three times a day  folic acid 1 milliGRAM(s) Oral daily  gabapentin 100 milliGRAM(s) Oral three times a day  heparin  Infusion.  Unit(s)/Hr IV Continuous   heparin  Injectable 4000 Unit(s) IV Push every 6 hours PRN  heparin  Injectable 2000 Unit(s) IV Push every 6 hours PRN  HYDROmorphone   Tablet 4 milliGRAM(s) Oral every 6 hours PRN  HYDROmorphone   Tablet 2 milliGRAM(s) Oral every 6 hours PRN  lidocaine   Patch 1 Patch Transdermal daily  meropenem  IVPB 1000 milliGRAM(s) IV Intermittent every 12 hours  methocarbamol 500 milliGRAM(s) Oral every 6 hours PRN  multivitamin 1 Tablet(s) Oral daily  pantoprazole    Tablet 40 milliGRAM(s) Oral before breakfast  saccharomyces boulardii 250 milliGRAM(s) Oral two times a day  thiamine 100 milliGRAM(s) Oral daily  traMADol 50 milliGRAM(s) Oral every 8 hours      PHYSICAL EXAM  General: Lying in bed, NAD  Neurology: Awake, nonfocal, THACKER x 4  Eyes: Scleras clear, PERRLA/ EOMI, Gross vision intact  ENT: Gross hearing intact, grossly patent pharynx, no stridor  Neck: Neck supple, trachea midline, No JVD  Respiratory: absent BS right mid lung to base  CV: RRR, S1S2, no murmurs, rubs or gallops  Abdominal: Soft, NT, ND  Extremities: R foot 1+ pitting edema>left, cool toes on Right, FROM of toes/ankles, no numbness/tingling noted, pulses b/l by doppler, no erythema  On  R CT removed, R chest incisions with thin brownish fluid, minimal drainage, communicates with chest, dressing reinforced with air tight seal.        @ 07: @ 07:00  --------------------------------------------------------  IN: 3654 mL / OUT: 2055 mL / NET: 1599 mL     @ 07:  -  08 @ 01:41  --------------------------------------------------------  IN: 273 mL / OUT: 225 mL / NET: 48 mL        Weights:  Daily     Daily Weight in k.9 (07 Dec 2019 06:20)  Admit Wt: Drug Dosing Weight  Height (cm): 167 (2019 10:14)  Weight (kg): 55.2 (2019 10:14)  BMI (kg/m2): 19.8 (2019 10:14)  BSA (m2): 1.61 (2019 10:14)    All laboratory results, radiology and medications reviewed.    LABS      139  |  105  |  15.0  ----------------------------<  94  4.4   |  20.0<L>  |  1.98<H>    Ca    8.3<L>      07 Dec 2019 23:07  Mg     2.2         TPro  5.1<L>  /  Alb  1.7<L>  /  TBili  0.3<L>  /  DBili  0.1  /  AST  20  /  ALT  12  /  AlkPhos  242<H>                                   9.4    14.46 )-----------( 679      ( 07 Dec 2019 18:53 )             30.2          PT/INR - ( 07 Dec 2019 05:42 )   PT: 20.6 sec;   INR: 1.76 ratio         PTT - ( 07 Dec 2019 18:53 )  PTT:43.9 sec  Bilirubin Total, Serum: 0.3 mg/dL ( @ 05:42)  Bilirubin Direct, Serum: 0.1 mg/dL ( @ 05:42)       CXR: Ordered, pending    < from: Xray Esophagram (19 @ 13:51) >  FINDINGS: The patient was given oral contrast (barium) by mouth. The swallowing reflex is normal. Fluoroscopic and film examination shows no significant abnormality of the esophagus. No perforation or tracheoesophageal fistula. No hiatal hernia is noted.   Impression:   Normal esophagram.  < end of copied text >    < from: CT Chest No Cont (19 @ 13:51) >  IMPRESSION: Interval removal of right chest tube with tract from prior chest tube now containing air.  Right hydropneumothorax again noted. The previously identified loculated fluid and air anteriorly has demonstrated resolution of the fluid component in this region with air now filling the same size space.  Skin defect with subcutaneous air and complex fluid right lateral chest wall again noted.  New skin defect right lower anterolateral chest wallwith air tract to the right pleural space now noted.  Slight increase in fluid tracking in the right major fissure. Areas of atelectasis in the right lung and opacities in the left upper lobe are unchanged.  Thickened distal esophagus again noted may be on the basis of esophagitis. Persistent renal nephrograms bilaterally may be seen in the setting of acute tubular necrosis given recent intravenous contrast administration. Correlation with serum creatinine recommended.  < end of copied text >

## 2019-12-08 NOTE — PROGRESS NOTE ADULT - PROBLEM SELECTOR PLAN 2
Pain consult appreciated.  Continue ultram, elavil, dilaudid, robaxin, gabapentin, xanax as needed. ID Following, c/w meropenem.

## 2019-12-08 NOTE — PROGRESS NOTE ADULT - PROBLEM SELECTOR PLAN 1
s/p Right VATS decortication & washout on 11/27/19. Debridement of CT site & VAC placement.  12/6 1 CT removed and VAC removed. Ostomy appliance to anterior incisions placed for high output drainage.   12/7 Remaining Right CT removed. Maintain occlusive dressing to old CT site for 48 H.   Esophogram for high amylase level in fluid as above.   Repeat CT chest as above.   Encourage the use of I/S, OOB to chair, daily PT, ambulate as tolerated  Continue supportive care.  Case and plan discussed with Dr. Aponte.

## 2019-12-08 NOTE — PROGRESS NOTE ADULT - ASSESSMENT
34 year old female who transferred from [facility] to [facility] on 11/22 for complaints of weakness, 25 pound weight loss & flu like symptoms & found to have a large right pleural effusion. Pt. has a PMH of empyema with group A hemolytic strep & has 4-5 drinks of alcohol per day.  On 11/23 CT of the chest/abd/pelvis indicated a clot in the Right IJ. Pt. on heparin bridge to coumadin. CT abdomen shows cystic mass in body/tail of pancreas, now concern for postop ileus.  Post-op course:  Pt s/p right VATS decort & washout debridement of CT site & VAC placement on 11/27/19.  Wound vac was placed and removed today due to increased drainage and leakage around . WBC elevated , abx adjusted by ID , vanco d/c yesterday due to BRAD and high level , procalcitonin high . Was planned for VATS  debridement today ( 12/6) but cancelled . Hg dropped to 7.5 and is getting PRBC ,    MRI with pancreatic cyst but poor study . Followed by CTS ,  ID , Hem / Psych , seen by GI . CT chest , abd / pelvis noted , ? esophageal fluid collection , ? pancreatitis, noted with RONAK likely due to Vanco vs CHIQUITA , on ivf.            Problem/Plan - 1:  ·  Problem: Pleural effusion on right.  Plan: s/p Right VATS decortication & washout on 11/27/19. Debridement of CT site & VAC placement , vac removed due to increased drainage ,  as per primary team . Zosyn / Vanco d/c , started on Meropenem , cultures sent and so for negative, ID is following, WBC high , patient is tachycardic , Empyema , procalcitonin high - Sepsis of unknown organism, culture at [facility] +ve for strep as per ID, will continue with current antibiotics, repeat CT chest done and reviewed, will monitor closely, her white count is trending down.          Problem/Plan - 2:  ·  Problem: Empyema.  Plan: s/p Chest tube removal today, continued with iv abx as per ID, further plan as per CT surgery.        Problem/Plan - 3:  ·  Problem: Internal jugular vein thrombosis, right.  Plan: On Heparin drip , Coumadin on hold for planned procedure, Hematology appreciated.      Problem/Plan - 5:  ·  Problem: Anemia likely multifactorial ; Due to anemia of chronic dz , multiple blood drawings , dilutional  , patient is tachycardic , pale  Plan: s/p PRBCS 3 days ago, Hb is stable, will monitor CBC.        Problem/Plan -6:  ·  Problem: Hyponatremia.  Plan: as per renal , sodium level is ok now.      Problem/Plan - 7:  ·  Problem: Alcohol abuse.  Plan: Monitor for withdrawal, Continue folic, MVI, thiamine.     Problem / Plan -8 : Abdominal tenderness , CT noted, ? pancreatitis , esophageal fluid collection, Esophagogram is normal, CT scan shows thickening, will consider MRI to r/o abscess if spiking and not improving.       Problem / Plan - 9; Frequent loose stools x 1 month - will hold on laxatives for now , follow stool cultures , O and P , GI PCR ,r/o  C diff , less frequent off Miralax     Problem / Plan -10 : B/L feet pain , pain all over her body, today R foot swollen , u/s negative for dvt , get XR of foot.     Problem / Plan -11 ; Hematuria , with blood clots , UTI r/o  - resolved , likely due to high inr     Problem / Plan -12 : Multifocal PNA -  treated with abx.     Problem /Plan - 13 : Hypoalbuminemia , prealbumin noted _ 4 , Acute severe protein calorie malnutrition -  Nutrition eval noted ,supplement with high protein shakes.     Problem / Plan - 14 : RONAK - likely multifactorial RONAK; ATN from Vancomycin and ? CHIQUITA , IV fluids are third spacing due to low albumin, would hold off fluids, renal noted and appreciated , follow BMP, I/O and daily weight, creatinine is improving, fluid were held as she was third spacing.     Lower ext edema: Right>left: Doppler US of the lower ext repeat is negative, she has intact pulses, could be from third spacing from the low albumin on in setting of volume over load due to renal failure, with hold IV fluid and keeping the legs up edema is better, Echo pending, will get CPK, Uric acid.     D/W patient / CT surgery PA.

## 2019-12-08 NOTE — PROGRESS NOTE ADULT - PROBLEM SELECTOR PLAN 6
CT scan from 12/7 showing Persistent renal nephrograms b/l may be seen in acute tubular necrosis given recent intravenous contrast.  Continue to trend BUN/Cr  Hold nephrotoxic agents  Continue to encourage PO fluids  Daily assessment for diuretics given LE edema

## 2019-12-08 NOTE — PROGRESS NOTE ADULT - ASSESSMENT
This 34 y.o. F who was admitted to [facility] 11/22 with weakness,   found with large right pleural effusion   s/p chest tube drainage    Hyponatremia - resolving  WBC elevation   alpha hemolytic strep infection  multi focal PNA  infected pleural effusion - empyema  Pancreatic cyst  Liver lesions  chest wall drainage  Acute renal failure    - Legionella Ag from [facility] 11/24/19 - negative, off azithromycin  - remainder of cultures from [facility] are negative  s/p VATS 11/27/19  - cultures remain negative  - WBC elevation   generally improving    - Chest incision with copious drainage  workup in process; Barium swallow showed normal esophogram       ACUTE renal failure, slowly improving  - watch renal function closely  Creatinine, Serum: 1.98 mg/dL (12-07-19 @ 23:07)  Creatinine, Serum: 2.29 mg/dL (12-07-19 @ 05:42)  Creatinine, Serum: 2.42 mg/dL (12-06-19 @ 11:13)  Creatinine, Serum: 2.62 mg/dL (12-05-19 @ 19:52)  Creatinine, Serum: 2.17 mg/dL (12-05-19 @ 10:37)  Creatinine, Serum: 2.11 mg/dL (12-05-19 @ 06:17)  Creatinine, Serum: 0.30 mg/dL (12-04-19 @ 06:46)      Regarding liver lesions  - cultures remain negative.   - possible repeat MRCP  continue Merrem 1 gram Q 12H       Patient will need eventual PICC line to complete course of IV antibiotics.     - follow up all outstanding cultures  - trend temperature and WBC curve  - repeat cultures from blood and all sources if febrile.

## 2019-12-08 NOTE — PROGRESS NOTE ADULT - SUBJECTIVE AND OBJECTIVE BOX
ICU Vital Signs Last 24 Hrs,    T(C): 36.5 (08 Dec 2019 08:00), Max: 36.9 (08 Dec 2019 04:00)  T(F): 97.7 (08 Dec 2019 08:00), Max: 98.4 (08 Dec 2019 04:00)  HR: 122 (08 Dec 2019 09:00) (89 - 122)  BP: 117/84 (08 Dec 2019 09:00) (10/65 - 140/86)  BP(mean): 95 (08 Dec 2019 09:00) (78 - 104)  RR: 18 (08 Dec 2019 09:00) (18 - 20)  SpO2: 99% (08 Dec 2019 09:00) (93% - 100%)    139    |  105    |  15.0   ----------------------------<  94     Ca:8.3<L> (07 Dec 2019 23:07)  4.4     |  20.0<L>  |  1.98<H>    eGFR if Non : 32 <L>    TPro  5.1<L>  /  Alb  1.7<L>  /  TBili  0.3<L>  /  DBili  0.1    /  AST  20     /  ALT  12     /  AlkPhos  242<H>  07 Dec 2019 05:42                               9.4<L>  14.46<H> )-----------( 679<H>    ( 07 Dec 2019 18:53 )                  30.2<L>    M.2 mg/dL     Urinalysis Basic - ( 05 Dec 2019 15:15 )  Color: Yellow / Appearance: Clear / S.005<L> / pH: x  Gluc: x / Ketone: Negative  / Bili: Negative / Urobili: Negative mg/dL   Blood: x / Protein: 30 mg/dL<!> / Nitrite: Negative   Leuk Esterase: Negative / RBC: 6-10 /HPF<!> / WBC 3-5   Sq Epi: x / Non Sq Epi: Occasional / Bacteria: Negative    UCr:26 mg/dL   Tyler:<30 mmol/L UOsm:116 mosm/kg<L> UVol:-- UCl:<27 mmol/L UK:17 mmol/L ( @ 15:14)    Chief Complaint:  RONAK, hyponatremia    24 hour events/subjective:  SCr., Continues to Improve,   On IVF;    PAST HISTORY  --------------------------------------------------------------------------------  No significant changes to PMH, PSH, FHx, SHx, unless otherwise noted    ALLERGIES & MEDICATIONS  --------------------------------------------------------------------------------  Allergies    No Known Allergies    Intolerances    Ensure TID (Unknown)    Standing Inpatient Medications  amitriptyline 10 milliGRAM(s) Oral at bedtime  ascorbic acid 500 milliGRAM(s) Oral daily  ferrous    sulfate 325 milliGRAM(s) Oral three times a day  folic acid 1 milliGRAM(s) Oral daily  gabapentin 100 milliGRAM(s) Oral three times a day  heparin  Infusion. 1100 Unit(s)/Hr IV Continuous <Continuous>  lidocaine   Patch 1 Patch Transdermal daily  multivitamin 1 Tablet(s) Oral daily  pantoprazole    Tablet 40 milliGRAM(s) Oral before breakfast  piperacillin/tazobactam IVPB.. 3.375 Gram(s) IV Intermittent every 12 hours  saccharomyces boulardii 250 milliGRAM(s) Oral two times a day  sodium chloride 0.9%. 1000 milliLiter(s) IV Continuous <Continuous>  thiamine 100 milliGRAM(s) Oral daily  traMADol 50 milliGRAM(s) Oral every 8 hours    PRN Inpatient Medications  ALPRAZolam 0.25 milliGRAM(s) Oral every 8 hours PRN  heparin  Injectable 3200 Unit(s) IV Push every 6 hours PRN  HYDROmorphone   Tablet 4 milliGRAM(s) Oral every 6 hours PRN  HYDROmorphone   Tablet 2 milliGRAM(s) Oral every 6 hours PRN  methocarbamol 500 milliGRAM(s) Oral every 6 hours PRN    REVIEW OF SYSTEMS  --------------------------------------------------------------------------------  Gen: No weight changes, fatigue, fevers/chills, weakness  Skin: No rashes  Head/Eyes/Ears/Mouth: No headache; Normal hearing; Normal vision w/o blurriness; No sinus pain/discomfort, sore throat  Respiratory: No dyspnea, cough, wheezing, hemoptysis  CV: No chest pain, PND, orthopnea  GI: No abdominal pain, diarrhea, constipation, nausea, vomiting, melena, hematochezia  : No increased frequency, dysuria, hematuria, nocturia  MSK: No joint pain/swelling; no back pain; ++  edema  Neuro: No dizziness/lightheadedness, weakness, seizures, numbness, tingling  Heme: No easy bruising or bleeding  Endo: No heat/cold intolerance  Psych: No significant nervousness, anxiety, stress, depression    All other systems were reviewed and are negative, except as noted.    VITALS/PHYSICAL EXAM  --------------------------------------------------------------------------------  T(C): 36.8 (19 @ 13:23), Max: 36.9 (19 @ 00:23)  HR: 112 (19 @ 13:23) (101 - 140)  BP: 108/66 (19 @ 13:23) (95/67 - 137/84)  RR: 17 (19 @ 13:23) (16 - 18)  SpO2: 99% (19 @ 13:23) (93% - 100%)    19 @ 07:01  -  19 @ 07:00  --------------------------------------------------------  IN: 2899 mL / OUT: 1055 mL / NET: 1844 mL    19 @ 07:01  -  19 @ 13:26  --------------------------------------------------------  IN: 694 mL / OUT: 450 mL / NET: 244 mL    Physical Exam:  	Gen: NAD, Pale,   	HEENT: PERRL, supple neck,   	Pulm: CTA B/L  	CV: RRR, S1S2; no rub  	Back: No spinal or CVA tenderness; no sacral edema  	Abd: +BS, soft, nontender/nondistended  	: No suprapubic tenderness  	UE: Warm, FROM, no clubbing, intact strength; no edema; no asterixis  	LE: Warm, FROM, no clubbing, intact strength; no edema  	Neuro: No focal deficits,  	Psych: abnormal affect;  	Skin: Warm, without rashes  	Vascular access: NA,     LABS/STUDIES  --------------------------------------------------------------------------------               7.5    25.73 >-----------<  577      [19 @ 03:55]              23.9     136  |  100  |  17.0  ----------------------------<  76      [19 @ 11:13]  3.7   |  22.0  |  2.42        Ca     8.2     [19 @ 11:13]      Mg     1.5     [19 @ 11:13]      Phos  4.1     [19 @ 06:17]    TPro  5.4  /  Alb  1.9  /  TBili  0.3  /  DBili  0.2  /  AST  29  /  ALT  13  /  AlkPhos  272  [19 @ 11:13]    PT/INR: PT 27.0 , INR 2.29       [19 11:13]  PTT: 44.7       [19 @ 03:55]    Creatinine Trend:  SCr 2.42 [ 11:13]  SCr 2.62 [ 19:52]  SCr 2.17 [ 10:37]  SCr 2.11 [ 06:17]  SCr 0.30 [ 06:46]    Urinalysis - [19 @ 15:15]      Color Yellow / Appearance Clear / SG 1.005 / pH 6.0      Gluc Negative / Ketone Negative  / Bili Negative / Urobili Negative       Blood Moderate / Protein 30 / Leuk Est Negative / Nitrite Negative      RBC 6-10 / WBC 3-5 / Hyaline  / Gran  / Sq Epi  / Non Sq Epi Occasional / Bacteria Negative    Urine Creatinine 26      [19 @ 15:14]  Urine Sodium <30      [19 @ 15:14]  Urine Urea Nitrogen 95      [19 @ 23:30]  Urine Potassium 17      [19 @ 15:14]  Urine Chloride <27      [19 @ 15:14]  Urine Osmolality 116      [19 @ 15:14]    Iron 23, TIBC 104, %sat 22      [19 @ 06:46]  Vitamin D (25OH) 21.9      [19 @ 17:51]  TSH 2.02      [19 @ 19:52]  Lipid: chol 68, , HDL 27, LDL 12      [19 @ 05:29]    HIV Nonreact      [19 @ 14:08]  ROMAN: titer Negative, pattern --      [19 @ 18:05]  dsDNA <12      [19 @ 18:05]  Rheumatoid Factor <10      [19 @ 17:34]  ANCA: cANCA Negative, pANCA Negative, atypical ANCA Negative      [19 @ 18:05]      1) Hyponatremia; multifactorial in setting of low solute and increased free water intake, ? SIADH from pain  2) RONAK  3)Hyperkalemia- resolved  4) R -  empyema s/p chest tube    Vancomycin  trough Level  was  elevated;     Also s/p  IV contrast administration; likely multifactorial RONAK;     Tyler < 30 Meq.,    Continue with IV hydration

## 2019-12-08 NOTE — CHART NOTE - NSCHARTNOTEFT_GEN_A_CORE
vac applied to old R chest tube sites x 2 d/t copious serous drainage as d/w Dr Aponte. CT site #3 with skin approximated and suture in place. None of the 3 sites with erythema, heat of purulent discharge. + Foul odor noted.

## 2019-12-08 NOTE — PROGRESS NOTE ADULT - ASSESSMENT
34 year old female with a PMHx of smoking, ETOH use (4-5 drinks/day), transferred from [facility] to [facility] on 11/22 for complaints of weakness, 25 pound weight loss & flu like symptoms & found to have a large right pleural effusion, cultures + for group A hemolytic strep,11/23 CT of the chest/abd/pelvis indicated a clot in the Right IJ, pt started on heparin gtt, 11/23 s/p right VATS decort & washout debridement of CT site & VAC placement on 11/27/19, persistent drainage from CT site so repeat CT C/A/P performed 12/4 which showed new cystic mass in pancreas and liver concerning for pancreatitis, GI consulted (NTD). Pt tolerating diet, zosyn changed to meropenem on 12/6 by ID for broader coverage given pancreatitis, pt remains with copious drainage from anterior CT site. VAC removed and ostomy appliance applied for high output on 12/6. Pleural fluid [place] [address] (serum 51) noted. 12/7 CT removed. Esophogram and repeat CT chest pending. All cultures remain negative. Ostomy appliance removed and dressing applied. Right lower extremity swelling noted to be worsening and patient upgraded to the ICU. 12/8 Venous and arterial USs of b/l LEs pending. Renal US pending.

## 2019-12-08 NOTE — PROGRESS NOTE ADULT - SUBJECTIVE AND OBJECTIVE BOX
HERACLIO MOLINA    [id number]    34y      Female    Patient is a 34y old  Female who presents with a chief complaint of RONAK (08 Dec 2019 10:44)      INTERVAL HPI/OVERNIGHT EVENTS:    Patient is having pain all around the body and epigastric area, swelling in both legs but more on the right side is improving, denies fever, chills, chest pain, nausea, vomiting.     REVIEW OF SYSTEMS:    CONSTITUTIONAL: No fever, Some fatigue  RESPIRATORY: No cough, has shortness of breath  CARDIOVASCULAR: No chest pain, palpitations  GASTROINTESTINAL: No abdominal, No nausea, vomiting  NEUROLOGICAL: No headaches,  loss of strength.  MISCELLANEOUS: body ache legs swellings     Vital Signs Last 24 Hrs  T(C): 36.5 (08 Dec 2019 08:00), Max: 36.9 (08 Dec 2019 04:00)  T(F): 97.7 (08 Dec 2019 08:00), Max: 98.4 (08 Dec 2019 04:00)  HR: 103 (08 Dec 2019 12:00) (89 - 122)  BP: 129/87 (08 Dec 2019 12:00) (100/66 - 140/86)  BP(mean): 104 (08 Dec 2019 12:00) (78 - 104)  RR: 20 (08 Dec 2019 12:00) (18 - 20)  SpO2: 98% (08 Dec 2019 12:00) (93% - 100%)    PHYSICAL EXAM:    GENERAL: Middle age female looking anxious    HEENT: PERRL  NECK: soft, Supple, No JVD,   CHEST/LUNG: Decrease air entry bilaterally; No wheezing  HEART: S1S2+, Regular rate and rhythm; No murmurs  ABDOMEN: Soft, Nontender, Nondistended; Bowel sounds present  EXTREMITIES:  1+ Peripheral Pulses, has edema right more then left.   SKIN: No rashes or lesions  NEURO: AAOX3, no focal deficits, no motor r sensory loss  PSYCH: Anxious mood    LABS:                        9.4    14.46 )-----------( 679      ( 07 Dec 2019 18:53 )             30.2     12-07    139  |  105  |  15.0  ----------------------------<  94  4.4   |  20.0<L>  |  1.98<H>    Ca    8.3<L>      07 Dec 2019 23:07  Mg     2.2     12-07    TPro  5.1<L>  /  Alb  1.7<L>  /  TBili  0.3<L>  /  DBili  0.1  /  AST  20  /  ALT  12  /  AlkPhos  242<H>  12-07    PT/INR - ( 07 Dec 2019 05:42 )   PT: 20.6 sec;   INR: 1.76 ratio         PTT - ( 08 Dec 2019 10:26 )  PTT:52.9 sec        I&O's Summary    07 Dec 2019 07:01  -  08 Dec 2019 07:00  --------------------------------------------------------  IN: 874 mL / OUT: 225 mL / NET: 649 mL    08 Dec 2019 07:01  -  08 Dec 2019 14:45  --------------------------------------------------------  IN: 542 mL / OUT: 0 mL / NET: 542 mL        MEDICATIONS  (STANDING):  amitriptyline 10 milliGRAM(s) Oral at bedtime  ascorbic acid 500 milliGRAM(s) Oral daily  ferrous    sulfate 325 milliGRAM(s) Oral three times a day  folic acid 1 milliGRAM(s) Oral daily  gabapentin 100 milliGRAM(s) Oral three times a day  heparin  Infusion.  Unit(s)/Hr (10 mL/Hr) IV Continuous <Continuous>  lidocaine   Patch 1 Patch Transdermal daily  meropenem  IVPB 1000 milliGRAM(s) IV Intermittent every 12 hours  multivitamin 1 Tablet(s) Oral daily  pantoprazole    Tablet 40 milliGRAM(s) Oral before breakfast  saccharomyces boulardii 250 milliGRAM(s) Oral two times a day  thiamine 100 milliGRAM(s) Oral daily  traMADol 50 milliGRAM(s) Oral every 8 hours    MEDICATIONS  (PRN):  ALPRAZolam 0.25 milliGRAM(s) Oral every 8 hours PRN anxiety  aluminum hydroxide/magnesium hydroxide/simethicone Suspension 30 milliLiter(s) Oral every 4 hours PRN Dyspepsia  heparin  Injectable 4000 Unit(s) IV Push every 6 hours PRN For aPTT less than 40  heparin  Injectable 2000 Unit(s) IV Push every 6 hours PRN For aPTT between 40 - 57  HYDROmorphone   Tablet 4 milliGRAM(s) Oral every 6 hours PRN Severe Pain (7 - 10)  HYDROmorphone   Tablet 2 milliGRAM(s) Oral every 6 hours PRN Moderate Pain (4 - 6)  methocarbamol 500 milliGRAM(s) Oral every 6 hours PRN Muscle Spasm

## 2019-12-08 NOTE — PROGRESS NOTE ADULT - ASSESSMENT
RONAK - CHIQUITA , Vancomycin Cast Nephropathy ( ? )     In C- ICU - DVT Excluded,      Scr., Trending down,    To Continue to Trend Scr., & U.O    Continue current critical care management per  Yoan - DONNIE,

## 2019-12-09 LAB
ALBUMIN SERPL ELPH-MCNC: 2.1 G/DL — LOW (ref 3.3–5.2)
ALP SERPL-CCNC: 244 U/L — HIGH (ref 40–120)
ALT FLD-CCNC: 12 U/L — SIGNIFICANT CHANGE UP
AMYLASE P1 CFR SERPL: 270 U/L — HIGH (ref 36–128)
ANION GAP SERPL CALC-SCNC: 17 MMOL/L — SIGNIFICANT CHANGE UP (ref 5–17)
ANISOCYTOSIS BLD QL: SLIGHT — SIGNIFICANT CHANGE UP
APTT BLD: 70.9 SEC — HIGH (ref 27.5–36.3)
AST SERPL-CCNC: 20 U/L — SIGNIFICANT CHANGE UP
BASOPHILS # BLD AUTO: 0 K/UL — SIGNIFICANT CHANGE UP (ref 0–0.2)
BASOPHILS NFR BLD AUTO: 0 % — SIGNIFICANT CHANGE UP (ref 0–2)
BILIRUB DIRECT SERPL-MCNC: 0.1 MG/DL — SIGNIFICANT CHANGE UP (ref 0–0.3)
BILIRUB INDIRECT FLD-MCNC: 0.1 MG/DL — LOW (ref 0.2–1)
BILIRUB SERPL-MCNC: 0.2 MG/DL — LOW (ref 0.4–2)
BUN SERPL-MCNC: 13 MG/DL — SIGNIFICANT CHANGE UP (ref 8–20)
CALCIUM SERPL-MCNC: 8.5 MG/DL — LOW (ref 8.6–10.2)
CHLORIDE SERPL-SCNC: 100 MMOL/L — SIGNIFICANT CHANGE UP (ref 98–107)
CHOLEST SERPL-MCNC: 94 MG/DL — LOW (ref 110–199)
CK SERPL-CCNC: 28 U/L — SIGNIFICANT CHANGE UP (ref 25–170)
CO2 SERPL-SCNC: 21 MMOL/L — LOW (ref 22–29)
CREAT SERPL-MCNC: 1.36 MG/DL — HIGH (ref 0.5–1.3)
EOSINOPHIL # BLD AUTO: 0.76 K/UL — HIGH (ref 0–0.5)
EOSINOPHIL NFR BLD AUTO: 3.6 % — SIGNIFICANT CHANGE UP (ref 0–6)
GLUCOSE SERPL-MCNC: 87 MG/DL — SIGNIFICANT CHANGE UP (ref 70–115)
HCT VFR BLD CALC: 33.4 % — LOW (ref 34.5–45)
HDLC SERPL-MCNC: 37 MG/DL — LOW
HGB BLD-MCNC: 10.1 G/DL — LOW (ref 11.5–15.5)
HYPOCHROMIA BLD QL: SLIGHT — SIGNIFICANT CHANGE UP
LIDOCAIN IGE QN: 2104 U/L — HIGH (ref 22–51)
LIPID PNL WITH DIRECT LDL SERPL: 24 MG/DL — SIGNIFICANT CHANGE UP
LYMPHOCYTES # BLD AUTO: 16.8 % — SIGNIFICANT CHANGE UP (ref 13–44)
LYMPHOCYTES # BLD AUTO: 3.55 K/UL — HIGH (ref 1–3.3)
MACROCYTES BLD QL: SLIGHT — SIGNIFICANT CHANGE UP
MAGNESIUM SERPL-MCNC: 1.6 MG/DL — SIGNIFICANT CHANGE UP (ref 1.6–2.6)
MANUAL SMEAR VERIFICATION: SIGNIFICANT CHANGE UP
MCHC RBC-ENTMCNC: 30.2 GM/DL — LOW (ref 32–36)
MCHC RBC-ENTMCNC: 30.3 PG — SIGNIFICANT CHANGE UP (ref 27–34)
MCV RBC AUTO: 100.3 FL — HIGH (ref 80–100)
MONOCYTES # BLD AUTO: 2.05 K/UL — HIGH (ref 0–0.9)
MONOCYTES NFR BLD AUTO: 9.7 % — SIGNIFICANT CHANGE UP (ref 2–14)
MYELOCYTES NFR BLD: 0.9 % — HIGH (ref 0–0)
NEUTROPHILS # BLD AUTO: 14.59 K/UL — HIGH (ref 1.8–7.4)
NEUTROPHILS NFR BLD AUTO: 64.6 % — SIGNIFICANT CHANGE UP (ref 43–77)
NEUTS BAND # BLD: 4.4 % — SIGNIFICANT CHANGE UP (ref 0–8)
PLAT MORPH BLD: NORMAL — SIGNIFICANT CHANGE UP
PLATELET # BLD AUTO: 640 K/UL — HIGH (ref 150–400)
POLYCHROMASIA BLD QL SMEAR: SIGNIFICANT CHANGE UP
POTASSIUM SERPL-MCNC: 3.9 MMOL/L — SIGNIFICANT CHANGE UP (ref 3.5–5.3)
POTASSIUM SERPL-SCNC: 3.9 MMOL/L — SIGNIFICANT CHANGE UP (ref 3.5–5.3)
PROT SERPL-MCNC: 6.1 G/DL — LOW (ref 6.6–8.7)
RBC # BLD: 3.33 M/UL — LOW (ref 3.8–5.2)
RBC # FLD: 16.7 % — HIGH (ref 10.3–14.5)
RBC BLD AUTO: ABNORMAL
SODIUM SERPL-SCNC: 138 MMOL/L — SIGNIFICANT CHANGE UP (ref 135–145)
TOTAL CHOLESTEROL/HDL RATIO MEASUREMENT: 3 RATIO — LOW (ref 3.3–7.1)
TRIGL SERPL-MCNC: 167 MG/DL — SIGNIFICANT CHANGE UP (ref 10–200)
WBC # BLD: 21.14 K/UL — HIGH (ref 3.8–10.5)
WBC # FLD AUTO: 21.14 K/UL — HIGH (ref 3.8–10.5)

## 2019-12-09 PROCEDURE — 99233 SBSQ HOSP IP/OBS HIGH 50: CPT

## 2019-12-09 PROCEDURE — 99024 POSTOP FOLLOW-UP VISIT: CPT

## 2019-12-09 PROCEDURE — 99232 SBSQ HOSP IP/OBS MODERATE 35: CPT

## 2019-12-09 PROCEDURE — 74181 MRI ABDOMEN W/O CONTRAST: CPT | Mod: 26

## 2019-12-09 PROCEDURE — 74018 RADEX ABDOMEN 1 VIEW: CPT | Mod: 26

## 2019-12-09 RX ORDER — MAGNESIUM OXIDE 400 MG ORAL TABLET 241.3 MG
400 TABLET ORAL ONCE
Refills: 0 | Status: COMPLETED | OUTPATIENT
Start: 2019-12-09 | End: 2019-12-09

## 2019-12-09 RX ORDER — CHOLECALCIFEROL (VITAMIN D3) 125 MCG
2000 CAPSULE ORAL DAILY
Refills: 0 | Status: DISCONTINUED | OUTPATIENT
Start: 2019-12-09 | End: 2020-01-07

## 2019-12-09 RX ORDER — POTASSIUM CHLORIDE 20 MEQ
20 PACKET (EA) ORAL ONCE
Refills: 0 | Status: COMPLETED | OUTPATIENT
Start: 2019-12-09 | End: 2019-12-09

## 2019-12-09 RX ADMIN — ONDANSETRON 4 MILLIGRAM(S): 8 TABLET, FILM COATED ORAL at 19:46

## 2019-12-09 RX ADMIN — GABAPENTIN 100 MILLIGRAM(S): 400 CAPSULE ORAL at 16:54

## 2019-12-09 RX ADMIN — TRAMADOL HYDROCHLORIDE 50 MILLIGRAM(S): 50 TABLET ORAL at 17:50

## 2019-12-09 RX ADMIN — Medication 10 MILLIGRAM(S): at 23:36

## 2019-12-09 RX ADMIN — TRAMADOL HYDROCHLORIDE 50 MILLIGRAM(S): 50 TABLET ORAL at 05:35

## 2019-12-09 RX ADMIN — HYDROMORPHONE HYDROCHLORIDE 4 MILLIGRAM(S): 2 INJECTION INTRAMUSCULAR; INTRAVENOUS; SUBCUTANEOUS at 12:50

## 2019-12-09 RX ADMIN — Medication 325 MILLIGRAM(S): at 05:35

## 2019-12-09 RX ADMIN — Medication 500 MILLIGRAM(S): at 16:30

## 2019-12-09 RX ADMIN — HYDROMORPHONE HYDROCHLORIDE 4 MILLIGRAM(S): 2 INJECTION INTRAMUSCULAR; INTRAVENOUS; SUBCUTANEOUS at 11:55

## 2019-12-09 RX ADMIN — TRAMADOL HYDROCHLORIDE 50 MILLIGRAM(S): 50 TABLET ORAL at 06:35

## 2019-12-09 RX ADMIN — Medication 20 MILLIEQUIVALENT(S): at 08:26

## 2019-12-09 RX ADMIN — Medication 1 TABLET(S): at 16:30

## 2019-12-09 RX ADMIN — MEROPENEM 100 MILLIGRAM(S): 1 INJECTION INTRAVENOUS at 05:35

## 2019-12-09 RX ADMIN — MAGNESIUM OXIDE 400 MG ORAL TABLET 400 MILLIGRAM(S): 241.3 TABLET ORAL at 08:26

## 2019-12-09 RX ADMIN — GABAPENTIN 100 MILLIGRAM(S): 400 CAPSULE ORAL at 05:35

## 2019-12-09 RX ADMIN — MEROPENEM 100 MILLIGRAM(S): 1 INJECTION INTRAVENOUS at 18:12

## 2019-12-09 RX ADMIN — Medication 100 MILLIGRAM(S): at 16:30

## 2019-12-09 RX ADMIN — HEPARIN SODIUM 1300 UNIT(S)/HR: 5000 INJECTION INTRAVENOUS; SUBCUTANEOUS at 04:51

## 2019-12-09 RX ADMIN — Medication 250 MILLIGRAM(S): at 05:36

## 2019-12-09 RX ADMIN — TRAMADOL HYDROCHLORIDE 50 MILLIGRAM(S): 50 TABLET ORAL at 16:54

## 2019-12-09 RX ADMIN — Medication 250 MILLIGRAM(S): at 23:35

## 2019-12-09 RX ADMIN — PANTOPRAZOLE SODIUM 40 MILLIGRAM(S): 20 TABLET, DELAYED RELEASE ORAL at 05:35

## 2019-12-09 RX ADMIN — Medication 325 MILLIGRAM(S): at 23:35

## 2019-12-09 RX ADMIN — TRAMADOL HYDROCHLORIDE 50 MILLIGRAM(S): 50 TABLET ORAL at 23:35

## 2019-12-09 RX ADMIN — HYDROMORPHONE HYDROCHLORIDE 4 MILLIGRAM(S): 2 INJECTION INTRAMUSCULAR; INTRAVENOUS; SUBCUTANEOUS at 19:43

## 2019-12-09 RX ADMIN — Medication 325 MILLIGRAM(S): at 16:30

## 2019-12-09 RX ADMIN — GABAPENTIN 100 MILLIGRAM(S): 400 CAPSULE ORAL at 23:35

## 2019-12-09 RX ADMIN — HYDROMORPHONE HYDROCHLORIDE 4 MILLIGRAM(S): 2 INJECTION INTRAMUSCULAR; INTRAVENOUS; SUBCUTANEOUS at 20:20

## 2019-12-09 RX ADMIN — Medication 1 MILLIGRAM(S): at 16:30

## 2019-12-09 NOTE — PHYSICAL THERAPY INITIAL EVALUATION ADULT - PERTINENT HX OF CURRENT PROBLEM, REHAB EVAL
35 y/o female who presented to [facility] w/ weakness, weight loss and flu like symptoms. Found to have large right pleural effusion on 11/22. Pleural fluid drained and culture + for group A strep. 11/24 patient transferred to [facility] for surgical evaluation. MRCP complete. 11/27 s/p debridement, wound with dressing replacement, wound VAC placement, pleural biopsy, lung decortication with VATS, bronchoscopy. Work up for pancreatic mass; transfer to CTICU 12/7 for neuro checks and Right LE swelling

## 2019-12-09 NOTE — PHYSICAL THERAPY INITIAL EVALUATION ADULT - MANUAL MUSCLE TESTING RESULTS, REHAB EVAL
generalized weakness throughout
no strength deficits were identified
no strength deficits were identified

## 2019-12-09 NOTE — CHART NOTE - NSCHARTNOTEFT_GEN_A_CORE
Wound VAC to R chest stab sites x 2 applied.  Sites are clean without erythema or purulent drainage.  Sites packed with drain sponge, tegaderm applied to intermediate area to allow for sponge bridging between sights.  Third CT stab site with stitch intact, no VAC applied.   Pt tolerated procedure well.  Next VAC change 12/12.

## 2019-12-09 NOTE — PROGRESS NOTE ADULT - ASSESSMENT
34 year old female with a PMHx of smoking, ETOH use (4-5 drinks/day), transferred from [facility] to [facility] on 11/22 for complaints of weakness, 25 pound weight loss & flu like symptoms & found to have a large right pleural effusion, cultures + for group A hemolytic strep,11/23 CT of the chest/abd/pelvis indicated a clot in the Right IJ, pt started on heparin gtt. 11/23 s/p right VATS decort & washout debridement of CT site & VAC placement on 11/27/19. Persistent drainage from chest tube site so repeat CT C/A/P performed 12/4 which showed new cystic mass in pancreas and liver concerning for pancreatitis, GI consulted (NTD). Zosyn changed to meropenem on 12/6 by ID for broader coverage given pancreatitis. Plan to continue Merrem for 4 weeks. Copious drainage from anterior chest tube site, so VAC removed and ostomy appliance applied for high output on 12/6. Pleural fluid with an amylase of 88683 (serum 51) noted.  Prealbumin noted to be 5. Pt tolerating diet. Ensure drinks added. 12/7 Right chest tube removed. Esophogram normal. Repeat CT scan showed a persistent Right hydroptx with air anteriorly, right lower anterolateral chest wall with air tract to right pleural space, and Persistent renal nephrograms b/l indicating ATN. All cultures negative. 12/8 Ostomy appliance removed and dressing applied. Right lower extremity swelling noted to be worsening and patient upgraded to the ICU. No acute findings on venous and arterial USs of b/l LEs or Renal US.  12/9 patient scheduled for PICC line placement and pigtail to right anterior air pocket by IR later today.

## 2019-12-09 NOTE — PROGRESS NOTE ADULT - PROBLEM SELECTOR PLAN 7
Xray from admission reviewed, no frx  Elevate, wrap  B/l venous and arterial USs with no acute findings

## 2019-12-09 NOTE — PROGRESS NOTE ADULT - PROBLEM SELECTOR PLAN 1
s/p Right VATS decortication & washout on 11/27/19. Debridement of CT site & VAC placement.  12/6 1 R CT removed and VAC removed. Ostomy appliance to anterior incisions placed for high output drainage.   12/7 Remaining Right CT removed. Maintain occlusive dressing to old CT site for 48 H.   Esophogram for high amylase level in fluid was normal.   Repeat CT chest as above.   Encourage the use of I/S, OOB to chair, daily PT, ambulate as tolerated  Continue supportive care.  Planned for PICC line placement and pigtail to right anterior air pocket by IR later today.   Case and plan discussed with Dr. Aponte.

## 2019-12-09 NOTE — PHYSICAL THERAPY INITIAL EVALUATION ADULT - GENERAL OBSERVATIONS, REHAB EVAL
Pt received in bed supine, +telemetry, +, +chest tube x2 to LSW, +wound vac, +IV, pt c/o 8/10 incisional chest pain pre and post PT, pt states she is due for pain meds, however pt agreeable to PT. Pt received in bed supine, +telemetry, +pulse ox, +IV,  agreeable to PT.

## 2019-12-09 NOTE — PROGRESS NOTE ADULT - ASSESSMENT
1) Hyponatremia; multifactorial in setting of low solute and increased free water intake, ? SIADH from pain  2) RONAK  3)Hyperkalemia- resolved  4) R -  empyema s/p chest tube    Vancomycin  trough Level  was  elevated; ?cast nephropathy    Also s/p  IV contrast administration; likely multifactorial RONAK;     Tyler < 30 Meq.,    Continue with IV hydration    Improving overall;    dw CTS

## 2019-12-09 NOTE — PHYSICAL THERAPY INITIAL EVALUATION ADULT - SHORT TERM MEMORY, REHAB EVAL
Daughter called with concerns about his declining function. She stated she spoke with her father last night and he is willing to do palliative care. Oxygen has been ordered for patient but has not been delivered. Pt has been taking ativan to help with some distress per daughter. Also daughter states they are probably going to take him to [facility] tomorrow if he is not any better. Dr Yesenia Tubbs aware.     Verbal order and read back per Betzaida Hernandes MD  Order palliative care    Pt daughter aware intact

## 2019-12-09 NOTE — PHYSICAL THERAPY INITIAL EVALUATION ADULT - DIAGNOSIS, PT EVAL
Decreased functional mobility
Functional debility s/p VATS
PT will not follow, as patient is independent with all functional skills.

## 2019-12-09 NOTE — PROGRESS NOTE ADULT - SUBJECTIVE AND OBJECTIVE BOX
Patient seen and examined on MICU , comfortable  , C/O B/L feet pain , states swelling decreased , had few small BM     CC : b/l feet pain , swelling decreased  , frequent stool       MEDICATIONS  (STANDING):  amitriptyline 10 milliGRAM(s) Oral at bedtime  ascorbic acid 500 milliGRAM(s) Oral daily  ferrous    sulfate 325 milliGRAM(s) Oral three times a day  folic acid 1 milliGRAM(s) Oral daily  gabapentin 100 milliGRAM(s) Oral three times a day  heparin  Infusion. 1300 Unit(s)/Hr (13 mL/Hr) IV Continuous <Continuous>  lidocaine   Patch 1 Patch Transdermal daily  meropenem  IVPB 1000 milliGRAM(s) IV Intermittent every 12 hours  multivitamin 1 Tablet(s) Oral daily  pantoprazole    Tablet 40 milliGRAM(s) Oral before breakfast  saccharomyces boulardii 250 milliGRAM(s) Oral two times a day  thiamine 100 milliGRAM(s) Oral daily  traMADol 50 milliGRAM(s) Oral every 8 hours    MEDICATIONS  (PRN):  ALPRAZolam 0.25 milliGRAM(s) Oral every 8 hours PRN anxiety  aluminum hydroxide/magnesium hydroxide/simethicone Suspension 30 milliLiter(s) Oral every 4 hours PRN Dyspepsia  heparin  Injectable 4000 Unit(s) IV Push every 6 hours PRN For aPTT less than 40  heparin  Injectable 2000 Unit(s) IV Push every 6 hours PRN For aPTT between 40 - 57  HYDROmorphone   Tablet 4 milliGRAM(s) Oral every 6 hours PRN Severe Pain (7 - 10)  HYDROmorphone   Tablet 2 milliGRAM(s) Oral every 6 hours PRN Moderate Pain (4 - 6)  methocarbamol 500 milliGRAM(s) Oral every 6 hours PRN Muscle Spasm  ondansetron Injectable 4 milliGRAM(s) IV Push every 4 hours PRN Nausea and/or Vomiting      LABS:                          10.1   21.14 )-----------( 640      ( 09 Dec 2019 03:08 )             33.4     12-09    138  |  100  |  13.0  ----------------------------<  87  3.9   |  21.0<L>  |  1.36<H>    Ca    8.5<L>      09 Dec 2019 03:08  Mg     1.6     12-09    TPro  6.1<L>  /  Alb  2.1<L>  /  TBili  0.2<L>  /  DBili  0.1  /  AST  20  /  ALT  12  /  AlkPhos  244<H>  12-09    PTT - ( 09 Dec 2019 03:08 )  PTT:70.9 sec    Prolactin, Serum (12.06.19 @ 16:32)    Prolactin, Serum: 40.5 ng/mL    Lipid Profile in AM (12.09.19 @ 03:08)    Total Cholesterol/HDL Ratio Measurement: 3.0 Ratio    Cholesterol, Serum: 94 mg/dL    Triglycerides, Serum: 167 mg/dL    HDL Cholesterol, Serum: 37: HDL Levels >/= 60 mg/dL are considered beneficial and a "negative" risk  factor.  Effective 08/15/2018: New reference range and interpretive comment. mg/dL  Amylase, Serum Total (12.09.19 @ 03:08)    Amylase, Serum Total: 270 U/L      Direct LDL: 24: LDL Cholesterol (mg/dL) --- Interpretive Comment (for adults 18 and over)  Optimal LDL Level may vary based on clinical situation  Below 70                   Ideal for people at very high risk of heart  disease  Below 100                  Ideal for people at risk of heart disease  100 - 129                    [place]  130 - 159                    Borderline high  160 - 189                    High  190 and Above           Very high mg/dL    Thyroid Stimulating Hormone, Serum (12.05.19 @ 19:52)    Thyroid Stimulating Hormone, Serum: 2.02 uIU/mL    Lipase, Serum (12.09.19 @ 03:08)    Lipase, Serum: 2104 U/LCancer Antigen, 125 (12.08.19 @ 03:20)    Cancer Antigen, 125: 231: Method: Roche Electrochemiluminescence Immuno Assay  Values obtained with different assay methods or kits cannot be  used interchangeably.  Results cannot be interpreted as absolute evidence of the presence  or absence of malignant disease. U/mL        Culture - Urine (collected 12-04-19 @ 10:24)  Source: .Urine  Final Report (12-05-19 @ 11:51):    No growth    Culture - Other (collected 12-03-19 @ 14:03)  Source: .Other chest tube insertion site (pus/purulent)  Final Report (12-05-19 @ 09:15):    No growth at 2 days.    Culture - Blood (collected 12-03-19 @ 13:30)  Source: .Blood  Final Report (12-08-19 @ 15:00):    No growth at 5 days.    Culture - Blood (collected 12-03-19 @ 13:30)  Source: .Blood  Final Report (12-08-19 @ 15:00):    No growth at 5 days.    Culture - Acid Fast - Tissue w/Smear (collected 11-28-19 @ 20:40)  Source: .Tissue chest wound    Culture - Fungal, Tissue (collected 11-27-19 @ 20:12)  Source: .Tissue chest wound - Fungal culture    Culture - Tissue with Gram Stain (collected 11-27-19 @ 20:12)  Source: .Tissue chest wound  Gram Stain (11-27-19 @ 21:27):    Few White blood cells    No organisms seen  Final Report (12-02-19 @ 16:00):    No growth at 5 days.      Cancer Antigen, GI Ca 19-9 (11.26.19 @ 18:05)    Cancer Antigen, GI Ca 19-9: 31: Method: Roche Electrochemiluminescence Immuno Assay  Values obtained with different assay methods or kits cannot be  used interchangeably.  Results cannot be interpreted as absolute evidence of the presence  or absence of malignant disease. U/mL      RADIOLOGY & ADDITIONAL TESTS:    < from: Xray Abdomen 1 View PORTABLE -Routine (12.09.19 @ 05:40) >     EXAM:  XR ABDOMEN PORTABLE ROUTINE 1V                          PROCEDURE DATE:  12/09/2019          INTERPRETATION:  Abdominal pain.  AP portable supine  Nonspecific nonobstructive bowel gas pattern. Residual contrast in the   colon. No opaque calculi.    Impression: As above        JAMAL MONTOYA M.D., ATTENDING RADIOLOGIST  This document has been electronically signed. Dec  9 2019  9:59AM        < end of copied text >    < from: US Renal (12.08.19 @ 02:04) >     EXAM:  US KIDNEY(S)                          PROCEDURE DATE:  12/08/2019          INTERPRETATION:  CLINICAL INFORMATION: Persistent elevated creatinine.    COMPARISON: CT chest/abdomen/pelvis 12/4/2019 and CT chest 12/7/2019    TECHNIQUE: Sonography of the kidneys.     FINDINGS:    Right kidney:  10.6 cm. No renal mass, hydronephrosis or calculi.    Left kidney:  11.6 cm. No renal mass, hydronephrosis or calculi.    IMPRESSION:     No hydronephrosis.      AMIE RAMIREZ   This document has been electronically signed. Dec  8 2019  8:20AM        < end of copied text >    < from: US Duplex Venous Lower Ext Complete, Bilateral (12.08.19 @ 02:01) >    INTERPRETATION:  CLINICAL INFORMATION: Right greater than left leg   swelling.    COMPARISON: Right lower extremity venous Doppler 12/6/2019    TECHNIQUE: Duplex sonography of the BILATERAL LOWER extremity veins with   color and spectral Doppler, with and without compression.      FINDINGS:    There is normal compressibility of the bilateral common femoral, femoral   and popliteal veins.     Doppler examination shows normal spontaneous and phasic flow.    No calf vein thrombosis is detected.    Subcutaneous edema is seen in both calves.    IMPRESSION:     No evidence of deep venous thrombosis in either lower extremity.        AMIE RAMIREZ   This document has been electronically signed. Dec  8 2019  8:16AM    < end of copied text >    < from: US Duplex Arterial Lower Ext Compl, Bilateral (12.08.19 @ 01:55) >     EXAM:  US DPLX LWR EXT ARTS COMPL BI                          PROCEDURE DATE:  12/08/2019          INTERPRETATION:  US LOWER EXTREMITY ARTERIAL DUPLEX COMPLETE BILATERAL    HISTORY:  Right greater than left lower extremity edema, pallor    Comparison: CT chest/abdomen/pelvis 12/4/2019    Real-time sonography of the bilateral lower extremity arterial system was   performed using a high-resolution linear array transducer and including   color and spectral Doppler. The imaged arteries including the distal   external iliac, common femoral, proximal profunda femoral, superficial   femoral, popliteal, posterior tibial, anterior tibial, peroneal, and   dorsalis pedis arteries.    No significant plaque or narrowing seen on the gray scale or color   imaging. Biphasic waveforms are seen throughout the majority of the   imaged lower extremity arteries.    Subcutaneous edema is seen in both calves.    There are elevated peak systolic velocities in the dorsalis pedis   arteries of uncertain etiology, possibly technical, otherwise there are   no abnormal step up of peak systolic velocities to suggest a   hemodynamically significant stenosis.      IMPRESSION:    Elevated peak systolic velocities in the dorsalis pedis arteries,   possibly technical, otherwise no evidence of a hemodynamically   significant stenosis in the visualized lower extremity arteries.    Biphasic waveforms in the majority of the imaged lower extremity arteries   of uncertain etiology.                AMIE RAMIREZ   This document has been electronically signed. Dec  8 2019  8:30AM        < end of copied text >      < from: Xray Esophagram (12.07.19 @ 13:51) >     EXAM:  ESOPHOGRAM                          PROCEDURE DATE:  12/07/2019          INTERPRETATION:  CLINICAL INDICATION: Assess for tracheoesophageal   fistula or perforation.     TECHNIQUE: Gastrografin Esophagram.     FINDINGS: The patient was given oral contrast (barium) by mouth.   The swallowing reflex is normal. Fluoroscopic and film examination shows   no significant abnormality of the esophagus. No perforation or   tracheoesophageal fistula.  No hiatal hernia is noted.     Impression:   Normal esophagram.      AISHWARYA BUCKLEY M.D., ATTENDING RADIOLOGIST  This document has been electronically signed. Dec  7 2019  1:59PM    < end of copied text >  < from: Xray Foot AP + Lateral, Right (11.26.19 @ 05:29) >     EXAM:  FOOT 2VIEWS RT                          PROCEDURE DATE:  11/26/2019          INTERPRETATION:  RIGHT foot     CLINICAL INFORMATION:Injury with  Pain.    TECHNIQUE: AP,lateral and oblique views.    FINDINGS:    The bones and joint spaces are preserved.  There are no fractures or dislocations.  The soft tissues are normal.     IMPRESSION:    No acute radiographic osseous pathology.    If pain persist despite conservative therapy and soft tissue internal   derangement or occult fracture is clinically suspected follow-up MRI   recommended.      AISHWARYA BUCKLEY M.D., ATTENDING RADIOLOGIST  This document has been electronically signed. Nov 26 2019  7:14AM        < end of copied text >                  REVIEW OF SYSTEMS:    As above , all other systems reviewed and are negative .     Vital Signs Last 24 Hrs  T(C): 36.7 (09 Dec 2019 07:00), Max: 36.8 (08 Dec 2019 20:59)  T(F): 98.1 (09 Dec 2019 07:00), Max: 98.3 (09 Dec 2019 00:30)  HR: 108 (09 Dec 2019 12:00) (81 - 126)  BP: 135/90 (09 Dec 2019 12:00) (98/57 - 135/90)  BP(mean): 107 (09 Dec 2019 12:00) (71 - 107)  RR: 16 (09 Dec 2019 12:00) (14 - 45)  SpO2: 97% (09 Dec 2019 12:00) (95% - 100%)    PHYSICAL EXAM:    GENERAL: NAD, well-groomed, well-developed  HEAD:  Atraumatic, Normocephalic  EYES: EOMI, PERRLA, conjunctiva and sclera clear  NECK: Supple, No JVD, Normal thyroid  NERVOUS SYSTEM:  Alert & Oriented X3, no focal deficit  CHEST/LUNG: R base b/s decreased , no  rales, rhonchi, wheezing, or rubs  HEART: Regular rate and rhythm; No murmurs, rubs, or gallops  ABDOMEN: Soft, epigastric tenderness , no rebound or guarding  Nondistended; Bowel sounds present  EXTREMITIES:  2+ Peripheral Pulses, No clubbing, cyanosis, or edema  LYMPH: No lymphadenopathy noted  SKIN: No rashes or lesions

## 2019-12-09 NOTE — PROGRESS NOTE ADULT - SUBJECTIVE AND OBJECTIVE BOX
Interventional Radiology Brief Post-Procedure Note    Procedure: Right chest tube placement and right arm double lumen PICC placement.    Operators: Holden Lincoln MD    Anesthesia (type): Local lidocaine.    Contrast: None.    EBL: Minimal.     Findings/Follow up Plan of Care: Successful 8 Congolese pigtail chest tube placement in right anterior hydropneumothorax. Successful double lumen PICC placement with tip at the cavoatrial junction.     Specimens Removed: None.     Implants: 8 Congolese pigtail in anterior hydropneumthorax. Double lumen PICC measuring 31 cm with tip at cavoatrial junction.     Complications: No immediate complications.     Condition/Disposition: Back to inpatient room.     Please call Interventional Radiology x [phone] with any questions, concerns, or issues.

## 2019-12-09 NOTE — PHYSICAL THERAPY INITIAL EVALUATION ADULT - RANGE OF MOTION EXAMINATION, REHAB EVAL
no ROM deficits were identified
bilateral upper extremity ROM was WFL (within functional limits)/bilateral lower extremity ROM was WFL (within functional limits)
bilateral upper extremity ROM was WFL (within functional limits)/bilateral lower extremity ROM was WFL (within functional limits)

## 2019-12-09 NOTE — PROGRESS NOTE ADULT - ASSESSMENT
This 34 y.o. F who was admitted to [facility] 11/22 with weakness,   found with large right pleural effusion   s/p chest tube drainage    Hyponatremia - resolving  WBC elevation   alpha hemolytic strep infection  multi focal PNA  infected pleural effusion - empyema  Pancreatic cyst  Liver lesions  chest wall drainage  Acute renal failure - improving    - Legionella Ag from [facility] 11/24/19 - negative, off azithromycin  - remainder of cultures from [facility] are negative  s/p VATS 11/27/19  - cultures remain negative  - WBC elevation   generally improving    - Chest incision with copious drainage  workup in process; Barium swallow showed normal esophogram       ACUTE renal failure, slowly improving  - watch renal function closely  Creatinine, Serum: 1.36 mg/dL (12-09-19 @ 03:08)  Creatinine, Serum: 1.98 mg/dL (12-07-19 @ 23:07)  Creatinine, Serum: 2.29 mg/dL (12-07-19 @ 05:42)  Creatinine, Serum: 2.42 mg/dL (12-06-19 @ 11:13)  Creatinine, Serum: 2.62 mg/dL (12-05-19 @ 19:52)  Creatinine, Serum: 2.17 mg/dL (12-05-19 @ 10:37)  Creatinine, Serum: 2.11 mg/dL (12-05-19 @ 06:17)    Regarding liver lesions  - cultures remain negative.   - possible repeat MRCP when able to do so  continue Merrem 1 gram Q 12H       - follow up all outstanding cultures  - trend temperature and WBC curve  - repeat cultures from blood and all sources if febrile.

## 2019-12-09 NOTE — PHYSICAL THERAPY INITIAL EVALUATION ADULT - ADDITIONAL COMMENTS
As per previous PT jatinal: Pt lives with boyfriend and mother "when she is not in rehab" (unknown type of rehab) in a two story home with 1 step to the front door, and 11 steps to the second floor where her bedroom is, both with hand rails going up. 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 Pt states to have never used or owned any DME.

## 2019-12-09 NOTE — CONSULT NOTE ADULT - SUBJECTIVE AND OBJECTIVE BOX
HPI:  Comes in to [facility]  11/22 with weakness, 25lb weight loss of several months, flu like symptoms, on work up found to have a large right pleural effusion.  Pulmonology contact for thoracentesis, 1.5 L of dark serous fluid was removed.  Chest tube placed shortly thereafter, 3 liters was removed at that point in time. Patient had some abnormalities in their lab work, found to have a leukocytosis with wbc of 40, platelet count of 793, Na 120, chloride 73, lactate of 4.7, amylase 268,  and CTA imaging also revealed a 2.1cm partially cystic mass in the body/tail of the pancreas with possible upstream ductal dilatation. 11/23 patient had CT chest/abdomen/pelvis and was also found to have a clot in the right IJ and was started on a heparin gtt. Patient does state that she has had a recent cough, and complains of right sided neck pain. 11/14 Patient transferred to [facility]. (25 Nov 2019 00:10)  pt had MRCP today   Prolactin drawn for unclear reasons 2-3 days ago- found to be 40 .  Pt without symptoms - no galactorrhea   Menses  are monthly but at different days       PAST MEDICAL & SURGICAL HISTORY:  EtOH abuse   None   has not seen doctor   FAMILY HISTORY:  FAther HTN   esophageal CA   Mother - schizophrenia  brotehr schizophrenia   GF  DM   Soc HX  + etoh use heavy - quit 2 months ago   No  drugs  + smoker  + using vape products           REVIEW OF SYSTEMS:    Constitutional: No fever, no chills, + weight loss +HA     Eyes: No eye swelling,no  blurry vision, no redness, no loss of vision. no double vision no tunnel vision   Neck: No neck pain, no change in voice.    Lungs: No shortness of breath, no wheezing, no cough    CV: No chest pain, no palpitations, no pain with walking.    GI: No nausea, no vomiting, no constipation, no diarrhea, no abdominal pain    : No urinary frequency, no blood in urine, no urinary burning, no difficulty voiding.    Musculoskeletal: No muscle pain, no joint pain, no swelling.    Skin: No rash, no infections.    Neurologic: No headaches, no weakness, no burning or pain in feet, no tremor.    Endocrine: No heat intolerance, no cold intolerance, no increased sweating, no shakiness between meals.    Psych: No depression, no anxiety, no trouble concentrating    MEDICATIONS  (STANDING):  amitriptyline 10 milliGRAM(s) Oral at bedtime  ascorbic acid 500 milliGRAM(s) Oral daily  cholecalciferol 2000 Unit(s) Oral daily  ferrous    sulfate 325 milliGRAM(s) Oral three times a day  folic acid 1 milliGRAM(s) Oral daily  gabapentin 100 milliGRAM(s) Oral three times a day  heparin  Infusion. 1300 Unit(s)/Hr (13 mL/Hr) IV Continuous <Continuous>  lidocaine   Patch 1 Patch Transdermal daily  meropenem  IVPB 1000 milliGRAM(s) IV Intermittent every 12 hours  multivitamin 1 Tablet(s) Oral daily  pantoprazole    Tablet 40 milliGRAM(s) Oral before breakfast  saccharomyces boulardii 250 milliGRAM(s) Oral two times a day  thiamine 100 milliGRAM(s) Oral daily  traMADol 50 milliGRAM(s) Oral every 8 hours    MEDICATIONS  (PRN):  ALPRAZolam 0.25 milliGRAM(s) Oral every 8 hours PRN anxiety  aluminum hydroxide/magnesium hydroxide/simethicone Suspension 30 milliLiter(s) Oral every 4 hours PRN Dyspepsia  heparin  Injectable 4000 Unit(s) IV Push every 6 hours PRN For aPTT less than 40  heparin  Injectable 2000 Unit(s) IV Push every 6 hours PRN For aPTT between 40 - 57  HYDROmorphone   Tablet 4 milliGRAM(s) Oral every 6 hours PRN Severe Pain (7 - 10)  HYDROmorphone   Tablet 2 milliGRAM(s) Oral every 6 hours PRN Moderate Pain (4 - 6)  methocarbamol 500 milliGRAM(s) Oral every 6 hours PRN Muscle Spasm  ondansetron Injectable 4 milliGRAM(s) IV Push every 4 hours PRN Nausea and/or Vomiting      Allergies    No Known Allergies  bee stings       Intolerances    Ensure TID (Unknown)        CAPILLARY BLOOD GLUCOSE          PHYSICAL EXAM:    Vital Signs Last 24 Hrs  T(C): 37.2 (09 Dec 2019 19:01), Max: 37.2 (09 Dec 2019 19:01)  T(F): 99 (09 Dec 2019 19:01), Max: 99 (09 Dec 2019 19:01)  HR: 109 (09 Dec 2019 22:00) (91 - 126)  BP: 132/93 (09 Dec 2019 22:00) (98/57 - 135/90)  BP(mean): 108 (09 Dec 2019 22:00) (71 - 108)  RR: 21 (09 Dec 2019 20:15) (16 - 22)  SpO2: 94% (09 Dec 2019 22:00) (94% - 100%)    General appearance: Well developed, well nourished.    Eyes: Pupils equal and reactive to light. EOM full. No exophthalmos.    Neck: Trachea midline. No thyroid enlargement.    Lungs: Normal respiratory excursion. Lungs clear.    CV: Regular cardiac rhythm. No murmur. Carotid and pedal pulses intact.  Abdomen: Soft, non tender, no organomegaly or mass.    Musculoskeletal: No cyanosis, clubbing, or edema. No pedal lesions.    Skin: Warm and moist. No rash. No acanthosis.    Neuro: Cranial nerves intact. Normal motor and sensory function. DTR's normal.    Psych: Normal affect, good judgement.            LABS:                        10.1   21.14 )-----------( 640      ( 09 Dec 2019 03:08 )             33.4     12-09    138  |  100  |  13.0  ----------------------------<  87  3.9   |  21.0<L>  |  1.36<H>    Ca    8.5<L>      09 Dec 2019 03:08  Mg     1.6     12-09    TPro  6.1<L>  /  Alb  2.1<L>  /  TBili  0.2<L>  /  DBili  0.1  /  AST  20  /  ALT  12  /  AlkPhos  244<H>  12-09      LIVER FUNCTIONS - ( 09 Dec 2019 03:08 )  Alb: 2.1 g/dL / Pro: 6.1 g/dL / ALK PHOS: 244 U/L / ALT: 12 U/L / AST: 20 U/L / GGT: x             T4, Serum: 5.7 ug/dL (12-04 @ 06:46)      Thyroid Stimulating Hormone, Serum (12.05.19 @ 19:52)    Thyroid Stimulating Hormone, Serum: 2.02 uIU/mL    Triiodothyronine, Total (T3 Total) (11.26.19 @ 05:17)    Triiodothyronine, Total (T3 Total): 70 ng/dL    T4, Serum: 5.7 ug/dL (12.04.19 @ 06:46)    Prolactin, Serum (12.06.19 @ 16:32)    Prolactin, Serum: 40.5 ng/mL        RADIOLOGY & ADDITIONAL STUDIES:

## 2019-12-09 NOTE — PHYSICAL THERAPY INITIAL EVALUATION ADULT - PATIENT/FAMILY/SIGNIFICANT OTHER GOALS STATEMENT, PT EVAL
Pt would like to return home without pain
Pt wants to go home
Pt wants to go home and the pain to get better

## 2019-12-09 NOTE — PROGRESS NOTE ADULT - ASSESSMENT
34 year old female who transferred from [facility] [place] [facility] to [facility] on 11/22 for complaints of weakness, 25 pound weight loss & flu like symptoms & found to have a large right pleural effusion. Pt. has a PMH of empyema with group A hemolytic strep & has 4-5 drinks of alcohol per day.  On 11/23 CT of the chest/abd/pelvis indicated a clot in the Right IJ. Pt. on heparin bridge to coumadin. CT abdomen shows cystic mass in body/tail of pancreas, now concern for postop ileus.  Post-op course:  Pt s/p right VATS decort & washout debridement of CT site & VAC placement on 11/27/19.  Wound vac was placed and removed today due to increased drainage and leakage around . WBC elevated , abx adjusted by ID , vanco d/c yesterday due to BRAD and high level , procalcitonin high . Was planned for VATS  debridement today ( 12/6) but cancelled . Hg dropped to 7.5 and is getting PRBC ,    MRI with pancreatic cyst but poor study . Followed by CTS ,  ID , Hem / Psych , seen by GI . CT chest , abd / pelvis noted , ? esophageal fluid collection , ? pancreatitis, noted with RONAK likely due to Vanco vs CHIQUITA , on ivf.   Today ( 12/9) , patient seen , c/o some b/l feet pain , frequent BM , no sob , no cp , NPO .            Problem/Plan - 1:  ·  Problem: Pleural effusion on right.  Plan: s/p Right VATS decortication & washout on 11/27/19. Debridement of CT site & VAC placement , vac removed due to increased drainage ,  as per primary team . Zosyn / Vanco d/c , started on Meropenem , cultures sent and so for negative, ID is following, WBC high , patient is tachycardic ,   Empyema , procalcitonin high - Sepsis of unknown organism, culture at [facility] +ve for strep as per ID, will continue with current antibiotics, repeat CT chest done and reviewed, will monitor closely, her white count is trending down.   Chaim tube removed , planned for newe chest tube PICC line today        Problem/Plan - 2:  ·  Problem: Empyema.  Plan: s/p Chest tube removal today, continued with iv abx as per ID, further plan as per CT surgery.        Problem/Plan - 3:  ·  Problem: Internal jugular vein thrombosis, right.  Plan: On Heparin drip , Coumadin on hold for planned procedure, Hematology appreciated.      Problem/Plan - 5:  ·  Problem: Anemia likely multifactorial ; Due to anemia of chronic dz , multiple blood drawings , dilutional  , patient is tachycardic , pale  Plan: s/p PRBCS 3 days ago, Hb is stable, will monitor CBC.        Problem/Plan -6:  ·  Problem: Hyponatremia.  Plan: as per renal , sodium level is ok now.      Problem/Plan - 7:  ·  Problem: Alcohol abuse.  Plan: Monitor for withdrawal, Continue folic, MVI, thiamine.     Problem / Plan -8 : Abdominal tenderness , CT noted, Lipase increased , ? pancreatitis , esophageal fluid collection, Esophagogram is normal, CT scan shows thickening, MRCP pending , GI re eval pending     Problem / Plan - 9; Frequent loose stools x 1 month - will hold on laxatives for now , follow stool cultures , O and P , GI PCR ,r/o  C diff , less frequent off Miralax     Problem / Plan -10 : B/L feet pain , pain all over her body, today R foot swollen , u/s negative for dvt , ? cause , third spacing ,  swelling decreasing , will observe     Problem / Plan -11 ; Hematuria , with blood clots , UTI r/o  - resolved , likely due to high inr     Problem / Plan -12 : Multifocal PNA -  treated with abx. May be due to use of Vapor     Problem /Plan - 13 : Hypoalbuminemia , prealbumin noted _ 4 , Acute severe protein calorie malnutrition -  Nutrition eval noted ,supplement with high protein shakes.     Problem / Plan - 14 : RONAK - likely multifactorial RONAK; ATN from Vancomycin and ? CHIQUITA , IV fluids are third spacing due to low albumin, would hold off fluids, renal noted and appreciated , follow BMP, I/O and daily weight, creatinine is improving, fluid were held as she was third spacing.     Lower ext edema: Right>left: Doppler US of the lower ext repeat is negative, she has intact pulses, could be from third spacing from the low albumin on in setting of volume over load due to renal failure, with hold IV fluid and keeping the legs up edema is better ,ECHO done on 11 /25 , EF - nl     Problem / Plan - Hyperprolactinemia - consider Endo     D/W patient / CT surgery PA.

## 2019-12-09 NOTE — PROGRESS NOTE ADULT - SUBJECTIVE AND OBJECTIVE BOX
Subjective: Patient sitting up in bed in no acute distress asking questions about her procedures scheduled for later today. Patient writing down notes in a notepad. Says it's "annoying" that she had to stop eating at midnight, but is aware of the plan. All questions answered in detail about the two procedures (PICC line placement and pigtail catheter placement to right anterior chest). No c/o incisional pain at this time. Denies CP, SOB, palpitations, N/V,D, or any other complaints at this time.    T(C): 36.8 (12-09-19 @ 00:30), Max: 36.9 (12-08-19 @ 04:00)  HR: 94 (12-09-19 @ 00:00) (81 - 122)  BP: 110/69 (12-09-19 @ 00:00) (99/66 - 140/86)  RR: 22 (12-09-19 @ 00:00) (14 - 45)  SpO2: 97% (12-09-19 @ 00:00) (93% - 100%)      I&O's Detail    07 Dec 2019 07:01  -  08 Dec 2019 07:00  --------------------------------------------------------  IN:    heparin  Infusion.: 80 mL    heparin  Infusion.: 24 mL    Oral Fluid: 720 mL    Solution: 50 mL  Total IN: 874 mL    OUT:    Other: 225 mL  Total OUT: 225 mL    Total NET: 649 mL      08 Dec 2019 07:01  -  09 Dec 2019 00:59  --------------------------------------------------------  IN:    heparin  Infusion.: 140 mL    heparin  Infusion.: 52 mL    Oral Fluid: 720 mL    Solution: 50 mL  Total IN: 962 mL    OUT:  Total OUT: 0 mL    Total NET: 962 mL        LABS: All Lab data reviewed and analyzed                        8.9    17.07 )-----------( 610      ( 08 Dec 2019 18:28 )             27.3     12-07    139  |  105  |  15.0  ----------------------------<  94  4.4   |  20.0<L>  |  1.98<H>    Ca    8.3<L>      07 Dec 2019 23:07  Mg     2.2     12-07    TPro  5.1<L>  /  Alb  1.7<L>  /  TBili  0.3<L>  /  DBili  0.1  /  AST  20  /  ALT  12  /  AlkPhos  242<H>  12-07    PT/INR - ( 07 Dec 2019 05:42 )   PT: 20.6 sec;   INR: 1.76 ratio         PTT - ( 08 Dec 2019 18:28 )  PTT:91.5 sec        RADIOLOGY: - Reviewed and analyzed     < from: Xray Chest 1 View- PORTABLE-Urgent (12.08.19 @ 02:01) >  FINDINGS:   The lungs  show residual interstitial opacities in the RIGHT perihilar lung base. On prior CT scan 12/7/2019 anterior extrapleural air collection /pneumothorax  not clearly evident on current AP chest radiograph. LEFT lung parenchyma clear. RIGHT lateral and basilar pleural thickening noted. The heart and mediastinum are within normal limits. Visualized osseous structures are intact.  IMPRESSION: No significant change.  < end of copied text >    < from: CT Chest No Cont (12.07.19 @ 13:51) >  Right hydropneumothorax again noted. The previously identified loculated fluid and air anteriorly has demonstrated resolution of the fluid component in this region with air now filling the same size space.  Skin defect with subcutaneous air and complex fluid right lateral chest wall again noted. New skin defect right lower anterolateral chest wall with air tract to the right pleural space now noted. Slight increase in fluid tracking in the right major fissure. Areas of atelectasis in the right lung and opacities in the left upper lobe are unchanged. Thickened distal esophagus again noted may be on the basis of esophagitis. Persistent renal nephrograms bilaterally may be seen in the setting of acute tubular necrosis given recent intravenous contrast administration. Correlation with serum creatinine recommended.  < end of copied text >        HOSPITAL MEDICATIONS: All medications reviewed and analyzed  MEDICATIONS  (STANDING):  amitriptyline 10 milliGRAM(s) Oral at bedtime  ascorbic acid 500 milliGRAM(s) Oral daily  ferrous    sulfate 325 milliGRAM(s) Oral three times a day  folic acid 1 milliGRAM(s) Oral daily  gabapentin 100 milliGRAM(s) Oral three times a day  heparin  Infusion. 1300 Unit(s)/Hr (13 mL/Hr) IV Continuous   lidocaine   Patch 1 Patch Transdermal daily  meropenem  IVPB 1000 milliGRAM(s) IV Intermittent every 12 hours  multivitamin 1 Tablet(s) Oral daily  pantoprazole    Tablet 40 milliGRAM(s) Oral before breakfast  saccharomyces boulardii 250 milliGRAM(s) Oral two times a day  thiamine 100 milliGRAM(s) Oral daily  traMADol 50 milliGRAM(s) Oral every 8 hours    MEDICATIONS  (PRN):  ALPRAZolam 0.25 milliGRAM(s) Oral every 8 hours PRN anxiety  aluminum hydroxide/magnesium hydroxide/simethicone Suspension 30 milliLiter(s) Oral every 4 hours PRN Dyspepsia  heparin  Injectable 4000 Unit(s) IV Push every 6 hours PRN For aPTT less than 40  heparin  Injectable 2000 Unit(s) IV Push every 6 hours PRN For aPTT between 40 - 57  HYDROmorphone   Tablet 4 milliGRAM(s) Oral every 6 hours PRN Severe Pain (7 - 10)  HYDROmorphone   Tablet 2 milliGRAM(s) Oral every 6 hours PRN Moderate Pain (4 - 6)  methocarbamol 500 milliGRAM(s) Oral every 6 hours PRN Muscle Spasm  ondansetron Injectable 4 milliGRAM(s) IV Push every 4 hours PRN Nausea and/or Vomiting        Physical Exam  General: Sitting up in bed, NAD  Neurology: Awake, nonfocal, THACKER x 4  Eyes: Scleras clear, PERRLA/ EOMI, Gross vision intact  ENT: Gross hearing intact, grossly patent pharynx, no stridor  Neck: Neck supple, trachea midline, No JVD  Respiratory: Decreased BSs right base  CV: RRR, S1S2, no murmurs, rubs or gallops  Abdominal: Soft, NT, ND  Extremities: Warm, R foot scant-+1 pitting edema>left, FROM of toes/ankles, no numbness/tingling noted, pulses b/l by doppler, no erythema  3 R lateral chest incisions, 1 with suture in place, 2 covered with wound vac with air tight seal. Subjective: Patient sitting up in bed in no acute distress asking questions about her procedures scheduled for later today. Patient writing down notes in a notepad. Says it's "annoying" that she had to stop eating at midnight, but is aware of the plan. All questions answered in detail about the two procedures (PICC line placement and pigtail catheter placement to right anterior chest). No c/o incisional pain at this time. Denies CP, SOB, palpitations, N/V,D, or any other complaints at this time.    HPI:  Comes in to [facility] [place]  11/22 with weakness, 25lb weight loss of several months, flu like symptoms, on work up found to have a large right pleural effusion.  Pulmonology contact for thoracentesis, 1.5 L of dark serous fluid was removed.  Chest tube placed shortly thereafter, 3 liters was removed at that point in time. Patient had some abnormalities in their lab work, found to have a leukocytosis with wbc of 40, platelet count of 793, Na 120, chloride 73, lactate of 4.7, amylase 268,  and CTA imaging also revealed a 2.1cm partially cystic mass in the body/tail of the pancreas with possible upstream ductal dilatation. 11/23 patient had CT chest/abdomen/pelvis and was also found to have a clot in the right IJ and was started on a heparin gtt. Patient does state that she has had a recent cough, and complains of right sided neck pain. 11/14 Patient transferred to [facility]. (25 Nov 2019 00:10)    PAST MEDICAL & SURGICAL HISTORY:  Smoker  Alcohol abuse    SOCIAL HISTORY:  Lives with boyfriend in an apartment with 2 dogs. Helps to care for her disabled mother. +Smoker. +Drinks wine 4-5x/day. Denies drug use/abuse.     Vitals:  T(C): 36.8 (12-09-19 @ 00:30), Max: 36.9 (12-08-19 @ 04:00)  HR: 94 (12-09-19 @ 00:00) (81 - 122)  BP: 110/69 (12-09-19 @ 00:00) (99/66 - 140/86)  RR: 22 (12-09-19 @ 00:00) (14 - 45)  SpO2: 97% (12-09-19 @ 00:00) (93% - 100%)      I&O's Detail    07 Dec 2019 07:01  -  08 Dec 2019 07:00  --------------------------------------------------------  IN:    heparin  Infusion.: 80 mL    heparin  Infusion.: 24 mL    Oral Fluid: 720 mL    Solution: 50 mL  Total IN: 874 mL    OUT:    Other: 225 mL  Total OUT: 225 mL    Total NET: 649 mL      08 Dec 2019 07:01  -  09 Dec 2019 00:59  --------------------------------------------------------  IN:    heparin  Infusion.: 140 mL    heparin  Infusion.: 52 mL    Oral Fluid: 720 mL    Solution: 50 mL  Total IN: 962 mL    OUT:  Total OUT: 0 mL    Total NET: 962 mL        LABS: All Lab data reviewed and analyzed                        8.9    17.07 )-----------( 610      ( 08 Dec 2019 18:28 )             27.3     12-07    139  |  105  |  15.0  ----------------------------<  94  4.4   |  20.0<L>  |  1.98<H>    Ca    8.3<L>      07 Dec 2019 23:07  Mg     2.2     12-07    TPro  5.1<L>  /  Alb  1.7<L>  /  TBili  0.3<L>  /  DBili  0.1  /  AST  20  /  ALT  12  /  AlkPhos  242<H>  12-07    PT/INR - ( 07 Dec 2019 05:42 )   PT: 20.6 sec;   INR: 1.76 ratio         PTT - ( 08 Dec 2019 18:28 )  PTT:91.5 sec        RADIOLOGY: - Reviewed and analyzed     < from: Xray Chest 1 View- PORTABLE-Urgent (12.08.19 @ 02:01) >  FINDINGS:   The lungs  show residual interstitial opacities in the RIGHT perihilar lung base. On prior CT scan 12/7/2019 anterior extrapleural air collection /pneumothorax  not clearly evident on current AP chest radiograph. LEFT lung parenchyma clear. RIGHT lateral and basilar pleural thickening noted. The heart and mediastinum are within normal limits. Visualized osseous structures are intact.  IMPRESSION: No significant change.  < end of copied text >    < from: CT Chest No Cont (12.07.19 @ 13:51) >  Right hydropneumothorax again noted. The previously identified loculated fluid and air anteriorly has demonstrated resolution of the fluid component in this region with air now filling the same size space.  Skin defect with subcutaneous air and complex fluid right lateral chest wall again noted. New skin defect right lower anterolateral chest wall with air tract to the right pleural space now noted. Slight increase in fluid tracking in the right major fissure. Areas of atelectasis in the right lung and opacities in the left upper lobe are unchanged. Thickened distal esophagus again noted may be on the basis of esophagitis. Persistent renal nephrograms bilaterally may be seen in the setting of acute tubular necrosis given recent intravenous contrast administration. Correlation with serum creatinine recommended.  < end of copied text >        HOSPITAL MEDICATIONS: All medications reviewed and analyzed  MEDICATIONS  (STANDING):  amitriptyline 10 milliGRAM(s) Oral at bedtime  ascorbic acid 500 milliGRAM(s) Oral daily  ferrous    sulfate 325 milliGRAM(s) Oral three times a day  folic acid 1 milliGRAM(s) Oral daily  gabapentin 100 milliGRAM(s) Oral three times a day  heparin  Infusion. 1300 Unit(s)/Hr (13 mL/Hr) IV Continuous   lidocaine   Patch 1 Patch Transdermal daily  meropenem  IVPB 1000 milliGRAM(s) IV Intermittent every 12 hours  multivitamin 1 Tablet(s) Oral daily  pantoprazole    Tablet 40 milliGRAM(s) Oral before breakfast  saccharomyces boulardii 250 milliGRAM(s) Oral two times a day  thiamine 100 milliGRAM(s) Oral daily  traMADol 50 milliGRAM(s) Oral every 8 hours    MEDICATIONS  (PRN):  ALPRAZolam 0.25 milliGRAM(s) Oral every 8 hours PRN anxiety  aluminum hydroxide/magnesium hydroxide/simethicone Suspension 30 milliLiter(s) Oral every 4 hours PRN Dyspepsia  heparin  Injectable 4000 Unit(s) IV Push every 6 hours PRN For aPTT less than 40  heparin  Injectable 2000 Unit(s) IV Push every 6 hours PRN For aPTT between 40 - 57  HYDROmorphone   Tablet 4 milliGRAM(s) Oral every 6 hours PRN Severe Pain (7 - 10)  HYDROmorphone   Tablet 2 milliGRAM(s) Oral every 6 hours PRN Moderate Pain (4 - 6)  methocarbamol 500 milliGRAM(s) Oral every 6 hours PRN Muscle Spasm  ondansetron Injectable 4 milliGRAM(s) IV Push every 4 hours PRN Nausea and/or Vomiting        Physical Exam  General: Sitting up in bed, NAD  Neurology: Awake, nonfocal, THACKER x 4  Eyes: Scleras clear, PERRLA/ EOMI, Gross vision intact  ENT: Gross hearing intact, grossly patent pharynx, no stridor  Neck: Neck supple, trachea midline, No JVD  Respiratory: Decreased BSs right base  CV: RRR, S1S2, no murmurs, rubs or gallops  Abdominal: Soft, NT, ND  Extremities: Warm, R foot scant-+1 pitting edema>left, FROM of toes/ankles, no numbness/tingling noted, pulses b/l by doppler, no erythema  3 R lateral chest incisions, 1 with suture in place, 2 covered with wound vac with air tight seal.

## 2019-12-09 NOTE — PROGRESS NOTE ADULT - SUBJECTIVE AND OBJECTIVE BOX
[facility] DIVISION OF KIDNEY DISEASES AND HYPERTENSION -- FOLLOW UP NOTE  --------------------------------------------------------------------------------  Chief Complaint:  RONAK, hyponatremia    24 hour events/subjective:  SCr., Continues to Improve,     PAST HISTORY  --------------------------------------------------------------------------------  No significant changes to PMH, PSH, FHx, SHx, unless otherwise noted    ALLERGIES & MEDICATIONS  --------------------------------------------------------------------------------  Allergies    No Known Allergies    Intolerances    Ensure TID (Unknown)    Standing Inpatient Medications  amitriptyline 10 milliGRAM(s) Oral at bedtime  ascorbic acid 500 milliGRAM(s) Oral daily  cholecalciferol 2000 Unit(s) Oral daily  ferrous    sulfate 325 milliGRAM(s) Oral three times a day  folic acid 1 milliGRAM(s) Oral daily  gabapentin 100 milliGRAM(s) Oral three times a day  heparin  Infusion. 1300 Unit(s)/Hr IV Continuous <Continuous>  lidocaine   Patch 1 Patch Transdermal daily  meropenem  IVPB 1000 milliGRAM(s) IV Intermittent every 12 hours  multivitamin 1 Tablet(s) Oral daily  pantoprazole    Tablet 40 milliGRAM(s) Oral before breakfast  saccharomyces boulardii 250 milliGRAM(s) Oral two times a day  thiamine 100 milliGRAM(s) Oral daily  traMADol 50 milliGRAM(s) Oral every 8 hours    PRN Inpatient Medications  ALPRAZolam 0.25 milliGRAM(s) Oral every 8 hours PRN  aluminum hydroxide/magnesium hydroxide/simethicone Suspension 30 milliLiter(s) Oral every 4 hours PRN  heparin  Injectable 4000 Unit(s) IV Push every 6 hours PRN  heparin  Injectable 2000 Unit(s) IV Push every 6 hours PRN  HYDROmorphone   Tablet 4 milliGRAM(s) Oral every 6 hours PRN  HYDROmorphone   Tablet 2 milliGRAM(s) Oral every 6 hours PRN  methocarbamol 500 milliGRAM(s) Oral every 6 hours PRN  ondansetron Injectable 4 milliGRAM(s) IV Push every 4 hours PRN      REVIEW OF SYSTEMS  --------------------------------------------------------------------------------  Gen: No weight changes, fatigue, fevers/chills, weakness  Skin: No rashes  Head/Eyes/Ears/Mouth: No headache; Normal hearing; Normal vision w/o blurriness; No sinus pain/discomfort, sore throat  Respiratory: No dyspnea, cough, wheezing, hemoptysis  CV: No chest pain, PND, orthopnea  GI: No abdominal pain, diarrhea, constipation, nausea, vomiting, melena, hematochezia  : No increased frequency, dysuria, hematuria, nocturia  MSK: No joint pain/swelling; no back pain; no edema  Neuro: No dizziness/lightheadedness, weakness, seizures, numbness, tingling  Heme: No easy bruising or bleeding  Endo: No heat/cold intolerance  Psych: No significant nervousness, anxiety, stress, depression    All other systems were reviewed and are negative, except as noted.    VITALS/PHYSICAL EXAM  --------------------------------------------------------------------------------  T(C): 36.7 (12-09-19 @ 07:00), Max: 36.8 (12-08-19 @ 20:59)  HR: 107 (12-09-19 @ 13:00) (81 - 126)  BP: 115/82 (12-09-19 @ 13:00) (98/57 - 135/90)  RR: 16 (12-09-19 @ 13:00) (14 - 45)  SpO2: 96% (12-09-19 @ 13:00) (95% - 100%)  Wt(kg): --        12-08-19 @ 07:01  -  12-09-19 @ 07:00  --------------------------------------------------------  IN: 1090 mL / OUT: 0 mL / NET: 1090 mL    12-09-19 @ 07:01  -  12-09-19 @ 14:43  --------------------------------------------------------  IN: 0 mL / OUT: 0 mL / NET: 0 mL      Physical Exam:  	Gen: NAD   	HEENT: PERRL, supple neck, clear oropharynx  	Pulm: CTA B/L  	CV: RRR, S1S2; no rub  	Back: No spinal or CVA tenderness; no sacral edema  	Abd: +BS, soft, nontender/nondistended  	: No suprapubic tenderness  	UE: Warm, FROM, no clubbing, intact strength; no edema; no asterixis  	LE: Warm, FROM, no clubbing, intact strength; no edema  	Neuro: No focal deficits, intact gait  	Psych: abnormal affect;  	Skin: Warm, without rashes  	Vascular access:    LABS/STUDIES  --------------------------------------------------------------------------------              10.1   21.14 >-----------<  640      [12-09-19 @ 03:08]              33.4     138  |  100  |  13.0  ----------------------------<  87      [12-09-19 @ 03:08]  3.9   |  21.0  |  1.36        Ca     8.5     [12-09-19 @ 03:08]      Mg     1.6     [12-09-19 @ 03:08]    TPro  6.1  /  Alb  2.1  /  TBili  0.2  /  DBili  0.1  /  AST  20  /  ALT  12  /  AlkPhos  244  [12-09-19 @ 03:08]      PTT: 70.9       [12-09-19 @ 03:08]    CK 28      [12-09-19 @ 03:08]    Creatinine Trend:  SCr 1.36 [12-09 @ 03:08]  SCr 1.98 [12-07 @ 23:07]  SCr 2.29 [12-07 @ 05:42]  SCr 2.42 [12-06 @ 11:13]  SCr 2.62 [12-05 @ 19:52]    Urinalysis - [12-05-19 @ 15:15]      Color Yellow / Appearance Clear / SG 1.005 / pH 6.0      Gluc Negative / Ketone Negative  / Bili Negative / Urobili Negative       Blood Moderate / Protein 30 / Leuk Est Negative / Nitrite Negative      RBC 6-10 / WBC 3-5 / Hyaline  / Gran  / Sq Epi  / Non Sq Epi Occasional / Bacteria Negative    Urine Creatinine 26      [12-05-19 @ 15:14]  Urine Sodium <30      [12-05-19 @ 15:14]  Urine Urea Nitrogen 95      [12-05-19 @ 23:30]  Urine Potassium 17      [12-05-19 @ 15:14]  Urine Chloride <27      [12-05-19 @ 15:14]  Urine Osmolality 116      [12-05-19 @ 15:14]    Iron 23, TIBC 104, %sat 22      [12-04-19 @ 06:46]  Vitamin D (25OH) 21.9      [12-04-19 @ 17:51]  TSH 2.02      [12-05-19 @ 19:52]  Lipid: chol 94, , HDL 37, LDL 24      [12-09-19 @ 03:08]    HIV Nonreact      [11-25-19 @ 14:08]    ROMAN: titer Negative, pattern --      [11-26-19 @ 18:05]  dsDNA <12      [11-26-19 @ 18:05]  Rheumatoid Factor <10      [11-26-19 @ 17:34]  ANCA: cANCA Negative, pANCA Negative, atypical ANCA Negative      [11-26-19 @ 18:05]

## 2019-12-09 NOTE — PROGRESS NOTE ADULT - SUBJECTIVE AND OBJECTIVE BOX
Vascular & Interventional Radiology Pre-Procedure Note    Procedure Name: Right chest tube placement and double lumen PICC.    HPI: 34y Female with small anterior pneumothorax. Chest tube placement is requested.    Allergies:   Medications (Abx/Cardiac/Anticoagulation/Blood Products)    heparin  Infusion.: 1000 Unit(s)/Hr IV Continuous (12-08 @ 05:54)  heparin  Infusion.: 1300 Unit(s)/Hr IV Continuous (12-08 @ 20:00)  heparin  Infusion.: 1200 Unit(s)/Hr IV Continuous (12-07 @ 19:36)  meropenem  IVPB: 100 mL/Hr IV Intermittent (12-09 @ 05:35)    Data:    T(C): 36.7  HR: 108  BP: 135/90  RR: 16  SpO2: 97%    -WBC 21.14 / HgB 10.1 / Hct 33.4 / Plt 640  -Na 138 / Cl 100 / BUN 13.0 / Glucose 87  -K 3.9 / CO2 21.0 / Cr 1.36  -ALT 12 / Alk Phos 244 / T.Bili 0.2  -INR1.76    Plan:   -34y Female presents for right chest tube placement and PICC placement. Procedure discussed with patient and she is agreeable to proceed.   -Risks/Benefits/alternatives explained with the patient and/or healthcare proxy and witnessed informed consent obtained.

## 2019-12-09 NOTE — PROGRESS NOTE ADULT - SUBJECTIVE AND OBJECTIVE BOX
[facility]  INFECTIOUS DISEASES AND INTERNAL MEDICINE at [place]  =======================================================  Carlos Alberto Soto MD  Diplomates American Board of Internal Medicine and Infectious Diseases  Telephone [phone]  Fax            [phone]  =======================================================    [id number]  HERACLIO MOLINA   follow up:  infected pleural effusion, empyema    s/p VATS on 11/27/19    currently NPO for possible IR drainage of effussion    =======================================================    REVIEW OF SYSTEMS:  CONSTITUTIONAL:  No Fever or chills  HEENT:  No diplopia or blurred vision.  No earache, sore throat or runny nose.  CARDIOVASCULAR:  No pressure, squeezing, strangling, tightness, heaviness or aching about the chest, neck, axilla or epigastrium.  RESPIRATORY:  No cough, shortness of breath  GASTROINTESTINAL:  No nausea, vomiting or diarrhea.  GENITOURINARY:  No dysuria, frequency or urgency. No Blood in urine  MUSCULOSKELETAL:  no joint aches, no muscle pain  SKIN:  No change in skin, hair or nails.  NEUROLOGIC:  No Headaches, seizures or weakness.  PSYCHIATRIC:  No disorder of thought or mood.  ENDOCRINE:  No heat or cold intolerance  HEMATOLOGICAL:  No easy bruising or bleeding.   =======================================================  Allergies  No Known Allergies    Antibiotics:  meropenem  IVPB 1000 milliGRAM(s) IV Intermittent every 12 hours    Other medications:  amitriptyline 10 milliGRAM(s) Oral at bedtime  ascorbic acid 500 milliGRAM(s) Oral daily  ferrous    sulfate 325 milliGRAM(s) Oral three times a day  folic acid 1 milliGRAM(s) Oral daily  gabapentin 100 milliGRAM(s) Oral three times a day  heparin  Infusion. 1300 Unit(s)/Hr IV Continuous <Continuous>  lidocaine   Patch 1 Patch Transdermal daily  multivitamin 1 Tablet(s) Oral daily  pantoprazole    Tablet 40 milliGRAM(s) Oral before breakfast  saccharomyces boulardii 250 milliGRAM(s) Oral two times a day  thiamine 100 milliGRAM(s) Oral daily  traMADol 50 milliGRAM(s) Oral every 8 hours      ======================================================  Physical Exam:  ============  T(F): 98.1 (09 Dec 2019 07:00), Max: 98.3 (09 Dec 2019 00:30)  HR: 103 (09 Dec 2019 09:00)  BP: 113/70 (09 Dec 2019 09:00)  RR: 18 (09 Dec 2019 09:00)  SpO2: 96% (09 Dec 2019 09:00) (94% - 100%)  temp max in last 48H T(F): , Max: 98.4 (12-08-19 @ 04:00)    General:  No acute distress.   Eye: Pupils are equal, round and reactive to light, Extraocular movements are intact, Normal conjunctiva.  HENT: Normocephalic, Oral mucosa is moist, No pharyngeal erythema, No sinus tenderness.  Neck: Supple, No lymphadenopathy.  Respiratory: decreased breath sounds right lung  RIGHT chest with WOUND VAC in place  Cardiovascular: Normal rate, Regular rhythm,   + edema of legs  Gastrointestinal: Soft, mild right abd tenderness  Normal bowel sounds.  Genitourinary: No costovertebral angle tenderness.  Lymphatics: No lymphadenopathy neck,   Musculoskeletal: Normal range of motion, Normal strength.  Integumentary: No rash.  Neurologic: Alert, Oriented, No focal deficits, Cranial Nerves II-XII are grossly intact.  Psychiatric: Appropriate mood & affect.    =======================================================  11/22/19 blood cx negative x 2  11/22/19 - body fluid cx with Moderate Alpha hemolytic strep x 2 sets  Alpha hemolytic strep  		[facility]	E Test  Ceftriaxone  			S (0.19)   Clindamycin 	S    Erythromycin 	S   Penicillin 	S   Vancomycin 	S     =======================================  Labs:                        10.1   21.14 )-----------( 640      ( 09 Dec 2019 03:08 )             33.4       WBC Count: 21.14 K/uL (12-09-19 @ 03:08)  WBC Count: 17.07 K/uL (12-08-19 @ 18:28)  WBC Count: 14.46 K/uL (12-07-19 @ 18:53)  WBC Count: 13.79 K/uL (12-07-19 @ 05:42)  WBC Count: 15.80 K/uL (12-06-19 @ 21:13)  WBC Count: 25.73 K/uL (12-06-19 @ 03:55)  WBC Count: 20.66 K/uL (12-05-19 @ 19:52)  WBC Count: 24.17 K/uL (12-05-19 @ 06:17)      12-09    138  |  100  |  13.0  ----------------------------<  87  3.9   |  21.0<L>  |  1.36<H>    Ca    8.5<L>      09 Dec 2019 03:08  Mg     1.6     12-09    TPro  6.1<L>  /  Alb  2.1<L>  /  TBili  0.2<L>  /  DBili  0.1  /  AST  20  /  ALT  12  /  AlkPhos  244<H>  12-09      Culture - Urine (collected 12-04-19 @ 10:24)  Source: .Urine  Final Report (12-05-19 @ 11:51):    No growth    Culture - Other (collected 12-03-19 @ 14:03)  Source: .Other chest tube insertion site (pus/purulent)  Final Report (12-05-19 @ 09:15):    No growth at 2 days.    Culture - Blood (collected 12-03-19 @ 13:30)  Source: .Blood  Final Report (12-08-19 @ 15:00):    No growth at 5 days.    Culture - Blood (collected 12-03-19 @ 13:30)  Source: .Blood  Final Report (12-08-19 @ 15:00):    No growth at 5 days.    Culture - Acid Fast - Tissue w/Smear (collected 11-28-19 @ 20:40)  Source: .Tissue chest wound    Culture - Fungal, Tissue (collected 11-27-19 @ 20:12)  Source: .Tissue chest wound - Fungal culture    Culture - Tissue with Gram Stain (collected 11-27-19 @ 20:12)  Source: .Tissue chest wound  Gram Stain (11-27-19 @ 21:27):    Few White blood cells    No organisms seen  Final Report (12-02-19 @ 16:00):    No growth at 5 days.

## 2019-12-10 LAB
ALBUMIN SERPL ELPH-MCNC: 1.9 G/DL — LOW (ref 3.3–5.2)
ALP SERPL-CCNC: 201 U/L — HIGH (ref 40–120)
ALT FLD-CCNC: 9 U/L — SIGNIFICANT CHANGE UP
AMYLASE P1 CFR SERPL: 135 U/L — HIGH (ref 36–128)
ANION GAP SERPL CALC-SCNC: 13 MMOL/L — SIGNIFICANT CHANGE UP (ref 5–17)
APTT BLD: 51.3 SEC — HIGH (ref 27.5–36.3)
APTT BLD: 64.5 SEC — HIGH (ref 27.5–36.3)
APTT BLD: 73.5 SEC — HIGH (ref 27.5–36.3)
AST SERPL-CCNC: 15 U/L — SIGNIFICANT CHANGE UP
BASOPHILS # BLD AUTO: 0.1 K/UL — SIGNIFICANT CHANGE UP (ref 0–0.2)
BASOPHILS NFR BLD AUTO: 0.5 % — SIGNIFICANT CHANGE UP (ref 0–2)
BILIRUB SERPL-MCNC: <0.2 MG/DL — LOW (ref 0.4–2)
BUN SERPL-MCNC: 9 MG/DL — SIGNIFICANT CHANGE UP (ref 8–20)
CALCIUM SERPL-MCNC: 7.7 MG/DL — LOW (ref 8.6–10.2)
CHLORIDE SERPL-SCNC: 101 MMOL/L — SIGNIFICANT CHANGE UP (ref 98–107)
CO2 SERPL-SCNC: 23 MMOL/L — SIGNIFICANT CHANGE UP (ref 22–29)
CREAT SERPL-MCNC: 1.06 MG/DL — SIGNIFICANT CHANGE UP (ref 0.5–1.3)
EOSINOPHIL # BLD AUTO: 0.77 K/UL — HIGH (ref 0–0.5)
EOSINOPHIL NFR BLD AUTO: 4.1 % — SIGNIFICANT CHANGE UP (ref 0–6)
ESTRADIOL FREE SERPL-MCNC: 8 PG/ML — SIGNIFICANT CHANGE UP
FSH SERPL-MCNC: 0.8 IU/L — SIGNIFICANT CHANGE UP
GH SERPL-MCNC: 5.01 NG/ML — SIGNIFICANT CHANGE UP (ref 0.12–9.88)
GLUCOSE SERPL-MCNC: 97 MG/DL — SIGNIFICANT CHANGE UP (ref 70–115)
HCT VFR BLD CALC: 25.8 % — LOW (ref 34.5–45)
HCT VFR BLD CALC: 27.2 % — LOW (ref 34.5–45)
HGB BLD-MCNC: 8.3 G/DL — LOW (ref 11.5–15.5)
HGB BLD-MCNC: 8.5 G/DL — LOW (ref 11.5–15.5)
IMM GRANULOCYTES NFR BLD AUTO: 6.6 % — HIGH (ref 0–1.5)
INR BLD: 1.27 RATIO — HIGH (ref 0.88–1.16)
LH SERPL-ACNC: <0.3 IU/L — SIGNIFICANT CHANGE UP
LIDOCAIN IGE QN: 991 U/L — HIGH (ref 22–51)
LYMPHOCYTES # BLD AUTO: 16.5 % — SIGNIFICANT CHANGE UP (ref 13–44)
LYMPHOCYTES # BLD AUTO: 3.07 K/UL — SIGNIFICANT CHANGE UP (ref 1–3.3)
MAGNESIUM SERPL-MCNC: 1.2 MG/DL — LOW (ref 1.6–2.6)
MAGNESIUM SERPL-MCNC: 2.4 MG/DL — SIGNIFICANT CHANGE UP (ref 1.8–2.6)
MCHC RBC-ENTMCNC: 30.7 PG — SIGNIFICANT CHANGE UP (ref 27–34)
MCHC RBC-ENTMCNC: 31.2 PG — SIGNIFICANT CHANGE UP (ref 27–34)
MCHC RBC-ENTMCNC: 31.3 GM/DL — LOW (ref 32–36)
MCHC RBC-ENTMCNC: 32.2 GM/DL — SIGNIFICANT CHANGE UP (ref 32–36)
MCV RBC AUTO: 97 FL — SIGNIFICANT CHANGE UP (ref 80–100)
MCV RBC AUTO: 98.2 FL — SIGNIFICANT CHANGE UP (ref 80–100)
MONOCYTES # BLD AUTO: 2.05 K/UL — HIGH (ref 0–0.9)
MONOCYTES NFR BLD AUTO: 11 % — SIGNIFICANT CHANGE UP (ref 2–14)
NEUTROPHILS # BLD AUTO: 11.37 K/UL — HIGH (ref 1.8–7.4)
NEUTROPHILS NFR BLD AUTO: 61.3 % — SIGNIFICANT CHANGE UP (ref 43–77)
PHOSPHATE SERPL-MCNC: 4.1 MG/DL — SIGNIFICANT CHANGE UP (ref 2.4–4.7)
PLATELET # BLD AUTO: 484 K/UL — HIGH (ref 150–400)
PLATELET # BLD AUTO: 493 K/UL — HIGH (ref 150–400)
POTASSIUM SERPL-MCNC: 4.1 MMOL/L — SIGNIFICANT CHANGE UP (ref 3.5–5.3)
POTASSIUM SERPL-SCNC: 4.1 MMOL/L — SIGNIFICANT CHANGE UP (ref 3.5–5.3)
PROCALCITONIN SERPL-MCNC: 2.14 NG/ML — HIGH (ref 0.02–0.1)
PROT SERPL-MCNC: 5 G/DL — LOW (ref 6.6–8.7)
PROTHROM AB SERPL-ACNC: 14.7 SEC — HIGH (ref 10–12.9)
RBC # BLD: 2.66 M/UL — LOW (ref 3.8–5.2)
RBC # BLD: 2.77 M/UL — LOW (ref 3.8–5.2)
RBC # FLD: 16.6 % — HIGH (ref 10.3–14.5)
RBC # FLD: 16.7 % — HIGH (ref 10.3–14.5)
SODIUM SERPL-SCNC: 137 MMOL/L — SIGNIFICANT CHANGE UP (ref 135–145)
T3 SERPL-MCNC: 84 NG/DL — SIGNIFICANT CHANGE UP (ref 80–200)
T4 AB SER-ACNC: 4.2 UG/DL — LOW (ref 4.5–12)
TSH SERPL-MCNC: 11.82 UIU/ML — HIGH (ref 0.27–4.2)
WBC # BLD: 18.59 K/UL — HIGH (ref 3.8–10.5)
WBC # BLD: 18.65 K/UL — HIGH (ref 3.8–10.5)
WBC # FLD AUTO: 18.59 K/UL — HIGH (ref 3.8–10.5)
WBC # FLD AUTO: 18.65 K/UL — HIGH (ref 3.8–10.5)

## 2019-12-10 PROCEDURE — 99233 SBSQ HOSP IP/OBS HIGH 50: CPT

## 2019-12-10 PROCEDURE — 99232 SBSQ HOSP IP/OBS MODERATE 35: CPT | Mod: 24

## 2019-12-10 PROCEDURE — 71045 X-RAY EXAM CHEST 1 VIEW: CPT | Mod: 26

## 2019-12-10 PROCEDURE — 99232 SBSQ HOSP IP/OBS MODERATE 35: CPT

## 2019-12-10 RX ORDER — ESCITALOPRAM OXALATE 10 MG/1
10 TABLET, FILM COATED ORAL DAILY
Refills: 0 | Status: DISCONTINUED | OUTPATIENT
Start: 2019-12-10 | End: 2020-01-07

## 2019-12-10 RX ORDER — MAGNESIUM SULFATE 500 MG/ML
2 VIAL (ML) INJECTION
Refills: 0 | Status: COMPLETED | OUTPATIENT
Start: 2019-12-10 | End: 2019-12-10

## 2019-12-10 RX ORDER — OCTREOTIDE ACETATE 200 UG/ML
100 INJECTION, SOLUTION INTRAVENOUS; SUBCUTANEOUS EVERY 8 HOURS
Refills: 0 | Status: DISCONTINUED | OUTPATIENT
Start: 2019-12-10 | End: 2020-01-07

## 2019-12-10 RX ORDER — HYDROMORPHONE HYDROCHLORIDE 2 MG/ML
2 INJECTION INTRAMUSCULAR; INTRAVENOUS; SUBCUTANEOUS EVERY 6 HOURS
Refills: 0 | Status: DISCONTINUED | OUTPATIENT
Start: 2019-12-10 | End: 2019-12-17

## 2019-12-10 RX ORDER — ALPRAZOLAM 0.25 MG
0.25 TABLET ORAL EVERY 8 HOURS
Refills: 0 | Status: DISCONTINUED | OUTPATIENT
Start: 2019-12-10 | End: 2019-12-17

## 2019-12-10 RX ORDER — TRAMADOL HYDROCHLORIDE 50 MG/1
50 TABLET ORAL EVERY 8 HOURS
Refills: 0 | Status: DISCONTINUED | OUTPATIENT
Start: 2019-12-10 | End: 2019-12-17

## 2019-12-10 RX ORDER — SODIUM CHLORIDE 9 MG/ML
1000 INJECTION, SOLUTION INTRAVENOUS
Refills: 0 | Status: DISCONTINUED | OUTPATIENT
Start: 2019-12-10 | End: 2019-12-10

## 2019-12-10 RX ORDER — HYDROMORPHONE HYDROCHLORIDE 2 MG/ML
4 INJECTION INTRAMUSCULAR; INTRAVENOUS; SUBCUTANEOUS EVERY 6 HOURS
Refills: 0 | Status: DISCONTINUED | OUTPATIENT
Start: 2019-12-10 | End: 2019-12-15

## 2019-12-10 RX ADMIN — TRAMADOL HYDROCHLORIDE 50 MILLIGRAM(S): 50 TABLET ORAL at 22:11

## 2019-12-10 RX ADMIN — PANTOPRAZOLE SODIUM 40 MILLIGRAM(S): 20 TABLET, DELAYED RELEASE ORAL at 05:42

## 2019-12-10 RX ADMIN — HYDROMORPHONE HYDROCHLORIDE 4 MILLIGRAM(S): 2 INJECTION INTRAMUSCULAR; INTRAVENOUS; SUBCUTANEOUS at 11:26

## 2019-12-10 RX ADMIN — Medication 10 MILLIGRAM(S): at 22:09

## 2019-12-10 RX ADMIN — Medication 2000 UNIT(S): at 11:23

## 2019-12-10 RX ADMIN — OCTREOTIDE ACETATE 100 MICROGRAM(S): 200 INJECTION, SOLUTION INTRAVENOUS; SUBCUTANEOUS at 15:27

## 2019-12-10 RX ADMIN — GABAPENTIN 100 MILLIGRAM(S): 400 CAPSULE ORAL at 22:09

## 2019-12-10 RX ADMIN — GABAPENTIN 100 MILLIGRAM(S): 400 CAPSULE ORAL at 05:42

## 2019-12-10 RX ADMIN — HYDROMORPHONE HYDROCHLORIDE 4 MILLIGRAM(S): 2 INJECTION INTRAMUSCULAR; INTRAVENOUS; SUBCUTANEOUS at 03:20

## 2019-12-10 RX ADMIN — HEPARIN SODIUM 1300 UNIT(S)/HR: 5000 INJECTION INTRAVENOUS; SUBCUTANEOUS at 18:26

## 2019-12-10 RX ADMIN — Medication 500 MILLIGRAM(S): at 11:23

## 2019-12-10 RX ADMIN — Medication 50 GRAM(S): at 04:30

## 2019-12-10 RX ADMIN — Medication 325 MILLIGRAM(S): at 14:14

## 2019-12-10 RX ADMIN — Medication 325 MILLIGRAM(S): at 22:09

## 2019-12-10 RX ADMIN — Medication 1 TABLET(S): at 11:23

## 2019-12-10 RX ADMIN — TRAMADOL HYDROCHLORIDE 50 MILLIGRAM(S): 50 TABLET ORAL at 00:35

## 2019-12-10 RX ADMIN — TRAMADOL HYDROCHLORIDE 50 MILLIGRAM(S): 50 TABLET ORAL at 14:14

## 2019-12-10 RX ADMIN — TRAMADOL HYDROCHLORIDE 50 MILLIGRAM(S): 50 TABLET ORAL at 22:45

## 2019-12-10 RX ADMIN — Medication 1 MILLIGRAM(S): at 11:23

## 2019-12-10 RX ADMIN — HYDROMORPHONE HYDROCHLORIDE 4 MILLIGRAM(S): 2 INJECTION INTRAMUSCULAR; INTRAVENOUS; SUBCUTANEOUS at 18:30

## 2019-12-10 RX ADMIN — HYDROMORPHONE HYDROCHLORIDE 4 MILLIGRAM(S): 2 INJECTION INTRAMUSCULAR; INTRAVENOUS; SUBCUTANEOUS at 02:32

## 2019-12-10 RX ADMIN — TRAMADOL HYDROCHLORIDE 50 MILLIGRAM(S): 50 TABLET ORAL at 15:10

## 2019-12-10 RX ADMIN — METHOCARBAMOL 500 MILLIGRAM(S): 500 TABLET, FILM COATED ORAL at 20:38

## 2019-12-10 RX ADMIN — TRAMADOL HYDROCHLORIDE 50 MILLIGRAM(S): 50 TABLET ORAL at 05:42

## 2019-12-10 RX ADMIN — GABAPENTIN 100 MILLIGRAM(S): 400 CAPSULE ORAL at 14:14

## 2019-12-10 RX ADMIN — Medication 325 MILLIGRAM(S): at 05:42

## 2019-12-10 RX ADMIN — Medication 250 MILLIGRAM(S): at 05:41

## 2019-12-10 RX ADMIN — HYDROMORPHONE HYDROCHLORIDE 4 MILLIGRAM(S): 2 INJECTION INTRAMUSCULAR; INTRAVENOUS; SUBCUTANEOUS at 12:25

## 2019-12-10 RX ADMIN — MEROPENEM 100 MILLIGRAM(S): 1 INJECTION INTRAVENOUS at 05:42

## 2019-12-10 RX ADMIN — Medication 250 MILLIGRAM(S): at 17:36

## 2019-12-10 RX ADMIN — HEPARIN SODIUM 1300 UNIT(S)/HR: 5000 INJECTION INTRAVENOUS; SUBCUTANEOUS at 10:37

## 2019-12-10 RX ADMIN — Medication 50 GRAM(S): at 03:30

## 2019-12-10 RX ADMIN — Medication 0.25 MILLIGRAM(S): at 15:26

## 2019-12-10 RX ADMIN — Medication 100 MILLIGRAM(S): at 11:23

## 2019-12-10 RX ADMIN — OCTREOTIDE ACETATE 100 MICROGRAM(S): 200 INJECTION, SOLUTION INTRAVENOUS; SUBCUTANEOUS at 22:37

## 2019-12-10 RX ADMIN — TRAMADOL HYDROCHLORIDE 50 MILLIGRAM(S): 50 TABLET ORAL at 06:30

## 2019-12-10 RX ADMIN — HYDROMORPHONE HYDROCHLORIDE 4 MILLIGRAM(S): 2 INJECTION INTRAMUSCULAR; INTRAVENOUS; SUBCUTANEOUS at 19:15

## 2019-12-10 RX ADMIN — MEROPENEM 100 MILLIGRAM(S): 1 INJECTION INTRAVENOUS at 17:36

## 2019-12-10 NOTE — PROGRESS NOTE ADULT - ASSESSMENT
34 year old female who [place] [facility] [place] [facility] on [place]/22 for complaints of weakness, 25 pound weight loss & flu like symptoms & found to have a large right pleural effusion. Pt. has a PMH of empyema with group A hemolytic strep & has 4-5 drinks of alcohol per day.  On 11/23 CT of the chest/abd/pelvis indicated a clot in the Right IJ. Pt. on heparin bridge to coumadin. CT abdomen shows cystic mass in body/tail of pancreas, now concern for postop ileus.  Post-op course:  Pt s/p right VATS decort & washout debridement of CT site & VAC placement on 11/27/19.  Wound vac was placed and removed today due to increased drainage and leakage around . WBC elevated , abx adjusted by ID , vanco d/c yesterday due to BRAD and high level , procalcitonin high . Was planned for VATS  debridement today ( 12/6) but cancelled . Hg dropped to 7.5 and is getting PRBC ,    MRI with pancreatic cyst but poor study . Followed by CTS ,  ID , Hem / Psych , seen by GI . CT chest , abd / pelvis noted , ? esophageal fluid collection , ? pancreatitis, noted with RONAK likely due to Vanco vs CHIQUITA , on ivf.   MRI done -            Problem/Plan - 1:  ·  Problem: Pleural effusion on right.  Plan: s/p Right VATS decortication & washout on 11/27/19. Debridement of CT site & VAC placement , vac removed due to increased drainage  .  Now    s.p PICC placement & Right anterior pig tail catheter & right lateral wound vac placement with bridge     MRCP significant for Peripancreatic fluid and edema consistent with pancreatitis. Cystic lesions in the pancreatic head with the largest measuring 2.1 cm, likely acute peripancreatic collections, increased in size since 12/4/2019, with a tract of fluid extending from the 2.1 cm cystic lesion into the mediastinum and towards the pleural space on the right suspicious for a pancreatic pleural fistula.     Problem/Plan - 2:  ·  Problem: Empyema.  Plan: s/p Chest tube removal today, continued with iv abx as per ID       Problem/Plan - 3:  ·  Problem: Internal jugular vein thrombosis, right.  Plan: On Heparin drip      Problem/Plan - 5:  ·  Problem: Anemia likely multifactorial ; Due to anemia of chronic dz , multiple blood drawings , dilutional  , patient is tachycardic , pale  Plan: s/p PRBCS 4 days ago, Hb is stable, will monitor CBC.        Problem/Plan -6:  ·  Problem: Hyponatremia.  Plan: as per renal , sodium level is ok now.      Problem/Plan - 7:  ·  Problem: Alcohol abuse.  Plan: Monitor for withdrawal, Continue folic, MVI, thiamine.     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 cause , third spacing ,  swelling decreasing , will observe     Problem / Plan -11 ; Hematuria , with blood clots , UTI r/o  - resolved , likely due to high inr     Problem / Plan -12 : Multifocal PNA -  treated with abx. May be due pancreatic / pleural fistula     Problem /Plan - 13 : Hypoalbuminemia , prealbumin noted _ 4 , Acute severe protein calorie malnutrition -  Nutrition eval noted ,supplement with high protein shakes.     Problem / Plan - 14 : RONAK - likely multifactorial RONAK; ATN from Vancomycin and ? CHIQUITA - resolved      Problem / Plan - 15 : Prolactinoma , increased TSH ,decreased ACTH - Hormone w/u ordered due to initially unexplained fatigue / wt lost / diarrhea / irregular menstrual periods - not sure if this all related to acute issues , ENDO re called , awaiting for input .     D/W  CT surgery PA.

## 2019-12-10 NOTE — PROGRESS NOTE ADULT - PROBLEM SELECTOR PLAN 1
s/p Right VATS decortication & washout on 11/27/19. Debridement of CT site & VAC placement.  12/6 1 R CT removed and VAC removed.    12/7 Remaining Right CT removed.   Esophogram for high amylase level in fluid was normal.   Repeat CT chest as above.   Encourage the use of I/S, OOB to chair, daily PT, ambulate as tolerated  Continue supportive care.  s/p right anterior chest tube/ PICC line/ new vac placement to right lateral chest wall   Case and plan discussed with Dr. Aponte.

## 2019-12-10 NOTE — PROGRESS NOTE ADULT - ASSESSMENT
This 34 y.o. F who [place] [facility] [place],   found with large right pleural effusion   s/p chest tube drainage    Hyponatremia - resolving  WBC elevation   alpha hemolytic strep infection  multi focal PNA  infected pleural effusion - empyema  Pancreatic cyst  Liver lesions  chest wall drainage  Acute renal failure - improving    - Legionella Ag from 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

## 2019-12-10 NOTE — PROGRESS NOTE ADULT - ASSESSMENT
This time, with significant pulmonary issue and possibility of pancreatico- pleural fistula. With pancreatic fluid collections, there is the possibility of disrupted pancreatic duct. Can try sub Q octreotide and assess. If the patient does not respond to octreotide, she may need a ERCP with pancreatic duct stent placement. She is at very high risk for any endoscopic procedures at this time.Try regressing the diet to clear liquids

## 2019-12-10 NOTE — PROGRESS NOTE ADULT - SUBJECTIVE AND OBJECTIVE BOX
34 year old woman with no known major comorbidities (had not gone to see a doctor for 15 years) who presented to [facility] on 11/22/2019 with progressive sob, weakness, weight loss, She was found to have a large pleural effusion. Initially 1.5 L of dark serous fluid was removed.  Chest tube placed shortly thereafter, 3 additional liters was removed. CBC showed a leukocytosis of 40k and   CTA imaging revealed a 2.1cm partially cystic mass in the body/tail of the pancreas with possible upstream ductal dilatation. On 11/23/2019 the patient had a CT chest/abdomen/pelvis that found to have a clot in the right IJ and was started on a heparin gtt.     Transferred to [facility] on 11/24/19.  MRCP performed on 11/26.  Underwent Debridement, wound, with dressing replacement  Wound VAC placement  Pleural biopsy  Lung decortication  Bronchoscopy, with lung decortication using VATS on 11/27/19.  Pt  has a history of alcohol abuse.     We were consulted for evaluation and management of the RIJ DVT.      INTERVAL HISTORY      MEDICATIONS  (STANDING):  amitriptyline 10 milliGRAM(s) Oral at bedtime  ascorbic acid 500 milliGRAM(s) Oral daily  cholecalciferol 2000 Unit(s) Oral daily  ferrous    sulfate 325 milliGRAM(s) Oral three times a day  folic acid 1 milliGRAM(s) Oral daily  gabapentin 100 milliGRAM(s) Oral three times a day  heparin  Infusion. 1300 Unit(s)/Hr (13 mL/Hr) IV Continuous <Continuous>  lidocaine   Patch 1 Patch Transdermal daily  meropenem  IVPB 1000 milliGRAM(s) IV Intermittent every 12 hours  multivitamin 1 Tablet(s) Oral daily  pantoprazole    Tablet 40 milliGRAM(s) Oral before breakfast  saccharomyces boulardii 250 milliGRAM(s) Oral two times a day  thiamine 100 milliGRAM(s) Oral daily  traMADol 50 milliGRAM(s) Oral every 8 hours      Vital Signs Last 24 Hrs  T(C): 37.2 (10 Dec 2019 08:00), Max: 37.3 (10 Dec 2019 00:30)  T(F): 99 (10 Dec 2019 08:00), Max: 99.1 (10 Dec 2019 00:30)  HR: 108 (10 Dec 2019 09:00) (93 - 112)  BP: 104/69 (10 Dec 2019 09:00) (103/66 - 135/90)  BP(mean): 82 (10 Dec 2019 09:00) (79 - 108)  RR: 20 (10 Dec 2019 08:00) (16 - 22)  SpO2: 96% (10 Dec 2019 09:00) (94% - 100%)  Gen: nad  Chest: chest tube right side, drain  CV: rrr  Abd: soft, nd, nt                x                    x    | x    | x            x     >-----------< x       ------------------------< x                     x                    x    | x    | x                                            Ca x     Mg 2.4   Ph x

## 2019-12-10 NOTE — PROGRESS NOTE ADULT - ASSESSMENT
1) Hyponatremia; multifactorial in setting of low solute and increased free water intake, ? SIADH from pain  2) RONAK  3)Hyperkalemia- resolved  4) R -  empyema s/p chest tube    Vancomycin  trough level  elevated; ?cast nephropathy  Also s/p  IV contrast administration; likely multifactorial RONAK  Now resolved; SCr ~1.0 today   serum sodium also normalized  Nephrology signing off; please reconsult prn

## 2019-12-10 NOTE — PROGRESS NOTE ADULT - SUBJECTIVE AND OBJECTIVE BOX
SUBJECTIVE       INTERIM HISTORY SIGNIFICANT FOR       Patient is a 34y old  Female who presents with a chief complaint of pyema (09 Dec 2019 17:30)    HPI:  Comes in to [facility]  11/22 with weakness, 25lb weight loss of several months, flu like symptoms, on work up found to have a large right pleural effusion.  Pulmonology contact for thoracentesis, 1.5 L of dark serous fluid was removed.  Chest tube placed shortly thereafter, 3 liters was removed at that point in time. Patient had some abnormalities in their lab work, found to have a leukocytosis with wbc of 40, platelet count of 793, Na 120, chloride 73, lactate of 4.7, amylase 268,  and CTA imaging also revealed a 2.1cm partially cystic mass in the body/tail of the pancreas with possible upstream ductal dilatation. 11/23 patient had CT chest/abdomen/pelvis and was also found to have a clot in the right IJ and was started on a heparin gtt. Patient does state that she has had a recent cough, and complains of right sided neck pain. 11/14 Patient transferred to [facility]. (25 Nov 2019 00:10)    OBJECTIVE  PAST MEDICAL & SURGICAL HISTORY:    Ensure TID (Unknown)  No Known Allergies    Home Medications:    VITALS  Currently in sinus rhythm with vitals as below  ICU Vital Signs Last 24 Hrs  T(C): 37.3 (10 Dec 2019 00:30), Max: 37.3 (10 Dec 2019 00:30)  T(F): 99.1 (10 Dec 2019 00:30), Max: 99.1 (10 Dec 2019 00:30)  HR: 93 (10 Dec 2019 00:00) (91 - 126)  BP: 118/77 (10 Dec 2019 00:00) (98/57 - 135/90)  BP(mean): 92 (10 Dec 2019 00:00) (71 - 108)  ABP: --  ABP(mean): --  RR: 22 (10 Dec 2019 00:00) (16 - 22)  SpO2: 97% (10 Dec 2019 00:00) (94% - 100%)      LABS                        10.1   21.14 )-----------( 640      ( 09 Dec 2019 03:08 )             33.4   PTT - ( 09 Dec 2019 03:08 )  PTT:70.9 [id number]    138  |  100  |  13.0  ----------------------------<  87  3.9   |  21.0<L>  |  1.36<H>    Ca    8.5<L>      09 Dec 2019 03:08  Mg     1.6     12-09    TPro  6.1<L>  /  Alb  2.1<L>  /  TBili  0.2<L>  /  DBili  0.1  /  AST  20  /  ALT  12  /  AlkPhos  244<H>  12-09  CAPILLARY BLOOD GLUCOSE      CARDIAC MARKERS ( 09 Dec 2019 03:08 )  x     / x     / 28 U/L / x     / x              IN/OUT    12-08-19 @ 07:01  -  12-09-19 @ 07:00  --------------------------------------------------------  IN: 1090 mL / OUT: 0 mL / NET: 1090 mL    12-09-19 @ 07:01  -  12-10-19 @ 01:25  --------------------------------------------------------  IN: 102 mL / OUT: 0 mL / NET: 102 mL      IMAGING  personally reviewed imaging     CURRENT MEDICATIONS  MEDICATIONS  (STANDING):  amitriptyline 10 milliGRAM(s) Oral at bedtime  ascorbic acid 500 milliGRAM(s) Oral daily  cholecalciferol 2000 Unit(s) Oral daily  ferrous    sulfate 325 milliGRAM(s) Oral three times a day  folic acid 1 milliGRAM(s) Oral daily  gabapentin 100 milliGRAM(s) Oral three times a day  heparin  Infusion. 1300 Unit(s)/Hr (13 mL/Hr) IV Continuous <Continuous>  lidocaine   Patch 1 Patch Transdermal daily  meropenem  IVPB 1000 milliGRAM(s) IV Intermittent every 12 hours  multivitamin 1 Tablet(s) Oral daily  pantoprazole    Tablet 40 milliGRAM(s) Oral before breakfast  saccharomyces boulardii 250 milliGRAM(s) Oral two times a day  thiamine 100 milliGRAM(s) Oral daily  traMADol 50 milliGRAM(s) Oral every 8 hours    MEDICATIONS  (PRN):  ALPRAZolam 0.25 milliGRAM(s) Oral every 8 hours PRN anxiety  aluminum hydroxide/magnesium hydroxide/simethicone Suspension 30 milliLiter(s) Oral every 4 hours PRN Dyspepsia  heparin  Injectable 4000 Unit(s) IV Push every 6 hours PRN For aPTT less than 40  heparin  Injectable 2000 Unit(s) IV Push every 6 hours PRN For aPTT between 40 - 57  HYDROmorphone   Tablet 4 milliGRAM(s) Oral every 6 hours PRN Severe Pain (7 - 10)  HYDROmorphone   Tablet 2 milliGRAM(s) Oral every 6 hours PRN Moderate Pain (4 - 6)  methocarbamol 500 milliGRAM(s) Oral every 6 hours PRN Muscle Spasm  ondansetron Injectable 4 milliGRAM(s) IV Push every 4 hours PRN Nausea and/or Vomiting

## 2019-12-10 NOTE — PROGRESS NOTE ADULT - ASSESSMENT
34 year old female with no known major comorbidities who is being treated for right pleural effusion/empyema/multifocal pna found to have a RIJ thrombus currently on heparin drip.    1) RIJ venous thrombosis -provoked event that occurred in context of her severe and complex lung illness/infection/empyema that may have led to developing the thrombus. Plan on anticoagulation for  3 months. Plan for warfarin noted as outpatient. will need frequent INR monitoring

## 2019-12-10 NOTE — PROGRESS NOTE ADULT - SUBJECTIVE AND OBJECTIVE BOX
INTERVAL HPI/OVERNIGHT EVENTS:    MEDICATIONS  (STANDING):  amitriptyline 10 milliGRAM(s) Oral at bedtime  ascorbic acid 500 milliGRAM(s) Oral daily  cholecalciferol 2000 Unit(s) Oral daily  ferrous    sulfate 325 milliGRAM(s) Oral three times a day  folic acid 1 milliGRAM(s) Oral daily  gabapentin 100 milliGRAM(s) Oral three times a day  heparin  Infusion. 1300 Unit(s)/Hr (13 mL/Hr) IV Continuous <Continuous>  lidocaine   Patch 1 Patch Transdermal daily  meropenem  IVPB 1000 milliGRAM(s) IV Intermittent every 12 hours  multivitamin 1 Tablet(s) Oral daily  pantoprazole    Tablet 40 milliGRAM(s) Oral before breakfast  saccharomyces boulardii 250 milliGRAM(s) Oral two times a day  thiamine 100 milliGRAM(s) Oral daily  traMADol 50 milliGRAM(s) Oral every 8 hours    MEDICATIONS  (PRN):  ALPRAZolam 0.25 milliGRAM(s) Oral every 8 hours PRN anxiety  aluminum hydroxide/magnesium hydroxide/simethicone Suspension 30 milliLiter(s) Oral every 4 hours PRN Dyspepsia  heparin  Injectable 4000 Unit(s) IV Push every 6 hours PRN For aPTT less than 40  heparin  Injectable 2000 Unit(s) IV Push every 6 hours PRN For aPTT between 40 - 57  HYDROmorphone   Tablet 4 milliGRAM(s) Oral every 6 hours PRN Severe Pain (7 - 10)  HYDROmorphone   Tablet 2 milliGRAM(s) Oral every 6 hours PRN Moderate Pain (4 - 6)  methocarbamol 500 milliGRAM(s) Oral every 6 hours PRN Muscle Spasm  ondansetron Injectable 4 milliGRAM(s) IV Push every 4 hours PRN Nausea and/or Vomiting      Allergies    No Known Allergies    Intolerances    Ensure TID (Unknown)      Vital Signs Last 24 Hrs  T(C): 37.3 (10 Dec 2019 00:30), Max: 37.3 (10 Dec 2019 00:30)  T(F): 99.1 (10 Dec 2019 00:30), Max: 99.1 (10 Dec 2019 00:30)  HR: 103 (10 Dec 2019 07:00) (93 - 112)  BP: 106/71 (10 Dec 2019 07:00) (103/66 - 135/90)  BP(mean): 79 (10 Dec 2019 07:00) (77 - 108)  RR: 19 (10 Dec 2019 07:00) (16 - 22)  SpO2: 96% (10 Dec 2019 07:00) (94% - 100%)    LABS:                        8.5    18.65 )-----------( 493      ( 10 Dec 2019 06:03 )             27.2     12-10    137  |  101  |  9.0  ----------------------------<  97  4.1   |  23.0  |  1.06    Ca    7.7<L>      10 Dec 2019 03:06  Phos  4.1     12-10  Mg     1.2     12-10    TPro  5.0<L>  /  Alb  1.9<L>  /  TBili  <0.2<L>  /  DBili  x   /  AST  15  /  ALT  9   /  AlkPhos  201<H>  12-10    PT/INR - ( 10 Dec 2019 03:06 )   PT: 14.7 sec;   INR: 1.27 ratio         PTT - ( 10 Dec 2019 03:06 )  PTT:51.3 sec      RADIOLOGY & ADDITIONAL TESTS: INTERVAL HPI/OVERNIGHT EVENTS:Patient was seen and evaluated at bed side. She was noted to have possible pancreatic fistula. She has intermittent abdominal discomfort. She is able to eat. She had a chest drain placed yesterday.    MEDICATIONS  (STANDING):  amitriptyline 10 milliGRAM(s) Oral at bedtime  ascorbic acid 500 milliGRAM(s) Oral daily  cholecalciferol 2000 Unit(s) Oral daily  ferrous    sulfate 325 milliGRAM(s) Oral three times a day  folic acid 1 milliGRAM(s) Oral daily  gabapentin 100 milliGRAM(s) Oral three times a day  heparin  Infusion. 1300 Unit(s)/Hr (13 mL/Hr) IV Continuous <Continuous>  lidocaine   Patch 1 Patch Transdermal daily  meropenem  IVPB 1000 milliGRAM(s) IV Intermittent every 12 hours  multivitamin 1 Tablet(s) Oral daily  pantoprazole    Tablet 40 milliGRAM(s) Oral before breakfast  saccharomyces boulardii 250 milliGRAM(s) Oral two times a day  thiamine 100 milliGRAM(s) Oral daily  traMADol 50 milliGRAM(s) Oral every 8 hours    MEDICATIONS  (PRN):  ALPRAZolam 0.25 milliGRAM(s) Oral every 8 hours PRN anxiety  aluminum hydroxide/magnesium hydroxide/simethicone Suspension 30 milliLiter(s) Oral every 4 hours PRN Dyspepsia  heparin  Injectable 4000 Unit(s) IV Push every 6 hours PRN For aPTT less than 40  heparin  Injectable 2000 Unit(s) IV Push every 6 hours PRN For aPTT between 40 - 57  HYDROmorphone   Tablet 4 milliGRAM(s) Oral every 6 hours PRN Severe Pain (7 - 10)  HYDROmorphone   Tablet 2 milliGRAM(s) Oral every 6 hours PRN Moderate Pain (4 - 6)  methocarbamol 500 milliGRAM(s) Oral every 6 hours PRN Muscle Spasm  ondansetron Injectable 4 milliGRAM(s) IV Push every 4 hours PRN Nausea and/or Vomiting      Allergies    No Known Allergies    Intolerances    Ensure TID (Unknown)      Vital Signs Last 24 Hrs  T(C): 37.3 (10 Dec 2019 00:30), Max: 37.3 (10 Dec 2019 00:30)  T(F): 99.1 (10 Dec 2019 00:30), Max: 99.1 (10 Dec 2019 00:30)  HR: 103 (10 Dec 2019 07:00) (93 - 112)  BP: 106/71 (10 Dec 2019 07:00) (103/66 - 135/90)  BP(mean): 79 (10 Dec 2019 07:00) (77 - 108)  RR: 19 (10 Dec 2019 07:00) (16 - 22)  SpO2: 96% (10 Dec 2019 07:00) (94% - 100%)    LABS:                        8.5    18.65 )-----------( 493      ( 10 Dec 2019 06:03 )             27.2     12-10    137  |  101  |  9.0  ----------------------------<  97  4.1   |  23.0  |  1.06    Ca    7.7<L>      10 Dec 2019 03:06  Phos  4.1     12-10  Mg     1.2     12-10    TPro  5.0<L>  /  Alb  1.9<L>  /  TBili  <0.2<L>  /  DBili  x   /  AST  15  /  ALT  9   /  AlkPhos  201<H>  12-10    PT/INR - ( 10 Dec 2019 03:06 )   PT: 14.7 sec;   INR: 1.27 ratio         PTT - ( 10 Dec 2019 03:06 )  PTT:51.3 sec      RADIOLOGY & ADDITIONAL TESTS:  < from: MR MRCP No Cont (12.09.19 @ 16:08) >     EXAM:  MR MRCP                          PROCEDURE DATE:  12/09/2019          INTERPRETATION:  CLINICAL INFORMATION: Worsening pancreatitis.    COMPARISON: CT chest 12/7/2019 and CT chest/abdomen/pelvis 12/4/2019, an   MRI abdomen 11/26/2019    PROCEDURE:   MRI of the abdomen was performed without intravenous contrast.  IV Contrast: None.   MRCP was performed.    FINDINGS:    LOWER CHEST: Partially small imaged bilateral pleural effusions and   consolidation at the right lung base. Air again seen in the pleural space.    LIVER: See below. Normal size. No mass. No fatty infiltration.   BILE DUCTS: Normal caliber. Common bile duct measures 3 mm.  GALLBLADDER: Within normal limits.  SPLEEN: Within normal limits.  PANCREAS: Scattered cystic lesions in the pancreas with the largest in   the pancreatic head measuring 2.1 cm, slightly increased in size since   12/4/2019. Previously seen cystic foci in the region of the liver are not   seen on the current study. Peripancreatic edema and fluid which has   increased since 12/4/2019. There is a tract of fluid extending from the   larger cyst into the mediastinum to the right of the esophagus and   towards the right pleural space (series 6 images 13-31). No cystic   lesions including a 1.2 cm lesion in the pancreatic head (series 7 image   32), also slightly increased in size and 2 adjacent cystic lesions in the   pancreatic head with the larger measuring 9 mm, not as well seen on the   prior CT.  ADRENALS: Within normal limits.  KIDNEYS/URETERS: Normal size. No hydronephrosis.    VISUALIZED PORTIONS:    BOWEL: Normal caliber.   PERITONEUM: Small amount of ascites and mesenteric edema, increased since   12/4/2019.  VESSELS: Abdominal aorta normal in caliber.  RETROPERITONEUM/LYMPH NODES: No lymphadenopathy.    ABDOMINAL WALL: Anasarca.  BONES: No aggressive osseous lesion.    IMPRESSION:     Peripancreatic fluid and edema consistent with pancreatitis.    Cystic lesions in the pancreatic head with the largest measuring 2.1 cm,   likely acute peripancreatic collections, increased in size since   12/4/2019, with a tract of fluid extending from the 2.1 cm cystic lesion   into the mediastinum and towards the pleural space on the right   suspicious for a pancreatic pleural fistula.    The findings were discussed with GLO Roque at 5:38 PM on 12/9/2019.                AMIE RAMIREZ   This document has been electronically signed. Dec  9 2019  5:40PM                  < end of copied text >

## 2019-12-10 NOTE — PROGRESS NOTE ADULT - SUBJECTIVE AND OBJECTIVE BOX
[facility] DIVISION OF KIDNEY DISEASES AND HYPERTENSION -- FOLLOW UP NOTE  --------------------------------------------------------------------------------  Chief Complaint: hypernatremia, RONAK    24 hour events/subjective:  No acute events  Pt seen and examined; feels ok      PAST HISTORY  --------------------------------------------------------------------------------  No significant changes to PMH, PSH, FHx, SHx, unless otherwise noted    ALLERGIES & MEDICATIONS  --------------------------------------------------------------------------------  Allergies    No Known Allergies    Intolerances    Ensure TID (Unknown)    Standing Inpatient Medications  amitriptyline 10 milliGRAM(s) Oral at bedtime  ascorbic acid 500 milliGRAM(s) Oral daily  cholecalciferol 2000 Unit(s) Oral daily  ferrous    sulfate 325 milliGRAM(s) Oral three times a day  folic acid 1 milliGRAM(s) Oral daily  gabapentin 100 milliGRAM(s) Oral three times a day  heparin  Infusion. 1300 Unit(s)/Hr IV Continuous <Continuous>  lidocaine   Patch 1 Patch Transdermal daily  meropenem  IVPB 1000 milliGRAM(s) IV Intermittent every 12 hours  multivitamin 1 Tablet(s) Oral daily  pantoprazole    Tablet 40 milliGRAM(s) Oral before breakfast  saccharomyces boulardii 250 milliGRAM(s) Oral two times a day  thiamine 100 milliGRAM(s) Oral daily  traMADol 50 milliGRAM(s) Oral every 8 hours    PRN Inpatient Medications  ALPRAZolam 0.25 milliGRAM(s) Oral every 8 hours PRN  aluminum hydroxide/magnesium hydroxide/simethicone Suspension 30 milliLiter(s) Oral every 4 hours PRN  heparin  Injectable 4000 Unit(s) IV Push every 6 hours PRN  heparin  Injectable 2000 Unit(s) IV Push every 6 hours PRN  HYDROmorphone   Tablet 4 milliGRAM(s) Oral every 6 hours PRN  HYDROmorphone   Tablet 2 milliGRAM(s) Oral every 6 hours PRN  methocarbamol 500 milliGRAM(s) Oral every 6 hours PRN  ondansetron Injectable 4 milliGRAM(s) IV Push every 4 hours PRN      REVIEW OF SYSTEMS  --------------------------------------------------------------------------------  Gen: No weight changes, fatigue, fevers/chills, weakness  Skin: No rashes  Head/Eyes/Ears/Mouth: No headache; Normal hearing; Normal vision w/o blurriness  Respiratory: No dyspnea, cough, wheezing, hemoptysis  CV: No chest pain, PND, orthopnea  GI: No abdominal pain, diarrhea, constipation, nausea, vomiting  : No increased frequency, dysuria, hematuria, nocturia  MSK: No joint pain/swelling; no back pain; no edema  Neuro: No dizziness/lightheadedness, weakness, seizures  Heme: No easy bruising or bleeding  Endo: No heat/cold intolerance  Psych: No anxiety, stress, depression    All other systems were reviewed and are negative, except as noted.    VITALS/PHYSICAL EXAM  --------------------------------------------------------------------------------  T(C): 37.2 (12-10-19 @ 08:00), Max: 37.3 (12-10-19 @ 00:30)  HR: 108 (12-10-19 @ 09:00) (93 - 112)  BP: 104/69 (12-10-19 @ 09:00) (103/66 - 133/88)  RR: 20 (12-10-19 @ 08:00) (16 - 22)  SpO2: 96% (12-10-19 @ 09:00) (94% - 100%)  Wt(kg): --        12-09-19 @ 07:01  -  12-10-19 @ 07:00  --------------------------------------------------------  IN: 306 mL / OUT: 45 mL / NET: 261 mL    12-10-19 @ 07:01  -  12-10-19 @ 12:01  --------------------------------------------------------  IN: 63 mL / OUT: 10 mL / NET: 53 mL      Physical Exam:  	Gen: thin woman, NAD  	HEENT: supple neck  	Pulm:  Rt chest tube+  	CV: RRR, S1S2; no rub  	Back:  no sacral edema  	Abd: +BS, soft, nontender/nondistended  	: No suprapubic tenderness  	UE: Warm, ; no edema; no asterixis  	LE: Warm, no leg edema  	Neuro: No focal deficits  	Psych: appears anxious  	Skin: Warm  LABS/STUDIES  --------------------------------------------------------------------------------              8.5    18.65 >-----------<  493      [12-10-19 @ 06:03]              27.2     137  |  101  |  9.0  ----------------------------<  97      [12-10-19 @ 03:06]  4.1   |  23.0  |  1.06        Ca     7.7     [12-10-19 @ 03:06]      Mg     2.4     [12-10-19 @ 10:26]      Phos  4.1     [12-10-19 @ 03:06]    TPro  5.0  /  Alb  1.9  /  TBili  <0.2  /  DBili  x   /  AST  15  /  ALT  9   /  AlkPhos  201  [12-10-19 @ 03:06]    PT/INR: PT 14.7 , INR 1.27       [12-10-19 @ 03:06]  PTT: 64.5       [12-10-19 @ 10:08]    CK 28      [12-09-19 @ 03:08]    Creatinine Trend:  SCr 1.06 [12-10 @ 03:06]  SCr 1.36 [12-09 @ 03:08]  SCr 1.98 [12-07 @ 23:07]  SCr 2.29 [12-07 @ 05:42]  SCr 2.42 [12-06 @ 11:13]    Urinalysis - [12-05-19 @ 15:15]      Color Yellow / Appearance Clear / SG 1.005 / pH 6.0      Gluc Negative / Ketone Negative  / Bili Negative / Urobili Negative       Blood Moderate / Protein 30 / Leuk Est Negative / Nitrite Negative      RBC 6-10 / WBC 3-5 / Hyaline  / Gran  / Sq Epi  / Non Sq Epi Occasional / Bacteria Negative    Urine Creatinine 26      [12-05-19 @ 15:14]  Urine Sodium <30      [12-05-19 @ 15:14]  Urine Urea Nitrogen 95      [12-05-19 @ 23:30]  Urine Potassium 17      [12-05-19 @ 15:14]  Urine Chloride <27      [12-05-19 @ 15:14]  Urine Osmolality 116      [12-05-19 @ 15:14]    Iron 23, TIBC 104, %sat 22      [12-04-19 @ 06:46]  Vitamin D (25OH) 21.9      [12-04-19 @ 17:51]  TSH 11.82      [12-10-19 @ 06:02]  Lipid: chol 94, , HDL 37, LDL 24      [12-09-19 @ 03:08]    HIV Nonreact      [11-25-19 @ 14:08]    ROMAN: titer Negative, pattern --      [11-26-19 @ 18:05]  dsDNA <12      [11-26-19 @ 18:05]  Rheumatoid Factor <10      [11-26-19 @ 17:34]  ANCA: cANCA Negative, pANCA Negative, atypical ANCA Negative      [11-26-19 @ 18:05]

## 2019-12-10 NOTE — PROGRESS NOTE ADULT - SUBJECTIVE AND OBJECTIVE BOX
Patient seen and examined . Very anxious , sitting on the couch , refusing to be asked any questions at this time . Wants me to leave her alone . Her ex boyfriend at bed side . s.p PICC placement & Right anterior pig tail catheter & right lateral wound vac placement with bridge     MRCP significant for Peripancreatic fluid and edema consistent with pancreatitis. Cystic lesions in the pancreatic head with the largest measuring 2.1 cm, likely acute peripancreatic collections, increased in size since 12/4/2019, with a tract of fluid extending from the 2.1 cm cystic lesion into the mediastinum and towards the pleural space on the right suspicious for a pancreatic pleural fistula.    CC : anxious       MEDICATIONS  (STANDING):  amitriptyline 10 milliGRAM(s) Oral at bedtime  ascorbic acid 500 milliGRAM(s) Oral daily  cholecalciferol 2000 Unit(s) Oral daily  ferrous    sulfate 325 milliGRAM(s) Oral three times a day  folic acid 1 milliGRAM(s) Oral daily  gabapentin 100 milliGRAM(s) Oral three times a day  heparin  Infusion. 1300 Unit(s)/Hr (13 mL/Hr) IV Continuous <Continuous>  lidocaine   Patch 1 Patch Transdermal daily  meropenem  IVPB 1000 milliGRAM(s) IV Intermittent every 12 hours  multivitamin 1 Tablet(s) Oral daily  octreotide  Injectable 100 MICROGram(s) SubCutaneous every 8 hours  pantoprazole    Tablet 40 milliGRAM(s) Oral before breakfast  saccharomyces boulardii 250 milliGRAM(s) Oral two times a day  thiamine 100 milliGRAM(s) Oral daily  traMADol 50 milliGRAM(s) Oral every 8 hours    MEDICATIONS  (PRN):  ALPRAZolam 0.25 milliGRAM(s) Oral every 8 hours PRN anxiety  aluminum hydroxide/magnesium hydroxide/simethicone Suspension 30 milliLiter(s) Oral every 4 hours PRN Dyspepsia  heparin  Injectable 4000 Unit(s) IV Push every 6 hours PRN For aPTT less than 40  heparin  Injectable 2000 Unit(s) IV Push every 6 hours PRN For aPTT between 40 - 57  HYDROmorphone   Tablet 4 milliGRAM(s) Oral every 6 hours PRN Severe Pain (7 - 10)  HYDROmorphone   Tablet 2 milliGRAM(s) Oral every 6 hours PRN Moderate Pain (4 - 6)  methocarbamol 500 milliGRAM(s) Oral every 6 hours PRN Muscle Spasm  ondansetron Injectable 4 milliGRAM(s) IV Push every 4 hours PRN Nausea and/or Vomiting      LABS:                          8.5    18.65 )-----------( 493      ( 10 Dec 2019 06:03 )             27.2     12-10    137  |  101  |  9.0  ----------------------------<  97  4.1   |  23.0  |  1.06    Ca    7.7<L>      10 Dec 2019 03:06  Phos  4.1     12-10  Mg     2.4     12-10    TPro  5.0<L>  /  Alb  1.9<L>  /  TBili  <0.2<L>  /  DBili  x   /  AST  15  /  ALT  9   /  AlkPhos  201<H>  12-10    PT/INR - ( 10 Dec 2019 03:06 )   PT: 14.7 sec;   INR: 1.27 ratio         PTT - ( 10 Dec 2019 10:08 )  PTT:64.5 sec  Thyroid Stimulating Hormone, Serum (12.10.19 @ 06:02)    Thyroid Stimulating Hormone, Serum: 11.82 uIU/mL    Magnesium, Serum (12.10.19 @ 10:26)    Magnesium, Serum: 2.4 mg/dL    Thyroid Stimulating Hormone, Serum (12.10.19 @ 06:02)    Thyroid Stimulating Hormone, Serum: 11.82 uIU/mL  Culture - Urine (12.04.19 @ 10:24)    Specimen Source: .Urine    Culture Results:   No growth      T4, Serum (12.10.19 @ 06:02)    T4, Serum: 4.2 ug/dL    Triiodothyronine, Total (T3 Total) (12.10.19 @ 06:02)    Triiodothyronine, Total (T3 Total): 84 ng/dL    Prolactin, Serum (12.06.19 @ 16:32)    Prolactin, Serum: 40.5 ng/mL    Adrenocorticotropic Hormone, Serum (12.06.19 @ 17:00)    Adrenocorticotropic Hormone, Serum: 1.6 pg/mL    Ova and Parasites (12.04.19 @ 16:28)    Culture Results:   No Protozoa seen by trichrome stain  No Helminths or Protozoa seen in formalin concentrate  performed by iodine stain  (routine O+P not evaluated for Microsporidia,  Cryptosporidia, Cyclospora, or Isospora.)  One negative sample does not necessarily rule  out the presence of a parasitic infection.  Numerous Yeast like cells seen    Culture - Other (12.03.19 @ 14:03)    Specimen Source: .Other chest tube insertion site (pus/purulent)    Culture Results:   No growth at 2 days.                RADIOLOGY & ADDITIONAL TESTS:    < from: Xray Chest 1 View- PORTABLE-Routine (12.10.19 @ 05:51) >     EXAM:  XR CHEST PORTABLE ROUTINE 1V                          PROCEDURE DATE:  12/10/2019          INTERPRETATION:  CHEST AP PORTABLE:    History: right pigtail.     Date and time of exam: 12/10/2019 5:19 AM.    Technique: A single AP view of the chest was obtained.    Comparison exam: 12/8/2019.    Findings:  A right pigtail chest tube has been inserted since the prior examination.   A right PICC line has been inserted. The tip is located in the region of   the mid superior vena cava. No evidence of pneumothorax. Persistent right   pleural effusion, unchanged. Persistent right basilar atelectasis and/or   infiltrate, unchanged. The left lung remains clear..    Impression:  Insertion of right PICC line in right pigtail chest tube since the prior   exam..      DOUGLAS MYERS M.D., ATTENDING RADIOLOGIST  This document has been electronically signed. Dec 10 2019  8:42AM        < end of copied text >    < from: MR MRCP No Cont (12.09.19 @ 16:08) >     EXAM:  MR MRCP                          PROCEDURE DATE:  12/09/2019          INTERPRETATION:  CLINICAL INFORMATION: Worsening pancreatitis.    COMPARISON: CT chest 12/7/2019 and CT chest/abdomen/pelvis 12/4/2019, an   MRI abdomen 11/26/2019    PROCEDURE:   MRI of the abdomen was performed without intravenous contrast.  IV Contrast: None.   MRCP was performed.    FINDINGS:    LOWER CHEST: Partially small imaged bilateral pleural effusions and   consolidation at the right lung base. Air again seen in the pleural space.    LIVER: See below. Normal size. No mass. No fatty infiltration.   BILE DUCTS: Normal caliber. Common bile duct measures 3 mm.  GALLBLADDER: Within normal limits.  SPLEEN: Within normal limits.  PANCREAS: Scattered cystic lesions in the pancreas with the largest in   the pancreatic head measuring 2.1 cm, slightly increased in size since   12/4/2019. Previously seen cystic foci in the region of the liver are not   seen on the current study. Peripancreatic edema and fluid which has   increased since 12/4/2019. There is a tract of fluid extending from the   larger cyst into the mediastinum to the right of the esophagus and   towards the right pleural space (series 6 images 13-31). No cystic   lesions including a 1.2 cm lesion in the pancreatic head (series 7 image   32), also slightly increased in size and 2 adjacent cystic lesions in the   pancreatic head with the larger measuring 9 mm, not as well seen on the   prior CT.  ADRENALS: Within normal limits.  KIDNEYS/URETERS: Normal size. No hydronephrosis.    VISUALIZED PORTIONS:    BOWEL: Normal caliber.   PERITONEUM: Small amount of ascites and mesenteric edema, increased since   12/4/2019.  VESSELS: Abdominal aorta normal in caliber.  RETROPERITONEUM/LYMPH NODES: No lymphadenopathy.    ABDOMINAL WALL: Anasarca.  BONES: No aggressive osseous lesion.    IMPRESSION:     Peripancreatic fluid and edema consistent with pancreatitis.    Cystic lesions in the pancreatic head with the largest measuring 2.1 cm,   likely acute peripancreatic collections, increased in size since   12/4/2019, with a tract of fluid extending from the 2.1 cm cystic lesion   into the mediastinum and towards the pleural space on the right   suspicious for a pancreatic pleural fistula.    The findings were discussed with GLO Roque at 5:38 PM on 12/9/2019.    AMIE RAMIREZ   This document has been electronically signed. Dec  9 2019  5:40PM        < end of copied text >          REVIEW OF SYSTEMS:  UTO obtain today , patient refusing to answer , feels anxious .     Vital Signs Last 24 Hrs  T(C): 37.2 (10 Dec 2019 08:00), Max: 37.3 (10 Dec 2019 00:30)  T(F): 99 (10 Dec 2019 08:00), Max: 99.1 (10 Dec 2019 00:30)  HR: 112 (10 Dec 2019 15:00) (93 - 112)  BP: 130/92 (10 Dec 2019 15:00) (103/66 - 133/88)  BP(mean): 108 (10 Dec 2019 15:00) (79 - 108)  RR: 20 (10 Dec 2019 15:00) (17 - 22)  SpO2: 99% (10 Dec 2019 15:00) (94% - 100%)    PHYSICAL EXAM:    Refused to be examined

## 2019-12-10 NOTE — PROGRESS NOTE ADULT - ASSESSMENT
34 year old female with a PMHx of smoking, ETOH use (4-5 drinks/day),   transferred from [facility] [place] to [facility] on 11/22 for complaints of weakness, 25 pound weight loss & flu like symptoms & found to have a large right pleural effusion, pleural fluid cultures + for group A hemolytic strep, + RIJ thormbus   11/23 CT of the chest/abd/pelvis indicated a clot in the Right IJ, pt started on heparin gtt.     s/p right VATS decort & washout debridement of CT site & VAC placement on 11/27/19.   ·	Persistent drainage from chest tube site so repeat CT C/A/P performed 12/4 which showed new cystic mass in pancreas and liver concerning for pancreatitis, GI consulted (NTD).   ·	Zosyn changed to meropenem on 12/6 by ID for broader coverage given pancreatitis. Plan to continue Merrem for 4 weeks.   ·	Copious drainage from anterior chest tube site, so VAC removed and ostomy appliance applied for high output on 12/6.   ·	Pleural fluid with [place] amylase of 03251 (serum 51) noted.  Prealbumin noted to be 5. Pt tolerating diet. Ensure drinks added.   ·	12/7 Right chest tube removed. Esophogram normal.   ·	Repeat CT scan showed a persistent Right hydropneumothorax with air anteriorly, right lower anterolateral chest wall with air tract to right pleural space, and Persistent renal nephrograms b/l indicating ATN.   ·	All cultures negative. 12/8 Ostomy appliance removed and dressing applied.   ·	Right lower extremity swelling noted to be worsening and patient upgraded to the ICU. No acute findings on venous and arterial US of b/l LEs or Renal US.      12/9 s.p PICC placement & Right anterior pig tail catheter & right lateral wound vac placement with bridge     ·	MRCP significant for Peripancreatic fluid and edema consistent with pancreatitis. Cystic lesions in the pancreatic head with the largest measuring 2.1 cm, likely acute peripancreatic collections, increased in size since 12/4/2019, with a tract of fluid extending from the 2.1 cm cystic lesion into the mediastinum and towards the pleural space on the right suspicious for a pancreatic pleural fistula.    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 and possible pancreatic and liver abscess, currently pending official recommendation from GI, discussed with team as patient may need surgical intervention for the same   will continue ABX as per ID via PICC line, maintain right anterior chest tube to suction , currently without leak; maintain vac, change q 3 days;   patient with many social concerns/ issues including ETOH abuse history complicated by numerous psychosocial concerns with comorbid anxiety with strong family history of schizophrenia with suicide- will continue social support, case management and social work consult in- patient currently with concerns about mothers well being while she remains inpatient and she notes she believes her mothers coverage for services have lapsed?     E/M TIME SPENT:   30 minutes of noncontinuous time spent   Evaluating/treating patient, reviewing data/labs/imaging, discussing case with multidisciplinary team, discussing plan/goals of care with patient/family about patient's condition . Non-inclusive of procedure time.   greater than 50% of time spent educating patient about condition, medication and prognosis.

## 2019-12-11 LAB
ACTH SER-ACNC: <1.5 PG/ML — LOW (ref 7.2–63.3)
ALBUMIN SERPL ELPH-MCNC: 1.8 G/DL — LOW (ref 3.3–5.2)
ALP SERPL-CCNC: 184 U/L — HIGH (ref 40–120)
ALT FLD-CCNC: 7 U/L — SIGNIFICANT CHANGE UP
AMYLASE P1 CFR SERPL: 101 U/L — SIGNIFICANT CHANGE UP (ref 36–128)
ANION GAP SERPL CALC-SCNC: 13 MMOL/L — SIGNIFICANT CHANGE UP (ref 5–17)
APTT BLD: 72 SEC — HIGH (ref 27.5–36.3)
AST SERPL-CCNC: 12 U/L — SIGNIFICANT CHANGE UP
BILIRUB SERPL-MCNC: <0.2 MG/DL — LOW (ref 0.4–2)
BUN SERPL-MCNC: 9 MG/DL — SIGNIFICANT CHANGE UP (ref 8–20)
CALCIUM SERPL-MCNC: 7.8 MG/DL — LOW (ref 8.6–10.2)
CHLORIDE SERPL-SCNC: 100 MMOL/L — SIGNIFICANT CHANGE UP (ref 98–107)
CO2 SERPL-SCNC: 25 MMOL/L — SIGNIFICANT CHANGE UP (ref 22–29)
CREAT SERPL-MCNC: 0.96 MG/DL — SIGNIFICANT CHANGE UP (ref 0.5–1.3)
GLUCOSE SERPL-MCNC: 105 MG/DL — SIGNIFICANT CHANGE UP (ref 70–115)
HCT VFR BLD CALC: 25.4 % — LOW (ref 34.5–45)
HGB BLD-MCNC: 7.9 G/DL — LOW (ref 11.5–15.5)
INSULIN-LIKE GROWTH FACTOR 1 INTERPRETATION: SIGNIFICANT CHANGE UP
INSULIN-LIKE GROWTH FACTOR 1: 100 NG/ML — SIGNIFICANT CHANGE UP (ref 82–279)
LIDOCAIN IGE QN: 673 U/L — HIGH (ref 22–51)
LIPASE FLD-CCNC: SIGNIFICANT CHANGE UP
MAGNESIUM SERPL-MCNC: 1.5 MG/DL — LOW (ref 1.6–2.6)
MCHC RBC-ENTMCNC: 30.4 PG — SIGNIFICANT CHANGE UP (ref 27–34)
MCHC RBC-ENTMCNC: 31.1 GM/DL — LOW (ref 32–36)
MCV RBC AUTO: 97.7 FL — SIGNIFICANT CHANGE UP (ref 80–100)
PLATELET # BLD AUTO: 458 K/UL — HIGH (ref 150–400)
POTASSIUM SERPL-MCNC: 4 MMOL/L — SIGNIFICANT CHANGE UP (ref 3.5–5.3)
POTASSIUM SERPL-SCNC: 4 MMOL/L — SIGNIFICANT CHANGE UP (ref 3.5–5.3)
PROT SERPL-MCNC: 4.8 G/DL — LOW (ref 6.6–8.7)
RBC # BLD: 2.6 M/UL — LOW (ref 3.8–5.2)
RBC # FLD: 16.6 % — HIGH (ref 10.3–14.5)
SODIUM SERPL-SCNC: 138 MMOL/L — SIGNIFICANT CHANGE UP (ref 135–145)
WBC # BLD: 21.12 K/UL — HIGH (ref 3.8–10.5)
WBC # FLD AUTO: 21.12 K/UL — HIGH (ref 3.8–10.5)

## 2019-12-11 PROCEDURE — 99232 SBSQ HOSP IP/OBS MODERATE 35: CPT

## 2019-12-11 PROCEDURE — 99024 POSTOP FOLLOW-UP VISIT: CPT

## 2019-12-11 PROCEDURE — 99233 SBSQ HOSP IP/OBS HIGH 50: CPT

## 2019-12-11 PROCEDURE — 71045 X-RAY EXAM CHEST 1 VIEW: CPT | Mod: 26,76

## 2019-12-11 RX ORDER — MAGNESIUM SULFATE 500 MG/ML
4 VIAL (ML) INJECTION ONCE
Refills: 0 | Status: COMPLETED | OUTPATIENT
Start: 2019-12-11 | End: 2019-12-11

## 2019-12-11 RX ORDER — MEROPENEM 1 G/30ML
1000 INJECTION INTRAVENOUS EVERY 8 HOURS
Refills: 0 | Status: COMPLETED | OUTPATIENT
Start: 2019-12-11 | End: 2020-01-03

## 2019-12-11 RX ORDER — SODIUM CHLORIDE 9 MG/ML
10 INJECTION INTRAMUSCULAR; INTRAVENOUS; SUBCUTANEOUS
Refills: 0 | Status: DISCONTINUED | OUTPATIENT
Start: 2019-12-11 | End: 2020-01-07

## 2019-12-11 RX ORDER — CHLORHEXIDINE GLUCONATE 213 G/1000ML
1 SOLUTION TOPICAL
Refills: 0 | Status: DISCONTINUED | OUTPATIENT
Start: 2019-12-11 | End: 2019-12-23

## 2019-12-11 RX ORDER — ELECTROLYTE SOLUTION,INJ
1 VIAL (ML) INTRAVENOUS
Refills: 0 | Status: DISCONTINUED | OUTPATIENT
Start: 2019-12-11 | End: 2019-12-11

## 2019-12-11 RX ORDER — LACTOBACILLUS ACIDOPHILUS 100MM CELL
1 CAPSULE ORAL
Refills: 0 | Status: DISCONTINUED | OUTPATIENT
Start: 2019-12-11 | End: 2019-12-17

## 2019-12-11 RX ADMIN — GABAPENTIN 100 MILLIGRAM(S): 400 CAPSULE ORAL at 22:03

## 2019-12-11 RX ADMIN — Medication 1 EACH: at 22:14

## 2019-12-11 RX ADMIN — OCTREOTIDE ACETATE 100 MICROGRAM(S): 200 INJECTION, SOLUTION INTRAVENOUS; SUBCUTANEOUS at 15:00

## 2019-12-11 RX ADMIN — PANTOPRAZOLE SODIUM 40 MILLIGRAM(S): 20 TABLET, DELAYED RELEASE ORAL at 05:58

## 2019-12-11 RX ADMIN — Medication 1 MILLIGRAM(S): at 11:41

## 2019-12-11 RX ADMIN — OCTREOTIDE ACETATE 100 MICROGRAM(S): 200 INJECTION, SOLUTION INTRAVENOUS; SUBCUTANEOUS at 06:03

## 2019-12-11 RX ADMIN — GABAPENTIN 100 MILLIGRAM(S): 400 CAPSULE ORAL at 15:10

## 2019-12-11 RX ADMIN — Medication 325 MILLIGRAM(S): at 22:03

## 2019-12-11 RX ADMIN — MEROPENEM 100 MILLIGRAM(S): 1 INJECTION INTRAVENOUS at 22:03

## 2019-12-11 RX ADMIN — HYDROMORPHONE HYDROCHLORIDE 4 MILLIGRAM(S): 2 INJECTION INTRAMUSCULAR; INTRAVENOUS; SUBCUTANEOUS at 02:40

## 2019-12-11 RX ADMIN — Medication 100 MILLIGRAM(S): at 11:42

## 2019-12-11 RX ADMIN — HYDROMORPHONE HYDROCHLORIDE 4 MILLIGRAM(S): 2 INJECTION INTRAMUSCULAR; INTRAVENOUS; SUBCUTANEOUS at 10:00

## 2019-12-11 RX ADMIN — Medication 325 MILLIGRAM(S): at 15:10

## 2019-12-11 RX ADMIN — TRAMADOL HYDROCHLORIDE 50 MILLIGRAM(S): 50 TABLET ORAL at 23:03

## 2019-12-11 RX ADMIN — Medication 250 MILLIGRAM(S): at 05:58

## 2019-12-11 RX ADMIN — Medication 500 MILLIGRAM(S): at 11:42

## 2019-12-11 RX ADMIN — HYDROMORPHONE HYDROCHLORIDE 4 MILLIGRAM(S): 2 INJECTION INTRAMUSCULAR; INTRAVENOUS; SUBCUTANEOUS at 10:35

## 2019-12-11 RX ADMIN — Medication 1 TABLET(S): at 17:48

## 2019-12-11 RX ADMIN — Medication 0.25 MILLIGRAM(S): at 19:39

## 2019-12-11 RX ADMIN — Medication 2000 UNIT(S): at 11:42

## 2019-12-11 RX ADMIN — OCTREOTIDE ACETATE 100 MICROGRAM(S): 200 INJECTION, SOLUTION INTRAVENOUS; SUBCUTANEOUS at 22:35

## 2019-12-11 RX ADMIN — TRAMADOL HYDROCHLORIDE 50 MILLIGRAM(S): 50 TABLET ORAL at 22:03

## 2019-12-11 RX ADMIN — TRAMADOL HYDROCHLORIDE 50 MILLIGRAM(S): 50 TABLET ORAL at 15:45

## 2019-12-11 RX ADMIN — ESCITALOPRAM OXALATE 10 MILLIGRAM(S): 10 TABLET, FILM COATED ORAL at 11:42

## 2019-12-11 RX ADMIN — TRAMADOL HYDROCHLORIDE 50 MILLIGRAM(S): 50 TABLET ORAL at 06:51

## 2019-12-11 RX ADMIN — MEROPENEM 100 MILLIGRAM(S): 1 INJECTION INTRAVENOUS at 06:50

## 2019-12-11 RX ADMIN — HEPARIN SODIUM 1300 UNIT(S)/HR: 5000 INJECTION INTRAVENOUS; SUBCUTANEOUS at 04:30

## 2019-12-11 RX ADMIN — Medication 1 TABLET(S): at 11:43

## 2019-12-11 RX ADMIN — TRAMADOL HYDROCHLORIDE 50 MILLIGRAM(S): 50 TABLET ORAL at 15:10

## 2019-12-11 RX ADMIN — GABAPENTIN 100 MILLIGRAM(S): 400 CAPSULE ORAL at 05:58

## 2019-12-11 RX ADMIN — Medication 100 GRAM(S): at 05:29

## 2019-12-11 RX ADMIN — Medication 10 MILLIGRAM(S): at 22:03

## 2019-12-11 RX ADMIN — Medication 325 MILLIGRAM(S): at 05:58

## 2019-12-11 RX ADMIN — TRAMADOL HYDROCHLORIDE 50 MILLIGRAM(S): 50 TABLET ORAL at 05:58

## 2019-12-11 RX ADMIN — Medication 0.25 MILLIGRAM(S): at 00:06

## 2019-12-11 RX ADMIN — HYDROMORPHONE HYDROCHLORIDE 4 MILLIGRAM(S): 2 INJECTION INTRAMUSCULAR; INTRAVENOUS; SUBCUTANEOUS at 01:57

## 2019-12-11 NOTE — PROGRESS NOTE ADULT - SUBJECTIVE AND OBJECTIVE BOX
Patient is a 34y old  Female who presents with a chief complaint of Empyema (11 Dec 2019 00:43)      HPI:  Comes in to [facility] [place]  11/22 with weakness, 25lb weight loss of several months, flu like symptoms, on work up found to have a large right pleural effusion.  Pulmonology contact for thoracentesis, 1.5 L of dark serous fluid was removed.  Chest tube placed shortly thereafter, 3 liters was removed at that point in time. Patient had some abnormalities in their lab work, found to have a leukocytosis with wbc of 40, platelet count of 793, Na 120, chloride 73, lactate of 4.7, amylase 268,  and CTA imaging also revealed a 2.1cm partially cystic mass in the body/tail of the pancreas with possible upstream ductal dilatation. 11/23 patient had CT chest/abdomen/pelvis and was also found to have a clot in the right IJ and was started on a heparin gtt. Patient does state that she has had a recent cough, and complains of right sided neck pain. 11/14 Patient transferred to [facility]. (25 Nov 2019 00:10)      REVIEW OF SYSTEMS:  Constitutional: No fever, weight loss or fatigue  ENMT:  No difficulty hearing, tinnitus, vertigo; No sinus or throat pain  Respiratory: No cough, wheezing, chills or hemoptysis  Cardiovascular: No chest pain, palpitations, dizziness or leg swelling  Gastrointestinal: No abdominal or epigastric pain. No nausea, vomiting or hematemesis; No diarrhea or constipation. No melena or hematochezia.  Skin: No itching, burning, rashes or lesions   Musculoskeletal: No joint pain or swelling; No muscle, back or extremity pain    PAST MEDICAL & SURGICAL HISTORY:      FAMILY HISTORY:      SOCIAL HISTORY:  Smoking Status: [ ] Current, [ ] Former, [ ] Never  Pack Years:  [  ] EtOH  [  ] IVDA    MEDICATIONS:  MEDICATIONS  (STANDING):  amitriptyline 10 milliGRAM(s) Oral at bedtime  ascorbic acid 500 milliGRAM(s) Oral daily  cholecalciferol 2000 Unit(s) Oral daily  escitalopram 10 milliGRAM(s) Oral daily  ferrous    sulfate 325 milliGRAM(s) Oral three times a day  folic acid 1 milliGRAM(s) Oral daily  gabapentin 100 milliGRAM(s) Oral three times a day  heparin  Infusion. 1300 Unit(s)/Hr (13 mL/Hr) IV Continuous <Continuous>  lactobacillus acidophilus 1 Tablet(s) Oral two times a day  lidocaine   Patch 1 Patch Transdermal daily  meropenem  IVPB 1000 milliGRAM(s) IV Intermittent every 12 hours  multivitamin 1 Tablet(s) Oral daily  octreotide  Injectable 100 MICROGram(s) SubCutaneous every 8 hours  pantoprazole    Tablet 40 milliGRAM(s) Oral before breakfast  thiamine 100 milliGRAM(s) Oral daily  traMADol 50 milliGRAM(s) Oral every 8 hours    MEDICATIONS  (PRN):  ALPRAZolam 0.25 milliGRAM(s) Oral every 8 hours PRN anxiety  aluminum hydroxide/magnesium hydroxide/simethicone Suspension 30 milliLiter(s) Oral every 4 hours PRN Dyspepsia  heparin  Injectable 4000 Unit(s) IV Push every 6 hours PRN For aPTT less than 40  heparin  Injectable 2000 Unit(s) IV Push every 6 hours PRN For aPTT between 40 - 57  HYDROmorphone   Tablet 4 milliGRAM(s) Oral every 6 hours PRN Severe Pain (7 - 10)  HYDROmorphone   Tablet 2 milliGRAM(s) Oral every 6 hours PRN Moderate Pain (4 - 6)  methocarbamol 500 milliGRAM(s) Oral every 6 hours PRN Muscle Spasm  ondansetron Injectable 4 milliGRAM(s) IV Push every 4 hours PRN Nausea and/or Vomiting      Allergies    No Known Allergies    Intolerances    Ensure TID (Unknown)      Vital Signs Last 24 Hrs  T(C): 36.9 (11 Dec 2019 04:57), Max: 37.1 (10 Dec 2019 12:00)  T(F): 98.5 (11 Dec 2019 04:57), Max: 98.8 (10 Dec 2019 12:00)  HR: 104 (11 Dec 2019 05:00) (98 - 129)  BP: 119/79 (11 Dec 2019 05:00) (103/69 - 130/92)  BP(mean): 94 (11 Dec 2019 05:00) (81 - 108)  RR: 17 (11 Dec 2019 05:00) (12 - 21)  SpO2: 98% (11 Dec 2019 05:00) (77% - 99%)    12-10 @ 07:01  -  12-11 @ 07:00  --------------------------------------------------------  IN: 2109 mL / OUT: 104 mL / NET: 2005 mL          PHYSICAL EXAM:    General: Well developed; well nourished; in no acute distress  HEENT: MMM, conjunctiva and sclera clear  Gastrointestinal: Soft, non-tender non-distended; Normal bowel sounds; No rebound or guarding  Extremities: Normal range of motion, No clubbing, cyanosis or edema  Neurological: Alert and oriented x3  Skin: Warm and dry. No obvious rash      LABS:                        7.9    21.12 )-----------( 458      ( 11 Dec 2019 03:15 )             25.4     11 Dec 2019 03:15    138    |  100    |  9.0    ----------------------------<  105    4.0     |  25.0   |  0.96     Ca    7.8        11 Dec 2019 03:15  Phos  4.1       10 Dec 2019 03:06  Mg     1.5       11 Dec 2019 03:15    TPro  4.8    /  Alb  1.8    /  TBili  <0.2   /  DBili  x      /  AST  12     /  ALT  7      /  AlkPhos  184    / Amylase 101    /Lipase 673    11 Dec 2019 03:15              RADIOLOGY & ADDITIONAL STUDIES: Patient is a 34y old  Female who presents with a chief complaint of Empyema (11 Dec 2019 00:43)      HPI:  [place] [facility] [place] weakness, 25lb weight loss of several months, flu like symptoms, on work up found to have a large right pleural effusion.  Pulmonology contact for thoracentesis, 1.5 L of dark serous fluid was removed.   Patient with pancreatic pleural fistula. has chest tube in place. Has  6/10 continuos upper abdominal cramping pain. + leukocytosis. No fevers. No nausea or vomting. Tolerating clear liquids. Now on octreotide .     REVIEW OF SYSTEMS:  Constitutional: No fever, weight loss or fatigue  ENMT:  No difficulty hearing, tinnitus, vertigo; No sinus or throat pain  Respiratory: No cough, wheezing, chills or hemoptysis  Cardiovascular: No chest pain, palpitations, dizziness or leg swelling  Gastrointestinal:+ abdominal pain. No nausea, vomiting or hematemesis; No diarrhea or constipation. No melena or hematochezia.  Skin: No itching, burning, rashes or lesions   Musculoskeletal: No joint pain or swelling; No muscle, back or extremity pain    PAST MEDICAL & SURGICAL HISTORY:      FAMILY HISTORY:      SOCIAL HISTORY:  Smoking Status: [ ] Current, [ ] Former, [ ] Never  Pack Years:  [X  ] EtOH  [  ] IVDA    MEDICATIONS:  MEDICATIONS  (STANDING):  amitriptyline 10 milliGRAM(s) Oral at bedtime  ascorbic acid 500 milliGRAM(s) Oral daily  cholecalciferol 2000 Unit(s) Oral daily  escitalopram 10 milliGRAM(s) Oral daily  ferrous    sulfate 325 milliGRAM(s) Oral three times a day  folic acid 1 milliGRAM(s) Oral daily  gabapentin 100 milliGRAM(s) Oral three times a day  heparin  Infusion. 1300 Unit(s)/Hr (13 mL/Hr) IV Continuous <Continuous>  lactobacillus acidophilus 1 Tablet(s) Oral two times a day  lidocaine   Patch 1 Patch Transdermal daily  meropenem  IVPB 1000 milliGRAM(s) IV Intermittent every 12 hours  multivitamin 1 Tablet(s) Oral daily  octreotide  Injectable 100 MICROGram(s) SubCutaneous every 8 hours  pantoprazole    Tablet 40 milliGRAM(s) Oral before breakfast  thiamine 100 milliGRAM(s) Oral daily  traMADol 50 milliGRAM(s) Oral every 8 hours    MEDICATIONS  (PRN):  ALPRAZolam 0.25 milliGRAM(s) Oral every 8 hours PRN anxiety  aluminum hydroxide/magnesium hydroxide/simethicone Suspension 30 milliLiter(s) Oral every 4 hours PRN Dyspepsia  heparin  Injectable 4000 Unit(s) IV Push every 6 hours PRN For aPTT less than 40  heparin  Injectable 2000 Unit(s) IV Push every 6 hours PRN For aPTT between 40 - 57  HYDROmorphone   Tablet 4 milliGRAM(s) Oral every 6 hours PRN Severe Pain (7 - 10)  HYDROmorphone   Tablet 2 milliGRAM(s) Oral every 6 hours PRN Moderate Pain (4 - 6)  methocarbamol 500 milliGRAM(s) Oral every 6 hours PRN Muscle Spasm  ondansetron Injectable 4 milliGRAM(s) IV Push every 4 hours PRN Nausea and/or Vomiting      Allergies    No Known Allergies    Intolerances    Ensure TID (Unknown)      Vital Signs Last 24 Hrs  T(C): 36.9 (11 Dec 2019 04:57), Max: 37.1 (10 Dec 2019 12:00)  T(F): 98.5 (11 Dec 2019 04:57), Max: 98.8 (10 Dec 2019 12:00)  HR: 104 (11 Dec 2019 05:00) (98 - 129)  BP: 119/79 (11 Dec 2019 05:00) (103/69 - 130/92)  BP(mean): 94 (11 Dec 2019 05:00) (81 - 108)  RR: 17 (11 Dec 2019 05:00) (12 - 21)  SpO2: 98% (11 Dec 2019 05:00) (77% - 99%)    12-10 @ 07:01  -  12-11 @ 07:00  --------------------------------------------------------  IN: 2109 mL / OUT: 104 mL / NET: 2005 mL          PHYSICAL EXAM:    General: Well developed; well nourished; in no acute distress  HEENT: MMM, conjunctiva and sclera clear  H- RRR  L- CTA  Gastrointestinal: Soft, non-tender non-distended; Normal bowel sounds; No rebound or guarding  Extremities: Normal range of motion, No clubbing, cyanosis or edema  Neurological: Alert and oriented x3  Skin: Warm and dry. No obvious rash      LABS:                        7.9    21.12 )-----------( 458      ( 11 Dec 2019 03:15 )             25.4     11 Dec 2019 03:15    138    |  100    |  9.0    ----------------------------<  105    4.0     |  25.0   |  0.96     Ca    7.8        11 Dec 2019 03:15  Phos  4.1       10 Dec 2019 03:06  Mg     1.5       11 Dec 2019 03:15    TPro  4.8    /  Alb  1.8    /  TBili  <0.2   /  DBili  x      /  AST  12     /  ALT  7      /  AlkPhos  184    / Amylase 101    /Lipase 673    11 Dec 2019 03:15              RADIOLOGY & ADDITIONAL STUDIES:   < from: CT Chest No Cont (12.07.19 @ 13:51) >  MPRESSION: Interval removal of right chest tube with tract from prior   chest tube now containing air.  Right hydropneumothorax again noted. The   previously identified loculated fluid and air anteriorly has demonstrated   resolution of the fluid component in this region with air now filling the   same size space.    Skin defect with subcutaneous air and complex fluid right lateral chest   wall again noted.    New skin defect right lower anterolateral chest wallwith air tract to   the right pleural space now noted.    Slight increase in fluid tracking in the right major fissure.    Areas of atelectasis in the right lung and opacities in the left upper   lobe are unchanged.    Thickened distal esophagus again noted may be on the basis of esophagitis.    Persistent renal nephrograms bilaterally may be seen in the setting of   acute tubular necrosis given recent intravenous contrast administration.   Correlation with serum creatinine recommended.      < end of copied text >

## 2019-12-11 NOTE — PROGRESS NOTE ADULT - PROBLEM SELECTOR PLAN 1
- pancreatic pleural fistula  CT surgery would like to keep NPO, start TPN and give octroetide  - I checked with Dr Nava. OK to start TPN  - I spoke to ICU PA. Picc line changed. Will keep one port for just TPN  TPN ordered

## 2019-12-11 NOTE — CHART NOTE - NSCHARTNOTEFT_GEN_A_CORE
Source: Patient [x ]  Family [ ]   other [ x] EMR and discussed in A.M rounds     Current Diet: Diet, Clear Liquid (12-10-19 @ 14:55)      Patient reports [ ] nausea  [ ] vomiting [ ] diarrhea [ ] constipation  [ ]chewing problems [ ] swallowing issues  [ x] other: "hungry"    PO intake:  < 50% [ ]   50-75%  [ ]   %  [x ]  other : of clear liquids     Source for PO intake [x ] Patient [ ] family [ ] chart [ ] staff [ ] other      Current Weight:   (12/7) 123#  (12/6) 121#  (12/5) 119#  (12/1) 114#  (11/30) 127#    % Weight Change: 2# wt gain over 2 days, +fluid accumulation in B/L lower extremities, will continue to monitor wt's for trends     Pertinent Medications: MEDICATIONS  (STANDING):  amitriptyline 10 milliGRAM(s) Oral at bedtime  ascorbic acid 500 milliGRAM(s) Oral daily  cholecalciferol 2000 Unit(s) Oral daily  escitalopram 10 milliGRAM(s) Oral daily  ferrous    sulfate 325 milliGRAM(s) Oral three times a day  folic acid 1 milliGRAM(s) Oral daily  gabapentin 100 milliGRAM(s) Oral three times a day  heparin  Infusion. 1300 Unit(s)/Hr (13 mL/Hr) IV Continuous <Continuous>  lactobacillus acidophilus 1 Tablet(s) Oral two times a day  lidocaine   Patch 1 Patch Transdermal daily  meropenem  IVPB 1000 milliGRAM(s) IV Intermittent every 12 hours  multivitamin 1 Tablet(s) Oral daily  octreotide  Injectable 100 MICROGram(s) SubCutaneous every 8 hours  pantoprazole    Tablet 40 milliGRAM(s) Oral before breakfast  thiamine 100 milliGRAM(s) Oral daily  traMADol 50 milliGRAM(s) Oral every 8 hours    MEDICATIONS  (PRN):  ALPRAZolam 0.25 milliGRAM(s) Oral every 8 hours PRN anxiety  aluminum hydroxide/magnesium hydroxide/simethicone Suspension 30 milliLiter(s) Oral every 4 hours PRN Dyspepsia  heparin  Injectable 4000 Unit(s) IV Push every 6 hours PRN For aPTT less than 40  heparin  Injectable 2000 Unit(s) IV Push every 6 hours PRN For aPTT between 40 - 57  HYDROmorphone   Tablet 4 milliGRAM(s) Oral every 6 hours PRN Severe Pain (7 - 10)  HYDROmorphone   Tablet 2 milliGRAM(s) Oral every 6 hours PRN Moderate Pain (4 - 6)  methocarbamol 500 milliGRAM(s) Oral every 6 hours PRN Muscle Spasm  ondansetron Injectable 4 milliGRAM(s) IV Push every 4 hours PRN Nausea and/or Vomiting    Pertinent Labs: CBC Full  -  ( 11 Dec 2019 03:15 )  WBC Count : 21.12 K/uL  RBC Count : 2.60 M/uL  Hemoglobin : 7.9 g/dL  Hematocrit : 25.4 %  Platelet Count - Automated : 458 K/uL  Mean Cell Volume : 97.7 fl  Mean Cell Hemoglobin : 30.4 pg  Mean Cell Hemoglobin Concentration : 31.1 gm/dL  Auto Neutrophil # : x  Auto Lymphocyte # : x  Auto Monocyte # : x  Auto Eosinophil # : x  Auto Basophil # : x  Auto Neutrophil % : x  Auto Lymphocyte % : x  Auto Monocyte % : x  Auto Eosinophil % : x  Auto Basophil % : x        Skin: R lateral CT site with wound vac     Nutrition focused physical exam Previously conducted - found signs of malnutrition [ ]absent [x ]present    Subcutaneous fat loss: [ ] Orbital fat pads region, [ ]Buccal fat region, [x ]Triceps region,  [ ]Ribs region    Muscle wasting: [x ]Temples region, [x ]Clavicle region, [x ]Shoulder region, [ ]Scapula region, [ ]Interosseous region,  [ ]thigh region, [ ]Calf region    Estimated Needs:   [ x] no change since previous assessment  [ ] recalculated:     Hospital course:   Pt is a 34 year old female with a PMHx of smoking, ETOH use (4-5 drinks/day),   [place] [facility] [place] [facility] [place] for complaints of weakness, 25 pound weight loss & flu like symptoms & found to have a large right pleural effusion, pleural fluid cultures + for group A hemolytic strep, + RIJ thormbus   11/23 CT of the chest/abd/pelvis indicated a clot in the Right IJ, pt started on heparin gtt.   s/p right VATS decort & washout debridement of CT site & VAC placement on 11/27/19.   Persistent drainage from chest tube site so repeat CT C/A/P performed 12/4 which showed new cystic mass in pancreas and liver concerning for pancreatitis, GI consulted (NTD).   Zosyn changed to meropenem on 12/6 by ID for broader coverage given pancreatitis. Plan to continue Merrem for 4 weeks.   Copious drainage from anterior chest tube site, so VAC removed and ostomy appliance applied for high output on 12/6.   Pleural fluid with an amylase of 53072 (serum 51) noted.  Prealbumin noted to be 5. Pt tolerating diet. Ensure drinks added.   12/7 Right chest tube removed. Esophogram normal.   Repeat CT scan showed a persistent Right hydropneumothorax with air anteriorly, right lower anterolateral chest wall with air tract to right pleural space, and Persistent renal nephrograms b/l indicating ATN.   All cultures negative. 12/8 Ostomy appliance removed and dressing applied.   Right lower extremity swelling noted to be worsening and patient upgraded to the ICU on 12/8. No acute findings on venous and arterial US of b/l LEs or Renal US.    12/9 s.p PICC placement & Right anterior pig tail catheter & right lateral wound vac placement with bridge     MRCP significant for Peripancreatic fluid and edema consistent with pancreatitis. Cystic lesions in the pancreatic head with the largest measuring 2.1 cm, likely acute peripancreatic collections, increased in size since 12/4/2019, with a tract of fluid extending from the 2.1 cm cystic lesion into the mediastinum and towards the pleural space on the right suspicious for a pancreatic pleural fistula.  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 and possible pancreatic and liver abscess  - GI currently rec clear diet & trial of octreotide, some discussion of possible TPN with total bowel rest       Current Nutrition Diagnosis:  Pt remains at high nutrition risk secondary to Severe-acute protein calorie malnutrition related to inadequate protein energy intake with decreased appetite and altered GI function in setting of large right pleural effusion, s/p Right VATS decortication & washout, empyema and now with findings consistent with pancreatitis as evidenced by pt with previous 25# unintentional wt loss (17%) x3 months, previously meeting <50% estimated energy intake > 7 days.  Pt also with mild muscle wasting and mild fat loss and now with +fluid accumulation in B/L lower extremities. Pt now on Clear liquid diet, taking and tolerating well, with complaints of hunger. Aware pt will need bowel rest and possible initiation of TPN. Recommendations below:     Recommendations:   1. Continue with MVI, Folic acid and Vit. C daily   2. Check wt daily to monitor trends  3. Continue with Clear liquid diet (with Ensure Clear TID)   4. Monitor H/H, Lipase, electrolytes     Monitoring and Evaluation:   [x ] PO intake [x ] Tolerance to diet prescription [X] Weights  [X] Follow up per protocol [X] Labs:

## 2019-12-11 NOTE — PROGRESS NOTE ADULT - SUBJECTIVE AND OBJECTIVE BOX
INTERVAL HPI/OVERNIGHT EVENTS:  folow up  slightly high Proalctin    now found to have TSH  leevated     pt with  pancreatic pleural fistula    started on octroetide     still with right sided CP     eatign  clear liquids but does not like  it   -   found to have fistula  between pancreatic cysts and   MEDICATIONS  (STANDING):  amitriptyline 10 milliGRAM(s) Oral at bedtime  ascorbic acid 500 milliGRAM(s) Oral daily  chlorhexidine 2% Cloths 1 Application(s) Topical <User Schedule>  cholecalciferol 2000 Unit(s) Oral daily  escitalopram 10 milliGRAM(s) Oral daily  ferrous    sulfate 325 milliGRAM(s) Oral three times a day  folic acid 1 milliGRAM(s) Oral daily  gabapentin 100 milliGRAM(s) Oral three times a day  heparin  Infusion. 1300 Unit(s)/Hr (13 mL/Hr) IV Continuous <Continuous>  lactobacillus acidophilus 1 Tablet(s) Oral two times a day  lidocaine   Patch 1 Patch Transdermal daily  meropenem  IVPB 1000 milliGRAM(s) IV Intermittent every 8 hours  multivitamin 1 Tablet(s) Oral daily  octreotide  Injectable 100 MICROGram(s) SubCutaneous every 8 hours  pantoprazole    Tablet 40 milliGRAM(s) Oral before breakfast  Parenteral Nutrition - Adult 1 Each (42 mL/Hr) TPN Continuous <Continuous>  thiamine 100 milliGRAM(s) Oral daily  traMADol 50 milliGRAM(s) Oral every 8 hours    MEDICATIONS  (PRN):  ALPRAZolam 0.25 milliGRAM(s) Oral every 8 hours PRN anxiety  aluminum hydroxide/magnesium hydroxide/simethicone Suspension 30 milliLiter(s) Oral every 4 hours PRN Dyspepsia  heparin  Injectable 4000 Unit(s) IV Push every 6 hours PRN For aPTT less than 40  heparin  Injectable 2000 Unit(s) IV Push every 6 hours PRN For aPTT between 40 - 57  HYDROmorphone   Tablet 4 milliGRAM(s) Oral every 6 hours PRN Severe Pain (7 - 10)  HYDROmorphone   Tablet 2 milliGRAM(s) Oral every 6 hours PRN Moderate Pain (4 - 6)  methocarbamol 500 milliGRAM(s) Oral every 6 hours PRN Muscle Spasm  ondansetron Injectable 4 milliGRAM(s) IV Push every 4 hours PRN Nausea and/or Vomiting  sodium chloride 0.9% lock flush 10 milliLiter(s) IV Push every 1 hour PRN Pre/post blood products, medications, blood draw, and to maintain line patency      Allergies    No Known Allergies    Intolerances    Ensure TID (Unknown)      Review of systems:    Vital Signs Last 24 Hrs  T(C): 37.3 (11 Dec 2019 19:41), Max: 37.3 (11 Dec 2019 19:41)  T(F): 99.1 (11 Dec 2019 19:41), Max: 99.1 (11 Dec 2019 19:41)  HR: 88 (11 Dec 2019 16:00) (88 - 119)  BP: 111/80 (11 Dec 2019 16:00) (105/86 - 127/87)  BP(mean): 92 (11 Dec 2019 16:00) (88 - 107)  RR: 15 (11 Dec 2019 16:00) (12 - 24)  SpO2: 96% (11 Dec 2019 16:00) (92% - 100%)    PHYSICAL EXAM:      Constitutional: NAD, well-groomed, well-developed    Respiratory: CTAB  Cardiovascular: S1 and S2, RRR, no M/G/R    Neurological: A/O x 3, no focal deficits    Skin: No rashes, no acanthosis        LABS:                        7.9    21.12 )-----------( 458      ( 11 Dec 2019 03:15 )             25.4     12-11    138  |  100  |  9.0  ----------------------------<  105  4.0   |  25.0  |  0.96    Ca    7.8<L>      11 Dec 2019 03:15  Phos  4.1     12-10  Mg     1.5     12-11    TPro  4.8<L>  /  Alb  1.8<L>  /  TBili  <0.2<L>  /  DBili  x   /  AST  12  /  ALT  7   /  AlkPhos  184<H>  12-11      Thyroid Stimulating Hormone, Serum (12.10.19 @ 06:02)    Thyroid Stimulating Hormone, Serum: 11.82 uIU/mL    Thyroid Stimulating Hormone, Serum (12.05.19 @ 19:52)    Thyroid Stimulating Hormone, Serum: 2.02 uIU/mL    Thyroid Stimulating Hormone, Serum (12.04.19 @ 06:46)    Thyroid Stimulating Hormone, Serum: 2.20 uIU/mL    Thyroid Stimulating Hormone, Serum (11.25.19 @ 05:29)    Thyroid Stimulating Hormone, Serum: 4.76 uIU/mL    Triiodothyronine, Total (T3 Total) (12.10.19 @ 06:02)    Triiodothyronine, Total (T3 Total): 84 ng/dL  T4, Serum (12.10.19 @ 06:02)    T4, Serum: 4.2 ug/dL  T4, Serum (12.04.19 @ 06:46)    T4, Serum: 5.7 ug/dL    T4, Serum (11.26.19 @ 05:17)    T4, Serum: 5.2 ug/dL

## 2019-12-11 NOTE — PROCEDURE NOTE - NSPOSTPRCRAD_GEN_A_CORE
chest radiograph/postion of catheter/line adjusted to depth of insertion/ultrasound/depth of insertion/line in appropriate postion

## 2019-12-11 NOTE — PROGRESS NOTE ADULT - SUBJECTIVE AND OBJECTIVE BOX
[facility]  INFECTIOUS DISEASES AND INTERNAL MEDICINE at [place]  =======================================================  Carlos Alberto Soto MD  Diplomates American Board of Internal Medicine and Infectious Diseases  Telephone [phone]  Fax            [phone]  =======================================================    [id number]  HERACLIO MOLINA   follow up:  infected pleural effusion, empyema  s/p VATS on 11/27/19    s/p PICC line today    =======================================================    REVIEW OF SYSTEMS:  CONSTITUTIONAL:  No Fever or chills  HEENT:  No diplopia or blurred vision.  No earache, sore throat or runny nose.  CARDIOVASCULAR:  No pressure, squeezing, strangling, tightness, heaviness or aching about the chest, neck, axilla or epigastrium.  RESPIRATORY:  No cough, shortness of breath  GASTROINTESTINAL:  No nausea, vomiting or diarrhea.  GENITOURINARY:  No dysuria, frequency or urgency. No Blood in urine  MUSCULOSKELETAL:  no joint aches, no muscle pain  SKIN:  No change in skin, hair or nails.  NEUROLOGIC:  No Headaches, seizures or weakness.  PSYCHIATRIC:  No disorder of thought or mood.  ENDOCRINE:  No heat or cold intolerance  HEMATOLOGICAL:  No easy bruising or bleeding.   =======================================================  Allergies  No Known Allergies    Antibiotics:  meropenem  IVPB 1000 milliGRAM(s) IV Intermittent every 12 hours       =====================================================  Physical Exam:  ============  T(F): 99 (10 Dec 2019 08:00), Max: 99.1 (10 Dec 2019 00:30)  HR: 104 (10 Dec 2019 12:00)  BP: 120/93 (10 Dec 2019 12:00)  RR: 20 (10 Dec 2019 12:00)  SpO2: 98% (10 Dec 2019 12:00) (94% - 100%)  temp max in last 48H T(F): , Max: 99.1 (12-10-19 @ 00:30)    General:  No acute distress.   Eye: Pupils are equal, round and reactive to light, Extraocular movements are intact, Normal conjunctiva.  HENT: Normocephalic, Oral mucosa is moist, No pharyngeal erythema, No sinus tenderness.  Neck: Supple, No lymphadenopathy.  Respiratory: decreased breath sounds right lung   Cardiovascular: Normal rate, Regular rhythm,   + edema of legs  Gastrointestinal: Soft, mild right abd tenderness  Normal bowel sounds.  Genitourinary: No costovertebral angle tenderness.  Lymphatics: No lymphadenopathy neck,   Musculoskeletal: Normal range of motion, Normal strength.  Integumentary: No rash.  Neurologic: Alert, Oriented, No focal deficits, Cranial Nerves II-XII are grossly intact.  Psychiatric: Appropriate mood & affect.    =======================================================  11/22/19 blood cx negative x 2  11/22/19 - body fluid cx with Moderate Alpha hemolytic strep x 2 sets  Alpha hemolytic strep  		[facility]	E Test  Ceftriaxone  			S (0.19)   Clindamycin 	S    Erythromycin 	S   Penicillin 	S   Vancomycin 	S     =======================================  Labs:                        7.9    21.12 )-----------( 458      ( 11 Dec 2019 03:15 )             25.4       WBC Count: 21.12 K/uL (12-11-19 @ 03:15)  WBC Count: 18.65 K/uL (12-10-19 @ 06:03)  WBC Count: 18.59 K/uL (12-10-19 @ 03:06)  WBC Count: 21.14 K/uL (12-09-19 @ 03:08)  WBC Count: 17.07 K/uL (12-08-19 @ 18:28)  WBC Count: 14.46 K/uL (12-07-19 @ 18:53)  WBC Count: 13.79 K/uL (12-07-19 @ 05:42)  WBC Count: 15.80 K/uL (12-06-19 @ 21:13)      12-11    138  |  100  |  9.0  ----------------------------<  105  4.0   |  25.0  |  0.96    Ca    7.8<L>      11 Dec 2019 03:15  Phos  4.1     12-10  Mg     1.5     12-11    TPro  4.8<L>  /  Alb  1.8<L>  /  TBili  <0.2<L>  /  DBili  x   /  AST  12  /  ALT  7   /  AlkPhos  184<H>  12-11      Culture - Urine (collected 12-04-19 @ 10:24)  Source: .Urine  Final Report (12-05-19 @ 11:51):    No growth    Culture - Other (collected 12-03-19 @ 14:03)  Source: .Other chest tube insertion site (pus/purulent)  Final Report (12-05-19 @ 09:15):    No growth at 2 days.    Culture - Blood (collected 12-03-19 @ 13:30)  Source: .Blood  Final Report (12-08-19 @ 15:00):    No growth at 5 days.    Culture - Blood (collected 12-03-19 @ 13:30)  Source: .Blood  Final Report (12-08-19 @ 15:00):    No growth at 5 days.    Culture - Acid Fast - Tissue w/Smear (collected 11-28-19 @ 20:40)  Source: .Tissue chest wound    Culture - Fungal, Tissue (collected 11-27-19 @ 20:12)  Source: .Tissue chest wound - Fungal culture    Culture - Tissue with Gram Stain (collected 11-27-19 @ 20:12)  Source: .Tissue chest wound  Gram Stain (11-27-19 @ 21:27):    Few White blood cells    No organisms seen  Final Report (12-02-19 @ 16:00):    No growth at 5 days.      Creatinine, Serum: 0.96 mg/dL (12-11-19 @ 03:15)  Creatinine, Serum: 1.06 mg/dL (12-10-19 @ 03:06)  Creatinine, Serum: 1.36 mg/dL (12-09-19 @ 03:08)  Creatinine, Serum: 1.98 mg/dL (12-07-19 @ 23:07)  Creatinine, Serum: 2.29 mg/dL (12-07-19 @ 05:42)

## 2019-12-11 NOTE — PROGRESS NOTE ADULT - ASSESSMENT
34 year old female with a PMHx of smoking, ETOH use (4-5 drinks/day),   transferred from [facility] to [facility] on 11/22 for complaints of weakness, 25 pound weight loss & flu like symptoms & found to have a large right pleural effusion, pleural fluid cultures + for group A hemolytic strep, + RIJ thormbus   11/23 CT of the chest/abd/pelvis indicated a clot in the Right IJ, pt started on heparin gtt.     s/p right VATS decort & washout debridement of CT site & VAC placement on 11/27/19.   ·	Persistent drainage from chest tube site so repeat CT C/A/P performed 12/4 which showed new cystic mass in pancreas and liver concerning for pancreatitis, GI consulted (NTD).   ·	Zosyn changed to meropenem on 12/6 by ID for broader coverage given pancreatitis. Plan to continue Merrem for 4 weeks.   ·	Copious drainage from anterior chest tube site, so VAC removed and ostomy appliance applied for high output on 12/6.   ·	Pleural fluid with [place] amylase [place] 83237 (serum 51) noted.  Prealbumin noted to be 5. Pt tolerating diet. Ensure drinks added.   ·	12/7 Right chest tube removed. Esophogram normal.   ·	Repeat CT scan showed a persistent Right hydropneumothorax with air anteriorly, right lower anterolateral chest wall with air tract to right pleural space, and Persistent renal nephrograms b/l indicating ATN.   ·	All cultures negative. 12/8 Ostomy appliance removed and dressing applied.   ·	Right lower extremity swelling noted to be worsening and patient upgraded to the ICU on 12/8. No acute findings on venous and arterial US of b/l LEs or Renal US.      12/9 s.p PICC placement & Right anterior pig tail catheter & right lateral wound vac placement with bridge     ·	MRCP significant for Peripancreatic fluid and edema consistent with pancreatitis. Cystic lesions in the pancreatic head with the largest measuring 2.1 cm, likely acute peripancreatic collections, increased in size since 12/4/2019, with a tract of fluid extending from the 2.1 cm cystic lesion into the mediastinum and towards the pleural space on the right suspicious for a pancreatic pleural fistula.    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 and possible pancreatic and liver abscess  - GI currently rec clear diet & trial of octreotide, some discussion of possible TPN with total bowel rest   will continue ABX as per ID via PICC line, maintain right anterior chest tube to suction , currently without leak; maintain vac, change q 3 days;   patient with many social concerns/ issues including ETOH abuse history complicated by numerous psychosocial concerns with comorbid anxiety with strong family history of schizophrenia with suicide- will continue social support, case management and social work consult in- patient currently with concerns about mothers well being while she remains inpatient and she notes she believes her mothers coverage for services have lapsed? lexapro ordered and offered to patient (she will think about it)   endo will need to come evaluate as LH/FSH/ estradiol low in the setting of increased prolactin & elevated TSH     E/M TIME SPENT:   25 minutes of noncontinuous time spent   Evaluating/treating patient, reviewing data/labs/imaging, discussing case with multidisciplinary team, discussing plan/goals of care with patient/family about patient's condition . Non-inclusive of procedure time.   greater than 50% of time spent educating patient about condition, medication and prognosis.

## 2019-12-11 NOTE — PROGRESS NOTE ADULT - SUBJECTIVE AND OBJECTIVE BOX
Patient seen and examined . Feels little better , still feels fatigue , R sided chest pain + , had one BM today     CC : fatigue , R sided chest pain       MEDICATIONS  (STANDING):  amitriptyline 10 milliGRAM(s) Oral at bedtime  ascorbic acid 500 milliGRAM(s) Oral daily  chlorhexidine 2% Cloths 1 Application(s) Topical <User Schedule>  cholecalciferol 2000 Unit(s) Oral daily  escitalopram 10 milliGRAM(s) Oral daily  ferrous    sulfate 325 milliGRAM(s) Oral three times a day  folic acid 1 milliGRAM(s) Oral daily  gabapentin 100 milliGRAM(s) Oral three times a day  heparin  Infusion. 1300 Unit(s)/Hr (13 mL/Hr) IV Continuous <Continuous>  lactobacillus acidophilus 1 Tablet(s) Oral two times a day  lidocaine   Patch 1 Patch Transdermal daily  meropenem  IVPB 1000 milliGRAM(s) IV Intermittent every 12 hours  multivitamin 1 Tablet(s) Oral daily  octreotide  Injectable 100 MICROGram(s) SubCutaneous every 8 hours  pantoprazole    Tablet 40 milliGRAM(s) Oral before breakfast  Parenteral Nutrition - Adult 1 Each (42 mL/Hr) TPN Continuous <Continuous>  thiamine 100 milliGRAM(s) Oral daily  traMADol 50 milliGRAM(s) Oral every 8 hours    MEDICATIONS  (PRN):  ALPRAZolam 0.25 milliGRAM(s) Oral every 8 hours PRN anxiety  aluminum hydroxide/magnesium hydroxide/simethicone Suspension 30 milliLiter(s) Oral every 4 hours PRN Dyspepsia  heparin  Injectable 4000 Unit(s) IV Push every 6 hours PRN For aPTT less than 40  heparin  Injectable 2000 Unit(s) IV Push every 6 hours PRN For aPTT between 40 - 57  HYDROmorphone   Tablet 4 milliGRAM(s) Oral every 6 hours PRN Severe Pain (7 - 10)  HYDROmorphone   Tablet 2 milliGRAM(s) Oral every 6 hours PRN Moderate Pain (4 - 6)  methocarbamol 500 milliGRAM(s) Oral every 6 hours PRN Muscle Spasm  ondansetron Injectable 4 milliGRAM(s) IV Push every 4 hours PRN Nausea and/or Vomiting  sodium chloride 0.9% lock flush 10 milliLiter(s) IV Push every 1 hour PRN Pre/post blood products, medications, blood draw, and to maintain line patency      LABS:                          7.9    21.12 )-----------( 458      ( 11 Dec 2019 03:15 )             25.4     12-11    138  |  100  |  9.0  ----------------------------<  105  4.0   |  25.0  |  0.96    Ca    7.8<L>      11 Dec 2019 03:15  Phos  4.1     12-10  Mg     1.5     12-11    TPro  4.8<L>  /  Alb  1.8<L>  /  TBili  <0.2<L>  /  DBili  x   /  AST  12  /  ALT  7   /  AlkPhos  184<H>  12-11    PT/INR - ( 10 Dec 2019 03:06 )   PT: 14.7 sec;   INR: 1.27 ratio         PTT - ( 11 Dec 2019 03:15 )  PTT:72.0 sec      RADIOLOGY & ADDITIONAL TESTS:  < from: Xray Chest 1 View- PORTABLE-Urgent (12.11.19 @ 11:55) >     EXAM:  XR CHEST PORTABLE URGENT 1V                          PROCEDURE DATE:  12/11/2019          INTERPRETATION:  Post chest tube removal.    AP chest. Prior earlier same day.    Right chest tube has been removed. Right PICC line tip in the SVC. Left   PICC line tip in right atrium. No definite pneumothorax or other gross   interval change heart and lungs.    Impression: As above                JAMAL MONTOYA M.D., ATTENDING RADIOLOGIST  This document has been electronically signed. Dec 11 2019  1:25PM        < end of copied text >    < from: Xray Chest 1 View- PORTABLE-Routine (12.11.19 @ 05:26) >     EXAM:  XR CHEST PORTABLE ROUTINE 1V                          PROCEDURE DATE:  12/11/2019          INTERPRETATION:  Abnormal chest sounds.    AP chest. Prior 12/10/2019.    Right PICC line right chest tube reidentified in position. No change   heart mediastinum. No change right basilar patchy infiltrate/atelectasis,   and small right pleural effusion. Mild left basilar retrocardiac   atelectatic changes noted. No pneumothorax or other new abnormality.    Impression: As above        JAMAL MONTOYA M.D., ATTENDING RADIOLOGIST  This document has been electronically signed. Dec 11 2019  2:54PM    < end of copied text >      < from: MR MRCP No Cont (12.09.19 @ 16:08) >   EXAM:  MR MRCP                          PROCEDURE DATE:  12/09/2019          INTERPRETATION:  CLINICAL INFORMATION: Worsening pancreatitis.    COMPARISON: CT chest 12/7/2019 and CT chest/abdomen/pelvis 12/4/2019, an   MRI abdomen 11/26/2019    PROCEDURE:   MRI of the abdomen was performed without intravenous contrast.  IV Contrast: None.   MRCP was performed.    FINDINGS:    LOWER CHEST: Partially small imaged bilateral pleural effusions and   consolidation at the right lung base. Air again seen in the pleural space.    LIVER: See below. Normal size. No mass. No fatty infiltration.   BILE DUCTS: Normal caliber. Common bile duct measures 3 mm.  GALLBLADDER: Within normal limits.  SPLEEN: Within normal limits.  PANCREAS: Scattered cystic lesions in the pancreas with the largest in   the pancreatic head measuring 2.1 cm, slightly increased in size since   12/4/2019. Previously seen cystic foci in the region of the liver are not   seen on the current study. Peripancreatic edema and fluid which has   increased since 12/4/2019. There is a tract of fluid extending from the   larger cyst into the mediastinum to the right of the esophagus and   towards the right pleural space (series 6 images 13-31). No cystic   lesions including a 1.2 cm lesion in the pancreatic head (series 7 image   32), also slightly increased in size and 2 adjacent cystic lesions in the   pancreatic head with the larger measuring 9 mm, not as well seen on the   prior CT.  ADRENALS: Within normal limits.  KIDNEYS/URETERS: Normal size. No hydronephrosis.    VISUALIZED PORTIONS:    BOWEL: Normal caliber.   PERITONEUM: Small amount of ascites and mesenteric edema, increased since   12/4/2019.  VESSELS: Abdominal aorta normal in caliber.  RETROPERITONEUM/LYMPH NODES: No lymphadenopathy.    ABDOMINAL WALL: Anasarca.  BONES: No aggressive osseous lesion.    IMPRESSION:     Peripancreatic fluid and edema consistent with pancreatitis.    Cystic lesions in the pancreatic head with the largest measuring 2.1 cm,   likely acute peripancreatic collections, increased in size since   12/4/2019, with a tract of fluid extending from the 2.1 cm cystic lesion   into the mediastinum and towards the pleural space on the right   suspicious for a pancreatic pleural fistula.    The findings were discussed with GLO Roque at 5:38 PM on 12/9/2019.                AMIE RAMIREZ   This document has been electronically signed. Dec  9 2019  5:40PM        < end of copied text >        REVIEW OF SYSTEMS:    As above , all other systems reviewed and are negative     Vital Signs Last 24 Hrs  T(C): 37 (11 Dec 2019 15:00), Max: 37 (11 Dec 2019 15:00)  T(F): 98.6 (11 Dec 2019 15:00), Max: 98.6 (11 Dec 2019 15:00)  HR: 111 (11 Dec 2019 12:00) (95 - 129)  BP: 124/97 (11 Dec 2019 12:00) (105/86 - 128/95)  BP(mean): 107 (11 Dec 2019 12:00) (83 - 107)  RR: 15 (11 Dec 2019 12:00) (12 - 24)  SpO2: 96% (11 Dec 2019 12:00) (77% - 100%)    PHYSICAL EXAM:    GENERAL: NAD, well-groomed, well-developed , pale looking   HEAD:  Atraumatic, Normocephalic  EYES: EOMI, PERRLA, conjunctiva and sclera clear  NECK: Supple, No JVD, Normal thyroid  NERVOUS SYSTEM:  Alert & Oriented X3, no focal deficit  CHEST/LUNG: CTA b/l ,  no  rales, rhonchi, wheezing, or rubs , R wound vac+ , chest tube removed   HEART: Tachycardic . No murmurs, rubs, or gallops  ABDOMEN: Soft, Nontender, Nondistended; Bowel sounds present  EXTREMITIES:  2+ Peripheral Pulses, No clubbing, cyanosis, , b/l LE edema R>L   LYMPH: No lymphadenopathy noted  SKIN: No rashes or lesions

## 2019-12-11 NOTE — PROGRESS NOTE ADULT - ASSESSMENT
34 year old female who is  transferred from [facility] to [facility] on 11/22 for complaints of weakness, 25 pound weight loss & flu like symptoms & found to have a large right pleural effusion , empyema with group A hemolytic strep & has 4-5 drinks of alcohol per day.  On 11/23 CT of the chest/abd/pelvis indicated a clot in the Right IJ. Pt. on heparin bridge to coumadin. CT abdomen shows cystic mass in body/tail of pancreas, now concern for postop ileus.  Post-op course:  Pt s/p right VATS decort & washout debridement of CT site & VAC placement on 11/27/19.  Wound vac was placed and removed today due to increased drainage and leakage around . WBC elevated , abx adjusted by ID , vanco d/c yesterday due to BRAD and high level , procalcitonin high . Was planned for VATS  debridement today ( 12/6) but cancelled . Hg dropped to 7.5 and is getting PRBC ,    MRI with pancreatic cyst but poor study . Followed by CTS ,  ID , Hem / Psych , seen by GI . CT chest , abd / pelvis noted , ? esophageal fluid collection , ? pancreatitis, noted with RONAK likely due to Vanco vs CHIQUITA , on ivf.   MRI done -            Problem/Plan - 1:  ·  Problem: Pleural effusion on right.  Plan: s/p Right VATS decortication & washout on 11/27/19. Debridement of CT site & VAC placement , chest tube removed . Multifocal PNA - on iv abx as per ID .   Now    s.p PICC placement   MRCP significant for Peripancreatic fluid and edema consistent with pancreatitis. Cystic lesions in the pancreatic head with the largest measuring 2.1 cm, likely acute peripancreatic collections, increased in size since 12/4/2019, with a tract of fluid extending from the 2.1 cm cystic lesion into the mediastinum and towards the pleural space on the right suspicious for a pancreatic pleural fistula.     Problem/Plan - 2:  ·  Problem: Empyema.  Plan: s/p Chest tube removal today, continued with iv abx as per ID       Problem/Plan - 3:  ·  Problem: Internal jugular vein thrombosis, right.  Plan: On Heparin drip      Problem/Plan - 5:  ·  Problem: Anemia likely multifactorial ; Due to anemia of chronic dz , multiple blood drawings , dilutional  , patient is tachycardic , pale  Plan: s/p PRBCS X1 , transfuse PRN , check FOBT        Problem/Plan -6:  ·  Problem: Hyponatremia.  Plan: resolved      Problem/Plan - 7:  ·  Problem: Alcohol abuse.  Plan: Monitor for withdrawal, Continue folic, MVI, thiamine.     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 cause , third spacing ,    Problem / Plan -11: Pancreatitis - as per GI    Problem /Plan - 13 : Hypoalbuminemia , prealbumin noted ,  severe protein calorie malnutrition - started on TPN     Problem / Plan - 14 : RONAK - likely multifactorial RONAK; ATN from Vancomycin and ? CHIQUITA - resolved      Problem / Plan - 15 : Prolactinoma , increased TSH ,decreased ACTH - Hormone w/u ordered due to initially unexplained fatigue / wt lost / diarrhea / irregular menstrual periods - not sure if this all related to acute issues , ENDO re called , awaiting for input .     D/W  CT surgery PA.

## 2019-12-11 NOTE — PROGRESS NOTE ADULT - PROBLEM SELECTOR PLAN 1
s/p Right VATS decortication & washout on 11/27/19. Debridement of CT site & VAC placement.  chest tubes since d.c   Encourage the use of I/S, OOB to chair, daily PT, ambulate as tolerated  Continue supportive care.  s/p right anterior chest tube/ PICC line/ new vac placement to right lateral chest wall   Case and plan discussed with Dr. Aponte.

## 2019-12-11 NOTE — PROGRESS NOTE ADULT - SUBJECTIVE AND OBJECTIVE BOX
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  Pulmonology contact for thoracentesis, 1.5 L of dark serous fluid was removed.  Chest tube placed shortly thereafter, 3 liters was removed at that point in time. Patient had some abnormalities in their lab work, found to have a leukocytosis with wbc of 40, platelet count of 793, Na 120, chloride 73, lactate of 4.7, amylase 268,  and CTA imaging also revealed a 2.1cm partially cystic mass in the body/tail of the pancreas with possible upstream ductal dilatation. 11/23 patient had CT chest/abdomen/pelvis and was also found to have a clot in the right IJ and was started on a heparin gtt. Patient does state that she has had a recent cough, and complains of right sided neck pain. 11/14 Patient transferred to [facility]. (25 Nov 2019 00:10)    OBJECTIVE  PAST MEDICAL & SURGICAL HISTORY:    Ensure TID (Unknown)  No Known Allergies    Home Medications:    VITALS  Currently in sinus rhythm with vitals as below  ICU Vital Signs Last 24 Hrs  T(C): 36.7 (10 Dec 2019 20:00), Max: 37.2 (10 Dec 2019 08:00)  T(F): 98.1 (10 Dec 2019 20:00), Max: 99 (10 Dec 2019 08:00)  HR: 102 (10 Dec 2019 21:00) (98 - 129)  BP: 115/81 (10 Dec 2019 21:00) (103/66 - 130/92)  BP(mean): 95 (10 Dec 2019 21:00) (79 - 108)  ABP: --  ABP(mean): --  RR: 21 (10 Dec 2019 20:00) (17 - 21)  SpO2: 96% (10 Dec 2019 21:00) (77% - 99%)      LABS                        8.5    18.65 )-----------( 493      ( 10 Dec 2019 06:03 )             27.2   PT/INR - ( 10 Dec 2019 03:06 )   PT: 14.7 sec;   INR: 1.27 ratio         PTT - ( 10 Dec 2019 17:32 )  PTT:73.5 sec12-10    137  |  101  |  9.0  ----------------------------<  97  4.1   |  23.0  |  1.06    Ca    7.7<L>      10 Dec 2019 03:06  Phos  4.1     12-10  Mg     2.4     12-10    TPro  5.0<L>  /  Alb  1.9<L>  /  TBili  <0.2<L>  /  DBili  x   /  AST  15  /  ALT  9   /  AlkPhos  201<H>  12-10  CAPILLARY BLOOD GLUCOSE      CARDIAC MARKERS ( 09 Dec 2019 03:08 )  x     / x     / 28 U/L / x     / x              IN/OUT    12-09-19 @ 07:01  -  12-10-19 @ 07:00  --------------------------------------------------------  IN: 306 mL / OUT: 45 mL / NET: 261 mL    12-10-19 @ 07:01  -  12-11-19 @ 00:44  --------------------------------------------------------  IN: 1589 mL / OUT: 65 mL / NET: 1524 mL      IMAGING  personally reviewed imaging   Xray Chest 1 View- PORTABLE-Routine:    EXAM:  XR CHEST PORTABLE ROUTINE 1V                          PROCEDURE DATE:  12/10/2019          INTERPRETATION:  CHEST AP PORTABLE:    History: right pigtail.     Date and time of exam: 12/10/2019 5:19 AM.    Technique: A single AP view of the chest was obtained.    Comparison exam: 12/8/2019.    Findings:  A right pigtail chest tube has been inserted since the prior examination.   A right PICC line has been inserted. The tip is located in the region of   the mid superior vena cava. No evidence of pneumothorax. Persistent right   pleural effusion, unchanged. Persistent right basilar atelectasis and/or   infiltrate, unchanged. The left lung remains clear..    Impression:  Insertion of right PICC line in right pigtail chest tube since the prior   exam..                DOUGLAS MYERS M.D., ATTENDING RADIOLOGIST  This document has been electronically signed. Dec 10 2019  8:42AM               (12-10-19 @ 05:51)    CURRENT MEDICATIONS  MEDICATIONS  (STANDING):  amitriptyline 10 milliGRAM(s) Oral at bedtime  ascorbic acid 500 milliGRAM(s) Oral daily  cholecalciferol 2000 Unit(s) Oral daily  escitalopram 10 milliGRAM(s) Oral daily  ferrous    sulfate 325 milliGRAM(s) Oral three times a day  folic acid 1 milliGRAM(s) Oral daily  gabapentin 100 milliGRAM(s) Oral three times a day  heparin  Infusion. 1300 Unit(s)/Hr (13 mL/Hr) IV Continuous <Continuous>  lidocaine   Patch 1 Patch Transdermal daily  meropenem  IVPB 1000 milliGRAM(s) IV Intermittent every 12 hours  multivitamin 1 Tablet(s) Oral daily  octreotide  Injectable 100 MICROGram(s) SubCutaneous every 8 hours  pantoprazole    Tablet 40 milliGRAM(s) Oral before breakfast  saccharomyces boulardii 250 milliGRAM(s) Oral two times a day  thiamine 100 milliGRAM(s) Oral daily  traMADol 50 milliGRAM(s) Oral every 8 hours    MEDICATIONS  (PRN):  ALPRAZolam 0.25 milliGRAM(s) Oral every 8 hours PRN anxiety  aluminum hydroxide/magnesium hydroxide/simethicone Suspension 30 milliLiter(s) Oral every 4 hours PRN Dyspepsia  heparin  Injectable 4000 Unit(s) IV Push every 6 hours PRN For aPTT less than 40  heparin  Injectable 2000 Unit(s) IV Push every 6 hours PRN For aPTT between 40 - 57  HYDROmorphone   Tablet 4 milliGRAM(s) Oral every 6 hours PRN Severe Pain (7 - 10)  HYDROmorphone   Tablet 2 milliGRAM(s) Oral every 6 hours PRN Moderate Pain (4 - 6)  methocarbamol 500 milliGRAM(s) Oral every 6 hours PRN Muscle Spasm  ondansetron Injectable 4 milliGRAM(s) IV Push every 4 hours PRN Nausea and/or Vomiting

## 2019-12-11 NOTE — PROGRESS NOTE ADULT - ASSESSMENT
Slightly elevated proalctin   await repeat  likely will need outpt follow up      TSH now elevated wheras last week was normal  x 4    - repeat in 3-4 days TSH free T 4 Free T3   ?if hypothryoid-  ma=y cause elevation in Proalctin    but I think these variable hormonal abnormalities  are due  to her current illness.  amenable to outpt follow up       pnacreatic cysts with pancreatic-pleural fistual  Chest tube in place   on octreotide

## 2019-12-11 NOTE — PROGRESS NOTE ADULT - ASSESSMENT
This 34 y.o. F who was admitted to [facility] 11/22 with weakness,   ALPHA Strep from body fluid culture  found with large right pleural effusion   s/p chest tube drainage    Hyponatremia - resolving  WBC elevation   alpha hemolytic strep infection  multi focal PNA  infected pleural effusion - empyema  Pancreatic cyst  Liver lesions  chest wall drainage  Acute renal failure - RESOLVED    - Legionella Ag from [facility] 11/24/19 - negative, off azithromycin  - remainder of cultures from [facility] are negative  s/p VATS 11/27/19  - cultures remain negative  - WBC elevation   generally improving    - Chest incision with copious drainage  - 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

## 2019-12-12 LAB
ANION GAP SERPL CALC-SCNC: 10 MMOL/L — SIGNIFICANT CHANGE UP (ref 5–17)
APTT BLD: 64 SEC — HIGH (ref 27.5–36.3)
BUN SERPL-MCNC: 4 MG/DL — LOW (ref 8–20)
CALCIUM SERPL-MCNC: 7.6 MG/DL — LOW (ref 8.6–10.2)
CHLORIDE SERPL-SCNC: 105 MMOL/L — SIGNIFICANT CHANGE UP (ref 98–107)
CO2 SERPL-SCNC: 26 MMOL/L — SIGNIFICANT CHANGE UP (ref 22–29)
CREAT SERPL-MCNC: 0.65 MG/DL — SIGNIFICANT CHANGE UP (ref 0.5–1.3)
GLUCOSE BLDC GLUCOMTR-MCNC: 131 MG/DL — HIGH (ref 70–99)
GLUCOSE SERPL-MCNC: 129 MG/DL — HIGH (ref 70–115)
HCT VFR BLD CALC: 25.8 % — LOW (ref 34.5–45)
HGB BLD-MCNC: 7.7 G/DL — LOW (ref 11.5–15.5)
MAGNESIUM SERPL-MCNC: 1.6 MG/DL — SIGNIFICANT CHANGE UP (ref 1.6–2.6)
MCHC RBC-ENTMCNC: 29.7 PG — SIGNIFICANT CHANGE UP (ref 27–34)
MCHC RBC-ENTMCNC: 29.8 GM/DL — LOW (ref 32–36)
MCV RBC AUTO: 99.6 FL — SIGNIFICANT CHANGE UP (ref 80–100)
PHOSPHATE SERPL-MCNC: 3.1 MG/DL — SIGNIFICANT CHANGE UP (ref 2.4–4.7)
PLATELET # BLD AUTO: 424 K/UL — HIGH (ref 150–400)
POTASSIUM SERPL-MCNC: 4.3 MMOL/L — SIGNIFICANT CHANGE UP (ref 3.5–5.3)
POTASSIUM SERPL-SCNC: 4.3 MMOL/L — SIGNIFICANT CHANGE UP (ref 3.5–5.3)
RBC # BLD: 2.59 M/UL — LOW (ref 3.8–5.2)
RBC # FLD: 16.3 % — HIGH (ref 10.3–14.5)
SODIUM SERPL-SCNC: 141 MMOL/L — SIGNIFICANT CHANGE UP (ref 135–145)
WBC # BLD: 16.93 K/UL — HIGH (ref 3.8–10.5)
WBC # FLD AUTO: 16.93 K/UL — HIGH (ref 3.8–10.5)

## 2019-12-12 PROCEDURE — 93010 ELECTROCARDIOGRAM REPORT: CPT

## 2019-12-12 PROCEDURE — 99232 SBSQ HOSP IP/OBS MODERATE 35: CPT

## 2019-12-12 PROCEDURE — 99233 SBSQ HOSP IP/OBS HIGH 50: CPT

## 2019-12-12 PROCEDURE — 99024 POSTOP FOLLOW-UP VISIT: CPT

## 2019-12-12 PROCEDURE — 97605 NEG PRS WND THER DME<=50SQCM: CPT

## 2019-12-12 RX ORDER — I.V. FAT EMULSION 20 G/100ML
0.9 EMULSION INTRAVENOUS
Qty: 50 | Refills: 0 | Status: DISCONTINUED | OUTPATIENT
Start: 2019-12-12 | End: 2019-12-12

## 2019-12-12 RX ORDER — MAGNESIUM OXIDE 400 MG ORAL TABLET 241.3 MG
400 TABLET ORAL DAILY
Refills: 0 | Status: DISCONTINUED | OUTPATIENT
Start: 2019-12-12 | End: 2019-12-15

## 2019-12-12 RX ORDER — ELECTROLYTE SOLUTION,INJ
1 VIAL (ML) INTRAVENOUS
Refills: 0 | Status: DISCONTINUED | OUTPATIENT
Start: 2019-12-12 | End: 2019-12-12

## 2019-12-12 RX ADMIN — MEROPENEM 100 MILLIGRAM(S): 1 INJECTION INTRAVENOUS at 14:33

## 2019-12-12 RX ADMIN — GABAPENTIN 100 MILLIGRAM(S): 400 CAPSULE ORAL at 21:16

## 2019-12-12 RX ADMIN — TRAMADOL HYDROCHLORIDE 50 MILLIGRAM(S): 50 TABLET ORAL at 07:37

## 2019-12-12 RX ADMIN — HYDROMORPHONE HYDROCHLORIDE 4 MILLIGRAM(S): 2 INJECTION INTRAMUSCULAR; INTRAVENOUS; SUBCUTANEOUS at 03:35

## 2019-12-12 RX ADMIN — ESCITALOPRAM OXALATE 10 MILLIGRAM(S): 10 TABLET, FILM COATED ORAL at 11:54

## 2019-12-12 RX ADMIN — Medication 325 MILLIGRAM(S): at 14:32

## 2019-12-12 RX ADMIN — HYDROMORPHONE HYDROCHLORIDE 4 MILLIGRAM(S): 2 INJECTION INTRAMUSCULAR; INTRAVENOUS; SUBCUTANEOUS at 03:05

## 2019-12-12 RX ADMIN — TRAMADOL HYDROCHLORIDE 50 MILLIGRAM(S): 50 TABLET ORAL at 06:37

## 2019-12-12 RX ADMIN — I.V. FAT EMULSION 31.05 GM/KG/DAY: 20 EMULSION INTRAVENOUS at 21:14

## 2019-12-12 RX ADMIN — HYDROMORPHONE HYDROCHLORIDE 4 MILLIGRAM(S): 2 INJECTION INTRAMUSCULAR; INTRAVENOUS; SUBCUTANEOUS at 12:30

## 2019-12-12 RX ADMIN — Medication 1 TABLET(S): at 17:00

## 2019-12-12 RX ADMIN — Medication 1 TABLET(S): at 11:54

## 2019-12-12 RX ADMIN — Medication 500 MILLIGRAM(S): at 11:53

## 2019-12-12 RX ADMIN — Medication 1 EACH: at 21:14

## 2019-12-12 RX ADMIN — OCTREOTIDE ACETATE 100 MICROGRAM(S): 200 INJECTION, SOLUTION INTRAVENOUS; SUBCUTANEOUS at 14:00

## 2019-12-12 RX ADMIN — MEROPENEM 100 MILLIGRAM(S): 1 INJECTION INTRAVENOUS at 06:37

## 2019-12-12 RX ADMIN — Medication 100 MILLIGRAM(S): at 11:55

## 2019-12-12 RX ADMIN — Medication 1 MILLIGRAM(S): at 11:54

## 2019-12-12 RX ADMIN — MAGNESIUM OXIDE 400 MG ORAL TABLET 400 MILLIGRAM(S): 241.3 TABLET ORAL at 14:33

## 2019-12-12 RX ADMIN — TRAMADOL HYDROCHLORIDE 50 MILLIGRAM(S): 50 TABLET ORAL at 23:11

## 2019-12-12 RX ADMIN — GABAPENTIN 100 MILLIGRAM(S): 400 CAPSULE ORAL at 14:32

## 2019-12-12 RX ADMIN — PANTOPRAZOLE SODIUM 40 MILLIGRAM(S): 20 TABLET, DELAYED RELEASE ORAL at 06:37

## 2019-12-12 RX ADMIN — HYDROMORPHONE HYDROCHLORIDE 4 MILLIGRAM(S): 2 INJECTION INTRAMUSCULAR; INTRAVENOUS; SUBCUTANEOUS at 21:16

## 2019-12-12 RX ADMIN — Medication 1 TABLET(S): at 06:37

## 2019-12-12 RX ADMIN — Medication 325 MILLIGRAM(S): at 06:37

## 2019-12-12 RX ADMIN — OCTREOTIDE ACETATE 100 MICROGRAM(S): 200 INJECTION, SOLUTION INTRAVENOUS; SUBCUTANEOUS at 06:37

## 2019-12-12 RX ADMIN — GABAPENTIN 100 MILLIGRAM(S): 400 CAPSULE ORAL at 06:37

## 2019-12-12 RX ADMIN — HEPARIN SODIUM 1300 UNIT(S)/HR: 5000 INJECTION INTRAVENOUS; SUBCUTANEOUS at 05:24

## 2019-12-12 RX ADMIN — TRAMADOL HYDROCHLORIDE 50 MILLIGRAM(S): 50 TABLET ORAL at 15:00

## 2019-12-12 RX ADMIN — Medication 0.25 MILLIGRAM(S): at 11:58

## 2019-12-12 RX ADMIN — Medication 10 MILLIGRAM(S): at 23:11

## 2019-12-12 RX ADMIN — TRAMADOL HYDROCHLORIDE 50 MILLIGRAM(S): 50 TABLET ORAL at 14:32

## 2019-12-12 RX ADMIN — MEROPENEM 100 MILLIGRAM(S): 1 INJECTION INTRAVENOUS at 21:15

## 2019-12-12 RX ADMIN — HYDROMORPHONE HYDROCHLORIDE 4 MILLIGRAM(S): 2 INJECTION INTRAMUSCULAR; INTRAVENOUS; SUBCUTANEOUS at 23:18

## 2019-12-12 RX ADMIN — OCTREOTIDE ACETATE 100 MICROGRAM(S): 200 INJECTION, SOLUTION INTRAVENOUS; SUBCUTANEOUS at 23:11

## 2019-12-12 RX ADMIN — METHOCARBAMOL 500 MILLIGRAM(S): 500 TABLET, FILM COATED ORAL at 20:26

## 2019-12-12 RX ADMIN — HYDROMORPHONE HYDROCHLORIDE 4 MILLIGRAM(S): 2 INJECTION INTRAMUSCULAR; INTRAVENOUS; SUBCUTANEOUS at 11:53

## 2019-12-12 RX ADMIN — Medication 2000 UNIT(S): at 11:53

## 2019-12-12 RX ADMIN — Medication 325 MILLIGRAM(S): at 21:17

## 2019-12-12 NOTE — PROGRESS NOTE ADULT - SUBJECTIVE AND OBJECTIVE BOX
INTERVAL HPI/OVERNIGHT EVENTS: Patient with pancreatic pleural fistula. Started on TPN yesterday. Feels heaviness in the chest.     ROS wnl     Home Medications:      MEDICATIONS  (STANDING):  amitriptyline 10 milliGRAM(s) Oral at bedtime  ascorbic acid 500 milliGRAM(s) Oral daily  chlorhexidine 2% Cloths 1 Application(s) Topical <User Schedule>  cholecalciferol 2000 Unit(s) Oral daily  escitalopram 10 milliGRAM(s) Oral daily  fat emulsion (Plant Based) 20% Infusion 0.9 Gm/kG/Day (31.05 mL/Hr) IV Continuous <Continuous>  ferrous    sulfate 325 milliGRAM(s) Oral three times a day  folic acid 1 milliGRAM(s) Oral daily  gabapentin 100 milliGRAM(s) Oral three times a day  heparin  Infusion. 1300 Unit(s)/Hr (13 mL/Hr) IV Continuous <Continuous>  lactobacillus acidophilus 1 Tablet(s) Oral two times a day  lidocaine   Patch 1 Patch Transdermal daily  magnesium oxide 400 milliGRAM(s) Oral daily  meropenem  IVPB 1000 milliGRAM(s) IV Intermittent every 8 hours  multivitamin 1 Tablet(s) Oral daily  octreotide  Injectable 100 MICROGram(s) SubCutaneous every 8 hours  pantoprazole    Tablet 40 milliGRAM(s) Oral before breakfast  Parenteral Nutrition - Adult 1 Each (83 mL/Hr) TPN Continuous <Continuous>  Parenteral Nutrition - Adult 1 Each (42 mL/Hr) TPN Continuous <Continuous>  thiamine 100 milliGRAM(s) Oral daily  traMADol 50 milliGRAM(s) Oral every 8 hours    MEDICATIONS  (PRN):  ALPRAZolam 0.25 milliGRAM(s) Oral every 8 hours PRN anxiety  aluminum hydroxide/magnesium hydroxide/simethicone Suspension 30 milliLiter(s) Oral every 4 hours PRN Dyspepsia  heparin  Injectable 4000 Unit(s) IV Push every 6 hours PRN For aPTT less than 40  heparin  Injectable 2000 Unit(s) IV Push every 6 hours PRN For aPTT between 40 - 57  HYDROmorphone   Tablet 4 milliGRAM(s) Oral every 6 hours PRN Severe Pain (7 - 10)  HYDROmorphone   Tablet 2 milliGRAM(s) Oral every 6 hours PRN Moderate Pain (4 - 6)  methocarbamol 500 milliGRAM(s) Oral every 6 hours PRN Muscle Spasm  ondansetron Injectable 4 milliGRAM(s) IV Push every 4 hours PRN Nausea and/or Vomiting  sodium chloride 0.9% lock flush 10 milliLiter(s) IV Push every 1 hour PRN Pre/post blood products, medications, blood draw, and to maintain line patency      Allergies    No Known Allergies    Intolerances    Ensure TID (Unknown)        PHYSICAL EXAM:   Vital Signs:  Vital Signs Last 24 Hrs  T(C): 36.9 (12 Dec 2019 08:00), Max: 37.3 (11 Dec 2019 19:41)  T(F): 98.4 (12 Dec 2019 08:00), Max: 99.1 (11 Dec 2019 19:41)  HR: 99 (12 Dec 2019 08:00) (86 - 99)  BP: 130/92 (12 Dec 2019 08:00) (111/80 - 130/92)  BP(mean): 88 (12 Dec 2019 06:00) (88 - 106)  RR: 17 (12 Dec 2019 08:00) (15 - 17)  SpO2: 96% (12 Dec 2019 08:00) (95% - 100%)  Daily     Daily     GENERAL:  no distress  HEENT:  NC/AT,  anicteric  Chest  ABDOMEN:  Soft, non-tender, non-distended, normoactive bowel sounds,  no masses  EXTREMITIES  + Lt arm PICC, no cyanosis      LABS:                        7.9    21.12 )-----------( 458      ( 11 Dec 2019 03:15 )             25.4       Hemoglobin: 7.9 g/dL (12-11-19 @ 03:15)  Hemoglobin: 8.5 g/dL (12-10-19 @ 06:03)  Hemoglobin: 8.3 g/dL (12-10-19 @ 03:06)      12-11    138  |  100  |  9.0  ----------------------------<  105  4.0   |  25.0  |  0.96    Ca    7.8<L>      11 Dec 2019 03:15  Mg     1.5     12-11    TPro  4.8<L>  /  Alb  1.8<L>  /  TBili  <0.2<L>  /  DBili  x   /  AST  12  /  ALT  7   /  AlkPhos  184<H>  12-11      INR: 1.27 ratio (12-10-19 @ 03:06)      Alkaline Phosphatase, Serum: 184 U/L (12-11-19 @ 03:15)  Alanine Aminotransferase (ALT/SGPT): 7 U/L (12-11-19 @ 03:15)  Aspartate Aminotransferase (AST/SGOT): 12 U/L (12-11-19 @ 03:15)  Alanine Aminotransferase (ALT/SGPT): 9 U/L (12-10-19 @ 03:06)  Aspartate Aminotransferase (AST/SGOT): 15 U/L (12-10-19 @ 03:06)  Alkaline Phosphatase, Serum: 201 U/L (12-10-19 @ 03:06)    RADIOLOGY & ADDITIONAL TESTS:  < from: Xray Chest 1 View- PORTABLE-Urgent (12.11.19 @ 11:55) >  INTERPRETATION:  Post chest tube removal.    AP chest. Prior earlier same day.    Right chest tube has been removed. Right PICC line tip in the SVC. Left   PICC line tip in right atrium. No definite pneumothorax or other gross   interval change heart and lungs.    Impression: As above    < end of copied text >

## 2019-12-12 NOTE — PROGRESS NOTE ADULT - SUBJECTIVE AND OBJECTIVE BOX
[facility]  INFECTIOUS DISEASES AND INTERNAL MEDICINE at [place]  =======================================================  Carlos Alberto Soto MD  Diplomates American Board of Internal Medicine and Infectious Diseases  Telephone [phone]  Fax            [phone]  =======================================================    [id number]  HERACLIO MOLINA   follow up:  infected pleural effusion, empyema  s/p VATS on 11/27/19  s/p PICC 12/11/19    started on TPN    Still has right sided chest wall pain on inspiration    =======================================================    REVIEW OF SYSTEMS:  CONSTITUTIONAL:  No Fever or chills  HEENT:  No diplopia or blurred vision.  No earache, sore throat or runny nose.  CARDIOVASCULAR:  No pressure, squeezing, strangling, tightness, heaviness or aching about the chest, neck, axilla or epigastrium.  RESPIRATORY:  No cough, shortness of breath  GASTROINTESTINAL:  No nausea, vomiting or diarrhea.  GENITOURINARY:  No dysuria, frequency or urgency. No Blood in urine  MUSCULOSKELETAL:  no joint aches, no muscle pain  SKIN:  No change in skin, hair or nails.  NEUROLOGIC:  No Headaches, seizures or weakness.  PSYCHIATRIC:  No disorder of thought or mood.  ENDOCRINE:  No heat or cold intolerance  HEMATOLOGICAL:  No easy bruising or bleeding.   =======================================================  Allergies  No Known Allergies    Antibiotics:  meropenem  IVPB 1000 milliGRAM(s) IV Intermittent every 8 hours    =====================================================  Physical Exam:  ============  T(F): 98.4 (12 Dec 2019 08:00), Max: 99.1 (11 Dec 2019 19:41)  HR: 99 (12 Dec 2019 08:00)  BP: 127/61 (12 Dec 2019 06:00)  RR: 17 (12 Dec 2019 08:00)  SpO2: 96% (12 Dec 2019 08:00) (95% - 100%)  temp max in last 48H T(F): , Max: 99.1 (12-11-19 @ 19:41)    General:  No acute distress.   Eye: Pupils are equal, round and reactive to light, Extraocular movements are intact, Normal conjunctiva.  HENT: Normocephalic, Oral mucosa is moist, No pharyngeal erythema, No sinus tenderness.  Neck: Supple, No lymphadenopathy.  Respiratory: decreased breath sounds right lung   Cardiovascular: Normal rate, Regular rhythm,   + edema of legs  RIGHT sided chest wound vac  Gastrointestinal: Soft, mild right abd tenderness  Normal bowel sounds.  Genitourinary: No costovertebral angle tenderness.  Lymphatics: No lymphadenopathy neck,   Musculoskeletal: Normal range of motion, Normal strength.  Integumentary: No rash.  Neurologic: Alert, Oriented, No focal deficits, Cranial Nerves II-XII are grossly intact.  Psychiatric: Appropriate mood & affect.    =======================================================  11/22/19 blood cx negative x 2  11/22/19 - body fluid cx with Moderate Alpha hemolytic strep x 2 sets  Alpha hemolytic strep  		[facility]	E Test  Ceftriaxone  			S (0.19)   Clindamycin 	S    Erythromycin 	S   Penicillin 	S   Vancomycin 	S     =======================================  Labs:                        7.9    21.12 )-----------( 458      ( 11 Dec 2019 03:15 )             25.4       WBC Count: 21.12 K/uL (12-11-19 @ 03:15)  WBC Count: 18.65 K/uL (12-10-19 @ 06:03)  WBC Count: 18.59 K/uL (12-10-19 @ 03:06)  WBC Count: 21.14 K/uL (12-09-19 @ 03:08)  WBC Count: 17.07 K/uL (12-08-19 @ 18:28)  WBC Count: 14.46 K/uL (12-07-19 @ 18:53)      12-11    138  |  100  |  9.0  ----------------------------<  105  4.0   |  25.0  |  0.96    Ca    7.8<L>      11 Dec 2019 03:15  Mg     1.5     12-11    TPro  4.8<L>  /  Alb  1.8<L>  /  TBili  <0.2<L>  /  DBili  x   /  AST  12  /  ALT  7   /  AlkPhos  184<H>  12-11      Culture - Urine (collected 12-04-19 @ 10:24)  Source: .Urine  Final Report (12-05-19 @ 11:51):    No growth    Culture - Other (collected 12-03-19 @ 14:03)  Source: .Other chest tube insertion site (pus/purulent)  Final Report (12-05-19 @ 09:15):    No growth at 2 days.    Culture - Blood (collected 12-03-19 @ 13:30)  Source: .Blood  Final Report (12-08-19 @ 15:00):    No growth at 5 days.    Culture - Blood (collected 12-03-19 @ 13:30)  Source: .Blood  Final Report (12-08-19 @ 15:00):    No growth at 5 days.    Culture - Acid Fast - Tissue w/Smear (collected 11-28-19 @ 20:40)  Source: .Tissue chest wound

## 2019-12-12 NOTE — PROGRESS NOTE ADULT - ASSESSMENT
This 34 y.o. F who was admitted to [facility] [place] [place] 11/22 with weakness,   ALPHA Strep from body fluid culture  found with large right pleural effusion   s/p chest tube drainage    Hyponatremia - resolving  WBC elevation   alpha hemolytic strep infection  multi focal PNA  infected pleural effusion - empyema  Pancreatic cyst  Liver lesions  chest wall drainage  Acute renal failure - RESOLVED    - Legionella Ag from [facility] 11/24/19 - negative, off azithromycin  - remainder of cultures from [facility] are negative  s/p VATS 11/27/19  - cultures negative    - WBC elevation   generally improving; will observer    - Chest incision with copious drainage  - 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

## 2019-12-12 NOTE — CHART NOTE - NSCHARTNOTEFT_GEN_A_CORE
Patient's wound vac changed bedside. One are was 2 x 1cm and 1 x 1cm, no purulence noted. Chest tube site is clean, dry and intact, suture removed.

## 2019-12-12 NOTE — PROGRESS NOTE ADULT - ASSESSMENT
34 year old female with a PMHx of smoking, ETOH use (4-5 drinks/day),   [place] [facility] [place] [facility] on 11/22 for complaints of weakness, 25 pound weight loss & flu like symptoms & found to have a large right pleural effusion, pleural fluid cultures + for group A hemolytic strep, + RIJ thormbus   11/23 CT of the chest/abd/pelvis indicated a clot in the Right IJ, pt started on heparin gtt.     s/p right VATS decort & washout debridement of CT site & VAC placement on 11/27/19.   ·	Persistent drainage from chest tube site so repeat CT C/A/P performed 12/4 which showed new cystic mass in pancreas and liver concerning for pancreatitis, GI consulted (NTD).   ·	Zosyn changed to meropenem on 12/6 by ID for broader coverage given pancreatitis. Plan to continue Merrem for 4 weeks.   ·	Copious drainage from anterior chest tube site, so VAC removed and ostomy appliance applied for high output on 12/6.   ·	Pleural fluid with an amylase of 25626 (serum 51) noted.  Prealbumin noted to be 5. Pt tolerating diet. Ensure drinks added.   ·	12/7 Right chest tube removed. Esophogram normal.   ·	Repeat CT scan showed a persistent Right hydropneumothorax with air anteriorly, right lower anterolateral chest wall with air tract to right pleural space, and Persistent renal nephrograms b/l indicating ATN.   ·	All cultures negative. 12/8 Ostomy appliance removed and dressing applied.   ·	Right lower extremity swelling noted to be worsening and patient upgraded to the ICU on 12/8. No acute findings on venous and arterial US of b/l LEs or Renal US.      12/9 s.p PICC placement & Right anterior pig tail catheter & right lateral wound vac placement with bridge     ·	MRCP significant for Peripancreatic fluid and edema consistent with pancreatitis. Cystic lesions in the pancreatic head with the largest measuring 2.1 cm, likely acute peripancreatic collections, increased in size since 12/4/2019, with a tract of fluid extending from the 2.1 cm cystic lesion into the mediastinum and towards the pleural space on the right suspicious for a pancreatic pleural fistula.    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 and pancreatic and liver abscess  - GI currently rec clear diet & trial of octreotide, TPN started 12/11  will continue ABX as per ID, right chest tube d/c, maintain vac, change q 3 days;   patient with many social concerns/ issues including ETOH abuse history complicated by numerous psychosocial concerns; lexapro ordered   endo following currently rec repeat Thyroid panel in three days     E/M TIME SPENT:   28 minutes of noncontinuous time spent   Evaluating/treating patient, reviewing data/labs/imaging, discussing case with multidisciplinary team, discussing plan/goals of care with patient/family about patient's condition . Non-inclusive of procedure time.   greater than 50% of time spent educating patient about condition, medication and prognosis.

## 2019-12-12 NOTE — PROGRESS NOTE ADULT - RS GEN PE MLT RESP DETAILS PC
respirations non-labored/good air movement/breath sounds equal
airway patent/breath sounds equal/diminished breath sounds, R
respirations non-labored/good air movement/airway patent
normal

## 2019-12-12 NOTE — CHART NOTE - NSCHARTNOTEFT_GEN_A_CORE
34 year old female with a PMHx of smoking, ETOH use (4-5 drinks/day),   [place]red from [facility] [place] to [facility] on 11/22 for complaints of weakness, 25 pound weight loss & flu like symptoms & found to have a large right pleural effusion, pleural fluid cultures + for group A hemolytic strep, + RIJ thormbus   11/23 CT of the chest/abd/pelvis indicated a clot in the Right IJ, pt started on heparin gtt.     s/p right VATS decort & washout debridement of CT site & VAC placement on 11/27/19.   ·Persistent drainage from chest tube site so repeat CT C/A/P performed 12/4 which showed new cystic mass in pancreas and liver concerning for pancreatitis, GI consulted (NTD).   ·Zosyn changed to meropenem on 12/6 by ID for broader coverage given pancreatitis. Plan to continue Merrem for 4 weeks.   ·Copious drainage from anterior chest tube site, so VAC removed and ostomy appliance applied for high output on 12/6.   ·Pleural fluid with an amylase of 62195 (serum 51) noted.  Prealbumin noted to be 5. Pt tolerating diet. Ensure drinks added.   ·12/7 Right chest tube removed. Esophogram normal.   ·Repeat CT scan showed a persistent Right hydropneumothorax with air anteriorly, right lower anterolateral chest wall with air tract to right pleural space, and Persistent renal nephrograms b/l indicating ATN.   ·All cultures negative. 12/8 Ostomy appliance removed and dressing applied.   ·Right lower extremity swelling noted to be worsening and patient upgraded to the ICU on 12/8. No acute findings on venous and arterial US of b/l LEs or Renal US.      12/9 s.p PICC placement & Right anterior pig tail catheter & right lateral wound vac placement with bridge     ·MRCP significant for Peripancreatic fluid and edema consistent with pancreatitis. Cystic lesions in the pancreatic head with the largest measuring 2.1 cm, likely acute peripancreatic collections, increased in size since 12/4/2019, with a tract of fluid extending from the 2.1 cm cystic lesion into the mediastinum and towards the pleural space on the right suspicious for a pancreatic pleural fistula.    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 and pancreatic and liver abscess  - GI currently rec clear diet & trial of octreotide, TPN started 12/11  will continue ABX as per ID, right chest tube d/c, maintain vac, change q 3 days;   patient with many social concerns/ issues including ETOH abuse history complicated by numerous psychosocial concerns; lexapro ordered   endo following currently rec repeat Thyroid panel in three days.    Transferred to the floor. No acute distress. B/L PICC lines, TPN running. VSS. Labs and Meds reviewed.

## 2019-12-12 NOTE — PROGRESS NOTE ADULT - PROBLEM SELECTOR PLAN 2
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

## 2019-12-12 NOTE — PROGRESS NOTE ADULT - ASSESSMENT
34 year old female who is  transferred from [facility] to [facility] on 11/22 for complaints of weakness, 25 pound weight loss & flu like symptoms & found to have a large right pleural effusion , empyema with group A hemolytic strep . No PMH except  has 4-5 drinks of alcohol per day. S/P thoracentesis .   Pt s/p right VATS decort & washout debridement of CT site & VAC placement on 11/27/19. Followed by ID - on iv abx .   Initial MRI with pancreatic cyst but poor study . 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 So R pleural effusion /multifocal PNA / Empyema - due to fistula . CT removed , wound vac+ . ARF - due to ATN - resolved , Hyponatremia - likely due to SIADH - resolved - renal signed off . R IJ thrombus + , on AC - followed by Hem - will need to be on AC least 3 months . Acute pancreatitis , likely on chronic , complicated with pancreatic / pleural fistula - GI is following , on Octreotide , started on TPN . Anemic - multifactorial - s/p PRBC transfusion            Problem/Plan - 1:  ·  Problem: Pleural effusion on right.  Plan: s/p Right VATS decortication & washout on 11/27/19. Debridement of CT site & VAC placement , chest tube removed . Multifocal PNA - on iv abx as per ID .   Now    s.p PICC placement   MRCP significant for Peripancreatic fluid and edema consistent with pancreatitis. Cystic lesions in the pancreatic head with the largest measuring 2.1 cm, likely acute peripancreatic collections, increased in size since 12/4/2019, with a tract of fluid extending from the 2.1 cm cystic lesion into the mediastinum and towards the pleural space on the right suspicious for a pancreatic pleural fistula.     Problem/Plan - 2:  ·  Problem: Empyema.  Plan: s/p Chest tube removal today, continued with iv abx as per ID       Problem/Plan - 3:  ·  Problem: Internal jugular vein thrombosis, right.  Plan: On Heparin drip      Problem/Plan - 5:  ·  Problem: Anemia likely multifactorial ; Due to anemia of chronic dz , multiple blood drawings , dilutional  , patient is tachycardic , pale  Plan: s/p PRBCS X1 , transfuse PRN , check FOBT        Problem/Plan -6:  ·  Problem: Hyponatremia.  Plan: resolved      Problem/Plan - 7:  ·  Problem: Alcohol abuse.  Plan: Monitor for withdrawal, Continue folic, MVI, thiamine.     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 cause , third spacing ,    Problem / Plan -11: Pancreatitis - as per GI    Problem /Plan - 13 : Hypoalbuminemia , prealbumin noted ,  severe protein calorie malnutrition - started on TPN     Problem / Plan - 14 : RONAK - likely multifactorial RONAK; ATN from Vancomycin and ? CHIQUITA - resolved      Problem / Plan - 15 : Prolactinoma , increased TSH ,decreased ACTH - Hormone w/u ordered due to initially unexplained fatigue / wt lost / diarrhea / irregular menstrual periods - not sure if this all related to acute issues , ENDO re called , likely due to acute illness , labs to be repeated in few days , ENDO to follow .    Dr Cleveland will take over in am .

## 2019-12-12 NOTE — PROGRESS NOTE ADULT - SUBJECTIVE AND OBJECTIVE BOX
SUBJECTIVE   currently resting comfortably     INTERIM HISTORY SIGNIFICANT FOR   patient with new PICC - now receiving TPN   pending official plan   currently trialing octreotide     Patient is a 34y old  Female who presents with a chief complaint of abd pain (11 Dec 2019 19:30)    HPI:  Comes in to [facility]  11/22 with weakness, 25lb weight loss of several months, flu like symptoms, on work up found to have a large right pleural effusion.  Pulmonology contact for thoracentesis, 1.5 L of dark serous fluid was removed.  Chest tube placed shortly thereafter, 3 liters was removed at that point in time. Patient had some abnormalities in their lab work, found to have a leukocytosis with wbc of 40, platelet count of 793, Na 120, chloride 73, lactate of 4.7, amylase 268,  and CTA imaging also revealed a 2.1cm partially cystic mass in the body/tail of the pancreas with possible upstream ductal dilatation. 11/23 patient had CT chest/abdomen/pelvis and was also found to have a clot in the right IJ and was started on a heparin gtt. Patient does state that she has had a recent cough, and complains of right sided neck pain. 11/14 Patient transferred to [facility]. (25 Nov 2019 00:10)    OBJECTIVE  PAST MEDICAL & SURGICAL HISTORY:    Ensure TID (Unknown)  No Known Allergies    Home Medications:    VITALS  Currently in sinus rhythm with vitals as below  ICU Vital Signs Last 24 Hrs  T(C): 36.9 (12 Dec 2019 00:45), Max: 37.3 (11 Dec 2019 19:41)  T(F): 98.4 (12 Dec 2019 00:45), Max: 99.1 (11 Dec 2019 19:41)  HR: 92 (11 Dec 2019 20:00) (88 - 119)  BP: 129/90 (11 Dec 2019 20:00) (111/80 - 129/90)  BP(mean): 106 (11 Dec 2019 20:00) (88 - 107)  RR: 17 (11 Dec 2019 20:00) (15 - 24)  SpO2: 100% (11 Dec 2019 20:00) (92% - 100%)        LABS                        7.9    21.12 )-----------( 458      ( 11 Dec 2019 03:15 )             25.4   PT/INR - ( 10 Dec 2019 03:06 )   PT: 14.7 sec;   INR: 1.27 ratio         PTT - ( 11 Dec 2019 03:15 )  PTT:72.0 dra63-95    138  |  100  |  9.0  ----------------------------<  105  4.0   |  25.0  |  0.96    Ca    7.8<L>      11 Dec 2019 03:15  Phos  4.1     12-10  Mg     1.5     12-11    TPro  4.8<L>  /  Alb  1.8<L>  /  TBili  <0.2<L>  /  DBili  x   /  AST  12  /  ALT  7   /  AlkPhos  184<H>  12-11  CAPILLARY BLOOD GLUCOSE              IN/OUT    12-10-19 @ 07:01  -  12-11-19 @ 07:00  --------------------------------------------------------  IN: 2109 mL / OUT: 104 mL / NET: 2005 mL    12-11-19 @ 07:01  -  12-12-19 @ 02:57  --------------------------------------------------------  IN: 859 mL / OUT: 2 mL / NET: 857 mL      IMAGING  personally reviewed imaging   Xray Chest 1 View- PORTABLE-Urgent:    EXAM:  XR CHEST PORTABLE URGENT 1V                          PROCEDURE DATE:  12/11/2019          INTERPRETATION:  Post chest tube removal.    AP chest. Prior earlier same day.    Right chest tube has been removed. Right PICC line tip in the SVC. Left   PICC line tip in right atrium. No definite pneumothorax or other gross   interval change heart and lungs.    Impression: As above                JAMAL MONTOYA M.D., ATTENDING RADIOLOGIST  This document has been electronically signed. Dec 11 2019  1:25PM               (12-11-19 @ 11:55)  Xray Chest 1 View- PORTABLE-Routine:    EXAM:  XR CHEST PORTABLE ROUTINE 1V                          PROCEDURE DATE:  12/11/2019          INTERPRETATION:  Abnormal chest sounds.    AP chest. Prior 12/10/2019.    Right PICC line right chest tube reidentified in position. No change   heart mediastinum. No change right basilar patchy infiltrate/atelectasis,   and small right pleural effusion. Mild left basilar retrocardiac   atelectatic changes noted. No pneumothorax or other new abnormality.    Impression: As above                JAMAL MONTOYA M.D., ATTENDING RADIOLOGIST  This document has been electronically signed. Dec 11 2019  2:54PM               (12-11-19 @ 05:26)    CURRENT MEDICATIONS  MEDICATIONS  (STANDING):  amitriptyline 10 milliGRAM(s) Oral at bedtime  ascorbic acid 500 milliGRAM(s) Oral daily  chlorhexidine 2% Cloths 1 Application(s) Topical <User Schedule>  cholecalciferol 2000 Unit(s) Oral daily  escitalopram 10 milliGRAM(s) Oral daily  ferrous    sulfate 325 milliGRAM(s) Oral three times a day  folic acid 1 milliGRAM(s) Oral daily  gabapentin 100 milliGRAM(s) Oral three times a day  heparin  Infusion. 1300 Unit(s)/Hr (13 mL/Hr) IV Continuous <Continuous>  lactobacillus acidophilus 1 Tablet(s) Oral two times a day  lidocaine   Patch 1 Patch Transdermal daily  meropenem  IVPB 1000 milliGRAM(s) IV Intermittent every 8 hours  multivitamin 1 Tablet(s) Oral daily  octreotide  Injectable 100 MICROGram(s) SubCutaneous every 8 hours  pantoprazole    Tablet 40 milliGRAM(s) Oral before breakfast  Parenteral Nutrition - Adult 1 Each (42 mL/Hr) TPN Continuous <Continuous>  thiamine 100 milliGRAM(s) Oral daily  traMADol 50 milliGRAM(s) Oral every 8 hours    MEDICATIONS  (PRN):  ALPRAZolam 0.25 milliGRAM(s) Oral every 8 hours PRN anxiety  aluminum hydroxide/magnesium hydroxide/simethicone Suspension 30 milliLiter(s) Oral every 4 hours PRN Dyspepsia  heparin  Injectable 4000 Unit(s) IV Push every 6 hours PRN For aPTT less than 40  heparin  Injectable 2000 Unit(s) IV Push every 6 hours PRN For aPTT between 40 - 57  HYDROmorphone   Tablet 4 milliGRAM(s) Oral every 6 hours PRN Severe Pain (7 - 10)  HYDROmorphone   Tablet 2 milliGRAM(s) Oral every 6 hours PRN Moderate Pain (4 - 6)  methocarbamol 500 milliGRAM(s) Oral every 6 hours PRN Muscle Spasm  ondansetron Injectable 4 milliGRAM(s) IV Push every 4 hours PRN Nausea and/or Vomiting  sodium chloride 0.9% lock flush 10 milliLiter(s) IV Push every 1 hour PRN Pre/post blood products, medications, blood draw, and to maintain line patency

## 2019-12-12 NOTE — PROGRESS NOTE ADULT - PROBLEM SELECTOR PLAN 1
secondary to pancreatitis and pancreatic pleural fistula  cont TPN and Octreotide  monitor electrolytes closely 6

## 2019-12-13 LAB
ALBUMIN SERPL ELPH-MCNC: 1.9 G/DL — LOW (ref 3.3–5.2)
ALP SERPL-CCNC: 163 U/L — HIGH (ref 40–120)
ALT FLD-CCNC: 7 U/L — SIGNIFICANT CHANGE UP
ANION GAP SERPL CALC-SCNC: 11 MMOL/L — SIGNIFICANT CHANGE UP (ref 5–17)
APTT BLD: 117.4 SEC — HIGH (ref 27.5–36.3)
APTT BLD: 48.7 SEC — HIGH (ref 27.5–36.3)
AST SERPL-CCNC: 13 U/L — SIGNIFICANT CHANGE UP
BILIRUB SERPL-MCNC: <0.2 MG/DL — LOW (ref 0.4–2)
BLD GP AB SCN SERPL QL: SIGNIFICANT CHANGE UP
BUN SERPL-MCNC: 3 MG/DL — LOW (ref 8–20)
CALCIUM SERPL-MCNC: 7.7 MG/DL — LOW (ref 8.6–10.2)
CHLORIDE SERPL-SCNC: 106 MMOL/L — SIGNIFICANT CHANGE UP (ref 98–107)
CO2 SERPL-SCNC: 26 MMOL/L — SIGNIFICANT CHANGE UP (ref 22–29)
CREAT SERPL-MCNC: 0.5 MG/DL — SIGNIFICANT CHANGE UP (ref 0.5–1.3)
GLUCOSE BLDC GLUCOMTR-MCNC: 140 MG/DL — HIGH (ref 70–99)
GLUCOSE SERPL-MCNC: 130 MG/DL — HIGH (ref 70–115)
HCT VFR BLD CALC: 29.5 % — LOW (ref 34.5–45)
HGB BLD-MCNC: 9.2 G/DL — LOW (ref 11.5–15.5)
MAGNESIUM SERPL-MCNC: 1.3 MG/DL — LOW (ref 1.6–2.6)
MCHC RBC-ENTMCNC: 30.6 PG — SIGNIFICANT CHANGE UP (ref 27–34)
MCHC RBC-ENTMCNC: 31.2 GM/DL — LOW (ref 32–36)
MCV RBC AUTO: 98 FL — SIGNIFICANT CHANGE UP (ref 80–100)
PHOSPHATE SERPL-MCNC: 3 MG/DL — SIGNIFICANT CHANGE UP (ref 2.4–4.7)
PLATELET # BLD AUTO: 389 K/UL — SIGNIFICANT CHANGE UP (ref 150–400)
POTASSIUM SERPL-MCNC: 4.3 MMOL/L — SIGNIFICANT CHANGE UP (ref 3.5–5.3)
POTASSIUM SERPL-SCNC: 4.3 MMOL/L — SIGNIFICANT CHANGE UP (ref 3.5–5.3)
PROT SERPL-MCNC: 5 G/DL — LOW (ref 6.6–8.7)
RBC # BLD: 3.01 M/UL — LOW (ref 3.8–5.2)
RBC # FLD: 16.4 % — HIGH (ref 10.3–14.5)
SODIUM SERPL-SCNC: 143 MMOL/L — SIGNIFICANT CHANGE UP (ref 135–145)
WBC # BLD: 16.49 K/UL — HIGH (ref 3.8–10.5)
WBC # FLD AUTO: 16.49 K/UL — HIGH (ref 3.8–10.5)

## 2019-12-13 PROCEDURE — 99024 POSTOP FOLLOW-UP VISIT: CPT

## 2019-12-13 PROCEDURE — 93010 ELECTROCARDIOGRAM REPORT: CPT

## 2019-12-13 PROCEDURE — 99233 SBSQ HOSP IP/OBS HIGH 50: CPT

## 2019-12-13 PROCEDURE — 99232 SBSQ HOSP IP/OBS MODERATE 35: CPT

## 2019-12-13 RX ORDER — MAGNESIUM SULFATE 500 MG/ML
2 VIAL (ML) INJECTION
Refills: 0 | Status: COMPLETED | OUTPATIENT
Start: 2019-12-13 | End: 2019-12-13

## 2019-12-13 RX ORDER — ELECTROLYTE SOLUTION,INJ
1 VIAL (ML) INTRAVENOUS
Refills: 0 | Status: DISCONTINUED | OUTPATIENT
Start: 2019-12-13 | End: 2019-12-13

## 2019-12-13 RX ORDER — I.V. FAT EMULSION 20 G/100ML
0.9 EMULSION INTRAVENOUS
Qty: 50 | Refills: 0 | Status: DISCONTINUED | OUTPATIENT
Start: 2019-12-13 | End: 2019-12-13

## 2019-12-13 RX ADMIN — OCTREOTIDE ACETATE 100 MICROGRAM(S): 200 INJECTION, SOLUTION INTRAVENOUS; SUBCUTANEOUS at 06:11

## 2019-12-13 RX ADMIN — Medication 30 MILLILITER(S): at 22:01

## 2019-12-13 RX ADMIN — GABAPENTIN 100 MILLIGRAM(S): 400 CAPSULE ORAL at 21:53

## 2019-12-13 RX ADMIN — TRAMADOL HYDROCHLORIDE 50 MILLIGRAM(S): 50 TABLET ORAL at 21:53

## 2019-12-13 RX ADMIN — MEROPENEM 100 MILLIGRAM(S): 1 INJECTION INTRAVENOUS at 15:14

## 2019-12-13 RX ADMIN — GABAPENTIN 100 MILLIGRAM(S): 400 CAPSULE ORAL at 15:16

## 2019-12-13 RX ADMIN — HEPARIN SODIUM 1300 UNIT(S)/HR: 5000 INJECTION INTRAVENOUS; SUBCUTANEOUS at 17:08

## 2019-12-13 RX ADMIN — I.V. FAT EMULSION 31.05 GM/KG/DAY: 20 EMULSION INTRAVENOUS at 22:29

## 2019-12-13 RX ADMIN — Medication 50 GRAM(S): at 12:37

## 2019-12-13 RX ADMIN — OCTREOTIDE ACETATE 100 MICROGRAM(S): 200 INJECTION, SOLUTION INTRAVENOUS; SUBCUTANEOUS at 21:53

## 2019-12-13 RX ADMIN — Medication 325 MILLIGRAM(S): at 06:11

## 2019-12-13 RX ADMIN — MAGNESIUM OXIDE 400 MG ORAL TABLET 400 MILLIGRAM(S): 241.3 TABLET ORAL at 11:42

## 2019-12-13 RX ADMIN — Medication 1 EACH: at 22:29

## 2019-12-13 RX ADMIN — Medication 50 GRAM(S): at 11:43

## 2019-12-13 RX ADMIN — TRAMADOL HYDROCHLORIDE 50 MILLIGRAM(S): 50 TABLET ORAL at 00:00

## 2019-12-13 RX ADMIN — MEROPENEM 100 MILLIGRAM(S): 1 INJECTION INTRAVENOUS at 06:08

## 2019-12-13 RX ADMIN — ESCITALOPRAM OXALATE 10 MILLIGRAM(S): 10 TABLET, FILM COATED ORAL at 11:42

## 2019-12-13 RX ADMIN — GABAPENTIN 100 MILLIGRAM(S): 400 CAPSULE ORAL at 06:10

## 2019-12-13 RX ADMIN — HEPARIN SODIUM 1400 UNIT(S)/HR: 5000 INJECTION INTRAVENOUS; SUBCUTANEOUS at 08:56

## 2019-12-13 RX ADMIN — MEROPENEM 100 MILLIGRAM(S): 1 INJECTION INTRAVENOUS at 21:53

## 2019-12-13 RX ADMIN — TRAMADOL HYDROCHLORIDE 50 MILLIGRAM(S): 50 TABLET ORAL at 07:15

## 2019-12-13 RX ADMIN — Medication 10 MILLIGRAM(S): at 21:53

## 2019-12-13 RX ADMIN — CHLORHEXIDINE GLUCONATE 1 APPLICATION(S): 213 SOLUTION TOPICAL at 11:30

## 2019-12-13 RX ADMIN — HYDROMORPHONE HYDROCHLORIDE 4 MILLIGRAM(S): 2 INJECTION INTRAMUSCULAR; INTRAVENOUS; SUBCUTANEOUS at 03:21

## 2019-12-13 RX ADMIN — TRAMADOL HYDROCHLORIDE 50 MILLIGRAM(S): 50 TABLET ORAL at 06:22

## 2019-12-13 RX ADMIN — TRAMADOL HYDROCHLORIDE 50 MILLIGRAM(S): 50 TABLET ORAL at 22:53

## 2019-12-13 RX ADMIN — ONDANSETRON 4 MILLIGRAM(S): 8 TABLET, FILM COATED ORAL at 15:39

## 2019-12-13 RX ADMIN — Medication 1 TABLET(S): at 06:10

## 2019-12-13 RX ADMIN — PANTOPRAZOLE SODIUM 40 MILLIGRAM(S): 20 TABLET, DELAYED RELEASE ORAL at 06:10

## 2019-12-13 RX ADMIN — Medication 30 MILLILITER(S): at 06:25

## 2019-12-13 RX ADMIN — TRAMADOL HYDROCHLORIDE 50 MILLIGRAM(S): 50 TABLET ORAL at 15:16

## 2019-12-13 RX ADMIN — HYDROMORPHONE HYDROCHLORIDE 4 MILLIGRAM(S): 2 INJECTION INTRAMUSCULAR; INTRAVENOUS; SUBCUTANEOUS at 04:00

## 2019-12-13 RX ADMIN — TRAMADOL HYDROCHLORIDE 50 MILLIGRAM(S): 50 TABLET ORAL at 15:15

## 2019-12-13 NOTE — PROGRESS NOTE ADULT - ASSESSMENT
34 year old female who is  transferred from [facility] to [facility] on 11/22 for complaints of weakness, 25 pound weight loss & flu like symptoms & found to have a large right pleural effusion , empyema with group A hemolytic strep . No PMH except  has 4-5 drinks of alcohol per day. S/P thoracentesis . Pt s/p right VATS decort & washout debridement of CT site & VAC placement on 11/27/19. Followed by ID - on iv abx .  Initial MRI with pancreatic cyst but poor study . 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 So R pleural effusion /multifocal PNA / Empyema - due to fistula . CT removed , wound vac+ . ARF - due to ATN - resolved , Hyponatremia - likely due to SIADH - resolved - renal signed off . R IJ thrombus + , on AC - followed by Hem - will need to be on AC least 3 months . Acute pancreatitis , likely on chronic , complicated with pancreatic / pleural fistula - GI is following , on Octreotide , started on TPN . Anemic - multifactorial - s/p PRBC transfusion        Problem/Plan - 1:  ·  Problem: Pleural effusion on right.  Plan: s/p Right VATS decortication & washout on 11/27/19. Debridement of CT site & VAC placement , chest tube removed . Multifocal PNA - on iv abx as per ID . s.p PICC placement   MRCP significant for Peripancreatic fluid and edema consistent with pancreatitis. Cystic lesions in the pancreatic head with the largest measuring 2.1 cm, likely acute peripancreatic collections, increased in size since 12/4/2019, with a tract of fluid extending from the 2.1 cm cystic lesion into the mediastinum and towards the pleural space on the right suspicious for a pancreatic pleural fistula.     Problem/Plan - 2:  ·  Problem: Empyema.  Plan: s/p Chest tube removal , continued with iv abx as per ID     Problem/Plan - 3:  ·  Problem: Internal jugular vein thrombosis, right.  Plan: On Heparin drip      Problem/Plan - 5:  ·  Problem: Anemia likely multifactorial ; Due to anemia of chronic dz , multiple blood drawings , dilutional  , patient is tachycardic , pale  Plan: s/p PRBCS X1 ,   --> improved     Problem/Plan -6:  ·  Problem: Hyponatremia.  Plan: resolved      Problem/Plan - 7:  ·  Problem: Alcohol abuse.  Plan: Monitor for withdrawal, Continue folic, MVI, thiamine.     Problem / Plan -8 : Pancreatic / pleural fistula - GI follow up appreciated , on CLD , Octreotide started , started on TPN     Problem / Plan - 9; Frequent loose stools x 1 month -  less frequent off Miralax   --> resolved    Problem / Plan -10 : B/L feet pain , pain all over her body, today R foot swollen , u/s negative for dvt ,     Problem / Plan -11: Pancreatitis - as per GI    Problem /Plan - 12 : Hypoalbuminemia , prealbumin noted ,  severe protein calorie malnutrition - on TPN     Problem / Plan - 13 : RONAK - likely multifactorial RONAK; ATN from Vancomycin and ? CHIQUITA - resolved      Problem / Plan - 14 : Prolactinoma , increased TSH ,decreased ACTH - Hormone w/u ordered due to initially unexplained fatigue / wt lost / diarrhea / irregular menstrual periods - not sure if this all related to acute issues , ENDO re called , likely due to acute illness , labs to be repeated in few days , ENDO to follow .

## 2019-12-13 NOTE — PROGRESS NOTE ADULT - PROBLEM SELECTOR PLAN 1
secondary to alcoholic pancreatitis and pancreatic pleural fistula  cont TPN and Octreotide  monitor electrolytes closely  Consider repeat MRI abdomen to r/o PD disruption and assess maturity of the pseudocyst

## 2019-12-13 NOTE — PROGRESS NOTE ADULT - ASSESSMENT
This 34 y.o. F who was admitted to [facility] 11/22 with weakness,   ALPHA Strep from body fluid culture  found with large right pleural effusion   s/p chest tube drainage    Hyponatremia - resolving  WBC elevation   alpha hemolytic strep infection  multi focal PNA  infected pleural effusion - empyema  Pancreatic cyst  Liver lesions  chest wall drainage  Acute renal failure - RESOLVED    - Legionella Ag from [facility] 11/24/19 - negative, off azithromycin  - remainder of cultures from [facility] are negative  s/p VATS 11/27/19  - cultures negative    - WBC elevation   generally improving; will observe    - Chest incision with copious drainage  - 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

## 2019-12-13 NOTE — PROGRESS NOTE ADULT - SUBJECTIVE AND OBJECTIVE BOX
INTERVAL HPI/OVERNIGHT EVENTS:  On TPN. Continues to c/o pressure in the chest. She has epigastric pain.     ROS wnl     PAST MEDICAL & SURGICAL HISTORY:      Home Medications:      MEDICATIONS  (STANDING):  amitriptyline 10 milliGRAM(s) Oral at bedtime  ascorbic acid 500 milliGRAM(s) Oral daily  chlorhexidine 2% Cloths 1 Application(s) Topical <User Schedule>  cholecalciferol 2000 Unit(s) Oral daily  escitalopram 10 milliGRAM(s) Oral daily  ferrous    sulfate 325 milliGRAM(s) Oral three times a day  folic acid 1 milliGRAM(s) Oral daily  gabapentin 100 milliGRAM(s) Oral three times a day  heparin  Infusion. 1300 Unit(s)/Hr (13 mL/Hr) IV Continuous <Continuous>  lactobacillus acidophilus 1 Tablet(s) Oral two times a day  lidocaine   Patch 1 Patch Transdermal daily  magnesium oxide 400 milliGRAM(s) Oral daily  meropenem  IVPB 1000 milliGRAM(s) IV Intermittent every 8 hours  multivitamin 1 Tablet(s) Oral daily  octreotide  Injectable 100 MICROGram(s) SubCutaneous every 8 hours  pantoprazole    Tablet 40 milliGRAM(s) Oral before breakfast  Parenteral Nutrition - Adult 1 Each (83 mL/Hr) TPN Continuous <Continuous>  thiamine 100 milliGRAM(s) Oral daily  traMADol 50 milliGRAM(s) Oral every 8 hours    MEDICATIONS  (PRN):  ALPRAZolam 0.25 milliGRAM(s) Oral every 8 hours PRN anxiety  aluminum hydroxide/magnesium hydroxide/simethicone Suspension 30 milliLiter(s) Oral every 4 hours PRN Dyspepsia  heparin  Injectable 4000 Unit(s) IV Push every 6 hours PRN For aPTT less than 40  heparin  Injectable 2000 Unit(s) IV Push every 6 hours PRN For aPTT between 40 - 57  HYDROmorphone   Tablet 4 milliGRAM(s) Oral every 6 hours PRN Severe Pain (7 - 10)  HYDROmorphone   Tablet 2 milliGRAM(s) Oral every 6 hours PRN Moderate Pain (4 - 6)  methocarbamol 500 milliGRAM(s) Oral every 6 hours PRN Muscle Spasm  ondansetron Injectable 4 milliGRAM(s) IV Push every 4 hours PRN Nausea and/or Vomiting  sodium chloride 0.9% lock flush 10 milliLiter(s) IV Push every 1 hour PRN Pre/post blood products, medications, blood draw, and to maintain line patency      Allergies    No Known Allergies    Intolerances    Ensure TID (Unknown)        PHYSICAL EXAM:   Vital Signs:  Vital Signs Last 24 Hrs  T(C): 36.9 (13 Dec 2019 06:07), Max: 36.9 (13 Dec 2019 06:07)  T(F): 98.5 (13 Dec 2019 06:07), Max: 98.5 (13 Dec 2019 06:07)  HR: 100 (13 Dec 2019 06:07) (96 - 109)  BP: 129/80 (13 Dec 2019 06:07) (116/62 - 159/110)  BP(mean): --  RR: 18 (13 Dec 2019 06:07) (15 - 18)  SpO2: 95% (13 Dec 2019 06:07) (94% - 98%)  Daily     Daily Weight in k.2 (13 Dec 2019 06:52)    GENERAL:  no distress  HEENT:  NC/AT,  anicteric  ABDOMEN:  Soft, epigastric tender, non-distended, normoactive bowel sounds  EXTREMITIES  no cyanosis      LABS:                        9.2    16.49 )-----------( 389      ( 13 Dec 2019 07:03 )             29.5       Hemoglobin: 9.2 g/dL (19 @ 07:03)  Hemoglobin: 7.7 g/dL (19 @ 12:51)  Hemoglobin: 7.9 g/dL (19 @ 03:15)          143  |  106  |  3.0<L>  ----------------------------<  130<H>  4.3   |  26.0  |  0.50    Ca    7.7<L>      13 Dec 2019 07:03  Phos  3.0       Mg     1.3         TPro  5.0<L>  /  Alb  1.9<L>  /  TBili  <0.2<L>  /  DBili  x   /  AST  13  /  ALT  7   /  AlkPhos  163<H>            Aspartate Aminotransferase (AST/SGOT): 13 U/L (19 @ 07:03)  Alkaline Phosphatase, Serum: 163 U/L (19 @ 07:03)  Alanine Aminotransferase (ALT/SGPT): 7 U/L (19 @ 07:03)  Alkaline Phosphatase, Serum: 184 U/L (19 @ 03:15)  Alanine Aminotransferase (ALT/SGPT): 7 U/L (19 @ 03:15)  Aspartate Aminotransferase (AST/SGOT): 12 U/L (19 @ 03:15)      RADIOLOGY & ADDITIONAL TESTS:  < from: Xray Chest 1 View- PORTABLE-Urgent (19 @ 11:55) >  INTERPRETATION:  Post chest tube removal.    AP chest. Prior earlier same day.    Right chest tube has been removed. Right PICC line tip in the SVC. Left   PICC line tip in right atrium. No definite pneumothorax or other gross   interval change heart and lungs.    Impression: As above    < end of copied text >

## 2019-12-13 NOTE — PROGRESS NOTE ADULT - SUBJECTIVE AND OBJECTIVE BOX
Subjective: no c/o incisional pain at this time. Denies CP, SOB, palpitations, N/V, other c/o.    T(C): 36.9 (12-13-19 @ 06:07), Max: 36.9 (12-13-19 @ 06:07)  HR: 100 (12-13-19 @ 06:07) (96 - 109)  BP: 129/80 (12-13-19 @ 06:07) (116/62 - 159/110)  ABP: --  ABP(mean): --  RR: 18 (12-13-19 @ 06:07) (15 - 18)  SpO2: 95% (12-13-19 @ 06:07) (94% - 98%)  Wt(kg): --  CVP(mm Hg): --  CO: --  CI: --  PA: --       I&O's Detail    12 Dec 2019 07:01  -  13 Dec 2019 07:00  --------------------------------------------------------  IN:    fat emulsion (Plant Based) 20% Infusion: 310.5 mL    heparin  Infusion.: 247 mL    TPN (Total Parenteral Nutrition): 1292 mL  Total IN: 1849.5 mL    OUT:  Total OUT: 0 mL    Total NET: 1849.5 mL          LABS: All Lab data reviewed and analyzed                        9.2    16.49 )-----------( 389      ( 13 Dec 2019 07:03 )             29.5     12-13    143  |  106  |  3.0<L>  ----------------------------<  130<H>  4.3   |  26.0  |  0.50    Ca    7.7<L>      13 Dec 2019 07:03  Phos  3.0     12-13  Mg     1.3     12-13    TPro  5.0<L>  /  Alb  1.9<L>  /  TBili  <0.2<L>  /  DBili  x   /  AST  13  /  ALT  7   /  AlkPhos  163<H>  12-13    PTT - ( 13 Dec 2019 07:03 )  PTT:48.7 sec        CAPILLARY BLOOD GLUCOSE      POCT Blood Glucose.: 140 mg/dL (13 Dec 2019 05:34)  POCT Blood Glucose.: 131 mg/dL (12 Dec 2019 22:28)           RADIOLOGY: - Reviewed and analyzed   CXR: pending    HOSPITAL MEDICATIONS: All medications reviewed and analyzed  MEDICATIONS  (STANDING):  amitriptyline 10 milliGRAM(s) Oral at bedtime  ascorbic acid 500 milliGRAM(s) Oral daily  chlorhexidine 2% Cloths 1 Application(s) Topical <User Schedule>  cholecalciferol 2000 Unit(s) Oral daily  escitalopram 10 milliGRAM(s) Oral daily  fat emulsion (Plant Based) 20% Infusion 0.9 Gm/kG/Day (31.05 mL/Hr) IV Continuous <Continuous>  ferrous    sulfate 325 milliGRAM(s) Oral three times a day  folic acid 1 milliGRAM(s) Oral daily  gabapentin 100 milliGRAM(s) Oral three times a day  heparin  Infusion. 1300 Unit(s)/Hr (13 mL/Hr) IV Continuous <Continuous>  lactobacillus acidophilus 1 Tablet(s) Oral two times a day  lidocaine   Patch 1 Patch Transdermal daily  magnesium oxide 400 milliGRAM(s) Oral daily  meropenem  IVPB 1000 milliGRAM(s) IV Intermittent every 8 hours  multivitamin 1 Tablet(s) Oral daily  octreotide  Injectable 100 MICROGram(s) SubCutaneous every 8 hours  pantoprazole    Tablet 40 milliGRAM(s) Oral before breakfast  Parenteral Nutrition - Adult 1 Each (83 mL/Hr) TPN Continuous <Continuous>  Parenteral Nutrition - Adult 1 Each (83 mL/Hr) TPN Continuous <Continuous>  thiamine 100 milliGRAM(s) Oral daily  traMADol 50 milliGRAM(s) Oral every 8 hours    MEDICATIONS  (PRN):  ALPRAZolam 0.25 milliGRAM(s) Oral every 8 hours PRN anxiety  aluminum hydroxide/magnesium hydroxide/simethicone Suspension 30 milliLiter(s) Oral every 4 hours PRN Dyspepsia  heparin  Injectable 4000 Unit(s) IV Push every 6 hours PRN For aPTT less than 40  heparin  Injectable 2000 Unit(s) IV Push every 6 hours PRN For aPTT between 40 - 57  HYDROmorphone   Tablet 4 milliGRAM(s) Oral every 6 hours PRN Severe Pain (7 - 10)  HYDROmorphone   Tablet 2 milliGRAM(s) Oral every 6 hours PRN Moderate Pain (4 - 6)  methocarbamol 500 milliGRAM(s) Oral every 6 hours PRN Muscle Spasm  ondansetron Injectable 4 milliGRAM(s) IV Push every 4 hours PRN Nausea and/or Vomiting  sodium chloride 0.9% lock flush 10 milliLiter(s) IV Push every 1 hour PRN Pre/post blood products, medications, blood draw, and to maintain line patency          Lines: b/l UE PIC lines    Physical Exam  Neuro: A+O x 3, non-focal, speech clear and intact  HEENT: PERRL, EOMI, oral mucosa pink and moist  Neck: supple, no JVD  chest: right chest wall wound vac last changed 12/12 dressign c/d/i   CV: regular rate, regular rhythm, +S1S2, no murmurs or rub  Pulm/chest: lung sounds CTA and equal bilaterally, no accessory muscle use noted  Abd: soft, NT, ND, +BS  Ext: THACKER x 4, no C/C/E  Skin: warm, well perfused       Case including assessment/plan of care discussed with   CT surgery attending.  Care time: 35     minutes of noncontinuos critical care time spent evaluating/treating, reviewing imaging, labs, discussing case with multidisciplinary team, discussing plans/goals of care with patient/family to prevent further life threatening depreciation of the patient's condition. Non-inclusive is procedure time.       34yFemale with PMH     US guided vascular access  Placement, PICC, with imaging guidance  Debridement, wound, with dressing replacement  Wound VAC placement  Pleural biopsy  Lung decortication  Bronchoscopy, with lung decortication using VATS      PAST MEDICAL & SURGICAL HISTORY:

## 2019-12-13 NOTE — PROGRESS NOTE ADULT - PROBLEM SELECTOR PLAN 8
Continue Heparin for DVT for prophylaxis and right IJ thrombus   Continue Protonix for GI prophylaxis

## 2019-12-13 NOTE — PROGRESS NOTE ADULT - SUBJECTIVE AND OBJECTIVE BOX
HERACLIO MOLINA    [id number]    34y      Female    INTERVAL HPI/OVERNIGHT EVENTS: Patient seen and examined at bedside. patient complains of being tired    REVIEW OF SYSTEMS:    CONSTITUTIONAL:  fatigue  RESPIRATORY: No cough, wheezing, hemoptysis; No shortness of breath  CARDIOVASCULAR: No chest pain, palpitations  GASTROINTESTINAL: No abdominal or epigastric pain. No nausea, vomiting  NEUROLOGICAL: No headaches, memory loss, loss of strength.  MISCELLANEOUS:      Vital Signs Last 24 Hrs  T(C): 36.3 (13 Dec 2019 11:00), Max: 36.9 (13 Dec 2019 06:07)  T(F): 97.3 (13 Dec 2019 11:00), Max: 98.5 (13 Dec 2019 06:07)  HR: 82 (13 Dec 2019 10:19) (82 - 109)  BP: 134/78 (13 Dec 2019 11:00) (128/86 - 159/110)  BP(mean): --  RR: 16 (13 Dec 2019 11:00) (15 - 18)  SpO2: 96% (13 Dec 2019 11:00) (94% - 98%)    PHYSICAL EXAM:    GENERAL: NAD, thin  HEAD:  Atraumatic, Normocephalic  EYES: EOMI, PERRLA, conjunctiva and sclera clear  NECK: Supple, No JVD, Normal thyroid  NERVOUS SYSTEM:  Alert & Oriented X3, no focal deficit  CHEST/LUNG: CTA b/l ,  chest wall wound vac  HEART: Regular rate and rhythm; No murmurs, rubs, or gallops  ABDOMEN: Soft,   EXTREMITIES:  2+ Peripheral Pulses,   LYMPH: No lymphadenopathy noted  SKIN: No rashes or lesions    LABS:                        9.2    16.49 )-----------( 389      ( 13 Dec 2019 07:03 )             29.5     12-13    143  |  106  |  3.0<L>  ----------------------------<  130<H>  4.3   |  26.0  |  0.50    Ca    7.7<L>      13 Dec 2019 07:03  Phos  3.0     12-13  Mg     1.3     12-13    TPro  5.0<L>  /  Alb  1.9<L>  /  TBili  <0.2<L>  /  DBili  x   /  AST  13  /  ALT  7   /  AlkPhos  163<H>  12-13    PTT - ( 13 Dec 2019 07:03 )  PTT:48.7 sec        MEDICATIONS  (STANDING):  amitriptyline 10 milliGRAM(s) Oral at bedtime  ascorbic acid 500 milliGRAM(s) Oral daily  chlorhexidine 2% Cloths 1 Application(s) Topical <User Schedule>  cholecalciferol 2000 Unit(s) Oral daily  escitalopram 10 milliGRAM(s) Oral daily  fat emulsion (Plant Based) 20% Infusion 0.9 Gm/kG/Day (31.05 mL/Hr) IV Continuous <Continuous>  ferrous    sulfate 325 milliGRAM(s) Oral three times a day  folic acid 1 milliGRAM(s) Oral daily  gabapentin 100 milliGRAM(s) Oral three times a day  heparin  Infusion. 1300 Unit(s)/Hr (13 mL/Hr) IV Continuous <Continuous>  lactobacillus acidophilus 1 Tablet(s) Oral two times a day  lidocaine   Patch 1 Patch Transdermal daily  magnesium oxide 400 milliGRAM(s) Oral daily  magnesium sulfate  IVPB 2 Gram(s) IV Intermittent every 1 hour  meropenem  IVPB 1000 milliGRAM(s) IV Intermittent every 8 hours  multivitamin 1 Tablet(s) Oral daily  octreotide  Injectable 100 MICROGram(s) SubCutaneous every 8 hours  pantoprazole    Tablet 40 milliGRAM(s) Oral before breakfast  Parenteral Nutrition - Adult 1 Each (83 mL/Hr) TPN Continuous <Continuous>  Parenteral Nutrition - Adult 1 Each (83 mL/Hr) TPN Continuous <Continuous>  thiamine 100 milliGRAM(s) Oral daily  traMADol 50 milliGRAM(s) Oral every 8 hours    MEDICATIONS  (PRN):  ALPRAZolam 0.25 milliGRAM(s) Oral every 8 hours PRN anxiety  aluminum hydroxide/magnesium hydroxide/simethicone Suspension 30 milliLiter(s) Oral every 4 hours PRN Dyspepsia  heparin  Injectable 4000 Unit(s) IV Push every 6 hours PRN For aPTT less than 40  heparin  Injectable 2000 Unit(s) IV Push every 6 hours PRN For aPTT between 40 - 57  HYDROmorphone   Tablet 4 milliGRAM(s) Oral every 6 hours PRN Severe Pain (7 - 10)  HYDROmorphone   Tablet 2 milliGRAM(s) Oral every 6 hours PRN Moderate Pain (4 - 6)  methocarbamol 500 milliGRAM(s) Oral every 6 hours PRN Muscle Spasm  ondansetron Injectable 4 milliGRAM(s) IV Push every 4 hours PRN Nausea and/or Vomiting  sodium chloride 0.9% lock flush 10 milliLiter(s) IV Push every 1 hour PRN Pre/post blood products, medications, blood draw, and to maintain line patency      RADIOLOGY & ADDITIONAL TESTS:

## 2019-12-13 NOTE — PROGRESS NOTE ADULT - PROBLEM SELECTOR PLAN 1
Quality 431: Preventive Care And Screening: Unhealthy Alcohol Use - Screening: Patient screened for unhealthy alcohol use using a single question and scores less than 2 times per year s/p Right VATS decortication & washout on 11/27/19. Debridement of CT site & VAC placement.  chest tubes since d.c   Encourage the use of I/S, OOB to chair, daily PT, ambulate as tolerated  Continue supportive care.   PICC line on left for TPN  PICC on right for meds   new vac placement to right lateral chest wall 12/12  Case and plan discussed with Dr. Aponte. Quality 47: Advance Care Plan: Advance Care Planning discussed and documented in the medical record; patient did not wish or was not able to name a surrogate decision maker or provide an advance care plan. Detail Level: Zone Quality 130: Documentation Of Current Medications In The Medical Record: Current Medications Documented Quality 110: Preventive Care And Screening: Influenza Immunization: Influenza Immunization not Administered for Documented Reasons. Additional Notes: Patient declines flu vaccine,no reason given Quality 111:Pneumonia Vaccination Status For Older Adults: Pneumococcal Vaccination Previously Received Quality 226: Preventive Care And Screening: Tobacco Use: Screening And Cessation Intervention: Patient screened for tobacco use and is an ex/non-smoker

## 2019-12-13 NOTE — PROGRESS NOTE ADULT - SUBJECTIVE AND OBJECTIVE BOX
[facility]  INFECTIOUS DISEASES AND INTERNAL MEDICINE at [place]  =======================================================  Carlos Alberto Soto MD  Diplomates American Board of Internal Medicine and Infectious Diseases  Telephone [phone]  Fax            [phone]  =======================================================    [facility]-[id number]  HERACLIO MOLINA   follow up:  infected pleural effusion, empyema  s/p VATS on 11/27/19  s/p PICC 12/11/19    remains on TPN  has pain in incision site.     =======================================================    REVIEW OF SYSTEMS:  CONSTITUTIONAL:  No Fever or chills  HEENT:  No diplopia or blurred vision.  No earache, sore throat or runny nose.  CARDIOVASCULAR:  No pressure, squeezing, strangling, tightness, heaviness or aching about the chest, neck, axilla or epigastrium.  RESPIRATORY:  No cough, shortness of breath  GASTROINTESTINAL:  No nausea, vomiting or diarrhea.  GENITOURINARY:  No dysuria, frequency or urgency. No Blood in urine  MUSCULOSKELETAL:  no joint aches, no muscle pain  SKIN:  No change in skin, hair or nails.  NEUROLOGIC:  No Headaches, seizures or weakness.  PSYCHIATRIC:  No disorder of thought or mood.  ENDOCRINE:  No heat or cold intolerance  HEMATOLOGICAL:  No easy bruising or bleeding.   =======================================================  Allergies  No Known Allergies    Antibiotics:  meropenem  IVPB 1000 milliGRAM(s) IV Intermittent every 8 hours    =====================================================  Physical Exam:  ============  T(F): 98.5 (13 Dec 2019 06:07), Max: 98.5 (13 Dec 2019 06:07)  HR: 82 (13 Dec 2019 10:19)  BP: 129/80 (13 Dec 2019 06:07)  RR: 16 (13 Dec 2019 10:19)  SpO2: 95% (13 Dec 2019 06:07) (94% - 98%)  temp max in last 48H T(F): , Max: 99.1 (12-11-19 @ 19:41)    General:  No acute distress.   Eye: Pupils are equal, round and reactive to light, Extraocular movements are intact, Normal conjunctiva.  HENT: Normocephalic, Oral mucosa is moist, No pharyngeal erythema, No sinus tenderness.  Neck: Supple, No lymphadenopathy.  Respiratory: decreased breath sounds right lung   Cardiovascular: Normal rate, Regular rhythm,   + edema of legs  RIGHT sided chest wound vac  Gastrointestinal: Soft, mild right abd tenderness  Normal bowel sounds.  Genitourinary: No costovertebral angle tenderness.  Lymphatics: No lymphadenopathy neck,   Musculoskeletal: Normal range of motion, Normal strength.  Integumentary: No rash.  Neurologic: Alert, Oriented, No focal deficits, Cranial Nerves II-XII are grossly intact.  Psychiatric: Appropriate mood & affect.    =======================================================  11/22/19 blood cx negative x 2  11/22/19 - body fluid cx with Moderate Alpha hemolytic strep x 2 sets  Alpha hemolytic strep  		[facility]	E Test  Ceftriaxone  			S (0.19)   Clindamycin 	S    Erythromycin 	S   Penicillin 	S   Vancomycin 	S     =======================================  Labs:                        9.2    16.49 )-----------( 389      ( 13 Dec 2019 07:03 )             29.5       WBC Count: 16.49 K/uL (12-13-19 @ 07:03)  WBC Count: 16.93 K/uL (12-12-19 @ 12:51)  WBC Count: 21.12 K/uL (12-11-19 @ 03:15)  WBC Count: 18.65 K/uL (12-10-19 @ 06:03)  WBC Count: 18.59 K/uL (12-10-19 @ 03:06)  WBC Count: 21.14 K/uL (12-09-19 @ 03:08)  WBC Count: 17.07 K/uL (12-08-19 @ 18:28)      12-13    143  |  106  |  3.0<L>  ----------------------------<  130<H>  4.3   |  26.0  |  0.50    Ca    7.7<L>      13 Dec 2019 07:03  Phos  3.0     12-13  Mg     1.3     12-13    TPro  5.0<L>  /  Alb  1.9<L>  /  TBili  <0.2<L>  /  DBili  x   /  AST  13  /  ALT  7   /  AlkPhos  163<H>  12-13      Culture - Urine (collected 12-04-19 @ 10:24)  Source: .Urine  Final Report (12-05-19 @ 11:51):    No growth    Culture - Other (collected 12-03-19 @ 14:03)  Source: .Other chest tube insertion site (pus/purulent)  Final Report (12-05-19 @ 09:15):    No growth at 2 days.    Culture - Blood (collected 12-03-19 @ 13:30)  Source: .Blood  Final Report (12-08-19 @ 15:00):    No growth at 5 days.    Culture - Blood (collected 12-03-19 @ 13:30)  Source: .Blood  Final Report (12-08-19 @ 15:00):    No growth at 5 days.

## 2019-12-13 NOTE — PROGRESS NOTE ADULT - ASSESSMENT
34 year old female with a PMHx of smoking, ETOH use (4-5 drinks/day),   [place] [facility] [place] [facility] on 11/22 for complaints of weakness, 25 pound weight loss & flu like symptoms & found to have a large right pleural effusion, pleural fluid cultures + for group A hemolytic strep, + RIJ thormbus   11/23 CT of the chest/abd/pelvis indicated a clot in the Right IJ, pt started on heparin gtt.     s/p right VATS decort & washout debridement of CT site & VAC placement on 11/27/19.   ·	Persistent drainage from chest tube site so repeat CT C/A/P performed 12/4 which showed new cystic mass in pancreas and liver concerning for pancreatitis, GI consulted (NTD).   ·	Zosyn changed to meropenem on 12/6 by ID for broader coverage given pancreatitis. Plan to continue Merrem for 4 weeks.   ·	Copious drainage from anterior chest tube site, so VAC removed and ostomy appliance applied for high output on 12/6.   ·	Pleural fluid with an amylase of 94332 (serum 51) noted.  Prealbumin noted to be 5. Pt tolerating diet. Ensure drinks added.   ·	12/7 Right chest tube removed. Esophogram normal.   ·	Repeat CT scan showed a persistent Right hydropneumothorax with air anteriorly, right lower anterolateral chest wall with air tract to right pleural space, and Persistent renal nephrograms b/l indicating ATN.   ·	All cultures negative. 12/8 Ostomy appliance removed and dressing applied.   ·	Right lower extremity swelling noted to be worsening and patient upgraded to the ICU on 12/8. No acute findings on venous and arterial US of b/l LEs or Renal US.      12/9 s.p PICC placement & Right anterior pig tail catheter & right lateral wound vac placement with bridge     ·	MRCP significant for Peripancreatic fluid and edema consistent with pancreatitis. Cystic lesions in the pancreatic head with the largest measuring 2.1 cm, likely acute peripancreatic collections, increased in size since 12/4/2019, with a tract of fluid extending from the 2.1 cm cystic lesion into the mediastinum and towards the pleural space on the right suspicious for a pancreatic pleural fistula.    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    Transferred to the floor. No acute distress. B/L PICC lines, TPN running. VSS. Labs and Meds reviewed. 34 year old female with a PMHx of smoking, ETOH use (4-5 drinks/day),   [place] [facility] [place] [facility] for complaints of weakness, 25 pound weight loss & flu like symptoms & found to have a large right pleural effusion, pleural fluid cultures + for group A hemolytic strep, + RIJ thormbus   11/23 CT of the chest/abd/pelvis indicated a clot in the Right IJ, pt started on heparin gtt.     s/p right VATS decort & washout debridement of CT site & VAC placement on 11/27/19.   ·	Persistent drainage from chest tube site so repeat CT C/A/P performed 12/4 which showed new cystic mass in pancreas and liver concerning for pancreatitis, GI consulted (NTD).   ·	Zosyn changed to meropenem on 12/6 by ID for broader coverage given pancreatitis. Plan to continue Merrem for 4 weeks.   ·	Copious drainage from anterior chest tube site, so VAC removed and ostomy appliance applied for high output on 12/6.   ·	Pleural fluid with an amylase of 9[address]06 (serum 51) noted.  Prealbumin noted to be 5. Pt tolerating diet. Ensure drinks added.   ·	12/7 Right chest tube removed. Esophogram normal.   ·	Repeat CT scan showed a persistent Right hydropneumothorax with air anteriorly, right lower anterolateral chest wall with air tract to right pleural space, and Persistent renal nephrograms b/l indicating ATN.   ·	All cultures negative. 12/8 Ostomy appliance removed and dressing applied.   ·	Right lower extremity swelling noted to be worsening and patient upgraded to the ICU on 12/8. No acute findings on venous and arterial US of b/l LEs or Renal US.      12/9 s.p PICC placement & Right anterior pig tail catheter & right lateral wound vac placement with bridge     ·	MRCP significant for Peripancreatic fluid and edema consistent with pancreatitis. Cystic lesions in the pancreatic head with the largest measuring 2.1 cm, likely acute peripancreatic collections, increased in size since 12/4/2019, with a tract of fluid extending from the 2.1 cm cystic lesion into the mediastinum and towards the pleural space on the right suspicious for a pancreatic pleural fistula.    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    Transferred to the floor. No acute distress. B/L PICC lines, TPN running. VSS. Labs and Meds reviewed.  12/13 Plan for transfer to Medicine service under Dr. redd awaiting call back from medicine service . F/u Cxr in AM right sided effusion increasing in size.

## 2019-12-14 LAB
ALBUMIN SERPL ELPH-MCNC: 1.8 G/DL — LOW (ref 3.3–5.2)
ALP SERPL-CCNC: 151 U/L — HIGH (ref 40–120)
ALT FLD-CCNC: 6 U/L — SIGNIFICANT CHANGE UP
ANION GAP SERPL CALC-SCNC: 11 MMOL/L — SIGNIFICANT CHANGE UP (ref 5–17)
APTT BLD: 103.9 SEC — HIGH (ref 27.5–36.3)
APTT BLD: 139 SEC — CRITICAL HIGH (ref 27.5–36.3)
APTT BLD: 45.7 SEC — HIGH (ref 27.5–36.3)
AST SERPL-CCNC: 10 U/L — SIGNIFICANT CHANGE UP
BILIRUB SERPL-MCNC: <0.2 MG/DL — LOW (ref 0.4–2)
BUN SERPL-MCNC: 5 MG/DL — LOW (ref 8–20)
CALCIUM SERPL-MCNC: 7.6 MG/DL — LOW (ref 8.6–10.2)
CHLORIDE SERPL-SCNC: 102 MMOL/L — SIGNIFICANT CHANGE UP (ref 98–107)
CO2 SERPL-SCNC: 26 MMOL/L — SIGNIFICANT CHANGE UP (ref 22–29)
CORTICOSTEROID BINDING GLOBULIN RESULT: 1.9 MG/DL — SIGNIFICANT CHANGE UP
CORTIS F/TOTAL MFR SERPL: 40 % — SIGNIFICANT CHANGE UP
CORTIS SERPL-MCNC: 21 UG/DL — HIGH
CORTISOL, FREE RESULT: 8.5 UG/DL — HIGH
CREAT SERPL-MCNC: 0.44 MG/DL — LOW (ref 0.5–1.3)
GLUCOSE BLDC GLUCOMTR-MCNC: 130 MG/DL — HIGH (ref 70–99)
GLUCOSE SERPL-MCNC: 143 MG/DL — HIGH (ref 70–115)
HCT VFR BLD CALC: 24.2 % — LOW (ref 34.5–45)
HGB BLD-MCNC: 7.5 G/DL — LOW (ref 11.5–15.5)
MAGNESIUM SERPL-MCNC: 1.5 MG/DL — LOW (ref 1.6–2.6)
MCHC RBC-ENTMCNC: 30.2 PG — SIGNIFICANT CHANGE UP (ref 27–34)
MCHC RBC-ENTMCNC: 31 GM/DL — LOW (ref 32–36)
MCV RBC AUTO: 97.6 FL — SIGNIFICANT CHANGE UP (ref 80–100)
PHOSPHATE SERPL-MCNC: 2.7 MG/DL — SIGNIFICANT CHANGE UP (ref 2.4–4.7)
PLATELET # BLD AUTO: 422 K/UL — HIGH (ref 150–400)
POTASSIUM SERPL-MCNC: 3.4 MMOL/L — LOW (ref 3.5–5.3)
POTASSIUM SERPL-SCNC: 3.4 MMOL/L — LOW (ref 3.5–5.3)
PROT SERPL-MCNC: 5 G/DL — LOW (ref 6.6–8.7)
RBC # BLD: 2.48 M/UL — LOW (ref 3.8–5.2)
RBC # FLD: 16.2 % — HIGH (ref 10.3–14.5)
SODIUM SERPL-SCNC: 139 MMOL/L — SIGNIFICANT CHANGE UP (ref 135–145)
WBC # BLD: 19.6 K/UL — HIGH (ref 3.8–10.5)
WBC # FLD AUTO: 19.6 K/UL — HIGH (ref 3.8–10.5)

## 2019-12-14 PROCEDURE — 71045 X-RAY EXAM CHEST 1 VIEW: CPT | Mod: 26

## 2019-12-14 PROCEDURE — 99233 SBSQ HOSP IP/OBS HIGH 50: CPT

## 2019-12-14 RX ORDER — ELECTROLYTE SOLUTION,INJ
1 VIAL (ML) INTRAVENOUS
Refills: 0 | Status: DISCONTINUED | OUTPATIENT
Start: 2019-12-14 | End: 2019-12-14

## 2019-12-14 RX ORDER — MAGNESIUM SULFATE 500 MG/ML
2 VIAL (ML) INJECTION ONCE
Refills: 0 | Status: COMPLETED | OUTPATIENT
Start: 2019-12-14 | End: 2019-12-14

## 2019-12-14 RX ORDER — POTASSIUM CHLORIDE 20 MEQ
40 PACKET (EA) ORAL ONCE
Refills: 0 | Status: COMPLETED | OUTPATIENT
Start: 2019-12-14 | End: 2019-12-14

## 2019-12-14 RX ORDER — I.V. FAT EMULSION 20 G/100ML
0.9 EMULSION INTRAVENOUS
Qty: 50 | Refills: 0 | Status: DISCONTINUED | OUTPATIENT
Start: 2019-12-14 | End: 2019-12-14

## 2019-12-14 RX ADMIN — Medication 50 GRAM(S): at 12:21

## 2019-12-14 RX ADMIN — ESCITALOPRAM OXALATE 10 MILLIGRAM(S): 10 TABLET, FILM COATED ORAL at 12:25

## 2019-12-14 RX ADMIN — HEPARIN SODIUM 0 UNIT(S)/HR: 5000 INJECTION INTRAVENOUS; SUBCUTANEOUS at 01:03

## 2019-12-14 RX ADMIN — TRAMADOL HYDROCHLORIDE 50 MILLIGRAM(S): 50 TABLET ORAL at 15:08

## 2019-12-14 RX ADMIN — HEPARIN SODIUM 1100 UNIT(S)/HR: 5000 INJECTION INTRAVENOUS; SUBCUTANEOUS at 02:35

## 2019-12-14 RX ADMIN — MEROPENEM 100 MILLIGRAM(S): 1 INJECTION INTRAVENOUS at 21:29

## 2019-12-14 RX ADMIN — GABAPENTIN 100 MILLIGRAM(S): 400 CAPSULE ORAL at 21:24

## 2019-12-14 RX ADMIN — MEROPENEM 100 MILLIGRAM(S): 1 INJECTION INTRAVENOUS at 06:19

## 2019-12-14 RX ADMIN — TRAMADOL HYDROCHLORIDE 50 MILLIGRAM(S): 50 TABLET ORAL at 22:24

## 2019-12-14 RX ADMIN — HYDROMORPHONE HYDROCHLORIDE 4 MILLIGRAM(S): 2 INJECTION INTRAMUSCULAR; INTRAVENOUS; SUBCUTANEOUS at 01:16

## 2019-12-14 RX ADMIN — MEROPENEM 100 MILLIGRAM(S): 1 INJECTION INTRAVENOUS at 15:08

## 2019-12-14 RX ADMIN — GABAPENTIN 100 MILLIGRAM(S): 400 CAPSULE ORAL at 06:27

## 2019-12-14 RX ADMIN — TRAMADOL HYDROCHLORIDE 50 MILLIGRAM(S): 50 TABLET ORAL at 21:24

## 2019-12-14 RX ADMIN — HEPARIN SODIUM 1100 UNIT(S)/HR: 5000 INJECTION INTRAVENOUS; SUBCUTANEOUS at 19:53

## 2019-12-14 RX ADMIN — HYDROMORPHONE HYDROCHLORIDE 4 MILLIGRAM(S): 2 INJECTION INTRAMUSCULAR; INTRAVENOUS; SUBCUTANEOUS at 12:54

## 2019-12-14 RX ADMIN — Medication 1 TABLET(S): at 17:29

## 2019-12-14 RX ADMIN — TRAMADOL HYDROCHLORIDE 50 MILLIGRAM(S): 50 TABLET ORAL at 06:27

## 2019-12-14 RX ADMIN — I.V. FAT EMULSION 31.25 GM/KG/DAY: 20 EMULSION INTRAVENOUS at 18:12

## 2019-12-14 RX ADMIN — PANTOPRAZOLE SODIUM 40 MILLIGRAM(S): 20 TABLET, DELAYED RELEASE ORAL at 06:25

## 2019-12-14 RX ADMIN — HEPARIN SODIUM 2000 UNIT(S): 5000 INJECTION INTRAVENOUS; SUBCUTANEOUS at 10:51

## 2019-12-14 RX ADMIN — TRAMADOL HYDROCHLORIDE 50 MILLIGRAM(S): 50 TABLET ORAL at 07:16

## 2019-12-14 RX ADMIN — HYDROMORPHONE HYDROCHLORIDE 4 MILLIGRAM(S): 2 INJECTION INTRAMUSCULAR; INTRAVENOUS; SUBCUTANEOUS at 00:16

## 2019-12-14 RX ADMIN — Medication 0.25 MILLIGRAM(S): at 12:24

## 2019-12-14 RX ADMIN — HEPARIN SODIUM 1200 UNIT(S)/HR: 5000 INJECTION INTRAVENOUS; SUBCUTANEOUS at 10:49

## 2019-12-14 RX ADMIN — Medication 1 TABLET(S): at 06:25

## 2019-12-14 RX ADMIN — Medication 40 MILLIEQUIVALENT(S): at 17:31

## 2019-12-14 RX ADMIN — GABAPENTIN 100 MILLIGRAM(S): 400 CAPSULE ORAL at 15:08

## 2019-12-14 RX ADMIN — Medication 1 EACH: at 18:20

## 2019-12-14 RX ADMIN — Medication 10 MILLIGRAM(S): at 21:30

## 2019-12-14 RX ADMIN — OCTREOTIDE ACETATE 100 MICROGRAM(S): 200 INJECTION, SOLUTION INTRAVENOUS; SUBCUTANEOUS at 18:07

## 2019-12-14 RX ADMIN — OCTREOTIDE ACETATE 100 MICROGRAM(S): 200 INJECTION, SOLUTION INTRAVENOUS; SUBCUTANEOUS at 06:24

## 2019-12-14 RX ADMIN — HYDROMORPHONE HYDROCHLORIDE 4 MILLIGRAM(S): 2 INJECTION INTRAMUSCULAR; INTRAVENOUS; SUBCUTANEOUS at 12:24

## 2019-12-14 NOTE — PROGRESS NOTE ADULT - ASSESSMENT
34 year old female with no known major comorbidities who is being treated for right pleural effusion/empyema/multifocal pna found to have a RIJ thrombus currently on heparin drip.    1) RIJ venous thrombosis -provoked event that occurred in context of her severe and complex lung illness/infection/empyema that may have led to developing the thrombus. Plan on anticoagulation for  3 months. Plan for warfarin noted as outpatient. will need frequent INR monitoring  2) Multifactorial anemia; Hb currently 7.5  3) Reactive leukocytosis, improved

## 2019-12-14 NOTE — PROGRESS NOTE ADULT - SUBJECTIVE AND OBJECTIVE BOX
Pt seen and examined. Condition unchanged. TPN renewed.     REVIEW OF SYSTEMS:  14 point review of systems unchanged from yesterday.      MEDICATIONS:  MEDICATIONS  (STANDING):  amitriptyline 10 milliGRAM(s) Oral at bedtime  ascorbic acid 500 milliGRAM(s) Oral daily  chlorhexidine 2% Cloths 1 Application(s) Topical <User Schedule>  cholecalciferol 2000 Unit(s) Oral daily  escitalopram 10 milliGRAM(s) Oral daily  fat emulsion (Plant Based) 20% Infusion 0.9 Gm/kG/Day (31.25 mL/Hr) IV Continuous <Continuous>  ferrous    sulfate 325 milliGRAM(s) Oral three times a day  folic acid 1 milliGRAM(s) Oral daily  gabapentin 100 milliGRAM(s) Oral three times a day  heparin  Infusion. 1300 Unit(s)/Hr (13 mL/Hr) IV Continuous <Continuous>  lactobacillus acidophilus 1 Tablet(s) Oral two times a day  lidocaine   Patch 1 Patch Transdermal daily  magnesium oxide 400 milliGRAM(s) Oral daily  meropenem  IVPB 1000 milliGRAM(s) IV Intermittent every 8 hours  multivitamin 1 Tablet(s) Oral daily  octreotide  Injectable 100 MICROGram(s) SubCutaneous every 8 hours  pantoprazole    Tablet 40 milliGRAM(s) Oral before breakfast  Parenteral Nutrition - Adult 1 Each (83 mL/Hr) TPN Continuous <Continuous>  Parenteral Nutrition - Adult 1 Each (83 mL/Hr) TPN Continuous <Continuous>  thiamine 100 milliGRAM(s) Oral daily  traMADol 50 milliGRAM(s) Oral every 8 hours    MEDICATIONS  (PRN):  ALPRAZolam 0.25 milliGRAM(s) Oral every 8 hours PRN anxiety  aluminum hydroxide/magnesium hydroxide/simethicone Suspension 30 milliLiter(s) Oral every 4 hours PRN Dyspepsia  heparin  Injectable 4000 Unit(s) IV Push every 6 hours PRN For aPTT less than 40  heparin  Injectable 2000 Unit(s) IV Push every 6 hours PRN For aPTT between 40 - 57  HYDROmorphone   Tablet 4 milliGRAM(s) Oral every 6 hours PRN Severe Pain (7 - 10)  HYDROmorphone   Tablet 2 milliGRAM(s) Oral every 6 hours PRN Moderate Pain (4 - 6)  methocarbamol 500 milliGRAM(s) Oral every 6 hours PRN Muscle Spasm  ondansetron Injectable 4 milliGRAM(s) IV Push every 4 hours PRN Nausea and/or Vomiting  sodium chloride 0.9% lock flush 10 milliLiter(s) IV Push every 1 hour PRN Pre/post blood products, medications, blood draw, and to maintain line patency      Allergies    No Known Allergies    Intolerances    Ensure TID (Unknown)      Vital Signs Last 24 Hrs  T(C): 36.5 (14 Dec 2019 08:44), Max: 36.7 (13 Dec 2019 21:51)  T(F): 97.7 (14 Dec 2019 08:44), Max: 98.1 (13 Dec 2019 21:51)  HR: 97 (14 Dec 2019 08:44) (80 - 97)  BP: 158/83 (14 Dec 2019 08:44) (122/60 - 158/83)  BP(mean): --  RR: 18 (14 Dec 2019 08:44) (16 - 18)  SpO2: 95% (14 Dec 2019 08:44) (95% - 96%)    12-13 @ 07:01  -  12-14 @ 07:00  --------------------------------------------------------  IN: 2596 mL / OUT: 0 mL / NET: 2596 mL        PHYSICAL EXAM:    General: Well developed; well nourished; in no acute distress  HEENT: MMM, conjunctiva pink and sclera anicteric.  Lungs: clear to auscultation bilaterally  Cor: RRR S1, S2 only.  Gastrointestinal: Abdomen: Soft epigastric tenderness non-distended; Normal bowel sounds; No hepatosplenomegaly.  Extremities: no cyanosis, clubbing or edema.  Skin: Warm and dry. No obvious rash  Neuro: Pt. a + o x 3    LABS:  CBC Full  -  ( 14 Dec 2019 09:32 )  WBC Count : 19.60 K/uL  RBC Count : 2.48 M/uL  Hemoglobin : 7.5 g/dL  Hematocrit : 24.2 %  Platelet Count - Automated : 422 K/uL  Mean Cell Volume : 97.6 fl  Mean Cell Hemoglobin : 30.2 pg  Mean Cell Hemoglobin Concentration : 31.0 gm/dL  Auto Neutrophil # : x  Auto Lymphocyte # : x  Auto Monocyte # : x  Auto Eosinophil # : x  Auto Basophil # : x  Auto Neutrophil % : x  Auto Lymphocyte % : x  Auto Monocyte % : x  Auto Eosinophil % : x  Auto Basophil % : x    12-13    143  |  106  |  3.0<L>  ----------------------------<  130<H>  4.3   |  26.0  |  0.50    Ca    7.7<L>      13 Dec 2019 07:03  Phos  3.0     12-13  Mg     1.3     12-13    TPro  5.0<L>  /  Alb  1.9<L>  /  TBili  <0.2<L>  /  DBili  x   /  AST  13  /  ALT  7   /  AlkPhos  163<H>  12-13    PTT - ( 14 Dec 2019 00:16 )  PTT:139.0 sec                  RADIOLOGY & ADDITIONAL STUDIES (The following images were personally reviewed):

## 2019-12-14 NOTE — CHART NOTE - NSCHARTNOTEFT_GEN_A_CORE
Patient chart reviewed and patient noted to be resting comfortably. Hct noted and discussed with Dr Ayala > will send repeat tomorrow and check occult blood. Patient hemodynamically stable. Patient also on a heparin gtt for RIJ thrombus. Wound vac to be changed tomorrow. Will transfer patient to Dr Ayala's service. For GI f/u. D/W Dr Castanon.

## 2019-12-14 NOTE — PROGRESS NOTE ADULT - PROBLEM SELECTOR PLAN 1
Secondary to pancreatico-pulmonic  fistula. TPN and IV lipids renewed. Repeat labs ordered for the AM.

## 2019-12-14 NOTE — PROGRESS NOTE ADULT - SUBJECTIVE AND OBJECTIVE BOX
34 year old woman with no known major comorbidities (had not gone to see a doctor for 15 years) who presented to [facility] on 11/22/2019 with progressive sob, weakness, weight loss, She was found to have a large pleural effusion. Initially 1.5 L of dark serous fluid was removed.  Chest tube placed shortly thereafter, 3 additional liters was removed. CBC showed a leukocytosis of 40k and   CTA imaging revealed a 2.1cm partially cystic mass in the body/tail of the pancreas with possible upstream ductal dilatation. On 11/23/2019 the patient had a CT chest/abdomen/pelvis that found to have a clot in the right IJ and was started on a heparin gtt.     Transferred to [facility] on 11/24/19.  MRCP performed on 11/26.  Underwent Debridement, wound, with dressing replacement  Wound VAC placement  Pleural biopsy  Lung decortication  Bronchoscopy, with lung decortication using VATS on 11/27/19.  Pt  has a history of alcohol abuse.     We were consulted for evaluation and management of the RIJ DVT.      INTERVAL HISTORY  currently c/o headache; states light bothers her eyes  On TPN; treated for pancreatic cysts  No bleeding reported; denies SOB or chest pain      MEDICATIONS  (STANDING):  amitriptyline 10 milliGRAM(s) Oral at bedtime  ascorbic acid 500 milliGRAM(s) Oral daily  cholecalciferol 2000 Unit(s) Oral daily  ferrous    sulfate 325 milliGRAM(s) Oral three times a day  folic acid 1 milliGRAM(s) Oral daily  gabapentin 100 milliGRAM(s) Oral three times a day  heparin  Infusion. 1300 Unit(s)/Hr (13 mL/Hr) IV Continuous <Continuous>  lidocaine   Patch 1 Patch Transdermal daily  meropenem  IVPB 1000 milliGRAM(s) IV Intermittent every 12 hours  multivitamin 1 Tablet(s) Oral daily  pantoprazole    Tablet 40 milliGRAM(s) Oral before breakfast  saccharomyces boulardii 250 milliGRAM(s) Oral two times a day  thiamine 100 milliGRAM(s) Oral daily  traMADol 50 milliGRAM(s) Oral every 8 hours      Vital Signs Last 24 Hrs  T(C): 37.2 (10 Dec 2019 08:00), Max: 37.3 (10 Dec 2019 00:30)  T(F): 99 (10 Dec 2019 08:00), Max: 99.1 (10 Dec 2019 00:30)  HR: 108 (10 Dec 2019 09:00) (93 - 112)  BP: 104/69 (10 Dec 2019 09:00) (103/66 - 135/90)  BP(mean): 82 (10 Dec 2019 09:00) (79 - 108)  RR: 20 (10 Dec 2019 08:00) (16 - 22)  SpO2: 96% (10 Dec 2019 09:00) (94% - 100%)  Gen: nad  Chest: chest tube right side, drain  CV: rrr  Abd: soft, nd, nt      WBC 19.6, H/H 7.54/24.2, plt ct 422

## 2019-12-14 NOTE — PROGRESS NOTE ADULT - SUBJECTIVE AND OBJECTIVE BOX
Asked by CTS team to transfer patient under medical service .     Patient seen . Flat affect , states " I have pain all over my body" , had 2 loose stools .     CC : pain all over her body , frequent loose stool        MEDICATIONS  (STANDING):  amitriptyline 10 milliGRAM(s) Oral at bedtime  ascorbic acid 500 milliGRAM(s) Oral daily  chlorhexidine 2% Cloths 1 Application(s) Topical <User Schedule>  cholecalciferol 2000 Unit(s) Oral daily  escitalopram 10 milliGRAM(s) Oral daily  fat emulsion (Plant Based) 20% Infusion 0.9 Gm/kG/Day (31.25 mL/Hr) IV Continuous <Continuous>  ferrous    sulfate 325 milliGRAM(s) Oral three times a day  folic acid 1 milliGRAM(s) Oral daily  gabapentin 100 milliGRAM(s) Oral three times a day  heparin  Infusion. 1300 Unit(s)/Hr (13 mL/Hr) IV Continuous <Continuous>  lactobacillus acidophilus 1 Tablet(s) Oral two times a day  lidocaine   Patch 1 Patch Transdermal daily  magnesium oxide 400 milliGRAM(s) Oral daily  magnesium sulfate  IVPB 2 Gram(s) IV Intermittent once  meropenem  IVPB 1000 milliGRAM(s) IV Intermittent every 8 hours  multivitamin 1 Tablet(s) Oral daily  octreotide  Injectable 100 MICROGram(s) SubCutaneous every 8 hours  pantoprazole    Tablet 40 milliGRAM(s) Oral before breakfast  Parenteral Nutrition - Adult 1 Each (83 mL/Hr) TPN Continuous <Continuous>  Parenteral Nutrition - Adult 1 Each (83 mL/Hr) TPN Continuous <Continuous>  potassium chloride    Tablet ER 40 milliEquivalent(s) Oral once  thiamine 100 milliGRAM(s) Oral daily  traMADol 50 milliGRAM(s) Oral every 8 hours    MEDICATIONS  (PRN):  ALPRAZolam 0.25 milliGRAM(s) Oral every 8 hours PRN anxiety  aluminum hydroxide/magnesium hydroxide/simethicone Suspension 30 milliLiter(s) Oral every 4 hours PRN Dyspepsia  heparin  Injectable 4000 Unit(s) IV Push every 6 hours PRN For aPTT less than 40  heparin  Injectable 2000 Unit(s) IV Push every 6 hours PRN For aPTT between 40 - 57  HYDROmorphone   Tablet 4 milliGRAM(s) Oral every 6 hours PRN Severe Pain (7 - 10)  HYDROmorphone   Tablet 2 milliGRAM(s) Oral every 6 hours PRN Moderate Pain (4 - 6)  methocarbamol 500 milliGRAM(s) Oral every 6 hours PRN Muscle Spasm  ondansetron Injectable 4 milliGRAM(s) IV Push every 4 hours PRN Nausea and/or Vomiting  sodium chloride 0.9% lock flush 10 milliLiter(s) IV Push every 1 hour PRN Pre/post blood products, medications, blood draw, and to maintain line patency      LABS:                          7.5    19.60 )-----------( 422      ( 14 Dec 2019 09:32 )             24.2     12-14    139  |  102  |  5.0<L>  ----------------------------<  143<H>  3.4<L>   |  26.0  |  0.44<L>    Ca    7.6<L>      14 Dec 2019 09:32  Phos  2.7     12-14  Mg     1.5     12-14    TPro  5.0<L>  /  Alb  1.8<L>  /  TBili  <0.2<L>  /  DBili  x   /  AST  10  /  ALT  6   /  AlkPhos  151<H>  12-14    PTT - ( 14 Dec 2019 09:29 )  PTT:45.7 sec      RADIOLOGY & ADDITIONAL TESTS:      REVIEW OF SYSTEMS:    As above , all other systems are reviewed and are negative .    Vital Signs Last 24 Hrs  T(C): 36.5 (14 Dec 2019 08:44), Max: 36.7 (13 Dec 2019 21:51)  T(F): 97.7 (14 Dec 2019 08:44), Max: 98.1 (13 Dec 2019 21:51)  HR: 97 (14 Dec 2019 08:44) (80 - 97)  BP: 158/83 (14 Dec 2019 08:44) (122/60 - 158/83)  BP(mean): --  RR: 18 (14 Dec 2019 08:44) (16 - 18)  SpO2: 95% (14 Dec 2019 08:44) (95% - 96%)    PHYSICAL EXAM:  Refusing to be examined due to pain all over her body

## 2019-12-14 NOTE — PROGRESS NOTE ADULT - ASSESSMENT
34 year old female who is  transferred from [facility] to [facility] on 11/22 for complaints of weakness, 25 pound weight loss & flu like symptoms & found to have a large right pleural effusion , empyema with group A hemolytic strep . No PMH except  has 4-5 drinks of alcohol per day. S/P thoracentesis .   Pt s/p right VATS decort & washout debridement of CT site & VAC placement on 11/27/19. Followed by ID - on iv abx .   Initial MRI with pancreatic cyst but poor study . 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 So R pleural effusion /multifocal PNA / Empyema - due to panreatic / pleural  fistula . CT removed , wound vac+ . ARF - due to ATN - resolved , Hyponatremia - likely due to SIADH - resolved - renal signed off . R IJ thrombus + , on AC - followed by Hem - will need to be on AC least 3 months . Acute pancreatitis , likely on chronic , complicated with pancreatic / pleural fistula - GI is following , on Octreotide , started on TPN . Anemic - multifactorial - s/p PRBC transfusion x1 , will check FOBT , type and screen in am . Abnormal TSH / Prolactin noted , Endo appreciated , will follow labs in 2 days .            Problem/Plan - 1:  ·  Problem: Pleural effusion on right.  Plan: s/p Right VATS decortication & washout on 11/27/19. Debridement of CT site & VAC placement , chest tube removed . Multifocal PNA - on iv abx as per ID .   Now    s.p PICC placement   MRCP significant for Peripancreatic fluid and edema consistent with pancreatitis. Cystic lesions in the pancreatic head with the largest measuring 2.1 cm, likely acute peripancreatic collections, increased in size since 12/4/2019, with a tract of fluid extending from the 2.1 cm cystic lesion into the mediastinum and towards the pleural space on the right suspicious for a pancreatic pleural fistula. GI is following , on Octreotide trial , CLD , ? for ERCP      Problem/Plan - 2:  ·  Problem: Empyema.  Plan: s/p Chest tube removal , wound vac + , last time changed on 12/12  continued with iv abx as per ID       Problem/Plan - 3:  ·  Problem: Internal jugular vein thrombosis, right.  Plan: On Heparin drip      Problem/Plan - 5:  ·  Problem: Anemia likely multifactorial ; Due to anemia of chronic dz , multiple blood drawings , dilutional  , patient is tachycardic , pale  Plan: s/p PRBCS X1 , transfuse PRN , check FOBT , get type and screen in am .        Problem/Plan -6:  ·  Problem: Hyponatremia.  Plan: resolved      Problem/Plan - 7:  ·  Problem: Alcohol abuse.  Plan: Monitor for withdrawal, Continue folic, MVI, thiamine.     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 cause , third spacing ,    Problem / Plan -11: Pancreatitis - as per GI    Problem /Plan - 13 : Hypoalbuminemia , prealbumin noted ,  severe protein calorie malnutrition - started on TPN     Problem / Plan - 14 : RONAK - likely multifactorial RONAK; ATN from Vancomycin and ? CHIQUITA - resolved      Problem / Plan - 15 : Prolactinoma , increased TSH ,decreased ACTH - Hormone w/u ordered due to initially unexplained fatigue / wt lost / diarrhea / irregular menstrual periods - not sure if this all related to acute issues , ENDO re called , likely due to acute illness , labs to be repeated in few days , ENDO to follow .

## 2019-12-15 DIAGNOSIS — E46 UNSPECIFIED PROTEIN-CALORIE MALNUTRITION: ICD-10-CM

## 2019-12-15 LAB
ALBUMIN SERPL ELPH-MCNC: 1.6 G/DL — LOW (ref 3.3–5.2)
ALP SERPL-CCNC: 153 U/L — HIGH (ref 40–120)
ALT FLD-CCNC: 7 U/L — SIGNIFICANT CHANGE UP
ANION GAP SERPL CALC-SCNC: 12 MMOL/L — SIGNIFICANT CHANGE UP (ref 5–17)
ANION GAP SERPL CALC-SCNC: 13 MMOL/L — SIGNIFICANT CHANGE UP (ref 5–17)
ANISOCYTOSIS BLD QL: SLIGHT — SIGNIFICANT CHANGE UP
APTT BLD: 106.2 SEC — HIGH (ref 27.5–36.3)
APTT BLD: 50.3 SEC — HIGH (ref 27.5–36.3)
APTT BLD: 60.7 SEC — HIGH (ref 27.5–36.3)
AST SERPL-CCNC: 11 U/L — SIGNIFICANT CHANGE UP
BASOPHILS # BLD AUTO: 0 K/UL — SIGNIFICANT CHANGE UP (ref 0–0.2)
BASOPHILS NFR BLD AUTO: 0 % — SIGNIFICANT CHANGE UP (ref 0–2)
BILIRUB SERPL-MCNC: <0.2 MG/DL — LOW (ref 0.4–2)
BUN SERPL-MCNC: 4 MG/DL — LOW (ref 8–20)
BUN SERPL-MCNC: 4 MG/DL — LOW (ref 8–20)
CA-I BLD-SCNC: 1.16 MMOL/L — SIGNIFICANT CHANGE UP (ref 1.12–1.3)
CALCIUM SERPL-MCNC: 7.4 MG/DL — LOW (ref 8.6–10.2)
CALCIUM SERPL-MCNC: 8.5 MG/DL — LOW (ref 8.6–10.2)
CHLORIDE SERPL-SCNC: 103 MMOL/L — SIGNIFICANT CHANGE UP (ref 98–107)
CHLORIDE SERPL-SCNC: 107 MMOL/L — SIGNIFICANT CHANGE UP (ref 98–107)
CO2 SERPL-SCNC: 23 MMOL/L — SIGNIFICANT CHANGE UP (ref 22–29)
CO2 SERPL-SCNC: 26 MMOL/L — SIGNIFICANT CHANGE UP (ref 22–29)
CREAT SERPL-MCNC: 0.5 MG/DL — SIGNIFICANT CHANGE UP (ref 0.5–1.3)
CREAT SERPL-MCNC: 0.53 MG/DL — SIGNIFICANT CHANGE UP (ref 0.5–1.3)
EOSINOPHIL # BLD AUTO: 0.89 K/UL — HIGH (ref 0–0.5)
EOSINOPHIL NFR BLD AUTO: 6 % — SIGNIFICANT CHANGE UP (ref 0–6)
GLUCOSE BLDC GLUCOMTR-MCNC: 108 MG/DL — HIGH (ref 70–99)
GLUCOSE BLDC GLUCOMTR-MCNC: 88 MG/DL — SIGNIFICANT CHANGE UP (ref 70–99)
GLUCOSE SERPL-MCNC: 100 MG/DL — SIGNIFICANT CHANGE UP (ref 70–115)
GLUCOSE SERPL-MCNC: 1391 MG/DL — CRITICAL HIGH (ref 70–115)
HCT VFR BLD CALC: 26 % — LOW (ref 34.5–45)
HGB BLD-MCNC: 7.1 G/DL — LOW (ref 11.5–15.5)
HYPOCHROMIA BLD QL: SLIGHT — SIGNIFICANT CHANGE UP
LYMPHOCYTES # BLD AUTO: 35 % — SIGNIFICANT CHANGE UP (ref 13–44)
LYMPHOCYTES # BLD AUTO: 5.2 K/UL — HIGH (ref 1–3.3)
MACROCYTES BLD QL: SLIGHT — SIGNIFICANT CHANGE UP
MAGNESIUM SERPL-MCNC: 2.3 MG/DL — SIGNIFICANT CHANGE UP (ref 1.6–2.6)
MANUAL SMEAR VERIFICATION: SIGNIFICANT CHANGE UP
MCHC RBC-ENTMCNC: 27.3 GM/DL — LOW (ref 32–36)
MCHC RBC-ENTMCNC: 30.6 PG — SIGNIFICANT CHANGE UP (ref 27–34)
MCV RBC AUTO: 112.1 FL — HIGH (ref 80–100)
METAMYELOCYTES # FLD: 1 % — HIGH (ref 0–0)
MICROCYTES BLD QL: SLIGHT — SIGNIFICANT CHANGE UP
MONOCYTES # BLD AUTO: 0.74 K/UL — SIGNIFICANT CHANGE UP (ref 0–0.9)
MONOCYTES NFR BLD AUTO: 5 % — SIGNIFICANT CHANGE UP (ref 2–14)
NEUTROPHILS # BLD AUTO: 7.88 K/UL — HIGH (ref 1.8–7.4)
NEUTROPHILS NFR BLD AUTO: 53 % — SIGNIFICANT CHANGE UP (ref 43–77)
NRBC # BLD: 0 /100 — SIGNIFICANT CHANGE UP (ref 0–0)
OB PNL STL: NEGATIVE — SIGNIFICANT CHANGE UP
PHOSPHATE SERPL-MCNC: 5.8 MG/DL — HIGH (ref 2.4–4.7)
PLAT MORPH BLD: NORMAL — SIGNIFICANT CHANGE UP
PLATELET # BLD AUTO: 401 K/UL — HIGH (ref 150–400)
POLYCHROMASIA BLD QL SMEAR: SLIGHT — SIGNIFICANT CHANGE UP
POTASSIUM SERPL-MCNC: 3.6 MMOL/L — SIGNIFICANT CHANGE UP (ref 3.5–5.3)
POTASSIUM SERPL-MCNC: 6.3 MMOL/L — CRITICAL HIGH (ref 3.5–5.3)
POTASSIUM SERPL-SCNC: 3.6 MMOL/L — SIGNIFICANT CHANGE UP (ref 3.5–5.3)
POTASSIUM SERPL-SCNC: 6.3 MMOL/L — CRITICAL HIGH (ref 3.5–5.3)
PREALB SERPL-MCNC: 6 MG/DL — LOW (ref 18–38)
PROT SERPL-MCNC: 4.8 G/DL — LOW (ref 6.6–8.7)
RBC # BLD: 2.32 M/UL — LOW (ref 3.8–5.2)
RBC # FLD: 17.2 % — HIGH (ref 10.3–14.5)
RBC BLD AUTO: ABNORMAL
SODIUM SERPL-SCNC: 142 MMOL/L — SIGNIFICANT CHANGE UP (ref 135–145)
SODIUM SERPL-SCNC: 142 MMOL/L — SIGNIFICANT CHANGE UP (ref 135–145)
STOMATOCYTES BLD QL SMEAR: SLIGHT — SIGNIFICANT CHANGE UP
T4 AB SER-ACNC: 3.7 UG/DL — LOW (ref 4.5–12)
TSH SERPL-MCNC: 5.97 UIU/ML — HIGH (ref 0.27–4.2)
WBC # BLD: 14.86 K/UL — HIGH (ref 3.8–10.5)
WBC # FLD AUTO: 14.86 K/UL — HIGH (ref 3.8–10.5)

## 2019-12-15 PROCEDURE — 99233 SBSQ HOSP IP/OBS HIGH 50: CPT

## 2019-12-15 PROCEDURE — 99232 SBSQ HOSP IP/OBS MODERATE 35: CPT

## 2019-12-15 PROCEDURE — 93010 ELECTROCARDIOGRAM REPORT: CPT

## 2019-12-15 PROCEDURE — 97605 NEG PRS WND THER DME<=50SQCM: CPT

## 2019-12-15 RX ORDER — ELECTROLYTE SOLUTION,INJ
1 VIAL (ML) INTRAVENOUS
Refills: 0 | Status: DISCONTINUED | OUTPATIENT
Start: 2019-12-15 | End: 2019-12-15

## 2019-12-15 RX ORDER — SODIUM POLYSTYRENE SULFONATE 4.1 MEQ/G
15 POWDER, FOR SUSPENSION ORAL ONCE
Refills: 0 | Status: COMPLETED | OUTPATIENT
Start: 2019-12-15 | End: 2019-12-15

## 2019-12-15 RX ORDER — I.V. FAT EMULSION 20 G/100ML
0.9 EMULSION INTRAVENOUS
Qty: 50 | Refills: 0 | Status: DISCONTINUED | OUTPATIENT
Start: 2019-12-15 | End: 2019-12-15

## 2019-12-15 RX ORDER — CALCIUM GLUCONATE 100 MG/ML
1 VIAL (ML) INTRAVENOUS ONCE
Refills: 0 | Status: COMPLETED | OUTPATIENT
Start: 2019-12-15 | End: 2019-12-15

## 2019-12-15 RX ORDER — ALBUTEROL 90 UG/1
2.5 AEROSOL, METERED ORAL ONCE
Refills: 0 | Status: COMPLETED | OUTPATIENT
Start: 2019-12-15 | End: 2019-12-15

## 2019-12-15 RX ADMIN — TRAMADOL HYDROCHLORIDE 50 MILLIGRAM(S): 50 TABLET ORAL at 22:50

## 2019-12-15 RX ADMIN — Medication 2000 UNIT(S): at 11:05

## 2019-12-15 RX ADMIN — OCTREOTIDE ACETATE 100 MICROGRAM(S): 200 INJECTION, SOLUTION INTRAVENOUS; SUBCUTANEOUS at 11:06

## 2019-12-15 RX ADMIN — Medication 500 MILLIGRAM(S): at 11:05

## 2019-12-15 RX ADMIN — Medication 10 MILLIGRAM(S): at 21:53

## 2019-12-15 RX ADMIN — Medication 325 MILLIGRAM(S): at 13:45

## 2019-12-15 RX ADMIN — Medication 1 TABLET(S): at 18:30

## 2019-12-15 RX ADMIN — OCTREOTIDE ACETATE 100 MICROGRAM(S): 200 INJECTION, SOLUTION INTRAVENOUS; SUBCUTANEOUS at 02:52

## 2019-12-15 RX ADMIN — TRAMADOL HYDROCHLORIDE 50 MILLIGRAM(S): 50 TABLET ORAL at 16:08

## 2019-12-15 RX ADMIN — METHOCARBAMOL 500 MILLIGRAM(S): 500 TABLET, FILM COATED ORAL at 11:04

## 2019-12-15 RX ADMIN — I.V. FAT EMULSION 31.05 GM/KG/DAY: 20 EMULSION INTRAVENOUS at 18:29

## 2019-12-15 RX ADMIN — HYDROMORPHONE HYDROCHLORIDE 4 MILLIGRAM(S): 2 INJECTION INTRAMUSCULAR; INTRAVENOUS; SUBCUTANEOUS at 11:42

## 2019-12-15 RX ADMIN — HYDROMORPHONE HYDROCHLORIDE 4 MILLIGRAM(S): 2 INJECTION INTRAMUSCULAR; INTRAVENOUS; SUBCUTANEOUS at 11:12

## 2019-12-15 RX ADMIN — ESCITALOPRAM OXALATE 10 MILLIGRAM(S): 10 TABLET, FILM COATED ORAL at 11:05

## 2019-12-15 RX ADMIN — GABAPENTIN 100 MILLIGRAM(S): 400 CAPSULE ORAL at 05:46

## 2019-12-15 RX ADMIN — HEPARIN SODIUM 2000 UNIT(S): 5000 INJECTION INTRAVENOUS; SUBCUTANEOUS at 05:41

## 2019-12-15 RX ADMIN — CHLORHEXIDINE GLUCONATE 1 APPLICATION(S): 213 SOLUTION TOPICAL at 15:29

## 2019-12-15 RX ADMIN — TRAMADOL HYDROCHLORIDE 50 MILLIGRAM(S): 50 TABLET ORAL at 13:45

## 2019-12-15 RX ADMIN — Medication 1 TABLET(S): at 05:47

## 2019-12-15 RX ADMIN — GABAPENTIN 100 MILLIGRAM(S): 400 CAPSULE ORAL at 13:45

## 2019-12-15 RX ADMIN — MEROPENEM 100 MILLIGRAM(S): 1 INJECTION INTRAVENOUS at 05:46

## 2019-12-15 RX ADMIN — Medication 30 MILLILITER(S): at 21:54

## 2019-12-15 RX ADMIN — HEPARIN SODIUM 1200 UNIT(S)/HR: 5000 INJECTION INTRAVENOUS; SUBCUTANEOUS at 05:39

## 2019-12-15 RX ADMIN — Medication 1 MILLIGRAM(S): at 11:05

## 2019-12-15 RX ADMIN — Medication 0.25 MILLIGRAM(S): at 11:12

## 2019-12-15 RX ADMIN — HYDROMORPHONE HYDROCHLORIDE 4 MILLIGRAM(S): 2 INJECTION INTRAMUSCULAR; INTRAVENOUS; SUBCUTANEOUS at 03:21

## 2019-12-15 RX ADMIN — Medication 30 MILLILITER(S): at 02:22

## 2019-12-15 RX ADMIN — HYDROMORPHONE HYDROCHLORIDE 4 MILLIGRAM(S): 2 INJECTION INTRAMUSCULAR; INTRAVENOUS; SUBCUTANEOUS at 19:40

## 2019-12-15 RX ADMIN — MEROPENEM 100 MILLIGRAM(S): 1 INJECTION INTRAVENOUS at 13:38

## 2019-12-15 RX ADMIN — SODIUM POLYSTYRENE SULFONATE 15 GRAM(S): 4.1 POWDER, FOR SUSPENSION ORAL at 13:37

## 2019-12-15 RX ADMIN — TRAMADOL HYDROCHLORIDE 50 MILLIGRAM(S): 50 TABLET ORAL at 21:50

## 2019-12-15 RX ADMIN — Medication 1 TABLET(S): at 11:07

## 2019-12-15 RX ADMIN — GABAPENTIN 100 MILLIGRAM(S): 400 CAPSULE ORAL at 21:50

## 2019-12-15 RX ADMIN — PANTOPRAZOLE SODIUM 40 MILLIGRAM(S): 20 TABLET, DELAYED RELEASE ORAL at 05:49

## 2019-12-15 RX ADMIN — TRAMADOL HYDROCHLORIDE 50 MILLIGRAM(S): 50 TABLET ORAL at 06:46

## 2019-12-15 RX ADMIN — TRAMADOL HYDROCHLORIDE 50 MILLIGRAM(S): 50 TABLET ORAL at 14:15

## 2019-12-15 RX ADMIN — Medication 100 GRAM(S): at 13:36

## 2019-12-15 RX ADMIN — HYDROMORPHONE HYDROCHLORIDE 4 MILLIGRAM(S): 2 INJECTION INTRAMUSCULAR; INTRAVENOUS; SUBCUTANEOUS at 18:40

## 2019-12-15 RX ADMIN — HEPARIN SODIUM 1100 UNIT(S)/HR: 5000 INJECTION INTRAVENOUS; SUBCUTANEOUS at 21:48

## 2019-12-15 RX ADMIN — MEROPENEM 100 MILLIGRAM(S): 1 INJECTION INTRAVENOUS at 21:53

## 2019-12-15 RX ADMIN — HYDROMORPHONE HYDROCHLORIDE 4 MILLIGRAM(S): 2 INJECTION INTRAMUSCULAR; INTRAVENOUS; SUBCUTANEOUS at 02:21

## 2019-12-15 RX ADMIN — HEPARIN SODIUM 1200 UNIT(S)/HR: 5000 INJECTION INTRAVENOUS; SUBCUTANEOUS at 14:07

## 2019-12-15 RX ADMIN — TRAMADOL HYDROCHLORIDE 50 MILLIGRAM(S): 50 TABLET ORAL at 05:46

## 2019-12-15 RX ADMIN — Medication 1 EACH: at 18:29

## 2019-12-15 RX ADMIN — Medication 100 MILLIGRAM(S): at 11:06

## 2019-12-15 NOTE — PROGRESS NOTE ADULT - ASSESSMENT
34 year old female who is  transferred from [facility] to [facility] on 11/22 for complaints of weakness, 25 pound weight loss & flu like symptoms & found to have a large right pleural effusion , empyema with group A hemolytic strep . No PMH except  has 4-5 drinks of alcohol per day. S/P thoracentesis .   Pt s/p right VATS decort & washout debridement of CT site & VAC placement on 11/27/19. Followed by ID - on iv abx .   Initial MRI with pancreatic cyst but poor study . 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 So R pleural effusion /multifocal PNA / Empyema - due to panreatic / pleural  fistula . CT removed , wound vac+ . ARF - due to ATN - resolved , Hyponatremia - likely due to SIADH - resolved - renal signed off . R IJ thrombus + , on AC - followed by Hem - will need to be on AC least 3 months . Acute pancreatitis , likely on chronic , complicated with pancreatic / pleural fistula - GI is following , on Octreotide , started on TPN . Anemic - multifactorial - s/p PRBC transfusion x1 , will check FOBT , patient informed about low H/H , blood transfusion offered but need time to think if he will agree to have blood transfusion  .  Abnormal TSH / Prolactin noted , Endo appreciated - TSH almost normalized .              Problem/Plan - 1:  ·  Problem: Pleural effusion on right.  Plan: s/p Right VATS decortication & washout on 11/27/19. Debridement of CT site & VAC placement , chest tube removed . Multifocal PNA - on iv abx as per ID . CXR done on 12/14 with moderate R pleural effusion - CTS team notified - as per CTS .   Now    s.p PICC placement   MRCP significant for Peripancreatic fluid and edema consistent with pancreatitis. Cystic lesions in the pancreatic head with the largest measuring 2.1 cm, likely acute peripancreatic collections, increased in size since 12/4/2019, with a tract of fluid extending from the 2.1 cm cystic lesion into the mediastinum and towards the pleural space on the right suspicious for a pancreatic pleural fistula. GI is following , on Octreotide trial , CLD , ? for ERCP      Problem/Plan - 2:  ·  Problem: Empyema.  Plan: s/p Chest tube removal , wound vac + , last time changed on 12/12  continued with iv abx as per ID , next wound vac to be changed as per CTS .        Problem/Plan - 3:  ·  Problem: Internal jugular vein thrombosis, right.  Plan: On Heparin drip      Problem/Plan - 5:  ·  Problem: Anemia likely multifactorial ; Due to anemia of chronic dz , multiple blood drawings , dilutional  , patient is tachycardic , pale  Plan: s/p PRBCS X1  , check FOBT , patient refusing blood transfusion at this time , risk and benefits explained , will follow cbc in am        Problem/Plan -6:  ·  Problem: Hyponatremia.  Plan: resolved      Problem/Plan - 7:  ·  Problem: Alcohol abuse.  Plan: Monitor for withdrawal, Continue folic, MVI, thiamine.     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 cause , third spacing ,    Problem / Plan -11: Pancreatitis - as per GI    Problem /Plan - 13 : Hypoalbuminemia , prealbumin noted ,  severe protein calorie malnutrition -  on TPN , prealbumin slightly improving     Problem / Plan - 14 : RONAK - likely multifactorial RONAK; ATN from Vancomycin and ? CHIQUITA - resolved      Problem / Plan - 15 : Prolactinoma , increased TSH ,decreased ACTH - Hormone w/u ordered due to initially unexplained fatigue / wt lost / diarrhea / irregular menstrual periods - not sure if this all related to acute issues , ENDO re called , likely due to acute illness . TSH level noted - almost back to nl . May follow up with Endo as on outpatient .     Problem / Plan - 16; Hyperkalemia - Kayexalate / Calcium gluconate / Albuterol ordered , EKG noted with out acute changes   will repeat BMP later , GI notified , TPN adjusted     Problem / Plan - 17 : s/p mechanical fall - no pain , will observe .

## 2019-12-15 NOTE — PROGRESS NOTE ADULT - SUBJECTIVE AND OBJECTIVE BOX
Pt seen and examined. Condition unchanged.     REVIEW OF SYSTEMS:  14 point review of systems unchanged.      MEDICATIONS:  MEDICATIONS  (STANDING):  amitriptyline 10 milliGRAM(s) Oral at bedtime  ascorbic acid 500 milliGRAM(s) Oral daily  chlorhexidine 2% Cloths 1 Application(s) Topical <User Schedule>  cholecalciferol 2000 Unit(s) Oral daily  escitalopram 10 milliGRAM(s) Oral daily  fat emulsion (Plant Based) 20% Infusion 0.9 Gm/kG/Day (31.05 mL/Hr) IV Continuous <Continuous>  ferrous    sulfate 325 milliGRAM(s) Oral three times a day  folic acid 1 milliGRAM(s) Oral daily  gabapentin 100 milliGRAM(s) Oral three times a day  heparin  Infusion. 1300 Unit(s)/Hr (13 mL/Hr) IV Continuous <Continuous>  lactobacillus acidophilus 1 Tablet(s) Oral two times a day  lidocaine   Patch 1 Patch Transdermal daily  magnesium oxide 400 milliGRAM(s) Oral daily  meropenem  IVPB 1000 milliGRAM(s) IV Intermittent every 8 hours  multivitamin 1 Tablet(s) Oral daily  octreotide  Injectable 100 MICROGram(s) SubCutaneous every 8 hours  pantoprazole    Tablet 40 milliGRAM(s) Oral before breakfast  Parenteral Nutrition - Adult 1 Each (83 mL/Hr) TPN Continuous <Continuous>  Parenteral Nutrition - Adult 1 Each (83 mL/Hr) TPN Continuous <Continuous>  thiamine 100 milliGRAM(s) Oral daily  traMADol 50 milliGRAM(s) Oral every 8 hours    MEDICATIONS  (PRN):  ALPRAZolam 0.25 milliGRAM(s) Oral every 8 hours PRN anxiety  aluminum hydroxide/magnesium hydroxide/simethicone Suspension 30 milliLiter(s) Oral every 4 hours PRN Dyspepsia  heparin  Injectable 4000 Unit(s) IV Push every 6 hours PRN For aPTT less than 40  heparin  Injectable 2000 Unit(s) IV Push every 6 hours PRN For aPTT between 40 - 57  HYDROmorphone   Tablet 4 milliGRAM(s) Oral every 6 hours PRN Severe Pain (7 - 10)  HYDROmorphone   Tablet 2 milliGRAM(s) Oral every 6 hours PRN Moderate Pain (4 - 6)  methocarbamol 500 milliGRAM(s) Oral every 6 hours PRN Muscle Spasm  ondansetron Injectable 4 milliGRAM(s) IV Push every 4 hours PRN Nausea and/or Vomiting  sodium chloride 0.9% lock flush 10 milliLiter(s) IV Push every 1 hour PRN Pre/post blood products, medications, blood draw, and to maintain line patency      Allergies    No Known Allergies    Intolerances    Ensure TID (Unknown)      Vital Signs Last 24 Hrs  T(C): 36.8 (14 Dec 2019 21:19), Max: 36.8 (14 Dec 2019 21:19)  T(F): 98.2 (14 Dec 2019 21:19), Max: 98.2 (14 Dec 2019 21:19)  HR: 86 (14 Dec 2019 21:19) (86 - 88)  BP: 130/60 (14 Dec 2019 21:19) (120/83 - 130/60)  BP(mean): --  RR: 16 (14 Dec 2019 21:19) (16 - 18)  SpO2: 96% (14 Dec 2019 21:19) (95% - 96%)    12-14 @ 07:01  -  12-15 @ 07:00  --------------------------------------------------------  IN: 1332.2 mL / OUT: 0 mL / NET: 1332.2 mL        PHYSICAL EXAM:    General: Well developed; malnourished.  HEENT: MMM, conjunctiva pink and sclera anicteric.  Lungs: Unchanged  Cor: RRR S1, S2 only.  Gastrointestinal: Abdomen: Unchanged  Extremities: no cyanosis, clubbing or edema.  Skin: Warm and dry. No obvious rash.    LABS:  CBC Full  -  ( 14 Dec 2019 09:32 )  WBC Count : 19.60 K/uL  RBC Count : 2.48 M/uL  Hemoglobin : 7.5 g/dL  Hematocrit : 24.2 %  Platelet Count - Automated : 422 K/uL  Mean Cell Volume : 97.6 fl  Mean Cell Hemoglobin : 30.2 pg  Mean Cell Hemoglobin Concentration : 31.0 gm/dL  Auto Neutrophil # : x  Auto Lymphocyte # : x  Auto Monocyte # : x  Auto Eosinophil # : x  Auto Basophil # : x  Auto Neutrophil % : x  Auto Lymphocyte % : x  Auto Monocyte % : x  Auto Eosinophil % : x  Auto Basophil % : x    12-14    139  |  102  |  5.0<L>  ----------------------------<  143<H>  3.4<L>   |  26.0  |  0.44<L>    Ca    7.6<L>      14 Dec 2019 09:32  Phos  2.7     12-14  Mg     1.5     12-14    TPro  5.0<L>  /  Alb  1.8<L>  /  TBili  <0.2<L>  /  DBili  x   /  AST  10  /  ALT  6   /  AlkPhos  151<H>  12-14    PTT - ( 15 Dec 2019 03:21 )  PTT:50.3 sec                  RADIOLOGY & ADDITIONAL STUDIES (The following images were personally reviewed): Pt seen and examined. Condition unchanged. Labs reviewed. K+ is 6.3 and Phos is 5.8    REVIEW OF SYSTEMS:  14 point review of systems unchanged.      MEDICATIONS:  MEDICATIONS  (STANDING):  amitriptyline 10 milliGRAM(s) Oral at bedtime  ascorbic acid 500 milliGRAM(s) Oral daily  chlorhexidine 2% Cloths 1 Application(s) Topical <User Schedule>  cholecalciferol 2000 Unit(s) Oral daily  escitalopram 10 milliGRAM(s) Oral daily  fat emulsion (Plant Based) 20% Infusion 0.9 Gm/kG/Day (31.05 mL/Hr) IV Continuous <Continuous>  ferrous    sulfate 325 milliGRAM(s) Oral three times a day  folic acid 1 milliGRAM(s) Oral daily  gabapentin 100 milliGRAM(s) Oral three times a day  heparin  Infusion. 1300 Unit(s)/Hr (13 mL/Hr) IV Continuous <Continuous>  lactobacillus acidophilus 1 Tablet(s) Oral two times a day  lidocaine   Patch 1 Patch Transdermal daily  magnesium oxide 400 milliGRAM(s) Oral daily  meropenem  IVPB 1000 milliGRAM(s) IV Intermittent every 8 hours  multivitamin 1 Tablet(s) Oral daily  octreotide  Injectable 100 MICROGram(s) SubCutaneous every 8 hours  pantoprazole    Tablet 40 milliGRAM(s) Oral before breakfast  Parenteral Nutrition - Adult 1 Each (83 mL/Hr) TPN Continuous <Continuous>  Parenteral Nutrition - Adult 1 Each (83 mL/Hr) TPN Continuous <Continuous>  thiamine 100 milliGRAM(s) Oral daily  traMADol 50 milliGRAM(s) Oral every 8 hours    MEDICATIONS  (PRN):  ALPRAZolam 0.25 milliGRAM(s) Oral every 8 hours PRN anxiety  aluminum hydroxide/magnesium hydroxide/simethicone Suspension 30 milliLiter(s) Oral every 4 hours PRN Dyspepsia  heparin  Injectable 4000 Unit(s) IV Push every 6 hours PRN For aPTT less than 40  heparin  Injectable 2000 Unit(s) IV Push every 6 hours PRN For aPTT between 40 - 57  HYDROmorphone   Tablet 4 milliGRAM(s) Oral every 6 hours PRN Severe Pain (7 - 10)  HYDROmorphone   Tablet 2 milliGRAM(s) Oral every 6 hours PRN Moderate Pain (4 - 6)  methocarbamol 500 milliGRAM(s) Oral every 6 hours PRN Muscle Spasm  ondansetron Injectable 4 milliGRAM(s) IV Push every 4 hours PRN Nausea and/or Vomiting  sodium chloride 0.9% lock flush 10 milliLiter(s) IV Push every 1 hour PRN Pre/post blood products, medications, blood draw, and to maintain line patency      Allergies    No Known Allergies    Intolerances    Ensure TID (Unknown)      Vital Signs Last 24 Hrs  T(C): 36.8 (14 Dec 2019 21:19), Max: 36.8 (14 Dec 2019 21:19)  T(F): 98.2 (14 Dec 2019 21:19), Max: 98.2 (14 Dec 2019 21:19)  HR: 86 (14 Dec 2019 21:19) (86 - 88)  BP: 130/60 (14 Dec 2019 21:19) (120/83 - 130/60)  BP(mean): --  RR: 16 (14 Dec 2019 21:19) (16 - 18)  SpO2: 96% (14 Dec 2019 21:19) (95% - 96%)    12-14 @ 07:01  -  12-15 @ 07:00  --------------------------------------------------------  IN: 1332.2 mL / OUT: 0 mL / NET: 1332.2 mL        PHYSICAL EXAM:    General: Well developed; malnourished.  HEENT: MMM, conjunctiva pink and sclera anicteric.  Lungs: Unchanged  Cor: RRR S1, S2 only.  Gastrointestinal: Abdomen: Unchanged  Extremities: no cyanosis, clubbing or edema.  Skin: Warm and dry. No obvious rash.    LABS:  CBC Full  -  ( 14 Dec 2019 09:32 )  WBC Count : 19.60 K/uL  RBC Count : 2.48 M/uL  Hemoglobin : 7.5 g/dL  Hematocrit : 24.2 %  Platelet Count - Automated : 422 K/uL  Mean Cell Volume : 97.6 fl  Mean Cell Hemoglobin : 30.2 pg  Mean Cell Hemoglobin Concentration : 31.0 gm/dL  Auto Neutrophil # : x  Auto Lymphocyte # : x  Auto Monocyte # : x  Auto Eosinophil # : x  Auto Basophil # : x  Auto Neutrophil % : x  Auto Lymphocyte % : x  Auto Monocyte % : x  Auto Eosinophil % : x  Auto Basophil % : x    12-14    139  |  102  |  5.0<L>  ----------------------------<  143<H>  3.4<L>   |  26.0  |  0.44<L>    Ca    7.6<L>      14 Dec 2019 09:32  Phos  2.7     12-14  Mg     1.5     12-14    TPro  5.0<L>  /  Alb  1.8<L>  /  TBili  <0.2<L>  /  DBili  x   /  AST  10  /  ALT  6   /  AlkPhos  151<H>  12-14    PTT - ( 15 Dec 2019 03:21 )  PTT:50.3 sec                  RADIOLOGY & ADDITIONAL STUDIES (The following images were personally reviewed): Pt seen and examined. Condition unchanged. Labs reviewed. K+ is 6.3 and Phos is 5.8. Pt is c/o chronic diarrhea likely secondary to her pancreatitis.    REVIEW OF SYSTEMS:  14 point review of systems unchanged.      MEDICATIONS:  MEDICATIONS  (STANDING):  amitriptyline 10 milliGRAM(s) Oral at bedtime  ascorbic acid 500 milliGRAM(s) Oral daily  chlorhexidine 2% Cloths 1 Application(s) Topical <User Schedule>  cholecalciferol 2000 Unit(s) Oral daily  escitalopram 10 milliGRAM(s) Oral daily  fat emulsion (Plant Based) 20% Infusion 0.9 Gm/kG/Day (31.05 mL/Hr) IV Continuous <Continuous>  ferrous    sulfate 325 milliGRAM(s) Oral three times a day  folic acid 1 milliGRAM(s) Oral daily  gabapentin 100 milliGRAM(s) Oral three times a day  heparin  Infusion. 1300 Unit(s)/Hr (13 mL/Hr) IV Continuous <Continuous>  lactobacillus acidophilus 1 Tablet(s) Oral two times a day  lidocaine   Patch 1 Patch Transdermal daily  magnesium oxide 400 milliGRAM(s) Oral daily  meropenem  IVPB 1000 milliGRAM(s) IV Intermittent every 8 hours  multivitamin 1 Tablet(s) Oral daily  octreotide  Injectable 100 MICROGram(s) SubCutaneous every 8 hours  pantoprazole    Tablet 40 milliGRAM(s) Oral before breakfast  Parenteral Nutrition - Adult 1 Each (83 mL/Hr) TPN Continuous <Continuous>  Parenteral Nutrition - Adult 1 Each (83 mL/Hr) TPN Continuous <Continuous>  thiamine 100 milliGRAM(s) Oral daily  traMADol 50 milliGRAM(s) Oral every 8 hours    MEDICATIONS  (PRN):  ALPRAZolam 0.25 milliGRAM(s) Oral every 8 hours PRN anxiety  aluminum hydroxide/magnesium hydroxide/simethicone Suspension 30 milliLiter(s) Oral every 4 hours PRN Dyspepsia  heparin  Injectable 4000 Unit(s) IV Push every 6 hours PRN For aPTT less than 40  heparin  Injectable 2000 Unit(s) IV Push every 6 hours PRN For aPTT between 40 - 57  HYDROmorphone   Tablet 4 milliGRAM(s) Oral every 6 hours PRN Severe Pain (7 - 10)  HYDROmorphone   Tablet 2 milliGRAM(s) Oral every 6 hours PRN Moderate Pain (4 - 6)  methocarbamol 500 milliGRAM(s) Oral every 6 hours PRN Muscle Spasm  ondansetron Injectable 4 milliGRAM(s) IV Push every 4 hours PRN Nausea and/or Vomiting  sodium chloride 0.9% lock flush 10 milliLiter(s) IV Push every 1 hour PRN Pre/post blood products, medications, blood draw, and to maintain line patency      Allergies    No Known Allergies    Intolerances    Ensure TID (Unknown)      Vital Signs Last 24 Hrs  T(C): 36.8 (14 Dec 2019 21:19), Max: 36.8 (14 Dec 2019 21:19)  T(F): 98.2 (14 Dec 2019 21:19), Max: 98.2 (14 Dec 2019 21:19)  HR: 86 (14 Dec 2019 21:19) (86 - 88)  BP: 130/60 (14 Dec 2019 21:19) (120/83 - 130/60)  BP(mean): --  RR: 16 (14 Dec 2019 21:19) (16 - 18)  SpO2: 96% (14 Dec 2019 21:19) (95% - 96%)    12-14 @ 07:01  -  12-15 @ 07:00  --------------------------------------------------------  IN: 1332.2 mL / OUT: 0 mL / NET: 1332.2 mL        PHYSICAL EXAM:    General: Well developed; malnourished.  HEENT: MMM, conjunctiva pink and sclera anicteric.  Lungs: Unchanged  Cor: RRR S1, S2 only.  Gastrointestinal: Abdomen: Unchanged  Extremities: no cyanosis, clubbing or edema.  Skin: Warm and dry. No obvious rash.    LABS:  CBC Full  -  ( 14 Dec 2019 09:32 )  WBC Count : 19.60 K/uL  RBC Count : 2.48 M/uL  Hemoglobin : 7.5 g/dL  Hematocrit : 24.2 %  Platelet Count - Automated : 422 K/uL  Mean Cell Volume : 97.6 fl  Mean Cell Hemoglobin : 30.2 pg  Mean Cell Hemoglobin Concentration : 31.0 gm/dL  Auto Neutrophil # : x  Auto Lymphocyte # : x  Auto Monocyte # : x  Auto Eosinophil # : x  Auto Basophil # : x  Auto Neutrophil % : x  Auto Lymphocyte % : x  Auto Monocyte % : x  Auto Eosinophil % : x  Auto Basophil % : x    12-14    139  |  102  |  5.0<L>  ----------------------------<  143<H>  3.4<L>   |  26.0  |  0.44<L>    Ca    7.6<L>      14 Dec 2019 09:32  Phos  2.7     12-14  Mg     1.5     12-14    TPro  5.0<L>  /  Alb  1.8<L>  /  TBili  <0.2<L>  /  DBili  x   /  AST  10  /  ALT  6   /  AlkPhos  151<H>  12-14    PTT - ( 15 Dec 2019 03:21 )  PTT:50.3 sec                  RADIOLOGY & ADDITIONAL STUDIES (The following images were personally reviewed):

## 2019-12-15 NOTE — PROGRESS NOTE ADULT - PROBLEM SELECTOR PLAN 3
With fistulous tract to lung secondary to pancreatitis. Etiology of pancreatitis uncertain but possibly secondary to ETOH. ROMAN and IgG subsets ordered to r/o possible autoimmune etiology.

## 2019-12-15 NOTE — PROGRESS NOTE ADULT - SUBJECTIVE AND OBJECTIVE BOX
Patient seen and examined . Very anxious , states she tripped and fell this am on her elbows , no pain of elbows but still with pain all over her body , pain at r sided chest at incision site     CC : anxious , fall , pain all over her body and at surgical site       MEDICATIONS  (STANDING):  ALBUTerol    0.083%. 2.5 milliGRAM(s) Nebulizer once  amitriptyline 10 milliGRAM(s) Oral at bedtime  ascorbic acid 500 milliGRAM(s) Oral daily  calcium gluconate IVPB 1 Gram(s) IV Intermittent once  chlorhexidine 2% Cloths 1 Application(s) Topical <User Schedule>  cholecalciferol 2000 Unit(s) Oral daily  escitalopram 10 milliGRAM(s) Oral daily  fat emulsion (Plant Based) 20% Infusion 0.9 Gm/kG/Day (31.05 mL/Hr) IV Continuous <Continuous>  ferrous    sulfate 325 milliGRAM(s) Oral three times a day  folic acid 1 milliGRAM(s) Oral daily  gabapentin 100 milliGRAM(s) Oral three times a day  heparin  Infusion. 1300 Unit(s)/Hr (13 mL/Hr) IV Continuous <Continuous>  lactobacillus acidophilus 1 Tablet(s) Oral two times a day  lidocaine   Patch 1 Patch Transdermal daily  meropenem  IVPB 1000 milliGRAM(s) IV Intermittent every 8 hours  multivitamin 1 Tablet(s) Oral daily  octreotide  Injectable 100 MICROGram(s) SubCutaneous every 8 hours  pantoprazole    Tablet 40 milliGRAM(s) Oral before breakfast  Parenteral Nutrition - Adult 1 Each (83 mL/Hr) TPN Continuous <Continuous>  Parenteral Nutrition - Adult 1 Each (83 mL/Hr) TPN Continuous <Continuous>  sodium polystyrene sulfonate Suspension 15 Gram(s) Oral once  thiamine 100 milliGRAM(s) Oral daily  traMADol 50 milliGRAM(s) Oral every 8 hours    MEDICATIONS  (PRN):  ALPRAZolam 0.25 milliGRAM(s) Oral every 8 hours PRN anxiety  aluminum hydroxide/magnesium hydroxide/simethicone Suspension 30 milliLiter(s) Oral every 4 hours PRN Dyspepsia  heparin  Injectable 4000 Unit(s) IV Push every 6 hours PRN For aPTT less than 40  heparin  Injectable 2000 Unit(s) IV Push every 6 hours PRN For aPTT between 40 - 57  HYDROmorphone   Tablet 4 milliGRAM(s) Oral every 6 hours PRN Severe Pain (7 - 10)  HYDROmorphone   Tablet 2 milliGRAM(s) Oral every 6 hours PRN Moderate Pain (4 - 6)  methocarbamol 500 milliGRAM(s) Oral every 6 hours PRN Muscle Spasm  ondansetron Injectable 4 milliGRAM(s) IV Push every 4 hours PRN Nausea and/or Vomiting  sodium chloride 0.9% lock flush 10 milliLiter(s) IV Push every 1 hour PRN Pre/post blood products, medications, blood draw, and to maintain line patency      LABS:                          7.1    14.86 )-----------( 401      ( 15 Dec 2019 09:45 )             26.0     12-15    142  |  107  |  4.0<L>  ----------------------------<  1391<HH>  6.3<HH>   |  23.0  |  0.53    Ca    7.4<L>      15 Dec 2019 09:45  Phos  5.8     12-15  Mg     2.3     12-15    TPro  4.8<L>  /  Alb  1.6<L>  /  TBili  <0.2<L>  /  DBili  x   /  AST  11  /  ALT  7   /  AlkPhos  153<H>  12-15    PTT - ( 15 Dec 2019 11:50 )  PTT:60.7 sec    RADIOLOGY & ADDITIONAL TESTS:      < from: Xray Chest 1 View- PORTABLE-Routine (12.14.19 @ 06:28) >     EXAM:  XR CHEST PORTABLE ROUTINE 1V                          PROCEDURE DATE:  12/14/2019          INTERPRETATION:  TECHNIQUE: Single portable view of the chest.    COMPARISON: 12/11/2019    CLINICAL HISTORY: s/p right vatseval right pleural effusion    FINDINGS:    Single frontal view of the chest demonstrates status post right-sided   VATS. Bilateral PICC line catheter tips in the distal SVC. Moderate   right-sided pleural effusion. Bilateral lower lobe infiltrates, right   worse than left. No large pneumothorax. The cardiomediastinal silhouette   is normal. No acute osseous abnormalities. Overlying EKG leads and wires   are noted    IMPRESSION: Moderate right-sided pleural effusion, slightly worse.    AUDREY MEYER M.D., ATTENDING RADIOLOGIST  This document has been electronically signed. Dec 14 2019  1:38PM    < end of copied text >    REVIEW OF SYSTEMS:    Anxious , pain all over her body , R sided chest pain at incision site , s/p fall ,  all other systems are reviewed and are negative   Vital Signs Last 24 Hrs  T(C): 36.8 (15 Dec 2019 10:55), Max: 36.8 (14 Dec 2019 21:19)  T(F): 98.3 (15 Dec 2019 10:55), Max: 98.3 (15 Dec 2019 10:55)  HR: 150 (15 Dec 2019 10:55) (86 - 150)  BP: 146/88 (15 Dec 2019 10:55) (120/83 - 159/104)  BP(mean): --  RR: 158 (15 Dec 2019 10:55) (16 - 158)  SpO2: 96% (15 Dec 2019 10:55) (95% - 97%)  PHYSICAL EXAM:    GENERAL: NAD, well-groomed, well-developed  HEAD:  Atraumatic, Normocephalic  EYES: EOMI, PERRLA, conjunctiva and sclera clear  NECK: Supple, No JVD, Normal thyroid  NERVOUS SYSTEM:  Alert & Oriented X3, no focal deficit  CHEST/LUNG: decreased R base b/s ,   no  rales, rhonchi, wheezing, or rubs  HEART: Regular rate and rhythm; No murmurs, rubs, or gallops  ABDOMEN: Soft, epigastric tenderness + , Nondistended; Bowel sounds present  EXTREMITIES:  2+ Peripheral Pulses, No clubbing, cyanosis, or edema ( edema resolved today )   LYMPH: No lymphadenopathy noted  SKIN: No rashes or lesions

## 2019-12-15 NOTE — PROGRESS NOTE ADULT - PROBLEM SELECTOR PLAN 1
TPN and IV Lipids and labs re-ordered. TPN and IV Lipids re-ordered. KCL and K-Phos held in today's TPN because of the above lab results. Remainder unchanged. repeat labs ordered for the AM. TPN and IV Lipids re-ordered. KCL and K-Phos held in today's TPN because of the above lab results. Remainder unchanged. Repeat labs ordered for the AM. Pt with chronic diarrhea possibly secondary to her pancreatitis and likely a contributing factor to her malnutrition. Once she is taking PO feeds she may benefit from pancreatic enzyme supplementation.

## 2019-12-15 NOTE — PROGRESS NOTE ADULT - SUBJECTIVE AND OBJECTIVE BOX
INTERVAL HPI/OVERNIGHT EVENTS:  follwo up Mild elevation in Proalctin  abnormal TFT    pt reprtos to be feelign tiored today      MEDICATIONS  (STANDING):  amitriptyline 10 milliGRAM(s) Oral at bedtime  ascorbic acid 500 milliGRAM(s) Oral daily  chlorhexidine 2% Cloths 1 Application(s) Topical <User Schedule>  cholecalciferol 2000 Unit(s) Oral daily  escitalopram 10 milliGRAM(s) Oral daily  fat emulsion (Plant Based) 20% Infusion 0.9 Gm/kG/Day (31.05 mL/Hr) IV Continuous <Continuous>  ferrous    sulfate 325 milliGRAM(s) Oral three times a day  folic acid 1 milliGRAM(s) Oral daily  gabapentin 100 milliGRAM(s) Oral three times a day  heparin  Infusion. 1300 Unit(s)/Hr (13 mL/Hr) IV Continuous <Continuous>  lactobacillus acidophilus 1 Tablet(s) Oral two times a day  lidocaine   Patch 1 Patch Transdermal daily  meropenem  IVPB 1000 milliGRAM(s) IV Intermittent every 8 hours  multivitamin 1 Tablet(s) Oral daily  octreotide  Injectable 100 MICROGram(s) SubCutaneous every 8 hours  pantoprazole    Tablet 40 milliGRAM(s) Oral before breakfast  Parenteral Nutrition - Adult 1 Each (83 mL/Hr) TPN Continuous <Continuous>  Parenteral Nutrition - Adult 1 Each (83 mL/Hr) TPN Continuous <Continuous>  thiamine 100 milliGRAM(s) Oral daily  traMADol 50 milliGRAM(s) Oral every 8 hours    MEDICATIONS  (PRN):  ALPRAZolam 0.25 milliGRAM(s) Oral every 8 hours PRN anxiety  aluminum hydroxide/magnesium hydroxide/simethicone Suspension 30 milliLiter(s) Oral every 4 hours PRN Dyspepsia  heparin  Injectable 4000 Unit(s) IV Push every 6 hours PRN For aPTT less than 40  heparin  Injectable 2000 Unit(s) IV Push every 6 hours PRN For aPTT between 40 - 57  HYDROmorphone   Tablet 4 milliGRAM(s) Oral every 6 hours PRN Severe Pain (7 - 10)  HYDROmorphone   Tablet 2 milliGRAM(s) Oral every 6 hours PRN Moderate Pain (4 - 6)  methocarbamol 500 milliGRAM(s) Oral every 6 hours PRN Muscle Spasm  ondansetron Injectable 4 milliGRAM(s) IV Push every 4 hours PRN Nausea and/or Vomiting  sodium chloride 0.9% lock flush 10 milliLiter(s) IV Push every 1 hour PRN Pre/post blood products, medications, blood draw, and to maintain line patency      Allergies    No Known Allergies    Intolerances    Ensure TID (Unknown)      Review of systems:    Vital Signs Last 24 Hrs  T(C): 36.8 (15 Dec 2019 15:10), Max: 36.8 (14 Dec 2019 21:19)  T(F): 98.2 (15 Dec 2019 15:10), Max: 98.3 (15 Dec 2019 10:55)  HR: 116 (15 Dec 2019 15:10) (86 - 150)  BP: 132/82 (15 Dec 2019 15:10) (130/60 - 159/104)  BP(mean): --  RR: 18 (15 Dec 2019 15:10) (16 - 158)  SpO2: 98% (15 Dec 2019 15:10) (96% - 98%)    PHYSICAL EXAM:      Constitutional: NAD, well-groomed, well-developed    Heart RRR       LABS:                        7.1    14.86 )-----------( 401      ( 15 Dec 2019 09:45 )             26.0     12-15    142  |  103  |  4.0<L>  ----------------------------<  100  3.6   |  26.0  |  0.50    Ca    8.5<L>      15 Dec 2019 17:58  Phos  5.8     12-15  Mg     2.3     12-15    TPro  4.8<L>  /  Alb  1.6<L>  /  TBili  <0.2<L>  /  DBili  x   /  AST  11  /  ALT  7   /  AlkPhos  153<H>  12-15          CAPILLARY BLOOD GLUCOSE  CAPILLARY BLOOD GLUCOSE      POCT Blood Glucose.: 88 mg/dL (15 Dec 2019 17:02)  POCT Blood Glucose.: 108 mg/dL (15 Dec 2019 08:25)  POCT Blood Glucose.: 130 mg/dL (14 Dec 2019 21:28)      RADIOLOGY & ADDITIONAL TESTS:

## 2019-12-15 NOTE — CHART NOTE - NSCHARTNOTEFT_GEN_A_CORE
old dressing removed without incident from two old chest tube sites. one measures 3cm x 1cm x 1cm and the other 2cm x 1 cm x 0.5cm. No surrounding erythema or heat. Minimal drainage from sites. No foul odor.   Vac sponge replaced to both sites then covered and connected to vac.

## 2019-12-15 NOTE — PROGRESS NOTE ADULT - ASSESSMENT
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

## 2019-12-15 NOTE — PROGRESS NOTE ADULT - PROBLEM SELECTOR PLAN 2
From pancreaticopulmonary fistula. See above. From pancreaticopulmonary fistula. See above. TPN and IV Lipids for both her malnutrition and to rest her pancreas. From pancreatico-pulmonary fistula. See above. TPN and IV Lipids for both her malnutrition and to rest her pancreas.

## 2019-12-16 LAB
ALBUMIN SERPL ELPH-MCNC: 1.9 G/DL — LOW (ref 3.3–5.2)
ALP SERPL-CCNC: 226 U/L — HIGH (ref 40–120)
ALT FLD-CCNC: 9 U/L — SIGNIFICANT CHANGE UP
ANION GAP SERPL CALC-SCNC: 10 MMOL/L — SIGNIFICANT CHANGE UP (ref 5–17)
APTT BLD: 42.6 SEC — HIGH (ref 27.5–36.3)
APTT BLD: 64.8 SEC — HIGH (ref 27.5–36.3)
APTT BLD: 97.4 SEC — HIGH (ref 27.5–36.3)
AST SERPL-CCNC: 23 U/L — SIGNIFICANT CHANGE UP
BASOPHILS # BLD AUTO: 0.11 K/UL — SIGNIFICANT CHANGE UP (ref 0–0.2)
BASOPHILS NFR BLD AUTO: 0.7 % — SIGNIFICANT CHANGE UP (ref 0–2)
BILIRUB SERPL-MCNC: <0.2 MG/DL — LOW (ref 0.4–2)
BLD GP AB SCN SERPL QL: SIGNIFICANT CHANGE UP
BUN SERPL-MCNC: 4 MG/DL — LOW (ref 8–20)
CALCIUM SERPL-MCNC: 8.2 MG/DL — LOW (ref 8.6–10.2)
CHLORIDE SERPL-SCNC: 105 MMOL/L — SIGNIFICANT CHANGE UP (ref 98–107)
CO2 SERPL-SCNC: 27 MMOL/L — SIGNIFICANT CHANGE UP (ref 22–29)
CREAT SERPL-MCNC: 0.29 MG/DL — LOW (ref 0.5–1.3)
EOSINOPHIL # BLD AUTO: 0.62 K/UL — HIGH (ref 0–0.5)
EOSINOPHIL NFR BLD AUTO: 4.2 % — SIGNIFICANT CHANGE UP (ref 0–6)
GLUCOSE BLDC GLUCOMTR-MCNC: 101 MG/DL — HIGH (ref 70–99)
GLUCOSE BLDC GLUCOMTR-MCNC: 104 MG/DL — HIGH (ref 70–99)
GLUCOSE BLDC GLUCOMTR-MCNC: 111 MG/DL — HIGH (ref 70–99)
GLUCOSE BLDC GLUCOMTR-MCNC: 118 MG/DL — HIGH (ref 70–99)
GLUCOSE SERPL-MCNC: 97 MG/DL — SIGNIFICANT CHANGE UP (ref 70–115)
HCT VFR BLD CALC: 23.5 % — LOW (ref 34.5–45)
HGB BLD-MCNC: 7.2 G/DL — LOW (ref 11.5–15.5)
IMM GRANULOCYTES NFR BLD AUTO: 3.1 % — HIGH (ref 0–1.5)
LYMPHOCYTES # BLD AUTO: 2.98 K/UL — SIGNIFICANT CHANGE UP (ref 1–3.3)
LYMPHOCYTES # BLD AUTO: 20.2 % — SIGNIFICANT CHANGE UP (ref 13–44)
MAGNESIUM SERPL-MCNC: 1.4 MG/DL — LOW (ref 1.8–2.6)
MCHC RBC-ENTMCNC: 29.5 PG — SIGNIFICANT CHANGE UP (ref 27–34)
MCHC RBC-ENTMCNC: 30.6 GM/DL — LOW (ref 32–36)
MCV RBC AUTO: 96.3 FL — SIGNIFICANT CHANGE UP (ref 80–100)
MONOCYTES # BLD AUTO: 1.06 K/UL — HIGH (ref 0–0.9)
MONOCYTES NFR BLD AUTO: 7.2 % — SIGNIFICANT CHANGE UP (ref 2–14)
NEUTROPHILS # BLD AUTO: 9.53 K/UL — HIGH (ref 1.8–7.4)
NEUTROPHILS NFR BLD AUTO: 64.6 % — SIGNIFICANT CHANGE UP (ref 43–77)
PHOSPHATE SERPL-MCNC: 2.8 MG/DL — SIGNIFICANT CHANGE UP (ref 2.4–4.7)
PLATELET # BLD AUTO: 409 K/UL — HIGH (ref 150–400)
POTASSIUM SERPL-MCNC: 3.2 MMOL/L — LOW (ref 3.5–5.3)
POTASSIUM SERPL-SCNC: 3.2 MMOL/L — LOW (ref 3.5–5.3)
PROT SERPL-MCNC: 5.2 G/DL — LOW (ref 6.6–8.7)
RBC # BLD: 2.44 M/UL — LOW (ref 3.8–5.2)
RBC # FLD: 16.4 % — HIGH (ref 10.3–14.5)
SODIUM SERPL-SCNC: 142 MMOL/L — SIGNIFICANT CHANGE UP (ref 135–145)
WBC # BLD: 14.75 K/UL — HIGH (ref 3.8–10.5)
WBC # FLD AUTO: 14.75 K/UL — HIGH (ref 3.8–10.5)

## 2019-12-16 PROCEDURE — 99232 SBSQ HOSP IP/OBS MODERATE 35: CPT

## 2019-12-16 PROCEDURE — 99233 SBSQ HOSP IP/OBS HIGH 50: CPT

## 2019-12-16 PROCEDURE — 71045 X-RAY EXAM CHEST 1 VIEW: CPT | Mod: 26

## 2019-12-16 RX ORDER — ELECTROLYTE SOLUTION,INJ
1 VIAL (ML) INTRAVENOUS
Refills: 0 | Status: DISCONTINUED | OUTPATIENT
Start: 2019-12-16 | End: 2019-12-16

## 2019-12-16 RX ORDER — I.V. FAT EMULSION 20 G/100ML
0.9 EMULSION INTRAVENOUS
Qty: 50 | Refills: 0 | Status: DISCONTINUED | OUTPATIENT
Start: 2019-12-16 | End: 2019-12-16

## 2019-12-16 RX ORDER — TRAZODONE HCL 50 MG
25 TABLET ORAL AT BEDTIME
Refills: 0 | Status: DISCONTINUED | OUTPATIENT
Start: 2019-12-16 | End: 2019-12-17

## 2019-12-16 RX ORDER — ACETAMINOPHEN 500 MG
650 TABLET ORAL EVERY 6 HOURS
Refills: 0 | Status: DISCONTINUED | OUTPATIENT
Start: 2019-12-16 | End: 2020-01-07

## 2019-12-16 RX ORDER — MAGNESIUM SULFATE 500 MG/ML
2 VIAL (ML) INJECTION ONCE
Refills: 0 | Status: COMPLETED | OUTPATIENT
Start: 2019-12-16 | End: 2019-12-16

## 2019-12-16 RX ADMIN — GABAPENTIN 100 MILLIGRAM(S): 400 CAPSULE ORAL at 22:18

## 2019-12-16 RX ADMIN — PANTOPRAZOLE SODIUM 40 MILLIGRAM(S): 20 TABLET, DELAYED RELEASE ORAL at 05:38

## 2019-12-16 RX ADMIN — HEPARIN SODIUM 1200 UNIT(S)/HR: 5000 INJECTION INTRAVENOUS; SUBCUTANEOUS at 17:03

## 2019-12-16 RX ADMIN — Medication 10 MILLIGRAM(S): at 22:20

## 2019-12-16 RX ADMIN — CHLORHEXIDINE GLUCONATE 1 APPLICATION(S): 213 SOLUTION TOPICAL at 12:05

## 2019-12-16 RX ADMIN — MEROPENEM 100 MILLIGRAM(S): 1 INJECTION INTRAVENOUS at 05:38

## 2019-12-16 RX ADMIN — MEROPENEM 100 MILLIGRAM(S): 1 INJECTION INTRAVENOUS at 22:19

## 2019-12-16 RX ADMIN — Medication 1 TABLET(S): at 05:38

## 2019-12-16 RX ADMIN — TRAMADOL HYDROCHLORIDE 50 MILLIGRAM(S): 50 TABLET ORAL at 23:09

## 2019-12-16 RX ADMIN — ONDANSETRON 4 MILLIGRAM(S): 8 TABLET, FILM COATED ORAL at 08:45

## 2019-12-16 RX ADMIN — HEPARIN SODIUM 1200 UNIT(S)/HR: 5000 INJECTION INTRAVENOUS; SUBCUTANEOUS at 11:19

## 2019-12-16 RX ADMIN — TRAMADOL HYDROCHLORIDE 50 MILLIGRAM(S): 50 TABLET ORAL at 06:00

## 2019-12-16 RX ADMIN — Medication 50 GRAM(S): at 12:54

## 2019-12-16 RX ADMIN — TRAMADOL HYDROCHLORIDE 50 MILLIGRAM(S): 50 TABLET ORAL at 13:00

## 2019-12-16 RX ADMIN — TRAMADOL HYDROCHLORIDE 50 MILLIGRAM(S): 50 TABLET ORAL at 05:37

## 2019-12-16 RX ADMIN — Medication 650 MILLIGRAM(S): at 18:00

## 2019-12-16 RX ADMIN — Medication 1 EACH: at 22:56

## 2019-12-16 RX ADMIN — Medication 650 MILLIGRAM(S): at 17:23

## 2019-12-16 RX ADMIN — Medication 30 MILLILITER(S): at 22:55

## 2019-12-16 RX ADMIN — GABAPENTIN 100 MILLIGRAM(S): 400 CAPSULE ORAL at 05:37

## 2019-12-16 RX ADMIN — TRAMADOL HYDROCHLORIDE 50 MILLIGRAM(S): 50 TABLET ORAL at 22:18

## 2019-12-16 RX ADMIN — ESCITALOPRAM OXALATE 10 MILLIGRAM(S): 10 TABLET, FILM COATED ORAL at 12:09

## 2019-12-16 RX ADMIN — I.V. FAT EMULSION 31.05 GM/KG/DAY: 20 EMULSION INTRAVENOUS at 22:56

## 2019-12-16 RX ADMIN — TRAMADOL HYDROCHLORIDE 50 MILLIGRAM(S): 50 TABLET ORAL at 12:08

## 2019-12-16 RX ADMIN — Medication 0.25 MILLIGRAM(S): at 15:52

## 2019-12-16 RX ADMIN — Medication 25 MILLIGRAM(S): at 22:18

## 2019-12-16 RX ADMIN — ONDANSETRON 4 MILLIGRAM(S): 8 TABLET, FILM COATED ORAL at 12:10

## 2019-12-16 RX ADMIN — HEPARIN SODIUM 1100 UNIT(S)/HR: 5000 INJECTION INTRAVENOUS; SUBCUTANEOUS at 04:07

## 2019-12-16 RX ADMIN — MEROPENEM 100 MILLIGRAM(S): 1 INJECTION INTRAVENOUS at 12:10

## 2019-12-16 RX ADMIN — GABAPENTIN 100 MILLIGRAM(S): 400 CAPSULE ORAL at 12:09

## 2019-12-16 NOTE — PROGRESS NOTE ADULT - ASSESSMENT
34 [place] [facility] [place]/ empyema s/p chest tube (now removed), also with acute pancreatitis (likely on chronic) with pancreatic- pleural fistula, currently on TPN and octreotide.  Also found to have RIJ thrombus.   We are consulted for elevated TSH and low T4 levels, which are improving and likely due to sick euthyroid syndrome.  She also has mildly elevated prolactin of unclear clinical significance.      1.  Abnormal TFTs-  based on pattern of TFTs this admission, this is likely due to sick euthyroid syndrome.  Unlikely to represent true hypothyroidism.  Would repeat TFTs again in 2 weeks.  Would expect normalization as clinical condition improves.  2.  Hyperprolactinemia-  repeat as an outpatient.  If level continues to be elevated, will need further work up, but at current time, this is of little clinical significance.    3.  Empyema-  management as per medicine, on abx  4.  Pancreatitis with pancreatic/ pleural fistula-  as per GI.

## 2019-12-16 NOTE — PROGRESS NOTE ADULT - ASSESSMENT
34 year old female who is  transferred from [facility] [place] to [facility] on 11/22 for complaints of weakness, 25 pound weight loss & flu like symptoms & found to have a large right pleural effusion , empyema with group A hemolytic strep . No PMH except  has 4-5 drinks of alcohol per day. S/P thoracentesis .   Pt s/p right VATS decort & washout debridement of CT site & VAC placement on 11/27/19. Followed by ID - on iv abx .   Initial MRI with pancreatic cyst but poor study . 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 So R pleural effusion /multifocal PNA / Empyema - due to panreatic / pleural  fistula . CT removed , wound vac+ . ARF - due to ATN - resolved , Hyponatremia - likely due to SIADH - resolved - renal signed off . R IJ thrombus + , on AC - followed by Hem - will need to be on AC least 3 months . Acute pancreatitis , likely on chronic , complicated with pancreatic / pleural fistula - GI is following , on Octreotide , started on TPN . Anemic - multifactorial - s/p PRBC transfusion x1 , will check FOBT , patient informed about low H/H , blood transfusion offered but need time to think if he will agree to have blood transfusion  .  Abnormal TSH / Prolactin noted , Endo appreciated - TSH almost normalized .              Problem/Plan - 1:  ·  Problem: Pleural effusion on right.  Plan: s/p Right VATS decortication & washout on 11/27/19. Debridement of CT site & VAC placement , chest tube removed . Multifocal PNA - on iv abx as per ID . CXR done on 12/14 with moderate R pleural effusion - CTS team notified - as per CTS .   Now    s.p PICC placement   MRCP significant for Peripancreatic fluid and edema consistent with pancreatitis. Cystic lesions in the pancreatic head with the largest measuring 2.1 cm, likely acute peripancreatic collections, increased in size since 12/4/2019, with a tract of fluid extending from the 2.1 cm cystic lesion into the mediastinum and towards the pleural space on the right suspicious for a pancreatic pleural fistula. GI is following , on Octreotide trial , CLD , ? for ERCP      Problem/Plan - 2:  ·  Problem: Empyema.  Plan: s/p Chest tube removal , wound vac + , last time changed on 12/12  continued with iv abx as per ID , next wound vac to be changed as per CTS .        Problem/Plan - 3:  ·  Problem: Internal jugular vein thrombosis, right.  Plan: On Heparin drip      Problem/Plan - 5:  ·  Problem: Anemia likely multifactorial ; Due to anemia of chronic dz , multiple blood drawings , dilutional  , patient is tachycardic , pale  Plan: s/p PRBCS X1  , check FOBT , patient refusing blood transfusion at this time , risk and benefits explained , will follow cbc in am        Problem/Plan -6:  ·  Problem: Hyponatremia.  Plan: resolved      Problem/Plan - 7:  ·  Problem: Alcohol abuse.  Plan: Monitor for withdrawal, Continue folic, MVI, thiamine.     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 cause , third spacing ,    Problem / Plan -11: Pancreatitis - as per GI    Problem /Plan - 13 : Hypoalbuminemia , prealbumin noted ,  severe protein calorie malnutrition -  on TPN , prealbumin slightly improving     Problem / Plan - 14 : RONAK - likely multifactorial RONAK; ATN from Vancomycin and ? CHIQUITA - resolved      Problem / Plan - 15 : Prolactinoma , increased TSH ,decreased ACTH - Hormone w/u ordered due to initially unexplained fatigue / wt lost / diarrhea / irregular menstrual periods - not sure if this all related to acute issues , ENDO re called , likely due to acute illness . TSH level noted - almost back to nl . May follow up with Endo as on outpatient .     Problem / Plan - 16; Hyperkalemia - Kayexalate / Calcium gluconate / Albuterol ordered , EKG noted with out acute changes   will repeat BMP later , GI notified , TPN adjusted     Problem / Plan - 17 : s/p mechanical fall - no pain , will observe . 34 year old female who is  [place] [facility] [place] [facility] on 11/22 for complaints of weakness, 25 pound weight loss & flu like symptoms & found to have a large right pleural effusion , empyema with group A hemolytic strep . No PMH except  has 4-5 drinks of alcohol per day. S/P thoracentesis .   Pt s/p right VATS decort & washout debridement of CT site & VAC placement on 11/27/19. Followed by ID - on iv abx .   Initial MRI with pancreatic cyst but poor study . 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 So R pleural effusion /multifocal PNA / Empyema - due to panreatic / pleural  fistula . CT removed , wound vac+ . ARF - due to ATN - resolved , Hyponatremia - likely due to SIADH - resolved - renal signed off . R IJ thrombus + , on AC - followed by Hem - will need to be on AC least 3 months . Acute pancreatitis , likely on chronic , complicated with pancreatic / pleural fistula - GI is following , on Octreotide , started on TPN . Anemic - multifactorial - s/p PRBC transfusion x1 ,  FOBT- negative , patient informed about low H/H , yesterday refused to be transfused , today agree with transfusion  .  Abnormal TSH / Prolactin noted , Endo appreciated - TSH almost normalized . C/O multiple loose stools since yesterday , likely due to Kayexalate . Not sleeping well for nights , feels very anxious .             Problem/Plan - 1:  ·  Problem: Pleural effusion on right.  Plan: s/p Right VATS decortication & washout on 11/27/19. Debridement of CT site & VAC placement , chest tube removed . Multifocal PNA / Empyema - on iv abx as per ID . CXR done on 12/14 with moderate R pleural effusion - CTS team notified  , plan is to repeat CXR < ? Pleurex placement , awaiting for CTS eval .  Now    s.p PICC placement   MRCP significant for Peripancreatic fluid and edema consistent with pancreatitis. Cystic lesions in the pancreatic head with the largest measuring 2.1 cm, likely acute peripancreatic collections, increased in size since 12/4/2019, with a tract of fluid extending from the 2.1 cm cystic lesion into the mediastinum and towards the pleural space on the right suspicious for a pancreatic pleural fistula. GI is following , on Octreotide trial , CLD , ,TPN , ? for ERCP      Problem/Plan - 2:  ·  Problem: Empyema.  Plan: s/p Chest tube removal , wound vac + ,  changed on 12/15   continued with iv abx as per ID , will call to follow . WBC decreasing        Problem/Plan - 3:  ·  Problem: Internal jugular vein thrombosis, right.  Plan: On Heparin drip , will consider to change to Coumadin vs NOAC once stable      Problem/Plan - 5:  ·  Problem: Anemia likely multifactorial ; Due to anemia of chronic dz , multiple blood drawings , dilutional  , patient is tachycardic , pale  Plan: s/p PRBCS X1  , FOBT negative , agree to be transfused , will transfuse 2 units        Problem/Plan -6:  ·  Problem: Hyponatremia.  Plan: resolved      Problem/Plan - 7:  ·  Problem: Alcohol abuse.  Plan: Continue folic, MVI, thiamine.     Problem / Plan -8 : Pancreatic / pleural fistula - GI appreciated , on CLD , Octreotide started , possible for ERCP ,  on TPN     Problem / Plan - 9; Frequent loose stools x 1 month - likely due to Pancreatitis , had multiple loose BM since yesterday - likely due to Kayexalate dose , will observe     Problem / Plan -10 : B/L feet pain / edema - likey due to 3rd spacing , resolved     Problem / Plan -11: Pancreatitis - as per GI - on bowel rest , cld , octreotide     Problem /Plan - 13 : Hypoalbuminemia , prealbumin noted ,  severe protein calorie malnutrition -  on TPN , prealbumin slightly improving     Problem / Plan - 14 : RONAK - likely multifactorial RONAK; ATN from Vancomycin and ? CHIQUITA - resolved      Problem / Plan - 15 : Prolactinoma , increased TSH ,decreased ACTH - Hormone w/u ordered due to initially unexplained fatigue / wt lost / diarrhea / irregular menstrual periods - not sure if this all related to acute issues , ENDO re called , likely due to acute illness . TSH level noted - almost back to nl . May follow up with Endo as on outpatient .     Problem / Plan - 16; Hyperkalemia - Kayexalate / Calcium gluconate / Albuterol ordered , EKG noted with out acute changes - resolved , now with hypokalemia - K added in TPN ,   hypomagnesemia - Mag rider ordered , will avoid PO Mag due to diarrhea , follow labs in am     Problem / Plan - 17 : s/p mechanical fall - no pain , will observe .     Problem / Plan - 18 ; Insomnia - unable to get good sleep for nights , will add small dose of Trazadone .   D/W patient / nurse / Dr Bales

## 2019-12-16 NOTE — PROGRESS NOTE ADULT - SUBJECTIVE AND OBJECTIVE BOX
Patient seen and examined . States not sleeping well for nights , had several loose BM after Kayexalate , some sob + , R sided chest pain at incision site + , flat affect , feels nauseated , vomited small amount , c/o abdominal pain .    CC : sob , R sided chest pain , nauseated / vomited , abdominal pain , loose stools +           MEDICATIONS  (STANDING):  amitriptyline 10 milliGRAM(s) Oral at bedtime  ascorbic acid 500 milliGRAM(s) Oral daily  chlorhexidine 2% Cloths 1 Application(s) Topical <User Schedule>  cholecalciferol 2000 Unit(s) Oral daily  escitalopram 10 milliGRAM(s) Oral daily  fat emulsion (Plant Based) 20% Infusion 0.9 Gm/kG/Day (31.05 mL/Hr) IV Continuous <Continuous>  ferrous    sulfate 325 milliGRAM(s) Oral three times a day  folic acid 1 milliGRAM(s) Oral daily  gabapentin 100 milliGRAM(s) Oral three times a day  heparin  Infusion. 1300 Unit(s)/Hr (13 mL/Hr) IV Continuous <Continuous>  lactobacillus acidophilus 1 Tablet(s) Oral two times a day  lidocaine   Patch 1 Patch Transdermal daily  magnesium sulfate  IVPB 2 Gram(s) IV Intermittent once  meropenem  IVPB 1000 milliGRAM(s) IV Intermittent every 8 hours  multivitamin 1 Tablet(s) Oral daily  octreotide  Injectable 100 MICROGram(s) SubCutaneous every 8 hours  pantoprazole    Tablet 40 milliGRAM(s) Oral before breakfast  Parenteral Nutrition - Adult 1 Each (83 mL/Hr) TPN Continuous <Continuous>  Parenteral Nutrition - Adult 1 Each (83 mL/Hr) TPN Continuous <Continuous>  thiamine 100 milliGRAM(s) Oral daily  traMADol 50 milliGRAM(s) Oral every 8 hours    MEDICATIONS  (PRN):  ALPRAZolam 0.25 milliGRAM(s) Oral every 8 hours PRN anxiety  aluminum hydroxide/magnesium hydroxide/simethicone Suspension 30 milliLiter(s) Oral every 4 hours PRN Dyspepsia  heparin  Injectable 4000 Unit(s) IV Push every 6 hours PRN For aPTT less than 40  heparin  Injectable 2000 Unit(s) IV Push every 6 hours PRN For aPTT between 40 - 57  HYDROmorphone   Tablet 4 milliGRAM(s) Oral every 6 hours PRN Severe Pain (7 - 10)  HYDROmorphone   Tablet 2 milliGRAM(s) Oral every 6 hours PRN Moderate Pain (4 - 6)  methocarbamol 500 milliGRAM(s) Oral every 6 hours PRN Muscle Spasm  ondansetron Injectable 4 milliGRAM(s) IV Push every 4 hours PRN Nausea and/or Vomiting  sodium chloride 0.9% lock flush 10 milliLiter(s) IV Push every 1 hour PRN Pre/post blood products, medications, blood draw, and to maintain line patency      LABS:                          7.2    14.75 )-----------( 409      ( 16 Dec 2019 09:44 )             23.5     12-16    142  |  105  |  4.0<L>  ----------------------------<  97  3.2<L>   |  27.0  |  0.29<L>    Ca    8.2<L>      16 Dec 2019 09:44  Phos  2.8     12-16  Mg     1.4     12-16    TPro  5.2<L>  /  Alb  1.9<L>  /  TBili  <0.2<L>  /  DBili  x   /  AST  23  /  ALT  9   /  AlkPhos  226<H>  12-16    PTT - ( 16 Dec 2019 09:44 )  PTT:42.6 sec      RADIOLOGY & ADDITIONAL TESTS:    < from: Xray Chest 1 View- PORTABLE-Routine (12.14.19 @ 06:28) >     EXAM:  XR CHEST PORTABLE ROUTINE 1V                          PROCEDURE DATE:  12/14/2019          INTERPRETATION:  TECHNIQUE: Single portable view of the chest.    COMPARISON: 12/11/2019    CLINICAL HISTORY: s/p right vatseval right pleural effusion    FINDINGS:    Single frontal view of the chest demonstrates status post right-sided   VATS. Bilateral PICC line catheter tips in the distal SVC. Moderate   right-sided pleural effusion. Bilateral lower lobe infiltrates, right   worse than left. No large pneumothorax. The cardiomediastinal silhouette   is normal. No acute osseous abnormalities. Overlying EKG leads and wires   are noted    IMPRESSION: Moderate right-sided pleural effusion, slightly worse.                AUDREY MEYER M.D., ATTENDING RADIOLOGIST  This document has been electronically signed. Dec 14 2019  1:38PM    < end of copied text >        REVIEW OF SYSTEMS:    As above , all other systems reviewed and are negative .     Vital Signs Last 24 Hrs  T(C): 37 (16 Dec 2019 09:53), Max: 37 (16 Dec 2019 09:53)  T(F): 98.6 (16 Dec 2019 09:53), Max: 98.6 (16 Dec 2019 09:53)  HR: 99 (16 Dec 2019 09:53) (82 - 116)  BP: 115/77 (16 Dec 2019 09:53) (115/77 - 146/87)  BP(mean): --  RR: 18 (16 Dec 2019 09:53) (16 - 18)  SpO2: 95% (16 Dec 2019 09:53) (95% - 98%)  PHYSICAL EXAM:    GENERAL: NAD, well-groomed, well-developed , pale   HEAD:  Atraumatic, Normocephalic  EYES: EOMI, PERRLA, conjunctiva and sclera clear  NECK: Supple, No JVD, Normal thyroid  NERVOUS SYSTEM:  Alert & Oriented X3, no focal deficit  CHEST/LUNG: R base decreased b/s ,   no  rales, rhonchi, wheezing, or rubs , R side wound vac +   HEART: Regular rate and rhythm; No murmurs, rubs, or gallops  ABDOMEN: Soft, epigastric tenderness + , no rebound ,  Nondistended; Bowel sounds present  EXTREMITIES:  2+ Peripheral Pulses, No clubbing, cyanosis, or edema  LYMPH: No lymphadenopathy noted  SKIN: No rashes or lesions Patient seen and examined . States not sleeping well for nights , had several loose BM after Kayexalate , some sob + , R sided chest pain at incision site + , flat affect , feels nauseated , vomited small amount , c/o abdominal pain .    CC : sob , R sided chest pain , nauseated / vomited , abdominal pain , loose stools +       MEDICATIONS  (STANDING):  amitriptyline 10 milliGRAM(s) Oral at bedtime  ascorbic acid 500 milliGRAM(s) Oral daily  chlorhexidine 2% Cloths 1 Application(s) Topical <User Schedule>  cholecalciferol 2000 Unit(s) Oral daily  escitalopram 10 milliGRAM(s) Oral daily  fat emulsion (Plant Based) 20% Infusion 0.9 Gm/kG/Day (31.05 mL/Hr) IV Continuous <Continuous>  ferrous    sulfate 325 milliGRAM(s) Oral three times a day  folic acid 1 milliGRAM(s) Oral daily  gabapentin 100 milliGRAM(s) Oral three times a day  heparin  Infusion. 1300 Unit(s)/Hr (13 mL/Hr) IV Continuous <Continuous>  lactobacillus acidophilus 1 Tablet(s) Oral two times a day  lidocaine   Patch 1 Patch Transdermal daily  magnesium sulfate  IVPB 2 Gram(s) IV Intermittent once  meropenem  IVPB 1000 milliGRAM(s) IV Intermittent every 8 hours  multivitamin 1 Tablet(s) Oral daily  octreotide  Injectable 100 MICROGram(s) SubCutaneous every 8 hours  pantoprazole    Tablet 40 milliGRAM(s) Oral before breakfast  Parenteral Nutrition - Adult 1 Each (83 mL/Hr) TPN Continuous <Continuous>  Parenteral Nutrition - Adult 1 Each (83 mL/Hr) TPN Continuous <Continuous>  thiamine 100 milliGRAM(s) Oral daily  traMADol 50 milliGRAM(s) Oral every 8 hours    MEDICATIONS  (PRN):  ALPRAZolam 0.25 milliGRAM(s) Oral every 8 hours PRN anxiety  aluminum hydroxide/magnesium hydroxide/simethicone Suspension 30 milliLiter(s) Oral every 4 hours PRN Dyspepsia  heparin  Injectable 4000 Unit(s) IV Push every 6 hours PRN For aPTT less than 40  heparin  Injectable 2000 Unit(s) IV Push every 6 hours PRN For aPTT between 40 - 57  HYDROmorphone   Tablet 4 milliGRAM(s) Oral every 6 hours PRN Severe Pain (7 - 10)  HYDROmorphone   Tablet 2 milliGRAM(s) Oral every 6 hours PRN Moderate Pain (4 - 6)  methocarbamol 500 milliGRAM(s) Oral every 6 hours PRN Muscle Spasm  ondansetron Injectable 4 milliGRAM(s) IV Push every 4 hours PRN Nausea and/or Vomiting  sodium chloride 0.9% lock flush 10 milliLiter(s) IV Push every 1 hour PRN Pre/post blood products, medications, blood draw, and to maintain line patency      LABS:                          7.2    14.75 )-----------( 409      ( 16 Dec 2019 09:44 )             23.5     12-16    142  |  105  |  4.0<L>  ----------------------------<  97  3.2<L>   |  27.0  |  0.29<L>    Ca    8.2<L>      16 Dec 2019 09:44  Phos  2.8     12-16  Mg     1.4     12-16    TPro  5.2<L>  /  Alb  1.9<L>  /  TBili  <0.2<L>  /  DBili  x   /  AST  23  /  ALT  9   /  AlkPhos  226<H>  12-16    PTT - ( 16 Dec 2019 09:44 )  PTT:42.6 sec      RADIOLOGY & ADDITIONAL TESTS:    < from: Xray Chest 1 View- PORTABLE-Routine (12.14.19 @ 06:28) >     EXAM:  XR CHEST PORTABLE ROUTINE 1V                          PROCEDURE DATE:  12/14/2019          INTERPRETATION:  TECHNIQUE: Single portable view of the chest.    COMPARISON: 12/11/2019    CLINICAL HISTORY: s/p right vatseval right pleural effusion    FINDINGS:    Single frontal view of the chest demonstrates status post right-sided   VATS. Bilateral PICC line catheter tips in the distal SVC. Moderate   right-sided pleural effusion. Bilateral lower lobe infiltrates, right   worse than left. No large pneumothorax. The cardiomediastinal silhouette   is normal. No acute osseous abnormalities. Overlying EKG leads and wires   are noted    IMPRESSION: Moderate right-sided pleural effusion, slightly worse.                AUDREY MEYER M.D., ATTENDING RADIOLOGIST  This document has been electronically signed. Dec 14 2019  1:38PM    < end of copied text >        REVIEW OF SYSTEMS:    As above , all other systems reviewed and are negative .     Vital Signs Last 24 Hrs  T(C): 37 (16 Dec 2019 09:53), Max: 37 (16 Dec 2019 09:53)  T(F): 98.6 (16 Dec 2019 09:53), Max: 98.6 (16 Dec 2019 09:53)  HR: 99 (16 Dec 2019 09:53) (82 - 116)  BP: 115/77 (16 Dec 2019 09:53) (115/77 - 146/87)  BP(mean): --  RR: 18 (16 Dec 2019 09:53) (16 - 18)  SpO2: 95% (16 Dec 2019 09:53) (95% - 98%)  PHYSICAL EXAM:    GENERAL: NAD, well-groomed, well-developed , pale   HEAD:  Atraumatic, Normocephalic  EYES: EOMI, PERRLA, conjunctiva and sclera clear  NECK: Supple, No JVD, Normal thyroid  NERVOUS SYSTEM:  Alert & Oriented X3, no focal deficit  CHEST/LUNG: R base decreased b/s ,   no  rales, rhonchi, wheezing, or rubs , R side wound vac +   HEART: Regular rate and rhythm; No murmurs, rubs, or gallops  ABDOMEN: Soft, epigastric tenderness + , no rebound ,  Nondistended; Bowel sounds present  EXTREMITIES:  2+ Peripheral Pulses, No clubbing, cyanosis, or edema( edema resolved )  LYMPH: No lymphadenopathy noted  SKIN: No rashes or lesions

## 2019-12-16 NOTE — PROGRESS NOTE ADULT - SUBJECTIVE AND OBJECTIVE BOX
[facility]  INFECTIOUS DISEASES AND INTERNAL MEDICINE at [place]  =======================================================  Carlos Alberto Soto MD  Diplomates American Board of Internal Medicine and Infectious Diseases  =======================================================    HERACLIO MOLINA [id number]    Follow up: R pleural effusion    Has diarrhea due to Kayexalate given for high potassium.   R pleural effusion s/p VATS, pancreaticopleural fistula.     Allergies:  Ensure TID (Unknown)  No Known Allergies    Antibiotics:  meropenem  IVPB 1000 milliGRAM(s) IV Intermittent every 8 hours    REVIEW OF SYSTEMS:  CONSTITUTIONAL:  No Fever or chills  HEENT:   No diplopia or blurred vision.  No earache, sore throat or runny nose.  CARDIOVASCULAR:  No chest pain or SOB  RESPIRATORY:  No cough, shortness of breath, PND or orthopnea.  GASTROINTESTINAL:  No nausea, vomiting, +diarrhea.  GENITOURINARY:  No dysuria, frequency or urgency. No Blood in urine  MUSCULOSKELETAL:  no joint aches, no muscle pain  SKIN:  No change in skin, hair or nails.  NEUROLOGIC:  No paresthesias or weakness.  PSYCHIATRIC:  No disorder of thought or mood.  ENDOCRINE:  No heat or cold intolerance, polyuria or polydipsia.  HEMATOLOGICAL:  No easy bruising or bleeding.      Physical Exam:  ICU Vital Signs Last 24 Hrs  T(C): 37 (16 Dec 2019 09:53), Max: 37 (16 Dec 2019 09:53)  T(F): 98.6 (16 Dec 2019 09:53), Max: 98.6 (16 Dec 2019 09:53)  HR: 99 (16 Dec 2019 09:53) (82 - 116)  BP: 115/77 (16 Dec 2019 09:53) (115/77 - 146/87)  RR: 18 (16 Dec 2019 09:53) (16 - 18)  SpO2: 95% (16 Dec 2019 09:53) (95% - 98%)  GEN: NAD  HEENT: normocephalic and atraumatic. EOMI. ROSALIE.    NECK: Supple.  No lymphadenopathy   LUNGS: R chest with decreased breath sounds, R vacuum dressing.   HEART: Regular rate and rhythm   ABDOMEN: Soft, nontender, and nondistended.  Positive bowel sounds.    : No CVA tenderness  EXTREMITIES: + edema. + PICC on TPN  MSK: no joint swelling  NEUROLOGIC: grossly intact.  PSYCHIATRIC: Appropriate affect .  SKIN: No ulceration or induration present.      Labs:  12-16    142  |  105  |  4.0<L>  ----------------------------<  97  3.2<L>   |  27.0  |  0.29<L>    Ca    8.2<L>      16 Dec 2019 09:44  Phos  2.8     12-16  Mg     1.4     12-16    TPro  5.2<L>  /  Alb  1.9<L>  /  TBili  <0.2<L>  /  DBili  x   /  AST  23  /  ALT  9   /  AlkPhos  226<H>  12-16                        7.2    14.75 )-----------( 409      ( 16 Dec 2019 09:44 )             23.5     PTT - ( 16 Dec 2019 09:44 )  PTT:42.6 sec    LIVER FUNCTIONS - ( 16 Dec 2019 09:44 )  Alb: 1.9 g/dL / Pro: 5.2 g/dL / ALK PHOS: 226 U/L / ALT: 9 U/L / AST: 23 U/L / GGT: x           RECENT CULTURES:  12-04 @ 16:28 .Stool     No Protozoa seen by trichrome stain  No Helminths or Protozoa seen in formalin concentrate    12-04 @ 10:24 .Urine     No growth    12-03 @ 14:03 .Other chest tube insertion site (pus/purulent)     No growth at 2 days.    12-03 @ 13:30 .Blood     No growth at 5 days.    All imaging and data are reviewed.   < from: CT Chest No Cont (12.07.19 @ 13:51) >   EXAM:  CT CHEST                        PROCEDURE DATE:  12/07/2019    INTERPRETATION:  Clinical information: Right chest collection. Status   post VATS 11/27  Comparison: Chest CT dated 12/4/2019  PROCEDURE:   CT of the Chest was performed without intravenous contrast.  FINDINGS:  Interval removal of right chest tube.  Air-containing tract noted from   the skin to the pleural space at site of prior chest tube on series 3   image 137.  There is a skin defect in the right lateral chest wall again   noted with subcutaneous air in this region and some deeper mildly complex   fluid again noted as seen on series 3 image 99. There is a new skin   defect on series 3 image 121 with tract of air extending into the right   pleural space. Previously identified anteriorly loculated right   hydropneumothorax demonstrates interval resolution of the fluid component   with the overall size of the stable now replaced by air. Atelectasis   involving the right lower lobe inferiorly is again noted. Fluid loculated   in the major fissure has slightly increased. Patchy opacities left upper   lobe predominantly groundglass are unchanged. Subsegmental atelectasis in   the lingula and left lower lobe. Patchy groundglass opacity now noted in   the left lower lobe.  Trace bilateral pleural effusions.  Mediastinum and Lyndsey: No abnormally enlarged lymph nodes.  Thickened distal esophagus.  Heart: Normal size. No pericardial effusion.   Vessels: within normal limits.  Upper abdomen: Residual contrast within the esophagus and stomach related   to recent esophagram. Residual contrast within the renal parenchyma   raising the possibility of acute tubular necrosis given recent   intravenous contrast administration on 12/4/2019.  Bones: Within normal limits.  IMPRESSION: Interval removal of right chest tube with tract from prior   chest tube now containing air.  Right hydropneumothorax again noted. The   previously identified loculated fluid and air anteriorly has demonstrated   resolution of the fluid component in this region with air now filling the   same size space.  Skin defect with subcutaneous air and complex fluid right lateral chest   wall again noted.  New skin defect right lower anterolateral chest wallwith air tract to   the right pleural space now noted.  Slight increase in fluid tracking in the right major fissure.  Areas of atelectasis in the right lung and opacities in the left upper   lobe are unchanged.  Thickened distal esophagus again noted may be on the basis of esophagitis.  Persistent renal nephrograms bilaterally may be seen in the setting of   acute tubular necrosis given recent intravenous contrast administration.   Correlation with serum creatinine recommended.    Assessment and Plan:   34 y.o. F who was admitted:    transferred from [facility] to [facility] on 11/22 for complaints of weakness, 25 pound weight loss & flu like symptoms & found to have a large right pleural effusion,   pleural fluid cultures + for group A hemolytic strep, + RIJ thormbus   11/23 CT of the chest/abd/pelvis indicated a clot in the Right IJ, pt started on heparin gtt.   s/p right VATS decort & washout debridement of CT site & VAC placement on 11/27/19.   Persistent drainage from chest tube site so repeat CT C/A/P performed 12/4 which showed new cystic mass in pancreas and liver concerning for pancreatitis  Zosyn changed to meropenem on 12/6  for broader coverage given pancreatitis  Copious drainage from anterior chest tube site, so VAC removed and ostomy appliance applied for high output on 12/6.   12/7 Right chest tube removed.   Repeat CT scan showed a persistent Right hydropneumothorax with air anteriorly  12/8 Ostomy appliance removed and dressing applied.   12/9 s.p PICC placement & Right anterior pig tail catheter & right lateral wound vac placement   MRCP significant for Peripancreatic fluid and edema consistent with pancreatitis. Cystic lesions in the pancreatic head with the largest measuring 2.1 cm, likely acute peripancreatic collections, increased in size since 12/4/2019, with a tract of fluid extending from the 2.1 cm cystic lesion into the mediastinum and towards the pleural space on the right suspicious for a pancreatic pleural fistula.    Persistent R pleural effusion  Pancreatitis  R pancreaticopleural fistula   Alcohol abuse     - All cultures negative    - Leukocytosis improving from 42k is 14 k now.   - NO fever   - Continue Meropenem 1gm q8h   - Has PICC, on TPN watch for superinfection    Will follow.

## 2019-12-16 NOTE — PROGRESS NOTE ADULT - PROBLEM SELECTOR PLAN 1
s/p Right VATS decortication & washout on 11/27/19. Debridement of CT site & VAC placement.  chest tubes since d.c   Encourage the use of I/S, OOB to chair, daily PT, ambulate as tolerated  Continue supportive care.   PICC line on left for TPN  PICC on right for meds   new vac placement to right lateral chest wall 12/15  Case and plan discussed with Dr. Aponte.

## 2019-12-16 NOTE — PROGRESS NOTE ADULT - SUBJECTIVE AND OBJECTIVE BOX
Patient seen and examined.  Complains of lack of sleep overnight due to loose stool.  She is now resting in bed comfortably.  Does not complain of any shortness of breath.  She does has some right chest pain at times but no pain currently.  Wound Vac was changed yesterday.      T(C): 37 (12-16-19 @ 09:53)  T(F): 98.6 (12-16-19 @ 09:53)  HR: 99 (12-16-19 @ 09:53)  BP: 115/77 (12-16-19 @ 09:53)    RR: 18 (12-16-19 @ 09:53)  SpO2: 95% (12-16-19 @ 09:53)      Physical Exam:  Gen: A&Ox3  Pulm:  Decreased sound right chest wound vac in place.    CV:  S1S2,   Abd: +BS, soft, NT, ND  Ext:  +DP b/l, no c/c/e      I&O's Detail    15 Dec 2019 07:01  -  16 Dec 2019 07:00  --------------------------------------------------------  IN:    fat emulsion (Plant Based) 20% Infusion: 217.4 mL    heparin  Infusion.: 139 mL    Oral Fluid: 200 mL    TPN (Total Parenteral Nutrition): 996 mL  Total IN: 1552.4 mL    OUT:  Total OUT: 0 mL    Total NET: 1552.4 mL      16 Dec 2019 07:01  -  16 Dec 2019 13:14  --------------------------------------------------------  IN:    heparin  Infusion.: 57 mL    Solution: 50 mL    Solution: 50 mL    TPN (Total Parenteral Nutrition): 415 mL  Total IN: 572 mL    OUT:  Total OUT: 0 mL    Total NET: 572 mL                              7.2    14.75 )-----------( 409      ( 16 Dec 2019 09:44 )             23.5   12-16    142  |  105  |  4.0<L>  ----------------------------<  97  3.2<L>   |  27.0  |  0.29<L>    Ca    8.2<L>      16 Dec 2019 09:44  Phos  2.8     12-16  Mg     1.4     12-16    TPro  5.2<L>  /  Alb  1.9<L>  /  TBili  <0.2<L>  /  DBili  x   /  AST  23  /  ALT  9   /  AlkPhos  226<H>  12-16  aPTT: 42.6 sec; PT: x    ; INR: x      12-16-19 @ 09:44         CAPILLARY BLOOD GLUCOSE      POCT Blood Glucose.: 118 mg/dL (16 Dec 2019 12:02)        Medications:  ALPRAZolam 0.25 milliGRAM(s) Oral every 8 hours PRN  aluminum hydroxide/magnesium hydroxide/simethicone Suspension 30 milliLiter(s) Oral every 4 hours PRN  amitriptyline 10 milliGRAM(s) Oral at bedtime  ascorbic acid 500 milliGRAM(s) Oral daily  chlorhexidine 2% Cloths 1 Application(s) Topical <User Schedule>  cholecalciferol 2000 Unit(s) Oral daily  escitalopram 10 milliGRAM(s) Oral daily  fat emulsion (Plant Based) 20% Infusion 0.9 Gm/kG/Day IV Continuous <Continuous>  ferrous    sulfate 325 milliGRAM(s) Oral three times a day  folic acid 1 milliGRAM(s) Oral daily  gabapentin 100 milliGRAM(s) Oral three times a day  heparin  Infusion. 1300 Unit(s)/Hr IV Continuous <Continuous>  heparin  Injectable 4000 Unit(s) IV Push every 6 hours PRN  heparin  Injectable 2000 Unit(s) IV Push every 6 hours PRN  HYDROmorphone   Tablet 4 milliGRAM(s) Oral every 6 hours PRN  HYDROmorphone   Tablet 2 milliGRAM(s) Oral every 6 hours PRN  lactobacillus acidophilus 1 Tablet(s) Oral two times a day  lidocaine   Patch 1 Patch Transdermal daily  meropenem  IVPB 1000 milliGRAM(s) IV Intermittent every 8 hours  methocarbamol 500 milliGRAM(s) Oral every 6 hours PRN  multivitamin 1 Tablet(s) Oral daily  octreotide  Injectable 100 MICROGram(s) SubCutaneous every 8 hours  ondansetron Injectable 4 milliGRAM(s) IV Push every 4 hours PRN  pantoprazole    Tablet 40 milliGRAM(s) Oral before breakfast  Parenteral Nutrition - Adult 1 Each TPN Continuous <Continuous>  Parenteral Nutrition - Adult 1 Each TPN Continuous <Continuous>  sodium chloride 0.9% lock flush 10 milliLiter(s) IV Push every 1 hour PRN  thiamine 100 milliGRAM(s) Oral daily  traMADol 50 milliGRAM(s) Oral every 8 hours  traZODone 25 milliGRAM(s) Oral at bedtime

## 2019-12-16 NOTE — PROGRESS NOTE ADULT - PROBLEM SELECTOR PLAN 2
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

## 2019-12-16 NOTE — CHART NOTE - NSCHARTNOTEFT_GEN_A_CORE
Source: Patient [x ]  Family [ ]   other [x ] EMR and staff, discussed in A.M. rounds     Current Diet: Clear Liquid     Patient reports [x ] nausea  [ ] vomiting [x ] diarrhea [ ] constipation  [ ]chewing problems [ ] swallowing issues  [ ] other:     PO intake:  < 50% [ x]   50-75%  [ ]   %  [ ]  other :    Source for PO intake [ x] Patient [ ] family [x ] chart [x ] staff [ ] other    Enteral /Parenteral Nutrition:   TPN providing a total of 2200 Kcal and 85gm protein     Current Weight:   (12/16)  134#  (12/7) 123#  (12/6) 121#  (12/5) 119#  (12/1) 114#  (11/30) 127#    % Weight Change (11# wt gain over past week, unsure of accuracy will continue to monitor trends)     Pertinent Medications: MEDICATIONS  (STANDING):  amitriptyline 10 milliGRAM(s) Oral at bedtime  ascorbic acid 500 milliGRAM(s) Oral daily  chlorhexidine 2% Cloths 1 Application(s) Topical <User Schedule>  cholecalciferol 2000 Unit(s) Oral daily  escitalopram 10 milliGRAM(s) Oral daily  fat emulsion (Plant Based) 20% Infusion 0.9 Gm/kG/Day (31.05 mL/Hr) IV Continuous <Continuous>  ferrous    sulfate 325 milliGRAM(s) Oral three times a day  folic acid 1 milliGRAM(s) Oral daily  gabapentin 100 milliGRAM(s) Oral three times a day  heparin  Infusion. 1300 Unit(s)/Hr (13 mL/Hr) IV Continuous <Continuous>  lactobacillus acidophilus 1 Tablet(s) Oral two times a day  lidocaine   Patch 1 Patch Transdermal daily  meropenem  IVPB 1000 milliGRAM(s) IV Intermittent every 8 hours  multivitamin 1 Tablet(s) Oral daily  octreotide  Injectable 100 MICROGram(s) SubCutaneous every 8 hours  pantoprazole    Tablet 40 milliGRAM(s) Oral before breakfast  Parenteral Nutrition - Adult 1 Each (83 mL/Hr) TPN Continuous <Continuous>  Parenteral Nutrition - Adult 1 Each (83 mL/Hr) TPN Continuous <Continuous>  thiamine 100 milliGRAM(s) Oral daily  traMADol 50 milliGRAM(s) Oral every 8 hours    MEDICATIONS  (PRN):  ALPRAZolam 0.25 milliGRAM(s) Oral every 8 hours PRN anxiety  aluminum hydroxide/magnesium hydroxide/simethicone Suspension 30 milliLiter(s) Oral every 4 hours PRN Dyspepsia  heparin  Injectable 4000 Unit(s) IV Push every 6 hours PRN For aPTT less than 40  heparin  Injectable 2000 Unit(s) IV Push every 6 hours PRN For aPTT between 40 - 57  HYDROmorphone   Tablet 4 milliGRAM(s) Oral every 6 hours PRN Severe Pain (7 - 10)  HYDROmorphone   Tablet 2 milliGRAM(s) Oral every 6 hours PRN Moderate Pain (4 - 6)  methocarbamol 500 milliGRAM(s) Oral every 6 hours PRN Muscle Spasm  ondansetron Injectable 4 milliGRAM(s) IV Push every 4 hours PRN Nausea and/or Vomiting  sodium chloride 0.9% lock flush 10 milliLiter(s) IV Push every 1 hour PRN Pre/post blood products, medications, blood draw, and to maintain line patency    Pertinent Labs: CBC Full  -  ( 15 Dec 2019 09:45 )  WBC Count : 14.86 K/uL  RBC Count : 2.32 M/uL  Hemoglobin : 7.1 g/dL  Hematocrit : 26.0 %  Platelet Count - Automated : 401 K/uL  Mean Cell Volume : 112.1 fl  Mean Cell Hemoglobin : 30.6 pg  Mean Cell Hemoglobin Concentration : 27.3 gm/dL  Auto Neutrophil # : 7.88 K/uL  Auto Lymphocyte # : 5.20 K/uL  Auto Monocyte # : 0.74 K/uL  Auto Eosinophil # : 0.89 K/uL  Auto Basophil # : 0.00 K/uL  Auto Neutrophil % : 53.0 %  Auto Lymphocyte % : 35.0 %  Auto Monocyte % : 5.0 %  Auto Eosinophil % : 6.0 %  Auto Basophil % : 0.0 %        Skin: R lateral CT site with wound vac     Nutrition focused physical exam Previously  conducted - found signs of malnutrition [ ]absent [ ]present    Subcutaneous fat loss: [ ] Orbital fat pads region, [ ]Buccal fat region, [ ]Triceps region,  [ ]Ribs region    Muscle wasting: [x ]Temples region, [x ]Clavicle region, [ x]Shoulder region, [ ]Scapula region, [ ]Interosseous region,  [ ]thigh region, [ ]Calf region    Estimated Needs:   [x ] no change since previous assessment  [ ] recalculated:     Pt is a 34 year old female with a PMHx of smoking, ETOH use (4-5 drinks/day),   transferred from [facility] to [facility] on 11/22 for complaints of weakness, 25 pound weight loss & flu like symptoms & found to have a large right pleural effusion, pleural fluid cultures + for group A hemolytic strep, + RIJ thormbus   11/23 CT of the chest/abd/pelvis indicated a clot in the Right IJ, pt started on heparin gtt.   s/p right VATS decort & washout debridement of CT site & VAC placement on 11/27/19.   Persistent drainage from chest tube site so repeat CT C/A/P performed 12/4 which showed new cystic mass in pancreas and liver concerning for pancreatitis, GI consulted (NTD).   Zosyn changed to meropenem on 12/6 by ID for broader coverage given pancreatitis. Plan to continue Merrem for 4 weeks.   Copious drainage from anterior chest tube site, so VAC removed and ostomy appliance applied for high output on 12/6.   Pleural fluid with an amylase of 54459 (serum 51) noted.  Prealbumin noted to be 5. Pt tolerating diet. Ensure drinks added.   12/7 Right chest tube removed. Esophogram normal.   Repeat CT scan showed a persistent Right hydropneumothorax with air anteriorly, right lower anterolateral chest wall with air tract to right pleural space, and Persistent renal nephrograms b/l indicating ATN.   All cultures negative. 12/8 Ostomy appliance removed and dressing applied.   Right lower extremity swelling noted to be worsening and patient upgraded to the ICU on 12/8. No acute findings on venous and arterial US of b/l LEs or Renal US.    12/9 s.p PICC placement & Right anterior pig tail catheter & right lateral wound vac placement with bridge     MRCP significant for Peripancreatic fluid and edema consistent with pancreatitis. Cystic lesions in the pancreatic head with the largest measuring 2.1 cm, likely acute peripancreatic collections, increased in size since 12/4/2019, with a tract of fluid extending from the 2.1 cm cystic lesion into the mediastinum and towards the pleural space on the right suspicious for a pancreatic pleural fistula.  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       Current Nutrition Diagnosis:  Pt remains at high nutrition risk secondary to Severe-acute protein calorie malnutrition related to inadequate protein energy intake with decreased appetite and altered GI function in setting of large right pleural effusion, s/p Right VATS decortication & washout, empyema and now with findings consistent with pancreatitis as evidenced by pt with previous 25# unintentional wt loss (17%) x3 months, previously meeting <50% estimated energy intake > 7 days.  Pt also with mild muscle wasting and mild fat loss and now with +fluid accumulation (now improved). Pt continues on clear liquid diet, and receiving TPN (which provides 2200kcal, 85gm protein) when receiving lipids.  Pt continues to complain of abd pain and nausea, Now have diarrhea.  Pt with poor PO intake of Clear liquid tray secondary to abd pain and nausea. Recommendations below:     Recommendations:   1. Continue with TPN at this time   2. Check wt daily to monitor trends   3. Check TG weekly while on TPN   4. Encourage clears     Monitoring and Evaluation:   [ ] PO intake [x ] Tolerance to diet prescription [X] Weights  [X] Follow up per protocol [X] Labs:

## 2019-12-16 NOTE — PROGRESS NOTE ADULT - ASSESSMENT
34 year old female with a PMHx of smoking, ETOH use (4-5 drinks/day),   transferred from [facility] to [facility] on 11/22 for complaints of weakness, 25 pound weight loss & flu like symptoms & found to have a large right pleural effusion, pleural fluid cultures + for group A hemolytic strep, + RIJ thormbus   11/23 CT of the chest/abd/pelvis indicated a clot in the Right IJ, pt started on heparin gtt.     s/p right VATS decort & washout debridement of CT site & VAC placement on 11/27/19.   ·	Persistent drainage from chest tube site so repeat CT C/A/P performed 12/4 which showed new cystic mass in pancreas and liver concerning for pancreatitis, GI consulted (NTD).   ·	Zosyn changed to meropenem on 12/6 by ID for broader coverage given pancreatitis. Plan to continue Meropenem for 4 weeks.   ·	Copious drainage from anterior chest tube site, so VAC removed and ostomy appliance applied for high output on 12/6.   ·	Pleural fluid with an amylase of 00597 (serum 51) noted.  Prealbumin noted to be 5. Pt tolerating diet. Ensure drinks added.   ·	12/7 Right chest tube removed. Esophogram normal.   ·	Repeat CT scan showed a persistent Right hydropneumothorax with air anteriorly, right lower anterolateral chest wall with air tract to right pleural space, and Persistent renal nephrograms b/l indicating ATN.   ·	All cultures negative. 12/8 Ostomy appliance removed and dressing applied.   ·	Right lower extremity swelling noted to be worsening and patient upgraded to the ICU on 12/8. No acute findings on venous and arterial US of b/l LEs or Renal US.      12/9 s.p PICC placement & Right anterior pig tail catheter & right lateral wound vac placement with bridge     ·	MRCP significant for Peripancreatic fluid and edema consistent with pancreatitis. Cystic lesions in the pancreatic head with the largest measuring 2.1 cm, likely acute peripancreatic collections, increased in size since 12/4/2019, with a tract of fluid extending from the 2.1 cm cystic lesion into the mediastinum and towards the pleural space on the right suspicious for a pancreatic pleural fistula.    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    Transferred to the floor. No acute distress. B/L PICC lines, TPN running. VSS. Labs and Meds reviewed.  12/13 Plan for transfer to Medicine service under Dr. redd awaiting call back from medicine service . F/u Cxr in AM right sided effusion increasing in size.    Wound Vac was changed on 12/15/19.  On 12/16/19 repeat xray was ordered. Awaiting official read however looks unchanged.

## 2019-12-16 NOTE — PROGRESS NOTE ADULT - PROBLEM SELECTOR PLAN 4
Heme consult appreciated.  Follow up with heme as out patient.  Will discharge home on Coumadin.    Continue Heparin gtt for now.

## 2019-12-16 NOTE — PROGRESS NOTE ADULT - SUBJECTIVE AND OBJECTIVE BOX
Patient seen and examined; Remains NPO.  TPN in place at full dose and tolerated well.  Glucoses in low 100's no coverage.    No fever.  States she had 5 episodes of diarrhea yesterday.  Only 2 were recorded by nursing.  Still with diffuse abdominal pain.  No vomiting.  Right chest tube in place and draining.  Heparin drip in place at 11 cc/ hr.      PAST MEDICAL & SURGICAL HISTORY:      ROS:  No Heartburn, regurgitation, dysphagia, odynophagia.  No dyspepsia  No abdominal pain.    No Nausea, vomiting.  No Bleeding.  No hematemesis.   No diarrhea.    No hematochesia.  No weight loss, anorexia.  No edema.      MEDICATIONS  (STANDING):  amitriptyline 10 milliGRAM(s) Oral at bedtime  ascorbic acid 500 milliGRAM(s) Oral daily  chlorhexidine 2% Cloths 1 Application(s) Topical <User Schedule>  cholecalciferol 2000 Unit(s) Oral daily  escitalopram 10 milliGRAM(s) Oral daily  fat emulsion (Plant Based) 20% Infusion 0.9 Gm/kG/Day (31.05 mL/Hr) IV Continuous <Continuous>  ferrous    sulfate 325 milliGRAM(s) Oral three times a day  folic acid 1 milliGRAM(s) Oral daily  gabapentin 100 milliGRAM(s) Oral three times a day  heparin  Infusion. 1300 Unit(s)/Hr (13 mL/Hr) IV Continuous <Continuous>  lactobacillus acidophilus 1 Tablet(s) Oral two times a day  lidocaine   Patch 1 Patch Transdermal daily  meropenem  IVPB 1000 milliGRAM(s) IV Intermittent every 8 hours  multivitamin 1 Tablet(s) Oral daily  octreotide  Injectable 100 MICROGram(s) SubCutaneous every 8 hours  pantoprazole    Tablet 40 milliGRAM(s) Oral before breakfast  Parenteral Nutrition - Adult 1 Each (83 mL/Hr) TPN Continuous <Continuous>  Parenteral Nutrition - Adult 1 Each (83 mL/Hr) TPN Continuous <Continuous>  thiamine 100 milliGRAM(s) Oral daily  traMADol 50 milliGRAM(s) Oral every 8 hours    MEDICATIONS  (PRN):  ALPRAZolam 0.25 milliGRAM(s) Oral every 8 hours PRN anxiety  aluminum hydroxide/magnesium hydroxide/simethicone Suspension 30 milliLiter(s) Oral every 4 hours PRN Dyspepsia  heparin  Injectable 4000 Unit(s) IV Push every 6 hours PRN For aPTT less than 40  heparin  Injectable 2000 Unit(s) IV Push every 6 hours PRN For aPTT between 40 - 57  HYDROmorphone   Tablet 4 milliGRAM(s) Oral every 6 hours PRN Severe Pain (7 - 10)  HYDROmorphone   Tablet 2 milliGRAM(s) Oral every 6 hours PRN Moderate Pain (4 - 6)  methocarbamol 500 milliGRAM(s) Oral every 6 hours PRN Muscle Spasm  ondansetron Injectable 4 milliGRAM(s) IV Push every 4 hours PRN Nausea and/or Vomiting  sodium chloride 0.9% lock flush 10 milliLiter(s) IV Push every 1 hour PRN Pre/post blood products, medications, blood draw, and to maintain line patency      Allergies    No Known Allergies    Intolerances    Ensure TID (Unknown)      Vital Signs Last 24 Hrs  T(C): 36.5 (16 Dec 2019 05:43), Max: 36.8 (15 Dec 2019 10:55)  T(F): 97.7 (16 Dec 2019 05:43), Max: 98.3 (15 Dec 2019 10:55)  HR: 82 (16 Dec 2019 05:43) (82 - 150)  BP: 135/81 (16 Dec 2019 05:43) (132/82 - 159/104)  BP(mean): --  RR: 16 (16 Dec 2019 05:43) (16 - 158)  SpO2: 98% (16 Dec 2019 05:43) (96% - 98%)    PHYSICAL EXAM:    GENERAL: NAD, well-groomed, well-developed  HEAD:  Atraumatic, Normocephalic  EYES: EOMI, PERRLA, conjunctiva and sclera clear  ENMT: No tonsillar erythema, exudates, or enlargement; Moist mucous membranes, Good dentition, No lesions  NECK: Supple, No JVD, Normal thyroid  CHEST/LUNG: Clear to percussion bilaterally; No rales, rhonchi, wheezing, or rubs  HEART: Regular rate and rhythm; No murmurs, rubs, or gallops  ABDOMEN: Soft, diffusely tender, Nondistended; Bowel sounds present;  no scars or hernias.   EXTREMITIES:  2+ Peripheral Pulses, No clubbing, cyanosis, or edema  LYMPH: No lymphadenopathy noted  SKIN: No rashes or lesions      LABS:                        7.1    14.86 )-----------( 401      ( 15 Dec 2019 09:45 )             26.0     12-15    142  |  103  |  4.0<L>  ----------------------------<  100  3.6   |  26.0  |  0.50    Ca    8.5<L>      15 Dec 2019 17:58  Phos  5.8     12-15  Mg     2.3     12-15    TPro  4.8<L>  /  Alb  1.6<L>  /  TBili  <0.2<L>  /  DBili  x   /  AST  11  /  ALT  7   /  AlkPhos  153<H>  12-15    PTT - ( 16 Dec 2019 03:30 )  PTT:97.4 sec         RADIOLOGY & ADDITIONAL STUDIES:

## 2019-12-16 NOTE — PROGRESS NOTE ADULT - SUBJECTIVE AND OBJECTIVE BOX
CC:  follow up hyperprolactinemia and abnormal TFTs    Interval HPI/ Overnight Events:  Patient c/o abdominal pain, nausea and headaches today.    MEDICATIONS  (STANDING):  amitriptyline 10 milliGRAM(s) Oral at bedtime  ascorbic acid 500 milliGRAM(s) Oral daily  chlorhexidine 2% Cloths 1 Application(s) Topical <User Schedule>  cholecalciferol 2000 Unit(s) Oral daily  escitalopram 10 milliGRAM(s) Oral daily  fat emulsion (Plant Based) 20% Infusion 0.9 Gm/kG/Day (31.05 mL/Hr) IV Continuous <Continuous>  ferrous    sulfate 325 milliGRAM(s) Oral three times a day  folic acid 1 milliGRAM(s) Oral daily  gabapentin 100 milliGRAM(s) Oral three times a day  heparin  Infusion. 1300 Unit(s)/Hr (13 mL/Hr) IV Continuous <Continuous>  lactobacillus acidophilus 1 Tablet(s) Oral two times a day  lidocaine   Patch 1 Patch Transdermal daily  magnesium sulfate  IVPB 2 Gram(s) IV Intermittent once  meropenem  IVPB 1000 milliGRAM(s) IV Intermittent every 8 hours  multivitamin 1 Tablet(s) Oral daily  octreotide  Injectable 100 MICROGram(s) SubCutaneous every 8 hours  pantoprazole    Tablet 40 milliGRAM(s) Oral before breakfast  Parenteral Nutrition - Adult 1 Each (83 mL/Hr) TPN Continuous <Continuous>  Parenteral Nutrition - Adult 1 Each (83 mL/Hr) TPN Continuous <Continuous>  thiamine 100 milliGRAM(s) Oral daily  traMADol 50 milliGRAM(s) Oral every 8 hours    Vital Signs Last 24 Hrs  T(C): 37 (16 Dec 2019 09:53), Max: 37 (16 Dec 2019 09:53)  T(F): 98.6 (16 Dec 2019 09:53), Max: 98.6 (16 Dec 2019 09:53)  HR: 99 (16 Dec 2019 09:53) (82 - 116)  BP: 115/77 (16 Dec 2019 09:53) (115/77 - 146/87)  RR: 18 (16 Dec 2019 09:53) (16 - 18)  SpO2: 95% (16 Dec 2019 09:53) (95% - 98%)    PE:  Gen: AAO, mild distress  HEENT:  sclera anicteric, MM are dry  Neck:  no thyromegaly, no LAD  CV:  nl S1 + S2, RRR, no m/r/g  Resp:  nl respiratory effort, CTA b/l  GI/ Abd: soft, +TTP throughout, no guarding or rebound, BS +  Neuro:  No tremor   MS:  no c/c/e  Skin:  no acanthosis  Psych:  flat affect    LABS:  12-16    142  |  105  |  4.0<L>  ----------------------------<  97  3.2<L>   |  27.0  |  0.29<L>    Ca    8.2<L>      16 Dec 2019 09:44  Phos  2.8     12-16  Mg     1.4     12-16    TPro  5.2<L>  /  Alb  1.9<L>  /  TBili  <0.2<L>  /  DBili  x   /  AST  23  /  ALT  9   /  AlkPhos  226<H>  12-16                          7.2    14.75 )-----------( 409      ( 16 Dec 2019 09:44 )             23.5     Prolactin, Serum: 40.5 ng/mL (12.06.19 @ 16:32)    Thyroid Stimulating Hormone, Serum: 5.97 uIU/mL (12.15.19 @ 09:45)  Thyroid Stimulating Hormone, Serum: 11.82 uIU/mL (12.10.19 @ 06:02)  Thyroid Stimulating Hormone, Serum: 2.02 uIU/mL (12.05.19 @ 19:52)    T4, Serum: 3.7 ug/dL (12.15.19 @ 09:45)  T4, Serum: 4.2 ug/dL (12.10.19 @ 06:02)  T4, Serum: 5.7 ug/dL (12.04.19 @ 06:46)

## 2019-12-17 LAB
ALBUMIN SERPL ELPH-MCNC: 2 G/DL — LOW (ref 3.3–5.2)
ALP SERPL-CCNC: 311 U/L — HIGH (ref 40–120)
ALT FLD-CCNC: 11 U/L — SIGNIFICANT CHANGE UP
ANION GAP SERPL CALC-SCNC: 12 MMOL/L — SIGNIFICANT CHANGE UP (ref 5–17)
APTT BLD: 52.8 SEC — HIGH (ref 27.5–36.3)
APTT BLD: 76.5 SEC — HIGH (ref 27.5–36.3)
APTT BLD: 79.9 SEC — HIGH (ref 27.5–36.3)
APTT BLD: 85.5 SEC — HIGH (ref 27.5–36.3)
AST SERPL-CCNC: 27 U/L — SIGNIFICANT CHANGE UP
BILIRUB SERPL-MCNC: <0.2 MG/DL — LOW (ref 0.4–2)
BUN SERPL-MCNC: 5 MG/DL — LOW (ref 8–20)
CALCIUM SERPL-MCNC: 7.6 MG/DL — LOW (ref 8.6–10.2)
CHLORIDE SERPL-SCNC: 108 MMOL/L — HIGH (ref 98–107)
CO2 SERPL-SCNC: 27 MMOL/L — SIGNIFICANT CHANGE UP (ref 22–29)
CREAT SERPL-MCNC: 0.31 MG/DL — LOW (ref 0.5–1.3)
GLUCOSE BLDC GLUCOMTR-MCNC: 110 MG/DL — HIGH (ref 70–99)
GLUCOSE BLDC GLUCOMTR-MCNC: 123 MG/DL — HIGH (ref 70–99)
GLUCOSE BLDC GLUCOMTR-MCNC: 123 MG/DL — HIGH (ref 70–99)
GLUCOSE BLDC GLUCOMTR-MCNC: 74 MG/DL — SIGNIFICANT CHANGE UP (ref 70–99)
GLUCOSE SERPL-MCNC: 131 MG/DL — HIGH (ref 70–115)
HCT VFR BLD CALC: 31.3 % — LOW (ref 34.5–45)
HCT VFR BLD CALC: 31.8 % — LOW (ref 34.5–45)
HGB BLD-MCNC: 10.5 G/DL — LOW (ref 11.5–15.5)
HGB BLD-MCNC: 9 G/DL — LOW (ref 11.5–15.5)
IGG SERPL-MCNC: 800 MG/DL — SIGNIFICANT CHANGE UP (ref 700–1600)
IGG1 SER-MCNC: 406 MG/DL — SIGNIFICANT CHANGE UP (ref 248–810)
IGG2 SER-MCNC: 243 MG/DL — SIGNIFICANT CHANGE UP (ref 130–555)
IGG3 SER-MCNC: 22 MG/DL — SIGNIFICANT CHANGE UP (ref 15–102)
IGG4 SER-MCNC: 69 MG/DL — SIGNIFICANT CHANGE UP (ref 2–96)
INR BLD: 1.16 RATIO — SIGNIFICANT CHANGE UP (ref 0.88–1.16)
MAGNESIUM SERPL-MCNC: 1.5 MG/DL — LOW (ref 1.6–2.6)
MCHC RBC-ENTMCNC: 28.8 GM/DL — LOW (ref 32–36)
MCHC RBC-ENTMCNC: 29.8 PG — SIGNIFICANT CHANGE UP (ref 27–34)
MCHC RBC-ENTMCNC: 30.3 PG — SIGNIFICANT CHANGE UP (ref 27–34)
MCHC RBC-ENTMCNC: 33 GM/DL — SIGNIFICANT CHANGE UP (ref 32–36)
MCV RBC AUTO: 103.6 FL — HIGH (ref 80–100)
MCV RBC AUTO: 91.9 FL — SIGNIFICANT CHANGE UP (ref 80–100)
PHOSPHATE SERPL-MCNC: 1.2 MG/DL — LOW (ref 2.4–4.7)
PLATELET # BLD AUTO: 313 K/UL — SIGNIFICANT CHANGE UP (ref 150–400)
PLATELET # BLD AUTO: 362 K/UL — SIGNIFICANT CHANGE UP (ref 150–400)
POTASSIUM SERPL-MCNC: 3.1 MMOL/L — LOW (ref 3.5–5.3)
POTASSIUM SERPL-SCNC: 3.1 MMOL/L — LOW (ref 3.5–5.3)
PROT SERPL-MCNC: 5.1 G/DL — LOW (ref 6.6–8.7)
PROTHROM AB SERPL-ACNC: 13.4 SEC — HIGH (ref 10–12.9)
RBC # BLD: 3.02 M/UL — LOW (ref 3.8–5.2)
RBC # BLD: 3.46 M/UL — LOW (ref 3.8–5.2)
RBC # FLD: 17.5 % — HIGH (ref 10.3–14.5)
RBC # FLD: 20.4 % — HIGH (ref 10.3–14.5)
SODIUM SERPL-SCNC: 147 MMOL/L — HIGH (ref 135–145)
WBC # BLD: 12.1 K/UL — HIGH (ref 3.8–10.5)
WBC # BLD: 14.76 K/UL — HIGH (ref 3.8–10.5)
WBC # FLD AUTO: 12.1 K/UL — HIGH (ref 3.8–10.5)
WBC # FLD AUTO: 14.76 K/UL — HIGH (ref 3.8–10.5)

## 2019-12-17 PROCEDURE — 99232 SBSQ HOSP IP/OBS MODERATE 35: CPT

## 2019-12-17 PROCEDURE — 99233 SBSQ HOSP IP/OBS HIGH 50: CPT

## 2019-12-17 RX ORDER — POTASSIUM CHLORIDE 20 MEQ
10 PACKET (EA) ORAL
Refills: 0 | Status: COMPLETED | OUTPATIENT
Start: 2019-12-17 | End: 2019-12-18

## 2019-12-17 RX ORDER — WARFARIN SODIUM 2.5 MG/1
7.5 TABLET ORAL ONCE
Refills: 0 | Status: COMPLETED | OUTPATIENT
Start: 2019-12-17 | End: 2019-12-17

## 2019-12-17 RX ORDER — TRAZODONE HCL 50 MG
50 TABLET ORAL AT BEDTIME
Refills: 0 | Status: DISCONTINUED | OUTPATIENT
Start: 2019-12-17 | End: 2020-01-07

## 2019-12-17 RX ORDER — LANOLIN ALCOHOL/MO/W.PET/CERES
3 CREAM (GRAM) TOPICAL AT BEDTIME
Refills: 0 | Status: DISCONTINUED | OUTPATIENT
Start: 2019-12-17 | End: 2020-01-07

## 2019-12-17 RX ORDER — MAGNESIUM SULFATE 500 MG/ML
2 VIAL (ML) INJECTION
Refills: 0 | Status: COMPLETED | OUTPATIENT
Start: 2019-12-17 | End: 2019-12-18

## 2019-12-17 RX ORDER — POTASSIUM PHOSPHATE, MONOBASIC POTASSIUM PHOSPHATE, DIBASIC 236; 224 MG/ML; MG/ML
30 INJECTION, SOLUTION INTRAVENOUS ONCE
Refills: 0 | Status: COMPLETED | OUTPATIENT
Start: 2019-12-17 | End: 2019-12-18

## 2019-12-17 RX ORDER — I.V. FAT EMULSION 20 G/100ML
0.9 EMULSION INTRAVENOUS
Qty: 50 | Refills: 0 | Status: DISCONTINUED | OUTPATIENT
Start: 2019-12-17 | End: 2019-12-17

## 2019-12-17 RX ORDER — ELECTROLYTE SOLUTION,INJ
1 VIAL (ML) INTRAVENOUS
Refills: 0 | Status: DISCONTINUED | OUTPATIENT
Start: 2019-12-17 | End: 2019-12-17

## 2019-12-17 RX ADMIN — GABAPENTIN 100 MILLIGRAM(S): 400 CAPSULE ORAL at 22:15

## 2019-12-17 RX ADMIN — Medication 10 MILLIGRAM(S): at 22:16

## 2019-12-17 RX ADMIN — TRAMADOL HYDROCHLORIDE 50 MILLIGRAM(S): 50 TABLET ORAL at 07:10

## 2019-12-17 RX ADMIN — MEROPENEM 100 MILLIGRAM(S): 1 INJECTION INTRAVENOUS at 06:17

## 2019-12-17 RX ADMIN — Medication 50 MILLIGRAM(S): at 22:16

## 2019-12-17 RX ADMIN — Medication 30 MILLILITER(S): at 22:53

## 2019-12-17 RX ADMIN — GABAPENTIN 100 MILLIGRAM(S): 400 CAPSULE ORAL at 16:36

## 2019-12-17 RX ADMIN — MEROPENEM 100 MILLIGRAM(S): 1 INJECTION INTRAVENOUS at 22:16

## 2019-12-17 RX ADMIN — TRAMADOL HYDROCHLORIDE 50 MILLIGRAM(S): 50 TABLET ORAL at 22:15

## 2019-12-17 RX ADMIN — I.V. FAT EMULSION 31.05 GM/KG/DAY: 20 EMULSION INTRAVENOUS at 22:30

## 2019-12-17 RX ADMIN — HEPARIN SODIUM 1300 UNIT(S)/HR: 5000 INJECTION INTRAVENOUS; SUBCUTANEOUS at 08:09

## 2019-12-17 RX ADMIN — HYDROMORPHONE HYDROCHLORIDE 2 MILLIGRAM(S): 2 INJECTION INTRAMUSCULAR; INTRAVENOUS; SUBCUTANEOUS at 17:00

## 2019-12-17 RX ADMIN — HEPARIN SODIUM 2000 UNIT(S): 5000 INJECTION INTRAVENOUS; SUBCUTANEOUS at 00:18

## 2019-12-17 RX ADMIN — TRAMADOL HYDROCHLORIDE 50 MILLIGRAM(S): 50 TABLET ORAL at 06:16

## 2019-12-17 RX ADMIN — TRAMADOL HYDROCHLORIDE 50 MILLIGRAM(S): 50 TABLET ORAL at 23:10

## 2019-12-17 RX ADMIN — HYDROMORPHONE HYDROCHLORIDE 2 MILLIGRAM(S): 2 INJECTION INTRAMUSCULAR; INTRAVENOUS; SUBCUTANEOUS at 16:34

## 2019-12-17 RX ADMIN — Medication 0.25 MILLIGRAM(S): at 13:54

## 2019-12-17 RX ADMIN — Medication 1 EACH: at 22:32

## 2019-12-17 RX ADMIN — Medication 100 MILLIEQUIVALENT(S): at 23:20

## 2019-12-17 RX ADMIN — HEPARIN SODIUM 1300 UNIT(S)/HR: 5000 INJECTION INTRAVENOUS; SUBCUTANEOUS at 00:17

## 2019-12-17 RX ADMIN — Medication 30 MILLILITER(S): at 06:18

## 2019-12-17 NOTE — PROGRESS NOTE ADULT - SUBJECTIVE AND OBJECTIVE BOX
HEALTH ISSUES - PROBLEM Dx:    rt pl eff/ epyema, pancreatico pleural fistula, pancreatitis,     INTERVAL HPI/ OVERNIGHT EVENTS:    pt groaning and moaning  says she hurts everywhere and doesn't want me to touch her  but is not asking for pain meds  states she wants to sleep and is trying to find a comfortable position to sleep    REVIEW OF SYSTEMS:    anxious, uncomfortable, poor appetite, denies sob, fever, n/v     Vital Signs Last 24 Hrs  T(C): 36.3 (17 Dec 2019 16:03), Max: 36.8 (16 Dec 2019 18:55)  T(F): 97.3 (17 Dec 2019 16:03), Max: 98.3 (16 Dec 2019 18:55)  HR: 112 (17 Dec 2019 16:03) (89 - 112)  BP: 152/110 (17 Dec 2019 16:03) (126/83 - 155/109)  BP(mean): --  RR: 18 (17 Dec 2019 16:03) (17 - 19)  SpO2: 96% (17 Dec 2019 16:03) (96% - 98%)    PHYSICAL EXAM-  GENERAL: frail built, uncomfortable  HEAD:  Atraumatic, Normocephalic  EYES:  conjunctiva and sclera clear  NECK: Supple, No JVD, Normal thyroid  NERVOUS SYSTEM:  Alert & Oriented X 3, no focal deficit  CHEST/LUNG: R base decreased b/s , no  rales, rhonchi, wheezing, or rubs , R antr side wound vac +, tender chest wall and rib cage  HEART: Regular rate and rhythm; No murmurs, rubs, or gallops  ABDOMEN: Soft, epigastric tenderness + , no rebound , Nondistended; Bowel sounds present  EXTREMITIES:  2+ Peripheral Pulses, No clubbing, cyanosis, or edema  LYMPH: No lymphadenopathy noted      MEDICATIONS  (STANDING):  amitriptyline 10 milliGRAM(s) Oral at bedtime  ascorbic acid 500 milliGRAM(s) Oral daily  chlorhexidine 2% Cloths 1 Application(s) Topical <User Schedule>  cholecalciferol 2000 Unit(s) Oral daily  escitalopram 10 milliGRAM(s) Oral daily  fat emulsion (Plant Based) 20% Infusion 0.9 Gm/kG/Day (31.05 mL/Hr) IV Continuous <Continuous>  ferrous    sulfate 325 milliGRAM(s) Oral three times a day  folic acid 1 milliGRAM(s) Oral daily  gabapentin 100 milliGRAM(s) Oral three times a day  heparin  Infusion. 1300 Unit(s)/Hr (13 mL/Hr) IV Continuous <Continuous>  lidocaine   Patch 1 Patch Transdermal daily  meropenem  IVPB 1000 milliGRAM(s) IV Intermittent every 8 hours  multivitamin 1 Tablet(s) Oral daily  octreotide  Injectable 100 MICROGram(s) SubCutaneous every 8 hours  pantoprazole    Tablet 40 milliGRAM(s) Oral before breakfast  Parenteral Nutrition - Adult 1 Each (83 mL/Hr) TPN Continuous <Continuous>  Parenteral Nutrition - Adult 1 Each (83 mL/Hr) TPN Continuous <Continuous>  thiamine 100 milliGRAM(s) Oral daily  traMADol 50 milliGRAM(s) Oral every 8 hours  traZODone 25 milliGRAM(s) Oral at bedtime    MEDICATIONS  (PRN):  acetaminophen   Tablet .. 650 milliGRAM(s) Oral every 6 hours PRN Mild Pain (1 - 3)  ALPRAZolam 0.25 milliGRAM(s) Oral every 8 hours PRN anxiety  aluminum hydroxide/magnesium hydroxide/simethicone Suspension 30 milliLiter(s) Oral every 4 hours PRN Dyspepsia  heparin  Injectable 4000 Unit(s) IV Push every 6 hours PRN For aPTT less than 40  heparin  Injectable 2000 Unit(s) IV Push every 6 hours PRN For aPTT between 40 - 57  HYDROmorphone   Tablet 4 milliGRAM(s) Oral every 6 hours PRN Severe Pain (7 - 10)  HYDROmorphone   Tablet 2 milliGRAM(s) Oral every 6 hours PRN Moderate Pain (4 - 6)  methocarbamol 500 milliGRAM(s) Oral every 6 hours PRN Muscle Spasm  ondansetron Injectable 4 milliGRAM(s) IV Push every 4 hours PRN Nausea and/or Vomiting  sodium chloride 0.9% lock flush 10 milliLiter(s) IV Push every 1 hour PRN Pre/post blood products, medications, blood draw, and to maintain line patency      LABS:                        10.5   14.76 )-----------( 362      ( 17 Dec 2019 10:53 )             31.8     12-17    147<H>  |  108<H>  |  5.0<L>  ----------------------------<  131<H>  3.1<L>   |  27.0  |  0.31<L>    Ca    7.6<L>      17 Dec 2019 10:53  Phos  1.2     12-17  Mg     1.5     12-17    TPro  5.1<L>  /  Alb  2.0<L>  /  TBili  <0.2<L>  /  DBili  x   /  AST  27  /  ALT  11  /  AlkPhos  311<H>  12-17    PTT - ( 17 Dec 2019 14:00 )  PTT:76.5 sec

## 2019-12-17 NOTE — PROGRESS NOTE ADULT - SUBJECTIVE AND OBJECTIVE BOX
Patient seen and examined. Complains of stomach pain this morning.      T(C): 36.4 (12-17-19 @ 06:10)  T(F): 97.6 (12-17-19 @ 06:10)  HR: 97 (12-17-19 @ 06:10)    RR: 19 (12-17-19 @ 06:10)  SpO2: 97% (12-17-19 @ 06:10)        Physical Exam:  Gen: A&Ox3  Pulm:  Decreased right base  CV:  S1S2, RRR, no m/r/g  Abd: +BS, soft,   Ext:  +DP b/l, no c/c/e  Wound vac in place 50 cc's in chamber    I&O's Detail    16 Dec 2019 07:01  -  17 Dec 2019 07:00  --------------------------------------------------------  IN:    fat emulsion (Plant Based) 20% Infusion: 283.5 mL    heparin  Infusion.: 283 mL    Oral Fluid: 240 mL    Packed Red Blood Cells: 336 mL    Solution: 50 mL    Solution: 50 mL    TPN (Total Parenteral Nutrition): 1992 mL  Total IN: 3234.5 mL    OUT:  Total OUT: 0 mL    Total NET: 3234.5 mL                              7.2    14.75 )-----------( 409      ( 16 Dec 2019 09:44 )             23.5   12-16    142  |  105  |  4.0<L>  ----------------------------<  97  3.2<L>   |  27.0  |  0.29<L>    Ca    8.2<L>      16 Dec 2019 09:44  Phos  2.8     12-16  Mg     1.4     12-16    TPro  5.2<L>  /  Alb  1.9<L>  /  TBili  <0.2<L>  /  DBili  x   /  AST  23  /  ALT  9   /  AlkPhos  226<H>  12-16  aPTT: 85.5 sec; PT: x    ; INR: x      12-17-19 @ 07:28       Labs are currently being drawn for today.   CAPILLARY BLOOD GLUCOSE      POCT Blood Glucose.: 123 mg/dL (17 Dec 2019 08:00)        Medications:  acetaminophen   Tablet .. 650 milliGRAM(s) Oral every 6 hours PRN  ALPRAZolam 0.25 milliGRAM(s) Oral every 8 hours PRN  aluminum hydroxide/magnesium hydroxide/simethicone Suspension 30 milliLiter(s) Oral every 4 hours PRN  amitriptyline 10 milliGRAM(s) Oral at bedtime  ascorbic acid 500 milliGRAM(s) Oral daily  chlorhexidine 2% Cloths 1 Application(s) Topical <User Schedule>  cholecalciferol 2000 Unit(s) Oral daily  escitalopram 10 milliGRAM(s) Oral daily  fat emulsion (Plant Based) 20% Infusion 0.9 Gm/kG/Day IV Continuous <Continuous>  ferrous    sulfate 325 milliGRAM(s) Oral three times a day  folic acid 1 milliGRAM(s) Oral daily  gabapentin 100 milliGRAM(s) Oral three times a day  heparin  Infusion. 1300 Unit(s)/Hr IV Continuous <Continuous>  heparin  Injectable 4000 Unit(s) IV Push every 6 hours PRN  heparin  Injectable 2000 Unit(s) IV Push every 6 hours PRN  HYDROmorphone   Tablet 4 milliGRAM(s) Oral every 6 hours PRN  HYDROmorphone   Tablet 2 milliGRAM(s) Oral every 6 hours PRN  lactobacillus acidophilus 1 Tablet(s) Oral two times a day  lidocaine   Patch 1 Patch Transdermal daily  meropenem  IVPB 1000 milliGRAM(s) IV Intermittent every 8 hours  methocarbamol 500 milliGRAM(s) Oral every 6 hours PRN  multivitamin 1 Tablet(s) Oral daily  octreotide  Injectable 100 MICROGram(s) SubCutaneous every 8 hours  ondansetron Injectable 4 milliGRAM(s) IV Push every 4 hours PRN  pantoprazole    Tablet 40 milliGRAM(s) Oral before breakfast  Parenteral Nutrition - Adult 1 Each TPN Continuous <Continuous>  Parenteral Nutrition - Adult 1 Each TPN Continuous <Continuous>  sodium chloride 0.9% lock flush 10 milliLiter(s) IV Push every 1 hour PRN  thiamine 100 milliGRAM(s) Oral daily  traMADol 50 milliGRAM(s) Oral every 8 hours  traZODone 25 milliGRAM(s) Oral at bedtime

## 2019-12-17 NOTE — CHART NOTE - NSCHARTNOTEFT_GEN_A_CORE
Cd. to remove right picc line and insert midline for transfer of IV heparin and intravenous therapy.  PICC line education and demonstration performed for procedure.  Patient positioned in trendelenburg and prepped for procedure. Sterile technique observed. Dressing C/D/I with small amounts of dried blood. Transparent occlusive dressing removed. 0 sutures removed and discarded. Upon patient breath hold and exhalation, entire catheter (31CM) removed from rue without difficulty. Bandages immediately applied. No heme drainage produced and occlusive bandages and tape applied over insertion site.  Patient instructed to remain supine for 45min-60min. Patient tolerated well.  Addendum:  A new right midline was inserted to the rue without complications good heme back and flushed with ns.  Measurements:  4FR 12CM BARD POWER MIDLINE.

## 2019-12-17 NOTE — PROGRESS NOTE ADULT - PROBLEM SELECTOR PLAN 1
s/p Right VATS decortication & washout on 11/27/19. Debridement of CT site & VAC placement.  chest tubes since d.c   Encourage the use of I/S, OOB to chair, daily PT, ambulate as tolerated  Continue supportive care.   PICC line on left for TPN  PICC on right for meds   new vac placement to right lateral chest wall 12/15 due to be changed 12/18  Case and plan discussed with Dr. Aponte.

## 2019-12-17 NOTE — PROGRESS NOTE ADULT - ASSESSMENT
34 year old female who is  transferred from [facility] to [facility] on 11/22 for complaints of weakness, 25 pound weight loss & flu like symptoms   rt large pl effusion/ Empyema- chest tube placed on 11/22 at [facility] and removed on 12/7/19 here at [facility]  fluid c/s alpha hemolytic strep  CT showed pancreatic cystic mass of body and tail with ductal dilatation  at [facility] found to have R IJ clot and started on Heparin GTT  ---------------------------------------------------------------------------------------  s/p right VATS decort & washout debridement of CT site & VAC placement at the antr chest tube site on 11/27/19 at [facility]  Ostomy applied on 12/6/19 and removed on 12/8/19  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 and Multifocal PNA  OCtreotide/ TPN started by GI with pt being on clears PO  Heme- transitioned to Coumadin  12/9/19 PICC line placed    Course complicated by:  Hyponatremia- deemed sec to alcoholism and SIADH ruled out;   ATN  Acute pancreatitis  Abnormal TFTs- being followed by Endocrine without any new diagnosis  Anemia requiring BT  rt leg swelling- not DVT    Assessment and Plan:    > Rt pl effusion/ Empyema  being followed by Thoracic  currently not large enough to redrain  given P-P fistula likely will need Pleurex or redrain if fistula not fixed  s/p chest tube removal, now on wound vac  antibiotics per ID via PICC line    > Acute pancreatitis and pancreatic lesion  GI input appreciated  On Octreotide and TPN with clears PO  Diarrhea likely related to pancreatitis    > Rt IJ thrombus  Heparin bridging with Coumadin until INR therapeutic  When acute issues completely resolved can switch to NOAC outpatient    > Anemia of chronic disease  s/p multiple transfusions here  BT PRN    > hypernatremia, Hypokalemia, hypomagnesemia, hypophosphatemia, Hypoalbuminemia  all need to addressed by GI with TPN  will replete K, Mg and Phos now    > Abnormal TFTs  per Endocrine rpt TFTs in 2 weeks time around 12/29/19    > Pain issues  controlled  being managed by pain mangmt    > insomnia  increase trazadone  add melatonin    > anxiety/ coping disorder  cont lexapro per Psych  cont xanax    > sevre protein calorie malnutrition  on nutritional supplements and TPN    > Hyponatremia sec to alcoholism resolved   > ATN- resolved    Heparin and COumadin

## 2019-12-17 NOTE — PROGRESS NOTE ADULT - SUBJECTIVE AND OBJECTIVE BOX
Patient seen and examined; chart reviewed.  Transfused 2 units rbc's yesterday and tolerated well.  4 units total on this admission.  No overt bleeding.  Chest tube is out and wound vac in place.    Has total body pain.  Still miserable.    TPN infuses via LUE PICC line.  Glucoses running 100 to 118.  No coverage.  Todays labs were drawn incorrectly; about to get a redraw.  No fever.  Some clears tolerated.    Reports having only 1 BM loose yesterday.  Sorbitol effect has worn off.  Had received Kayexalate/ Sorbitol for high K 2 nights ago.  K now corrected.      PAST MEDICAL & SURGICAL HISTORY:      ROS:  No Heartburn, regurgitation, dysphagia, odynophagia.  No dyspepsia  No abdominal pain.    No Nausea, vomiting.  No Bleeding.  No hematemesis.   No diarrhea.    No hematochezia.  No weight loss, anorexia.  No edema.      MEDICATIONS  (STANDING):  amitriptyline 10 milliGRAM(s) Oral at bedtime  ascorbic acid 500 milliGRAM(s) Oral daily  chlorhexidine 2% Cloths 1 Application(s) Topical <User Schedule>  cholecalciferol 2000 Unit(s) Oral daily  escitalopram 10 milliGRAM(s) Oral daily  ferrous    sulfate 325 milliGRAM(s) Oral three times a day  folic acid 1 milliGRAM(s) Oral daily  gabapentin 100 milliGRAM(s) Oral three times a day  heparin  Infusion. 1300 Unit(s)/Hr (13 mL/Hr) IV Continuous <Continuous>  lactobacillus acidophilus 1 Tablet(s) Oral two times a day  lidocaine   Patch 1 Patch Transdermal daily  meropenem  IVPB 1000 milliGRAM(s) IV Intermittent every 8 hours  multivitamin 1 Tablet(s) Oral daily  octreotide  Injectable 100 MICROGram(s) SubCutaneous every 8 hours  pantoprazole    Tablet 40 milliGRAM(s) Oral before breakfast  Parenteral Nutrition - Adult 1 Each (83 mL/Hr) TPN Continuous <Continuous>  thiamine 100 milliGRAM(s) Oral daily  traMADol 50 milliGRAM(s) Oral every 8 hours  traZODone 25 milliGRAM(s) Oral at bedtime    MEDICATIONS  (PRN):  acetaminophen   Tablet .. 650 milliGRAM(s) Oral every 6 hours PRN Mild Pain (1 - 3)  ALPRAZolam 0.25 milliGRAM(s) Oral every 8 hours PRN anxiety  aluminum hydroxide/magnesium hydroxide/simethicone Suspension 30 milliLiter(s) Oral every 4 hours PRN Dyspepsia  heparin  Injectable 4000 Unit(s) IV Push every 6 hours PRN For aPTT less than 40  heparin  Injectable 2000 Unit(s) IV Push every 6 hours PRN For aPTT between 40 - 57  HYDROmorphone   Tablet 4 milliGRAM(s) Oral every 6 hours PRN Severe Pain (7 - 10)  HYDROmorphone   Tablet 2 milliGRAM(s) Oral every 6 hours PRN Moderate Pain (4 - 6)  methocarbamol 500 milliGRAM(s) Oral every 6 hours PRN Muscle Spasm  ondansetron Injectable 4 milliGRAM(s) IV Push every 4 hours PRN Nausea and/or Vomiting  sodium chloride 0.9% lock flush 10 milliLiter(s) IV Push every 1 hour PRN Pre/post blood products, medications, blood draw, and to maintain line patency      Allergies    No Known Allergies    Intolerances    Ensure TID (Unknown)      Vital Signs Last 24 Hrs  T(C): 36.4 (17 Dec 2019 06:10), Max: 37 (16 Dec 2019 09:53)  T(F): 97.6 (17 Dec 2019 06:10), Max: 98.6 (16 Dec 2019 09:53)  HR: 97 (17 Dec 2019 06:10) (80 - 107)  BP: 155/109 (17 Dec 2019 06:39) (93/64 - 155/109)  BP(mean): --  RR: 19 (17 Dec 2019 06:10) (17 - 19)  SpO2: 97% (17 Dec 2019 06:10) (95% - 98%)    PHYSICAL EXAM:    GENERAL: NAD, Dischevelled, well-developed  HEAD:  Atraumatic, Normocephalic  EYES: EOMI, PERRLA, conjunctiva and sclera clear  ENMT: No tonsillar erythema, exudates, or enlargement; Moist mucous membranes, Good dentition, No lesions  NECK: Supple, No JVD, Normal thyroid  CHEST/LUNG: Clear to percussion bilaterally; No rales, rhonchi, wheezing, or rubs; wound vacc to drainage.    HEART: Regular rate and rhythm; No murmurs, rubs, or gallops  ABDOMEN: Soft, Nontender, Nondistended; Bowel sounds present  EXTREMITIES:  2+ Peripheral Pulses, No clubbing, cyanosis, or edema;  PICC line in GILMA.  Looks good.    LYMPH: No lymphadenopathy noted  SKIN: No rashes or lesions      LABS:                        7.2    14.75 )-----------( 409      ( 16 Dec 2019 09:44 )             23.5     12-16    142  |  105  |  4.0<L>  ----------------------------<  97  3.2<L>   |  27.0  |  0.29<L>    Ca    8.2<L>      16 Dec 2019 09:44  Phos  2.8     12-16  Mg     1.4     12-16    TPro  5.2<L>  /  Alb  1.9<L>  /  TBili  <0.2<L>  /  DBili  x   /  AST  23  /  ALT  9   /  AlkPhos  226<H>  12-16    PTT - ( 17 Dec 2019 07:28 )  PTT:85.5 sec         RADIOLOGY & ADDITIONAL STUDIES:

## 2019-12-17 NOTE — PROGRESS NOTE ADULT - SUBJECTIVE AND OBJECTIVE BOX
[facility]  INFECTIOUS DISEASES AND INTERNAL MEDICINE at [place]  =======================================================  Carlos Alberto Soto MD  Diplomates American Board of Internal Medicine and Infectious Diseases  =======================================================    HERACLIO MOLINA [id number]    Follow up: R pleural effusion    Complaining of pain in R chest on chest tube site and also abdominal discomfort.   R pleural effusion s/p VATS, pancreaticopleural fistula.     Allergies:  Ensure TID (Unknown)  No Known Allergies    Antibiotics:  meropenem  IVPB 1000 milliGRAM(s) IV Intermittent every 8 hours    REVIEW OF SYSTEMS:  CONSTITUTIONAL:  No Fever or chills  HEENT:   No diplopia or blurred vision.  No earache, sore throat or runny nose.  CARDIOVASCULAR:  No chest pain or SOB, pain on chest tube site  RESPIRATORY:  No cough, shortness of breath, PND or orthopnea.  GASTROINTESTINAL:  No nausea, vomiting, no more diarrhea.  GENITOURINARY:  No dysuria, frequency or urgency. No Blood in urine  MUSCULOSKELETAL:  no joint aches, no muscle pain  SKIN:  No change in skin, hair or nails.  NEUROLOGIC:  No paresthesias or weakness.  PSYCHIATRIC:  No disorder of thought or mood.  ENDOCRINE:  No heat or cold intolerance, polyuria or polydipsia.  HEMATOLOGICAL:  No easy bruising or bleeding.      Physical Exam:  Vital Signs Last 24 Hrs  T(C): 36.4 (17 Dec 2019 06:10), Max: 36.9 (16 Dec 2019 15:36)  T(F): 97.6 (17 Dec 2019 06:10), Max: 98.4 (16 Dec 2019 15:36)  HR: 97 (17 Dec 2019 06:10) (80 - 107)  BP: 155/109 (17 Dec 2019 06:39) (93/64 - 155/109)  BP(mean): --  RR: 19 (17 Dec 2019 06:10) (17 - 19)  SpO2: 97% (17 Dec 2019 06:10) (96% - 98%)  GEN: NAD  HEENT: normocephalic and atraumatic. EOMI. ROSALIE.    NECK: Supple.  No lymphadenopathy   LUNGS: R chest with decreased breath sounds, R chest tube and vacuum dressing.   HEART: Regular rate and rhythm   ABDOMEN: Soft, nontender, and nondistended.  Positive bowel sounds.    : No CVA tenderness  EXTREMITIES: + edema. + PICC on TPN  MSK: no joint swelling  NEUROLOGIC: grossly intact.  PSYCHIATRIC: Appropriate affect .  SKIN: No ulceration or induration present.      Labs:  12-16    142  |  105  |  4.0<L>  ----------------------------<  97  3.2<L>   |  27.0  |  0.29<L>    Ca    8.2<L>      16 Dec 2019 09:44  Phos  2.8     12-16  Mg     1.4     12-16    TPro  5.2<L>  /  Alb  1.9<L>  /  TBili  <0.2<L>  /  DBili  x   /  AST  23  /  ALT  9   /  AlkPhos  226<H>  12-16                        9.0    12.10 )-----------( 313      ( 17 Dec 2019 07:29 )             31.3     PTT - ( 17 Dec 2019 07:28 )  PTT:85.5 sec    LIVER FUNCTIONS - ( 16 Dec 2019 09:44 )  Alb: 1.9 g/dL / Pro: 5.2 g/dL / ALK PHOS: 226 U/L / ALT: 9 U/L / AST: 23 U/L / GGT: x           RECENT CULTURES:  12-04 @ 16:28 .Stool     No Protozoa seen by trichrome stain  No Helminths or Protozoa seen in formalin concentrate    12-04 @ 10:24 .Urine     No growth    12-03 @ 14:03 .Other chest tube insertion site (pus/purulent)     No growth at 2 days.    12-03 @ 13:30 .Blood     No growth at 5 days.      All imaging and data are reviewed.   < from: CT Chest No Cont (12.07.19 @ 13:51) >   EXAM:  CT CHEST                        PROCEDURE DATE:  12/07/2019    INTERPRETATION:  Clinical information: Right chest collection. Status   post VATS 11/27  Comparison: Chest CT dated 12/4/2019  PROCEDURE:   CT of the Chest was performed without intravenous contrast.  FINDINGS:  Interval removal of right chest tube.  Air-containing tract noted from   the skin to the pleural space at site of prior chest tube on series 3   image 137.  There is a skin defect in the right lateral chest wall again   noted with subcutaneous air in this region and some deeper mildly complex   fluid again noted as seen on series 3 image 99. There is a new skin   defect on series 3 image 121 with tract of air extending into the right   pleural space. Previously identified anteriorly loculated right   hydropneumothorax demonstrates interval resolution of the fluid component   with the overall size of the stable now replaced by air. Atelectasis   involving the right lower lobe inferiorly is again noted. Fluid loculated   in the major fissure has slightly increased. Patchy opacities left upper   lobe predominantly groundglass are unchanged. Subsegmental atelectasis in   the lingula and left lower lobe. Patchy groundglass opacity now noted in   the left lower lobe.  Trace bilateral pleural effusions.  Mediastinum and Lyndsey: No abnormally enlarged lymph nodes.  Thickened distal esophagus.  Heart: Normal size. No pericardial effusion.   Vessels: within normal limits.  Upper abdomen: Residual contrast within the esophagus and stomach related   to recent esophagram. Residual contrast within the renal parenchyma   raising the possibility of acute tubular necrosis given recent   intravenous contrast administration on 12/4/2019.  Bones: Within normal limits.  IMPRESSION: Interval removal of right chest tube with tract from prior   chest tube now containing air.  Right hydropneumothorax again noted. The   previously identified loculated fluid and air anteriorly has demonstrated   resolution of the fluid component in this region with air now filling the   same size space.  Skin defect with subcutaneous air and complex fluid right lateral chest   wall again noted.  New skin defect right lower anterolateral chest wallwith air tract to   the right pleural space now noted.  Slight increase in fluid tracking in the right major fissure.  Areas of atelectasis in the right lung and opacities in the left upper   lobe are unchanged.  Thickened distal esophagus again noted may be on the basis of esophagitis.  Persistent renal nephrograms bilaterally may be seen in the setting of   acute tubular necrosis given recent intravenous contrast administration.   Correlation with serum creatinine recommended.    Assessment and Plan:   34 y.o. F who was admitted:    transferred from [facility] [place] [facility] to [facility] on 11/22 for complaints of weakness, 25 pound weight loss & flu like symptoms & found to have a large right pleural effusion,   pleural fluid cultures + for group A hemolytic strep, + RIJ thormbus   11/23 CT of the chest/abd/pelvis indicated a clot in the Right IJ, pt started on heparin gtt.   s/p right VATS decort & washout debridement of CT site & VAC placement on 11/27/19.   Persistent drainage from chest tube site so repeat CT C/A/P performed 12/4 which showed new cystic mass in pancreas and liver concerning for pancreatitis  Zosyn changed to meropenem on 12/6  for broader coverage given pancreatitis  Copious drainage from anterior chest tube site, so VAC removed and ostomy appliance applied for high output on 12/6.   12/7 Right chest tube removed.   Repeat CT scan showed a persistent Right hydropneumothorax with air anteriorly  12/8 Ostomy appliance removed and dressing applied.   12/9 s.p PICC placement & Right anterior pig tail catheter & right lateral wound vac placement   MRCP significant for Peripancreatic fluid and edema consistent with pancreatitis. Cystic lesions in the pancreatic head with the largest measuring 2.1 cm, likely acute peripancreatic collections, increased in size since 12/4/2019, with a tract of fluid extending from the 2.1 cm cystic lesion into the mediastinum and towards the pleural space on the right suspicious for a pancreatic pleural fistula.    Persistent R pleural effusion  Pancreatitis  R pancreaticopleural fistula   Alcohol abuse     - All cultures negative , blood, urine and chest tube insertion site.  - Leukocytosis improving from 42k is 12k now.   - NO fever   - Continue Meropenem 1gm q8h for necrotizing pancreatitis and fistula with pleural space. GI and surgery following.   - Has 2 PICC lines, on TPN and ABx, watch for superinfection    Will follow.

## 2019-12-17 NOTE — PROGRESS NOTE ADULT - ASSESSMENT
34 year old female with a PMHx of smoking, ETOH use (4-5 drinks/day),   transferred from [facility] to [facility] on 11/22 for complaints of weakness, 25 pound weight loss & flu like symptoms & found to have a large right pleural effusion, pleural fluid cultures + for group A hemolytic strep, + RIJ thormbus   11/23 CT of the chest/abd/pelvis indicated a clot in the Right IJ, pt started on heparin gtt.     s/p right VATS decort & washout debridement of CT site & VAC placement on 11/27/19.   ·	Persistent drainage from chest tube site so repeat CT C/A/P performed 12/4 which showed new cystic mass in pancreas and liver concerning for pancreatitis, GI consulted (NTD).   ·	Zosyn changed to meropenem on 12/6 by ID for broader coverage given pancreatitis. Plan to continue Meropenem for 4 weeks.   ·	Copious drainage from anterior chest tube site, so VAC removed and ostomy appliance applied for high output on 12/6.   ·	Pleural fluid with an amylase of 79970 (serum 51) noted.  Prealbumin noted to be 5. Pt tolerating diet. Ensure drinks added.   ·	12/7 Right chest tube removed. Esophogram normal.   ·	Repeat CT scan showed a persistent Right hydropneumothorax with air anteriorly, right lower anterolateral chest wall with air tract to right pleural space, and Persistent renal nephrograms b/l indicating ATN.   ·	All cultures negative. 12/8 Ostomy appliance removed and dressing applied.   ·	Right lower extremity swelling noted to be worsening and patient upgraded to the ICU on 12/8. No acute findings on venous and arterial US of b/l LEs or Renal US.      12/9 s.p PICC placement & Right anterior pig tail catheter & right lateral wound vac placement with bridge     ·	MRCP significant for Peripancreatic fluid and edema consistent with pancreatitis. Cystic lesions in the pancreatic head with the largest measuring 2.1 cm, likely acute peripancreatic collections, increased in size since 12/4/2019, with a tract of fluid extending from the 2.1 cm cystic lesion into the mediastinum and towards the pleural space on the right suspicious for a pancreatic pleural fistula.    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    Transferred to the floor. No acute distress. B/L PICC lines, TPN running. VSS. Labs and Meds reviewed.  12/13 Plan for transfer to Medicine service under Dr. redd awaiting call back from medicine service . F/u Cxr in AM right sided effusion increasing in size.    Wound Vac was changed on 12/15/19.  On 12/16/19 repeat xray was ordered. Awaiting official read however looks unchanged.

## 2019-12-17 NOTE — PROGRESS NOTE ADULT - ASSESSMENT
Pancreatico-pleural fistula;  Pancreatitis with necrosis.  Supportive care.    TPN reordered.  No change in formulation.

## 2019-12-18 LAB
ALBUMIN SERPL ELPH-MCNC: 2.4 G/DL — LOW (ref 3.3–5.2)
ALP SERPL-CCNC: 317 U/L — HIGH (ref 40–120)
ALT FLD-CCNC: 12 U/L — SIGNIFICANT CHANGE UP
ANA TITR SER: NEGATIVE — SIGNIFICANT CHANGE UP
ANION GAP SERPL CALC-SCNC: 13 MMOL/L — SIGNIFICANT CHANGE UP (ref 5–17)
APTT BLD: 112.6 SEC — HIGH (ref 27.5–36.3)
APTT BLD: 82.2 SEC — HIGH (ref 27.5–36.3)
AST SERPL-CCNC: 24 U/L — SIGNIFICANT CHANGE UP
BILIRUB SERPL-MCNC: <0.2 MG/DL — LOW (ref 0.4–2)
BUN SERPL-MCNC: 6 MG/DL — LOW (ref 8–20)
CALCIUM SERPL-MCNC: 8 MG/DL — LOW (ref 8.6–10.2)
CHLORIDE SERPL-SCNC: 104 MMOL/L — SIGNIFICANT CHANGE UP (ref 98–107)
CO2 SERPL-SCNC: 24 MMOL/L — SIGNIFICANT CHANGE UP (ref 22–29)
CREAT SERPL-MCNC: 0.32 MG/DL — LOW (ref 0.5–1.3)
GLUCOSE BLDC GLUCOMTR-MCNC: 129 MG/DL — HIGH (ref 70–99)
GLUCOSE BLDC GLUCOMTR-MCNC: 129 MG/DL — HIGH (ref 70–99)
GLUCOSE BLDC GLUCOMTR-MCNC: 87 MG/DL — SIGNIFICANT CHANGE UP (ref 70–99)
GLUCOSE SERPL-MCNC: 114 MG/DL — SIGNIFICANT CHANGE UP (ref 70–115)
INR BLD: 1.24 RATIO — HIGH (ref 0.88–1.16)
MAGNESIUM SERPL-MCNC: 2.4 MG/DL — SIGNIFICANT CHANGE UP (ref 1.6–2.6)
PHOSPHATE SERPL-MCNC: 2.9 MG/DL — SIGNIFICANT CHANGE UP (ref 2.4–4.7)
POTASSIUM SERPL-MCNC: 4.3 MMOL/L — SIGNIFICANT CHANGE UP (ref 3.5–5.3)
POTASSIUM SERPL-SCNC: 4.3 MMOL/L — SIGNIFICANT CHANGE UP (ref 3.5–5.3)
PROT SERPL-MCNC: 5.8 G/DL — LOW (ref 6.6–8.7)
PROTHROM AB SERPL-ACNC: 14.3 SEC — HIGH (ref 10–12.9)
SODIUM SERPL-SCNC: 141 MMOL/L — SIGNIFICANT CHANGE UP (ref 135–145)

## 2019-12-18 PROCEDURE — 99233 SBSQ HOSP IP/OBS HIGH 50: CPT

## 2019-12-18 PROCEDURE — 71045 X-RAY EXAM CHEST 1 VIEW: CPT | Mod: 26

## 2019-12-18 PROCEDURE — 99232 SBSQ HOSP IP/OBS MODERATE 35: CPT

## 2019-12-18 RX ORDER — TRAMADOL HYDROCHLORIDE 50 MG/1
50 TABLET ORAL EVERY 8 HOURS
Refills: 0 | Status: DISCONTINUED | OUTPATIENT
Start: 2019-12-18 | End: 2019-12-25

## 2019-12-18 RX ORDER — ELECTROLYTE SOLUTION,INJ
1 VIAL (ML) INTRAVENOUS
Refills: 0 | Status: DISCONTINUED | OUTPATIENT
Start: 2019-12-18 | End: 2019-12-19

## 2019-12-18 RX ORDER — I.V. FAT EMULSION 20 G/100ML
0.9 EMULSION INTRAVENOUS
Qty: 50 | Refills: 0 | Status: DISCONTINUED | OUTPATIENT
Start: 2019-12-18 | End: 2019-12-18

## 2019-12-18 RX ORDER — WARFARIN SODIUM 2.5 MG/1
5 TABLET ORAL ONCE
Refills: 0 | Status: COMPLETED | OUTPATIENT
Start: 2019-12-18 | End: 2019-12-18

## 2019-12-18 RX ORDER — MORPHINE SULFATE 50 MG/1
2 CAPSULE, EXTENDED RELEASE ORAL ONCE
Refills: 0 | Status: DISCONTINUED | OUTPATIENT
Start: 2019-12-18 | End: 2019-12-18

## 2019-12-18 RX ADMIN — WARFARIN SODIUM 7.5 MILLIGRAM(S): 2.5 TABLET ORAL at 00:14

## 2019-12-18 RX ADMIN — Medication 50 GRAM(S): at 02:20

## 2019-12-18 RX ADMIN — Medication 100 MILLIEQUIVALENT(S): at 01:12

## 2019-12-18 RX ADMIN — HEPARIN SODIUM 1200 UNIT(S)/HR: 5000 INJECTION INTRAVENOUS; SUBCUTANEOUS at 07:37

## 2019-12-18 RX ADMIN — MEROPENEM 100 MILLIGRAM(S): 1 INJECTION INTRAVENOUS at 15:17

## 2019-12-18 RX ADMIN — MORPHINE SULFATE 2 MILLIGRAM(S): 50 CAPSULE, EXTENDED RELEASE ORAL at 15:30

## 2019-12-18 RX ADMIN — HEPARIN SODIUM 1200 UNIT(S)/HR: 5000 INJECTION INTRAVENOUS; SUBCUTANEOUS at 15:28

## 2019-12-18 RX ADMIN — GABAPENTIN 100 MILLIGRAM(S): 400 CAPSULE ORAL at 23:17

## 2019-12-18 RX ADMIN — MORPHINE SULFATE 2 MILLIGRAM(S): 50 CAPSULE, EXTENDED RELEASE ORAL at 15:45

## 2019-12-18 RX ADMIN — Medication 100 MILLIEQUIVALENT(S): at 00:15

## 2019-12-18 RX ADMIN — TRAMADOL HYDROCHLORIDE 50 MILLIGRAM(S): 50 TABLET ORAL at 10:53

## 2019-12-18 RX ADMIN — Medication 50 GRAM(S): at 03:15

## 2019-12-18 RX ADMIN — Medication 10 MILLIGRAM(S): at 23:16

## 2019-12-18 RX ADMIN — WARFARIN SODIUM 5 MILLIGRAM(S): 2.5 TABLET ORAL at 23:17

## 2019-12-18 RX ADMIN — OCTREOTIDE ACETATE 100 MICROGRAM(S): 200 INJECTION, SOLUTION INTRAVENOUS; SUBCUTANEOUS at 23:18

## 2019-12-18 RX ADMIN — MEROPENEM 100 MILLIGRAM(S): 1 INJECTION INTRAVENOUS at 23:18

## 2019-12-18 RX ADMIN — MEROPENEM 100 MILLIGRAM(S): 1 INJECTION INTRAVENOUS at 07:58

## 2019-12-18 RX ADMIN — ESCITALOPRAM OXALATE 10 MILLIGRAM(S): 10 TABLET, FILM COATED ORAL at 15:20

## 2019-12-18 RX ADMIN — I.V. FAT EMULSION 31.25 GM/KG/DAY: 20 EMULSION INTRAVENOUS at 23:32

## 2019-12-18 RX ADMIN — TRAMADOL HYDROCHLORIDE 50 MILLIGRAM(S): 50 TABLET ORAL at 11:37

## 2019-12-18 RX ADMIN — Medication 50 MILLIGRAM(S): at 23:16

## 2019-12-18 RX ADMIN — Medication 325 MILLIGRAM(S): at 23:17

## 2019-12-18 RX ADMIN — POTASSIUM PHOSPHATE, MONOBASIC POTASSIUM PHOSPHATE, DIBASIC 83.33 MILLIMOLE(S): 236; 224 INJECTION, SOLUTION INTRAVENOUS at 04:08

## 2019-12-18 RX ADMIN — Medication 1 EACH: at 23:31

## 2019-12-18 RX ADMIN — GABAPENTIN 100 MILLIGRAM(S): 400 CAPSULE ORAL at 13:48

## 2019-12-18 NOTE — PROGRESS NOTE ADULT - PROBLEM SELECTOR PROBLEM 7
Alcohol abuse
Hypomagnesemia
RONAK (acute kidney injury)
Right ankle swelling
Alcohol abuse
Hypomagnesemia
Right ankle swelling

## 2019-12-18 NOTE — PROGRESS NOTE ADULT - ASSESSMENT
34 year old female who is  transferred from [facility] to [facility] on 11/22 for complaints of weakness, 25 pound weight loss & flu like symptoms   rt large pl effusion/ Empyema- chest tube placed on 11/22 at [facility] and removed on 12/7/19 here at [facility]  fluid c/s alpha hemolytic strep  CT showed pancreatic cystic mass of body and tail with ductal dilatation  at [facility] found to have R IJ clot and started on Heparin GTT  ---------------------------------------------------------------------------------------  s/p right VATS decort & washout debridement of CT site & VAC placement at the antr chest tube site on 11/27/19 at [facility]  Ostomy applied on 12/6/19 and removed on 12/8/19  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 and Multifocal PNA  OCtreotide/ TPN started by GI with pt being on clears PO  Heme- transitioned to Coumadin  12/9/19 PICC line placed    Course complicated by:  Hyponatremia- deemed sec to alcoholism and SIADH ruled out;   ATN- now resolved  Acute pancreatitis  Abnormal TFTs- being followed by Endocrine without any new diagnosis  Anemia requiring BT  rt leg swelling- not DVT    Assessment and Plan:    > Rt pl effusion/ Empyema  being followed by Thoracic  currently not large enough to redrain  given P-P fistula likely will need Pleurex or redrain if fistula not fixed  s/p chest tube removal, now on wound vac  antibiotics per ID via PICC line  recent rpt CXr read as worsening eff and infiltrates  will rpt CT chest and abd around this weekend (2 weeks from prior). If no improvement or worsening she should be transferred to [place] for either stent placement or other options to close fistula.  ID has changed antibiotics to include coverage for pancreatitis    > Acute pancreatitis and pancreatic lesion  GI input appreciated  On Octreotide and TPN with clears PO  Diarrhea likely related to pancreatitis  Though clears allowed, pt refusing - stating nausea and dislike of clears  Advance diet only if allowed by GI  per GI does not need antibiotics for Pancreatitis    > Rt IJ thrombus  Heparin bridging with Coumadin until INR therapeutic  When acute issues completely resolved can switch to NOAC outpatient if needed    > Anemia of chronic disease  s/p multiple transfusions here  BT PRN    > hypernatremia, Hypokalemia, hypomagnesemia, hypophosphatemia, Hypoalbuminemia  mostly resolved  addressed by GI with TPN as best as possible  will replete K, Mg and Phos PRN    > Abnormal TFTs  per Endocrine rpt TFTs in 2 weeks time around 12/29/19    > Pain issues  controlled  being managed by pain mangmt    > insomnia  increase trazadone  added melatonin    > anxiety/ coping disorder  cont lexapro per Psych  cont xanax    > sevre protein calorie malnutrition  on nutritional supplements and TPN    > Hyponatremia sec to alcoholism resolved   > ATN- resolved    Heparin and Coumadin 34 year old female who is  [place] [facility] [place] [facility] on 11/22 for complaints of weakness, 25 pound weight loss & flu like symptoms   rt large pl effusion/ Empyema- chest tube placed on 11/22 at [facility] and removed on 12/7/19 here at [facility]  fluid c/s alpha hemolytic strep  CT showed pancreatic cystic mass of body and tail with ductal dilatation  at [facility] found to have R IJ clot and started on Heparin GTT  ---------------------------------------------------------------------------------------  s/p right VATS decort & washout debridement of CT site & VAC placement at the antr chest tube site on 11/27/19 at 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 and Multifocal PNA  OCtreotide/ TPN started by GI with pt being on clears PO  Heme- transitioned to Coumadin  12/9/19 PICC line placed    Course complicated by:  Hyponatremia- deemed sec to alcoholism and SIADH ruled out;   ATN- now resolved  Acute pancreatitis  Abnormal TFTs- being followed by Endocrine without any new diagnosis  Anemia requiring BT  rt leg swelling- not DVT    Assessment and Plan:    > Rt pl effusion/ Empyema  being followed by Thoracic  currently not large enough to redrain  given P-P fistula likely will need Pleurex or redrain if fistula not fixed  s/p chest tube removal, now on wound vac  antibiotics per ID via PICC line  recent rpt CXr read as worsening eff and infiltrates  will rpt CT chest and abd around this weekend (2 weeks from prior). If no improvement or worsening she should be transferred to [place] for either stent placement or other options to close fistula.  ID has changed antibiotics to include coverage for pancreatitis    > Acute pancreatitis and pancreatic lesion  GI input appreciated  On Octreotide and TPN with clears PO  Diarrhea likely related to pancreatitis  Though clears allowed, pt refusing - stating nausea and dislike of clears  Advance diet only if allowed by GI  per GI does not need antibiotics for Pancreatitis    > Rt IJ thrombus  Heparin bridging with Coumadin until INR therapeutic  When acute issues completely resolved can switch to NOAC outpatient if needed    > Anemia of chronic disease  s/p multiple transfusions here  BT PRN    > hypernatremia, Hypokalemia, hypomagnesemia, hypophosphatemia, Hypoalbuminemia  mostly resolved  addressed by GI with TPN as best as possible  will replete K, Mg and Phos PRN    > Abnormal TFTs  per Endocrine rpt TFTs in 2 weeks time around 12/29/19    > Pain issues  controlled  being managed by pain mangmt    > insomnia  increase trazadone  added melatonin    > anxiety/ coping disorder/ alcoholism   cont lexapro per Psych  cont xanax  sec to alcoholism suspected thiamine deficiency and now repleted    > sevre protein calorie malnutrition  on nutritional supplements and TPN    > Hyponatremia sec to alcoholism resolved   > ATN- resolved    Heparin and Coumadin

## 2019-12-18 NOTE — PROGRESS NOTE ADULT - PROBLEM SELECTOR PROBLEM 6
Prophylactic measure
RONAK (acute kidney injury)
Prophylactic measure
Prophylactic measure
RONAK (acute kidney injury)

## 2019-12-18 NOTE — PROGRESS NOTE ADULT - ASSESSMENT
Pancreatic pleural fistula.  On TPN for "rest" of pancreas.  Will reorder TPN with lipids.  Electrolytes back to normal.  No further riders needed.    No plans here for any pancreatic intervention.

## 2019-12-18 NOTE — PROGRESS NOTE ADULT - PROBLEM SELECTOR PROBLEM 4
Internal jugular vein thrombosis, right
Pancreatic cyst
Internal jugular vein thrombosis, right

## 2019-12-18 NOTE — PROGRESS NOTE ADULT - PROBLEM SELECTOR PROBLEM 5
Anemia due to blood loss

## 2019-12-18 NOTE — PROGRESS NOTE ADULT - SUBJECTIVE AND OBJECTIVE BOX
[facility]  INFECTIOUS DISEASES AND INTERNAL MEDICINE at [place]  =======================================================  Carlos Alberto Soto MD  Diplomates American Board of Internal Medicine and Infectious Diseases  =======================================================    HERACLIO MOLINA [id number]    Follow up: R pleural effusion    Complaining of pain in R chest and mid-abdominal discomfort.   R pleural effusion s/p VATS, pancreaticopleural fistula.     Allergies:  Ensure TID (Unknown)  No Known Allergies    Antibiotics:  meropenem  IVPB 1000 milliGRAM(s) IV Intermittent every 8 hours    REVIEW OF SYSTEMS:  CONSTITUTIONAL:  No Fever or chills  HEENT:   No diplopia or blurred vision.  No earache, sore throat or runny nose.  CARDIOVASCULAR:  No chest pain or SOB, pain on chest tube site  RESPIRATORY:  No cough, shortness of breath, PND or orthopnea.  GASTROINTESTINAL:  No nausea, vomiting, no more diarrhea.  GENITOURINARY:  No dysuria, frequency or urgency. No Blood in urine  MUSCULOSKELETAL:  no joint aches, no muscle pain  SKIN:  No change in skin, hair or nails.  NEUROLOGIC:  No paresthesias or weakness.  PSYCHIATRIC:  No disorder of thought or mood.  ENDOCRINE:  No heat or cold intolerance, polyuria or polydipsia.  HEMATOLOGICAL:  No easy bruising or bleeding.      Physical Exam:  Vital Signs Last 24 Hrs  T(C): 36.5 (18 Dec 2019 09:25), Max: 36.8 (18 Dec 2019 06:01)  T(F): 97.7 (18 Dec 2019 09:25), Max: 98.3 (18 Dec 2019 06:01)  HR: 120 (18 Dec 2019 09:25) (112 - 135)  BP: 135/85 (18 Dec 2019 09:25) (135/85 - 157/109)  BP(mean): --  RR: 18 (18 Dec 2019 09:25) (18 - 19)  SpO2: 95% (18 Dec 2019 09:25) (94% - 96%)  GEN: NAD  HEENT: normocephalic and atraumatic. EOMI. ROSALIE.    NECK: Supple.  No lymphadenopathy   LUNGS: R chest with decreased breath sounds, R chest tube and vacuum dressing.   HEART: Regular rate and rhythm   ABDOMEN: Soft, nontender, and nondistended.  Positive bowel sounds.    : No CVA tenderness  EXTREMITIES: + edema. + PICC on TPN  MSK: no joint swelling  NEUROLOGIC: grossly intact.  PSYCHIATRIC: Appropriate affect .  SKIN: No ulceration or induration present.      Labs:  12-18    141  |  104  |  6.0<L>  ----------------------------<  114  4.3   |  24.0  |  0.32<L>    Ca    8.0<L>      18 Dec 2019 06:40  Phos  2.9     12-18  Mg     2.4     12-18    TPro  5.8<L>  /  Alb  2.4<L>  /  TBili  <0.2<L>  /  DBili  x   /  AST  24  /  ALT  12  /  AlkPhos  317<H>  12-18                        10.5   14.76 )-----------( 362      ( 17 Dec 2019 10:53 )             31.8     PT/INR - ( 18 Dec 2019 06:40 )   PT: 14.3 sec;   INR: 1.24 ratio    PTT - ( 18 Dec 2019 06:40 )  PTT:112.6 sec    LIVER FUNCTIONS - ( 18 Dec 2019 06:40 )  Alb: 2.4 g/dL / Pro: 5.8 g/dL / ALK PHOS: 317 U/L / ALT: 12 U/L / AST: 24 U/L / GGT: x           RECENT CULTURES:  12-04 @ 16:28 .Stool     No Protozoa seen by trichrome stain  No Helminths or Protozoa seen in formalin concentrate    All imaging and data are reviewed.   < from: CT Chest No Cont (12.07.19 @ 13:51) >   EXAM:  CT CHEST                        PROCEDURE DATE:  12/07/2019    INTERPRETATION:  Clinical information: Right chest collection. Status   post VATS 11/27  Comparison: Chest CT dated 12/4/2019  PROCEDURE:   CT of the Chest was performed without intravenous contrast.  FINDINGS:  Interval removal of right chest tube.  Air-containing tract noted from   the skin to the pleural space at site of prior chest tube on series 3   image 137.  There is a skin defect in the right lateral chest wall again   noted with subcutaneous air in this region and some deeper mildly complex   fluid again noted as seen on series 3 image 99. There is a new skin   defect on series 3 image 121 with tract of air extending into the right   pleural space. Previously identified anteriorly loculated right   hydropneumothorax demonstrates interval resolution of the fluid component   with the overall size of the stable now replaced by air. Atelectasis   involving the right lower lobe inferiorly is again noted. Fluid loculated   in the major fissure has slightly increased. Patchy opacities left upper   lobe predominantly groundglass are unchanged. Subsegmental atelectasis in   the lingula and left lower lobe. Patchy groundglass opacity now noted in   the left lower lobe.  Trace bilateral pleural effusions.  Mediastinum and Lyndsey: No abnormally enlarged lymph nodes.  Thickened distal esophagus.  Heart: Normal size. No pericardial effusion.   Vessels: within normal limits.  Upper abdomen: Residual contrast within the esophagus and stomach related   to recent esophagram. Residual contrast within the renal parenchyma   raising the possibility of acute tubular necrosis given recent   intravenous contrast administration on 12/4/2019.  Bones: Within normal limits.  IMPRESSION: Interval removal of right chest tube with tract from prior   chest tube now containing air.  Right hydropneumothorax again noted. The   previously identified loculated fluid and air anteriorly has demonstrated   resolution of the fluid component in this region with air now filling the   same size space.  Skin defect with subcutaneous air and complex fluid right lateral chest   wall again noted.  New skin defect right lower anterolateral chest wallwith air tract to   the right pleural space now noted.  Slight increase in fluid tracking in the right major fissure.  Areas of atelectasis in the right lung and opacities in the left upper   lobe are unchanged.  Thickened distal esophagus again noted may be on the basis of esophagitis.  Persistent renal nephrograms bilaterally may be seen in the setting of   acute tubular necrosis given recent intravenous contrast administration.   Correlation with serum creatinine recommended.    Assessment and Plan:   34 y.o. F who was admitted:    [place] [facility] [place] [facility] on 11/22 for complaints of weakness, 25 pound weight loss & flu like symptoms & found to have a large right pleural effusion,   pleural fluid cultures + for group A hemolytic strep, + RIJ thormbus   11/23 CT of the chest/abd/pelvis indicated a clot in the Right IJ, pt started on heparin gtt.   s/p right VATS decort & washout debridement of CT site & VAC placement on 11/27/19.   Persistent drainage from chest tube site so repeat CT C/A/P performed 12/4 which showed new cystic mass in pancreas and liver concerning for pancreatitis  Zosyn changed to meropenem on 12/6  for broader coverage given pancreatitis  Copious drainage from anterior chest tube site, so VAC removed and ostomy appliance applied for high output on 12/6.   12/7 Right chest tube removed.   Repeat CT scan showed a persistent Right hydropneumothorax with air anteriorly  12/8 Ostomy appliance removed and dressing applied.   12/9 s.p PICC placement & Right anterior pig tail catheter & right lateral wound vac placement   MRCP significant for Peripancreatic fluid and edema consistent with pancreatitis. Cystic lesions in the pancreatic head with the largest measuring 2.1 cm, likely acute peripancreatic collections, increased in size since 12/4/2019, with a tract of fluid extending from the 2.1 cm cystic lesion into the mediastinum and towards the pleural space on the right suspicious for a pancreatic pleural fistula.    Persistent R pleural effusion  Pancreatitis  R pancreaticopleural fistula   Alcohol abuse     - All cultures negative , blood, urine, pleural fluid and chest tube insertion site.  - Leukocytosis improving from 42k is 14k now.   - No fever   - Continue Meropenem 1gm q8h for necrotizing pancreatitis and fistula with pleural space. GI and surgery following.   - Has 2 PICC lines, on TPN and ABx, watch for superinfection  - Based on her response and Chest tube output/pleural effusion will decide about the length of treatment.     Will follow. [facility]  INFECTIOUS DISEASES AND INTERNAL MEDICINE at [place]  =======================================================  Carlos Alberto Soto MD  Diplomates American Board of Internal Medicine and Infectious Diseases  =======================================================    HERACLIO MOLINA [id number]    Follow up: R pleural effusion    Complaining of pain in R chest and mid-abdominal discomfort.   R pleural effusion s/p VATS, pancreaticopleural fistula.     Allergies:  Ensure TID (Unknown)  No Known Allergies    Antibiotics:  meropenem  IVPB 1000 milliGRAM(s) IV Intermittent every 8 hours    REVIEW OF SYSTEMS:  CONSTITUTIONAL:  No Fever or chills  HEENT:   No diplopia or blurred vision.  No earache, sore throat or runny nose.  CARDIOVASCULAR:  No chest pain or SOB, pain on chest tube site  RESPIRATORY:  No cough, shortness of breath, PND or orthopnea.  GASTROINTESTINAL:  No nausea, vomiting, no more diarrhea.  GENITOURINARY:  No dysuria, frequency or urgency. No Blood in urine  MUSCULOSKELETAL:  no joint aches, no muscle pain  SKIN:  No change in skin, hair or nails.  NEUROLOGIC:  No paresthesias or weakness.  PSYCHIATRIC:  No disorder of thought or mood.  ENDOCRINE:  No heat or cold intolerance, polyuria or polydipsia.  HEMATOLOGICAL:  No easy bruising or bleeding.      Physical Exam:  Vital Signs Last 24 Hrs  T(C): 36.5 (18 Dec 2019 09:25), Max: 36.8 (18 Dec 2019 06:01)  T(F): 97.7 (18 Dec 2019 09:25), Max: 98.3 (18 Dec 2019 06:01)  HR: 120 (18 Dec 2019 09:25) (112 - 135)  BP: 135/85 (18 Dec 2019 09:25) (135/85 - 157/109)  BP(mean): --  RR: 18 (18 Dec 2019 09:25) (18 - 19)  SpO2: 95% (18 Dec 2019 09:25) (94% - 96%)  GEN: NAD  HEENT: normocephalic and atraumatic. EOMI. ROSALIE.    NECK: Supple.  No lymphadenopathy   LUNGS: R chest with decreased breath sounds, R chest tube and vacuum dressing.   HEART: Regular rate and rhythm   ABDOMEN: Soft, nontender, and nondistended.  Positive bowel sounds.    : No CVA tenderness  EXTREMITIES: + edema. + PICC on TPN  MSK: no joint swelling  NEUROLOGIC: grossly intact.  PSYCHIATRIC: Appropriate affect .  SKIN: No ulceration or induration present.      Labs:  12-18    141  |  104  |  6.0<L>  ----------------------------<  114  4.3   |  24.0  |  0.32<L>    Ca    8.0<L>      18 Dec 2019 06:40  Phos  2.9     12-18  Mg     2.4     12-18    TPro  5.8<L>  /  Alb  2.4<L>  /  TBili  <0.2<L>  /  DBili  x   /  AST  24  /  ALT  12  /  AlkPhos  317<H>  12-18                        10.5   14.76 )-----------( 362      ( 17 Dec 2019 10:53 )             31.8     PT/INR - ( 18 Dec 2019 06:40 )   PT: 14.3 sec;   INR: 1.24 ratio    PTT - ( 18 Dec 2019 06:40 )  PTT:112.6 sec    LIVER FUNCTIONS - ( 18 Dec 2019 06:40 )  Alb: 2.4 g/dL / Pro: 5.8 g/dL / ALK PHOS: 317 U/L / ALT: 12 U/L / AST: 24 U/L / GGT: x           RECENT CULTURES:  12-04 @ 16:28 .Stool     No Protozoa seen by trichrome stain  No Helminths or Protozoa seen in formalin concentrate    All imaging and data are reviewed.   < from: CT Chest No Cont (12.07.19 @ 13:51) >   EXAM:  CT CHEST                        PROCEDURE DATE:  12/07/2019    INTERPRETATION:  Clinical information: Right chest collection. Status   post VATS 11/27  Comparison: Chest CT dated 12/4/2019  PROCEDURE:   CT of the Chest was performed without intravenous contrast.  FINDINGS:  Interval removal of right chest tube.  Air-containing tract noted from   the skin to the pleural space at site of prior chest tube on series 3   image 137.  There is a skin defect in the right lateral chest wall again   noted with subcutaneous air in this region and some deeper mildly complex   fluid again noted as seen on series 3 image 99. There is a new skin   defect on series 3 image 121 with tract of air extending into the right   pleural space. Previously identified anteriorly loculated right   hydropneumothorax demonstrates interval resolution of the fluid component   with the overall size of the stable now replaced by air. Atelectasis   involving the right lower lobe inferiorly is again noted. Fluid loculated   in the major fissure has slightly increased. Patchy opacities left upper   lobe predominantly groundglass are unchanged. Subsegmental atelectasis in   the lingula and left lower lobe. Patchy groundglass opacity now noted in   the left lower lobe.  Trace bilateral pleural effusions.  Mediastinum and Lyndsey: No abnormally enlarged lymph nodes.  Thickened distal esophagus.  Heart: Normal size. No pericardial effusion.   Vessels: within normal limits.  Upper abdomen: Residual contrast within the esophagus and stomach related   to recent esophagram. Residual contrast within the renal parenchyma   raising the possibility of acute tubular necrosis given recent   intravenous contrast administration on 12/4/2019.  Bones: Within normal limits.  IMPRESSION: Interval removal of right chest tube with tract from prior   chest tube now containing air.  Right hydropneumothorax again noted. The   previously identified loculated fluid and air anteriorly has demonstrated   resolution of the fluid component in this region with air now filling the   same size space.  Skin defect with subcutaneous air and complex fluid right lateral chest   wall again noted.  New skin defect right lower anterolateral chest wallwith air tract to   the right pleural space now noted.  Slight increase in fluid tracking in the right major fissure.  Areas of atelectasis in the right lung and opacities in the left upper   lobe are unchanged.  Thickened distal esophagus again noted may be on the basis of esophagitis.  Persistent renal nephrograms bilaterally may be seen in the setting of   acute tubular necrosis given recent intravenous contrast administration.   Correlation with serum creatinine recommended.    Assessment and Plan:   34 y.o. F who was admitted:    [place] [facility] [place] [facility] on 11/22 for complaints of weakness, 25 pound weight loss & flu like symptoms & found to have a large right pleural effusion,   pleural fluid cultures + for group A hemolytic strep, + RIJ thormbus   11/23 CT of the chest/abd/pelvis indicated a clot in the Right IJ, pt started on heparin gtt.   s/p right VATS decort & washout debridement of CT site & VAC placement on 11/27/19.   Persistent drainage from chest tube site so repeat CT C/A/P performed 12/4 which showed new cystic mass in pancreas and liver concerning for pancreatitis  Zosyn changed to meropenem on 12/6  for broader coverage given pancreatitis  Copious drainage from anterior chest tube site, so VAC removed and ostomy appliance applied for high output on 12/6.   12/7 Right chest tube removed.   Repeat CT scan showed a persistent Right hydropneumothorax with air anteriorly  12/8 Ostomy appliance removed and dressing applied.   12/9 s.p PICC placement & Right anterior pig tail catheter & right lateral wound vac placement   MRCP significant for Peripancreatic fluid and edema consistent with pancreatitis. Cystic lesions in the pancreatic head with the largest measuring 2.1 cm, likely acute peripancreatic collections, increased in size since 12/4/2019, with a tract of fluid extending from the 2.1 cm cystic lesion into the mediastinum and towards the pleural space on the right suspicious for a pancreatic pleural fistula.    Persistent R pleural effusion  Pancreatitis  R pancreaticopleural fistula   Alcohol abuse     - All cultures negative , blood, urine, pleural fluid and chest tube insertion site.  - Leukocytosis improving from 42k is 14k now.   - No fever   - Continue Meropenem 1gm q8h for necrotizing pancreatitis and fistula with pleural space. GI and surgery following.   - Has 2 PICC lines, on TPN and ABx, watch for superinfection  - Based on her response and Chest tube output/pleural effusion will decide about the length of treatment.   - CXR with worsening of effusion.  - Repeat chest and abdominal CT in near future to evaluate the fistula, pancreatitis and effusion     Will follow.

## 2019-12-18 NOTE — PROGRESS NOTE ADULT - PROBLEM SELECTOR PROBLEM 8
Hyponatremia
Prophylactic measure
RONAK (acute kidney injury)
Right ankle swelling
Hyponatremia
RONAK (acute kidney injury)
Prophylactic measure

## 2019-12-18 NOTE — PROGRESS NOTE ADULT - SUBJECTIVE AND OBJECTIVE BOX
Patient seen and examined; chart reviewed.  Events noted.  PO intake of Clear liquid diet is minimal. Multiple electrolyte abnormalities noted and supplemented.  Received 2 Mag riders, 3 K Riders and 1 KPhos rider in last 24 hrs.  Glucoses running 74 to 129.  No coverage required.  No fluid overload.  Remains tachycardic.  Seems to be in less pain.  Having pain meds reduced.  Still complains of abdominal grumbling.  Breathing comfortably.  Minimal drainage from Chest wall Vac drain.    Picc line looks ok.  No fever.  Still on antibiotics.    Being transitioned from Heparin to Coumadin for prior Central line clot.  Planned for 3 months of Anticoagulation.        PAST MEDICAL & SURGICAL HISTORY:      ROS:  No Heartburn, regurgitation, dysphagia, odynophagia.  No dyspepsia  No abdominal pain.    No Nausea, vomiting.  No Bleeding.  No hematemesis.   No diarrhea.    No hematochesia.  No weight loss, anorexia.  No edema.      MEDICATIONS  (STANDING):  amitriptyline 10 milliGRAM(s) Oral at bedtime  ascorbic acid 500 milliGRAM(s) Oral daily  chlorhexidine 2% Cloths 1 Application(s) Topical <User Schedule>  cholecalciferol 2000 Unit(s) Oral daily  escitalopram 10 milliGRAM(s) Oral daily  ferrous    sulfate 325 milliGRAM(s) Oral three times a day  folic acid 1 milliGRAM(s) Oral daily  gabapentin 100 milliGRAM(s) Oral three times a day  heparin  Infusion. 1300 Unit(s)/Hr (13 mL/Hr) IV Continuous <Continuous>  lidocaine   Patch 1 Patch Transdermal daily  meropenem  IVPB 1000 milliGRAM(s) IV Intermittent every 8 hours  multivitamin 1 Tablet(s) Oral daily  octreotide  Injectable 100 MICROGram(s) SubCutaneous every 8 hours  pantoprazole    Tablet 40 milliGRAM(s) Oral before breakfast  Parenteral Nutrition - Adult 1 Each (83 mL/Hr) TPN Continuous <Continuous>  thiamine 100 milliGRAM(s) Oral daily  traZODone 50 milliGRAM(s) Oral at bedtime    MEDICATIONS  (PRN):  acetaminophen   Tablet .. 650 milliGRAM(s) Oral every 6 hours PRN Mild Pain (1 - 3)  aluminum hydroxide/magnesium hydroxide/simethicone Suspension 30 milliLiter(s) Oral every 4 hours PRN Dyspepsia  heparin  Injectable 4000 Unit(s) IV Push every 6 hours PRN For aPTT less than 40  heparin  Injectable 2000 Unit(s) IV Push every 6 hours PRN For aPTT between 40 - 57  melatonin 3 milliGRAM(s) Oral at bedtime PRN Insomnia  methocarbamol 500 milliGRAM(s) Oral every 6 hours PRN Muscle Spasm  ondansetron Injectable 4 milliGRAM(s) IV Push every 4 hours PRN Nausea and/or Vomiting  sodium chloride 0.9% lock flush 10 milliLiter(s) IV Push every 1 hour PRN Pre/post blood products, medications, blood draw, and to maintain line patency      Allergies    No Known Allergies    Intolerances    Ensure TID (Unknown)      Vital Signs Last 24 Hrs  T(C): 36.8 (18 Dec 2019 06:01), Max: 36.8 (18 Dec 2019 06:01)  T(F): 98.3 (18 Dec 2019 06:01), Max: 98.3 (18 Dec 2019 06:01)  HR: 135 (18 Dec 2019 06:01) (112 - 135)  BP: 157/109 (18 Dec 2019 06:01) (138/96 - 157/109)  BP(mean): --  RR: 19 (18 Dec 2019 06:01) (18 - 19)  SpO2: 94% (18 Dec 2019 06:01) (94% - 96%)    PHYSICAL EXAM:    GENERAL: NAD, well-groomed, well-developed  HEAD:  Atraumatic, Normocephalic  EYES: EOMI, PERRLA, conjunctiva and sclera clear  ENMT: No tonsillar erythema, exudates, or enlargement; Moist mucous membranes, Good dentition, No lesions  NECK: Supple, No JVD, Normal thyroid  CHEST/LUNG: Clear to percussion bilaterally; No rales, rhonchi, wheezing, or rubs  HEART: Regular rate and rhythm; No murmurs, rubs, or gallops  ABDOMEN: Soft, Nontender, Nondistended; Bowel sounds present  EXTREMITIES:  2+ Peripheral Pulses, No clubbing, cyanosis, or edema;  LUE Double lumen PICC intact.    LYMPH: No lymphadenopathy noted  SKIN: No rashes or lesions      LABS:                        10.5   14.76 )-----------( 362      ( 17 Dec 2019 10:53 )             31.8     12-18    141  |  104  |  6.0<L>  ----------------------------<  114  4.3   |  24.0  |  0.32<L>    Ca    8.0<L>      18 Dec 2019 06:40  Phos  2.9     12-18  Mg     2.4     12-18    TPro  5.8<L>  /  Alb  2.4<L>  /  TBili  <0.2<L>  /  DBili  x   /  AST  24  /  ALT  12  /  AlkPhos  317<H>  12-18    PT/INR - ( 18 Dec 2019 06:40 )   PT: 14.3 sec;   INR: 1.24 ratio         PTT - ( 18 Dec 2019 06:40 )  PTT:112.6 sec         RADIOLOGY & ADDITIONAL STUDIES:

## 2019-12-18 NOTE — PROGRESS NOTE ADULT - SUBJECTIVE AND OBJECTIVE BOX
HEALTH ISSUES - PROBLEM Dx:    rt pl eff/ epyema, pancreatico pleural fistula, pancreatitis,     INTERVAL HPI/ OVERNIGHT EVENTS:    says she hurts everywhere and doesn't want me to touch her  not very cooperative    REVIEW OF SYSTEMS:    anxious, uncomfortable, poor appetite, denies sob, fever, n/v     Vital Signs Last 24 Hrs  T(C): 36.5 (18 Dec 2019 09:25), Max: 36.8 (18 Dec 2019 06:01)  T(F): 97.7 (18 Dec 2019 09:25), Max: 98.3 (18 Dec 2019 06:01)  HR: 120 (18 Dec 2019 09:25) (112 - 135)  BP: 135/85 (18 Dec 2019 09:25) (135/85 - 157/109)  BP(mean): --  RR: 18 (18 Dec 2019 09:25) (18 - 19)  SpO2: 95% (18 Dec 2019 09:25) (94% - 96%)    PHYSICAL EXAM-  GENERAL: frail built, uncooperative  HEAD:  Atraumatic, Normocephalic  EYES:  conjunctiva and sclera clear  NECK: Supple, No JVD, Normal thyroid  NERVOUS SYSTEM:  Alert & Oriented X 3, no focal deficit  CHEST/LUNG: R base decreased b/s , no  rales, rhonchi, wheezing, or rubs , R antr side wound vac +, tender chest wall and rib cage  HEART: Regular rate and rhythm; No murmurs, rubs, or gallops  ABDOMEN: Soft, epigastric tenderness +, no rebound , Nondistended; Bowel sounds present  EXTREMITIES:  2+ Peripheral Pulses, No clubbing, cyanosis, or edema  LYMPH: No lymphadenopathy noted    MEDICATIONS  (STANDING):  amitriptyline 10 milliGRAM(s) Oral at bedtime  ascorbic acid 500 milliGRAM(s) Oral daily  chlorhexidine 2% Cloths 1 Application(s) Topical <User Schedule>  cholecalciferol 2000 Unit(s) Oral daily  escitalopram 10 milliGRAM(s) Oral daily  fat emulsion (Plant Based) 20% Infusion 0.9 Gm/kG/Day (31.25 mL/Hr) IV Continuous <Continuous>  ferrous    sulfate 325 milliGRAM(s) Oral three times a day  folic acid 1 milliGRAM(s) Oral daily  gabapentin 100 milliGRAM(s) Oral three times a day  heparin  Infusion. 1300 Unit(s)/Hr (13 mL/Hr) IV Continuous <Continuous>  lidocaine   Patch 1 Patch Transdermal daily  meropenem  IVPB 1000 milliGRAM(s) IV Intermittent every 8 hours  multivitamin 1 Tablet(s) Oral daily  octreotide  Injectable 100 MICROGram(s) SubCutaneous every 8 hours  pantoprazole    Tablet 40 milliGRAM(s) Oral before breakfast  Parenteral Nutrition - Adult 1 Each (83 mL/Hr) TPN Continuous <Continuous>  Parenteral Nutrition - Adult 1 Each (83 mL/Hr) TPN Continuous <Continuous>  thiamine 100 milliGRAM(s) Oral daily  traZODone 50 milliGRAM(s) Oral at bedtime    MEDICATIONS  (PRN):  acetaminophen   Tablet .. 650 milliGRAM(s) Oral every 6 hours PRN Mild Pain (1 - 3)  aluminum hydroxide/magnesium hydroxide/simethicone Suspension 30 milliLiter(s) Oral every 4 hours PRN Dyspepsia  heparin  Injectable 4000 Unit(s) IV Push every 6 hours PRN For aPTT less than 40  heparin  Injectable 2000 Unit(s) IV Push every 6 hours PRN For aPTT between 40 - 57  melatonin 3 milliGRAM(s) Oral at bedtime PRN Insomnia  methocarbamol 500 milliGRAM(s) Oral every 6 hours PRN Muscle Spasm  ondansetron Injectable 4 milliGRAM(s) IV Push every 4 hours PRN Nausea and/or Vomiting  sodium chloride 0.9% lock flush 10 milliLiter(s) IV Push every 1 hour PRN Pre/post blood products, medications, blood draw, and to maintain line patency  traMADol 50 milliGRAM(s) Oral every 8 hours PRN Moderate Pain (4 - 6)      LABS:                        10.5   14.76 )-----------( 362      ( 17 Dec 2019 10:53 )             31.8     12-18    141  |  104  |  6.0<L>  ----------------------------<  114  4.3   |  24.0  |  0.32<L>    Ca    8.0<L>      18 Dec 2019 06:40  Phos  2.9     12-18  Mg     2.4     12-18    TPro  5.8<L>  /  Alb  2.4<L>  /  TBili  <0.2<L>  /  DBili  x   /  AST  24  /  ALT  12  /  AlkPhos  317<H>  12-18    PT/INR - ( 18 Dec 2019 06:40 )   PT: 14.3 sec;   INR: 1.24 ratio         PTT - ( 18 Dec 2019 06:40 )  PTT:112.6 sec

## 2019-12-18 NOTE — PROGRESS NOTE ADULT - SUBJECTIVE AND OBJECTIVE BOX
RN caught patient by elevators trying to elope.  Patient holding on to her infusion pump currently on Heparin gtt and TPN.  Patient doesn't want to stay any longer in hospital.  Called security to bring back patient.  After a long persuation patient returned to floor.  Insists that she doesn't want to remain in her room.  Convinced her that she can stay in another room.  Willing to stay.  For safety reasons patient placed on CO.  Nurse manager by bedside.

## 2019-12-18 NOTE — PROGRESS NOTE ADULT - PROBLEM SELECTOR PLAN 1
s/p Right VATS decortication & washout on 11/27/19. Debridement of CT site & VAC placement.  chest tubes since d.c   Encourage the use of I/S, OOB to chair, daily PT, ambulate as tolerated  Continue supportive care.   PICC line on left for TPN  PICC on right for meds   new vac placement to right lateral chest wall 12/15, due to be changed 12/21  Daily CXR

## 2019-12-18 NOTE — PROGRESS NOTE ADULT - PROBLEM SELECTOR PLAN 2
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

## 2019-12-18 NOTE — PROGRESS NOTE ADULT - ASSESSMENT
34 year old female with a PMHx of smoking, ETOH use (4-5 drinks/day), transferred from [facility] to [facility] on 11/22 for complaints of weakness, 25 pound weight loss & flu like symptoms & found to have a large right pleural effusion, pleural fluid cultures + for group A hemolytic strep, + RIJ thrombus   11/23 CT of the chest/abd/pelvis indicated a clot in the Right IJ, pt started on heparin gtt.     s/p right VATS decort & washout debridement of CT site & VAC placement on 11/27/19.   ·	Persistent drainage from chest tube site so repeat CT C/A/P performed 12/4 which showed new cystic mass in pancreas and liver concerning for pancreatitis, GI consulted (NTD).   ·	Zosyn changed to meropenem on 12/6 by ID for broader coverage given pancreatitis. Plan to continue Meropenem for 4 weeks.   ·	Copious drainage from anterior chest tube site, so VAC removed and ostomy appliance applied for high output on 12/6.   ·	Pleural fluid with an amylase [place] 03591 (serum 51) noted.  Prealbumin noted to be 5. Pt tolerating diet. Ensure drinks added.   ·	12/7 Right chest tube removed. Esophogram normal.   ·	Repeat CT scan showed a persistent Right hydropneumothorax with air anteriorly, right lower anterolateral chest wall with air tract to right pleural space, and Persistent renal nephrograms b/l indicating ATN.   ·	All cultures negative. 12/8 Ostomy appliance removed and dressing applied.   ·	Right lower extremity swelling noted to be worsening and patient upgraded to the ICU on 12/8. No acute findings on venous and arterial US of b/l LEs or Renal US.      12/9 s.p PICC placement & Right anterior pig tail catheter & right lateral wound vac placement with bridge     ·	MRCP significant for Peripancreatic fluid and edema consistent with pancreatitis. Cystic lesions in the pancreatic head with the largest measuring 2.1 cm, likely acute peripancreatic collections, increased in size since 12/4/2019, with a tract of fluid extending from the 2.1 cm cystic lesion into the mediastinum and towards the pleural space on the right suspicious for a pancreatic pleural fistula.    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      12/13 Patient transferred to medicine.   12/15/19 Wound VAC changed.  12/16/19 Repeat xray unchanged.   12/18 US Left chest showed small effusion- no intervention. VAC changed.       Discussed with Cardiothoracic Team at AM rounds.    Ivana CODY  Thoracic Surgery   #[phone]

## 2019-12-18 NOTE — PROGRESS NOTE ADULT - SUBJECTIVE AND OBJECTIVE BOX
Subjective: Patient nauseated and one episode of emesis today. C/O severe intermittent epigastric pain. Not having much clears. Receiving TPN. On heparin gtt until INR therapeutic with coumadin. Admits to SOB but able to get up and walk to bathroom on her own with out supplemental oxygen. Saturating well on RA. Denies HA, cough, fever, chills, palpitations, hemoptysis.     Vital Signs:  Vital Signs Last 24 Hrs  T(C): 37.2 (12-18-19 @ 15:10), Max: 37.2 (12-18-19 @ 15:10)  T(F): 98.9 (12-18-19 @ 15:10), Max: 98.9 (12-18-19 @ 15:10)  HR: 124 (12-18-19 @ 15:10) (120 - 135)  BP: 156/101 (12-18-19 @ 15:10) (135/85 - 157/109)  RR: 18 (12-18-19 @ 15:10) (18 - 19)  SpO2: 96% (12-18-19 @ 15:10) (94% - 96%) on (O2)    I&O's Detail    17 Dec 2019 07:01  -  18 Dec 2019 07:00  --------------------------------------------------------  IN:    fat emulsion (Plant Based) 20% Infusion: 248 mL    heparin  Infusion.: 143 mL    Oral Fluid: 240 mL    TPN (Total Parenteral Nutrition): 913 mL  Total IN: 1544 mL    OUT:    Voided: 450 mL  Total OUT: 450 mL    Total NET: 1094 mL          Telemetry/Alarms: Sinus tach  General: NAD  Neurology: Awake, nonfocal, THACKER x 4  Eyes: Scleras clear, PERRLA/ EOMI, Gross vision intact  ENT: Gross hearing intact, grossly patent pharynx, no stridor  Neck: Neck supple, trachea midline, No JVD  Respiratory: CTA B/L, No wheezing, rales, rhonchi  CV: S1S2, no murmurs, rubs or gallops  Abdominal: Soft, NT, ND  Extremities: No edema  Psych: Oriented x 3  Incisions: C/D/I, VAC intact no leak, changed today, minimal output    VAC Change: Two small prior VATS incisions with granulation, no debridement required, small amount of serous output,   superior right chest wall wound approx 2cm long x 1 cm wide x 2cm deep, small amount of tracking about 1.5 cm long into posterior aspect of wound  inferior right chest wall wound approx 2cm long x 1 cm wide x 1 cm deep, small amount of tracking about 2 cm long into posterior aspect of wound  black granufoam applied with bridge, no leak     Bedside US assessment of Left chest: small pleural fluid, atelectatic lung at base    Relevant labs, radiology and Medications reviewed                        10.5   14.76 )-----------( 362      ( 17 Dec 2019 10:53 )             31.8     12-18    141  |  104  |  6.0<L>  ----------------------------<  114  4.3   |  24.0  |  0.32<L>    Ca    8.0<L>      18 Dec 2019 06:40  Phos  2.9     12-18  Mg     2.4     12-18    TPro  5.8<L>  /  Alb  2.4<L>  /  TBili  <0.2<L>  /  DBili  x   /  AST  24  /  ALT  12  /  AlkPhos  317<H>  12-18    PT/INR - ( 18 Dec 2019 06:40 )   PT: 14.3 sec;   INR: 1.24 ratio         PTT - ( 18 Dec 2019 14:48 )  PTT:82.2 sec  MEDICATIONS  (STANDING):  amitriptyline 10 milliGRAM(s) Oral at bedtime  ascorbic acid 500 milliGRAM(s) Oral daily  chlorhexidine 2% Cloths 1 Application(s) Topical <User Schedule>  cholecalciferol 2000 Unit(s) Oral daily  escitalopram 10 milliGRAM(s) Oral daily  fat emulsion (Plant Based) 20% Infusion 0.9 Gm/kG/Day (31.25 mL/Hr) IV Continuous <Continuous>  ferrous    sulfate 325 milliGRAM(s) Oral three times a day  folic acid 1 milliGRAM(s) Oral daily  gabapentin 100 milliGRAM(s) Oral three times a day  heparin  Infusion. 1300 Unit(s)/Hr (13 mL/Hr) IV Continuous <Continuous>  lidocaine   Patch 1 Patch Transdermal daily  meropenem  IVPB 1000 milliGRAM(s) IV Intermittent every 8 hours  multivitamin 1 Tablet(s) Oral daily  octreotide  Injectable 100 MICROGram(s) SubCutaneous every 8 hours  pantoprazole    Tablet 40 milliGRAM(s) Oral before breakfast  Parenteral Nutrition - Adult 1 Each (83 mL/Hr) TPN Continuous <Continuous>  Parenteral Nutrition - Adult 1 Each (83 mL/Hr) TPN Continuous <Continuous>  traZODone 50 milliGRAM(s) Oral at bedtime  warfarin 5 milliGRAM(s) Oral once    MEDICATIONS  (PRN):  acetaminophen   Tablet .. 650 milliGRAM(s) Oral every 6 hours PRN Mild Pain (1 - 3)  aluminum hydroxide/magnesium hydroxide/simethicone Suspension 30 milliLiter(s) Oral every 4 hours PRN Dyspepsia  heparin  Injectable 4000 Unit(s) IV Push every 6 hours PRN For aPTT less than 40  heparin  Injectable 2000 Unit(s) IV Push every 6 hours PRN For aPTT between 40 - 57  melatonin 3 milliGRAM(s) Oral at bedtime PRN Insomnia  methocarbamol 500 milliGRAM(s) Oral every 6 hours PRN Muscle Spasm  ondansetron Injectable 4 milliGRAM(s) IV Push every 4 hours PRN Nausea and/or Vomiting  sodium chloride 0.9% lock flush 10 milliLiter(s) IV Push every 1 hour PRN Pre/post blood products, medications, blood draw, and to maintain line patency  traMADol 50 milliGRAM(s) Oral every 8 hours PRN Moderate Pain (4 - 6)        Assessment  34y Female  w/ PAST MEDICAL & SURGICAL HISTORY:  admitted with complaints of Patient is a 34y old  Female who presents with a chief complaint of Pancreatitis;  Pancreatic- pleural fistula. (18 Dec 2019 08:40)  patient underwent US guided vascular access  Placement, PICC, with imaging guidance  Debridement, wound, with dressing replacement  Wound VAC placement  Pleural biopsy  Lung decortication  Bronchoscopy, with lung decortication using VATS

## 2019-12-19 LAB
ANION GAP SERPL CALC-SCNC: 13 MMOL/L — SIGNIFICANT CHANGE UP (ref 5–17)
ANISOCYTOSIS BLD QL: SLIGHT — SIGNIFICANT CHANGE UP
APTT BLD: 63.3 SEC — HIGH (ref 27.5–36.3)
BASOPHILS # BLD AUTO: 0.61 K/UL — HIGH (ref 0–0.2)
BASOPHILS NFR BLD AUTO: 3.5 % — HIGH (ref 0–2)
BUN SERPL-MCNC: 5 MG/DL — LOW (ref 8–20)
CALCIUM SERPL-MCNC: 8.6 MG/DL — SIGNIFICANT CHANGE UP (ref 8.6–10.2)
CHLORIDE SERPL-SCNC: 105 MMOL/L — SIGNIFICANT CHANGE UP (ref 98–107)
CO2 SERPL-SCNC: 23 MMOL/L — SIGNIFICANT CHANGE UP (ref 22–29)
CREAT SERPL-MCNC: 0.35 MG/DL — LOW (ref 0.5–1.3)
EOSINOPHIL # BLD AUTO: 0 K/UL — SIGNIFICANT CHANGE UP (ref 0–0.5)
EOSINOPHIL NFR BLD AUTO: 0 % — SIGNIFICANT CHANGE UP (ref 0–6)
GIANT PLATELETS BLD QL SMEAR: PRESENT — SIGNIFICANT CHANGE UP
GLUCOSE BLDC GLUCOMTR-MCNC: 109 MG/DL — HIGH (ref 70–99)
GLUCOSE BLDC GLUCOMTR-MCNC: 112 MG/DL — HIGH (ref 70–99)
GLUCOSE BLDC GLUCOMTR-MCNC: 140 MG/DL — HIGH (ref 70–99)
GLUCOSE BLDC GLUCOMTR-MCNC: 171 MG/DL — HIGH (ref 70–99)
GLUCOSE SERPL-MCNC: 111 MG/DL — SIGNIFICANT CHANGE UP (ref 70–115)
HCT VFR BLD CALC: 32.5 % — LOW (ref 34.5–45)
HGB BLD-MCNC: 10.2 G/DL — LOW (ref 11.5–15.5)
HYPOCHROMIA BLD QL: SLIGHT — SIGNIFICANT CHANGE UP
INR BLD: 1.34 RATIO — HIGH (ref 0.88–1.16)
LYMPHOCYTES # BLD AUTO: 1.98 K/UL — SIGNIFICANT CHANGE UP (ref 1–3.3)
LYMPHOCYTES # BLD AUTO: 11.4 % — LOW (ref 13–44)
MAGNESIUM SERPL-MCNC: 1.6 MG/DL — SIGNIFICANT CHANGE UP (ref 1.6–2.6)
MANUAL SMEAR VERIFICATION: SIGNIFICANT CHANGE UP
MCHC RBC-ENTMCNC: 29.7 PG — SIGNIFICANT CHANGE UP (ref 27–34)
MCHC RBC-ENTMCNC: 31.4 GM/DL — LOW (ref 32–36)
MCV RBC AUTO: 94.5 FL — SIGNIFICANT CHANGE UP (ref 80–100)
MONOCYTES # BLD AUTO: 1.06 K/UL — HIGH (ref 0–0.9)
MONOCYTES NFR BLD AUTO: 6.1 % — SIGNIFICANT CHANGE UP (ref 2–14)
NEUTROPHILS # BLD AUTO: 13.75 K/UL — HIGH (ref 1.8–7.4)
NEUTROPHILS NFR BLD AUTO: 70.2 % — SIGNIFICANT CHANGE UP (ref 43–77)
NEUTS BAND # BLD: 8.8 % — HIGH (ref 0–8)
NRBC # BLD: 1 /100 — HIGH (ref 0–0)
OVALOCYTES BLD QL SMEAR: SLIGHT — SIGNIFICANT CHANGE UP
PHOSPHATE SERPL-MCNC: 3.6 MG/DL — SIGNIFICANT CHANGE UP (ref 2.4–4.7)
PLAT MORPH BLD: NORMAL — SIGNIFICANT CHANGE UP
PLATELET # BLD AUTO: 325 K/UL — SIGNIFICANT CHANGE UP (ref 150–400)
POIKILOCYTOSIS BLD QL AUTO: SLIGHT — SIGNIFICANT CHANGE UP
POLYCHROMASIA BLD QL SMEAR: SLIGHT — SIGNIFICANT CHANGE UP
POTASSIUM SERPL-MCNC: 4.2 MMOL/L — SIGNIFICANT CHANGE UP (ref 3.5–5.3)
POTASSIUM SERPL-SCNC: 4.2 MMOL/L — SIGNIFICANT CHANGE UP (ref 3.5–5.3)
PROTHROM AB SERPL-ACNC: 15.5 SEC — HIGH (ref 10–12.9)
RBC # BLD: 3.44 M/UL — LOW (ref 3.8–5.2)
RBC # FLD: 17.1 % — HIGH (ref 10.3–14.5)
RBC BLD AUTO: ABNORMAL
SODIUM SERPL-SCNC: 141 MMOL/L — SIGNIFICANT CHANGE UP (ref 135–145)
WBC # BLD: 17.4 K/UL — HIGH (ref 3.8–10.5)
WBC # FLD AUTO: 17.4 K/UL — HIGH (ref 3.8–10.5)

## 2019-12-19 PROCEDURE — 99233 SBSQ HOSP IP/OBS HIGH 50: CPT

## 2019-12-19 PROCEDURE — 71045 X-RAY EXAM CHEST 1 VIEW: CPT | Mod: 26

## 2019-12-19 PROCEDURE — 99232 SBSQ HOSP IP/OBS MODERATE 35: CPT

## 2019-12-19 RX ORDER — WARFARIN SODIUM 2.5 MG/1
7.5 TABLET ORAL ONCE
Refills: 0 | Status: COMPLETED | OUTPATIENT
Start: 2019-12-19 | End: 2019-12-19

## 2019-12-19 RX ORDER — I.V. FAT EMULSION 20 G/100ML
0.91 EMULSION INTRAVENOUS
Qty: 50 | Refills: 0 | Status: DISCONTINUED | OUTPATIENT
Start: 2019-12-19 | End: 2019-12-19

## 2019-12-19 RX ORDER — MAGNESIUM SULFATE 500 MG/ML
1 VIAL (ML) INJECTION ONCE
Refills: 0 | Status: COMPLETED | OUTPATIENT
Start: 2019-12-19 | End: 2019-12-19

## 2019-12-19 RX ORDER — ELECTROLYTE SOLUTION,INJ
1 VIAL (ML) INTRAVENOUS
Refills: 0 | Status: DISCONTINUED | OUTPATIENT
Start: 2019-12-19 | End: 2019-12-19

## 2019-12-19 RX ADMIN — GABAPENTIN 100 MILLIGRAM(S): 400 CAPSULE ORAL at 22:48

## 2019-12-19 RX ADMIN — Medication 10 MILLIGRAM(S): at 22:49

## 2019-12-19 RX ADMIN — Medication 1 TABLET(S): at 11:08

## 2019-12-19 RX ADMIN — MEROPENEM 100 MILLIGRAM(S): 1 INJECTION INTRAVENOUS at 22:49

## 2019-12-19 RX ADMIN — Medication 1 MILLIGRAM(S): at 11:08

## 2019-12-19 RX ADMIN — OCTREOTIDE ACETATE 100 MICROGRAM(S): 200 INJECTION, SOLUTION INTRAVENOUS; SUBCUTANEOUS at 22:49

## 2019-12-19 RX ADMIN — Medication 325 MILLIGRAM(S): at 22:48

## 2019-12-19 RX ADMIN — Medication 50 MILLIGRAM(S): at 22:49

## 2019-12-19 RX ADMIN — CHLORHEXIDINE GLUCONATE 1 APPLICATION(S): 213 SOLUTION TOPICAL at 10:45

## 2019-12-19 RX ADMIN — Medication 325 MILLIGRAM(S): at 06:36

## 2019-12-19 RX ADMIN — GABAPENTIN 100 MILLIGRAM(S): 400 CAPSULE ORAL at 06:36

## 2019-12-19 RX ADMIN — MEROPENEM 100 MILLIGRAM(S): 1 INJECTION INTRAVENOUS at 06:37

## 2019-12-19 RX ADMIN — PANTOPRAZOLE SODIUM 40 MILLIGRAM(S): 20 TABLET, DELAYED RELEASE ORAL at 06:36

## 2019-12-19 RX ADMIN — MEROPENEM 100 MILLIGRAM(S): 1 INJECTION INTRAVENOUS at 15:22

## 2019-12-19 RX ADMIN — ESCITALOPRAM OXALATE 10 MILLIGRAM(S): 10 TABLET, FILM COATED ORAL at 11:08

## 2019-12-19 RX ADMIN — Medication 500 MILLIGRAM(S): at 11:08

## 2019-12-19 RX ADMIN — TRAMADOL HYDROCHLORIDE 50 MILLIGRAM(S): 50 TABLET ORAL at 15:24

## 2019-12-19 RX ADMIN — HEPARIN SODIUM 1200 UNIT(S)/HR: 5000 INJECTION INTRAVENOUS; SUBCUTANEOUS at 06:39

## 2019-12-19 RX ADMIN — TRAMADOL HYDROCHLORIDE 50 MILLIGRAM(S): 50 TABLET ORAL at 16:50

## 2019-12-19 RX ADMIN — Medication 1 EACH: at 07:27

## 2019-12-19 RX ADMIN — OCTREOTIDE ACETATE 100 MICROGRAM(S): 200 INJECTION, SOLUTION INTRAVENOUS; SUBCUTANEOUS at 15:22

## 2019-12-19 RX ADMIN — GABAPENTIN 100 MILLIGRAM(S): 400 CAPSULE ORAL at 15:21

## 2019-12-19 RX ADMIN — Medication 100 GRAM(S): at 23:58

## 2019-12-19 RX ADMIN — Medication 325 MILLIGRAM(S): at 15:20

## 2019-12-19 RX ADMIN — Medication 2000 UNIT(S): at 11:08

## 2019-12-19 RX ADMIN — WARFARIN SODIUM 7.5 MILLIGRAM(S): 2.5 TABLET ORAL at 23:58

## 2019-12-19 RX ADMIN — OCTREOTIDE ACETATE 100 MICROGRAM(S): 200 INJECTION, SOLUTION INTRAVENOUS; SUBCUTANEOUS at 06:37

## 2019-12-19 NOTE — PROGRESS NOTE ADULT - SUBJECTIVE AND OBJECTIVE BOX
HEALTH ISSUES - PROBLEM Dx:    rt pl eff/ epyema, pancreatico pleural fistula, pancreatitis,     INTERVAL HPI/ OVERNIGHT EVENTS:    seen and examined  no complaints  ROS neg  on heparin gtt    Vital Signs Last 24 Hrs  T(C): 36.5 (19 Dec 2019 16:29), Max: 36.7 (18 Dec 2019 23:10)  T(F): 97.7 (19 Dec 2019 16:29), Max: 98 (18 Dec 2019 23:10)  HR: 92 (19 Dec 2019 16:29) (92 - 115)  BP: 143/88 (19 Dec 2019 16:29) (120/78 - 143/88)  BP(mean): --  RR: 18 (19 Dec 2019 16:29) (17 - 18)  SpO2: 95% (19 Dec 2019 16:29) (94% - 98%)    PHYSICAL EXAM-  GENERAL: Not in distress. Alert    HEENT:  Normocephalic and atraumatic. PEARLA,EOMI  NECK: Supple.  No JVD.    CARDIOVASCULAR: RRR S1, S2. No murmur/rubs/gallop  LUNGS: BLAE+, no rales, no wheezing, no rhonchi.    ABDOMEN: ND. Soft,  NT, no guarding / rebound / rigidity. BS normoactive. No CVA tenderness.    BACK: No spine tenderness.  EXTREMITIES: no cyanosis, no clubbing, no edema.   SKIN: no rash. No skin break or ulcer. No cellulitis.  NEUROLOGIC: AAO*3.strength is symmetric, sensation intact, speech fluent.    PSYCHIATRIC: Calm.  No agitation.  MEDICATIONS  (STANDING):  amitriptyline 10 milliGRAM(s) Oral at bedtime  ascorbic acid 500 milliGRAM(s) Oral daily  chlorhexidine 2% Cloths 1 Application(s) Topical <User Schedule>  cholecalciferol 2000 Unit(s) Oral daily  escitalopram 10 milliGRAM(s) Oral daily  fat emulsion (Plant Based) 20% Infusion 0.905 Gm/kG/Day (31.223 mL/Hr) IV Continuous <Continuous>  ferrous    sulfate 325 milliGRAM(s) Oral three times a day  folic acid 1 milliGRAM(s) Oral daily  gabapentin 100 milliGRAM(s) Oral three times a day  heparin  Infusion. 1300 Unit(s)/Hr (13 mL/Hr) IV Continuous <Continuous>  lidocaine   Patch 1 Patch Transdermal daily  meropenem  IVPB 1000 milliGRAM(s) IV Intermittent every 8 hours  multivitamin 1 Tablet(s) Oral daily  octreotide  Injectable 100 MICROGram(s) SubCutaneous every 8 hours  pantoprazole    Tablet 40 milliGRAM(s) Oral before breakfast  Parenteral Nutrition - Adult 1 Each (83 mL/Hr) TPN Continuous <Continuous>  Parenteral Nutrition - Adult 1 Each (83 mL/Hr) TPN Continuous <Continuous>  traZODone 50 milliGRAM(s) Oral at bedtime    MEDICATIONS  (PRN):  acetaminophen   Tablet .. 650 milliGRAM(s) Oral every 6 hours PRN Mild Pain (1 - 3)  aluminum hydroxide/magnesium hydroxide/simethicone Suspension 30 milliLiter(s) Oral every 4 hours PRN Dyspepsia  heparin  Injectable 4000 Unit(s) IV Push every 6 hours PRN For aPTT less than 40  heparin  Injectable 2000 Unit(s) IV Push every 6 hours PRN For aPTT between 40 - 57  melatonin 3 milliGRAM(s) Oral at bedtime PRN Insomnia  methocarbamol 500 milliGRAM(s) Oral every 6 hours PRN Muscle Spasm  ondansetron Injectable 4 milliGRAM(s) IV Push every 4 hours PRN Nausea and/or Vomiting  sodium chloride 0.9% lock flush 10 milliLiter(s) IV Push every 1 hour PRN Pre/post blood products, medications, blood draw, and to maintain line patency  traMADol 50 milliGRAM(s) Oral every 8 hours PRN Moderate Pain (4 - 6)      LABS:                                   10.2   17.40 )-----------( 325      ( 19 Dec 2019 07:18 )             32.5       12-19    141  |  105  |  5.0<L>  ----------------------------<  111  4.2   |  23.0  |  0.35<L>    Ca    8.6      19 Dec 2019 07:18  Phos  3.6     12-19  Mg     1.6     12-19    TPro  5.8<L>  /  Alb  2.4<L>  /  TBili  <0.2<L>  /  DBili  x   /  AST  24  /  ALT  12  /  AlkPhos  317<H>  12-18 HEALTH ISSUES - PROBLEM Dx:    rt pl eff/ epyema, pancreatico pleural fistula, pancreatitis,     INTERVAL HPI/ OVERNIGHT EVENTS:    seen and examined  no complaints  ROS neg  on heparin gtt/coumadin  repeat MRI abd and CXR pending  on TPN    Vital Signs Last 24 Hrs  T(C): 36.5 (19 Dec 2019 16:29), Max: 36.7 (18 Dec 2019 23:10)  T(F): 97.7 (19 Dec 2019 16:29), Max: 98 (18 Dec 2019 23:10)  HR: 92 (19 Dec 2019 16:29) (92 - 115)  BP: 143/88 (19 Dec 2019 16:29) (120/78 - 143/88)  BP(mean): --  RR: 18 (19 Dec 2019 16:29) (17 - 18)  SpO2: 95% (19 Dec 2019 16:29) (94% - 98%)    PHYSICAL EXAM-  GENERAL: Not in distress. Alert    HEENT:  Normocephalic and atraumatic.   NECK: Supple.    CARDIOVASCULAR: RRR S1, S2. No murmur/rubs/gallop  LUNGS: BLAE+, , no rales, no wheezing, no rhonchi.    ABDOMEN: ND. Soft,  mild diffuse TP, no guarding / rebound / rigidity. BS normoactive. No CVA tenderness.    BACK: No spine tenderness.  EXTREMITIES: no cyanosis, no clubbing, no edema.   SKIN: no rash. no cellulitis  NEUROLOGIC: AAO*3. grossly intact   PSYCHIATRIC: Calm.  No agitation.    MEDICATIONS  (STANDING):  amitriptyline 10 milliGRAM(s) Oral at bedtime  ascorbic acid 500 milliGRAM(s) Oral daily  chlorhexidine 2% Cloths 1 Application(s) Topical <User Schedule>  cholecalciferol 2000 Unit(s) Oral daily  escitalopram 10 milliGRAM(s) Oral daily  fat emulsion (Plant Based) 20% Infusion 0.905 Gm/kG/Day (31.223 mL/Hr) IV Continuous <Continuous>  ferrous    sulfate 325 milliGRAM(s) Oral three times a day  folic acid 1 milliGRAM(s) Oral daily  gabapentin 100 milliGRAM(s) Oral three times a day  heparin  Infusion. 1300 Unit(s)/Hr (13 mL/Hr) IV Continuous <Continuous>  lidocaine   Patch 1 Patch Transdermal daily  meropenem  IVPB 1000 milliGRAM(s) IV Intermittent every 8 hours  multivitamin 1 Tablet(s) Oral daily  octreotide  Injectable 100 MICROGram(s) SubCutaneous every 8 hours  pantoprazole    Tablet 40 milliGRAM(s) Oral before breakfast  Parenteral Nutrition - Adult 1 Each (83 mL/Hr) TPN Continuous <Continuous>  Parenteral Nutrition - Adult 1 Each (83 mL/Hr) TPN Continuous <Continuous>  traZODone 50 milliGRAM(s) Oral at bedtime    MEDICATIONS  (PRN):  acetaminophen   Tablet .. 650 milliGRAM(s) Oral every 6 hours PRN Mild Pain (1 - 3)  aluminum hydroxide/magnesium hydroxide/simethicone Suspension 30 milliLiter(s) Oral every 4 hours PRN Dyspepsia  heparin  Injectable 4000 Unit(s) IV Push every 6 hours PRN For aPTT less than 40  heparin  Injectable 2000 Unit(s) IV Push every 6 hours PRN For aPTT between 40 - 57  melatonin 3 milliGRAM(s) Oral at bedtime PRN Insomnia  methocarbamol 500 milliGRAM(s) Oral every 6 hours PRN Muscle Spasm  ondansetron Injectable 4 milliGRAM(s) IV Push every 4 hours PRN Nausea and/or Vomiting  sodium chloride 0.9% lock flush 10 milliLiter(s) IV Push every 1 hour PRN Pre/post blood products, medications, blood draw, and to maintain line patency  traMADol 50 milliGRAM(s) Oral every 8 hours PRN Moderate Pain (4 - 6)      LABS:                                   10.2   17.40 )-----------( 325      ( 19 Dec 2019 07:18 )             32.5       12-19    141  |  105  |  5.0<L>  ----------------------------<  111  4.2   |  23.0  |  0.35<L>    Ca    8.6      19 Dec 2019 07:18  Phos  3.6     12-19  Mg     1.6     12-19    TPro  5.8<L>  /  Alb  2.4<L>  /  TBili  <0.2<L>  /  DBili  x   /  AST  24  /  ALT  12  /  AlkPhos  317<H>  12-18

## 2019-12-19 NOTE — PROGRESS NOTE ADULT - SUBJECTIVE AND OBJECTIVE BOX
34 year old woman with no known major comorbidities (had not gone to see a doctor for 15 years) who presented to [facility] on 11/22/2019 with progressive sob, weakness, weight loss, She was found to have a large pleural effusion. Initially 1.5 L of dark serous fluid was removed.  Chest tube placed shortly thereafter, 3 additional liters was removed. CBC showed a leukocytosis of 40k and   CTA imaging revealed a 2.1cm partially cystic mass in the body/tail of the pancreas with possible upstream ductal dilatation. On 11/23/2019 the patient had a CT chest/abdomen/pelvis that found to have a clot in the right IJ and was started on a heparin gtt.   Chest tube out; on parenteral feedings  Tried to leave this AM, now supervised    Transferred to [facility] on 11/24/19.  MRCP performed on 11/26.  Underwent Debridement, wound, with dressing replacement  Wound VAC placement  Pleural biopsy  Lung decortication  Bronchoscopy, with lung decortication using VATS on 11/27/19.  Pt  has a history of alcohol abuse.     We were consulted for evaluation and management of the RIJ DVT.      INTERVAL HISTORY  currently c/o headache; states light bothers her eyes  On TPN; treated for pancreatic cysts  No bleeding reported; denies SOB or chest pain      MEDICATIONS  (STANDING):  amitriptyline 10 milliGRAM(s) Oral at bedtime  ascorbic acid 500 milliGRAM(s) Oral daily  cholecalciferol 2000 Unit(s) Oral daily  ferrous    sulfate 325 milliGRAM(s) Oral three times a day  folic acid 1 milliGRAM(s) Oral daily  gabapentin 100 milliGRAM(s) Oral three times a day  heparin  Infusion. 1300 Unit(s)/Hr (13 mL/Hr) IV Continuous <Continuous>  lidocaine   Patch 1 Patch Transdermal daily  meropenem  IVPB 1000 milliGRAM(s) IV Intermittent every 12 hours  multivitamin 1 Tablet(s) Oral daily  pantoprazole    Tablet 40 milliGRAM(s) Oral before breakfast  saccharomyces boulardii 250 milliGRAM(s) Oral two times a day  thiamine 100 milliGRAM(s) Oral daily  traMADol 50 milliGRAM(s) Oral every 8 hours      Vital Signs Last 24 Hrs  T(C): 37.2 (10 Dec 2019 08:00), Max: 37.3 (10 Dec 2019 00:30)  T(F): 99 (10 Dec 2019 08:00), Max: 99.1 (10 Dec 2019 00:30)  HR: 108 (10 Dec 2019 09:00) (93 - 112)  BP: 104/69 (10 Dec 2019 09:00) (103/66 - 135/90)  BP(mean): 82 (10 Dec 2019 09:00) (79 - 108)  RR: 20 (10 Dec 2019 08:00) (16 - 22)  SpO2: 96% (10 Dec 2019 09:00) (94% - 100%)  Gen: nad  Chest: chest tube right side, drain  CV: rrr  Abd: soft, nd, nt      WBC 17.4 , H/H 10.2/32.5 plt ct 325,000

## 2019-12-19 NOTE — PROGRESS NOTE ADULT - ASSESSMENT
34 year old female who is  transferred from [facility] to [facility] on 11/22 for complaints of weakness, 25 pound weight loss & flu like symptoms   rt large pl effusion/ Empyema- chest tube placed on 11/22 at [facility] and removed on 12/7/19 here at [facility]  fluid c/s alpha hemolytic strep  CT showed pancreatic cystic mass of body and tail with ductal dilatation  at [facility] found to have R IJ clot and started on Heparin GTT  ---------------------------------------------------------------------------------------  s/p right VATS decort & washout debridement of CT site & VAC placement at the antr chest tube site on 11/27/19 at [facility]  Ostomy applied on 12/6/19 and removed on 12/8/19  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 and Multifocal PNA  OCtreotide/ TPN started by GI with pt being on clears PO  Heme- transitioned to Coumadin  12/9/19 PICC line placed    Course complicated by:  Hyponatremia- deemed sec to alcoholism and SIADH ruled out;   ATN- now resolved  Acute pancreatitis  Abnormal TFTs- being followed by Endocrine without any new diagnosis  Anemia requiring BT  rt leg swelling- not DVT    Assessment and Plan:    > Rt pl effusion/ Empyema  being followed by Thoracic  currently not large enough to redrain  given P-P fistula likely will need Pleurex or redrain if fistula not fixed  s/p chest tube removal, now on wound vac  antibiotics per ID via PICC line  recent rpt CXr read as worsening eff and infiltrates  will rpt CT chest and abd around this weekend (2 weeks from prior). If no improvement or worsening she should be transferred to [place] for either stent placement or other options to close fistula.  ID has changed antibiotics to include coverage for pancreatitis    > Acute pancreatitis and pancreatic lesion  GI input appreciated  On Octreotide and TPN with clears PO  Diarrhea likely related to pancreatitis  Though clears allowed, pt refusing - stating nausea and dislike of clears  Advance diet only if allowed by GI  per GI does not need antibiotics for Pancreatitis    > Rt IJ thrombus  Heparin bridging with Coumadin until INR therapeutic  When acute issues completely resolved can switch to NOAC outpatient if needed    > Anemia of chronic disease  s/p multiple transfusions here  BT PRN    > hypernatremia, Hypokalemia, hypomagnesemia, hypophosphatemia, Hypoalbuminemia  mostly resolved  addressed by GI with TPN as best as possible  will replete K, Mg and Phos PRN    > Abnormal TFTs  per Endocrine rpt TFTs in 2 weeks time around 12/29/19    > Pain issues  controlled  being managed by pain mangmt    > insomnia  increase trazadone  added melatonin    > anxiety/ coping disorder/ alcoholism   cont lexapro per Psych  cont xanax  sec to alcoholism suspected thiamine deficiency and now repleted    > sevre protein calorie malnutrition  on nutritional supplements and TPN    > Hyponatremia sec to alcoholism resolved   > ATN- resolved    Heparin and Coumadin 34 year old woman with no known major comorbidities (had not gone to see a doctor for 15 years) who presented to [facility] on 11/22/2019 with progressive sob, weakness, weight loss, She was found to have a large pleural effusion. Initially 1.5 L of dark serous fluid was removed. Chest tube placed shortly thereafter, 3 additional liters was removed. pleural fluid cultures + for group A hemolytic strep,CBC showed a leukocytosis of 40k and CTA imaging revealed a 2.1cm partially cystic mass in the body/tail of the pancreas with possible upstream ductal dilatation. On 11/23/2019 the patient had a CT chest/abdomen/pelvis that found to have a clot in the right IJ and was started on a heparin gtt.  s/p right VATS decort & washout debridement of CT site & VAC placement on 11/27/19.   Persistent drainage from chest tube site so repeat CT C/A/P performed 12/4 which showed new cystic mass in pancreas and liver concerning for pancreatitis  Zosyn changed to meropenem on 12/6  for broader coverage given pancreatitis  Copious drainage from anterior chest tube site, so VAC removed and ostomy appliance applied for high output on 12/6.   12/7 Right chest tube removed.   Repeat CT scan showed a persistent Right hydropneumothorax with air anteriorly  12/8 Ostomy appliance removed and dressing applied.   12/9 s.p PICC placement & Right anterior pig tail catheter & right lateral wound vac placement   MRCP significant for Peripancreatic fluid and edema consistent with pancreatitis. Cystic lesions in the pancreatic head with the largest measuring 2.1 cm, likely acute peripancreatic collections, increased in size since 12/4/2019, with a tract of fluid extending from the 2.1 cm cystic lesion into the mediastinum and towards the pleural space on the right suspicious for a pancreatic pleural fistula.  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 not done yet    > Rt IJ thrombus  Heparin bridging with Coumadin until INR therapeutic  coumadin for 3 month  heme eval noted    > Anemia of chronic disease  s/p multiple transfusions here  BT PRN    > hypernatremia, Hypokalemia, hypomagnesemia, hypophosphatemia, Hypoalbuminemia  mostly resolved  addressed by GI with TPN as best as possible  will replete K, Mg and Phos PRN    > Abnormal TFTs  per Endocrine rpt TFTs in 2 weeks time around 12/29/19    > Pain issues  controlled  being managed by pain mangmt    > insomnia  c/w trazadone and melatonin    > anxiety/ coping disorder/ alcoholism   cont lexapro per Psych  cont xanax  sec to alcoholism suspected thiamine deficiency and now repleted    > severe protein calorie malnutrition  on nutritional supplements and TPN    > Hyponatremia sec to alcoholism resolved   > ATN- resolved    Heparin and Coumadin    dispo: unable to anticipate at this time

## 2019-12-19 NOTE — PROGRESS NOTE ADULT - SUBJECTIVE AND OBJECTIVE BOX
Pt seen and examined f/u for pancreaticopleural fistula    This morning she has some vague mild upper abdominal pain but no nausea or vomiting. Does not the clear liquids. yesterday she was vry upset and wanted to leave hospital. X ray yesterday shows increasing pleural effusions    REVIEW OF SYSTEMS:    CONSTITUTIONAL: No fever, weight loss, or fatigue  EYES: No eye pain, visual disturbances, or discharge  ENMT:  No difficulty hearing, tinnitus, vertigo; No sinus or throat pain  RESPIRATORY: No cough, wheezing, chills or hemoptysis; No shortness of breath  CARDIOVASCULAR: No chest pain, palpitations, dizziness, or leg swelling  GASTROINTESTINAL: as avoce    MEDICATIONS:  MEDICATIONS  (STANDING):  amitriptyline 10 milliGRAM(s) Oral at bedtime  ascorbic acid 500 milliGRAM(s) Oral daily  chlorhexidine 2% Cloths 1 Application(s) Topical <User Schedule>  cholecalciferol 2000 Unit(s) Oral daily  escitalopram 10 milliGRAM(s) Oral daily  fat emulsion (Plant Based) 20% Infusion 0.905 Gm/kG/Day (31.223 mL/Hr) IV Continuous <Continuous>  ferrous    sulfate 325 milliGRAM(s) Oral three times a day  folic acid 1 milliGRAM(s) Oral daily  gabapentin 100 milliGRAM(s) Oral three times a day  heparin  Infusion. 1300 Unit(s)/Hr (13 mL/Hr) IV Continuous <Continuous>  lidocaine   Patch 1 Patch Transdermal daily  meropenem  IVPB 1000 milliGRAM(s) IV Intermittent every 8 hours  multivitamin 1 Tablet(s) Oral daily  octreotide  Injectable 100 MICROGram(s) SubCutaneous every 8 hours  pantoprazole    Tablet 40 milliGRAM(s) Oral before breakfast  Parenteral Nutrition - Adult 1 Each (83 mL/Hr) TPN Continuous <Continuous>  Parenteral Nutrition - Adult 1 Each (83 mL/Hr) TPN Continuous <Continuous>  traZODone 50 milliGRAM(s) Oral at bedtime    MEDICATIONS  (PRN):  acetaminophen   Tablet .. 650 milliGRAM(s) Oral every 6 hours PRN Mild Pain (1 - 3)  aluminum hydroxide/magnesium hydroxide/simethicone Suspension 30 milliLiter(s) Oral every 4 hours PRN Dyspepsia  heparin  Injectable 4000 Unit(s) IV Push every 6 hours PRN For aPTT less than 40  heparin  Injectable 2000 Unit(s) IV Push every 6 hours PRN For aPTT between 40 - 57  melatonin 3 milliGRAM(s) Oral at bedtime PRN Insomnia  methocarbamol 500 milliGRAM(s) Oral every 6 hours PRN Muscle Spasm  ondansetron Injectable 4 milliGRAM(s) IV Push every 4 hours PRN Nausea and/or Vomiting  sodium chloride 0.9% lock flush 10 milliLiter(s) IV Push every 1 hour PRN Pre/post blood products, medications, blood draw, and to maintain line patency  traMADol 50 milliGRAM(s) Oral every 8 hours PRN Moderate Pain (4 - 6)      Allergies    No Known Allergies    Intolerances    Ensure TID (Unknown)      Vital Signs Last 24 Hrs  T(C): 36.7 (19 Dec 2019 06:28), Max: 37.2 (18 Dec 2019 15:10)  T(F): 98 (19 Dec 2019 06:28), Max: 98.9 (18 Dec 2019 15:10)  HR: 109 (19 Dec 2019 06:28) (109 - 124)  BP: 133/91 (19 Dec 2019 06:28) (131/82 - 156/101)  BP(mean): --  RR: 18 (19 Dec 2019 06:28) (17 - 18)  SpO2: 95% (19 Dec 2019 06:28) (94% - 96%)    12-18 @ 07:01  -  12-19 @ 07:00  --------------------------------------------------------  IN: 0 mL / OUT: 0 mL / NET: 0 mL        PHYSICAL EXAM:    General: thin in no acute distress but appears somewhat tachypneic  HEENT: MMM, conjunctiva and sclera clear  Gastrointestinal:Abdomen: Soft with mild diffuse tenderness without guarding or rebound,  non-distended; Normal bowel sounds; No hepatosplenomegaly  Extremities: no cyanosis, clubbing or edema.  Skin: Warm and dry. No obvious rash    LABS:      CBC Full  -  ( 19 Dec 2019 07:18 )  WBC Count : 17.40 K/uL  RBC Count : 3.44 M/uL  Hemoglobin : 10.2 g/dL  Hematocrit : 32.5 %  Platelet Count - Automated : 325 K/uL  Mean Cell Volume : 94.5 fl  Mean Cell Hemoglobin : 29.7 pg  Mean Cell Hemoglobin Concentration : 31.4 gm/dL  Auto Neutrophil # : 13.75 K/uL  Auto Lymphocyte # : 1.98 K/uL  Auto Monocyte # : 1.06 K/uL  Auto Eosinophil # : 0.00 K/uL  Auto Basophil # : 0.61 K/uL  Auto Neutrophil % : 70.2 %  Auto Lymphocyte % : 11.4 %  Auto Monocyte % : 6.1 %  Auto Eosinophil % : 0.0 %  Auto Basophil % : 3.5 %    12-19    141  |  105  |  5.0<L>  ----------------------------<  111  4.2   |  23.0  |  0.35<L>    Ca    8.6      19 Dec 2019 07:18  Phos  3.6     12-19  Mg     1.6     12-19    TPro  5.8<L>  /  Alb  2.4<L>  /  TBili  <0.2<L>  /  DBili  x   /  AST  24  /  ALT  12  /  AlkPhos  317<H>  12-18    PT/INR - ( 19 Dec 2019 04:56 )   PT: 15.5 sec;   INR: 1.34 ratio         PTT - ( 19 Dec 2019 04:56 )  PTT:63.3 sec                  RADIOLOGY & ADDITIONAL STUDIES (The following images were personally reviewed):  < from: Xray Chest 1 View- PORTABLE-Routine (12.18.19 @ 06:51) >   EXAM:  XR CHEST PORTABLE ROUTINE 1V                          PROCEDURE DATE:  12/18/2019          INTERPRETATION:  HISTORY:  ADMDIAG1: J90 PLEURAL EFFUSION, NOT ELSEWHERE   CLASSIFIED/; sp effusion; evaluate effusion;  TECHNIQUE: Portable frontal view of the chest, 1 view.  COMPARISON: 12/16/2019.  FINDINGS:   Right ARM midline. PICC line is seen in the left arm the tip in the   region of the superior vena cava.  HEART:difficult to access in this projection  LUNGS: Powerscribe bibasilar left    OSSEOUS STRUCTURES:: degenerative changes    IMPRESSION:   Worsening bilateral effusions and infiltrates.                  PREMA VELAZQUEZ M.D., ATTENDING RADIOLOGIST  This document has been electronically signed. Dec 18 2019 12:25PM          < end of copied text >  < from: Xray Chest 1 View- PORTABLE-Routine (12.19.19 @ 06:46) >   EXAM:  XR CHEST PORTABLE ROUTINE 1V                          PROCEDURE DATE:  12/19/2019          INTERPRETATION:  Portable chest radiograph        CLINICAL INFORMATION:   Status post VATS procedure. Follow-up.    TECHNIQUE:  Portable  AP view of the chest was obtained.    COMPARISON: 12/18/2019 available for review.    FINDINGS:   The lungs  show persistent bibasilar linear and LEFT lower lobe patchy airspace consolidations and/or mild effusions. Upper lobes clear. No pneumothorax.    The heart and mediastinum are within normal limits.    Visualized osseous structures are intact.        IMPRESSION:   Residual bibasilar linear and focal LEFT airspace consolidations and/or effusions.              < end of copied text >

## 2019-12-19 NOTE — PROGRESS NOTE ADULT - ASSESSMENT
34 year old female with no known major comorbidities who is being treated for right pleural effusion/empyema/multifocal pna found to have a RIJ thrombus currently on heparin drip.    1) RIJ venous thrombosis -provoked event that occurred in context of her severe and complex lung illness/infection/empyema that may have led to developing the thrombus. Plan on anticoagulation for  3 months. Plan for warfarin noted as outpatient. will need frequent INR monitoring  2) Multifactorial anemia; Hb currently 10.2  3) Reactive leukocytosis, improved.  WBC 17.4.  4) Pancreatic Fistula-care per managing service/ID/GI

## 2019-12-20 LAB
ANION GAP SERPL CALC-SCNC: 11 MMOL/L — SIGNIFICANT CHANGE UP (ref 5–17)
APTT BLD: 63.3 SEC — HIGH (ref 27.5–36.3)
BASOPHILS # BLD AUTO: 0.16 K/UL — SIGNIFICANT CHANGE UP (ref 0–0.2)
BASOPHILS NFR BLD AUTO: 1.2 % — SIGNIFICANT CHANGE UP (ref 0–2)
BUN SERPL-MCNC: 6 MG/DL — LOW (ref 8–20)
CALCIUM SERPL-MCNC: 8.7 MG/DL — SIGNIFICANT CHANGE UP (ref 8.6–10.2)
CHLORIDE SERPL-SCNC: 104 MMOL/L — SIGNIFICANT CHANGE UP (ref 98–107)
CO2 SERPL-SCNC: 25 MMOL/L — SIGNIFICANT CHANGE UP (ref 22–29)
CREAT SERPL-MCNC: 0.21 MG/DL — LOW (ref 0.5–1.3)
EOSINOPHIL # BLD AUTO: 0.54 K/UL — HIGH (ref 0–0.5)
EOSINOPHIL NFR BLD AUTO: 3.9 % — SIGNIFICANT CHANGE UP (ref 0–6)
GLUCOSE BLDC GLUCOMTR-MCNC: 110 MG/DL — HIGH (ref 70–99)
GLUCOSE BLDC GLUCOMTR-MCNC: 112 MG/DL — HIGH (ref 70–99)
GLUCOSE BLDC GLUCOMTR-MCNC: 113 MG/DL — HIGH (ref 70–99)
GLUCOSE BLDC GLUCOMTR-MCNC: 73 MG/DL — SIGNIFICANT CHANGE UP (ref 70–99)
GLUCOSE SERPL-MCNC: 111 MG/DL — SIGNIFICANT CHANGE UP (ref 70–115)
HCG UR QL: NEGATIVE — SIGNIFICANT CHANGE UP
HCT VFR BLD CALC: 34.6 % — SIGNIFICANT CHANGE UP (ref 34.5–45)
HGB BLD-MCNC: 11 G/DL — LOW (ref 11.5–15.5)
IMM GRANULOCYTES NFR BLD AUTO: 2.7 % — HIGH (ref 0–1.5)
INR BLD: 1.27 RATIO — HIGH (ref 0.88–1.16)
LYMPHOCYTES # BLD AUTO: 22.5 % — SIGNIFICANT CHANGE UP (ref 13–44)
LYMPHOCYTES # BLD AUTO: 3.1 K/UL — SIGNIFICANT CHANGE UP (ref 1–3.3)
MAGNESIUM SERPL-MCNC: 1.8 MG/DL — SIGNIFICANT CHANGE UP (ref 1.8–2.6)
MCHC RBC-ENTMCNC: 29.6 PG — SIGNIFICANT CHANGE UP (ref 27–34)
MCHC RBC-ENTMCNC: 31.8 GM/DL — LOW (ref 32–36)
MCV RBC AUTO: 93 FL — SIGNIFICANT CHANGE UP (ref 80–100)
MONOCYTES # BLD AUTO: 1.53 K/UL — HIGH (ref 0–0.9)
MONOCYTES NFR BLD AUTO: 11.1 % — SIGNIFICANT CHANGE UP (ref 2–14)
NEUTROPHILS # BLD AUTO: 8.09 K/UL — HIGH (ref 1.8–7.4)
NEUTROPHILS NFR BLD AUTO: 58.6 % — SIGNIFICANT CHANGE UP (ref 43–77)
PHOSPHATE SERPL-MCNC: 3.7 MG/DL — SIGNIFICANT CHANGE UP (ref 2.4–4.7)
PLATELET # BLD AUTO: 341 K/UL — SIGNIFICANT CHANGE UP (ref 150–400)
POTASSIUM SERPL-MCNC: 4.1 MMOL/L — SIGNIFICANT CHANGE UP (ref 3.5–5.3)
POTASSIUM SERPL-SCNC: 4.1 MMOL/L — SIGNIFICANT CHANGE UP (ref 3.5–5.3)
PROTHROM AB SERPL-ACNC: 14.7 SEC — HIGH (ref 10–12.9)
RBC # BLD: 3.72 M/UL — LOW (ref 3.8–5.2)
RBC # FLD: 16.1 % — HIGH (ref 10.3–14.5)
SODIUM SERPL-SCNC: 140 MMOL/L — SIGNIFICANT CHANGE UP (ref 135–145)
WBC # BLD: 13.79 K/UL — HIGH (ref 3.8–10.5)
WBC # FLD AUTO: 13.79 K/UL — HIGH (ref 3.8–10.5)

## 2019-12-20 PROCEDURE — 99232 SBSQ HOSP IP/OBS MODERATE 35: CPT

## 2019-12-20 PROCEDURE — 99233 SBSQ HOSP IP/OBS HIGH 50: CPT

## 2019-12-20 PROCEDURE — 74182 MRI ABDOMEN W/CONTRAST: CPT | Mod: 26

## 2019-12-20 PROCEDURE — 71045 X-RAY EXAM CHEST 1 VIEW: CPT | Mod: 26

## 2019-12-20 RX ORDER — ELECTROLYTE SOLUTION,INJ
1 VIAL (ML) INTRAVENOUS
Refills: 0 | Status: DISCONTINUED | OUTPATIENT
Start: 2019-12-20 | End: 2019-12-20

## 2019-12-20 RX ORDER — I.V. FAT EMULSION 20 G/100ML
0.9 EMULSION INTRAVENOUS
Qty: 50 | Refills: 0 | Status: DISCONTINUED | OUTPATIENT
Start: 2019-12-20 | End: 2019-12-20

## 2019-12-20 RX ORDER — WARFARIN SODIUM 2.5 MG/1
7.5 TABLET ORAL ONCE
Refills: 0 | Status: COMPLETED | OUTPATIENT
Start: 2019-12-20 | End: 2019-12-20

## 2019-12-20 RX ADMIN — WARFARIN SODIUM 7.5 MILLIGRAM(S): 2.5 TABLET ORAL at 21:45

## 2019-12-20 RX ADMIN — MEROPENEM 100 MILLIGRAM(S): 1 INJECTION INTRAVENOUS at 21:45

## 2019-12-20 RX ADMIN — Medication 2000 UNIT(S): at 13:51

## 2019-12-20 RX ADMIN — Medication 1 TABLET(S): at 10:11

## 2019-12-20 RX ADMIN — Medication 500 MILLIGRAM(S): at 10:11

## 2019-12-20 RX ADMIN — TRAMADOL HYDROCHLORIDE 50 MILLIGRAM(S): 50 TABLET ORAL at 16:48

## 2019-12-20 RX ADMIN — GABAPENTIN 100 MILLIGRAM(S): 400 CAPSULE ORAL at 13:59

## 2019-12-20 RX ADMIN — PANTOPRAZOLE SODIUM 40 MILLIGRAM(S): 20 TABLET, DELAYED RELEASE ORAL at 06:46

## 2019-12-20 RX ADMIN — Medication 325 MILLIGRAM(S): at 21:45

## 2019-12-20 RX ADMIN — Medication 650 MILLIGRAM(S): at 22:50

## 2019-12-20 RX ADMIN — Medication 650 MILLIGRAM(S): at 23:50

## 2019-12-20 RX ADMIN — MEROPENEM 100 MILLIGRAM(S): 1 INJECTION INTRAVENOUS at 06:47

## 2019-12-20 RX ADMIN — GABAPENTIN 100 MILLIGRAM(S): 400 CAPSULE ORAL at 21:45

## 2019-12-20 RX ADMIN — ESCITALOPRAM OXALATE 10 MILLIGRAM(S): 10 TABLET, FILM COATED ORAL at 10:11

## 2019-12-20 RX ADMIN — CHLORHEXIDINE GLUCONATE 1 APPLICATION(S): 213 SOLUTION TOPICAL at 07:33

## 2019-12-20 RX ADMIN — Medication 1 EACH: at 19:57

## 2019-12-20 RX ADMIN — Medication 325 MILLIGRAM(S): at 13:51

## 2019-12-20 RX ADMIN — Medication 10 MILLIGRAM(S): at 21:45

## 2019-12-20 RX ADMIN — OCTREOTIDE ACETATE 100 MICROGRAM(S): 200 INJECTION, SOLUTION INTRAVENOUS; SUBCUTANEOUS at 22:46

## 2019-12-20 RX ADMIN — Medication 1 MILLIGRAM(S): at 10:11

## 2019-12-20 RX ADMIN — Medication 50 MILLIGRAM(S): at 21:45

## 2019-12-20 RX ADMIN — MEROPENEM 100 MILLIGRAM(S): 1 INJECTION INTRAVENOUS at 13:52

## 2019-12-20 RX ADMIN — GABAPENTIN 100 MILLIGRAM(S): 400 CAPSULE ORAL at 06:46

## 2019-12-20 RX ADMIN — I.V. FAT EMULSION 31.25 GM/KG/DAY: 20 EMULSION INTRAVENOUS at 19:57

## 2019-12-20 RX ADMIN — Medication 325 MILLIGRAM(S): at 06:46

## 2019-12-20 NOTE — CHART NOTE - NSCHARTNOTEFT_GEN_A_CORE
Patient seen at bedside.  Anxious to go home.  Wound vac in place and working well.  Due to change wound vac Tomorrow.  Will continue to follow patient, please call for any concerns.

## 2019-12-20 NOTE — PROGRESS NOTE ADULT - ASSESSMENT
Status improving gradually.  No fever.  TPN tolerated.  Lytes all acceptable.    TPN reordered.  Continue with pancreatic rest for now.  Await MRI.

## 2019-12-20 NOTE — PROGRESS NOTE ADULT - SUBJECTIVE AND OBJECTIVE BOX
On Service Note   pt is seen examined  rt pl eff/ empyema , pancreatic pleural fistula, pancreatitis,   she is resting in the bed , comfortable no distress   denies any pain       Vital Signs Last 24 Hrs  T(C): 36.3 (20 Dec 2019 09:15), Max: 36.8 (19 Dec 2019 22:09)  T(F): 97.4 (20 Dec 2019 09:15), Max: 98.2 (19 Dec 2019 22:09)  HR: 109 (20 Dec 2019 09:15) (92 - 111)  BP: 142/102 (20 Dec 2019 09:15) (130/90 - 163/107)  BP(mean): --  RR: 18 (20 Dec 2019 09:15) (17 - 18)  SpO2: 98% (20 Dec 2019 09:15) (95% - 99%)    PHYSICAL EXAM-  GENERAL: Not in distress. Alert  lying in the bed   NECK: Supple.    CARDIOVASCULAR: RRR S1, S2. No murmur/rubs/gallop  LUNGS: CTA bilaterally +,  no rales, no wheezing, no rhonchi.. right anterior lower chest drain  rube in place   ABDOMEN: ND. Soft,  mild diffuse upper quadrant tenderness   EXTREMITIES: no cyanosis, no clubbing, no edema.   SKIN: no rash. no cellulitis                                       11.0   13.79 )-----------( 341      ( 20 Dec 2019 06:11 )             34.6   12-20    140  |  104  |  6.0<L>  ----------------------------<  111  4.1   |  25.0  |  0.21<L>    Ca    8.7      20 Dec 2019 06:11  Phos  3.7     12-20  Mg     1.8     12-20

## 2019-12-20 NOTE — PROGRESS NOTE ADULT - SUBJECTIVE AND OBJECTIVE BOX
Patient seen and examined; chart reviewed.  Afebrile.  Tolerates some of clear liquid diet.  No vomiting.  No BM.  No distention.  awaiting MRI of abdomen later today.  Glucoses running 109 to 140.  No coverage.  Denies SOB or cough.  Still with some epigastric pain.  IV antibiotics, Merepenem,  still in place.  Warfarin daily.  No Still on IV heparin.  AC x 3 months for line thrombosis.      PAST MEDICAL & SURGICAL HISTORY:      ROS:  No Heartburn, regurgitation, dysphagia, odynophagia.  No dyspepsia  No abdominal pain.    No Nausea, vomiting.  No Bleeding.  No hematemesis.   No diarrhea.    No hematochesia.  No weight loss, anorexia.  No edema.      MEDICATIONS  (STANDING):  amitriptyline 10 milliGRAM(s) Oral at bedtime  ascorbic acid 500 milliGRAM(s) Oral daily  chlorhexidine 2% Cloths 1 Application(s) Topical <User Schedule>  cholecalciferol 2000 Unit(s) Oral daily  escitalopram 10 milliGRAM(s) Oral daily  fat emulsion (Plant Based) 20% Infusion 0.9 Gm/kG/Day (31.05 mL/Hr) IV Continuous <Continuous>  ferrous    sulfate 325 milliGRAM(s) Oral three times a day  folic acid 1 milliGRAM(s) Oral daily  gabapentin 100 milliGRAM(s) Oral three times a day  heparin  Infusion. 1300 Unit(s)/Hr (13 mL/Hr) IV Continuous <Continuous>  lidocaine   Patch 1 Patch Transdermal daily  meropenem  IVPB 1000 milliGRAM(s) IV Intermittent every 8 hours  multivitamin 1 Tablet(s) Oral daily  octreotide  Injectable 100 MICROGram(s) SubCutaneous every 8 hours  pantoprazole    Tablet 40 milliGRAM(s) Oral before breakfast  Parenteral Nutrition - Adult 1 Each (83 mL/Hr) TPN Continuous <Continuous>  Parenteral Nutrition - Adult 1 Each (83 mL/Hr) TPN Continuous <Continuous>  traZODone 50 milliGRAM(s) Oral at bedtime    MEDICATIONS  (PRN):  acetaminophen   Tablet .. 650 milliGRAM(s) Oral every 6 hours PRN Mild Pain (1 - 3)  aluminum hydroxide/magnesium hydroxide/simethicone Suspension 30 milliLiter(s) Oral every 4 hours PRN Dyspepsia  heparin  Injectable 4000 Unit(s) IV Push every 6 hours PRN For aPTT less than 40  heparin  Injectable 2000 Unit(s) IV Push every 6 hours PRN For aPTT between 40 - 57  melatonin 3 milliGRAM(s) Oral at bedtime PRN Insomnia  methocarbamol 500 milliGRAM(s) Oral every 6 hours PRN Muscle Spasm  ondansetron Injectable 4 milliGRAM(s) IV Push every 4 hours PRN Nausea and/or Vomiting  sodium chloride 0.9% lock flush 10 milliLiter(s) IV Push every 1 hour PRN Pre/post blood products, medications, blood draw, and to maintain line patency  traMADol 50 milliGRAM(s) Oral every 8 hours PRN Moderate Pain (4 - 6)      Allergies    No Known Allergies    Intolerances    Ensure TID (Unknown)      Vital Signs Last 24 Hrs  T(C): 36.7 (20 Dec 2019 06:41), Max: 36.8 (19 Dec 2019 22:09)  T(F): 98.1 (20 Dec 2019 06:41), Max: 98.2 (19 Dec 2019 22:09)  HR: 111 (20 Dec 2019 06:41) (92 - 111)  BP: 130/90 (20 Dec 2019 06:41) (120/78 - 163/107)  BP(mean): --  RR: 17 (20 Dec 2019 06:41) (17 - 18)  SpO2: 99% (20 Dec 2019 06:41) (95% - 99%)    PHYSICAL EXAM:    GENERAL: NAD, well-groomed, well-developed  HEAD:  Atraumatic, Normocephalic  EYES: EOMI, PERRLA, conjunctiva and sclera clear  ENMT: No tonsillar erythema, exudates, or enlargement; Moist mucous membranes, Good dentition, No lesions  NECK: Supple, No JVD, Normal thyroid  CHEST/LUNG: Clear to percussion bilaterally; No rales, rhonchi, wheezing, or rubs  HEART: Regular rate and rhythm; No murmurs, rubs, or gallops  ABDOMEN: Soft, Nontender, Nondistended; Bowel sounds present  EXTREMITIES:  2+ Peripheral Pulses, No clubbing, cyanosis, or edema  LYMPH: No lymphadenopathy noted  SKIN: No rashes or lesions      LABS:                        11.0   13.79 )-----------( 341      ( 20 Dec 2019 06:11 )             34.6     12-20    140  |  104  |  6.0<L>  ----------------------------<  111  4.1   |  25.0  |  0.21<L>    Ca    8.7      20 Dec 2019 06:11  Phos  3.7     12-20  Mg     1.8     12-20      PT/INR - ( 20 Dec 2019 06:11 )   PT: 14.7 sec;   INR: 1.27 ratio         PTT - ( 20 Dec 2019 06:11 )  PTT:63.3 sec         RADIOLOGY & ADDITIONAL STUDIES:

## 2019-12-20 NOTE — PROGRESS NOTE ADULT - SUBJECTIVE AND OBJECTIVE BOX
[facility]  INFECTIOUS DISEASES AND INTERNAL MEDICINE at [place]  =======================================================  Carlos Alberto Soto MD  Diplomates American Board of Internal Medicine and Infectious Diseases  =======================================================    HERACLIO MOLINA [id number]    Follow up: R pleural effusion    Pain has improved. No fever. No diarrhea. On TPN.    R pleural effusion s/p VATS, pancreaticopleural fistula.   Awaiting repeat MRI.     Allergies:  Ensure TID (Unknown)  No Known Allergies    Antibiotics:  meropenem  IVPB 1000 milliGRAM(s) IV Intermittent every 8 hours    REVIEW OF SYSTEMS:  CONSTITUTIONAL:  No Fever or chills  HEENT:   No diplopia or blurred vision.  No earache, sore throat or runny nose.  CARDIOVASCULAR:  No chest pain or SOB, pain on chest tube site  RESPIRATORY:  No cough, shortness of breath, PND or orthopnea.  GASTROINTESTINAL:  No nausea, vomiting, no more diarrhea.  GENITOURINARY:  No dysuria, frequency or urgency. No Blood in urine  MUSCULOSKELETAL:  no joint aches, no muscle pain  SKIN:  No change in skin, hair or nails.  NEUROLOGIC:  No paresthesias or weakness.  PSYCHIATRIC:  No disorder of thought or mood.  ENDOCRINE:  No heat or cold intolerance, polyuria or polydipsia.  HEMATOLOGICAL:  No easy bruising or bleeding.      Physical Exam:  Vital Signs Last 24 Hrs  T(C): 36.4 (19 Dec 2019 11:04), Max: 37.2 (18 Dec 2019 15:10)  T(F): 97.6 (19 Dec 2019 11:04), Max: 98.9 (18 Dec 2019 15:10)  HR: 106 (19 Dec 2019 11:04) (106 - 124)  BP: 120/78 (19 Dec 2019 11:04) (120/78 - 156/101)  RR: 18 (19 Dec 2019 11:04) (17 - 18)  SpO2: 98% (19 Dec 2019 11:04) (94% - 98%)  GEN: NAD  HEENT: normocephalic and atraumatic. EOMI. ROSALIE.    NECK: Supple.  No lymphadenopathy   LUNGS: R chest with decreased breath sounds, R chest tube has been removed, has vacuum dressing.   HEART: Regular rate and rhythm   ABDOMEN: Soft, nontender, and nondistended.  Positive bowel sounds.    : No CVA tenderness  EXTREMITIES: + edema. + PICC on TPN  MSK: no joint swelling  NEUROLOGIC: grossly intact.  PSYCHIATRIC: Appropriate affect .  SKIN: No ulceration or induration present.      Labs:  12-20    140  |  104  |  6.0<L>  ----------------------------<  111  4.1   |  25.0  |  0.21<L>    Ca    8.7      20 Dec 2019 06:11  Phos  3.7     12-20  Mg     1.8     12-20                        11.0   13.79 )-----------( 341      ( 20 Dec 2019 06:11 )             34.6     PT/INR - ( 20 Dec 2019 06:11 )   PT: 14.7 sec;   INR: 1.27 ratio    PTT - ( 20 Dec 2019 06:11 )  PTT:63.3 sec    All imaging and data are reviewed.   < from: CT Chest No Cont (12.07.19 @ 13:51) >   EXAM:  CT CHEST                        PROCEDURE DATE:  12/07/2019    INTERPRETATION:  Clinical information: Right chest collection. Status   post VATS 11/27  Comparison: Chest CT dated 12/4/2019  PROCEDURE:   CT of the Chest was performed without intravenous contrast.  FINDINGS:  Interval removal of right chest tube.  Air-containing tract noted from   the skin to the pleural space at site of prior chest tube on series 3   image 137.  There is a skin defect in the right lateral chest wall again   noted with subcutaneous air in this region and some deeper mildly complex   fluid again noted as seen on series 3 image 99. There is a new skin   defect on series 3 image 121 with tract of air extending into the right   pleural space. Previously identified anteriorly loculated right   hydropneumothorax demonstrates interval resolution of the fluid component   with the overall size of the stable now replaced by air. Atelectasis   involving the right lower lobe inferiorly is again noted. Fluid loculated   in the major fissure has slightly increased. Patchy opacities left upper   lobe predominantly groundglass are unchanged. Subsegmental atelectasis in   the lingula and left lower lobe. Patchy groundglass opacity now noted in   the left lower lobe.  Trace bilateral pleural effusions.  Mediastinum and Lyndsey: No abnormally enlarged lymph nodes.  Thickened distal esophagus.  Heart: Normal size. No pericardial effusion.   Vessels: within normal limits.  Upper abdomen: Residual contrast within the esophagus and stomach related   to recent esophagram. Residual contrast within the renal parenchyma   raising the possibility of acute tubular necrosis given recent   intravenous contrast administration on 12/4/2019.  Bones: Within normal limits.  IMPRESSION: Interval removal of right chest tube with tract from prior   chest tube now containing air.  Right hydropneumothorax again noted. The   previously identified loculated fluid and air anteriorly has demonstrated   resolution of the fluid component in this region with air now filling the   same size space.  Skin defect with subcutaneous air and complex fluid right lateral chest   wall again noted.  New skin defect right lower anterolateral chest wallwith air tract to   the right pleural space now noted.  Slight increase in fluid tracking in the right major fissure.  Areas of atelectasis in the right lung and opacities in the left upper   lobe are unchanged.  Thickened distal esophagus again noted may be on the basis of esophagitis.  Persistent renal nephrograms bilaterally may be seen in the setting of   acute tubular necrosis given recent intravenous contrast administration.   Correlation with serum creatinine recommended.    Assessment and Plan:   34 y.o. F who was admitted:    transferred from [facility] to [facility] on 11/22 for complaints of weakness, 25 pound weight loss & flu like symptoms & found to have a large right pleural effusion,   pleural fluid cultures + for group A hemolytic strep, + RIJ thormbus   11/23 CT of the chest/abd/pelvis indicated a clot in the Right IJ, pt started on heparin gtt.   s/p right VATS decort & washout debridement of CT site & VAC placement on 11/27/19.   Persistent drainage from chest tube site so repeat CT C/A/P performed 12/4 which showed new cystic mass in pancreas and liver concerning for pancreatitis  Zosyn changed to meropenem on 12/6  for broader coverage given pancreatitis  Copious drainage from anterior chest tube site, so VAC removed and ostomy appliance applied for high output on 12/6.   12/7 Right chest tube removed.   Repeat CT scan showed a persistent Right hydropneumothorax with air anteriorly  12/8 Ostomy appliance removed and dressing applied.   12/9 s.p PICC placement & Right anterior pig tail catheter & right lateral wound vac placement   MRCP significant for Peripancreatic fluid and edema consistent with pancreatitis. Cystic lesions in the pancreatic head with the largest measuring 2.1 cm, likely acute peripancreatic collections, increased in size since 12/4/2019, with a tract of fluid extending from the 2.1 cm cystic lesion into the mediastinum and towards the pleural space on the right suspicious for a pancreatic pleural fistula.    Persistent R pleural effusion  Pancreatitis  R pancreaticopleural fistula   Alcohol abuse     11/24 zosyn  11/24 to 12/3 ceftriaxone   12/3 to 12/6 zosyn  12/6 to present meropenem    - All cultures negative , blood, urine, pleural fluid and chest tube insertion site.  - Leukocytosis improving from 42k to 13k now.   - No fever   - Continue Meropenem 1gm q8h for necrotizing pancreatitis and fistula with pleural space. GI and surgery following.   - Has 2 PICC lines, on TPN and ABx, watch for superinfection  - Based on her response and pleural effusion and MRI report today, will decide about the length of treatment.     Will follow.

## 2019-12-20 NOTE — PROGRESS NOTE ADULT - ASSESSMENT
34 year old woman with no known major comorbidities (had not gone to see a doctor for 15 years) who presented to [facility] on 11/22/2019 with progressive sob, weakness, weight loss, She was found to have a large pleural effusion. Initially 1.5 L of dark serous fluid was removed. Chest tube placed shortly thereafter, 3 additional liters was removed. pleural fluid cultures + for group A hemolytic strep,CBC showed a leukocytosis of 40k and CTA imaging revealed a 2.1cm partially cystic mass in the body/tail of the pancreas with possible upstream ductal dilatation. On 11/23/2019 the patient had a CT chest/abdomen/pelvis that found to have a clot in the right IJ and was started on a heparin gtt   s/p right VATS decort & washout debridement of CT site & VAC placement on 11/27/19  Persistent drainage from chest tube site so repeat CT C/A/P performed 12/4 which showed new cystic mass in pancreas and liver concerning for pancreatitis  Zosyn changed to meropenem on 12/6  for broader coverage given pancreatitis  Copious drainage from anterior chest tube site, so VAC removed and ostomy appliance applied for high output on 12/6.   12/7 Right chest tube removed.   Repeat CT scan showed a persistent Right hydropneumothorax with air anteriorly  12/8 Ostomy appliance removed and dressing applied.   12/9 s.p PICC placement & Right anterior pig tail catheter & right lateral wound vac placement   MRCP significant for Peripancreatic fluid and edema consistent with pancreatitis. Cystic lesions in the pancreatic head with the largest measuring 2.1 cm, likely acute peripancreatic collections, increased in size since 12/4/2019, with a tract of fluid extending from the 2.1 cm cystic lesion into the mediastinum and towards the pleural space on the right suspicious for a pancreatic pleural fistula.  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 and Multifocal PNA  OCtreotide/ TPN started by GI with pt being on clears PO  Heme- transitioned to Coumadin  12/9/19 PICC line placed      Hyponatremia- deemed sec to alcoholism and SIADH ruled out;   ATN- now resolved  Acute pancreatitis  Abnormal TFTs- being followed by Endocrine without any new diagnosis  Anemia requiring BT  rt leg swelling- not DVT    Assessment and Plan:    1- Rt pl effusion/ Empyema  being followed by Thoracic and ID, chest wound vac in place   c/w Abx as per ID  f/u repeat CXR     2- Acute pancreatitis and pancreatic cyst with pancreaticopleural fistula  GI input appreciated  On Octreotide and TPN with clears PO  Diarrhea likely related to pancreatitis  Advance diet only if allowed by GI  repeat MRI abd/pelvis is pending     3- Rt IJ thrombus  Heparin bridging with Coumadin until INR therapeutic  coumadin for 3 month  heme eval appreciated     3- Anemia of chronic disease  s/p multiple transfusions       4- hypernatremia, Hypokalemia, hypomagnesemia, hypophosphatemia, Hypoalbuminemia  improved   on  TPN   will replete K, Mg and Phos PRN    5- Abnormal TFTs  per Endocrine rpt TFTs in 2 weeks time around 12/29/19    6- Pain issues  controlled  being managed by pain managament     7- insomnia  c/w trazadone and melatonin    8-anxiety/ coping disorder/ alcoholism   cont lexapro and xanax     9-suspected thiamine deficiency and now repleted    10- severe protein calorie malnutrition  on nutritional supplements and TPN  11- ATN- resolved

## 2019-12-21 LAB
ANION GAP SERPL CALC-SCNC: 13 MMOL/L — SIGNIFICANT CHANGE UP (ref 5–17)
APTT BLD: 56.9 SEC — HIGH (ref 27.5–36.3)
APTT BLD: 93.7 SEC — HIGH (ref 27.5–36.3)
BUN SERPL-MCNC: 8 MG/DL — SIGNIFICANT CHANGE UP (ref 8–20)
CALCIUM SERPL-MCNC: 9.3 MG/DL — SIGNIFICANT CHANGE UP (ref 8.6–10.2)
CHLORIDE SERPL-SCNC: 104 MMOL/L — SIGNIFICANT CHANGE UP (ref 98–107)
CO2 SERPL-SCNC: 25 MMOL/L — SIGNIFICANT CHANGE UP (ref 22–29)
CREAT SERPL-MCNC: 0.42 MG/DL — LOW (ref 0.5–1.3)
GLUCOSE BLDC GLUCOMTR-MCNC: 101 MG/DL — HIGH (ref 70–99)
GLUCOSE BLDC GLUCOMTR-MCNC: 108 MG/DL — HIGH (ref 70–99)
GLUCOSE BLDC GLUCOMTR-MCNC: 117 MG/DL — HIGH (ref 70–99)
GLUCOSE BLDC GLUCOMTR-MCNC: 91 MG/DL — SIGNIFICANT CHANGE UP (ref 70–99)
GLUCOSE SERPL-MCNC: 140 MG/DL — HIGH (ref 70–115)
INR BLD: 1.49 RATIO — HIGH (ref 0.88–1.16)
MAGNESIUM SERPL-MCNC: 1.9 MG/DL — SIGNIFICANT CHANGE UP (ref 1.6–2.6)
PHOSPHATE SERPL-MCNC: 5.3 MG/DL — HIGH (ref 2.4–4.7)
POTASSIUM SERPL-MCNC: 4.7 MMOL/L — SIGNIFICANT CHANGE UP (ref 3.5–5.3)
POTASSIUM SERPL-SCNC: 4.7 MMOL/L — SIGNIFICANT CHANGE UP (ref 3.5–5.3)
PROTHROM AB SERPL-ACNC: 17.4 SEC — HIGH (ref 10–12.9)
SODIUM SERPL-SCNC: 142 MMOL/L — SIGNIFICANT CHANGE UP (ref 135–145)

## 2019-12-21 PROCEDURE — 99024 POSTOP FOLLOW-UP VISIT: CPT

## 2019-12-21 PROCEDURE — 99232 SBSQ HOSP IP/OBS MODERATE 35: CPT

## 2019-12-21 PROCEDURE — 99233 SBSQ HOSP IP/OBS HIGH 50: CPT

## 2019-12-21 RX ORDER — WARFARIN SODIUM 2.5 MG/1
7.5 TABLET ORAL ONCE
Refills: 0 | Status: COMPLETED | OUTPATIENT
Start: 2019-12-21 | End: 2019-12-21

## 2019-12-21 RX ORDER — ALPRAZOLAM 0.25 MG
0.25 TABLET ORAL THREE TIMES A DAY
Refills: 0 | Status: DISCONTINUED | OUTPATIENT
Start: 2019-12-21 | End: 2019-12-28

## 2019-12-21 RX ORDER — ELECTROLYTE SOLUTION,INJ
1 VIAL (ML) INTRAVENOUS
Refills: 0 | Status: DISCONTINUED | OUTPATIENT
Start: 2019-12-21 | End: 2019-12-21

## 2019-12-21 RX ORDER — I.V. FAT EMULSION 20 G/100ML
0.9 EMULSION INTRAVENOUS
Qty: 50 | Refills: 0 | Status: DISCONTINUED | OUTPATIENT
Start: 2019-12-21 | End: 2019-12-21

## 2019-12-21 RX ADMIN — Medication 10 MILLIGRAM(S): at 20:54

## 2019-12-21 RX ADMIN — PANTOPRAZOLE SODIUM 40 MILLIGRAM(S): 20 TABLET, DELAYED RELEASE ORAL at 05:34

## 2019-12-21 RX ADMIN — OCTREOTIDE ACETATE 100 MICROGRAM(S): 200 INJECTION, SOLUTION INTRAVENOUS; SUBCUTANEOUS at 05:34

## 2019-12-21 RX ADMIN — Medication 1 EACH: at 17:52

## 2019-12-21 RX ADMIN — CHLORHEXIDINE GLUCONATE 1 APPLICATION(S): 213 SOLUTION TOPICAL at 10:31

## 2019-12-21 RX ADMIN — TRAMADOL HYDROCHLORIDE 50 MILLIGRAM(S): 50 TABLET ORAL at 11:47

## 2019-12-21 RX ADMIN — Medication 500 MILLIGRAM(S): at 11:58

## 2019-12-21 RX ADMIN — Medication 1 TABLET(S): at 11:58

## 2019-12-21 RX ADMIN — Medication 325 MILLIGRAM(S): at 05:34

## 2019-12-21 RX ADMIN — MEROPENEM 100 MILLIGRAM(S): 1 INJECTION INTRAVENOUS at 17:37

## 2019-12-21 RX ADMIN — GABAPENTIN 100 MILLIGRAM(S): 400 CAPSULE ORAL at 17:38

## 2019-12-21 RX ADMIN — HEPARIN SODIUM 2000 UNIT(S): 5000 INJECTION INTRAVENOUS; SUBCUTANEOUS at 09:57

## 2019-12-21 RX ADMIN — GABAPENTIN 100 MILLIGRAM(S): 400 CAPSULE ORAL at 23:44

## 2019-12-21 RX ADMIN — Medication 325 MILLIGRAM(S): at 17:35

## 2019-12-21 RX ADMIN — TRAMADOL HYDROCHLORIDE 50 MILLIGRAM(S): 50 TABLET ORAL at 14:42

## 2019-12-21 RX ADMIN — Medication 325 MILLIGRAM(S): at 23:44

## 2019-12-21 RX ADMIN — MEROPENEM 100 MILLIGRAM(S): 1 INJECTION INTRAVENOUS at 23:44

## 2019-12-21 RX ADMIN — TRAMADOL HYDROCHLORIDE 50 MILLIGRAM(S): 50 TABLET ORAL at 20:54

## 2019-12-21 RX ADMIN — Medication 650 MILLIGRAM(S): at 23:43

## 2019-12-21 RX ADMIN — Medication 0.25 MILLIGRAM(S): at 11:58

## 2019-12-21 RX ADMIN — Medication 1 MILLIGRAM(S): at 11:58

## 2019-12-21 RX ADMIN — OCTREOTIDE ACETATE 100 MICROGRAM(S): 200 INJECTION, SOLUTION INTRAVENOUS; SUBCUTANEOUS at 21:00

## 2019-12-21 RX ADMIN — HEPARIN SODIUM 1300 UNIT(S)/HR: 5000 INJECTION INTRAVENOUS; SUBCUTANEOUS at 19:51

## 2019-12-21 RX ADMIN — Medication 50 MILLIGRAM(S): at 20:54

## 2019-12-21 RX ADMIN — I.V. FAT EMULSION 31.05 GM/KG/DAY: 20 EMULSION INTRAVENOUS at 17:40

## 2019-12-21 RX ADMIN — MEROPENEM 100 MILLIGRAM(S): 1 INJECTION INTRAVENOUS at 05:34

## 2019-12-21 RX ADMIN — HEPARIN SODIUM 1300 UNIT(S)/HR: 5000 INJECTION INTRAVENOUS; SUBCUTANEOUS at 09:55

## 2019-12-21 RX ADMIN — TRAMADOL HYDROCHLORIDE 50 MILLIGRAM(S): 50 TABLET ORAL at 00:52

## 2019-12-21 RX ADMIN — Medication 2000 UNIT(S): at 17:34

## 2019-12-21 RX ADMIN — ESCITALOPRAM OXALATE 10 MILLIGRAM(S): 10 TABLET, FILM COATED ORAL at 11:58

## 2019-12-21 RX ADMIN — GABAPENTIN 100 MILLIGRAM(S): 400 CAPSULE ORAL at 05:44

## 2019-12-21 RX ADMIN — TRAMADOL HYDROCHLORIDE 50 MILLIGRAM(S): 50 TABLET ORAL at 21:23

## 2019-12-21 RX ADMIN — TRAMADOL HYDROCHLORIDE 50 MILLIGRAM(S): 50 TABLET ORAL at 01:50

## 2019-12-21 RX ADMIN — WARFARIN SODIUM 7.5 MILLIGRAM(S): 2.5 TABLET ORAL at 20:54

## 2019-12-21 NOTE — PROGRESS NOTE ADULT - SUBJECTIVE AND OBJECTIVE BOX
34 year old woman with no known major comorbidities (had not gone to see a doctor for 15 years) who presented to [facility] on 11/22/2019 with progressive sob, weakness, weight loss, She was found to have a large pleural effusion. Initially 1.5 L of dark serous fluid was removed.  Chest tube placed shortly thereafter, 3 additional liters was removed. CBC showed a leukocytosis of 40k and   CTA imaging revealed a 2.1cm partially cystic mass in the body/tail of the pancreas with possible upstream ductal dilatation. On 11/23/2019 the patient had a CT chest/abdomen/pelvis that found to have a clot in the right IJ and was started on a heparin gtt.   Chest tube out; on parenteral feedings  Tried to leave this AM, now supervised    Transferred to [facility] on 11/24/19.  MRCP performed on 11/26.  Underwent Debridement, wound, with dressing replacement  Wound VAC placement  Pleural biopsy  Lung decortication  Bronchoscopy, with lung decortication using VATS on 11/27/19.  Pt  has a history of alcohol abuse.     We were consulted for evaluation and management of the RIJ DVT.      INTERVAL HISTORY  The patient is frustrated about length of hospital stay. no bleeding. on heparin gtt  On TPN; treated for pancreatic cysts  No bleeding reported; denies SOB or chest pain      MEDICATIONS  (STANDING):  amitriptyline 10 milliGRAM(s) Oral at bedtime  ascorbic acid 500 milliGRAM(s) Oral daily  chlorhexidine 2% Cloths 1 Application(s) Topical <User Schedule>  cholecalciferol 2000 Unit(s) Oral daily  escitalopram 10 milliGRAM(s) Oral daily  ferrous    sulfate 325 milliGRAM(s) Oral three times a day  folic acid 1 milliGRAM(s) Oral daily  gabapentin 100 milliGRAM(s) Oral three times a day  heparin  Infusion. 1300 Unit(s)/Hr (13 mL/Hr) IV Continuous <Continuous>  lidocaine   Patch 1 Patch Transdermal daily  meropenem  IVPB 1000 milliGRAM(s) IV Intermittent every 8 hours  multivitamin 1 Tablet(s) Oral daily  octreotide  Injectable 100 MICROGram(s) SubCutaneous every 8 hours  pantoprazole    Tablet 40 milliGRAM(s) Oral before breakfast  Parenteral Nutrition - Adult 1 Each (83 mL/Hr) TPN Continuous <Continuous>  traZODone 50 milliGRAM(s) Oral at bedtime        Vital Signs Last 24 Hrs  Vital Signs Last 24 Hrs  T(C): 36.8 (21 Dec 2019 09:25), Max: 36.9 (20 Dec 2019 21:39)  T(F): 98.3 (21 Dec 2019 09:25), Max: 98.4 (20 Dec 2019 21:39)  HR: 108 (21 Dec 2019 09:25) (108 - 120)  BP: 126/87 (21 Dec 2019 09:25) (118/70 - 126/87)  BP(mean): --  RR: 20 (21 Dec 2019 09:25) (18 - 20)  SpO2: 100% (21 Dec 2019 09:25) (96% - 100%)  Gen: nad  CV: rrr  Abd: soft, nd, nt                  x                    142  | 25.0 | 8.0          x     >-----------< x       ------------------------< 140                   x                    4.7  | 104  | 0.42                                         Ca 9.3   Mg 1.9   Ph 5.3    PT/INR - ( 21 Dec 2019 06:59 )   PT: 17.4 sec;   INR: 1.49 ratio         PTT - ( 21 Dec 2019 06:59 )  PTT:56.9 sec

## 2019-12-21 NOTE — PROGRESS NOTE ADULT - SUBJECTIVE AND OBJECTIVE BOX
On Service Note   pt is seen examined in am , she was crying anxious wants to go home   explained to her in details her medical condition and reasons to remain in the hospital   discussed with GI and thoracic surgery team   pt denies any chest pain or abd pain       Vital Signs Last 24 Hrs  T(C): 36.8 (21 Dec 2019 09:25), Max: 36.9 (20 Dec 2019 21:39)  T(F): 98.3 (21 Dec 2019 09:25), Max: 98.4 (20 Dec 2019 21:39)  HR: 108 (21 Dec 2019 09:25) (108 - 120)  BP: 126/87 (21 Dec 2019 09:25) (118/70 - 126/87)  BP(mean): --  RR: 20 (21 Dec 2019 09:25) (18 - 20)  SpO2: 100% (21 Dec 2019 09:25) (96% - 100%)    PHYSICAL EXAM-  GENERAL: Not in distress. Alert  anxious   CARDIOVASCULAR: RRR S1, S2. No murmur/rubs/gallop  LUNGS: CTA bilaterally +,  no rales, no wheezing, no rhonchi.. right anterior lower chest drain  rube in place   ABDOMEN: ND. Soft,  mild diffuse upper quadrant tenderness   EXTREMITIES: no cyanosis, no clubbing, no edema.   SKIN: no rash. no cellulitis                                       11.0   13.79 )-----------( 341      ( 20 Dec 2019 06:11 )             34.6   12-20    140  |  104  |  6.0<L>  ----------------------------<  111  4.1   |  25.0  |  0.21<L>    Ca    8.7      20 Dec 2019 06:11  Phos  3.7     12-20  Mg     1.8     12-20 pt is seen examined in am , she was crying anxious wants to go home   explained to her in details her medical condition and reasons to remain in the hospital   discussed with GI and thoracic surgery team   pt denies any chest pain or abd pain       Vital Signs Last 24 Hrs  T(C): 36.8 (21 Dec 2019 09:25), Max: 36.9 (20 Dec 2019 21:39)  T(F): 98.3 (21 Dec 2019 09:25), Max: 98.4 (20 Dec 2019 21:39)  HR: 108 (21 Dec 2019 09:25) (108 - 120)  BP: 126/87 (21 Dec 2019 09:25) (118/70 - 126/87)  BP(mean): --  RR: 20 (21 Dec 2019 09:25) (18 - 20)  SpO2: 100% (21 Dec 2019 09:25) (96% - 100%)    PHYSICAL EXAM-  GENERAL: Not in distress. Alert  anxious   CARDIOVASCULAR: RRR S1, S2. No murmur/rubs/gallop  LUNGS: CTA bilaterally +,  no rales, no wheezing, no rhonchi.. right anterior lower chest drain  rube in place   ABDOMEN: ND. Soft,  mild diffuse upper quadrant tenderness   EXTREMITIES: no cyanosis, no clubbing, no edema.   SKIN: no rash. no cellulitis                                       11.0   13.79 )-----------( 341      ( 20 Dec 2019 06:11 )             34.6   12-20    140  |  104  |  6.0<L>  ----------------------------<  111  4.1   |  25.0  |  0.21<L>    Ca    8.7      20 Dec 2019 06:11  Phos  3.7     12-20  Mg     1.8     12-20

## 2019-12-21 NOTE — PROGRESS NOTE ADULT - ASSESSMENT
34 year old female with a PMHx of smoking, ETOH use (4-5 drinks/day), [place] [facility] [place] [facility] on 11/22 for complaints of weakness, 25 pound weight loss & flu like symptoms & found to have a large right pleural effusion, pleural fluid cultures + for group A hemolytic strep, + RIJ thrombus   11/23 CT of the chest/abd/pelvis indicated a clot in the Right IJ, pt started on heparin gtt.     s/p right VATS decort & washout debridement of CT site & VAC placement on 11/27/19.   ·	Persistent drainage from chest tube site so repeat CT C/A/P performed 12/4 which showed new cystic mass in pancreas and liver concerning for pancreatitis, GI consulted (NTD).   ·	Zosyn changed to meropenem on 12/6 by ID for broader coverage given pancreatitis. Plan to continue Meropenem for 4 weeks.   ·	Copious drainage from anterior chest tube site, so VAC removed and ostomy appliance applied for high output on 12/6.   ·	Pleural fluid with an amylase [place] 19920 (serum 51) noted.  Prealbumin noted to be 5. Pt tolerating diet. Ensure drinks added.   ·	12/7 Right chest tube removed. Esophogram normal.   ·	Repeat CT scan showed a persistent Right hydropneumothorax with air anteriorly, right lower anterolateral chest wall with air tract to right pleural space, and Persistent renal nephrograms b/l indicating ATN.   ·	All cultures negative. 12/8 Ostomy appliance removed and dressing applied.   ·	Right lower extremity swelling noted to be worsening and patient upgraded to the ICU on 12/8. No acute findings on venous and arterial US of b/l LEs or Renal US.      12/9 s.p PICC placement & Right anterior pig tail catheter & right lateral wound vac placement with bridge     ·	MRCP significant for Peripancreatic fluid and edema consistent with pancreatitis. Cystic lesions in the pancreatic head with the largest measuring 2.1 cm, likely acute peripancreatic collections, increased in size since 12/4/2019, with a tract of fluid extending from the 2.1 cm cystic lesion into the mediastinum and towards the pleural space on the right suspicious for a pancreatic pleural fistula.    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      12/13 Patient transferred to medicine.   12/15/19 Wound VAC changed.  12/16/19 Repeat xray unchanged.   12/18 US Left chest showed small effusion- no intervention. VAC changed.   12/21: VAC changed

## 2019-12-21 NOTE — PROGRESS NOTE ADULT - SUBJECTIVE AND OBJECTIVE BOX
Significant recent/past 24 hr events:  No acute overnight events. Wound VAC changed today.  Pt currently in NAD and without acute complaints. Denies N/V/D HA, dizziness, blurry vision, numbness/tingling, SOB, cough, chest pain, palpitations, abd pain or urinary sx's.     Subjective:  Review of Systems    ROS negative x 10 systems except as noted above    Patient is a 34y old  Female who presents with a chief complaint of Pleural effusion (21 Dec 2019 16:21)    HPI:  [place] [facility] [place] with weakness, 25lb weight loss of several months, flu like symptoms, on work up found to have a large right pleural effusion.  Pulmonology contact for thoracentesis, 1.5 L of dark serous fluid was removed.  Chest tube placed shortly thereafter, 3 liters was removed at that point in time. Patient had some abnormalities in their lab work, found to have a leukocytosis with wbc of 40, platelet count of 793, Na 120, chloride 73, lactate of 4.7, amylase 268,  and CTA imaging also revealed a 2.1cm partially cystic mass in the body/tail of the pancreas with possible upstream ductal dilatation. 11/23 patient had CT chest/abdomen/pelvis and was also found to have a clot in the right IJ and was started on a heparin gtt. Patient does state that she has had a recent cough, and complains of right sided neck pain. 11/14 Patient transferred to [facility]. (25 Nov 2019 00:10)    Vitals   ICU Vital Signs Last 24 Hrs  T(C): 37 (21 Dec 2019 16:20), Max: 37 (21 Dec 2019 16:20)  T(F): 98.6 (21 Dec 2019 16:20), Max: 98.6 (21 Dec 2019 16:20)  HR: 118 (21 Dec 2019 16:20) (108 - 120)  BP: 140/92 (21 Dec 2019 16:20) (118/70 - 140/92)  BP(mean): --  ABP: --  ABP(mean): --  RR: 18 (21 Dec 2019 16:20) (18 - 20)  SpO2: 96% (21 Dec 2019 16:20) (96% - 100%)    I&O's Detail    20 Dec 2019 07:01  -  21 Dec 2019 07:00  --------------------------------------------------------  IN:    heparin  Infusion.: 156 mL    Oral Fluid: 100 mL    TPN (Total Parenteral Nutrition): 1079 mL  Total IN: 1335 mL    OUT:  Total OUT: 0 mL    Total NET: 1335 mL      21 Dec 2019 07:01  -  21 Dec 2019 18:32  --------------------------------------------------------  IN:    Oral Fluid: 340 mL  Total IN: 340 mL    OUT:  Total OUT: 0 mL    Total NET: 340 mL        LABS                        11.0   13.79 )-----------( 341      ( 20 Dec 2019 06:11 )             34.6     12-21    142  |  104  |  8.0  ----------------------------<  140<H>  4.7   |  25.0  |  0.42<L>    Ca    9.3      21 Dec 2019 06:59  Phos  5.3     12-21  Mg     1.9     12-21    PT/INR - ( 21 Dec 2019 06:59 )   PT: 17.4 sec;   INR: 1.49 ratio      PTT - ( 21 Dec 2019 06:59 )  PTT:56.9 sec    POCT Blood Glucose.: 101 mg/dL (12-21-19 @ 17:33)  POCT Blood Glucose.: 91 mg/dL (12-21-19 @ 12:05)  POCT Blood Glucose.: 108 mg/dL (12-21-19 @ 05:29)  POCT Blood Glucose.: 113 mg/dL (12-20-19 @ 22:22)      MEDICATIONS  (STANDING):  amitriptyline 10 milliGRAM(s) Oral at bedtime  ascorbic acid 500 milliGRAM(s) Oral daily  chlorhexidine 2% Cloths 1 Application(s) Topical <User Schedule>  cholecalciferol 2000 Unit(s) Oral daily  escitalopram 10 milliGRAM(s) Oral daily  fat emulsion (Plant Based) 20% Infusion 0.9 Gm/kG/Day (31.05 mL/Hr) IV Continuous <Continuous>  ferrous    sulfate 325 milliGRAM(s) Oral three times a day  folic acid 1 milliGRAM(s) Oral daily  gabapentin 100 milliGRAM(s) Oral three times a day  heparin  Infusion. 1300 Unit(s)/Hr (13 mL/Hr) IV Continuous <Continuous>  lidocaine   Patch 1 Patch Transdermal daily  meropenem  IVPB 1000 milliGRAM(s) IV Intermittent every 8 hours  multivitamin 1 Tablet(s) Oral daily  octreotide  Injectable 100 MICROGram(s) SubCutaneous every 8 hours  pantoprazole    Tablet 40 milliGRAM(s) Oral before breakfast  Parenteral Nutrition - Adult 1 Each (83 mL/Hr) TPN Continuous <Continuous>  Parenteral Nutrition - Adult 1 Each (83 mL/Hr) TPN Continuous <Continuous>  traZODone 50 milliGRAM(s) Oral at bedtime  warfarin 7.5 milliGRAM(s) Oral once    MEDICATIONS  (PRN):  acetaminophen   Tablet .. 650 milliGRAM(s) Oral every 6 hours PRN Mild Pain (1 - 3)  ALPRAZolam 0.25 milliGRAM(s) Oral three times a day PRN anxiety  aluminum hydroxide/magnesium hydroxide/simethicone Suspension 30 milliLiter(s) Oral every 4 hours PRN Dyspepsia  heparin  Injectable 4000 Unit(s) IV Push every 6 hours PRN For aPTT less than 40  heparin  Injectable 2000 Unit(s) IV Push every 6 hours PRN For aPTT between 40 - 57  melatonin 3 milliGRAM(s) Oral at bedtime PRN Insomnia  methocarbamol 500 milliGRAM(s) Oral every 6 hours PRN Muscle Spasm  ondansetron Injectable 4 milliGRAM(s) IV Push every 4 hours PRN Nausea and/or Vomiting  sodium chloride 0.9% lock flush 10 milliLiter(s) IV Push every 1 hour PRN Pre/post blood products, medications, blood draw, and to maintain line patency  traMADol 50 milliGRAM(s) Oral every 8 hours PRN Moderate Pain (4 - 6)    Allergies:  Ensure TID (Unknown)  No Known Allergies    Physical Exam:   Constitutional: NAD  Neck: supple,  No JVD. trachea midline  Respiratory: Breath Sounds equal & clear bilaterally to auscultation, no accessory muscle use noted  Cardiovascular: Regular rate, regular rhythm, normal S1, S2; no murmurs or rub  Gastrointestinal: Soft, non-tender, non distended, normal bowel sounds  Extremities: THACKER x 4, no peripheral edema, no cyanosis, no clubbing   Vascular: Equal and normal pulses: 2+ peripheral pulses throughout  Neurological: A+O x 3; speech clear and intact; no gross sensory/motor deficits  Skin: warm, dry, well perfused, no rashes  Tubes/Lines: VAC dressing-Thoracotomy sites-2x0.5x0.5cm & 2x1x0.5cm wounds with 100% granluation tissue.

## 2019-12-21 NOTE — PROGRESS NOTE ADULT - ASSESSMENT
34 year old woman with no known major comorbidities (had not gone to see a doctor for 15 years) who presented to [facility] on 11/22/2019 with progressive sob, weakness, weight loss, She was found to have a large pleural effusion. Initially 1.5 L of dark serous fluid was removed. Chest tube placed shortly thereafter, 3 additional liters was removed. pleural fluid cultures + for group A hemolytic strep,CBC showed a leukocytosis of 40k and CTA imaging revealed a 2.1cm partially cystic mass in the body/tail of the pancreas with possible upstream ductal dilatation. On 11/23/2019 the patient had a CT chest/abdomen/pelvis that found to have a clot in the right IJ and was started on a heparin gtt   s/p right VATS decort & washout debridement of CT site & VAC placement on 11/27/19  Persistent drainage from chest tube site so repeat CT C/A/P performed 12/4 which showed new cystic mass in pancreas and liver concerning for pancreatitis  Zosyn changed to meropenem on 12/6  for broader coverage given pancreatitis  Copious drainage from anterior chest tube site, so VAC removed and ostomy appliance applied for high output on 12/6.   12/7 Right chest tube removed.   Repeat CT scan showed a persistent Right hydropneumothorax with air anteriorly  12/8 Ostomy appliance removed and dressing applied.   12/9 s.p PICC placement & Right anterior pig tail catheter & right lateral wound vac placement   MRCP significant for Peripancreatic fluid and edema consistent with pancreatitis. Cystic lesions in the pancreatic head with the largest measuring 2.1 cm, likely acute peripancreatic collections, increased in size since 12/4/2019, with a tract of fluid extending from the 2.1 cm cystic lesion into the mediastinum and towards the pleural space on the right suspicious for a pancreatic pleural fistula.    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 and Multifocal PNA  OCtreotide/ TPN started by GI with pt being on clears PO  Heme- transitioned to Coumadin  12/9/19 PICC line placed      Hyponatremia- deemed sec to alcoholism and SIADH ruled out;   ATN- now resolved  Acute pancreatitis  Abnormal TFTs- being followed by Endocrine without any new diagnosis  Anemia requiring BT  rt leg swelling- not DVT    Assessment and Plan:    1- Rt pl effusion/ Empyema  being followed by Thoracic and ID, chest wound vac in place   c/w Abx as per ID  cont wound vac change today by thoracic surgery team     2- 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

## 2019-12-21 NOTE — PROGRESS NOTE ADULT - PROBLEM SELECTOR PLAN 2
secondary to translocation from pancreatic cyst with pancreatic fistula   ID Following, c/w meropenem as per ID

## 2019-12-21 NOTE — PROGRESS NOTE ADULT - ASSESSMENT
Patient currently on TPN for pancreatic rest due to pancreaticopleural fistula.  She is understandably frustrated by her prolonged hospitalization but does not seem to have much insight into how ill she is.  Would recommend psychiatry evaluation.    TPN and AM labs reordered

## 2019-12-21 NOTE — PROGRESS NOTE ADULT - PROBLEM SELECTOR PLAN 1
s/p Right VATS decortication & washout on 11/27/19. Debridement of CT site & VAC placement.  Encourage the use of I/S, OOB to chair, daily PT, ambulate as tolerated  PICC line on left for TPN  PICC on right for meds  New vac placement to right lateral chest wall 12/21, due to be changed 12/24  Daily CXR  Continue supportive care.

## 2019-12-21 NOTE — CHART NOTE - NSCHARTNOTEFT_GEN_A_CORE
CT Surgery    Procedure: Wound VAC change    Patient positioned supine in bed. Wound VAC dressing + bridge removed, revealing 2x0.5x0.5cm & 2x1x0.5cm open wound with 100% granluation tissue. No bleeding encountered. No tunneling noted. Further findings: Serous drainage at inferior wound    Wound irrigated with sterile saline and dried with sterile gauze. Chloraprep used on skin surrounding wound. A BLACK SPONGE was carved to the shape and depth of wound and secured to wound with a clear adhesive dressing. Wound VAC connection attached to small opening in top of dressing. Wound VAC suction initiated with good suction, seal and no leak. All edges of the wound are covered. The patient tolerated the procedure well.     Plan for next VAC change in 3 days. (12/24)

## 2019-12-21 NOTE — PROGRESS NOTE ADULT - ASSESSMENT
34 year old female with no known major comorbidities who is being treated for right pleural effusion/empyema/multifocal pna found to have a RIJ thrombus currently on heparin drip.    1) RIJ venous thrombosis -provoked event that occurred in context of her severe and complex lung illness/infection/empyema that may have led to developing the thrombus. Plan on anticoagulation for  3 months. Plan for warfarin noted as outpatient. will need frequent INR monitoring. receiving warfarin at 7.5mg. INR:1.49  2) Multifactorial anemia; Hb11  3) Reactive leukocytosis, improved.  WBC 13.7  4) Pancreatic Fistula-care per managing service/ID/GI

## 2019-12-21 NOTE — PROGRESS NOTE ADULT - SUBJECTIVE AND OBJECTIVE BOX
Chief Complaint: This is a 34y old woman patient being seen in follow-up consultation for pancreatitis.    HPI / 24H events:  Patient very frustrated about still being in the hospital.  Denies any pain, nausea or vomiting.  Feels that she can handle all of her medical care by herself at home.    ROS: A 14-point review of systems was reviewed and was otherwise negative save what was reported in the HPI.    PAST MEDICAL/SURGICAL HISTORY:    MEDICATIONS  (STANDING):  amitriptyline 10 milliGRAM(s) Oral at bedtime  ascorbic acid 500 milliGRAM(s) Oral daily  chlorhexidine 2% Cloths 1 Application(s) Topical <User Schedule>  cholecalciferol 2000 Unit(s) Oral daily  escitalopram 10 milliGRAM(s) Oral daily  fat emulsion (Plant Based) 20% Infusion 0.9 Gm/kG/Day (31.05 mL/Hr) IV Continuous <Continuous>  ferrous    sulfate 325 milliGRAM(s) Oral three times a day  folic acid 1 milliGRAM(s) Oral daily  gabapentin 100 milliGRAM(s) Oral three times a day  heparin  Infusion. 1300 Unit(s)/Hr (13 mL/Hr) IV Continuous <Continuous>  lidocaine   Patch 1 Patch Transdermal daily  meropenem  IVPB 1000 milliGRAM(s) IV Intermittent every 8 hours  multivitamin 1 Tablet(s) Oral daily  octreotide  Injectable 100 MICROGram(s) SubCutaneous every 8 hours  pantoprazole    Tablet 40 milliGRAM(s) Oral before breakfast  Parenteral Nutrition - Adult 1 Each (83 mL/Hr) TPN Continuous <Continuous>  Parenteral Nutrition - Adult 1 Each (83 mL/Hr) TPN Continuous <Continuous>  traZODone 50 milliGRAM(s) Oral at bedtime  warfarin 7.5 milliGRAM(s) Oral once    MEDICATIONS  (PRN):  acetaminophen   Tablet .. 650 milliGRAM(s) Oral every 6 hours PRN Mild Pain (1 - 3)  ALPRAZolam 0.25 milliGRAM(s) Oral three times a day PRN anxiety  aluminum hydroxide/magnesium hydroxide/simethicone Suspension 30 milliLiter(s) Oral every 4 hours PRN Dyspepsia  heparin  Injectable 4000 Unit(s) IV Push every 6 hours PRN For aPTT less than 40  heparin  Injectable 2000 Unit(s) IV Push every 6 hours PRN For aPTT between 40 - 57  melatonin 3 milliGRAM(s) Oral at bedtime PRN Insomnia  methocarbamol 500 milliGRAM(s) Oral every 6 hours PRN Muscle Spasm  ondansetron Injectable 4 milliGRAM(s) IV Push every 4 hours PRN Nausea and/or Vomiting  sodium chloride 0.9% lock flush 10 milliLiter(s) IV Push every 1 hour PRN Pre/post blood products, medications, blood draw, and to maintain line patency  traMADol 50 milliGRAM(s) Oral every 8 hours PRN Moderate Pain (4 - 6)    Ensure TID (Unknown)  No Known Allergies    T(C): 36.8 (12-21-19 @ 09:25), Max: 36.9 (12-20-19 @ 21:39)  HR: 108 (12-21-19 @ 09:25) (108 - 120)  BP: 126/87 (12-21-19 @ 09:25) (118/70 - 126/87)  RR: 20 (12-21-19 @ 09:25) (18 - 20)  SpO2: 100% (12-21-19 @ 09:25) (96% - 100%)    I&O's Summary    20 Dec 2019 07:01  -  21 Dec 2019 07:00  --------------------------------------------------------  IN: 1335 mL / OUT: 0 mL / NET: 1335 mL    21 Dec 2019 07:01  -  21 Dec 2019 16:21  --------------------------------------------------------  IN: 340 mL / OUT: 0 mL / NET: 340 mL      PHYSICAL EXAM:  Constitutional: Well-developed, malnourished, in no apparent distress, tearful  Eyes: Sclerae anicteric, conjunctivae normal  ENMT: Mucus membranes moist, no oropharyngeal thrush noted  Neck: No thyroid nodules appreciated, no significant cervical or supraclavicular lymphadenopathy  Respiratory: Breathing nonlabored  Cardiovascular: Regular rate and rhythm  Gastrointestinal: Soft, nontender, nondistended, normoactive bowel sounds; no hepatosplenomegaly appreciated; no rebound tenderness or involuntary guarding  Extremities: No clubbing, cyanosis or edema  Neurological: Alert and oriented to person, place and time; no asterixis  Skin: No jaundice  Lymph Nodes: No significant lymphadenopathy  Musculoskeletal: No significant peripheral atrophy  Psychiatric: Affect and mood appropriate                   11.0   13.79 )-----------( 341      ( 12-20 @ 06:11 )             34.6                10.2   17.40 )-----------( 325      ( 12-19 @ 07:18 )             32.5       12-21    142  |  104  |  8.0  ----------------------------<  140<H>  4.7   |  25.0  |  0.42<L>    Ca    9.3      21 Dec 2019 06:59  Phos  5.3     12-21  Mg     1.9     12-21    PT/INR - ( 21 Dec 2019 06:59 )   PT: 17.4 sec;   INR: 1.49 ratio         PTT - ( 21 Dec 2019 06:59 )  PTT:56.9 sec

## 2019-12-22 LAB
ALBUMIN SERPL ELPH-MCNC: 2.5 G/DL — LOW (ref 3.3–5.2)
ALP SERPL-CCNC: 331 U/L — HIGH (ref 40–120)
ALT FLD-CCNC: 14 U/L — SIGNIFICANT CHANGE UP
ANION GAP SERPL CALC-SCNC: 13 MMOL/L — SIGNIFICANT CHANGE UP (ref 5–17)
APTT BLD: 87 SEC — HIGH (ref 27.5–36.3)
AST SERPL-CCNC: 22 U/L — SIGNIFICANT CHANGE UP
BILIRUB SERPL-MCNC: <0.2 MG/DL — LOW (ref 0.4–2)
BUN SERPL-MCNC: 9 MG/DL — SIGNIFICANT CHANGE UP (ref 8–20)
CALCIUM SERPL-MCNC: 8.9 MG/DL — SIGNIFICANT CHANGE UP (ref 8.6–10.2)
CHLORIDE SERPL-SCNC: 104 MMOL/L — SIGNIFICANT CHANGE UP (ref 98–107)
CO2 SERPL-SCNC: 22 MMOL/L — SIGNIFICANT CHANGE UP (ref 22–29)
CREAT SERPL-MCNC: 0.37 MG/DL — LOW (ref 0.5–1.3)
GLUCOSE BLDC GLUCOMTR-MCNC: 118 MG/DL — HIGH (ref 70–99)
GLUCOSE BLDC GLUCOMTR-MCNC: 88 MG/DL — SIGNIFICANT CHANGE UP (ref 70–99)
GLUCOSE BLDC GLUCOMTR-MCNC: 97 MG/DL — SIGNIFICANT CHANGE UP (ref 70–99)
GLUCOSE SERPL-MCNC: 96 MG/DL — SIGNIFICANT CHANGE UP (ref 70–115)
INR BLD: 1.57 RATIO — HIGH (ref 0.88–1.16)
MAGNESIUM SERPL-MCNC: 1.9 MG/DL — SIGNIFICANT CHANGE UP (ref 1.6–2.6)
PHOSPHATE SERPL-MCNC: 4.8 MG/DL — HIGH (ref 2.4–4.7)
POTASSIUM SERPL-MCNC: 4.4 MMOL/L — SIGNIFICANT CHANGE UP (ref 3.5–5.3)
POTASSIUM SERPL-SCNC: 4.4 MMOL/L — SIGNIFICANT CHANGE UP (ref 3.5–5.3)
PROT SERPL-MCNC: 6.4 G/DL — LOW (ref 6.6–8.7)
PROTHROM AB SERPL-ACNC: 18.3 SEC — HIGH (ref 10–12.9)
SODIUM SERPL-SCNC: 139 MMOL/L — SIGNIFICANT CHANGE UP (ref 135–145)

## 2019-12-22 PROCEDURE — 99233 SBSQ HOSP IP/OBS HIGH 50: CPT

## 2019-12-22 PROCEDURE — 99232 SBSQ HOSP IP/OBS MODERATE 35: CPT

## 2019-12-22 PROCEDURE — 71045 X-RAY EXAM CHEST 1 VIEW: CPT | Mod: 26

## 2019-12-22 RX ORDER — ELECTROLYTE SOLUTION,INJ
1 VIAL (ML) INTRAVENOUS
Refills: 0 | Status: DISCONTINUED | OUTPATIENT
Start: 2019-12-22 | End: 2019-12-22

## 2019-12-22 RX ORDER — I.V. FAT EMULSION 20 G/100ML
0.9 EMULSION INTRAVENOUS
Qty: 50 | Refills: 0 | Status: DISCONTINUED | OUTPATIENT
Start: 2019-12-22 | End: 2019-12-22

## 2019-12-22 RX ADMIN — ESCITALOPRAM OXALATE 10 MILLIGRAM(S): 10 TABLET, FILM COATED ORAL at 13:34

## 2019-12-22 RX ADMIN — GABAPENTIN 100 MILLIGRAM(S): 400 CAPSULE ORAL at 05:43

## 2019-12-22 RX ADMIN — Medication 10 MILLIGRAM(S): at 21:26

## 2019-12-22 RX ADMIN — Medication 50 MILLIGRAM(S): at 21:26

## 2019-12-22 RX ADMIN — Medication 325 MILLIGRAM(S): at 05:42

## 2019-12-22 RX ADMIN — MEROPENEM 100 MILLIGRAM(S): 1 INJECTION INTRAVENOUS at 05:43

## 2019-12-22 RX ADMIN — Medication 1 MILLIGRAM(S): at 13:33

## 2019-12-22 RX ADMIN — Medication 325 MILLIGRAM(S): at 21:26

## 2019-12-22 RX ADMIN — Medication 650 MILLIGRAM(S): at 13:20

## 2019-12-22 RX ADMIN — PANTOPRAZOLE SODIUM 40 MILLIGRAM(S): 20 TABLET, DELAYED RELEASE ORAL at 05:44

## 2019-12-22 RX ADMIN — HEPARIN SODIUM 1300 UNIT(S)/HR: 5000 INJECTION INTRAVENOUS; SUBCUTANEOUS at 02:53

## 2019-12-22 RX ADMIN — Medication 650 MILLIGRAM(S): at 12:20

## 2019-12-22 RX ADMIN — MEROPENEM 100 MILLIGRAM(S): 1 INJECTION INTRAVENOUS at 21:26

## 2019-12-22 RX ADMIN — Medication 500 MILLIGRAM(S): at 13:33

## 2019-12-22 RX ADMIN — Medication 1 EACH: at 18:03

## 2019-12-22 RX ADMIN — Medication 650 MILLIGRAM(S): at 00:43

## 2019-12-22 RX ADMIN — Medication 0.25 MILLIGRAM(S): at 21:26

## 2019-12-22 RX ADMIN — TRAMADOL HYDROCHLORIDE 50 MILLIGRAM(S): 50 TABLET ORAL at 06:42

## 2019-12-22 RX ADMIN — GABAPENTIN 100 MILLIGRAM(S): 400 CAPSULE ORAL at 21:28

## 2019-12-22 RX ADMIN — TRAMADOL HYDROCHLORIDE 50 MILLIGRAM(S): 50 TABLET ORAL at 05:42

## 2019-12-22 RX ADMIN — TRAMADOL HYDROCHLORIDE 50 MILLIGRAM(S): 50 TABLET ORAL at 22:09

## 2019-12-22 RX ADMIN — GABAPENTIN 100 MILLIGRAM(S): 400 CAPSULE ORAL at 13:59

## 2019-12-22 RX ADMIN — MEROPENEM 100 MILLIGRAM(S): 1 INJECTION INTRAVENOUS at 13:34

## 2019-12-22 RX ADMIN — Medication 325 MILLIGRAM(S): at 13:33

## 2019-12-22 RX ADMIN — TRAMADOL HYDROCHLORIDE 50 MILLIGRAM(S): 50 TABLET ORAL at 21:26

## 2019-12-22 RX ADMIN — METHOCARBAMOL 500 MILLIGRAM(S): 500 TABLET, FILM COATED ORAL at 12:21

## 2019-12-22 RX ADMIN — Medication 2000 UNIT(S): at 13:33

## 2019-12-22 RX ADMIN — Medication 1 TABLET(S): at 13:56

## 2019-12-22 RX ADMIN — OCTREOTIDE ACETATE 100 MICROGRAM(S): 200 INJECTION, SOLUTION INTRAVENOUS; SUBCUTANEOUS at 13:36

## 2019-12-22 RX ADMIN — OCTREOTIDE ACETATE 100 MICROGRAM(S): 200 INJECTION, SOLUTION INTRAVENOUS; SUBCUTANEOUS at 21:28

## 2019-12-22 RX ADMIN — I.V. FAT EMULSION 31.05 GM/KG/DAY: 20 EMULSION INTRAVENOUS at 18:04

## 2019-12-22 NOTE — PROGRESS NOTE ADULT - ASSESSMENT
34 y.o. F who was  transferred from [facility] to [facility] on 11/22 for complaints of weakness, 25 pound weight loss & flu like symptoms & found to have a large right pleural effusion,   pleural fluid cultures + for group A hemolytic strep, + RIJ thormbus   11/23 CT of the chest/abd/pelvis indicated a clot in the Right IJ, pt started on heparin gtt.   s/p right VATS decort & washout debridement of CT site & VAC placement on 11/27/19.   Persistent drainage from chest tube site so repeat CT C/A/P performed 12/4 which showed new cystic mass in pancreas and liver concerning for pancreatitis  Zosyn changed to meropenem on 12/6  for broader coverage given pancreatitis  Copious drainage from anterior chest tube site, so VAC removed and ostomy appliance applied for high output on 12/6.   12/7 Right chest tube removed.   Repeat CT scan showed a persistent Right hydropneumothorax with air anteriorly  12/8 Ostomy appliance removed and dressing applied.   12/9 s.p PICC placement & Right anterior pig tail catheter & right lateral wound vac placement   MRCP significant for Peripancreatic fluid and edema consistent with pancreatitis. Cystic lesions in the pancreatic head with the largest measuring 2.1 cm, likely acute peripancreatic collections, increased in size since 12/4/2019, with a tract of fluid extending from the 2.1 cm cystic lesion into the mediastinum and towards the pleural space on the right suspicious for a pancreatic pleural fistula.    R pleural effusion  Pancreatitis  R pancreaticopleural fistula   Alcohol abuse       11/24 zosyn  11/24 to 12/3 ceftriaxone   12/3 to 12/6 zosyn  12/6 to present meropenem      - All cultures negative , blood, urine, pleural fluid and chest tube insertion site.  - Leukocytosis improving from 42k to 13k now.   - No fever   - Continue Meropenem 1gm q8h for necrotizing pancreatitis and fistula with pleural space. GI and surgery following.   - Has 2 PICC lines, on TPN and ABx, watch for superinfection  - MRI results reviewed  - Plan for antibiotics till 1/3 /20 to complete 4 weeks from Chest tube removal       Will follow

## 2019-12-22 NOTE — PROGRESS NOTE ADULT - SUBJECTIVE AND OBJECTIVE BOX
[facility]  INFECTIOUS DISEASES AND INTERNAL MEDICINE at [place]  =======================================================  Carlos Alberto Soto MD  Diplomates American Board of Internal Medicine and Infectious Diseases  Tel: [phone]      Fax: [phone]  =======================================================    HERACLIO MOLINA [id number]    Follow up: R pleural effusion    No fever. No diarrhea. On TPN.    R pleural effusion s/p VATS, pancreaticopleural fistula.     Allergies:  Ensure TID (Unknown)  No Known Allergies      Antibiotics:  meropenem  IVPB 1000 milliGRAM(s) IV Intermittent every 8 hours      REVIEW OF SYSTEMS:  CONSTITUTIONAL:  No Fever or chills  HEENT:   No diplopia or blurred vision.  No earache, sore throat or runny nose.  CARDIOVASCULAR:  No pressure, squeezing, strangling, tightness, heaviness or aching about the chest, neck, axilla or epigastrium.  RESPIRATORY:  No cough, shortness of breath  GASTROINTESTINAL:  No nausea, vomiting or diarrhea.  GENITOURINARY:  No dysuria, frequency or urgency.   MUSCULOSKELETAL:  no joint aches, no muscle pain  SKIN:  No change in skin, hair or nails.  NEUROLOGIC:  No Headaches, seizures   PSYCHIATRIC:  No disorder of thought or mood.  ENDOCRINE:  No heat or cold intolerance  HEMATOLOGICAL:  No easy bruising or bleeding.       Physical Exam:  Vital Signs Last 24 Hrs  T(C): 37.1 (21 Dec 2019 21:51), Max: 37.1 (21 Dec 2019 20:45)  T(F): 98.8 (21 Dec 2019 21:51), Max: 98.8 (21 Dec 2019 21:51)  HR: 117 (22 Dec 2019 04:07) (115 - 147)  BP: 129/91 (21 Dec 2019 21:51) (116/78 - 140/92)  RR: 18 (21 Dec 2019 21:51) (18 - 18)  SpO2: 97% (21 Dec 2019 21:51) (96% - 97%)      GEN: NAD, pleasant  HEENT: normocephalic and atraumatic. EOMI. PERRL.  Anicteric   NECK: Supple.   LUNGS: Clear to auscultation. Wound vac on site of old chest tube  HEART: Regular rate and rhythm   ABDOMEN: Soft, nontender, and nondistended.  Positive bowel sounds.    : No CVA tenderness  EXTREMITIES: Without any edema.  MSK: no joint swelling  NEUROLOGIC: No focal deficits  PSYCHIATRIC: Appropriate affect .  SKIN: No Rash      Labs:  12-22    139  |  104  |  9.0  ----------------------------<  96  4.4   |  22.0  |  0.37<L>    Ca    8.9      22 Dec 2019 06:56  Phos  4.8     12-22  Mg     1.9     12-22    TPro  6.4<L>  /  Alb  2.5<L>  /  TBili  <0.2<L>  /  DBili  x   /  AST  22  /  ALT  14  /  AlkPhos  331<H>  12-22        PT/INR - ( 22 Dec 2019 06:56 )   PT: 18.3 sec;   INR: 1.57 ratio         PTT - ( 22 Dec 2019 02:25 )  PTT:87.0 sec    LIVER FUNCTIONS - ( 22 Dec 2019 06:56 )  Alb: 2.5 g/dL / Pro: 6.4 g/dL / ALK PHOS: 331 U/L / ALT: 14 U/L / AST: 22 U/L / GGT: x             < from: MR Abdomen w/ IV Cont (12.20.19 @ 15:56) >  EXAM:  MR ABDOMEN IC                          PROCEDURE DATE:  12/20/2019          INTERPRETATION:  CLINICAL INFORMATION: 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    COMPARISON: CT dated 12/04/2019.    PROCEDURE:   MRI of the abdomen was performed with and without intravenous contrast.  IV Contrast: Gadavist. 5 cc administered, 2.5 cc discarded.  MRCP was performed.    FINDINGS:    LOWER CHEST: Subsegmental atelectasis/infiltrates and trace pleural reaction both lung bases, left more thanright.    LIVER: Previously seen cystic caudate lobe lesion is smaller measuring 1.3 cm in comparison to prior measurement of 1.9 cm. Posteriorly located left hepatic lobe lesion is not identified at this time.   BILE DUCTS: Normal caliber.  GALLBLADDER: Within normal limits.  SPLEEN: Within normal limits.  PANCREAS: An 11 mm sized peripherally enhancing lesion involving the proximal body of the pancreas is smaller. It previously measured 1.6 cm. A 3 mm sized  lesion in the mid body of the pancreas is stable. A 1.1 cm sized peripancreatic structure near the neck of the pancreas also appears to be smaller but was not well the linear dated on prior CT imaging. Additional small peripancreatic possibly cystic structures are either less pronouncedor resolved. The improvement suggests cystic pancreatic and peripancreatic collections related to pancreatitis. Suggest clinical correlation and follow-up. Pancreatic or pleural fistula of flow clinical concern is not delineated by MR imaging.  ADRENALS: Within normal limits.  KIDNEYS/URETERS: Within normal limits.    VISUALIZED PORTIONS:    BOWEL: Within normal limits.   PERITONEUM: No ascites.  VESSELS: Within normal limits.  RETROPERITONEUM/LYMPH NODES: No lymphadenopathy.    ABDOMINAL WALL: Within normal limits.  BONES: Within normal limits.    IMPRESSION:     Stable or improving peripancreatic and pancreatic possibly cystic lesions suggest possibility of pancreatitis related collections. Clinical correlation and follow-up suggested.    Bibasilar atelectasis/infiltrate as well as pleural reaction, left more than right.    < end of copied text >

## 2019-12-22 NOTE — PROGRESS NOTE ADULT - SUBJECTIVE AND OBJECTIVE BOX
Chief Complaint: This is a 34y old woman patient being seen in follow-up consultation for pancreatitis.    HPI / 24H events:  Patient continues to be frustrated about still being in the hospital.  Denies any pain, nausea or vomiting.  Tolerating TPN without issue.    ROS: A 14-point review of systems was reviewed and was otherwise negative save what was reported in the HPI.    PAST MEDICAL/SURGICAL HISTORY: None before this hospitalization    MEDICATIONS  (STANDING):  amitriptyline 10 milliGRAM(s) Oral at bedtime  ascorbic acid 500 milliGRAM(s) Oral daily  chlorhexidine 2% Cloths 1 Application(s) Topical <User Schedule>  cholecalciferol 2000 Unit(s) Oral daily  escitalopram 10 milliGRAM(s) Oral daily  fat emulsion (Plant Based) 20% Infusion 0.9 Gm/kG/Day (31.05 mL/Hr) IV Continuous <Continuous>  ferrous    sulfate 325 milliGRAM(s) Oral three times a day  folic acid 1 milliGRAM(s) Oral daily  gabapentin 100 milliGRAM(s) Oral three times a day  heparin  Infusion. 1300 Unit(s)/Hr (13 mL/Hr) IV Continuous <Continuous>  lidocaine   Patch 1 Patch Transdermal daily  meropenem  IVPB 1000 milliGRAM(s) IV Intermittent every 8 hours  multivitamin 1 Tablet(s) Oral daily  octreotide  Injectable 100 MICROGram(s) SubCutaneous every 8 hours  pantoprazole    Tablet 40 milliGRAM(s) Oral before breakfast  Parenteral Nutrition - Adult 1 Each (83 mL/Hr) TPN Continuous <Continuous>  Parenteral Nutrition - Adult 1 Each (83 mL/Hr) TPN Continuous <Continuous>  traZODone 50 milliGRAM(s) Oral at bedtime  warfarin 7.5 milliGRAM(s) Oral once    MEDICATIONS  (PRN):  acetaminophen   Tablet .. 650 milliGRAM(s) Oral every 6 hours PRN Mild Pain (1 - 3)  ALPRAZolam 0.25 milliGRAM(s) Oral three times a day PRN anxiety  aluminum hydroxide/magnesium hydroxide/simethicone Suspension 30 milliLiter(s) Oral every 4 hours PRN Dyspepsia  heparin  Injectable 4000 Unit(s) IV Push every 6 hours PRN For aPTT less than 40  heparin  Injectable 2000 Unit(s) IV Push every 6 hours PRN For aPTT between 40 - 57  melatonin 3 milliGRAM(s) Oral at bedtime PRN Insomnia  methocarbamol 500 milliGRAM(s) Oral every 6 hours PRN Muscle Spasm  ondansetron Injectable 4 milliGRAM(s) IV Push every 4 hours PRN Nausea and/or Vomiting  sodium chloride 0.9% lock flush 10 milliLiter(s) IV Push every 1 hour PRN Pre/post blood products, medications, blood draw, and to maintain line patency  traMADol 50 milliGRAM(s) Oral every 8 hours PRN Moderate Pain (4 - 6)    Ensure TID (Unknown)  No Known Allergies    Vital Signs Last 24 Hrs  T(C): 37.1 (21 Dec 2019 21:51), Max: 37.1 (21 Dec 2019 20:45)  T(F): 98.8 (21 Dec 2019 21:51), Max: 98.8 (21 Dec 2019 21:51)  HR: 117 (22 Dec 2019 04:07) (115 - 147)  BP: 129/91 (21 Dec 2019 21:51) (116/78 - 140/92)  RR: 18 (21 Dec 2019 21:51) (18 - 18)  SpO2: 97% (21 Dec 2019 21:51) (96% - 97%)    PHYSICAL EXAM:  Constitutional: Well-developed, malnourished, in no apparent distress  Eyes: Sclerae anicteric, conjunctivae normal  ENMT: Mucus membranes moist, no oropharyngeal thrush noted  Neck: No thyroid nodules appreciated, no significant cervical or supraclavicular lymphadenopathy  Respiratory: Breathing nonlabored  Cardiovascular: Regular rate and rhythm  Gastrointestinal: Soft, nontender, nondistended, normoactive bowel sounds; no hepatosplenomegaly appreciated; no rebound tenderness or involuntary guarding  Extremities: No clubbing, cyanosis or edema  Neurological: Alert and oriented to person, place and time; no asterixis  Skin: No jaundice  Lymph Nodes: No significant lymphadenopathy  Musculoskeletal: No significant peripheral atrophy  Psychiatric: Affect and mood appropriate    LABS:             11.0   13.79 )-----------( 341      ( 12-20 @ 06:11 )             34.6     12-22    139  |  104  |  9.0  ----------------------------<  96  4.4   |  22.0  |  0.37<L>    Ca    8.9      22 Dec 2019 06:56  Phos  4.8     12-22  Mg     1.9     12-22    TPro  6.4<L>  /  Alb  2.5<L>  /  TBili  <0.2<L>  /  DBili  x   /  AST  22  /  ALT  14  /  AlkPhos  331<H>  12-22 none

## 2019-12-22 NOTE — PROGRESS NOTE ADULT - ASSESSMENT
34 year old woman with no known major comorbidities (had not gone to see a doctor for 15 years) who presented to [facility] on 11/22/2019 with progressive sob, weakness, weight loss, She was found to have a large pleural effusion. Initially 1.5 L of dark serous fluid was removed. Chest tube placed shortly thereafter, 3 additional liters was removed. pleural fluid cultures + for group A hemolytic strep,CBC showed a leukocytosis of 40k and CTA imaging revealed a 2.1cm partially cystic mass in the body/tail of the pancreas with possible upstream ductal dilatation. On 11/23/2019 the patient had a CT chest/abdomen/pelvis that found to have a clot in the right IJ and was started on a heparin gtt   s/p right VATS decort & washout debridement of CT site & VAC placement on 11/27/19  Persistent drainage from chest tube site so repeat CT C/A/P performed 12/4 which showed new cystic mass in pancreas and liver concerning for pancreatitis  Zosyn changed to meropenem on 12/6  for broader coverage given pancreatitis  Copious drainage from anterior chest tube site, so VAC removed and ostomy appliance applied for high output on 12/6.   12/7 Right chest tube removed.   Repeat CT scan showed a persistent Right hydropneumothorax with air anteriorly  12/8 Ostomy appliance removed and dressing applied.   12/9 s.p PICC placement & Right anterior pig tail catheter & right lateral wound vac placement   MRCP significant for Peripancreatic fluid and edema consistent with pancreatitis. Cystic lesions in the pancreatic head with the largest measuring 2.1 cm, likely acute peripancreatic collections, increased in size since 12/4/2019, with a tract of fluid extending from the 2.1 cm cystic lesion into the mediastinum and towards the pleural space on the right suspicious for a pancreatic pleural fistula.    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

## 2019-12-22 NOTE — PROGRESS NOTE ADULT - ASSESSMENT
Patient currently on TPN for pancreatic rest due to pancreaticopleural fistula.     TPN and AM labs reordered

## 2019-12-22 NOTE — PROGRESS NOTE ADULT - SUBJECTIVE AND OBJECTIVE BOX
follow up for pneumonia , pancreatitis , panrea pleural fistula   pt is seen in am , she is calm , not anxious   having mild pleuritic chest pian on the right rib anteriorly   no SOB , lying flat in the bed     GI and ID follow up appreciated     Vital Signs Last 24 Hrs  T(C): 37.1 (21 Dec 2019 21:51), Max: 37.1 (21 Dec 2019 20:45)  T(F): 98.8 (21 Dec 2019 21:51), Max: 98.8 (21 Dec 2019 21:51)  HR: 117 (22 Dec 2019 04:07) (115 - 147)  BP: 129/91 (21 Dec 2019 21:51) (116/78 - 140/92)  BP(mean): --  RR: 18 (21 Dec 2019 21:51) (18 - 18)  SpO2: 97% (21 Dec 2019 21:51) (96% - 97%)    PHYSICAL EXAM-  GENERAL: Not in distress. Alert  anxious   CARDIOVASCULAR: RRR S1, S2. No murmur/rubs/gallop  LUNGS: CTA bilaterally   no rales, no wheezing, no rhonchi.. right anterior lower chest drain  rube in place   ABDOMEN: ND. Soft,  no tenderness   EXTREMITIES: no cyanosis, no clubbing, no edema.     12-22    139  |  104  |  9.0  ----------------------------<  96  4.4   |  22.0  |  0.37<L>    Ca    8.9      22 Dec 2019 06:56  Phos  4.8     12-22  Mg     1.9     12-22    TPro  6.4<L>  /  Alb  2.5<L>  /  TBili  <0.2<L>  /  DBili  x   /  AST  22  /  ALT  14  /  AlkPhos  331<H>  12-22

## 2019-12-23 LAB
ALBUMIN SERPL ELPH-MCNC: 2.9 G/DL — LOW (ref 3.3–5.2)
ALP SERPL-CCNC: 331 U/L — HIGH (ref 40–120)
ALT FLD-CCNC: 15 U/L — SIGNIFICANT CHANGE UP
ANION GAP SERPL CALC-SCNC: 14 MMOL/L — SIGNIFICANT CHANGE UP (ref 5–17)
APTT BLD: 100.4 SEC — HIGH (ref 27.5–36.3)
APTT BLD: 123.1 SEC — CRITICAL HIGH (ref 27.5–36.3)
AST SERPL-CCNC: 16 U/L — SIGNIFICANT CHANGE UP
BASOPHILS # BLD AUTO: 0.47 K/UL — HIGH (ref 0–0.2)
BASOPHILS NFR BLD AUTO: 5.1 % — HIGH (ref 0–2)
BILIRUB SERPL-MCNC: <0.2 MG/DL — LOW (ref 0.4–2)
BUN SERPL-MCNC: 8 MG/DL — SIGNIFICANT CHANGE UP (ref 8–20)
CALCIUM SERPL-MCNC: 9.1 MG/DL — SIGNIFICANT CHANGE UP (ref 8.6–10.2)
CHLORIDE SERPL-SCNC: 105 MMOL/L — SIGNIFICANT CHANGE UP (ref 98–107)
CO2 SERPL-SCNC: 23 MMOL/L — SIGNIFICANT CHANGE UP (ref 22–29)
CREAT SERPL-MCNC: 0.29 MG/DL — LOW (ref 0.5–1.3)
CULTURE RESULTS: SIGNIFICANT CHANGE UP
EOSINOPHIL # BLD AUTO: 0.55 K/UL — HIGH (ref 0–0.5)
EOSINOPHIL NFR BLD AUTO: 5.9 % — SIGNIFICANT CHANGE UP (ref 0–6)
GLUCOSE BLDC GLUCOMTR-MCNC: 101 MG/DL — HIGH (ref 70–99)
GLUCOSE BLDC GLUCOMTR-MCNC: 111 MG/DL — HIGH (ref 70–99)
GLUCOSE BLDC GLUCOMTR-MCNC: 125 MG/DL — HIGH (ref 70–99)
GLUCOSE BLDC GLUCOMTR-MCNC: 94 MG/DL — SIGNIFICANT CHANGE UP (ref 70–99)
GLUCOSE BLDC GLUCOMTR-MCNC: 97 MG/DL — SIGNIFICANT CHANGE UP (ref 70–99)
GLUCOSE SERPL-MCNC: 102 MG/DL — SIGNIFICANT CHANGE UP (ref 70–115)
HCT VFR BLD CALC: 35.9 % — SIGNIFICANT CHANGE UP (ref 34.5–45)
HGB BLD-MCNC: 11.3 G/DL — LOW (ref 11.5–15.5)
INR BLD: 1.47 RATIO — HIGH (ref 0.88–1.16)
LYMPHOCYTES # BLD AUTO: 2.04 K/UL — SIGNIFICANT CHANGE UP (ref 1–3.3)
LYMPHOCYTES # BLD AUTO: 22 % — SIGNIFICANT CHANGE UP (ref 13–44)
MAGNESIUM SERPL-MCNC: 1.9 MG/DL — SIGNIFICANT CHANGE UP (ref 1.6–2.6)
MANUAL SMEAR VERIFICATION: SIGNIFICANT CHANGE UP
MCHC RBC-ENTMCNC: 29.5 PG — SIGNIFICANT CHANGE UP (ref 27–34)
MCHC RBC-ENTMCNC: 31.5 GM/DL — LOW (ref 32–36)
MCV RBC AUTO: 93.7 FL — SIGNIFICANT CHANGE UP (ref 80–100)
MONOCYTES # BLD AUTO: 0.63 K/UL — SIGNIFICANT CHANGE UP (ref 0–0.9)
MONOCYTES NFR BLD AUTO: 6.8 % — SIGNIFICANT CHANGE UP (ref 2–14)
MYELOCYTES NFR BLD: 0.8 % — HIGH (ref 0–0)
NEUTROPHILS # BLD AUTO: 5.35 K/UL — SIGNIFICANT CHANGE UP (ref 1.8–7.4)
NEUTROPHILS NFR BLD AUTO: 56.8 % — SIGNIFICANT CHANGE UP (ref 43–77)
NEUTS BAND # BLD: 0.9 % — SIGNIFICANT CHANGE UP (ref 0–8)
PHOSPHATE SERPL-MCNC: 4.3 MG/DL — SIGNIFICANT CHANGE UP (ref 2.4–4.7)
PLAT MORPH BLD: NORMAL — SIGNIFICANT CHANGE UP
PLATELET # BLD AUTO: 349 K/UL — SIGNIFICANT CHANGE UP (ref 150–400)
POTASSIUM SERPL-MCNC: 4.4 MMOL/L — SIGNIFICANT CHANGE UP (ref 3.5–5.3)
POTASSIUM SERPL-SCNC: 4.4 MMOL/L — SIGNIFICANT CHANGE UP (ref 3.5–5.3)
PROT SERPL-MCNC: 6.7 G/DL — SIGNIFICANT CHANGE UP (ref 6.6–8.7)
PROTHROM AB SERPL-ACNC: 17.1 SEC — HIGH (ref 10–12.9)
RBC # BLD: 3.83 M/UL — SIGNIFICANT CHANGE UP (ref 3.8–5.2)
RBC # FLD: 15.8 % — HIGH (ref 10.3–14.5)
RBC BLD AUTO: NORMAL — SIGNIFICANT CHANGE UP
SMUDGE CELLS # BLD: PRESENT — SIGNIFICANT CHANGE UP
SODIUM SERPL-SCNC: 142 MMOL/L — SIGNIFICANT CHANGE UP (ref 135–145)
SPECIMEN SOURCE: SIGNIFICANT CHANGE UP
VARIANT LYMPHS # BLD: 1.7 % — SIGNIFICANT CHANGE UP (ref 0–6)
WBC # BLD: 9.27 K/UL — SIGNIFICANT CHANGE UP (ref 3.8–10.5)
WBC # FLD AUTO: 9.27 K/UL — SIGNIFICANT CHANGE UP (ref 3.8–10.5)

## 2019-12-23 PROCEDURE — 99232 SBSQ HOSP IP/OBS MODERATE 35: CPT

## 2019-12-23 PROCEDURE — 99233 SBSQ HOSP IP/OBS HIGH 50: CPT

## 2019-12-23 RX ORDER — I.V. FAT EMULSION 20 G/100ML
0.9 EMULSION INTRAVENOUS
Qty: 50 | Refills: 0 | Status: DISCONTINUED | OUTPATIENT
Start: 2019-12-23 | End: 2019-12-23

## 2019-12-23 RX ORDER — ELECTROLYTE SOLUTION,INJ
1 VIAL (ML) INTRAVENOUS
Refills: 0 | Status: DISCONTINUED | OUTPATIENT
Start: 2019-12-23 | End: 2019-12-23

## 2019-12-23 RX ORDER — CHLORHEXIDINE GLUCONATE 213 G/1000ML
1 SOLUTION TOPICAL
Refills: 0 | Status: DISCONTINUED | OUTPATIENT
Start: 2019-12-23 | End: 2020-01-07

## 2019-12-23 RX ORDER — WARFARIN SODIUM 2.5 MG/1
5 TABLET ORAL ONCE
Refills: 0 | Status: COMPLETED | OUTPATIENT
Start: 2019-12-23 | End: 2019-12-23

## 2019-12-23 RX ADMIN — ESCITALOPRAM OXALATE 10 MILLIGRAM(S): 10 TABLET, FILM COATED ORAL at 11:54

## 2019-12-23 RX ADMIN — MEROPENEM 100 MILLIGRAM(S): 1 INJECTION INTRAVENOUS at 21:00

## 2019-12-23 RX ADMIN — GABAPENTIN 100 MILLIGRAM(S): 400 CAPSULE ORAL at 21:00

## 2019-12-23 RX ADMIN — Medication 30 MILLILITER(S): at 06:10

## 2019-12-23 RX ADMIN — HEPARIN SODIUM 1200 UNIT(S)/HR: 5000 INJECTION INTRAVENOUS; SUBCUTANEOUS at 07:25

## 2019-12-23 RX ADMIN — Medication 50 MILLIGRAM(S): at 21:00

## 2019-12-23 RX ADMIN — Medication 0.25 MILLIGRAM(S): at 19:25

## 2019-12-23 RX ADMIN — Medication 2000 UNIT(S): at 11:54

## 2019-12-23 RX ADMIN — TRAMADOL HYDROCHLORIDE 50 MILLIGRAM(S): 50 TABLET ORAL at 07:03

## 2019-12-23 RX ADMIN — TRAMADOL HYDROCHLORIDE 50 MILLIGRAM(S): 50 TABLET ORAL at 06:09

## 2019-12-23 RX ADMIN — Medication 325 MILLIGRAM(S): at 21:00

## 2019-12-23 RX ADMIN — METHOCARBAMOL 500 MILLIGRAM(S): 500 TABLET, FILM COATED ORAL at 19:24

## 2019-12-23 RX ADMIN — Medication 10 MILLIGRAM(S): at 21:00

## 2019-12-23 RX ADMIN — OCTREOTIDE ACETATE 100 MICROGRAM(S): 200 INJECTION, SOLUTION INTRAVENOUS; SUBCUTANEOUS at 21:00

## 2019-12-23 RX ADMIN — Medication 325 MILLIGRAM(S): at 11:54

## 2019-12-23 RX ADMIN — Medication 1 EACH: at 19:40

## 2019-12-23 RX ADMIN — GABAPENTIN 100 MILLIGRAM(S): 400 CAPSULE ORAL at 13:26

## 2019-12-23 RX ADMIN — PANTOPRAZOLE SODIUM 40 MILLIGRAM(S): 20 TABLET, DELAYED RELEASE ORAL at 06:09

## 2019-12-23 RX ADMIN — I.V. FAT EMULSION 31.05 GM/KG/DAY: 20 EMULSION INTRAVENOUS at 19:45

## 2019-12-23 RX ADMIN — OCTREOTIDE ACETATE 100 MICROGRAM(S): 200 INJECTION, SOLUTION INTRAVENOUS; SUBCUTANEOUS at 13:26

## 2019-12-23 RX ADMIN — GABAPENTIN 100 MILLIGRAM(S): 400 CAPSULE ORAL at 06:14

## 2019-12-23 RX ADMIN — WARFARIN SODIUM 5 MILLIGRAM(S): 2.5 TABLET ORAL at 21:00

## 2019-12-23 RX ADMIN — MEROPENEM 100 MILLIGRAM(S): 1 INJECTION INTRAVENOUS at 13:26

## 2019-12-23 RX ADMIN — MEROPENEM 100 MILLIGRAM(S): 1 INJECTION INTRAVENOUS at 06:09

## 2019-12-23 RX ADMIN — HEPARIN SODIUM 1100 UNIT(S)/HR: 5000 INJECTION INTRAVENOUS; SUBCUTANEOUS at 14:51

## 2019-12-23 NOTE — PROGRESS NOTE ADULT - SUBJECTIVE AND OBJECTIVE BOX
INTERVAL HPI/OVERNIGHT EVENTS: Frustrated and wants to eat. She is on TPN.     ROS wnl     PAST MEDICAL & SURGICAL HISTORY:      Home Medications:      MEDICATIONS  (STANDING):  amitriptyline 10 milliGRAM(s) Oral at bedtime  ascorbic acid 500 milliGRAM(s) Oral daily  chlorhexidine 2% Cloths 1 Application(s) Topical <User Schedule>  cholecalciferol 2000 Unit(s) Oral daily  escitalopram 10 milliGRAM(s) Oral daily  ferrous    sulfate 325 milliGRAM(s) Oral three times a day  folic acid 1 milliGRAM(s) Oral daily  gabapentin 100 milliGRAM(s) Oral three times a day  heparin  Infusion. 1300 Unit(s)/Hr (13 mL/Hr) IV Continuous <Continuous>  lidocaine   Patch 1 Patch Transdermal daily  meropenem  IVPB 1000 milliGRAM(s) IV Intermittent every 8 hours  multivitamin 1 Tablet(s) Oral daily  octreotide  Injectable 100 MICROGram(s) SubCutaneous every 8 hours  pantoprazole    Tablet 40 milliGRAM(s) Oral before breakfast  Parenteral Nutrition - Adult 1 Each (83 mL/Hr) TPN Continuous <Continuous>  traZODone 50 milliGRAM(s) Oral at bedtime    MEDICATIONS  (PRN):  acetaminophen   Tablet .. 650 milliGRAM(s) Oral every 6 hours PRN Mild Pain (1 - 3)  ALPRAZolam 0.25 milliGRAM(s) Oral three times a day PRN anxiety  aluminum hydroxide/magnesium hydroxide/simethicone Suspension 30 milliLiter(s) Oral every 4 hours PRN Dyspepsia  heparin  Injectable 4000 Unit(s) IV Push every 6 hours PRN For aPTT less than 40  heparin  Injectable 2000 Unit(s) IV Push every 6 hours PRN For aPTT between 40 - 57  melatonin 3 milliGRAM(s) Oral at bedtime PRN Insomnia  methocarbamol 500 milliGRAM(s) Oral every 6 hours PRN Muscle Spasm  ondansetron Injectable 4 milliGRAM(s) IV Push every 4 hours PRN Nausea and/or Vomiting  sodium chloride 0.9% lock flush 10 milliLiter(s) IV Push every 1 hour PRN Pre/post blood products, medications, blood draw, and to maintain line patency  traMADol 50 milliGRAM(s) Oral every 8 hours PRN Moderate Pain (4 - 6)      Allergies    No Known Allergies    Intolerances    Ensure TID (Unknown)        PHYSICAL EXAM:   Vital Signs:  Vital Signs Last 24 Hrs  T(C): 36.9 (23 Dec 2019 07:41), Max: 36.9 (23 Dec 2019 07:41)  T(F): 98.4 (23 Dec 2019 07:41), Max: 98.4 (23 Dec 2019 07:41)  HR: 120 (23 Dec 2019 07:41) (106 - 120)  BP: 124/84 (23 Dec 2019 07:41) (121/81 - 124/84)  BP(mean): --  RR: 18 (23 Dec 2019 07:41) (18 - 20)  SpO2: 97% (22 Dec 2019 20:36) (97% - 97%)  Daily     Daily     GENERAL:  no distress  HEENT:  NC/AT,  anicteric  ABDOMEN:  Soft, non-tender, non-distended, normoactive bowel sounds  EXTREMITIES  no cyanosis      LABS:                        11.3   9.27  )-----------( 349      ( 23 Dec 2019 06:44 )             35.9       Hemoglobin: 11.3 g/dL (12-23-19 @ 06:44)      12-23    142  |  105  |  8.0  ----------------------------<  102  4.4   |  23.0  |  0.29<L>    Ca    9.1      23 Dec 2019 06:44  Phos  4.3     12-23  Mg     1.9     12-23    TPro  6.7  /  Alb  2.9<L>  /  TBili  <0.2<L>  /  DBili  x   /  AST  16  /  ALT  15  /  AlkPhos  331<H>  12-23      INR: 1.47 ratio (12-23-19 @ 06:44)  INR: 1.57 ratio (12-22-19 @ 06:56)  INR: 1.49 ratio (12-21-19 @ 06:59)      Alkaline Phosphatase, Serum: 331 U/L (12-23-19 @ 06:44)  Alanine Aminotransferase (ALT/SGPT): 15 U/L (12-23-19 @ 06:44)  Aspartate Aminotransferase (AST/SGOT): 16 U/L (12-23-19 @ 06:44)  Aspartate Aminotransferase (AST/SGOT): 22 U/L (12-22-19 @ 06:56)  Alkaline Phosphatase, Serum: 331 U/L (12-22-19 @ 06:56)  Alanine Aminotransferase (ALT/SGPT): 14 U/L (12-22-19 @ 06:56)    RADIOLOGY & ADDITIONAL TESTS:  < from: MR Abdomen w/ IV Cont (12.20.19 @ 15:56) >  FINDINGS:    LOWER CHEST: Subsegmental atelectasis/infiltrates and trace pleural reaction both lung bases, left more thanright.    LIVER: Previously seen cystic caudate lobe lesion is smaller measuring 1.3 cm in comparison to prior measurement of 1.9 cm. Posteriorly located left hepatic lobe lesion is not identified at this time.   BILE DUCTS: Normal caliber.  GALLBLADDER: Within normal limits.  SPLEEN: Within normal limits.  PANCREAS: An 11 mm sized peripherally enhancing lesion involving the proximal body of the pancreas is smaller. It previously measured 1.6 cm. A 3 mm sized  lesion in the mid body of the pancreas is stable. A 1.1 cm sized peripancreatic structure near the neck of the pancreas also appears to be smaller but was not well the linear dated on prior CT imaging. Additional small peripancreatic possibly cystic structures are either less pronouncedor resolved. The improvement suggests cystic pancreatic and peripancreatic collections related to pancreatitis. Suggest clinical correlation and follow-up. Pancreatic or pleural fistula of flow clinical concern is not delineated by MR imaging.  ADRENALS: Within normal limits.  KIDNEYS/URETERS: Within normal limits.    VISUALIZED PORTIONS:    BOWEL: Within normal limits.   PERITONEUM: No ascites.  VESSELS: Within normal limits.  RETROPERITONEUM/LYMPH NODES: No lymphadenopathy.    ABDOMINAL WALL: Within normal limits.  BONES: Within normal limits.    IMPRESSION:     Stable or improving peripancreatic and pancreatic possibly cystic lesions suggest possibility of pancreatitis related collections. Clinical correlation and follow-up suggested.    Bibasilar atelectasis/infiltrate as well as pleural reaction, left more than right.    < end of copied text >

## 2019-12-23 NOTE — CHART NOTE - NSCHARTNOTEFT_GEN_A_CORE
Source: Patient [ ]  Family [ ]   other [x ]EMR    Current Diet: TPN with clear liquids    Patient reports [ ] nausea  [ ] vomiting [ ] diarrhea [ ] constipation  [ ]chewing problems [ ] swallowing issues  [ ] other: some pain    PO intake:  < 50% [x ]   50-75%  [ ]   %  [ ]  other :    Source for PO intake [x ] Patient [ ] family [ ] chart [ ] staff [ ] other    Enteral /Parenteral Nutrition: TPN providing a total of 2200 Kcal and 85gm protein     Current Weight:   (12/16)  134#, 12/20 122#, 123# 12/22  (12/7) 123#  (12/6) 121#  (12/5) 119#  (12/1) 114#  (11/30) 127#               Pertinent Medications: MEDICATIONS  (STANDING):  amitriptyline 10 milliGRAM(s) Oral at bedtime  ascorbic acid 500 milliGRAM(s) Oral daily  chlorhexidine 2% Cloths 1 Application(s) Topical <User Schedule>  cholecalciferol 2000 Unit(s) Oral daily  escitalopram 10 milliGRAM(s) Oral daily  fat emulsion (Plant Based) 20% Infusion 0.9 Gm/kG/Day (31.05 mL/Hr) IV Continuous <Continuous>  ferrous    sulfate 325 milliGRAM(s) Oral three times a day  folic acid 1 milliGRAM(s) Oral daily  gabapentin 100 milliGRAM(s) Oral three times a day  heparin  Infusion. 1300 Unit(s)/Hr (13 mL/Hr) IV Continuous <Continuous>  lidocaine   Patch 1 Patch Transdermal daily  meropenem  IVPB 1000 milliGRAM(s) IV Intermittent every 8 hours  multivitamin 1 Tablet(s) Oral daily  octreotide  Injectable 100 MICROGram(s) SubCutaneous every 8 hours  pantoprazole    Tablet 40 milliGRAM(s) Oral before breakfast  Parenteral Nutrition - Adult 1 Each (83 mL/Hr) TPN Continuous <Continuous>  Parenteral Nutrition - Adult 1 Each (83 mL/Hr) TPN Continuous <Continuous>  traZODone 50 milliGRAM(s) Oral at bedtime    MEDICATIONS  (PRN):  acetaminophen   Tablet .. 650 milliGRAM(s) Oral every 6 hours PRN Mild Pain (1 - 3)  ALPRAZolam 0.25 milliGRAM(s) Oral three times a day PRN anxiety  aluminum hydroxide/magnesium hydroxide/simethicone Suspension 30 milliLiter(s) Oral every 4 hours PRN Dyspepsia  heparin  Injectable 4000 Unit(s) IV Push every 6 hours PRN For aPTT less than 40  heparin  Injectable 2000 Unit(s) IV Push every 6 hours PRN For aPTT between 40 - 57  melatonin 3 milliGRAM(s) Oral at bedtime PRN Insomnia  methocarbamol 500 milliGRAM(s) Oral every 6 hours PRN Muscle Spasm  ondansetron Injectable 4 milliGRAM(s) IV Push every 4 hours PRN Nausea and/or Vomiting  sodium chloride 0.9% lock flush 10 milliLiter(s) IV Push every 1 hour PRN Pre/post blood products, medications, blood draw, and to maintain line patency  traMADol 50 milliGRAM(s) Oral every 8 hours PRN Moderate Pain (4 - 6)    Pertinent Labs: CBC Full  -  ( 23 Dec 2019 06:44 )  WBC Count : 9.27 K/uL  RBC Count : 3.83 M/uL  Hemoglobin : 11.3 g/dL  Hematocrit : 35.9 %  Platelet Count - Automated : 349 K/uL  Mean Cell Volume : 93.7 fl  Mean Cell Hemoglobin : 29.5 pg  Mean Cell Hemoglobin Concentration : 31.5 gm/dL  Auto Neutrophil # : 5.35 K/uL  Auto Lymphocyte # : 2.04 K/uL  Auto Monocyte # : 0.63 K/uL  Auto Eosinophil # : 0.55 K/uL  Auto Basophil # : 0.47 K/uL  Auto Neutrophil % : 56.8 %  Auto Lymphocyte % : 22.0 %  Auto Monocyte % : 6.8 %  Auto Eosinophil % : 5.9 %  Auto Basophil % : 5.1 %          Skin: chest wound vac in place    Nutrition focused physical exam conducted - found signs of malnutrition [ ]absent [ x]present    Subcutaneous fat loss: [ x] Orbital fat pads region, [ ]Buccal fat region, [ ]Triceps region,  [ ]Ribs region    Muscle wasting:  x]Temples region, [x]Clavicle region, [ x]Shoulder region, [ ]Scapula region, [ ]Interosseous region,  [ ]thigh region, [ ]Calf region    Estimated Needs:   [x ] no change since previous assessment  [ ] recalculated:     Current Nutrition Diagnosis: 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 and Multifocal PNA  OCtreotide/ TPN started by GI with pt on clears PO, Heme- transitioned to Coumadin, 12/9/19 PICC line placed.  Pt remains at high nutrition risk secondary to Severe-acute protein calorie malnutrition related to inadequate protein energy intake with decreased appetite and altered GI function in setting of large right pleural effusion, s/p Right VATS decortication & washout, empyema and now with findings consistent with pancreatitis as evidenced by pt with previous 25# unintentional wt loss (17%) x3 months, previously meeting <50% estimated energy intake > 7 days.  Pt also with mild muscle wasting and mild fat loss and now with +fluid accumulation (now improved). Pt continues on clear liquid diet, and receiving TPN (which provides 2200kcal, 85gm protein) when receiving lipids. Pt to cont wound vac per thoracic surgery team. Pt continues to complain of some abd pain and soft stools.  Pt reports wanting more solid food.      Recommendations:   1. Continue with TPN at this time   2. Check wt daily to monitor trends   3. Check TG weekly while on TPN   4. Encourage clears andvance as tolerated      Monitoring and Evaluation:   [x ] PO intake [x ] Tolerance to diet prescription [X] Weights  [X] Follow up per protocol [X] Labs:

## 2019-12-23 NOTE — PROGRESS NOTE ADULT - SUBJECTIVE AND OBJECTIVE BOX
[facility]  INFECTIOUS DISEASES AND INTERNAL MEDICINE at [place]  =======================================================  Carlos Alberto Soto MD  Diplomates American Board of Internal Medicine and Infectious Diseases  Tel: [phone]      Fax: [phone]  =======================================================    HERACLIO MOLINA [id number]  Follow up: R pleural effusion    No fever. No diarrhea. On TPN.    Chest tube from right side now removed        Allergies:  Ensure TID (Unknown)  No Known Allergies      Antibiotics:  meropenem  IVPB 1000 milliGRAM(s) IV Intermittent every 8 hours      REVIEW OF SYSTEMS:  CONSTITUTIONAL:  No Fever or chills  HEENT:   No diplopia or blurred vision.  No earache, sore throat or runny nose.  CARDIOVASCULAR:  No pressure, squeezing, strangling, tightness, heaviness or aching about the chest, neck, axilla or epigastrium.  RESPIRATORY:  No cough, shortness of breath  GASTROINTESTINAL:  No nausea, vomiting or diarrhea.  GENITOURINARY:  No dysuria, frequency or urgency.   MUSCULOSKELETAL:  no joint aches, no muscle pain  SKIN:  No change in skin, hair or nails.  NEUROLOGIC:  No Headaches, seizures   PSYCHIATRIC:  No disorder of thought or mood.  ENDOCRINE:  No heat or cold intolerance  HEMATOLOGICAL:  No easy bruising or bleeding.       Physical Exam:  T(F): 98.4 (23 Dec 2019 07:41), Max: 98.4 (23 Dec 2019 07:41)  HR: 120 (23 Dec 2019 07:41)  BP: 124/84 (23 Dec 2019 07:41)  RR: 18 (23 Dec 2019 07:41)  SpO2: 97% (22 Dec 2019 20:36) (97% - 97%)  temp max in last 48H T(F): , Max: 98.8 (12-21-19 @ 21:51)    GEN: NAD, pleasant  HEENT: normocephalic and atraumatic. EOMI. PERRL.  Anicteric   NECK: Supple.   LUNGS: Clear to auscultation. Wound vac on site of old chest tube  HEART: Regular rate and rhythm   ABDOMEN: Soft, nontender, and nondistended.  Positive bowel sounds.    : No CVA tenderness  EXTREMITIES: Without any edema.  MSK: no joint swelling  NEUROLOGIC: No focal deficits  PSYCHIATRIC: Appropriate affect .  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    COMPARISON: CT dated 12/04/2019.    PROCEDURE:   MRI of the abdomen was performed with and without intravenous contrast.  IV Contrast: Gadavist. 5 cc administered, 2.5 cc discarded.  MRCP was performed.    FINDINGS:    LOWER CHEST: Subsegmental atelectasis/infiltrates and trace pleural reaction both lung bases, left more thanright.    LIVER: Previously seen cystic caudate lobe lesion is smaller measuring 1.3 cm in comparison to prior measurement of 1.9 cm. Posteriorly located left hepatic lobe lesion is not identified at this time.   BILE DUCTS: Normal caliber.  GALLBLADDER: Within normal limits.  SPLEEN: Within normal limits.  PANCREAS: An 11 mm sized peripherally enhancing lesion involving the proximal body of the pancreas is smaller. It previously measured 1.6 cm. A 3 mm sized  lesion in the mid body of the pancreas is stable. A 1.1 cm sized peripancreatic structure near the neck of the pancreas also appears to be smaller but was not well the linear dated on prior CT imaging. Additional small peripancreatic possibly cystic structures are either less pronouncedor resolved. The improvement suggests cystic pancreatic and peripancreatic collections related to pancreatitis. Suggest clinical correlation and follow-up. Pancreatic or pleural fistula of flow clinical concern is not delineated by MR imaging.  ADRENALS: Within normal limits.  KIDNEYS/URETERS: Within normal limits.    VISUALIZED PORTIONS:    BOWEL: Within normal limits.   PERITONEUM: No ascites.  VESSELS: Within normal limits.  RETROPERITONEUM/LYMPH NODES: No lymphadenopathy.    ABDOMINAL WALL: Within normal limits.  BONES: Within normal limits.    IMPRESSION:   Stable or improving peripancreatic and pancreatic possibly cystic lesions suggest possibility of pancreatitis related collections. Clinical correlation and follow-up suggested.  Bibasilar atelectasis/infiltrate as well as pleural reaction, left more than right.    < end of copied text >        Labs:                        11.3   9.27  )-----------( 349      ( 23 Dec 2019 06:44 )             35.9       WBC Count: 9.27 K/uL (12-23-19 @ 06:44)  WBC Count: 13.79 K/uL (12-20-19 @ 06:11)  WBC Count: 17.40 K/uL (12-19-19 @ 07:18)      12-23    142  |  105  |  8.0  ----------------------------<  102  4.4   |  23.0  |  0.29<L>    Ca    9.1      23 Dec 2019 06:44  Phos  4.3     12-23  Mg     1.9     12-23    TPro  6.7  /  Alb  2.9<L>  /  TBili  <0.2<L>  /  DBili  x   /  AST  16  /  ALT  15  /  AlkPhos  331<H>  12-23

## 2019-12-23 NOTE — PROGRESS NOTE ADULT - ASSESSMENT
34 year old woman with no known major comorbidities (had not gone to see a doctor for 15 years) who presented to [facility] on 11/22/2019 with progressive sob, weakness, weight loss, She was found to have a large pleural effusion. Initially 1.5 L of dark serous fluid was removed. Chest tube placed shortly thereafter, 3 additional liters was removed. pleural fluid cultures + for group A hemolytic strep,CBC showed a leukocytosis of 40k and CTA imaging revealed a 2.1cm partially cystic mass in the body/tail of the pancreas with possible upstream ductal dilatation. On 11/23/2019 the patient had a CT chest/abdomen/pelvis that found to have a clot in the right IJ and was started on a heparin gtt   s/p right VATS decort & washout debridement of CT site & VAC placement on 11/27/19  Persistent drainage from chest tube site so repeat CT C/A/P performed 12/4 which showed new cystic mass in pancreas and liver concerning for pancreatitis  Zosyn changed to meropenem on 12/6  for broader coverage given pancreatitis  Copious drainage from anterior chest tube site, so VAC removed and ostomy appliance applied for high output on 12/6.   12/7 Right chest tube removed.   Repeat CT scan showed a persistent Right hydropneumothorax with air anteriorly  12/8 Ostomy appliance removed and dressing applied.   12/9 s.p PICC placement & Right anterior pig tail catheter & right lateral wound vac placement   MRCP significant for Peripancreatic fluid and edema consistent with pancreatitis. Cystic lesions in the pancreatic head with the largest measuring 2.1 cm, likely acute peripancreatic collections, increased in size since 12/4/2019, with a tract of fluid extending from the 2.1 cm cystic lesion into the mediastinum and towards the pleural space on the right suspicious for a pancreatic pleural fistula.    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

## 2019-12-23 NOTE — PROGRESS NOTE ADULT - ASSESSMENT
34 year old female transferred from [facility] with right pleural effusion/ empyema s/p chest tube (now removed), also with acute pancreatitis (likely on chronic) with pancreatic- pleural fistula, currently on TPN and octreotide.  Also found to have RIJ thrombus.   We are consulted for elevated TSH and low T4 levels, which are improving and likely due to sick euthyroid syndrome.  She also has mildly elevated prolactin of unclear clinical significance.      1.  Abnormal TFTs-  likely due to sick euthyroid syndrome.  Unlikely to represent true hypothyroidism.  repeat TFTs this week.  2.  Hyperprolactinemia-  repeat as an outpatient.  If level continues to be elevated, will need further work up, but at current time, this is of little clinical significance.    3.  Right pleural effusion/Empyema-  management as per medicine, on abx  4.  Pancreatitis with pancreatic/ pleural fistula- On Octreotide and TPN as per GI.

## 2019-12-23 NOTE — PROGRESS NOTE ADULT - SUBJECTIVE AND OBJECTIVE BOX
follow up for pneumonia , pancreatitis , pancreatic   pleural fistula   pt is seen in am ,doing well   no complaints , good appetite     GI and ID follow up appreciated     Vital Signs Last 24 Hrs  T(C): 36.9 (23 Dec 2019 07:41), Max: 36.9 (23 Dec 2019 07:41)  T(F): 98.4 (23 Dec 2019 07:41), Max: 98.4 (23 Dec 2019 07:41)  HR: 110 (23 Dec 2019 08:00) (110 - 120)  BP: 124/84 (23 Dec 2019 07:41) (124/84 - 124/84)  BP(mean): --  RR: 18 (23 Dec 2019 07:41) (18 - 18)  SpO2: 97% (22 Dec 2019 20:36) (97% - 97%)      GENERAL: Not in distress. Alert  anxious   CARDIOVASCULAR: RRR S1, S2. No murmur/rubs/gallop  LUNGS: CTA bilaterally ,   no rales, no wheezing, right anterior lower chest drain  rube in place   ABDOMEN: ND. Soft,  no tenderness   EXTREMITIES: no cyanosis, no clubbing, no edema.                           11.3   9.27  )-----------( 349      ( 23 Dec 2019 06:44 )             35.9   12-23    142  |  105  |  8.0  ----------------------------<  102  4.4   |  23.0  |  0.29<L>    Ca    9.1      23 Dec 2019 06:44  Phos  4.3     12-23  Mg     1.9     12-23    TPro  6.7  /  Alb  2.9<L>  /  TBili  <0.2<L>  /  DBili  x   /  AST  16  /  ALT  15  /  AlkPhos  331<H>  12-23

## 2019-12-23 NOTE — PROGRESS NOTE ADULT - ASSESSMENT
34 y.o. F who was  [place] [facility] [place] [facility] on 11/22 for complaints of weakness, 25 pound weight loss & flu like symptoms & found to have a large right pleural effusion,   pleural fluid cultures + for group A hemolytic strep, + RIJ thormbus   11/23 CT of the chest/abd/pelvis indicated a clot in the Right IJ, pt started on heparin gtt.   s/p right VATS decort & washout debridement of CT site & VAC placement on 11/27/19.   Persistent drainage from chest tube site so repeat CT C/A/P performed 12/4 which showed new cystic mass in pancreas and liver concerning for pancreatitis  Zosyn changed to meropenem on 12/6  for broader coverage given pancreatitis  Copious drainage from anterior chest tube site, so VAC removed and ostomy appliance applied for high output on 12/6.   12/7 Right chest tube removed.   Repeat CT scan showed a persistent Right hydropneumothorax with air anteriorly  12/8 Ostomy appliance removed and dressing applied.   12/9 s.p PICC placement & Right anterior pig tail catheter & right lateral wound vac placement   MRCP significant for Peripancreatic fluid and edema consistent with pancreatitis. Cystic lesions in the pancreatic head with the largest measuring 2.1 cm, likely acute peripancreatic collections, increased in size since 12/4/2019, with a tract of fluid extending from the 2.1 cm cystic lesion into the mediastinum and towards the pleural space on the right suspicious for a pancreatic pleural fistula.    R pleural effusion  Pancreatitis  R pancreaticopleural fistula   Alcohol abuse   WBC elevation - resolved  Acute renal failure - resolved      - All cultures negative , blood, urine, pleural fluid and chest tube insertion site.  - Leukocytosis now resolved  - No fever   - Continue Meropenem 1gm q8h for necrotizing pancreatitis and fistula with pleural space. GI and surgery following.   - Has 2 PICC lines, on TPN and ABx, watch for superinfection  - MRI results reviewed  - Plan for antibiotics till 1/3/20 to complete 4 weeks from Chest tube removal       Will follow

## 2019-12-23 NOTE — PROGRESS NOTE ADULT - SUBJECTIVE AND OBJECTIVE BOX
cc: f/u abnormal prolactin and thyroid levels  INTERVAL HPI/OVERNIGHT EVENTS: no issues    MEDICATIONS  (STANDING):  amitriptyline 10 milliGRAM(s) Oral at bedtime  ascorbic acid 500 milliGRAM(s) Oral daily  chlorhexidine 2% Cloths 1 Application(s) Topical <User Schedule>  cholecalciferol 2000 Unit(s) Oral daily  escitalopram 10 milliGRAM(s) Oral daily  fat emulsion (Plant Based) 20% Infusion 0.9 Gm/kG/Day (31.05 mL/Hr) IV Continuous <Continuous>  ferrous    sulfate 325 milliGRAM(s) Oral three times a day  folic acid 1 milliGRAM(s) Oral daily  gabapentin 100 milliGRAM(s) Oral three times a day  heparin  Infusion. 1300 Unit(s)/Hr (13 mL/Hr) IV Continuous <Continuous>  lidocaine   Patch 1 Patch Transdermal daily  meropenem  IVPB 1000 milliGRAM(s) IV Intermittent every 8 hours  multivitamin 1 Tablet(s) Oral daily  octreotide  Injectable 100 MICROGram(s) SubCutaneous every 8 hours  pantoprazole    Tablet 40 milliGRAM(s) Oral before breakfast  Parenteral Nutrition - Adult 1 Each (83 mL/Hr) TPN Continuous <Continuous>  Parenteral Nutrition - Adult 1 Each (83 mL/Hr) TPN Continuous <Continuous>  traZODone 50 milliGRAM(s) Oral at bedtime    MEDICATIONS  (PRN):  acetaminophen   Tablet .. 650 milliGRAM(s) Oral every 6 hours PRN Mild Pain (1 - 3)  ALPRAZolam 0.25 milliGRAM(s) Oral three times a day PRN anxiety  aluminum hydroxide/magnesium hydroxide/simethicone Suspension 30 milliLiter(s) Oral every 4 hours PRN Dyspepsia  heparin  Injectable 4000 Unit(s) IV Push every 6 hours PRN For aPTT less than 40  heparin  Injectable 2000 Unit(s) IV Push every 6 hours PRN For aPTT between 40 - 57  melatonin 3 milliGRAM(s) Oral at bedtime PRN Insomnia  methocarbamol 500 milliGRAM(s) Oral every 6 hours PRN Muscle Spasm  ondansetron Injectable 4 milliGRAM(s) IV Push every 4 hours PRN Nausea and/or Vomiting  sodium chloride 0.9% lock flush 10 milliLiter(s) IV Push every 1 hour PRN Pre/post blood products, medications, blood draw, and to maintain line patency  traMADol 50 milliGRAM(s) Oral every 8 hours PRN Moderate Pain (4 - 6)      Allergies  No Known Allergies    Review of systems: denies anterior neck pain, dysphagia, voice changes. breathing improved.    Vital Signs Last 24 Hrs  T(C): 36.9 (23 Dec 2019 07:41), Max: 36.9 (23 Dec 2019 07:41)  T(F): 98.4 (23 Dec 2019 07:41), Max: 98.4 (23 Dec 2019 07:41)  HR: 110 (23 Dec 2019 08:00) (106 - 120)  BP: 124/84 (23 Dec 2019 07:41) (121/81 - 124/84)  BP(mean): --  RR: 18 (23 Dec 2019 07:41) (18 - 20)  SpO2: 97% (22 Dec 2019 20:36) (97% - 97%)    PHYSICAL EXAM:  Constitutional: NAD, thin appearing  HEENT: PERRLA, EOMI=  Neck: No LAD,  trachea midline, no thyroid enlargement  Respiratory: CTAB, diminished BS Right side  Cardiovascular: S1 and S2, RRR  Gastrointestinal: BS+, soft  Extremities: No peripheral edema  Neurological: A/O x 3, no focal deficits  Psychiatric: Normal mood, normal affect        LABS:                        11.3   9.27  )-----------( 349      ( 23 Dec 2019 06:44 )             35.9     12-23    142  |  105  |  8.0  ----------------------------<  102  4.4   |  23.0  |  0.29<L>    Ca    9.1      23 Dec 2019 06:44  Phos  4.3     12-23  Mg     1.9     12-23    TPro  6.7  /  Alb  2.9<L>  /  TBili  <0.2<L>  /  DBili  x   /  AST  16  /  ALT  15  /  AlkPhos  331<H>  12-23    Prolactin, Serum: 40.5 ng/mL [3.4 - 24.1] (12-06-19)  T4, Serum: 3.7 ug/dL [4.5 - 12.0] (12-15-19)  Thyroid Stimulating Hormone, Serum: 5.97 uIU/mL [0.27 - 4.20] (12-15-19)    Triiodothyronine, Total (T3 Total): 84 ng/dL [80 - 200] (12-10-19)  T4, Serum: 4.2 ug/dL [4.5 - 12.0] (12-10-19)  Thyroid Stimulating Hormone, Serum: 11.82 uIU/mL [0.27 - 4.20] (12-10-19)    Thyroid Stimulating Hormone, Serum: 2.02 uIU/mL [0.27 - 4.20] (12-05-19)    Thyroid Stimulating Hormone, Serum: 2.20 uIU/mL [0.27 - 4.20] (12-04-19)  T4, Serum: 5.7 ug/dL [4.5 - 12.0] (12-04-19)    Thyroperoxidase Antibody: <10.0 IU/mL (11-26-19)  Triiodothyronine, Total (T3 Total): 70 ng/dL [80 - 200] (11-26-19)  T4, Serum: 5.2 ug/dL [4.5 - 12.0] (11-26-19)  Thyroid Stimulating Hormone, Serum: 4.76 uIU/mL [0.27 - 4.20] (11-25-19)

## 2019-12-24 LAB
ALBUMIN SERPL ELPH-MCNC: 2.9 G/DL — LOW (ref 3.3–5.2)
ALP SERPL-CCNC: 318 U/L — HIGH (ref 40–120)
ALT FLD-CCNC: 17 U/L — SIGNIFICANT CHANGE UP
ANION GAP SERPL CALC-SCNC: 13 MMOL/L — SIGNIFICANT CHANGE UP (ref 5–17)
APTT BLD: 78.3 SEC — HIGH (ref 27.5–36.3)
APTT BLD: 81.5 SEC — HIGH (ref 27.5–36.3)
APTT BLD: 83.2 SEC — HIGH (ref 27.5–36.3)
AST SERPL-CCNC: 19 U/L — SIGNIFICANT CHANGE UP
BILIRUB SERPL-MCNC: <0.2 MG/DL — LOW (ref 0.4–2)
BUN SERPL-MCNC: 8 MG/DL — SIGNIFICANT CHANGE UP (ref 8–20)
CA-I BLD-SCNC: 1.27 MMOL/L — SIGNIFICANT CHANGE UP (ref 1.12–1.3)
CALCIUM SERPL-MCNC: 9.2 MG/DL — SIGNIFICANT CHANGE UP (ref 8.6–10.2)
CHLORIDE SERPL-SCNC: 104 MMOL/L — SIGNIFICANT CHANGE UP (ref 98–107)
CO2 SERPL-SCNC: 22 MMOL/L — SIGNIFICANT CHANGE UP (ref 22–29)
CREAT SERPL-MCNC: 0.3 MG/DL — LOW (ref 0.5–1.3)
GLUCOSE BLDC GLUCOMTR-MCNC: 116 MG/DL — HIGH (ref 70–99)
GLUCOSE SERPL-MCNC: 127 MG/DL — HIGH (ref 70–115)
INR BLD: 1.24 RATIO — HIGH (ref 0.88–1.16)
MAGNESIUM SERPL-MCNC: 1.7 MG/DL — LOW (ref 1.8–2.6)
PHOSPHATE SERPL-MCNC: 3.9 MG/DL — SIGNIFICANT CHANGE UP (ref 2.4–4.7)
POTASSIUM SERPL-MCNC: 4.3 MMOL/L — SIGNIFICANT CHANGE UP (ref 3.5–5.3)
POTASSIUM SERPL-SCNC: 4.3 MMOL/L — SIGNIFICANT CHANGE UP (ref 3.5–5.3)
PROT SERPL-MCNC: 6.6 G/DL — SIGNIFICANT CHANGE UP (ref 6.6–8.7)
PROTHROM AB SERPL-ACNC: 14.3 SEC — HIGH (ref 10–12.9)
SODIUM SERPL-SCNC: 139 MMOL/L — SIGNIFICANT CHANGE UP (ref 135–145)
T3 SERPL-MCNC: 145 NG/DL — SIGNIFICANT CHANGE UP (ref 80–200)
T4 AB SER-ACNC: 6.3 UG/DL — SIGNIFICANT CHANGE UP (ref 4.5–12)
TSH SERPL-MCNC: 1.09 UIU/ML — SIGNIFICANT CHANGE UP (ref 0.27–4.2)

## 2019-12-24 PROCEDURE — 99024 POSTOP FOLLOW-UP VISIT: CPT

## 2019-12-24 PROCEDURE — 99232 SBSQ HOSP IP/OBS MODERATE 35: CPT

## 2019-12-24 PROCEDURE — 99233 SBSQ HOSP IP/OBS HIGH 50: CPT

## 2019-12-24 RX ORDER — WARFARIN SODIUM 2.5 MG/1
6 TABLET ORAL ONCE
Refills: 0 | Status: COMPLETED | OUTPATIENT
Start: 2019-12-24 | End: 2019-12-24

## 2019-12-24 RX ORDER — ELECTROLYTE SOLUTION,INJ
1 VIAL (ML) INTRAVENOUS
Refills: 0 | Status: DISCONTINUED | OUTPATIENT
Start: 2019-12-24 | End: 2019-12-24

## 2019-12-24 RX ORDER — I.V. FAT EMULSION 20 G/100ML
0.9 EMULSION INTRAVENOUS
Qty: 50 | Refills: 0 | Status: DISCONTINUED | OUTPATIENT
Start: 2019-12-24 | End: 2019-12-24

## 2019-12-24 RX ORDER — SIMETHICONE 80 MG/1
80 TABLET, CHEWABLE ORAL THREE TIMES A DAY
Refills: 0 | Status: DISCONTINUED | OUTPATIENT
Start: 2019-12-24 | End: 2020-01-07

## 2019-12-24 RX ORDER — MAGNESIUM SULFATE 500 MG/ML
2 VIAL (ML) INJECTION ONCE
Refills: 0 | Status: COMPLETED | OUTPATIENT
Start: 2019-12-24 | End: 2019-12-24

## 2019-12-24 RX ADMIN — ESCITALOPRAM OXALATE 10 MILLIGRAM(S): 10 TABLET, FILM COATED ORAL at 13:01

## 2019-12-24 RX ADMIN — Medication 1 EACH: at 18:31

## 2019-12-24 RX ADMIN — OCTREOTIDE ACETATE 100 MICROGRAM(S): 200 INJECTION, SOLUTION INTRAVENOUS; SUBCUTANEOUS at 23:28

## 2019-12-24 RX ADMIN — Medication 2000 UNIT(S): at 13:01

## 2019-12-24 RX ADMIN — Medication 30 MILLILITER(S): at 13:12

## 2019-12-24 RX ADMIN — HEPARIN SODIUM 1100 UNIT(S)/HR: 5000 INJECTION INTRAVENOUS; SUBCUTANEOUS at 00:26

## 2019-12-24 RX ADMIN — Medication 325 MILLIGRAM(S): at 08:41

## 2019-12-24 RX ADMIN — HEPARIN SODIUM 1100 UNIT(S)/HR: 5000 INJECTION INTRAVENOUS; SUBCUTANEOUS at 08:37

## 2019-12-24 RX ADMIN — Medication 50 GRAM(S): at 17:22

## 2019-12-24 RX ADMIN — GABAPENTIN 100 MILLIGRAM(S): 400 CAPSULE ORAL at 13:00

## 2019-12-24 RX ADMIN — WARFARIN SODIUM 6 MILLIGRAM(S): 2.5 TABLET ORAL at 21:56

## 2019-12-24 RX ADMIN — Medication 0.25 MILLIGRAM(S): at 23:10

## 2019-12-24 RX ADMIN — Medication 325 MILLIGRAM(S): at 13:01

## 2019-12-24 RX ADMIN — GABAPENTIN 100 MILLIGRAM(S): 400 CAPSULE ORAL at 21:56

## 2019-12-24 RX ADMIN — Medication 325 MILLIGRAM(S): at 21:56

## 2019-12-24 RX ADMIN — I.V. FAT EMULSION 31.25 GM/KG/DAY: 20 EMULSION INTRAVENOUS at 18:32

## 2019-12-24 RX ADMIN — MEROPENEM 100 MILLIGRAM(S): 1 INJECTION INTRAVENOUS at 13:00

## 2019-12-24 RX ADMIN — Medication 50 MILLIGRAM(S): at 21:56

## 2019-12-24 RX ADMIN — MEROPENEM 100 MILLIGRAM(S): 1 INJECTION INTRAVENOUS at 21:56

## 2019-12-24 RX ADMIN — OCTREOTIDE ACETATE 100 MICROGRAM(S): 200 INJECTION, SOLUTION INTRAVENOUS; SUBCUTANEOUS at 13:00

## 2019-12-24 RX ADMIN — METHOCARBAMOL 500 MILLIGRAM(S): 500 TABLET, FILM COATED ORAL at 16:32

## 2019-12-24 RX ADMIN — Medication 650 MILLIGRAM(S): at 16:31

## 2019-12-24 RX ADMIN — MEROPENEM 100 MILLIGRAM(S): 1 INJECTION INTRAVENOUS at 05:02

## 2019-12-24 RX ADMIN — PANTOPRAZOLE SODIUM 40 MILLIGRAM(S): 20 TABLET, DELAYED RELEASE ORAL at 08:41

## 2019-12-24 RX ADMIN — Medication 10 MILLIGRAM(S): at 21:56

## 2019-12-24 RX ADMIN — GABAPENTIN 100 MILLIGRAM(S): 400 CAPSULE ORAL at 08:41

## 2019-12-24 RX ADMIN — HEPARIN SODIUM 1100 UNIT(S)/HR: 5000 INJECTION INTRAVENOUS; SUBCUTANEOUS at 23:41

## 2019-12-24 RX ADMIN — SIMETHICONE 80 MILLIGRAM(S): 80 TABLET, CHEWABLE ORAL at 18:32

## 2019-12-24 NOTE — PROGRESS NOTE ADULT - PROBLEM SELECTOR PLAN 1
s/p Right VATS decortication & washout on 11/27/19. Debridement of CT site & VAC placement.  Encourage the use of I/S, OOB to chair, daily PT, ambulate as tolerated  PICC line on left for TPN  PICC on right for meds  New vac placement to right lateral chest wall 12/24, due to be changed 12/27  Daily CXR  Continue supportive care.

## 2019-12-24 NOTE — PROGRESS NOTE ADULT - ASSESSMENT
34 y.o. F who was  transferred from [facility] [place] [facility] on 11/22 for complaints of weakness, 25 pound weight loss & flu like symptoms & found to have a large right pleural effusion,   pleural fluid cultures + for group A hemolytic strep, + RIJ thormbus   11/23 CT of the chest/abd/pelvis indicated a clot in the Right IJ, pt started on heparin gtt.   s/p right VATS decort & washout debridement of CT site & VAC placement on 11/27/19.   Persistent drainage from chest tube site so repeat CT C/A/P performed 12/4 which showed new cystic mass in pancreas and liver concerning for pancreatitis  Zosyn changed to meropenem on 12/6  for broader coverage given pancreatitis  Copious drainage from anterior chest tube site, so VAC removed and ostomy appliance applied for high output on 12/6.   12/7 Right chest tube removed.   Repeat CT scan showed a persistent Right hydropneumothorax with air anteriorly  12/8 Ostomy appliance removed and dressing applied.   12/9 s.p PICC placement & Right anterior pig tail catheter & right lateral wound vac placement   MRCP significant for Peripancreatic fluid and edema consistent with pancreatitis. Cystic lesions in the pancreatic head with the largest measuring 2.1 cm, likely acute peripancreatic collections, increased in size since 12/4/2019, with a tract of fluid extending from the 2.1 cm cystic lesion into the mediastinum and towards the pleural space on the right suspicious for a pancreatic pleural fistula.    R pleural effusion  Pancreatitis  R pancreaticopleural fistula   Alcohol abuse   WBC elevation - resolved  Acute renal failure - resolved      - All cultures negative , blood, urine, pleural fluid and chest tube insertion site.  - Leukocytosis now resolved  - No fever   - Continue Meropenem 1gm q8h for necrotizing pancreatitis and fistula with pleural space. GI and surgery following.   - Has 2 PICC lines, on TPN and ABx, watch for superinfection  - MRI results reviewed  - Plan for antibiotics till 1/3/20 to complete 4 weeks from Chest tube removal     Will follow

## 2019-12-24 NOTE — PROGRESS NOTE ADULT - ASSESSMENT
34 year old female with a PMHx of smoking, ETOH use (4-5 drinks/day), transferred from [facility] to [facility] on 11/22 for complaints of weakness, 25 pound weight loss & flu like symptoms & found to have a large right pleural effusion, pleural fluid cultures + for group A hemolytic strep, + RIJ thrombus   11/23 CT of the chest/abd/pelvis indicated a clot in the Right IJ, pt started on heparin gtt.     s/p right VATS decort & washout debridement of CT site & VAC placement on 11/27/19.   ·	Persistent drainage from chest tube site so repeat CT C/A/P performed 12/4 which showed new cystic mass in pancreas and liver concerning for pancreatitis, GI consulted (NTD).   ·	Zosyn changed to meropenem on 12/6 by ID for broader coverage given pancreatitis. Plan to continue Meropenem for 4 weeks.   ·	Copious drainage from anterior chest tube site, so VAC removed and ostomy appliance applied for high output on 12/6.   ·	Pleural fluid with an amylase of 23338 (serum 51) noted.  Prealbumin noted to be 5. Pt tolerating diet. Ensure drinks added.   ·	12/7 Right chest tube removed. Esophogram normal.   ·	Repeat CT scan showed a persistent Right hydropneumothorax with air anteriorly, right lower anterolateral chest wall with air tract to right pleural space, and Persistent renal nephrograms b/l indicating ATN.   ·	All cultures negative. 12/8 Ostomy appliance removed and dressing applied.   ·	Right lower extremity swelling noted to be worsening and patient upgraded to the ICU on 12/8. No acute findings on venous and arterial US of b/l LEs or Renal US.      12/9 s.p PICC placement & Right anterior pig tail catheter & right lateral wound vac placement with bridge     ·	MRCP significant for Peripancreatic fluid and edema consistent with pancreatitis. Cystic lesions in the pancreatic head with the largest measuring 2.1 cm, likely acute peripancreatic collections, increased in size since 12/4/2019, with a tract of fluid extending from the 2.1 cm cystic lesion into the mediastinum and towards the pleural space on the right suspicious for a pancreatic pleural fistula.    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      12/13 Patient transferred to medicine.   12/15/19 Wound VAC changed.  12/16/19 Repeat xray unchanged.   12/18 US Left chest showed small effusion- no intervention. VAC changed.   12/21: VAC changed 34 year old female with a PMHx of smoking, ETOH use (4-5 drinks/day), transferred from [facility] to [facility] on 11/22 for complaints of weakness, 25 pound weight loss & flu like symptoms & found to have a large right pleural effusion, pleural fluid cultures + for group A hemolytic strep, + RIJ thrombus   11/23 CT of the chest/abd/pelvis indicated a clot in the Right IJ, pt started on heparin gtt.     s/p right VATS decort & washout debridement of CT site & VAC placement on 11/27/19.   ·	Persistent drainage from chest tube site so repeat CT C/A/P performed 12/4 which showed new cystic mass in pancreas and liver concerning for pancreatitis, GI consulted (NTD).   ·	Zosyn changed to meropenem on 12/6 by ID for broader coverage given pancreatitis. Plan to continue Meropenem for 4 weeks.   ·	Copious drainage from anterior chest tube site, so VAC removed and ostomy appliance applied for high output on 12/6.   ·	Pleural fluid with an amylase of 50234 (serum 51) noted.  Prealbumin noted to be 5. Pt tolerating diet. Ensure drinks added.   ·	12/7 Right chest tube removed. Esophogram normal.   ·	Repeat CT scan showed a persistent Right hydropneumothorax with air anteriorly, right lower anterolateral chest wall with air tract to right pleural space, and Persistent renal nephrograms b/l indicating ATN.   ·	All cultures negative. 12/8 Ostomy appliance removed and dressing applied.   ·	Right lower extremity swelling noted to be worsening and patient upgraded to the ICU on 12/8. No acute findings on venous and arterial US of b/l LEs or Renal US.      12/9 s.p PICC placement & Right anterior pig tail catheter & right lateral wound vac placement with bridge     ·	MRCP significant for Peripancreatic fluid and edema consistent with pancreatitis. Cystic lesions in the pancreatic head with the largest measuring 2.1 cm, likely acute peripancreatic collections, increased in size since 12/4/2019, with a tract of fluid extending from the 2.1 cm cystic lesion into the mediastinum and towards the pleural space on the right suspicious for a pancreatic pleural fistula.    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      12/13 Patient transferred to medicine.   12/15/19 Wound VAC changed.  12/16/19 Repeat xray unchanged.   12/18 US Left chest showed small effusion- no intervention. VAC changed.   12/21: VAC changed  12/24 wv CHANGE

## 2019-12-24 NOTE — PROGRESS NOTE ADULT - SUBJECTIVE AND OBJECTIVE BOX
[facility]  INFECTIOUS DISEASES AND INTERNAL MEDICINE at [place]  =======================================================  Carlos Alberto Soto MD  Diplomates American Board of Internal Medicine and Infectious Diseases  Tel: [phone]      Fax: [phone]  =======================================================    HERACLIO MOLINA [id number]  Follow up: R pleural effusion    No fever. No diarrhea. On TPN.    no new issues.   no tolerating PO per RN      Allergies:  Ensure TID (Unknown)  No Known Allergies      Antibiotics:  meropenem  IVPB 1000 milliGRAM(s) IV Intermittent every 8 hours      REVIEW OF SYSTEMS:  CONSTITUTIONAL:  No Fever or chills  HEENT:   No diplopia or blurred vision.  No earache, sore throat or runny nose.  CARDIOVASCULAR:  No pressure, squeezing, strangling, tightness, heaviness or aching about the chest, neck, axilla or epigastrium.  RESPIRATORY:  No cough, shortness of breath  GASTROINTESTINAL:  No nausea, vomiting or diarrhea.  GENITOURINARY:  No dysuria, frequency or urgency.   MUSCULOSKELETAL:  no joint aches, no muscle pain  SKIN:  No change in skin, hair or nails.  NEUROLOGIC:  No Headaches, seizures   PSYCHIATRIC:  No disorder of thought or mood.  ENDOCRINE:  No heat or cold intolerance  HEMATOLOGICAL:  No easy bruising or bleeding.       Physical Exam:  T(F): 97.9 (24 Dec 2019 13:48), Max: 98.4 (23 Dec 2019 16:26)  HR: 123 (24 Dec 2019 13:48)  BP: 125/93 (24 Dec 2019 13:48)  RR: 18 (24 Dec 2019 13:48)  SpO2: 98% (23 Dec 2019 22:05) (98% - 99%)  temp max in last 48H T(F): , Max: 98.4 (12-23-19 @ 07:41)    GEN: NAD, pleasant  HEENT: normocephalic and atraumatic. EOMI. PERRL.  Anicteric   NECK: Supple.   LUNGS: Clear to auscultation. Wound vac on site of old chest tube  HEART: Regular rate and rhythm   ABDOMEN: Soft, nontender, and nondistended.  Positive bowel sounds.    : No CVA tenderness  EXTREMITIES: Without any edema.  MSK: no joint swelling  NEUROLOGIC: No focal deficits  PSYCHIATRIC: Appropriate affect .  SKIN: No Rash       Labs:                        11.3   9.27  )-----------( 349      ( 23 Dec 2019 06:44 )             35.9       WBC Count: 9.27 K/uL (12-23-19 @ 06:44)  WBC Count: 13.79 K/uL (12-20-19 @ 06:11)      12-24    139  |  104  |  8.0  ----------------------------<  127<H>  4.3   |  22.0  |  0.30<L>    Ca    9.2      24 Dec 2019 07:38  Phos  3.9     12-24  Mg     1.7     12-24    TPro  6.6  /  Alb  2.9<L>  /  TBili  <0.2<L>  /  DBili  x   /  AST  19  /  ALT  17  /  AlkPhos  318<H>  12-24

## 2019-12-24 NOTE — PROGRESS NOTE ADULT - ASSESSMENT
Maintained on TPN with chest tube out and no reaccumulation of the Pleural effusion.    All Electrolytes stable and acceptable.  TPN reordered.  No changes.  Same care.

## 2019-12-24 NOTE — PROGRESS NOTE ADULT - SUBJECTIVE AND OBJECTIVE BOX
follow up for pneumonia , pancreatitis , pancreatic   pleural fistula   pt is seen examined   pt is with c/o abd cramps , loose soft BM x3 today   no chest pain at tube site     Vital Signs Last 24 Hrs  T(C): 36.6 (24 Dec 2019 13:48), Max: 36.8 (23 Dec 2019 22:05)  T(F): 97.9 (24 Dec 2019 13:48), Max: 98.2 (23 Dec 2019 22:05)  HR: 117 (24 Dec 2019 16:00) (110 - 127)  BP: 125/93 (24 Dec 2019 13:48) (122/84 - 125/93)  BP(mean): --  RR: 18 (24 Dec 2019 13:48) (18 - 18)  SpO2: 98% (23 Dec 2019 22:05) (98% - 98%)    GENERAL: Not in distress. Alert  anxious   CARDIOVASCULAR: RRR S1, S2. No murmur/rubs/gallop  LUNGS: CTA bilaterally , diminished BS ,  right anterior lower chest drain  rube in place   ABDOMEN: ND. Soft,  no tenderness , hyperactive BS   EXTREMITIES: no cyanosis, no clubbing, no edema.                                    11.3   9.27  )-----------( 349      ( 23 Dec 2019 06:44 )             35.9   12-24    139  |  104  |  8.0  ----------------------------<  127<H>  4.3   |  22.0  |  0.30<L>    Ca    9.2      24 Dec 2019 07:38  Phos  3.9     12-24  Mg     1.7     12-24    TPro  6.6  /  Alb  2.9<L>  /  TBili  <0.2<L>  /  DBili  x   /  AST  19  /  ALT  17  /  AlkPhos  318<H>  12-24

## 2019-12-24 NOTE — PROGRESS NOTE ADULT - SUBJECTIVE AND OBJECTIVE BOX
Patient seen and examined;  chart reviewed.  No change in clinical status.  Remains on antibiotics and heparin.  Attempting to get to adequate coumadin levels, but low PT's persist.    Tolerates TPN well.  Glucoses in low 100 range.  All electrolytes stable for extended period of time.  No fever of toxicity.    AP high from TPN effect on liver.      PAST MEDICAL & SURGICAL HISTORY:      ROS:  No Heartburn, regurgitation, dysphagia, odynophagia.  No dyspepsia  No abdominal pain.    No Nausea, vomiting.  No Bleeding.  No hematemesis.   No diarrhea.    No hematochesia.  No weight loss, anorexia.  No edema.      MEDICATIONS  (STANDING):  amitriptyline 10 milliGRAM(s) Oral at bedtime  ascorbic acid 500 milliGRAM(s) Oral daily  chlorhexidine 4% Liquid 1 Application(s) Topical <User Schedule>  cholecalciferol 2000 Unit(s) Oral daily  escitalopram 10 milliGRAM(s) Oral daily  ferrous    sulfate 325 milliGRAM(s) Oral three times a day  folic acid 1 milliGRAM(s) Oral daily  gabapentin 100 milliGRAM(s) Oral three times a day  heparin  Infusion. 1300 Unit(s)/Hr (13 mL/Hr) IV Continuous <Continuous>  lidocaine   Patch 1 Patch Transdermal daily  meropenem  IVPB 1000 milliGRAM(s) IV Intermittent every 8 hours  multivitamin 1 Tablet(s) Oral daily  octreotide  Injectable 100 MICROGram(s) SubCutaneous every 8 hours  pantoprazole    Tablet 40 milliGRAM(s) Oral before breakfast  Parenteral Nutrition - Adult 1 Each (83 mL/Hr) TPN Continuous <Continuous>  traZODone 50 milliGRAM(s) Oral at bedtime    MEDICATIONS  (PRN):  acetaminophen   Tablet .. 650 milliGRAM(s) Oral every 6 hours PRN Mild Pain (1 - 3)  ALPRAZolam 0.25 milliGRAM(s) Oral three times a day PRN anxiety  aluminum hydroxide/magnesium hydroxide/simethicone Suspension 30 milliLiter(s) Oral every 4 hours PRN Dyspepsia  heparin  Injectable 4000 Unit(s) IV Push every 6 hours PRN For aPTT less than 40  heparin  Injectable 2000 Unit(s) IV Push every 6 hours PRN For aPTT between 40 - 57  melatonin 3 milliGRAM(s) Oral at bedtime PRN Insomnia  methocarbamol 500 milliGRAM(s) Oral every 6 hours PRN Muscle Spasm  ondansetron Injectable 4 milliGRAM(s) IV Push every 4 hours PRN Nausea and/or Vomiting  sodium chloride 0.9% lock flush 10 milliLiter(s) IV Push every 1 hour PRN Pre/post blood products, medications, blood draw, and to maintain line patency  traMADol 50 milliGRAM(s) Oral every 8 hours PRN Moderate Pain (4 - 6)      Allergies    No Known Allergies    Intolerances    Ensure TID (Unknown)      Vital Signs Last 24 Hrs  T(C): 36.8 (23 Dec 2019 22:05), Max: 36.9 (23 Dec 2019 16:26)  T(F): 98.2 (23 Dec 2019 22:05), Max: 98.4 (23 Dec 2019 16:26)  HR: 110 (23 Dec 2019 22:05) (106 - 112)  BP: 122/84 (23 Dec 2019 22:05) (122/84 - 129/85)  BP(mean): --  RR: 18 (23 Dec 2019 22:05) (18 - 18)  SpO2: 98% (23 Dec 2019 22:05) (98% - 99%)    PHYSICAL EXAM:    GENERAL: NAD, well-groomed, well-developed  HEAD:  Atraumatic, Normocephalic  EYES: EOMI, PERRLA, conjunctiva and sclera clear  ENMT: No tonsillar erythema, exudates, or enlargement; Moist mucous membranes, Good dentition, No lesions  NECK: Supple, No JVD, Normal thyroid  CHEST/LUNG: Clear to percussion bilaterally; No rales, rhonchi, wheezing, or rubs  HEART: Regular rate and rhythm; No murmurs, rubs, or gallops  ABDOMEN: Soft, Nontender, Nondistended; Bowel sounds present  EXTREMITIES:  2+ Peripheral Pulses, No clubbing, cyanosis, or edema  LYMPH: No lymphadenopathy noted  SKIN: No rashes or lesions      LABS:                        11.3   9.27  )-----------( 349      ( 23 Dec 2019 06:44 )             35.9     12-24    139  |  104  |  8.0  ----------------------------<  127<H>  4.3   |  22.0  |  0.30<L>    Ca    9.2      24 Dec 2019 07:38  Phos  3.9     12-24  Mg     1.7     12-24    TPro  6.6  /  Alb  2.9<L>  /  TBili  <0.2<L>  /  DBili  x   /  AST  19  /  ALT  17  /  AlkPhos  318<H>  12-24    PT/INR - ( 24 Dec 2019 07:38 )   PT: 14.3 sec;   INR: 1.24 ratio         PTT - ( 24 Dec 2019 07:38 )  PTT:83.2 sec         RADIOLOGY & ADDITIONAL STUDIES:  MRI of pancreas.  No disruption of the PD.  Resolving pancreatitis.

## 2019-12-24 NOTE — PROGRESS NOTE ADULT - ASSESSMENT
34 year old woman with no known major comorbidities (had not gone to see a doctor for 15 years) who presented to [facility] on 11/22/2019 with progressive sob, weakness, weight loss, She was found to have a large pleural effusion. Initially 1.5 L of dark serous fluid was removed. Chest tube placed shortly thereafter, 3 additional liters was removed. pleural fluid cultures + for group A hemolytic strep,CBC showed a leukocytosis of 40k and CTA imaging revealed a 2.1cm partially cystic mass in the body/tail of the pancreas with possible upstream ductal dilatation. On 11/23/2019 the patient had a CT chest/abdomen/pelvis that found to have a clot in the right IJ and was started on a heparin gtt   s/p right VATS decort & washout debridement of CT site & VAC placement on 11/27/19  Persistent drainage from chest tube site so repeat CT C/A/P performed 12/4 which showed new cystic mass in pancreas and liver concerning for pancreatitis  Zosyn changed to meropenem on 12/6  for broader coverage given pancreatitis  Copious drainage from anterior chest tube site, so VAC removed and ostomy appliance applied for high output on 12/6.   12/7 Right chest tube removed.   Repeat CT scan showed a persistent Right hydropneumothorax with air anteriorly  12/8 Ostomy appliance removed and dressing applied.   12/9 s.p PICC placement & Right anterior pig tail catheter & right lateral wound vac placement   MRCP significant for Peripancreatic fluid and edema consistent with pancreatitis. Cystic lesions in the pancreatic head with the largest measuring 2.1 cm, likely acute peripancreatic collections, increased in size since 12/4/2019, with a tract of fluid extending from the 2.1 cm cystic lesion into the mediastinum and towards the pleural space on the right suspicious for a pancreatic pleural fistula.    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

## 2019-12-24 NOTE — PROGRESS NOTE ADULT - SUBJECTIVE AND OBJECTIVE BOX
Subjective: no c/o incisional pain at this time. Denies CP, SOB, palpitations, N/V, other c/o.    T(C): 36.8 (12-23-19 @ 22:05), Max: 36.9 (12-23-19 @ 16:26)  HR: 110 (12-23-19 @ 22:05) (106 - 112)  BP: 122/84 (12-23-19 @ 22:05) (122/84 - 129/85)  ABP: --  ABP(mean): --  RR: 18 (12-23-19 @ 22:05) (18 - 18)  SpO2: 98% (12-23-19 @ 22:05) (98% - 99%)  Wt(kg): --  CVP(mm Hg): --  CO: --  CI: --  PA: --       I&O's Detail    23 Dec 2019 07:01  -  24 Dec 2019 07:00  --------------------------------------------------------  IN:    heparin  Infusion.: 138 mL    Solution: 50 mL    TPN (Total Parenteral Nutrition): 996 mL  Total IN: 1184 mL    OUT:  Total OUT: 0 mL    Total NET: 1184 mL      24 Dec 2019 07:01  -  24 Dec 2019 10:40  --------------------------------------------------------  IN:    heparin  Infusion.: 11 mL    TPN (Total Parenteral Nutrition): 83 mL  Total IN: 94 mL    OUT:  Total OUT: 0 mL    Total NET: 94 mL          LABS: All Lab data reviewed and analyzed                        11.3   9.27  )-----------( 349      ( 23 Dec 2019 06:44 )             35.9     12-24    139  |  104  |  8.0  ----------------------------<  127<H>  4.3   |  22.0  |  0.30<L>    Ca    9.2      24 Dec 2019 07:38  Phos  3.9     12-24  Mg     1.7     12-24    TPro  6.6  /  Alb  2.9<L>  /  TBili  <0.2<L>  /  DBili  x   /  AST  19  /  ALT  17  /  AlkPhos  318<H>  12-24    PT/INR - ( 24 Dec 2019 07:38 )   PT: 14.3 sec;   INR: 1.24 ratio         PTT - ( 24 Dec 2019 07:38 )  PTT:83.2 sec        CAPILLARY BLOOD GLUCOSE      POCT Blood Glucose.: 116 mg/dL (24 Dec 2019 08:38)  POCT Blood Glucose.: 111 mg/dL (23 Dec 2019 23:45)  POCT Blood Glucose.: 101 mg/dL (23 Dec 2019 18:10)  POCT Blood Glucose.: 97 mg/dL (23 Dec 2019 13:21)           RADIOLOGY: - Reviewed and analyzed   CXR: pending    HOSPITAL MEDICATIONS: All medications reviewed and analyzed  MEDICATIONS  (STANDING):  amitriptyline 10 milliGRAM(s) Oral at bedtime  ascorbic acid 500 milliGRAM(s) Oral daily  chlorhexidine 4% Liquid 1 Application(s) Topical <User Schedule>  cholecalciferol 2000 Unit(s) Oral daily  escitalopram 10 milliGRAM(s) Oral daily  fat emulsion (Plant Based) 20% Infusion 0.9 Gm/kG/Day (31.25 mL/Hr) IV Continuous <Continuous>  ferrous    sulfate 325 milliGRAM(s) Oral three times a day  folic acid 1 milliGRAM(s) Oral daily  gabapentin 100 milliGRAM(s) Oral three times a day  heparin  Infusion. 1300 Unit(s)/Hr (13 mL/Hr) IV Continuous <Continuous>  lidocaine   Patch 1 Patch Transdermal daily  meropenem  IVPB 1000 milliGRAM(s) IV Intermittent every 8 hours  multivitamin 1 Tablet(s) Oral daily  octreotide  Injectable 100 MICROGram(s) SubCutaneous every 8 hours  pantoprazole    Tablet 40 milliGRAM(s) Oral before breakfast  Parenteral Nutrition - Adult 1 Each (83 mL/Hr) TPN Continuous <Continuous>  Parenteral Nutrition - Adult 1 Each (83 mL/Hr) TPN Continuous <Continuous>  traZODone 50 milliGRAM(s) Oral at bedtime    MEDICATIONS  (PRN):  acetaminophen   Tablet .. 650 milliGRAM(s) Oral every 6 hours PRN Mild Pain (1 - 3)  ALPRAZolam 0.25 milliGRAM(s) Oral three times a day PRN anxiety  aluminum hydroxide/magnesium hydroxide/simethicone Suspension 30 milliLiter(s) Oral every 4 hours PRN Dyspepsia  heparin  Injectable 4000 Unit(s) IV Push every 6 hours PRN For aPTT less than 40  heparin  Injectable 2000 Unit(s) IV Push every 6 hours PRN For aPTT between 40 - 57  melatonin 3 milliGRAM(s) Oral at bedtime PRN Insomnia  methocarbamol 500 milliGRAM(s) Oral every 6 hours PRN Muscle Spasm  ondansetron Injectable 4 milliGRAM(s) IV Push every 4 hours PRN Nausea and/or Vomiting  sodium chloride 0.9% lock flush 10 milliLiter(s) IV Push every 1 hour PRN Pre/post blood products, medications, blood draw, and to maintain line patency  traMADol 50 milliGRAM(s) Oral every 8 hours PRN Moderate Pain (4 - 6)              Neuro: A+O x 3, non-focal, speech clear and intact  HEENT: PERRL, EOMI, oral mucosa pink and moist  CHEST: right chest wall wound vac healing well, wound c/d/i   Neck: supple, no JVD  CV: regular rate, regular rhythm, +S1S2, no murmurs or rub  Pulm/chest: lung sounds CTA and equal bilaterally, no accessory muscle use noted  Abd: soft, NT, ND, +BS  Ext: THACKER x 4, no C/C/E  Skin: warm, well perfused         Case including assessment/plan of care discussed with   CT surgery attending.  Care time:      minutes of noncontinuos critical care time spent evaluating/treating, reviewing imaging, labs, discussing case with multidisciplinary team, discussing plans/goals of care with patient/family to prevent further life threatening depreciation of the patient's condition. Non-inclusive is procedure time.       34yFemale with PMH     US guided vascular access  Placement, PICC, with imaging guidance  Debridement, wound, with dressing replacement  Wound VAC placement  Pleural biopsy  Lung decortication  Bronchoscopy, with lung decortication using VATS      PAST MEDICAL & SURGICAL HISTORY:

## 2019-12-25 LAB
ANION GAP SERPL CALC-SCNC: 13 MMOL/L — SIGNIFICANT CHANGE UP (ref 5–17)
APTT BLD: 70.2 SEC — HIGH (ref 27.5–36.3)
BUN SERPL-MCNC: 9 MG/DL — SIGNIFICANT CHANGE UP (ref 8–20)
CALCIUM SERPL-MCNC: 9.1 MG/DL — SIGNIFICANT CHANGE UP (ref 8.6–10.2)
CHLORIDE SERPL-SCNC: 104 MMOL/L — SIGNIFICANT CHANGE UP (ref 98–107)
CO2 SERPL-SCNC: 23 MMOL/L — SIGNIFICANT CHANGE UP (ref 22–29)
CREAT SERPL-MCNC: 0.3 MG/DL — LOW (ref 0.5–1.3)
GLUCOSE BLDC GLUCOMTR-MCNC: 110 MG/DL — HIGH (ref 70–99)
GLUCOSE BLDC GLUCOMTR-MCNC: 95 MG/DL — SIGNIFICANT CHANGE UP (ref 70–99)
GLUCOSE SERPL-MCNC: 122 MG/DL — HIGH (ref 70–115)
INR BLD: 1.26 RATIO — HIGH (ref 0.88–1.16)
MAGNESIUM SERPL-MCNC: 1.8 MG/DL — SIGNIFICANT CHANGE UP (ref 1.6–2.6)
PHOSPHATE SERPL-MCNC: 4.3 MG/DL — SIGNIFICANT CHANGE UP (ref 2.4–4.7)
POTASSIUM SERPL-MCNC: 4.3 MMOL/L — SIGNIFICANT CHANGE UP (ref 3.5–5.3)
POTASSIUM SERPL-SCNC: 4.3 MMOL/L — SIGNIFICANT CHANGE UP (ref 3.5–5.3)
PROTHROM AB SERPL-ACNC: 14.6 SEC — HIGH (ref 10–12.9)
SODIUM SERPL-SCNC: 140 MMOL/L — SIGNIFICANT CHANGE UP (ref 135–145)

## 2019-12-25 PROCEDURE — 99233 SBSQ HOSP IP/OBS HIGH 50: CPT

## 2019-12-25 PROCEDURE — 99232 SBSQ HOSP IP/OBS MODERATE 35: CPT

## 2019-12-25 RX ORDER — ELECTROLYTE SOLUTION,INJ
1 VIAL (ML) INTRAVENOUS
Refills: 0 | Status: DISCONTINUED | OUTPATIENT
Start: 2019-12-25 | End: 2019-12-25

## 2019-12-25 RX ORDER — WARFARIN SODIUM 2.5 MG/1
6 TABLET ORAL ONCE
Refills: 0 | Status: COMPLETED | OUTPATIENT
Start: 2019-12-25 | End: 2019-12-25

## 2019-12-25 RX ORDER — I.V. FAT EMULSION 20 G/100ML
0.9 EMULSION INTRAVENOUS
Qty: 50 | Refills: 0 | Status: DISCONTINUED | OUTPATIENT
Start: 2019-12-25 | End: 2019-12-25

## 2019-12-25 RX ORDER — SUCRALFATE 1 G
1 TABLET ORAL
Refills: 0 | Status: DISCONTINUED | OUTPATIENT
Start: 2019-12-25 | End: 2020-01-07

## 2019-12-25 RX ADMIN — SIMETHICONE 80 MILLIGRAM(S): 80 TABLET, CHEWABLE ORAL at 11:30

## 2019-12-25 RX ADMIN — GABAPENTIN 100 MILLIGRAM(S): 400 CAPSULE ORAL at 14:56

## 2019-12-25 RX ADMIN — Medication 1 EACH: at 18:25

## 2019-12-25 RX ADMIN — Medication 1 GRAM(S): at 18:26

## 2019-12-25 RX ADMIN — Medication 325 MILLIGRAM(S): at 22:06

## 2019-12-25 RX ADMIN — PANTOPRAZOLE SODIUM 40 MILLIGRAM(S): 20 TABLET, DELAYED RELEASE ORAL at 05:31

## 2019-12-25 RX ADMIN — OCTREOTIDE ACETATE 100 MICROGRAM(S): 200 INJECTION, SOLUTION INTRAVENOUS; SUBCUTANEOUS at 14:56

## 2019-12-25 RX ADMIN — Medication 2000 UNIT(S): at 11:30

## 2019-12-25 RX ADMIN — I.V. FAT EMULSION 31.05 GM/KG/DAY: 20 EMULSION INTRAVENOUS at 18:26

## 2019-12-25 RX ADMIN — MEROPENEM 100 MILLIGRAM(S): 1 INJECTION INTRAVENOUS at 05:38

## 2019-12-25 RX ADMIN — Medication 325 MILLIGRAM(S): at 14:56

## 2019-12-25 RX ADMIN — TRAMADOL HYDROCHLORIDE 50 MILLIGRAM(S): 50 TABLET ORAL at 19:33

## 2019-12-25 RX ADMIN — MEROPENEM 100 MILLIGRAM(S): 1 INJECTION INTRAVENOUS at 14:56

## 2019-12-25 RX ADMIN — Medication 325 MILLIGRAM(S): at 05:31

## 2019-12-25 RX ADMIN — OCTREOTIDE ACETATE 100 MICROGRAM(S): 200 INJECTION, SOLUTION INTRAVENOUS; SUBCUTANEOUS at 05:31

## 2019-12-25 RX ADMIN — OCTREOTIDE ACETATE 100 MICROGRAM(S): 200 INJECTION, SOLUTION INTRAVENOUS; SUBCUTANEOUS at 22:07

## 2019-12-25 RX ADMIN — GABAPENTIN 100 MILLIGRAM(S): 400 CAPSULE ORAL at 05:31

## 2019-12-25 RX ADMIN — WARFARIN SODIUM 6 MILLIGRAM(S): 2.5 TABLET ORAL at 22:06

## 2019-12-25 RX ADMIN — Medication 30 MILLILITER(S): at 14:57

## 2019-12-25 RX ADMIN — MEROPENEM 100 MILLIGRAM(S): 1 INJECTION INTRAVENOUS at 22:07

## 2019-12-25 RX ADMIN — Medication 0.25 MILLIGRAM(S): at 11:35

## 2019-12-25 RX ADMIN — Medication 50 MILLIGRAM(S): at 22:06

## 2019-12-25 RX ADMIN — SIMETHICONE 80 MILLIGRAM(S): 80 TABLET, CHEWABLE ORAL at 06:07

## 2019-12-25 RX ADMIN — Medication 10 MILLIGRAM(S): at 22:07

## 2019-12-25 RX ADMIN — GABAPENTIN 100 MILLIGRAM(S): 400 CAPSULE ORAL at 22:06

## 2019-12-25 RX ADMIN — CHLORHEXIDINE GLUCONATE 1 APPLICATION(S): 213 SOLUTION TOPICAL at 06:18

## 2019-12-25 RX ADMIN — METHOCARBAMOL 500 MILLIGRAM(S): 500 TABLET, FILM COATED ORAL at 22:06

## 2019-12-25 RX ADMIN — ESCITALOPRAM OXALATE 10 MILLIGRAM(S): 10 TABLET, FILM COATED ORAL at 11:32

## 2019-12-25 RX ADMIN — TRAMADOL HYDROCHLORIDE 50 MILLIGRAM(S): 50 TABLET ORAL at 18:33

## 2019-12-25 NOTE — PROGRESS NOTE ADULT - SUBJECTIVE AND OBJECTIVE BOX
follow up for pneumonia , pancreatitis , pancreatic   pleural fistula   pt is seen examined   pt is with c/o abd cramps, better + heart burn , loose BM twice today   no nausea , on clear liquid diet     Vital Signs Last 24 Hrs  T(C): 36.4 (25 Dec 2019 16:08), Max: 36.9 (24 Dec 2019 19:51)  T(F): 97.5 (25 Dec 2019 16:08), Max: 98.4 (24 Dec 2019 19:51)  HR: 111 (25 Dec 2019 16:08) (111 - 123)  BP: 127/92 (25 Dec 2019 16:08) (115/74 - 127/92)  BP(mean): --  RR: 18 (25 Dec 2019 16:08) (18 - 18)  SpO2: 98% (25 Dec 2019 16:08) (93% - 98%)    GENERAL: Not in distress. Alert  anxious   CARDIOVASCULAR: RRR S1, S2. No murmur/rubs/gallop  LUNGS: CTA bilaterally , diminished BS ,  right anterior lower chest drain  rube in place   ABDOMEN: ND. Soft,  no tenderness , hyperactive BS   EXTREMITIES: no cyanosis, no clubbing, no edema.

## 2019-12-25 NOTE — PROGRESS NOTE ADULT - SUBJECTIVE AND OBJECTIVE BOX
Chief Complaint: This is a 34y old woman patient being seen in follow-up consultation for TPN.    HPI / 24H events:  Patient reports moving bowels, which has abated her abdominal discomfort.  Would like diet advanced.  No other complaints.     ROS: A 14-point review of systems was reviewed and was otherwise negative save what was reported in the HPI.    PAST MEDICAL/SURGICAL HISTORY:    MEDICATIONS  (STANDING):  amitriptyline 10 milliGRAM(s) Oral at bedtime  chlorhexidine 4% Liquid 1 Application(s) Topical <User Schedule>  cholecalciferol 2000 Unit(s) Oral daily  escitalopram 10 milliGRAM(s) Oral daily  fat emulsion (Plant Based) 20% Infusion 0.9 Gm/kG/Day (31.05 mL/Hr) IV Continuous <Continuous>  ferrous    sulfate 325 milliGRAM(s) Oral three times a day  gabapentin 100 milliGRAM(s) Oral three times a day  heparin  Infusion. 1300 Unit(s)/Hr (13 mL/Hr) IV Continuous <Continuous>  lidocaine   Patch 1 Patch Transdermal daily  meropenem  IVPB 1000 milliGRAM(s) IV Intermittent every 8 hours  octreotide  Injectable 100 MICROGram(s) SubCutaneous every 8 hours  pantoprazole    Tablet 40 milliGRAM(s) Oral before breakfast  Parenteral Nutrition - Adult 1 Each (83 mL/Hr) TPN Continuous <Continuous>  Parenteral Nutrition - Adult 1 Each (83 mL/Hr) TPN Continuous <Continuous>  traZODone 50 milliGRAM(s) Oral at bedtime    MEDICATIONS  (PRN):  acetaminophen   Tablet .. 650 milliGRAM(s) Oral every 6 hours PRN Mild Pain (1 - 3)  ALPRAZolam 0.25 milliGRAM(s) Oral three times a day PRN anxiety  aluminum hydroxide/magnesium hydroxide/simethicone Suspension 30 milliLiter(s) Oral every 4 hours PRN Dyspepsia  heparin  Injectable 4000 Unit(s) IV Push every 6 hours PRN For aPTT less than 40  heparin  Injectable 2000 Unit(s) IV Push every 6 hours PRN For aPTT between 40 - 57  melatonin 3 milliGRAM(s) Oral at bedtime PRN Insomnia  methocarbamol 500 milliGRAM(s) Oral every 6 hours PRN Muscle Spasm  ondansetron Injectable 4 milliGRAM(s) IV Push every 4 hours PRN Nausea and/or Vomiting  simethicone 80 milliGRAM(s) Chew three times a day PRN Gas  sodium chloride 0.9% lock flush 10 milliLiter(s) IV Push every 1 hour PRN Pre/post blood products, medications, blood draw, and to maintain line patency  traMADol 50 milliGRAM(s) Oral every 8 hours PRN Moderate Pain (4 - 6)    Ensure TID (Unknown)  No Known Allergies  pt requests chicken broth (no beef) and CHERRY italian ice (no lemon) (Unknown)    T(C): 36.8 (12-25-19 @ 11:13), Max: 36.9 (12-24-19 @ 19:51)  HR: 123 (12-25-19 @ 12:00) (111 - 123)  BP: 124/87 (12-25-19 @ 11:13) (115/74 - 125/93)  RR: 18 (12-25-19 @ 11:13) (18 - 18)  SpO2: 93% (12-25-19 @ 11:13) (93% - 93%)    I&O's Summary    24 Dec 2019 07:01  -  25 Dec 2019 07:00  --------------------------------------------------------  IN: 2721.2 mL / OUT: 0 mL / NET: 2721.2 mL      PHYSICAL EXAM:  Constitutional: Well-developed, malnourished, in no apparent distress  Eyes: Sclerae anicteric, conjunctivae normal  ENMT: Mucus membranes moist, no oropharyngeal thrush noted  Neck: No thyroid nodules appreciated, no significant cervical or supraclavicular lymphadenopathy  Respiratory: Breathing nonlabored  Cardiovascular: Regular rate and rhythm  Gastrointestinal: Soft, nontender, nondistended, normoactive bowel sounds; no hepatosplenomegaly appreciated; no rebound tenderness or involuntary guarding  Extremities: No clubbing, cyanosis or edema  Neurological: Alert and oriented to person, place and time; no asterixis  Skin: No jaundice  Lymph Nodes: No significant lymphadenopathy  Musculoskeletal: No significant peripheral atrophy  Psychiatric: Affect and mood appropriate                   11.3   9.27  )-----------( 349      ( 12-23 @ 06:44 )             35.9       12-25    140  |  104  |  9.0  ----------------------------<  122<H>  4.3   |  23.0  |  0.30<L>    Ca    9.1      25 Dec 2019 09:32  Phos  4.3     12-25  Mg     1.8     12-25    TPro  6.6  /  Alb  2.9<L>  /  TBili  <0.2<L>  /  DBili  x   /  AST  19  /  ALT  17  /  AlkPhos  318<H>  12-24    LIVER FUNCTIONS - ( 24 Dec 2019 07:38 )  Alb: 2.9 g/dL / Pro: 6.6 g/dL / ALK PHOS: 318 U/L / ALT: 17 U/L / AST: 19 U/L / GGT: x               PT/INR - ( 25 Dec 2019 09:32 )   PT: 14.6 sec;   INR: 1.26 ratio         PTT - ( 25 Dec 2019 09:32 )  PTT:70.2 sec

## 2019-12-25 NOTE — PROGRESS NOTE ADULT - ASSESSMENT
34 year old woman with no known major comorbidities (had not gone to see a doctor for 15 years) who presented to [facility] on 11/22/2019 with progressive sob, weakness, weight loss, She was found to have a large pleural effusion. Initially 1.5 L of dark serous fluid was removed. Chest tube placed shortly thereafter, 3 additional liters was removed. pleural fluid cultures + for group A hemolytic strep,CBC showed a leukocytosis of 40k and CTA imaging revealed a 2.1cm partially cystic mass in the body/tail of the pancreas with possible upstream ductal dilatation. On 11/23/2019 the patient had a CT chest/abdomen/pelvis that found to have a clot in the right IJ and was started on a heparin gtt   s/p right VATS decort & washout debridement of CT site & VAC placement on 11/27/19  Persistent drainage from chest tube site so repeat CT C/A/P performed 12/4 which showed new cystic mass in pancreas and liver concerning for pancreatitis  Zosyn changed to meropenem on 12/6  for broader coverage given pancreatitis  Copious drainage from anterior chest tube site, so VAC removed and ostomy appliance applied for high output on 12/6.   12/7 Right chest tube removed.   Repeat CT scan showed a persistent Right hydropneumothorax with air anteriorly  12/8 Ostomy appliance removed and dressing applied.   12/9 s.p PICC placement & Right anterior pig tail catheter & right lateral wound vac placement   MRCP significant for Peripancreatic fluid and edema consistent with pancreatitis. Cystic lesions in the pancreatic head with the largest measuring 2.1 cm, likely acute peripancreatic collections, increased in size since 12/4/2019, with a tract of fluid extending from the 2.1 cm cystic lesion into the mediastinum and towards the pleural space on the right suspicious for a pancreatic pleural fistula.    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

## 2019-12-25 NOTE — PROGRESS NOTE ADULT - SUBJECTIVE AND OBJECTIVE BOX
f/u abnormal thyroid and prolactin levels  INTERVAL HPI/OVERNIGHT EVENTS: she is frustrated with diet and wants to go home    MEDICATIONS  (STANDING):  amitriptyline 10 milliGRAM(s) Oral at bedtime  chlorhexidine 4% Liquid 1 Application(s) Topical <User Schedule>  cholecalciferol 2000 Unit(s) Oral daily  escitalopram 10 milliGRAM(s) Oral daily  fat emulsion (Plant Based) 20% Infusion 0.9 Gm/kG/Day (31.05 mL/Hr) IV Continuous <Continuous>  ferrous    sulfate 325 milliGRAM(s) Oral three times a day  gabapentin 100 milliGRAM(s) Oral three times a day  heparin  Infusion. 1300 Unit(s)/Hr (13 mL/Hr) IV Continuous <Continuous>  lidocaine   Patch 1 Patch Transdermal daily  meropenem  IVPB 1000 milliGRAM(s) IV Intermittent every 8 hours  octreotide  Injectable 100 MICROGram(s) SubCutaneous every 8 hours  pantoprazole    Tablet 40 milliGRAM(s) Oral before breakfast  Parenteral Nutrition - Adult 1 Each (83 mL/Hr) TPN Continuous <Continuous>  Parenteral Nutrition - Adult 1 Each (83 mL/Hr) TPN Continuous <Continuous>  traZODone 50 milliGRAM(s) Oral at bedtime    MEDICATIONS  (PRN):  acetaminophen   Tablet .. 650 milliGRAM(s) Oral every 6 hours PRN Mild Pain (1 - 3)  ALPRAZolam 0.25 milliGRAM(s) Oral three times a day PRN anxiety  aluminum hydroxide/magnesium hydroxide/simethicone Suspension 30 milliLiter(s) Oral every 4 hours PRN Dyspepsia  heparin  Injectable 4000 Unit(s) IV Push every 6 hours PRN For aPTT less than 40  heparin  Injectable 2000 Unit(s) IV Push every 6 hours PRN For aPTT between 40 - 57  melatonin 3 milliGRAM(s) Oral at bedtime PRN Insomnia  methocarbamol 500 milliGRAM(s) Oral every 6 hours PRN Muscle Spasm  ondansetron Injectable 4 milliGRAM(s) IV Push every 4 hours PRN Nausea and/or Vomiting  simethicone 80 milliGRAM(s) Chew three times a day PRN Gas  sodium chloride 0.9% lock flush 10 milliLiter(s) IV Push every 1 hour PRN Pre/post blood products, medications, blood draw, and to maintain line patency  traMADol 50 milliGRAM(s) Oral every 8 hours PRN Moderate Pain (4 - 6)      Allergies  No Known Allergies      Review of systems: denies fever/chils. denies abd pain/N/V/D/C. denies CP/palp. denies SOB. (+) weight loss (+) breast pain    Vital Signs Last 24 Hrs  T(C): 36.8 (25 Dec 2019 11:13), Max: 36.9 (24 Dec 2019 19:51)  T(F): 98.3 (25 Dec 2019 11:13), Max: 98.4 (24 Dec 2019 19:51)  HR: 123 (25 Dec 2019 12:00) (111 - 123)  BP: 124/87 (25 Dec 2019 11:13) (115/74 - 124/87)  BP(mean): --  RR: 18 (25 Dec 2019 11:13) (18 - 18)  SpO2: 93% (25 Dec 2019 11:13) (93% - 93%)    PHYSICAL EXAM:  Constitutional: NAD, thin appearing  HEENT: PERRLA, EOMI=  Neck: No LAD,  trachea midline, no thyroid enlargement  Respiratory: CTAB, no w/r/r  Cardiovascular: S1 and S2, RRR  Gastrointestinal: BS+, soft  Extremities: No peripheral edema  Neurological: A/O x 3, no focal deficits  Psychiatric: Normal mood, normal affect      LABS:    12-25    140  |  104  |  9.0  ----------------------------<  122<H>  4.3   |  23.0  |  0.30<L>    Ca    9.1      25 Dec 2019 09:32  Phos  4.3     12-25  Mg     1.8     12-25    TPro  6.6  /  Alb  2.9<L>  /  TBili  <0.2<L>  /  DBili  x   /  AST  19  /  ALT  17  /  AlkPhos  318<H>  12-24        Triiodothyronine, Total (T3 Total): 145 ng/dL [80 - 200] (12-24-19)  T4, Serum: 6.3 ug/dL [4.5 - 12.0] (12-24-19)  Thyroid Stimulating Hormone, Serum: 1.09 uIU/mL [0.27 - 4.20] (12-24-19)    T4, Serum: 3.7 ug/dL [4.5 - 12.0] (12-15-19)  Thyroid Stimulating Hormone, Serum: 5.97 uIU/mL [0.27 - 4.20] (12-15-19)    Triiodothyronine, Total (T3 Total): 84 ng/dL [80 - 200] (12-10-19)  T4, Serum: 4.2 ug/dL [4.5 - 12.0] (12-10-19)  Thyroid Stimulating Hormone, Serum: 11.82 uIU/mL [0.27 - 4.20] (12-10-19)    Thyroid Stimulating Hormone, Serum: 2.02 uIU/mL [0.27 - 4.20] (12-05-19)  Thyroid Stimulating Hormone, Serum: 2.20 uIU/mL [0.27 - 4.20] (12-04-19)  T4, Serum: 5.7 ug/dL [4.5 - 12.0] (12-04-19)    Thyroperoxidase Antibody: <10.0 IU/mL (11-26-19)  Triiodothyronine, Total (T3 Total): 70 ng/dL [80 - 200] (11-26-19)  T4, Serum: 5.2 ug/dL [4.5 - 12.0] (11-26-19)  Thyroid Stimulating Hormone, Serum: 4.76 uIU/mL [0.27 - 4.20] (11-25-19)    Prolactin, Serum: 40.5 ng/mL [3.4 - 24.1] (12-06-19)

## 2019-12-25 NOTE — PROGRESS NOTE ADULT - SUBJECTIVE AND OBJECTIVE BOX
34 year old woman with no known major comorbidities (had not gone to see a doctor for 15 years) who presented to [facility] on 11/22/2019 with progressive sob, weakness, weight loss, She was found to have a large pleural effusion. Initially 1.5 L of dark serous fluid was removed.  Chest tube placed shortly thereafter, 3 additional liters was removed. CBC showed a leukocytosis of 40k and   CTA imaging revealed a 2.1cm partially cystic mass in the body/tail of the pancreas with possible upstream ductal dilatation. On 11/23/2019 the patient had a CT chest/abdomen/pelvis that found to have a clot in the right IJ and was started on a heparin gtt.   Chest tube out; on parenteral feedings      Transferred to [facility] on 11/24/19.  MRCP performed on 11/26.  Underwent Debridement, wound, with dressing replacement  Wound VAC placement  Pleural biopsy  Lung decortication  Bronchoscopy, with lung decortication using VATS on 11/27/19.  Pt  has a history of alcohol abuse.     We were consulted for evaluation and management of the RIJ DVT.      INTERVAL HISTORY  The patient is frustrated about length of hospital stay. no bleeding. on heparin gtt  On TPN; treated for pancreatic cysts  No bleeding reported; denies SOB or chest pain    MEDICATIONS  (STANDING):  amitriptyline 10 milliGRAM(s) Oral at bedtime  chlorhexidine 4% Liquid 1 Application(s) Topical <User Schedule>  cholecalciferol 2000 Unit(s) Oral daily  escitalopram 10 milliGRAM(s) Oral daily  fat emulsion (Plant Based) 20% Infusion 0.9 Gm/kG/Day (31.25 mL/Hr) IV Continuous <Continuous>  ferrous    sulfate 325 milliGRAM(s) Oral three times a day  gabapentin 100 milliGRAM(s) Oral three times a day  heparin  Infusion. 1300 Unit(s)/Hr (13 mL/Hr) IV Continuous <Continuous>  lidocaine   Patch 1 Patch Transdermal daily  meropenem  IVPB 1000 milliGRAM(s) IV Intermittent every 8 hours  octreotide  Injectable 100 MICROGram(s) SubCutaneous every 8 hours  pantoprazole    Tablet 40 milliGRAM(s) Oral before breakfast  Parenteral Nutrition - Adult 1 Each (83 mL/Hr) TPN Continuous <Continuous>  traZODone 50 milliGRAM(s) Oral at bedtime    MEDICATIONS  (PRN):  acetaminophen   Tablet .. 650 milliGRAM(s) Oral every 6 hours PRN Mild Pain (1 - 3)  ALPRAZolam 0.25 milliGRAM(s) Oral three times a day PRN anxiety  aluminum hydroxide/magnesium hydroxide/simethicone Suspension 30 milliLiter(s) Oral every 4 hours PRN Dyspepsia  heparin  Injectable 4000 Unit(s) IV Push every 6 hours PRN For aPTT less than 40  heparin  Injectable 2000 Unit(s) IV Push every 6 hours PRN For aPTT between 40 - 57  melatonin 3 milliGRAM(s) Oral at bedtime PRN Insomnia  methocarbamol 500 milliGRAM(s) Oral every 6 hours PRN Muscle Spasm  ondansetron Injectable 4 milliGRAM(s) IV Push every 4 hours PRN Nausea and/or Vomiting  simethicone 80 milliGRAM(s) Chew three times a day PRN Gas  sodium chloride 0.9% lock flush 10 milliLiter(s) IV Push every 1 hour PRN Pre/post blood products, medications, blood draw, and to maintain line patency  traMADol 50 milliGRAM(s) Oral every 8 hours PRN Moderate Pain (4 - 6)      Vital Signs Last 24 Hrs  T(C): 36.9 (24 Dec 2019 19:51), Max: 36.9 (24 Dec 2019 19:51)  T(F): 98.4 (24 Dec 2019 19:51), Max: 98.4 (24 Dec 2019 19:51)  HR: 120 (24 Dec 2019 19:51) (117 - 127)  BP: 115/74 (24 Dec 2019 19:51) (115/74 - 125/93)  BP(mean): --  RR: 18 (24 Dec 2019 19:51) (18 - 18)  SpO2: --Gen: nad  CV: rrr  Abd: soft, nd,                          11.3   9.27  )-----------( 349      ( 23 Dec 2019 06:44 )             35.9     12-24    139  |  104  |  8.0  ----------------------------<  127<H>  4.3   |  22.0  |  0.30<L>    Ca    9.2      24 Dec 2019 07:38  Phos  3.9     12-24  Mg     1.7     12-24    TPro  6.6  /  Alb  2.9<L>  /  TBili  <0.2<L>  /  DBili  x   /  AST  19  /  ALT  17  /  AlkPhos  318<H>  12-24

## 2019-12-25 NOTE — PROGRESS NOTE ADULT - ASSESSMENT
34 year old female with no known major comorbidities who is being treated for right pleural effusion/empyema/multifocal pna found to have a RIJ thrombus currently on heparin drip.    1) RIJ venous thrombosis -provoked event that occurred in context of her severe and complex lung illness/infection/empyema that may have led to developing the thrombus. Plan on anticoagulation for  3 months. Plan for warfarin noted as outpatient. will need frequent INR monitoring.  2) Multifactorial anemia; Hb11.3.  3) Reactive leukocytosis, improved.  WBC 9.27k today.  4) Pancreatic Fistula-care per managing service/ID/GI

## 2019-12-25 NOTE — PROGRESS NOTE ADULT - ASSESSMENT
34 [place] [facility] [place]/ empyema s/p chest tube (now removed), also with acute pancreatitis (likely on chronic) with pancreatic- pleural fistula, currently on TPN and octreotide.  Also found to have RIJ thrombus.   We are consulted for elevated TSH and low T4 levels, which are improving and likely due to sick euthyroid syndrome.  She also has mildly elevated prolactin of unclear clinical significance.      1.  Abnormal TFTs-due to sick euthyroid syndrome.  thyroid levels now normal . no further work up or treatment needed at this time  2.  Hyperprolactinemia-  repeat as an outpatient.  If level continues to be elevated, will need further work up, but at current time, this is of little clinical significance.    3.  Pancreatitis with pancreatic/ pleural fistula- On Octreotide and TPN as per GI.

## 2019-12-26 LAB
ANION GAP SERPL CALC-SCNC: 12 MMOL/L — SIGNIFICANT CHANGE UP (ref 5–17)
APTT BLD: 51.8 SEC — HIGH (ref 27.5–36.3)
APTT BLD: 52.5 SEC — HIGH (ref 27.5–36.3)
BUN SERPL-MCNC: 10 MG/DL — SIGNIFICANT CHANGE UP (ref 8–20)
CA-I BLD-SCNC: 1.28 MMOL/L — SIGNIFICANT CHANGE UP (ref 1.12–1.3)
CALCIUM SERPL-MCNC: 9.4 MG/DL — SIGNIFICANT CHANGE UP (ref 8.6–10.2)
CHLORIDE SERPL-SCNC: 103 MMOL/L — SIGNIFICANT CHANGE UP (ref 98–107)
CO2 SERPL-SCNC: 24 MMOL/L — SIGNIFICANT CHANGE UP (ref 22–29)
CREAT SERPL-MCNC: 0.36 MG/DL — LOW (ref 0.5–1.3)
GLUCOSE BLDC GLUCOMTR-MCNC: 104 MG/DL — HIGH (ref 70–99)
GLUCOSE BLDC GLUCOMTR-MCNC: 110 MG/DL — HIGH (ref 70–99)
GLUCOSE BLDC GLUCOMTR-MCNC: 113 MG/DL — HIGH (ref 70–99)
GLUCOSE BLDC GLUCOMTR-MCNC: 117 MG/DL — HIGH (ref 70–99)
GLUCOSE BLDC GLUCOMTR-MCNC: 133 MG/DL — HIGH (ref 70–99)
GLUCOSE SERPL-MCNC: 137 MG/DL — HIGH (ref 70–115)
HCT VFR BLD CALC: 36.4 % — SIGNIFICANT CHANGE UP (ref 34.5–45)
HGB BLD-MCNC: 11.3 G/DL — LOW (ref 11.5–15.5)
INR BLD: 1.23 RATIO — HIGH (ref 0.88–1.16)
MAGNESIUM SERPL-MCNC: 1.7 MG/DL — LOW (ref 1.8–2.6)
MCHC RBC-ENTMCNC: 29.2 PG — SIGNIFICANT CHANGE UP (ref 27–34)
MCHC RBC-ENTMCNC: 31 GM/DL — LOW (ref 32–36)
MCV RBC AUTO: 94.1 FL — SIGNIFICANT CHANGE UP (ref 80–100)
PHOSPHATE SERPL-MCNC: 5.2 MG/DL — HIGH (ref 2.4–4.7)
PLATELET # BLD AUTO: 300 K/UL — SIGNIFICANT CHANGE UP (ref 150–400)
POTASSIUM SERPL-MCNC: 4.7 MMOL/L — SIGNIFICANT CHANGE UP (ref 3.5–5.3)
POTASSIUM SERPL-SCNC: 4.7 MMOL/L — SIGNIFICANT CHANGE UP (ref 3.5–5.3)
PROTHROM AB SERPL-ACNC: 14.2 SEC — HIGH (ref 10–12.9)
RBC # BLD: 3.87 M/UL — SIGNIFICANT CHANGE UP (ref 3.8–5.2)
RBC # FLD: 15.7 % — HIGH (ref 10.3–14.5)
SODIUM SERPL-SCNC: 139 MMOL/L — SIGNIFICANT CHANGE UP (ref 135–145)
WBC # BLD: 9.13 K/UL — SIGNIFICANT CHANGE UP (ref 3.8–10.5)
WBC # FLD AUTO: 9.13 K/UL — SIGNIFICANT CHANGE UP (ref 3.8–10.5)

## 2019-12-26 PROCEDURE — 99233 SBSQ HOSP IP/OBS HIGH 50: CPT

## 2019-12-26 PROCEDURE — 99232 SBSQ HOSP IP/OBS MODERATE 35: CPT

## 2019-12-26 RX ORDER — WARFARIN SODIUM 2.5 MG/1
7.5 TABLET ORAL ONCE
Refills: 0 | Status: COMPLETED | OUTPATIENT
Start: 2019-12-26 | End: 2019-12-26

## 2019-12-26 RX ORDER — I.V. FAT EMULSION 20 G/100ML
0.9 EMULSION INTRAVENOUS
Qty: 50 | Refills: 0 | Status: DISCONTINUED | OUTPATIENT
Start: 2019-12-26 | End: 2019-12-26

## 2019-12-26 RX ORDER — ELECTROLYTE SOLUTION,INJ
1 VIAL (ML) INTRAVENOUS
Refills: 0 | Status: DISCONTINUED | OUTPATIENT
Start: 2019-12-26 | End: 2019-12-26

## 2019-12-26 RX ADMIN — HEPARIN SODIUM 1300 UNIT(S)/HR: 5000 INJECTION INTRAVENOUS; SUBCUTANEOUS at 20:26

## 2019-12-26 RX ADMIN — WARFARIN SODIUM 7.5 MILLIGRAM(S): 2.5 TABLET ORAL at 21:28

## 2019-12-26 RX ADMIN — MEROPENEM 100 MILLIGRAM(S): 1 INJECTION INTRAVENOUS at 05:03

## 2019-12-26 RX ADMIN — Medication 325 MILLIGRAM(S): at 13:57

## 2019-12-26 RX ADMIN — METHOCARBAMOL 500 MILLIGRAM(S): 500 TABLET, FILM COATED ORAL at 21:31

## 2019-12-26 RX ADMIN — Medication 325 MILLIGRAM(S): at 05:03

## 2019-12-26 RX ADMIN — GABAPENTIN 100 MILLIGRAM(S): 400 CAPSULE ORAL at 21:28

## 2019-12-26 RX ADMIN — Medication 650 MILLIGRAM(S): at 09:56

## 2019-12-26 RX ADMIN — Medication 1 EACH: at 19:17

## 2019-12-26 RX ADMIN — MEROPENEM 100 MILLIGRAM(S): 1 INJECTION INTRAVENOUS at 21:27

## 2019-12-26 RX ADMIN — ESCITALOPRAM OXALATE 10 MILLIGRAM(S): 10 TABLET, FILM COATED ORAL at 11:59

## 2019-12-26 RX ADMIN — METHOCARBAMOL 500 MILLIGRAM(S): 500 TABLET, FILM COATED ORAL at 09:55

## 2019-12-26 RX ADMIN — HEPARIN SODIUM 2000 UNIT(S): 5000 INJECTION INTRAVENOUS; SUBCUTANEOUS at 20:27

## 2019-12-26 RX ADMIN — Medication 650 MILLIGRAM(S): at 10:40

## 2019-12-26 RX ADMIN — Medication 2000 UNIT(S): at 11:59

## 2019-12-26 RX ADMIN — Medication 10 MILLIGRAM(S): at 21:27

## 2019-12-26 RX ADMIN — PANTOPRAZOLE SODIUM 40 MILLIGRAM(S): 20 TABLET, DELAYED RELEASE ORAL at 05:06

## 2019-12-26 RX ADMIN — GABAPENTIN 100 MILLIGRAM(S): 400 CAPSULE ORAL at 05:06

## 2019-12-26 RX ADMIN — Medication 50 MILLIGRAM(S): at 21:27

## 2019-12-26 RX ADMIN — Medication 650 MILLIGRAM(S): at 21:31

## 2019-12-26 RX ADMIN — OCTREOTIDE ACETATE 100 MICROGRAM(S): 200 INJECTION, SOLUTION INTRAVENOUS; SUBCUTANEOUS at 05:02

## 2019-12-26 RX ADMIN — Medication 0.25 MILLIGRAM(S): at 19:23

## 2019-12-26 RX ADMIN — GABAPENTIN 100 MILLIGRAM(S): 400 CAPSULE ORAL at 13:57

## 2019-12-26 RX ADMIN — HEPARIN SODIUM 2000 UNIT(S): 5000 INJECTION INTRAVENOUS; SUBCUTANEOUS at 09:44

## 2019-12-26 RX ADMIN — Medication 325 MILLIGRAM(S): at 21:27

## 2019-12-26 RX ADMIN — Medication 0.25 MILLIGRAM(S): at 09:55

## 2019-12-26 RX ADMIN — OCTREOTIDE ACETATE 100 MICROGRAM(S): 200 INJECTION, SOLUTION INTRAVENOUS; SUBCUTANEOUS at 13:57

## 2019-12-26 RX ADMIN — Medication 1 GRAM(S): at 05:03

## 2019-12-26 RX ADMIN — MEROPENEM 100 MILLIGRAM(S): 1 INJECTION INTRAVENOUS at 13:57

## 2019-12-26 RX ADMIN — OCTREOTIDE ACETATE 100 MICROGRAM(S): 200 INJECTION, SOLUTION INTRAVENOUS; SUBCUTANEOUS at 21:28

## 2019-12-26 RX ADMIN — Medication 650 MILLIGRAM(S): at 22:31

## 2019-12-26 RX ADMIN — Medication 1 GRAM(S): at 18:09

## 2019-12-26 RX ADMIN — HEPARIN SODIUM 1200 UNIT(S)/HR: 5000 INJECTION INTRAVENOUS; SUBCUTANEOUS at 09:43

## 2019-12-26 RX ADMIN — CHLORHEXIDINE GLUCONATE 1 APPLICATION(S): 213 SOLUTION TOPICAL at 05:04

## 2019-12-26 RX ADMIN — I.V. FAT EMULSION 31.05 GM/KG/DAY: 20 EMULSION INTRAVENOUS at 19:18

## 2019-12-26 NOTE — PROGRESS NOTE ADULT - ASSESSMENT
34 year old woman with no known major comorbidities (had not gone to see a doctor for 15 years) who presented to [facility] on 11/22/2019 with progressive sob, weakness, weight loss, She was found to have a large pleural effusion. Initially 1.5 L of dark serous fluid was removed. Chest tube placed shortly thereafter, 3 additional liters was removed. pleural fluid cultures + for group A hemolytic strep,CBC showed a leukocytosis of 40k and CTA imaging revealed a 2.1cm partially cystic mass in the body/tail of the pancreas with possible upstream ductal dilatation. On 11/23/2019 the patient had a CT chest/abdomen/pelvis that found to have a clot in the right IJ and was started on a heparin gtt   s/p right VATS decort & washout debridement of CT site & VAC placement on 11/27/19  Persistent drainage from chest tube site so repeat CT C/A/P performed 12/4 which showed new cystic mass in pancreas and liver concerning for pancreatitis  Zosyn changed to meropenem on 12/6  for broader coverage given pancreatitis  Copious drainage from anterior chest tube site, so VAC removed and ostomy appliance applied for high output on 12/6.   12/7 Right chest tube removed.   Repeat CT scan showed a persistent Right hydropneumothorax with air anteriorly  12/8 Ostomy appliance removed and dressing applied.   12/9 s.p PICC placement & Right anterior pig tail catheter & right lateral wound vac placement by thoracic surgery   MRCP significant for Peripancreatic fluid and edema consistent with pancreatitis. Cystic lesions in the pancreatic head with the largest measuring 2.1 cm, likely acute peripancreatic collections, increased in size since 12/4/2019, with a tract of fluid extending from the 2.1 cm cystic lesion into the mediastinum and towards the pleural space on the right suspicious for a pancreatic pleural fistula.  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  discussed with GI re diet cont clear liquid diet  for now   may need repeat imaging next week before advancing diet per Gi attending   GI order TPN daily   cont clear diet     3- Diarrhea - stool  for GI PCR ordered     4- Electrolyte depletion   K and Mg replaced     5 Rt IJ thrombus  Heparin bridging with Coumadin until INR therapeutic  cont daily INR and warfarin order   coumadin for 3 month per hematology     6Anemia of chronic disease  s/p multiple transfusions   hb stable       7- insomnia/ anxiety   c/w trazadone and melatonin    8-severe protein calorie malnutrition  on nutritional supplements and TPN

## 2019-12-26 NOTE — PROGRESS NOTE ADULT - ASSESSMENT
34 y.o. F who was  transferred from [facility] [place] to [facility] on 11/22 for complaints of weakness, 25 pound weight loss & flu like symptoms & found to have a large right pleural effusion,   pleural fluid cultures + for group A hemolytic strep, + RIJ thormbus   11/23 CT of the chest/abd/pelvis indicated a clot in the Right IJ, pt started on heparin gtt.   s/p right VATS decort & washout debridement of CT site & VAC placement on 11/27/19.   Persistent drainage from chest tube site so repeat CT C/A/P performed 12/4 which showed new cystic mass in pancreas and liver concerning for pancreatitis  Zosyn changed to meropenem on 12/6  for broader coverage given pancreatitis  Copious drainage from anterior chest tube site, so VAC removed and ostomy appliance applied for high output on 12/6.   12/7 Right chest tube removed.   Repeat CT scan showed a persistent Right hydropneumothorax with air anteriorly  12/8 Ostomy appliance removed and dressing applied.   12/9 s.p PICC placement & Right anterior pig tail catheter & right lateral wound vac placement   MRCP significant for Peripancreatic fluid and edema consistent with pancreatitis. Cystic lesions in the pancreatic head with the largest measuring 2.1 cm, likely acute peripancreatic collections, increased in size since 12/4/2019, with a tract of fluid extending from the 2.1 cm cystic lesion into the mediastinum and towards the pleural space on the right suspicious for a pancreatic pleural fistula.    R pleural effusion  Pancreatitis  R pancreaticopleural fistula   Alcohol abuse   WBC elevation - resolved  Acute renal failure - resolved      - All cultures negative , blood, urine, pleural fluid and chest tube insertion site.  - Leukocytosis now resolved  - No fever   ENCOURAGE PO intake.  - Continue Meropenem 1gm q8h for necrotizing pancreatitis and fistula with pleural space. GI and surgery following.   - Has 2 PICC lines, on TPN and ABx, watch for superinfection  - MRI results reviewed    - Plan for antibiotics till 1/3/20 to complete 4 weeks from Chest tube removal     Will follow

## 2019-12-26 NOTE — PROGRESS NOTE ADULT - SUBJECTIVE AND OBJECTIVE BOX
INTERVAL HPI/OVERNIGHT EVENTS:    ROS wnl     PAST MEDICAL & SURGICAL HISTORY:      Home Medications:      MEDICATIONS  (STANDING):  amitriptyline 10 milliGRAM(s) Oral at bedtime  chlorhexidine 4% Liquid 1 Application(s) Topical <User Schedule>  cholecalciferol 2000 Unit(s) Oral daily  escitalopram 10 milliGRAM(s) Oral daily  ferrous    sulfate 325 milliGRAM(s) Oral three times a day  gabapentin 100 milliGRAM(s) Oral three times a day  heparin  Infusion. 1300 Unit(s)/Hr (13 mL/Hr) IV Continuous <Continuous>  lidocaine   Patch 1 Patch Transdermal daily  meropenem  IVPB 1000 milliGRAM(s) IV Intermittent every 8 hours  octreotide  Injectable 100 MICROGram(s) SubCutaneous every 8 hours  pantoprazole    Tablet 40 milliGRAM(s) Oral before breakfast  Parenteral Nutrition - Adult 1 Each (83 mL/Hr) TPN Continuous <Continuous>  sucralfate 1 Gram(s) Oral two times a day  traZODone 50 milliGRAM(s) Oral at bedtime    MEDICATIONS  (PRN):  acetaminophen   Tablet .. 650 milliGRAM(s) Oral every 6 hours PRN Mild Pain (1 - 3)  ALPRAZolam 0.25 milliGRAM(s) Oral three times a day PRN anxiety  heparin  Injectable 4000 Unit(s) IV Push every 6 hours PRN For aPTT less than 40  heparin  Injectable 2000 Unit(s) IV Push every 6 hours PRN For aPTT between 40 - 57  melatonin 3 milliGRAM(s) Oral at bedtime PRN Insomnia  methocarbamol 500 milliGRAM(s) Oral every 6 hours PRN Muscle Spasm  ondansetron Injectable 4 milliGRAM(s) IV Push every 4 hours PRN Nausea and/or Vomiting  simethicone 80 milliGRAM(s) Chew three times a day PRN Gas  sodium chloride 0.9% lock flush 10 milliLiter(s) IV Push every 1 hour PRN Pre/post blood products, medications, blood draw, and to maintain line patency      Allergies    No Known Allergies    Intolerances    Ensure TID (Unknown)  pt requests chicken broth (no beef) and CHERRY italian ice (no lemon) (Unknown)        PHYSICAL EXAM:   Vital Signs:  Vital Signs Last 24 Hrs  T(C): 36.7 (26 Dec 2019 04:58), Max: 36.8 (25 Dec 2019 11:13)  T(F): 98.1 (26 Dec 2019 04:58), Max: 98.3 (25 Dec 2019 11:13)  HR: 111 (26 Dec 2019 04:58) (109 - 123)  BP: 124/83 (26 Dec 2019 04:58) (124/83 - 129/86)  BP(mean): --  RR: 18 (26 Dec 2019 04:58) (18 - 18)  SpO2: 96% (26 Dec 2019 04:58) (93% - 98%)  Daily     Daily Weight in k.1 (26 Dec 2019 07:30)    GENERAL:  no distress  HEENT:  NC/AT,  anicteric  CHEST:   no increased effort, breath sounds clear  HEART:  Regular rhythm  ABDOMEN:  Soft, non-tender, non-distended, normoactive bowel sounds,  no masses ,no hepato-splenomegaly, no signs of chronic liver disease  EXTEREMITIES:  no cyanosis      LABS:                        11.3   9.13  )-----------( 300      ( 26 Dec 2019 08:42 )             36.4       Hemoglobin: 11.3 g/dL (19 @ 08:42)          139  |  103  |  10.0  ----------------------------<  137<H>  4.7   |  24.0  |  0.36<L>    Ca    9.4      26 Dec 2019 08:42  Phos  5.2       Mg     1.7             INR: 1.23 ratio (19 @ 08:42)  INR: 1.26 ratio (19 @ 09:32)  INR: 1.24 ratio (19 @ 07:38)      Aspartate Aminotransferase (AST/SGOT): 19 U/L (19 @ 07:38)  Alkaline Phosphatase, Serum: 318 U/L (19 @ 07:38)  Alanine Aminotransferase (ALT/SGPT): 17 U/L (19 @ 07:38)      RADIOLOGY & ADDITIONAL TESTS:  < from: MR Abdomen w/ IV Cont (19 @ 15:56) >  FINDINGS:    LOWER CHEST: Subsegmental atelectasis/infiltrates and trace pleural reaction both lung bases, left more thanright.    LIVER: Previously seen cystic caudate lobe lesion is smaller measuring 1.3 cm in comparison to prior measurement of 1.9 cm. Posteriorly located left hepatic lobe lesion is not identified at this time.   BILE DUCTS: Normal caliber.  GALLBLADDER: Within normal limits.  SPLEEN: Within normal limits.  PANCREAS: An 11 mm sized peripherally enhancing lesion involving the proximal body of the pancreas is smaller. It previously measured 1.6 cm. A 3 mm sized  lesion in the mid body of the pancreas is stable. A 1.1 cm sized peripancreatic structure near the neck of the pancreas also appears to be smaller but was not well the linear dated on prior CT imaging. Additional small peripancreatic possibly cystic structures are either less pronouncedor resolved. The improvement suggests cystic pancreatic and peripancreatic collections related to pancreatitis. Suggest clinical correlation and follow-up. Pancreatic or pleural fistula of flow clinical concern is not delineated by MR imaging.  ADRENALS: Within normal limits.  KIDNEYS/URETERS: Within normal limits.    VISUALIZED PORTIONS:    BOWEL: Within normal limits.   PERITONEUM: No ascites.  VESSELS: Within normal limits.  RETROPERITONEUM/LYMPH NODES: No lymphadenopathy.    ABDOMINAL WALL: Within normal limits.  BONES: Within normal limits.    IMPRESSION:     Stable or improving peripancreatic and pancreatic possibly cystic lesions suggest possibility of pancreatitis related collections. Clinical correlation and follow-up suggested.    Bibasilar atelectasis/infiltrate as well as pleural reaction, left more than right.    < end of copied text > INTERVAL HPI/OVERNIGHT EVENTS: C/o gas pain and loose stool. No fevers or chills. On TPN. No pain at PICC site.      ROS wnl     PAST MEDICAL & SURGICAL HISTORY:      Home Medications:      MEDICATIONS  (STANDING):  amitriptyline 10 milliGRAM(s) Oral at bedtime  chlorhexidine 4% Liquid 1 Application(s) Topical <User Schedule>  cholecalciferol 2000 Unit(s) Oral daily  escitalopram 10 milliGRAM(s) Oral daily  ferrous    sulfate 325 milliGRAM(s) Oral three times a day  gabapentin 100 milliGRAM(s) Oral three times a day  heparin  Infusion. 1300 Unit(s)/Hr (13 mL/Hr) IV Continuous <Continuous>  lidocaine   Patch 1 Patch Transdermal daily  meropenem  IVPB 1000 milliGRAM(s) IV Intermittent every 8 hours  octreotide  Injectable 100 MICROGram(s) SubCutaneous every 8 hours  pantoprazole    Tablet 40 milliGRAM(s) Oral before breakfast  Parenteral Nutrition - Adult 1 Each (83 mL/Hr) TPN Continuous <Continuous>  sucralfate 1 Gram(s) Oral two times a day  traZODone 50 milliGRAM(s) Oral at bedtime    MEDICATIONS  (PRN):  acetaminophen   Tablet .. 650 milliGRAM(s) Oral every 6 hours PRN Mild Pain (1 - 3)  ALPRAZolam 0.25 milliGRAM(s) Oral three times a day PRN anxiety  heparin  Injectable 4000 Unit(s) IV Push every 6 hours PRN For aPTT less than 40  heparin  Injectable 2000 Unit(s) IV Push every 6 hours PRN For aPTT between 40 - 57  melatonin 3 milliGRAM(s) Oral at bedtime PRN Insomnia  methocarbamol 500 milliGRAM(s) Oral every 6 hours PRN Muscle Spasm  ondansetron Injectable 4 milliGRAM(s) IV Push every 4 hours PRN Nausea and/or Vomiting  simethicone 80 milliGRAM(s) Chew three times a day PRN Gas  sodium chloride 0.9% lock flush 10 milliLiter(s) IV Push every 1 hour PRN Pre/post blood products, medications, blood draw, and to maintain line patency      Allergies    No Known Allergies    Intolerances    Ensure TID (Unknown)  pt requests chicken broth (no beef) and CHERRY italian ice (no lemon) (Unknown)        PHYSICAL EXAM:   Vital Signs:  Vital Signs Last 24 Hrs  T(C): 36.7 (26 Dec 2019 04:58), Max: 36.8 (25 Dec 2019 11:13)  T(F): 98.1 (26 Dec 2019 04:58), Max: 98.3 (25 Dec 2019 11:13)  HR: 111 (26 Dec 2019 04:58) (109 - 123)  BP: 124/83 (26 Dec 2019 04:58) (124/83 - 129/86)  BP(mean): --  RR: 18 (26 Dec 2019 04:58) (18 - 18)  SpO2: 96% (26 Dec 2019 04:58) (93% - 98%)  Daily     Daily Weight in k.1 (26 Dec 2019 07:30)    GENERAL:  no distress  HEENT:  NC/AT,  anicteric  ABDOMEN:  Soft, non-tender, non-distended, normoactive bowel sounds,  no masses  EXTREMITIES'  + PICC RUE, no edema, no cyanosis      LABS:                        11.3   9.13  )-----------( 300      ( 26 Dec 2019 08:42 )             36.4       Hemoglobin: 11.3 g/dL (19 @ 08:42)          139  |  103  |  10.0  ----------------------------<  137<H>  4.7   |  24.0  |  0.36<L>    Ca    9.4      26 Dec 2019 08:42  Phos  5.2       Mg     1.7             INR: 1.23 ratio (19 @ 08:42)  INR: 1.26 ratio (19 @ 09:32)  INR: 1.24 ratio (19 @ 07:38)      Aspartate Aminotransferase (AST/SGOT): 19 U/L (19 @ 07:38)  Alkaline Phosphatase, Serum: 318 U/L (19 @ 07:38)  Alanine Aminotransferase (ALT/SGPT): 17 U/L (19 @ 07:38)      RADIOLOGY & ADDITIONAL TESTS:  < from: MR Abdomen w/ IV Cont (19 @ 15:56) >  FINDINGS:    LOWER CHEST: Subsegmental atelectasis/infiltrates and trace pleural reaction both lung bases, left more thanright.    LIVER: Previously seen cystic caudate lobe lesion is smaller measuring 1.3 cm in comparison to prior measurement of 1.9 cm. Posteriorly located left hepatic lobe lesion is not identified at this time.   BILE DUCTS: Normal caliber.  GALLBLADDER: Within normal limits.  SPLEEN: Within normal limits.  PANCREAS: An 11 mm sized peripherally enhancing lesion involving the proximal body of the pancreas is smaller. It previously measured 1.6 cm. A 3 mm sized  lesion in the mid body of the pancreas is stable. A 1.1 cm sized peripancreatic structure near the neck of the pancreas also appears to be smaller but was not well the linear dated on prior CT imaging. Additional small peripancreatic possibly cystic structures are either less pronouncedor resolved. The improvement suggests cystic pancreatic and peripancreatic collections related to pancreatitis. Suggest clinical correlation and follow-up. Pancreatic or pleural fistula of flow clinical concern is not delineated by MR imaging.  ADRENALS: Within normal limits.  KIDNEYS/URETERS: Within normal limits.    VISUALIZED PORTIONS:    BOWEL: Within normal limits.   PERITONEUM: No ascites.  VESSELS: Within normal limits.  RETROPERITONEUM/LYMPH NODES: No lymphadenopathy.    ABDOMINAL WALL: Within normal limits.  BONES: Within normal limits.    IMPRESSION:     Stable or improving peripancreatic and pancreatic possibly cystic lesions suggest possibility of pancreatitis related collections. Clinical correlation and follow-up suggested.    Bibasilar atelectasis/infiltrate as well as pleural reaction, left more than right.    < end of copied text >

## 2019-12-26 NOTE — PROGRESS NOTE ADULT - SUBJECTIVE AND OBJECTIVE BOX
34 year old woman with no known major comorbidities (had not gone to see a doctor for 15 years) who presented to [facility] on 11/22/2019 with progressive sob, weakness, weight loss, She was found to have a large pleural effusion. Initially 1.5 L of dark serous fluid was removed.  Chest tube placed shortly thereafter, 3 additional liters was removed. CBC showed a leukocytosis of 40k and   CTA imaging revealed a 2.1cm partially cystic mass in the body/tail of the pancreas with possible upstream ductal dilatation. On 11/23/2019 the patient had a CT chest/abdomen/pelvis that found to have a clot in the right IJ and was started on a heparin gtt.   Chest tube out; on parenteral feedings; taking clear liquids  Remains unhappy with situation      Transferred to [facility] on 11/24/19.  MRCP performed on 11/26.  Underwent Debridement, wound, with dressing replacement  Wound VAC placement  Pleural biopsy  Lung decortication  Bronchoscopy, with lung decortication using VATS on 11/27/19.  Pt  has a history of alcohol abuse.     We were consulted for evaluation and management of the RIJ DVT.      INTERVAL HISTORY  The patient is frustrated about length of hospital stay. no bleeding. on heparin gtt  On TPN; treated for pancreatic cysts  No bleeding reported; denies SOB or chest pain    MEDICATIONS  (STANDING):  amitriptyline 10 milliGRAM(s) Oral at bedtime  chlorhexidine 4% Liquid 1 Application(s) Topical <User Schedule>  cholecalciferol 2000 Unit(s) Oral daily  escitalopram 10 milliGRAM(s) Oral daily  fat emulsion (Plant Based) 20% Infusion 0.9 Gm/kG/Day (31.25 mL/Hr) IV Continuous <Continuous>  ferrous    sulfate 325 milliGRAM(s) Oral three times a day  gabapentin 100 milliGRAM(s) Oral three times a day  heparin  Infusion. 1300 Unit(s)/Hr (13 mL/Hr) IV Continuous <Continuous>  lidocaine   Patch 1 Patch Transdermal daily  meropenem  IVPB 1000 milliGRAM(s) IV Intermittent every 8 hours  octreotide  Injectable 100 MICROGram(s) SubCutaneous every 8 hours  pantoprazole    Tablet 40 milliGRAM(s) Oral before breakfast  Parenteral Nutrition - Adult 1 Each (83 mL/Hr) TPN Continuous <Continuous>  traZODone 50 milliGRAM(s) Oral at bedtime    MEDICATIONS  (PRN):  acetaminophen   Tablet .. 650 milliGRAM(s) Oral every 6 hours PRN Mild Pain (1 - 3)  ALPRAZolam 0.25 milliGRAM(s) Oral three times a day PRN anxiety  aluminum hydroxide/magnesium hydroxide/simethicone Suspension 30 milliLiter(s) Oral every 4 hours PRN Dyspepsia  heparin  Injectable 4000 Unit(s) IV Push every 6 hours PRN For aPTT less than 40  heparin  Injectable 2000 Unit(s) IV Push every 6 hours PRN For aPTT between 40 - 57  melatonin 3 milliGRAM(s) Oral at bedtime PRN Insomnia  methocarbamol 500 milliGRAM(s) Oral every 6 hours PRN Muscle Spasm  ondansetron Injectable 4 milliGRAM(s) IV Push every 4 hours PRN Nausea and/or Vomiting  simethicone 80 milliGRAM(s) Chew three times a day PRN Gas  sodium chloride 0.9% lock flush 10 milliLiter(s) IV Push every 1 hour PRN Pre/post blood products, medications, blood draw, and to maintain line patency  traMADol 50 milliGRAM(s) Oral every 8 hours PRN Moderate Pain (4 - 6)      Vital Signs Last 24 Hrs  T(C): 36.9 (24 Dec 2019 19:51), Max: 36.9 (24 Dec 2019 19:51)  T(F): 98.4 (24 Dec 2019 19:51), Max: 98.4 (24 Dec 2019 19:51)  HR: 120 (24 Dec 2019 19:51) (117 - 127)  BP: 115/74 (24 Dec 2019 19:51) (115/74 - 125/93)  BP(mean): --  RR: 18 (24 Dec 2019 19:51) (18 - 18)  SpO2: --Gen: nad  CV: rrr  Abd: soft, nd,      12/26/19:  WBC 9.13, H/H 11.3/36.4, plt ct 300,000                 11.3   9.27  )-----------( 349      ( 23 Dec 2019 06:44 )             35.9     12-24    139  |  104  |  8.0  ----------------------------<  127<H>  4.3   |  22.0  |  0.30<L>    Ca    9.2      24 Dec 2019 07:38  Phos  3.9     12-24  Mg     1.7     12-24    TPro  6.6  /  Alb  2.9<L>  /  TBili  <0.2<L>  /  DBili  x   /  AST  19  /  ALT  17  /  AlkPhos  318<H>  12-24

## 2019-12-26 NOTE — PROGRESS NOTE ADULT - SUBJECTIVE AND OBJECTIVE BOX
[facility]  INFECTIOUS DISEASES AND INTERNAL MEDICINE at [place]  =======================================================  Carlos Alberto Soto MD  Diplomates American Board of Internal Medicine and Infectious Diseases  Tel: [phone]      Fax: [phone]  =======================================================    HERACLIO MOLINA [id number]  Follow up: R pleural effusion    no fevers  does not like clear diet  had some italian ice only.   everything else tastes like (expletive)      Allergies:  Ensure TID (Unknown)  No Known Allergies      Antibiotics:  meropenem  IVPB 1000 milliGRAM(s) IV Intermittent every 8 hours    Other medications:  amitriptyline 10 milliGRAM(s) Oral at bedtime  chlorhexidine 4% Liquid 1 Application(s) Topical <User Schedule>  cholecalciferol 2000 Unit(s) Oral daily  escitalopram 10 milliGRAM(s) Oral daily  ferrous    sulfate 325 milliGRAM(s) Oral three times a day  gabapentin 100 milliGRAM(s) Oral three times a day  heparin  Infusion. 1300 Unit(s)/Hr IV Continuous <Continuous>  lidocaine   Patch 1 Patch Transdermal daily  octreotide  Injectable 100 MICROGram(s) SubCutaneous every 8 hours  pantoprazole    Tablet 40 milliGRAM(s) Oral before breakfast  Parenteral Nutrition - Adult 1 Each TPN Continuous <Continuous>  sucralfate 1 Gram(s) Oral two times a day  traZODone 50 milliGRAM(s) Oral at bedtime      REVIEW OF SYSTEMS:  CONSTITUTIONAL:  No Fever or chills  HEENT:   No diplopia or blurred vision.  No earache, sore throat or runny nose.  CARDIOVASCULAR:  No pressure, squeezing, strangling, tightness, heaviness or aching about the chest, neck, axilla or epigastrium.  RESPIRATORY:  No cough, shortness of breath  GASTROINTESTINAL:  No nausea, vomiting or diarrhea.  GENITOURINARY:  No dysuria, frequency or urgency.   MUSCULOSKELETAL:  no joint aches, no muscle pain  SKIN:  No change in skin, hair or nails.  NEUROLOGIC:  No Headaches, seizures   PSYCHIATRIC:  No disorder of thought or mood.  ENDOCRINE:  No heat or cold intolerance  HEMATOLOGICAL:  No easy bruising or bleeding.       Physical Exam:  T(F): 98.1 (26 Dec 2019 04:58), Max: 98.3 (25 Dec 2019 11:13)  HR: 111 (26 Dec 2019 04:58)  BP: 124/83 (26 Dec 2019 04:58)  RR: 18 (26 Dec 2019 04:58)  SpO2: 96% (26 Dec 2019 04:58) (93% - 98%)  temp max in last 48H T(F): , Max: 98.4 (12-24-19 @ 19:51)    GEN: NAD, pleasant  HEENT: normocephalic and atraumatic. EOMI. PERRL.  Anicteric   NECK: Supple.   LUNGS: Clear to auscultation. Wound vac on site of old chest tube  HEART: Regular rate and rhythm   ABDOMEN: Soft, nontender, and nondistended.  Positive bowel sounds.    : No CVA tenderness  EXTREMITIES: Without any edema.  MSK: no joint swelling  NEUROLOGIC: No focal deficits  PSYCHIATRIC: Appropriate affect .  SKIN: No Rash       Labs:                        11.3   9.13  )-----------( 300      ( 26 Dec 2019 08:42 )             36.4       WBC Count: 9.13 K/uL (12-26-19 @ 08:42)  WBC Count: 9.27 K/uL (12-23-19 @ 06:44)      12-26    139  |  103  |  10.0  ----------------------------<  137<H>  4.7   |  24.0  |  0.36<L>    Ca    9.4      26 Dec 2019 08:42  Phos  5.2     12-26  Mg     1.7     12-26

## 2019-12-26 NOTE — PROGRESS NOTE ADULT - SUBJECTIVE AND OBJECTIVE BOX
follow up for pneumonia , pancreatitis , pancreatic   pleural fistula   pt is seen examined   pt is with soft BM , no pain   remains on TPN and clear liquid       Vital Signs Last 24 Hrs  T(C): 36.6 (26 Dec 2019 16:38), Max: 36.8 (25 Dec 2019 23:06)  T(F): 97.8 (26 Dec 2019 16:38), Max: 98.2 (25 Dec 2019 23:06)  HR: 113 (26 Dec 2019 16:38) (109 - 113)  BP: 117/72 (26 Dec 2019 16:38) (117/72 - 129/86)  BP(mean): --  RR: 18 (26 Dec 2019 16:38) (18 - 18)  SpO2: 97% (26 Dec 2019 16:38) (96% - 97%)    GENERAL: Not in distress. Alert   CARDIOVASCULAR: RRR S1, S2. No murmur/rubs/gallop  LUNGS: CTA bilaterally , diminished BS ,  right anterior lower chest drain  rube in place   ABDOMEN: ND. Soft,  no tenderness , hyperactive BS   EXTREMITIES: no cyanosis, no clubbing, no edema.   Skin : normal color no rash                                  11.3   9.13  )-----------( 300      ( 26 Dec 2019 08:42 )             36.4   12-26    139  |  103  |  10.0  ----------------------------<  137<H>  4.7   |  24.0  |  0.36<L>    Ca    9.4      26 Dec 2019 08:42  Phos  5.2     12-26  Mg     1.7     12-26

## 2019-12-26 NOTE — PROGRESS NOTE ADULT - ASSESSMENT
34 year old female with no known major comorbidities who is being treated for right pleural effusion/empyema/multifocal pna found to have a RIJ thrombus currently on heparin drip.    1) RIJ venous thrombosis -provoked event that occurred in context of her severe and complex lung illness/infection/empyema that may have led to developing the thrombus. Plan on anticoagulation for  3 months. Plan for warfarin noted as outpatient. will need frequent INR monitoring.  2) Multifactorial anemia; Hb11.3.  3) Reactive leukocytosis, improved.  WBC 9.13k today.  4) Pancreatic Fistula-care per managing service/ID/GI

## 2019-12-27 LAB
ALBUMIN SERPL ELPH-MCNC: 3.1 G/DL — LOW (ref 3.3–5.2)
ALP SERPL-CCNC: 363 U/L — HIGH (ref 40–120)
ALT FLD-CCNC: 11 U/L — SIGNIFICANT CHANGE UP
ANION GAP SERPL CALC-SCNC: 13 MMOL/L — SIGNIFICANT CHANGE UP (ref 5–17)
ANION GAP SERPL CALC-SCNC: 13 MMOL/L — SIGNIFICANT CHANGE UP (ref 5–17)
APTT BLD: 70.6 SEC — HIGH (ref 27.5–36.3)
APTT BLD: 95.5 SEC — HIGH (ref 27.5–36.3)
AST SERPL-CCNC: <5 U/L — SIGNIFICANT CHANGE UP
BILIRUB SERPL-MCNC: <0.2 MG/DL — LOW (ref 0.4–2)
BUN SERPL-MCNC: 9 MG/DL — SIGNIFICANT CHANGE UP (ref 8–20)
CA-I BLD-SCNC: 0.98 MMOL/L — LOW (ref 1.12–1.3)
CALCIUM SERPL-MCNC: 9.7 MG/DL — SIGNIFICANT CHANGE UP (ref 8.6–10.2)
CHLORIDE SERPL-SCNC: 101 MMOL/L — SIGNIFICANT CHANGE UP (ref 98–107)
CHLORIDE SERPL-SCNC: 102 MMOL/L — SIGNIFICANT CHANGE UP (ref 98–107)
CO2 SERPL-SCNC: 23 MMOL/L — SIGNIFICANT CHANGE UP (ref 22–29)
CO2 SERPL-SCNC: 24 MMOL/L — SIGNIFICANT CHANGE UP (ref 22–29)
CREAT SERPL-MCNC: 0.33 MG/DL — LOW (ref 0.5–1.3)
CULTURE RESULTS: SIGNIFICANT CHANGE UP
GLUCOSE BLDC GLUCOMTR-MCNC: 111 MG/DL — HIGH (ref 70–99)
GLUCOSE BLDC GLUCOMTR-MCNC: 115 MG/DL — HIGH (ref 70–99)
GLUCOSE BLDC GLUCOMTR-MCNC: 139 MG/DL — HIGH (ref 70–99)
GLUCOSE BLDC GLUCOMTR-MCNC: 98 MG/DL — SIGNIFICANT CHANGE UP (ref 70–99)
GLUCOSE SERPL-MCNC: 88 MG/DL — SIGNIFICANT CHANGE UP (ref 70–115)
INR BLD: 1.24 RATIO — HIGH (ref 0.88–1.16)
INR BLD: 1.26 RATIO — HIGH (ref 0.88–1.16)
MAGNESIUM SERPL-MCNC: 1.5 MG/DL — LOW (ref 1.6–2.6)
MAGNESIUM SERPL-MCNC: 1.8 MG/DL — SIGNIFICANT CHANGE UP (ref 1.6–2.6)
PHOSPHATE SERPL-MCNC: 4.7 MG/DL — SIGNIFICANT CHANGE UP (ref 2.4–4.7)
POTASSIUM SERPL-MCNC: 5.1 MMOL/L — SIGNIFICANT CHANGE UP (ref 3.5–5.3)
POTASSIUM SERPL-MCNC: 5.6 MMOL/L — HIGH (ref 3.5–5.3)
POTASSIUM SERPL-SCNC: 5.1 MMOL/L — SIGNIFICANT CHANGE UP (ref 3.5–5.3)
POTASSIUM SERPL-SCNC: 5.6 MMOL/L — HIGH (ref 3.5–5.3)
PROT SERPL-MCNC: 7 G/DL — SIGNIFICANT CHANGE UP (ref 6.6–8.7)
PROTHROM AB SERPL-ACNC: 14.3 SEC — HIGH (ref 10–12.9)
PROTHROM AB SERPL-ACNC: 14.6 SEC — HIGH (ref 10–12.9)
SODIUM SERPL-SCNC: 138 MMOL/L — SIGNIFICANT CHANGE UP (ref 135–145)
SODIUM SERPL-SCNC: 138 MMOL/L — SIGNIFICANT CHANGE UP (ref 135–145)
SPECIMEN SOURCE: SIGNIFICANT CHANGE UP

## 2019-12-27 PROCEDURE — 97605 NEG PRS WND THER DME<=50SQCM: CPT

## 2019-12-27 PROCEDURE — 99233 SBSQ HOSP IP/OBS HIGH 50: CPT

## 2019-12-27 PROCEDURE — 99024 POSTOP FOLLOW-UP VISIT: CPT

## 2019-12-27 PROCEDURE — 71045 X-RAY EXAM CHEST 1 VIEW: CPT | Mod: 26

## 2019-12-27 PROCEDURE — 99232 SBSQ HOSP IP/OBS MODERATE 35: CPT

## 2019-12-27 PROCEDURE — 93010 ELECTROCARDIOGRAM REPORT: CPT | Mod: 76

## 2019-12-27 PROCEDURE — 12345: CPT | Mod: NC

## 2019-12-27 RX ORDER — I.V. FAT EMULSION 20 G/100ML
0.9 EMULSION INTRAVENOUS
Qty: 50 | Refills: 0 | Status: DISCONTINUED | OUTPATIENT
Start: 2019-12-27 | End: 2019-12-27

## 2019-12-27 RX ORDER — ELECTROLYTE SOLUTION,INJ
1 VIAL (ML) INTRAVENOUS
Refills: 0 | Status: DISCONTINUED | OUTPATIENT
Start: 2019-12-27 | End: 2019-12-27

## 2019-12-27 RX ORDER — MAGNESIUM SULFATE 500 MG/ML
1 VIAL (ML) INJECTION ONCE
Refills: 0 | Status: COMPLETED | OUTPATIENT
Start: 2019-12-27 | End: 2019-12-27

## 2019-12-27 RX ORDER — WARFARIN SODIUM 2.5 MG/1
7.5 TABLET ORAL ONCE
Refills: 0 | Status: COMPLETED | OUTPATIENT
Start: 2019-12-27 | End: 2019-12-27

## 2019-12-27 RX ORDER — OXYCODONE AND ACETAMINOPHEN 5; 325 MG/1; MG/1
1 TABLET ORAL ONCE
Refills: 0 | Status: DISCONTINUED | OUTPATIENT
Start: 2019-12-27 | End: 2019-12-27

## 2019-12-27 RX ORDER — DEXTROSE 10 % IN WATER 10 %
1000 INTRAVENOUS SOLUTION INTRAVENOUS
Refills: 0 | Status: DISCONTINUED | OUTPATIENT
Start: 2019-12-27 | End: 2020-01-04

## 2019-12-27 RX ORDER — TRAMADOL HYDROCHLORIDE 50 MG/1
50 TABLET ORAL ONCE
Refills: 0 | Status: DISCONTINUED | OUTPATIENT
Start: 2019-12-27 | End: 2019-12-27

## 2019-12-27 RX ORDER — CHLORHEXIDINE GLUCONATE 213 G/1000ML
1 SOLUTION TOPICAL
Refills: 0 | Status: DISCONTINUED | OUTPATIENT
Start: 2019-12-27 | End: 2020-01-07

## 2019-12-27 RX ADMIN — Medication 650 MILLIGRAM(S): at 14:38

## 2019-12-27 RX ADMIN — CHLORHEXIDINE GLUCONATE 1 APPLICATION(S): 213 SOLUTION TOPICAL at 10:30

## 2019-12-27 RX ADMIN — Medication 325 MILLIGRAM(S): at 06:33

## 2019-12-27 RX ADMIN — OXYCODONE AND ACETAMINOPHEN 1 TABLET(S): 5; 325 TABLET ORAL at 06:58

## 2019-12-27 RX ADMIN — OCTREOTIDE ACETATE 100 MICROGRAM(S): 200 INJECTION, SOLUTION INTRAVENOUS; SUBCUTANEOUS at 13:23

## 2019-12-27 RX ADMIN — OXYCODONE AND ACETAMINOPHEN 1 TABLET(S): 5; 325 TABLET ORAL at 07:30

## 2019-12-27 RX ADMIN — Medication 325 MILLIGRAM(S): at 13:22

## 2019-12-27 RX ADMIN — PANTOPRAZOLE SODIUM 40 MILLIGRAM(S): 20 TABLET, DELAYED RELEASE ORAL at 06:33

## 2019-12-27 RX ADMIN — Medication 0.25 MILLIGRAM(S): at 17:25

## 2019-12-27 RX ADMIN — GABAPENTIN 100 MILLIGRAM(S): 400 CAPSULE ORAL at 06:33

## 2019-12-27 RX ADMIN — I.V. FAT EMULSION 31.25 GM/KG/DAY: 20 EMULSION INTRAVENOUS at 19:14

## 2019-12-27 RX ADMIN — Medication 2000 UNIT(S): at 13:22

## 2019-12-27 RX ADMIN — WARFARIN SODIUM 7.5 MILLIGRAM(S): 2.5 TABLET ORAL at 21:39

## 2019-12-27 RX ADMIN — Medication 1 GRAM(S): at 17:20

## 2019-12-27 RX ADMIN — Medication 1 GRAM(S): at 06:33

## 2019-12-27 RX ADMIN — GABAPENTIN 100 MILLIGRAM(S): 400 CAPSULE ORAL at 21:39

## 2019-12-27 RX ADMIN — HEPARIN SODIUM 1300 UNIT(S)/HR: 5000 INJECTION INTRAVENOUS; SUBCUTANEOUS at 03:47

## 2019-12-27 RX ADMIN — Medication 50 MILLIGRAM(S): at 21:38

## 2019-12-27 RX ADMIN — HEPARIN SODIUM 1300 UNIT(S)/HR: 5000 INJECTION INTRAVENOUS; SUBCUTANEOUS at 12:52

## 2019-12-27 RX ADMIN — Medication 100 MILLILITER(S): at 06:43

## 2019-12-27 RX ADMIN — OCTREOTIDE ACETATE 100 MICROGRAM(S): 200 INJECTION, SOLUTION INTRAVENOUS; SUBCUTANEOUS at 06:34

## 2019-12-27 RX ADMIN — Medication 650 MILLIGRAM(S): at 22:38

## 2019-12-27 RX ADMIN — Medication 100 GRAM(S): at 17:19

## 2019-12-27 RX ADMIN — Medication 1 EACH: at 19:14

## 2019-12-27 RX ADMIN — Medication 650 MILLIGRAM(S): at 13:25

## 2019-12-27 RX ADMIN — METHOCARBAMOL 500 MILLIGRAM(S): 500 TABLET, FILM COATED ORAL at 13:24

## 2019-12-27 RX ADMIN — TRAMADOL HYDROCHLORIDE 50 MILLIGRAM(S): 50 TABLET ORAL at 19:47

## 2019-12-27 RX ADMIN — TRAMADOL HYDROCHLORIDE 50 MILLIGRAM(S): 50 TABLET ORAL at 20:47

## 2019-12-27 RX ADMIN — Medication 325 MILLIGRAM(S): at 21:38

## 2019-12-27 RX ADMIN — METHOCARBAMOL 500 MILLIGRAM(S): 500 TABLET, FILM COATED ORAL at 21:38

## 2019-12-27 RX ADMIN — GABAPENTIN 100 MILLIGRAM(S): 400 CAPSULE ORAL at 13:22

## 2019-12-27 RX ADMIN — MEROPENEM 100 MILLIGRAM(S): 1 INJECTION INTRAVENOUS at 10:34

## 2019-12-27 RX ADMIN — MEROPENEM 100 MILLIGRAM(S): 1 INJECTION INTRAVENOUS at 17:25

## 2019-12-27 RX ADMIN — ESCITALOPRAM OXALATE 10 MILLIGRAM(S): 10 TABLET, FILM COATED ORAL at 13:23

## 2019-12-27 RX ADMIN — Medication 10 MILLIGRAM(S): at 21:38

## 2019-12-27 RX ADMIN — Medication 650 MILLIGRAM(S): at 21:38

## 2019-12-27 NOTE — PROGRESS NOTE ADULT - ASSESSMENT
34 year old woman with no known major comorbidities (had not gone to see a doctor for 15 years) who presented to [facility] on 11/22/2019 with progressive sob, weakness, weight loss, She was found to have a large pleural effusion. Initially 1.5 L of dark serous fluid was removed. Chest tube placed shortly thereafter, 3 additional liters was removed. pleural fluid cultures + for group A hemolytic strep,CBC showed a leukocytosis of 40k and CTA imaging revealed a 2.1cm partially cystic mass in the body/tail of the pancreas with possible upstream ductal dilatation. On 11/23/2019 the patient had a CT chest/abdomen/pelvis that found to have a clot in the right IJ and was started on a heparin gtt   s/p right VATS decort & washout debridement of CT site & VAC placement on 11/27/19  Persistent drainage from chest tube site so repeat CT C/A/P performed 12/4 which showed new cystic mass in pancreas and liver concerning for pancreatitis  Zosyn changed to meropenem on 12/6  for broader coverage given pancreatitis  Copious drainage from anterior chest tube site, so VAC removed and ostomy appliance applied for high output on 12/6.   12/7 Right chest tube removed.   Repeat CT scan showed a persistent Right hydropneumothorax with air anteriorly  12/8 Ostomy appliance removed and dressing applied.   12/9 s.p PICC placement & Right anterior pig tail catheter & right lateral wound vac placement by thoracic surgery   MRCP significant for Peripancreatic fluid and edema consistent with pancreatitis. Cystic lesions in the pancreatic head with the largest measuring 2.1 cm, likely acute peripancreatic collections, increased in size since 12/4/2019, with a tract of fluid extending from the 2.1 cm cystic lesion into the mediastinum and towards the pleural space on the right suspicious for a pancreatic pleural fistula.  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  - previously discussed with GI re diet cont clear liquid diet  for now   - may need repeat imaging next week before advancing diet per Gi attending   - GI order TPN daily   - cont clear diet     #Diarrhea   - GI PCR negative    #Rt IJ thrombus  - Heparin bridging with Coumadin until INR therapeutic  - cont daily INR and warfarin order   - coumadin for 3 month per hematology     #Anemia of chronic disease  - s/p multiple transfusions   - hb stable       #insomnia/ anxiety   - trazadone and melatonin    #severe protein calorie malnutrition  on nutritional supplements and TPN    #DVT prophylaxis  -coumadin 34 year old woman with no known major comorbidities (had not gone to see a doctor for 15 years) who presented to [facility] on 11/22/2019 with progressive sob, weakness, weight loss, She was found to have a large pleural effusion. Initially 1.5 L of dark serous fluid was removed. Chest tube placed shortly thereafter, 3 additional liters was removed. pleural fluid cultures + for group A hemolytic strep,CBC showed a leukocytosis of 40k and CTA imaging revealed a 2.1cm partially cystic mass in the body/tail of the pancreas with possible upstream ductal dilatation. On 11/23/2019 the patient had a CT chest/abdomen/pelvis that found to have a clot in the right IJ and was started on a heparin gtt   s/p right VATS decort & washout debridement of CT site & VAC placement on 11/27/19  Persistent drainage from chest tube site so repeat CT C/A/P performed 12/4 which showed new cystic mass in pancreas and liver concerning for pancreatitis  Zosyn changed to meropenem on 12/6  for broader coverage given pancreatitis  Copious drainage from anterior chest tube site, so VAC removed and ostomy appliance applied for high output on 12/6.   12/7 Right chest tube removed.   Repeat CT scan showed a persistent Right hydropneumothorax with air anteriorly  12/8 Ostomy appliance removed and dressing applied.   12/9 s.p PICC placement & Right anterior pig tail catheter & right lateral wound vac placement by thoracic surgery   MRCP significant for Peripancreatic fluid and edema consistent with pancreatitis. Cystic lesions in the pancreatic head with the largest measuring 2.1 cm, likely acute peripancreatic collections, increased in size since 12/4/2019, with a tract of fluid extending from the 2.1 cm cystic lesion into the mediastinum and towards the pleural space on the right suspicious for a pancreatic pleural fistula.  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  - previously discussed with GI re diet cont clear liquid diet  for now   - may need repeat imaging next week before advancing diet per Gi attending   - GI order TPN daily   - cont clear diet     #Diarrhea   - GI PCR negative    #Rt IJ thrombus  - Heparin bridging with Coumadin until INR therapeutic  - cont daily INR and warfarin order   - coumadin for 3 month per hematology     #Anemia of chronic disease  - s/p multiple transfusions   - hb stable       #insomnia/ anxiety   - trazadone and melatonin    #severe protein calorie malnutrition  on nutritional supplements and TPN    #DVT prophylaxis  -coumadin

## 2019-12-27 NOTE — CHART NOTE - NSCHARTNOTEFT_GEN_A_CORE
Called by RN to report patient PICC line fell out this am while patient sleeping. Tip intact, no swelling, bruising to area. Dextrose 10% ordered and TPN stopped. Patient denies any discomfort at site. Resting comfortably in NAD. Denies pain, chest pain, sob, abdominal pain.

## 2019-12-27 NOTE — PROCEDURE NOTE - NSICDXPROCEDURE_GEN_ALL_CORE_FT
PROCEDURES:  US guided needle placement 27-Dec-2019 10:25:59  Deandre Valentin  US guided vascular access 27-Dec-2019 10:25:41 Patent left basilic vein Deandre Valentin  Placement, PICC, with imaging guidance 27-Dec-2019 10:25:09 4FR 43Cm  20CIRC [place] POWER PICC LINE ns flush good heme back left basilic vein Deandre Valentin
PROCEDURES:  US guided vascular access 11-Dec-2019 10:34:57 Patent left brachial vein Deandre Valentin  Placement, PICC, with imaging guidance 11-Dec-2019 10:34:48 5 FR dual 39CM  21CIRC BARD POWER PICC LINE ns flush good heme back left brachail vein Deandre Valentin

## 2019-12-27 NOTE — CHART NOTE - NSCHARTNOTEFT_GEN_A_CORE
Wound-vac on right lateral chest-wall changed at bedside. Dressing taken down, and 3 pieces of sponge were retrieved. Wounds appear clean, dry, and intact with no signs of infection, both measuring 2cmx0.5cmx0.25cm and elliptical in shape. Healthy granulation tissue noted. 2 pieces of sponge placed in wounds and covered with tegaderm, and a single piece of sponge functioning as a connecting bridge placed over tegaderm. Patient tolerated procedure well. No complications. Wound-vac on right lateral chest-wall changed at bedside. Dressing taken down, and 3 pieces of sponge were retrieved. Wounds appear clean, dry, and intact with no signs of infection, both measuring 2cmx0.5cmx0.25cm and elliptical in shape. Healthy granulation tissue noted. 2 pieces of sponge placed in wounds and covered with tegaderm, and a single piece of sponge functioning as a connecting bridge placed over tegaderm. Patient tolerated procedure well. No complications. To be changed next on 12/30/19.

## 2019-12-27 NOTE — PROCEDURE NOTE - NSPOSTPRCRAD_GEN_A_CORE
line in appropriate postion/chest radiograph/depth of insertion/line adjusted to depth of insertion/postion of catheter/ultrasound

## 2019-12-27 NOTE — PROGRESS NOTE ADULT - SUBJECTIVE AND OBJECTIVE BOX
34 year old woman with no known major comorbidities (had not gone to see a doctor for 15 years) who presented to [facility] on 11/22/2019 with progressive sob, weakness, weight loss, She was found to have a large pleural effusion. Initially 1.5 L of dark serous fluid was removed.  Chest tube placed shortly thereafter, 3 additional liters was removed. CBC showed a leukocytosis of 40k and   CTA imaging revealed a 2.1cm partially cystic mass in the body/tail of the pancreas with possible upstream ductal dilatation. On 11/23/2019 the patient had a CT chest/abdomen/pelvis that found to have a clot in the right IJ and was started on a heparin gtt.   Chest tube out; on parenteral feedings; taking clear liquids    Transferred to [facility] on 11/24/19.  MRCP performed on 11/26.  Underwent Debridement, wound, with dressing replacement  Wound VAC placement  Pleural biopsy  Lung decortication  Bronchoscopy, with lung decortication using VATS on 11/27/19.  Pt  has a history of alcohol abuse.     We were consulted for evaluation and management of the RIJ DVT.      INTERVAL HISTORY: No new complaints. No fever. Still frustrated about length of hospital stay. No bleeding. on heparin gtt and coumadin. INR 1.26 today.  On TPN; treated for pancreatic cysts  denies SOB or chest pain        MEDICATIONS  (STANDING):  amitriptyline 10 milliGRAM(s) Oral at bedtime  chlorhexidine 2% Cloths 1 Application(s) Topical <User Schedule>  chlorhexidine 4% Liquid 1 Application(s) Topical <User Schedule>  cholecalciferol 2000 Unit(s) Oral daily  dextrose 10%. 1000 milliLiter(s) (100 mL/Hr) IV Continuous <Continuous>  escitalopram 10 milliGRAM(s) Oral daily  fat emulsion (Plant Based) 20% Infusion 0.9 Gm/kG/Day (31.25 mL/Hr) IV Continuous <Continuous>  ferrous    sulfate 325 milliGRAM(s) Oral three times a day  gabapentin 100 milliGRAM(s) Oral three times a day  heparin  Infusion. 1300 Unit(s)/Hr (13 mL/Hr) IV Continuous <Continuous>  lidocaine   Patch 1 Patch Transdermal daily  meropenem  IVPB 1000 milliGRAM(s) IV Intermittent every 8 hours  octreotide  Injectable 100 MICROGram(s) SubCutaneous every 8 hours  pantoprazole    Tablet 40 milliGRAM(s) Oral before breakfast  Parenteral Nutrition - Adult 1 Each (83 mL/Hr) TPN Continuous <Continuous>  Parenteral Nutrition - Adult 1 Each (83 mL/Hr) TPN Continuous <Continuous>  sucralfate 1 Gram(s) Oral two times a day  traZODone 50 milliGRAM(s) Oral at bedtime  warfarin 7.5 milliGRAM(s) Oral once    MEDICATIONS  (PRN):  acetaminophen   Tablet .. 650 milliGRAM(s) Oral every 6 hours PRN Mild Pain (1 - 3)  ALPRAZolam 0.25 milliGRAM(s) Oral three times a day PRN anxiety  heparin  Injectable 4000 Unit(s) IV Push every 6 hours PRN For aPTT less than 40  heparin  Injectable 2000 Unit(s) IV Push every 6 hours PRN For aPTT between 40 - 57  melatonin 3 milliGRAM(s) Oral at bedtime PRN Insomnia  methocarbamol 500 milliGRAM(s) Oral every 6 hours PRN Muscle Spasm  ondansetron Injectable 4 milliGRAM(s) IV Push every 4 hours PRN Nausea and/or Vomiting  simethicone 80 milliGRAM(s) Chew three times a day PRN Gas  sodium chloride 0.9% lock flush 10 milliLiter(s) IV Push every 1 hour PRN Pre/post blood products, medications, blood draw, and to maintain line patency      Vital Signs Last 24 Hrs  T(C): 36.7 (27 Dec 2019 16:41), Max: 36.7 (26 Dec 2019 20:20)  T(F): 98 (27 Dec 2019 16:41), Max: 98 (26 Dec 2019 20:20)  HR: 104 (27 Dec 2019 16:41) (104 - 114)  BP: 121/80 (27 Dec 2019 16:41) (119/77 - 123/82)  BP(mean): --  RR: 18 (27 Dec 2019 16:41) (18 - 18)  SpO2: 97% (27 Dec 2019 16:41) (97% - 99%)CV: rrr  Abd: soft, nd,                          11.3   9.13  )-----------( 300      ( 26 Dec 2019 08:42 )             36.4       12-27    138  |  101  |  x   ----------------------------<  x   5.6<H>   |  24.0  |  x     Ca    9.7      27 Dec 2019 02:55  Phos  4.7     12-27  Mg     1.5     12-27    TPro  7.0  /  Alb  3.1<L>  /  TBili  <0.2<L>  /  DBili  x   /  AST  <5  /  ALT  11  /  AlkPhos  363<H>  12-27

## 2019-12-27 NOTE — PROGRESS NOTE ADULT - SUBJECTIVE AND OBJECTIVE BOX
[facility]  INFECTIOUS DISEASES AND INTERNAL MEDICINE at [place]  =======================================================  Carlos Alberto Soto MD  Diplomates American Board of Internal Medicine and Infectious Diseases  Tel: [phone]      Fax: [phone]  =======================================================    HERACLIO MOLINA [id number]  Follow up: R pleural effusion  no fevers  tolerating some liquids    Allergies:  Ensure TID (Unknown)  No Known Allergies    Antibiotics:  meropenem  IVPB 1000 milliGRAM(s) IV Intermittent every 8 hours    Other medications:  amitriptyline 10 milliGRAM(s) Oral at bedtime  chlorhexidine 2% Cloths 1 Application(s) Topical <User Schedule>  chlorhexidine 4% Liquid 1 Application(s) Topical <User Schedule>  cholecalciferol 2000 Unit(s) Oral daily  dextrose 10%. 1000 milliLiter(s) IV Continuous <Continuous>  escitalopram 10 milliGRAM(s) Oral daily  fat emulsion (Plant Based) 20% Infusion 0.9 Gm/kG/Day IV Continuous <Continuous>  ferrous    sulfate 325 milliGRAM(s) Oral three times a day  gabapentin 100 milliGRAM(s) Oral three times a day  heparin  Infusion. 1300 Unit(s)/Hr IV Continuous <Continuous>  lidocaine   Patch 1 Patch Transdermal daily  octreotide  Injectable 100 MICROGram(s) SubCutaneous every 8 hours  pantoprazole    Tablet 40 milliGRAM(s) Oral before breakfast  Parenteral Nutrition - Adult 1 Each TPN Continuous <Continuous>  Parenteral Nutrition - Adult 1 Each TPN Continuous <Continuous>  sucralfate 1 Gram(s) Oral two times a day  traZODone 50 milliGRAM(s) Oral at bedtime      REVIEW OF SYSTEMS:  CONSTITUTIONAL:  No Fever or chills  HEENT:   No diplopia or blurred vision.  No earache, sore throat or runny nose.  CARDIOVASCULAR:  No pressure, squeezing, strangling, tightness, heaviness or aching about the chest, neck, axilla or epigastrium.  RESPIRATORY:  No cough, shortness of breath  GASTROINTESTINAL:  No nausea, vomiting or diarrhea.  GENITOURINARY:  No dysuria, frequency or urgency.   MUSCULOSKELETAL:  no joint aches, no muscle pain  SKIN:  No change in skin, hair or nails.  NEUROLOGIC:  No Headaches, seizures   PSYCHIATRIC:  No disorder of thought or mood.  ENDOCRINE:  No heat or cold intolerance  HEMATOLOGICAL:  No easy bruising or bleeding.       Physical Exam:  T(F): 97.8 (27 Dec 2019 08:48), Max: 98 (26 Dec 2019 20:20)  HR: 114 (27 Dec 2019 08:48)  BP: 119/77 (27 Dec 2019 08:48)  RR: 18 (27 Dec 2019 08:48)  SpO2: 99% (26 Dec 2019 20:20) (97% - 99%)  temp max in last 48H T(F): , Max: 98.2 (12-25-19 @ 23:06)    GEN: NAD, pleasant  HEENT: normocephalic and atraumatic. EOMI. PERRL.  Anicteric   NECK: Supple.   LUNGS: Clear to auscultation. Wound vac on site of old chest tube  HEART: Regular rate and rhythm   ABDOMEN: Soft, nontender, and nondistended.  Positive bowel sounds.    : No CVA tenderness  EXTREMITIES: Without any edema.  MSK: no joint swelling  NEUROLOGIC: No focal deficits  PSYCHIATRIC: Appropriate affect .  SKIN: No Rash       Labs:                        11.3   9.13  )-----------( 300      ( 26 Dec 2019 08:42 )             36.4       WBC Count: 9.13 K/uL (12-26-19 @ 08:42)  WBC Count: 9.27 K/uL (12-23-19 @ 06:44)      12-27    138  |  102  |  9.0  ----------------------------<  88  5.1   |  23.0  |  0.33<L>    Ca    9.7      27 Dec 2019 02:55  Phos  4.7     12-27  Mg     1.8     12-27    TPro  7.0  /  Alb  3.1<L>  /  TBili  <0.2<L>  /  DBili  x   /  AST  <5  /  ALT  11  /  AlkPhos  363<H>  12-27      GI PCR Panel, Stool (collected 12-26-19 @ 21:40)  Source: .Stool  Final Report (12-27-19 @ 02:23):    GI PCR Results: NOT detected    *******Please Note:*******    GI panel PCR evaluates for:    Campylobacter, Plesiomonas shigelloides, Salmonella,    Vibrio, Yersinia enterocolitica, Enteroaggregative    Escherichia coli (EAEC), Enteropathogenic E.coli (EPEC),    Enterotoxigenic E. coli (ETEC) lt/st, Shiga-like    toxin-producing E. coli (STEC) stx1/stx2,    Shigella/ Enteroinvasive E. coli (EIEC), Cryptosporidium,    Cyclospora cayetanensis, Entamoeba histolytica,    Giardia lamblia, Adenovirus F 40/41, Astrovirus,    Norovirus GI/GII, Rotavirus A, Sapovirus

## 2019-12-27 NOTE — PROCEDURE NOTE - NSPOSTCAREGUIDE_GEN_A_CORE
Verbal/written post procedure instructions were given to patient/caregiver/Instructed patient/caregiver regarding signs and symptoms of infection/Keep the cast/splint/dressing clean and dry/Care for catheter as per unit/ICU protocols/Instructed patient/caregiver to follow-up with primary care physician
Instructed patient/caregiver regarding signs and symptoms of infection/Verbal/written post procedure instructions were given to patient/caregiver/Instructed patient/caregiver to follow-up with primary care physician/Keep the cast/splint/dressing clean and dry/Care for catheter as per unit/ICU protocols

## 2019-12-27 NOTE — PROGRESS NOTE ADULT - ASSESSMENT
34 y.o. F who was  transferred from [facility] to [facility] on 11/22 for complaints of weakness, 25 pound weight loss & flu like symptoms & found to have a large right pleural effusion,   pleural fluid cultures + for group A hemolytic strep, + RIJ thormbus   11/23 CT of the chest/abd/pelvis indicated a clot in the Right IJ, pt started on heparin gtt.   s/p right VATS decort & washout debridement of CT site & VAC placement on 11/27/19.   Persistent drainage from chest tube site so repeat CT C/A/P performed 12/4 which showed new cystic mass in pancreas and liver concerning for pancreatitis  Zosyn changed to meropenem on 12/6  for broader coverage given pancreatitis  Copious drainage from anterior chest tube site, so VAC removed and ostomy appliance applied for high output on 12/6.   12/7 Right chest tube removed.   Repeat CT scan showed a persistent Right hydropneumothorax with air anteriorly  12/8 Ostomy appliance removed and dressing applied.   12/9 s.p PICC placement & Right anterior pig tail catheter & right lateral wound vac placement   MRCP significant for Peripancreatic fluid and edema consistent with pancreatitis. Cystic lesions in the pancreatic head with the largest measuring 2.1 cm, likely acute peripancreatic collections, increased in size since 12/4/2019, with a tract of fluid extending from the 2.1 cm cystic lesion into the mediastinum and towards the pleural space on the right suspicious for a pancreatic pleural fistula.    R pleural effusion  Pancreatitis  R pancreaticopleural fistula   Alcohol abuse   WBC elevation - resolved  Acute renal failure - resolved      - All cultures negative , blood, urine, pleural fluid and chest tube insertion site.  - Leukocytosis now resolved  - No fever   ENCOURAGE PO intake.  - Continue Meropenem 1gm q8h for necrotizing pancreatitis and fistula with pleural space. GI and surgery following.   - Has 2 PICC lines, on TPN and ABx, watch for superinfection  - MRI results reviewed    - Plan for antibiotics till 1/3/20 to complete 4 weeks from Chest tube removal     Will follow

## 2019-12-27 NOTE — PROGRESS NOTE ADULT - ASSESSMENT
Pancreatitis with pancreatic pleural fistula.    Plan Replace double lumen PICC.  TPN reordered.  Less KCl and less K Phos.

## 2019-12-27 NOTE — CHART NOTE - NSCHARTNOTEFT_GEN_A_CORE
Delayed entry note:  Called by RN around 6:30AM because pt PICC line dislodged and medicine PA told them to notify on-call hospitalist.  Pt will require re-insertion of access for TPN, please follow up in AM.

## 2019-12-27 NOTE — PROGRESS NOTE ADULT - PROBLEM SELECTOR PLAN 1
s/p Right VATS decortication & washout on 11/27/19. Debridement of CT site & VAC placement.  Encourage the use of I/S, OOB to chair, daily PT, ambulate as tolerated  PICC line on left for TPN (PICC line fell out in AM and Replaced with new PICC line this afternoon)   Midline on right for meds  New vac placement to right lateral chest wall 12/27  Daily CXR  Continue supportive care.

## 2019-12-27 NOTE — PROGRESS NOTE ADULT - ASSESSMENT
34 year old female with no known major comorbidities who is being treated for right pleural effusion/empyema/multifocal pna found to have a RIJ thrombus currently on heparin drip.    1) RIJ venous thrombosis -provoked event that occurred in context of her severe and complex lung illness/infection/empyema that may have led to developing the thrombus. Plan on anticoagulation for 3 months. Plan for warfarin noted as outpatient. will need frequent INR monitoring.  2) Multifactorial anemia; Hb11.3 yesterday. monitor.  3) Reactive leukocytosis, improved.  WBC 9.13k yesterday.  4) Pancreatic Fistula-care per managing service/ID/GI

## 2019-12-27 NOTE — PROGRESS NOTE ADULT - ASSESSMENT
34 year old female with a PMHx of smoking, ETOH use (4-5 drinks/day), transferred from [facility] to [facility] on 11/22 for complaints of weakness, 25 pound weight loss & flu like symptoms & found to have a large right pleural effusion, pleural fluid cultures + for group A hemolytic strep, + RIJ thrombus   11/23 CT of the chest/abd/pelvis indicated a clot in the Right IJ, pt started on heparin gtt.     s/p right VATS decort & washout debridement of CT site & VAC placement on 11/27/19.   ·	Persistent drainage from chest tube site so repeat CT C/A/P performed 12/4 which showed new cystic mass in pancreas and liver concerning for pancreatitis, GI consulted (NTD).   ·	Zosyn changed to meropenem on 12/6 by ID for broader coverage given pancreatitis. Plan to continue Meropenem for 4 weeks.   ·	Copious drainage from anterior chest tube site, so VAC removed and ostomy appliance applied for high output on 12/6.   ·	Pleural fluid with an amylase [place] 58860 (serum 51) noted.  Prealbumin noted to be 5. Pt tolerating diet. Ensure drinks added.   ·	12/7 Right chest tube removed. Esophogram normal.   ·	Repeat CT scan showed a persistent Right hydropneumothorax with air anteriorly, right lower anterolateral chest wall with air tract to right pleural space, and Persistent renal nephrograms b/l indicating ATN.   ·	All cultures negative. 12/8 Ostomy appliance removed and dressing applied.   ·	Right lower extremity swelling noted to be worsening and patient upgraded to the ICU on 12/8. No acute findings on venous and arterial US of b/l LEs or Renal US.      12/9 s.p PICC placement & Right anterior pig tail catheter & right lateral wound vac placement with bridge     ·	MRCP significant for Peripancreatic fluid and edema consistent with pancreatitis. Cystic lesions in the pancreatic head with the largest measuring 2.1 cm, likely acute peripancreatic collections, increased in size since 12/4/2019, with a tract of fluid extending from the 2.1 cm cystic lesion into the mediastinum and towards the pleural space on the right suspicious for a pancreatic pleural fistula.    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      12/13 Patient transferred to medicine.   12/15/19 Wound VAC changed.  12/16/19 Repeat xray unchanged.   12/18 US Left chest showed small effusion- no intervention. VAC changed.   12/21: VAC changed  12/24 VAC changed  12/27 VAC changed

## 2019-12-27 NOTE — PROGRESS NOTE ADULT - SUBJECTIVE AND OBJECTIVE BOX
Patient seen and examined; chart reviewed.  Events noted.  Glucoses running low 100's.  No coverage.  Picc line for TPN became dislodged overnight and TPN halted.  D10W infusing.  No hypoglycemia.  No fever.  2 diarrheal BM's yesterday:  Stool for GI pcr was neg.     PAST MEDICAL & SURGICAL HISTORY:      ROS:  No Heartburn, regurgitation, dysphagia, odynophagia.  No dyspepsia  No abdominal pain.    No Nausea, vomiting.  No Bleeding.  No hematemesis.   No diarrhea.    No hematochesia.  No weight loss, anorexia.  No edema.      MEDICATIONS  (STANDING):  amitriptyline 10 milliGRAM(s) Oral at bedtime  chlorhexidine 4% Liquid 1 Application(s) Topical <User Schedule>  cholecalciferol 2000 Unit(s) Oral daily  dextrose 10%. 1000 milliLiter(s) (100 mL/Hr) IV Continuous <Continuous>  escitalopram 10 milliGRAM(s) Oral daily  fat emulsion (Plant Based) 20% Infusion 0.9 Gm/kG/Day (31.05 mL/Hr) IV Continuous <Continuous>  ferrous    sulfate 325 milliGRAM(s) Oral three times a day  gabapentin 100 milliGRAM(s) Oral three times a day  heparin  Infusion. 1300 Unit(s)/Hr (13 mL/Hr) IV Continuous <Continuous>  lidocaine   Patch 1 Patch Transdermal daily  meropenem  IVPB 1000 milliGRAM(s) IV Intermittent every 8 hours  octreotide  Injectable 100 MICROGram(s) SubCutaneous every 8 hours  pantoprazole    Tablet 40 milliGRAM(s) Oral before breakfast  Parenteral Nutrition - Adult 1 Each (83 mL/Hr) TPN Continuous <Continuous>  Parenteral Nutrition - Adult 1 Each (83 mL/Hr) TPN Continuous <Continuous>  sucralfate 1 Gram(s) Oral two times a day  traZODone 50 milliGRAM(s) Oral at bedtime    MEDICATIONS  (PRN):  acetaminophen   Tablet .. 650 milliGRAM(s) Oral every 6 hours PRN Mild Pain (1 - 3)  ALPRAZolam 0.25 milliGRAM(s) Oral three times a day PRN anxiety  heparin  Injectable 4000 Unit(s) IV Push every 6 hours PRN For aPTT less than 40  heparin  Injectable 2000 Unit(s) IV Push every 6 hours PRN For aPTT between 40 - 57  melatonin 3 milliGRAM(s) Oral at bedtime PRN Insomnia  methocarbamol 500 milliGRAM(s) Oral every 6 hours PRN Muscle Spasm  ondansetron Injectable 4 milliGRAM(s) IV Push every 4 hours PRN Nausea and/or Vomiting  simethicone 80 milliGRAM(s) Chew three times a day PRN Gas  sodium chloride 0.9% lock flush 10 milliLiter(s) IV Push every 1 hour PRN Pre/post blood products, medications, blood draw, and to maintain line patency      Allergies    No Known Allergies    Intolerances    Ensure TID (Unknown)  pt requests chicken broth (no beef) and CHERRY italian ice (no lemon) (Unknown)      Vital Signs Last 24 Hrs  T(C): 36.6 (27 Dec 2019 08:48), Max: 36.7 (26 Dec 2019 20:20)  T(F): 97.8 (27 Dec 2019 08:48), Max: 98 (26 Dec 2019 20:20)  HR: 114 (27 Dec 2019 08:48) (112 - 114)  BP: 119/77 (27 Dec 2019 08:48) (117/72 - 123/82)  BP(mean): --  RR: 18 (27 Dec 2019 08:48) (18 - 18)  SpO2: 99% (26 Dec 2019 20:20) (97% - 99%)    PHYSICAL EXAM:    GENERAL: NAD, well-groomed, well-developed  HEAD:  Atraumatic, Normocephalic  EYES: EOMI, PERRLA, conjunctiva and sclera clear  ENMT: No tonsillar erythema, exudates, or enlargement; Moist mucous membranes, Good dentition, No lesions  NECK: Supple, No JVD, Normal thyroid  CHEST/LUNG: Clear to percussion bilaterally; No rales, rhonchi, wheezing, or rubs  HEART: Regular rate and rhythm; No murmurs, rubs, or gallops  ABDOMEN: Soft, Nontender, Nondistended; Bowel sounds present  EXTREMITIES:  2+ Peripheral Pulses, No clubbing, cyanosis, or edema  LYMPH: No lymphadenopathy noted  SKIN: No rashes or lesions      LABS:                        11.3   9.13  )-----------( 300      ( 26 Dec 2019 08:42 )             36.4     12-27    138  |  102  |  9.0  ----------------------------<  88  5.1   |  23.0  |  0.33<L>    Ca    9.7      27 Dec 2019 02:55  Phos  4.7     12-27  Mg     1.8     12-27    TPro  7.0  /  Alb  3.1<L>  /  TBili  <0.2<L>  /  DBili  x   /  AST  <5  /  ALT  11  /  AlkPhos  363<H>  12-27    PT/INR - ( 27 Dec 2019 02:55 )   PT: 14.3 sec;   INR: 1.24 ratio         PTT - ( 27 Dec 2019 02:55 )  PTT:95.5 sec         RADIOLOGY & ADDITIONAL STUDIES:

## 2019-12-27 NOTE — PROGRESS NOTE ADULT - SUBJECTIVE AND OBJECTIVE BOX
Patient is a 34y old  Female who presents with a chief complaint of Empyema of Lt chest pleura (27 Dec 2019 13:45)      Patient seen and examined at bedside.     ALLERGIES:  Ensure TID (Unknown)  No Known Allergies  pt requests chicken broth (no beef) and CHERRY italian ice (no lemon) (Unknown)    MEDICATIONS  (STANDING):  amitriptyline 10 milliGRAM(s) Oral at bedtime  chlorhexidine 2% Cloths 1 Application(s) Topical <User Schedule>  chlorhexidine 4% Liquid 1 Application(s) Topical <User Schedule>  cholecalciferol 2000 Unit(s) Oral daily  dextrose 10%. 1000 milliLiter(s) (100 mL/Hr) IV Continuous <Continuous>  escitalopram 10 milliGRAM(s) Oral daily  fat emulsion (Plant Based) 20% Infusion 0.9 Gm/kG/Day (31.25 mL/Hr) IV Continuous <Continuous>  ferrous    sulfate 325 milliGRAM(s) Oral three times a day  gabapentin 100 milliGRAM(s) Oral three times a day  heparin  Infusion. 1300 Unit(s)/Hr (13 mL/Hr) IV Continuous <Continuous>  lidocaine   Patch 1 Patch Transdermal daily  magnesium sulfate  IVPB 1 Gram(s) IV Intermittent once  meropenem  IVPB 1000 milliGRAM(s) IV Intermittent every 8 hours  octreotide  Injectable 100 MICROGram(s) SubCutaneous every 8 hours  pantoprazole    Tablet 40 milliGRAM(s) Oral before breakfast  Parenteral Nutrition - Adult 1 Each (83 mL/Hr) TPN Continuous <Continuous>  Parenteral Nutrition - Adult 1 Each (83 mL/Hr) TPN Continuous <Continuous>  sucralfate 1 Gram(s) Oral two times a day  traZODone 50 milliGRAM(s) Oral at bedtime  warfarin 7.5 milliGRAM(s) Oral once    MEDICATIONS  (PRN):  acetaminophen   Tablet .. 650 milliGRAM(s) Oral every 6 hours PRN Mild Pain (1 - 3)  ALPRAZolam 0.25 milliGRAM(s) Oral three times a day PRN anxiety  heparin  Injectable 4000 Unit(s) IV Push every 6 hours PRN For aPTT less than 40  heparin  Injectable 2000 Unit(s) IV Push every 6 hours PRN For aPTT between 40 - 57  melatonin 3 milliGRAM(s) Oral at bedtime PRN Insomnia  methocarbamol 500 milliGRAM(s) Oral every 6 hours PRN Muscle Spasm  ondansetron Injectable 4 milliGRAM(s) IV Push every 4 hours PRN Nausea and/or Vomiting  simethicone 80 milliGRAM(s) Chew three times a day PRN Gas  sodium chloride 0.9% lock flush 10 milliLiter(s) IV Push every 1 hour PRN Pre/post blood products, medications, blood draw, and to maintain line patency    Vital Signs Last 24 Hrs  T(F): 97.8 (27 Dec 2019 08:48), Max: 98 (26 Dec 2019 20:20)  HR: 114 (27 Dec 2019 08:48) (112 - 114)  BP: 119/77 (27 Dec 2019 08:48) (117/72 - 123/82)  RR: 18 (27 Dec 2019 08:48) (18 - 18)  SpO2: 99% (26 Dec 2019 20:20) (97% - 99%)  I&O's Summary    26 Dec 2019 07:01  -  27 Dec 2019 07:00  --------------------------------------------------------  IN: 3564 mL / OUT: 0 mL / NET: 3564 mL      PHYSICAL EXAM:  General: NAD, Alert  ENT: MMM, no thrush  Neck: Supple, No JVD  Lungs: non labored breathing; +chest wound vac  Cardio: +s1/s2  Abdomen: Soft, Nontender, Nondistended; Bowel sounds present  Extremities: No calf tenderness    LABS:                        11.3   9.13  )-----------( 300      ( 26 Dec 2019 08:42 )             36.4     12-27    138  |  101  |  x   ----------------------------<  x   5.6   |  24.0  |  x     Ca    9.7      27 Dec 2019 02:55  Phos  4.7     12-27  Mg     1.5     12-27    TPro  7.0  /  Alb  3.1  /  TBili  <0.2  /  DBili  x   /  AST  <5  /  ALT  11  /  AlkPhos  363  12-27    eGFR if Non African American: 145 mL/min/1.73M2 (12-27-19 @ 02:55)  eGFR if : 168 mL/min/1.73M2 (12-27-19 @ 02:55)    PT/INR - ( 27 Dec 2019 11:27 )   PT: 14.6 sec;   INR: 1.26 ratio    PTT - ( 27 Dec 2019 11:27 )  PTT:70.6 sec    12-09 Chol 94 mg/dL LDL 24 mg/dL HDL 37 mg/dL Trig 167 mg/dL    Glucose  POCT Blood Glucose.: 115 mg/dL (27 Dec 2019 11:10)  POCT Blood Glucose.: 139 mg/dL (27 Dec 2019 08:09)  POCT Blood Glucose.: 110 mg/dL (26 Dec 2019 23:41)  POCT Blood Glucose.: 104 mg/dL (26 Dec 2019 18:34)      GI PCR Panel, Stool (collected 26 Dec 2019 21:40)  Source: .Stool  Final Report (27 Dec 2019 02:23):    GI PCR Results: NOT detected    RADIOLOGY & ADDITIONAL TESTS:  < from: Xray Chest 1 View AP/PA. (12.27.19 @ 11:12) >  IMPRESSION: Interval antegrade movement of the left-sided PICC line with the tip now in the right atrium.  No interval change in the small right-sided pleural effusion.  < end of copied text >    Care Discussed with Consultants/Other Providers:

## 2019-12-27 NOTE — CONSULT NOTE ADULT - SUBJECTIVE AND OBJECTIVE BOX
[place] [facility]                                                               Office:  [address] [address]                                                              Telephone: [phone]. Fax:[phone]                                                                        CARDIOLOGY CONSULTATION NOTE                                                                                             Consult requested by:    Reason for Consultation:   History obtained by: Patient and medical record   obtained: No    Chief complaint:    Patient is a 34y old  Female who presents with a chief complaint of Empyema of Lt chest pleura (27 Dec 2019 13:45)        HPI: 33 y/o F transfer from [facility] for large R pleural effusion, empyema s/p thoracentesis.  found to have a leukocytosis with wbc of 40, platelet count of 793, Na 120, chloride 73, lactate of 4.7, amylase 268,  and CTA imaging also revealed a 2.1cm partially cystic mass in the body/tail of the pancreas with possible upstream ductal dilatation. 11/23 patient had CT chest/abdomen/pelvis and was also found to have a clot in the right IJ and was started on a heparin gtt. s/p right VATS decort & washout debridement of CT site & VAC placement on 11/27/19. Persistent drainage from chest tube site so repeat CT C/A/P performed 12/4 which showed new cystic mass in pancreas and liver concerning for pancreatitis. TPN initiated. Cardiology called for tachycardia, arrythmia on telemetry. Pt c/o occasional palpitations, states suffers from anxiety. dneies chest pain, difficulty breathing, dizzy, lightheaded, syncope, near syncope.         REVIEW OF SYMPTOMS:     CONSTITUTIONAL: + hunger No fever, weight loss, or fatigue  ENMT:  No difficulty hearing, tinnitus, vertigo; No sinus or throat pain  NECK: No pain or stiffness  CARDIOVASCULAR: + palpitations No chest pain, dyspnea, syncope, dizziness, Orthopnea, Paroxsymal nocturnal dyspnea  RESPIRATORY: No Dyspnea on exertion, Shortness of breath, cough, wheezing  : No dysuria, no hematuria   GI: No dark color stool, no melena, no diarrhea, no constipation, no abdominal pain   NEURO: No headache, no dizziness, no slurred speech   MUSCULOSKELETAL: No joint pain or swelling; No muscle, back, or extremity pain  PSYCH: No agitation, + anxiety.    ALL OTHER REVIEW OF SYSTEMS ARE NEGATIVE.      PREVIOUS DIAGNOSTIC TESTING  ECHO FINDINGS:  < from: TTE Echo Complete w/Doppler (11.25.19 @ 12:44) >    Summary:   1. Left ventricular ejection fraction, by visual estimation, is >75%.   2. Hyperdynamic global left ventricular systolic function.   3. Spectral Doppler shows impaired relaxation pattern of left   ventricular myocardial filling (Grade I diastolic dysfunction).   4. Mild thickening of the anterior and posterior mitral valve leaflets.   5. Trace mitral valve regurgitation.    MD Jospeh Electronically signed on 11/25/2019 at 6:26:21 PM         < end of copied text >        ALLERGIES: Allergies  No Known Allergies    Intolerances  Ensure TID (Unknown)  pt requests chicken broth (no beef) and CHERRY italian ice (no lemon) (Unknown)        PAST MEDICAL HISTORY      PAST SURGICAL HISTORY      FAMILY HISTORY:  Mother DVT  Brother DVT- thrombus- cardiac arrest     SOCIAL HISTORY:  Denies smoking/alcohol/drugs  CIGARETTES:   Former smoker, x18 years 1/3 PPD   ALCOHOL: 3 drinks/day  DRUGS: marijuana       CURRENT MEDICATIONS:     amitriptyline  escitalopram  gabapentin  traZODone  pantoprazole    Tablet  sucralfate  chlorhexidine 2% Cloths  chlorhexidine 4% Liquid  cholecalciferol  dextrose 10%.  fat emulsion (Plant Based) 20% Infusion  ferrous    sulfate  heparin  Infusion.  lidocaine   Patch  magnesium sulfate  IVPB  meropenem  IVPB  octreotide  Injectable  Parenteral Nutrition - Adult  Parenteral Nutrition - Adult  warfarin        HOME MEDICATIONS:      Vital Signs Last 24 Hrs  T(C): 36.7 (27 Dec 2019 16:41), Max: 36.7 (26 Dec 2019 20:20)  T(F): 98 (27 Dec 2019 16:41), Max: 98 (26 Dec 2019 20:20)  HR: 104 (27 Dec 2019 16:41) (104 - 114)  BP: 121/80 (27 Dec 2019 16:41) (119/77 - 123/82)  RR: 18 (27 Dec 2019 16:41) (18 - 18)  SpO2: 97% (27 Dec 2019 16:41) (97% - 99%)      PHYSICAL EXAM:  Constitutional: Comfortable . No acute distress.   HEENT: Atraumatic and normocephalic . no JVD. No carotid bruit. PEERL   CNS: A&Ox3. No focal deficits. EOMI. Cranial nerves II-IX are intact.   Lymph Nodes: Cervical : Not palpable.  Respiratory: CTAB  Cardiovascular: S1S2 tachy. No murmur/rubs or gallop.  Gastrointestinal: Soft non-tender and non distended . +Bowel sounds. negative Rajan's sign.  Extremities: No edema.   Psychiatric: Calm . no agitation.  Skin: R side wound vac. No skin rash/ulcers visualized to face, hands or feet.    Intake and output:   12-26 @ 07:01  -  12-27 @ 07:00  --------------------------------------------------------  IN: 3564 mL / OUT: 0 mL / NET: 3564 mL        LABS:                        11.3   9.13  )-----------( 300      ( 26 Dec 2019 08:42 )             36.4     12-27  138  |  101  |  x   ----------------------------<  x   5.6<H>   |  24.0  |  x       Ca    9.7      27 Dec 2019 02:55  Phos  4.7     12-27  Mg     1.5     12-27      TPro  7.0  /  Alb  3.1<L>  /  TBili  <0.2<L>  /  DBili  x   /  AST  <5  /  ALT  11  /  AlkPhos  363<H>  12-27      PT/INR - ( 27 Dec 2019 11:27 )   PT: 14.6 sec;   INR: 1.26 ratio    PTT - ( 27 Dec 2019 11:27 )  PTT:70.6 sec      INTERPRETATION OF TELEMETRY: ST, SVT short burst.   ECG: ST, NSST T abnl    RADIOLOGY & ADDITIONAL STUDIES:    X-ray:  reviewed by me.   CT scan:

## 2019-12-27 NOTE — CHART NOTE - NSCHARTNOTEFT_GEN_A_CORE
PA called by RN to report pt noted with periods of nonsustained vtach on cardiac monitor. RN reports pt in sinus tach with periods Vtach fluctuating 3-10 beats per episode, nonsustained which has been noted to be occurring throughout the day. Pt seen by cardiology PA called by RN to report pt noted with periods of nonsustained vtach on cardiac monitor. RN reports pt in sinus tach with periods Vtach fluctuating 3-10 beats per episode, nonsustained which has been noted to be occurring throughout the day. Pt seen today by cardiology for arrythmia and tachycardia, recommendations appreciated. Upon assessment pt lying in bed, NAD. Pt denies CP, chest pressure, palpitations, SOB, lightheadedness, dizziness, abdominal pain or discomfort. +S1/S2, tachycardic. T 98.3F oral, /84, P 111, RR 18, O2 sat 97% on room air. Ordered STAT EKG: Sinus tachycardia, , no acute ischemic changes. Ordered STAT BMP, Mg will f/u on results. Monitoring electrolytes. Pt is stable at this time. Continue cardiac monitoring. RN instructed to continue to monitor pt and notify provider of any changes in pt status.

## 2019-12-27 NOTE — CONSULT NOTE ADULT - ASSESSMENT
1) Hyponatremia  2) Rt empyema  3) Pancreatic mass  4) RIJ thrombosis    Pt with hyponatremia of unknown duration; serum Na+ 121 on 11/22- unclear what treatment pt received  Serum sodium now improved to 133  Recent UA with low sp gravity; goes against SIADH  Pt likely with hyponatremia in setting of poor solute intake ?beer potomania  Obtain UA, urine lytes and Uosm  Increase protein intake  Will follow  Discussed with CTA PA
33yo female admitted for tx of empyema w/ recent imaging showing a cystic lesion in her pancreas. Patient in need of further imaging to characterize her pancreatic cyst with MRI. During blood work she was found to have slightly high TSH.    Abnormal TFts   slightly high TSH. very likely nonthyroidal disease.   i would monitor and repeat in 3-4 days TSh, Ft4 and Tt3  	  Hyponatremia : possible dehydration vs alcohol abuse.   [facility] protocol . she drinks 4-5 drinks a day.    empyema: managed by CTICU  drainage by chest tube     Pancreas cyst:   - MRCP performed today
34 year old female who [place] [facility] [place] [facility] on 11/22 for complaints of weakness, 25 pound weight loss & flu like symptoms & found to have a large right pleural effusion. Pt. has a PMH of empyema with group A hemolytic strep & has 4-5 drinks of alcohol per day.  On 11/23 CT of the chest/abd/pelvis indicated a clot in the Right IJ. Pt. on heparin bridge to coumadin. CT abdomen shows cystic mass in body/tail of pancreas, now concern for postop ileus.  Post-op course:  Pt s/p right VATS decort & washout debridement of CT site & VAC placement on 11/27/19.     Coumadin started for RIJ thrombus x 2 days then INR spike > held, postop anemia > on iron, folate, vit c. Today, patient had elevated WBC, hyperkalemia, hypomagnesemia and reactive thrombocytosis. Patient remains afebrile and pain complaints appear constant. Psych and Pain evaluation requested.   Today noted with elevated WBC , increasing drainage , c/o loose stools , b/l LE pain / swelling . MRI with pancreatic cyst but poor study . Followed by CTS ,  ID , Hem / Psych , seen by GI            Problem/Plan - 1:  ·  Problem: Pleural effusion on right.  Plan: s/p Right VATS decortication & washout on 11/27/19. Debridement of CT site & VAC placement, as per primary team , increased drainage noted , On iv abx Zosyn / vanco , follow up cultures , repeat CT - chest          Problem/Plan - 2:  ·  Problem: Empyema.  Plan: On Zosyn / Vanco ,  ID following       Problem/Plan - 3:  ·  Problem: Internal jugular vein thrombosis, right.  Plan: Heme consult appreciated  Follow up with heme as out patient. On Coumadin , presently on hold due to high INR , keep INR between 2- 3 , follow INR in am      Problem/Plan - 5:  ·  Problem: Anemia likely multifactorial .  Plan: Continue FeSO4, vitamin C, MVI, thiamine, & folate  Trend HCT/HGB. Check FOBT / anemia w/u      Problem/Plan - 6:  Problem: Prophylactic measure. Plan: Continue Protonix for GI prophylaxis  Continue senna & miralax  Continue Heparin for DVT for prophylaxis.     Problem/Plan - 7:  ·  Problem: Hypomagnesemia.  Plan: Magnesium replaced, repeat serum magnesium level tomorrow.      Problem/Plan - 8:  ·  Problem: Hyponatremia.  Plan: as per renal , likely SIADH      Problem/Plan - 9:  ·  Problem: Alcohol abuse.  Plan: Monitor for withdrawal  Continue folic, MVI, thiamine, & feSO4    Problem / Plan - 10 : Abdominal tenderness - CT A/P recommended     Problem / Plan - 11; Frequent loose stools x 1 month - will hold on laxatives for now , get stool cultures , O and P , GI PCR ,r/o  C diff     Problem / Plan -12 : B/L feet pain , pain all over her body - consider rheum eval     Problem / Plan -13 ; Hematuria , patient not sure if she is menstruating , consider UA / urine cultures due to elevated WBC < ? hematuria     Problem / Plan -14 : Multifocal PNA -  treated with abx     Problem /Plan - 15 : Hypoalbuminemia - check prealbumin , add supplements , get Nutritionist eval .
34y old Female transferred with [facility] for management of empyema. She has a right sided chest tube and ongoing pain, relieved with use of Percocet. Found A&Ox3, NAD, sitting up in bed. Observed independently changing position without difficulty or signs of intense pain. Discussed use of a mild analgesic during the daytime, addition of a muscle relaxant PRN, with Percocet at bedtime. patient agreeable to plan.
35 [place]o [place] [facility] [place], empyema s/p thoracentesis.  found to have a leukocytosis with wbc of 40, platelet count of 793, Na 120, chloride 73, lactate of 4.7, amylase 268,  and CTA imaging also revealed a 2.1cm partially cystic mass in the body/tail of the pancreas with possible upstream ductal dilatation. 11/23 patient had CT chest/abdomen/pelvis and was also found to have a clot in the right IJ and was started on a heparin gtt. s/p right VATS decort & washout debridement of CT site & VAC placement on 11/27/19. Persistent drainage from chest tube site so repeat CT C/A/P performed 12/4 which showed new cystic mass in pancreas and liver concerning for pancreatitis. TPN initiated. Cardiology called for tachycardia, arrythmia on telemetry. Pt c/o occasional palpitations, states suffers from anxiety. dneies chest pain, difficulty breathing, dizzy, lightheaded, syncope, near syncope.     Tachycardia  - short run SVT, narrow complex. Multiple PVCs  - continue telemetry monitoring  - limited TTE ordered to evaluate EF  - TTE 11/25 EF >75%  - monitor electorlytes closely with on going TPN  - keep Mag>2, K >4  - transition to PO diet as soon as tolerated, as per GI    RIJ thrombus  - on heparin gtt- bridge to coumadin  - as per hospitalist/heme    No further cardiac intervention at this time. Please call with further questions. Thank you for allowing us to participate.
35yo female admitted for tx of empyema w/ recent imaging showing a cystic lesion in her pancreas. Patient in need of further imaging to characterize her pancreatic cyst. Recommend MRCP. Discussed findings from previous CT w/ patient and need to get further imaging via MRI.     - Rec MRCP  - Rec lifestyle modification, specifically reduction of etoh intake.   - Surgical oncology to continue to follow.
This 34 y.o. F who [facility] with weakness,   found with large right pleural effusion   s/p chest tube drainage    Hyponatremia - resolving  WBC elevation - resolving  alpha hemolytic strep infection    - Change zosyn to Ceftriaxone 1 gram Q 24H  - stop Azithromycin    - chest tube per ICU team.     - follow up all outstanding cultures (FROM [facility])  - trend temperature and WBC curve  - repeat cultures from blood and all sources if febrile.
Very mild elevation in Proalctin   -unclear as to why it was drawn   -can repeat  Prolactin   along with LH FSH  Estradiol  ACTH Cortisol IGF-1 Growth hormone   TFT wnl   as pt with menses that are regular only off  by a few days
34 year old female with no known major comorbidities who is being treated for right pleural effusion/empyema/multifocal pna found to have a RIJ thrombus currently on heparin drip.    1) RIJ venous thrombosis - I feel that this is a provoked event that occurred in context of her severe and complex lung illness/infection/empyema that may have led to developing the thrombus. I agree with continuing anticoagulation and for a duration of at least 3 months. I feel that coumadin or a DOAC can be the agent of choice. If no contraindication from cardiothoracic perspective I would favor a DOAC. The patient said that she would have a difficult time adjusting her diet and that she eats salads/greens "all the time" which would make keeping her INR between 2-3 more difficult. She said that she would prefer not have to have INR checks and modify her diet if not necessary. I told her that I would like to see her in my office a couple of weeks after she gets discharged from the hospital.    She verbalized understanding and agreed with the plan.
Patient with significant lung disease with right sided pleural effusion s/p chest tube, multifocal pneumonia, weight loss, on antibiotics and now noted to have pancreatic cystic lesion in the body. Based on the significant alcohol abuse history. there is possibility of pancreatitis/pseudocyst based on the history. Currently, the patient is not having any abdominal pain. Agree with surgical oncology recommendations for performing MRCP/MRI.  Check Ca 19-9 level  Alcohol abstinence counseling  Endoscopic evaluation including EUS is deferred at this time and should be pursued as outpatient once her respiratory condition is better  Nutritional evaluation

## 2019-12-27 NOTE — PROCEDURE NOTE - NSPROCDETAILS_GEN_ALL_CORE
ultrasound assessment/location identified, draped/prepped, sterile technique used/supine position/sterile dressing applied/sterile technique, catheter placed/ultrasound guidance

## 2019-12-28 LAB
ANION GAP SERPL CALC-SCNC: 14 MMOL/L — SIGNIFICANT CHANGE UP (ref 5–17)
ANION GAP SERPL CALC-SCNC: 14 MMOL/L — SIGNIFICANT CHANGE UP (ref 5–17)
APTT BLD: 80.9 SEC — HIGH (ref 27.5–36.3)
BUN SERPL-MCNC: 11 MG/DL — SIGNIFICANT CHANGE UP (ref 8–20)
BUN SERPL-MCNC: 12 MG/DL — SIGNIFICANT CHANGE UP (ref 8–20)
CALCIUM SERPL-MCNC: 8.9 MG/DL — SIGNIFICANT CHANGE UP (ref 8.6–10.2)
CALCIUM SERPL-MCNC: 9.4 MG/DL — SIGNIFICANT CHANGE UP (ref 8.6–10.2)
CHLORIDE SERPL-SCNC: 101 MMOL/L — SIGNIFICANT CHANGE UP (ref 98–107)
CHLORIDE SERPL-SCNC: 102 MMOL/L — SIGNIFICANT CHANGE UP (ref 98–107)
CO2 SERPL-SCNC: 22 MMOL/L — SIGNIFICANT CHANGE UP (ref 22–29)
CO2 SERPL-SCNC: 23 MMOL/L — SIGNIFICANT CHANGE UP (ref 22–29)
CREAT SERPL-MCNC: 0.4 MG/DL — LOW (ref 0.5–1.3)
CREAT SERPL-MCNC: 0.42 MG/DL — LOW (ref 0.5–1.3)
GLUCOSE BLDC GLUCOMTR-MCNC: 151 MG/DL — HIGH (ref 70–99)
GLUCOSE BLDC GLUCOMTR-MCNC: 159 MG/DL — HIGH (ref 70–99)
GLUCOSE BLDC GLUCOMTR-MCNC: 98 MG/DL — SIGNIFICANT CHANGE UP (ref 70–99)
GLUCOSE BLDC GLUCOMTR-MCNC: 99 MG/DL — SIGNIFICANT CHANGE UP (ref 70–99)
GLUCOSE SERPL-MCNC: 121 MG/DL — HIGH (ref 70–115)
GLUCOSE SERPL-MCNC: 180 MG/DL — HIGH (ref 70–115)
HCT VFR BLD CALC: 38.1 % — SIGNIFICANT CHANGE UP (ref 34.5–45)
HGB BLD-MCNC: 11.5 G/DL — SIGNIFICANT CHANGE UP (ref 11.5–15.5)
INR BLD: 1.43 RATIO — HIGH (ref 0.88–1.16)
MAGNESIUM SERPL-MCNC: 1.7 MG/DL — SIGNIFICANT CHANGE UP (ref 1.6–2.6)
MAGNESIUM SERPL-MCNC: 1.8 MG/DL — SIGNIFICANT CHANGE UP (ref 1.6–2.6)
MCHC RBC-ENTMCNC: 28.5 PG — SIGNIFICANT CHANGE UP (ref 27–34)
MCHC RBC-ENTMCNC: 30.2 GM/DL — LOW (ref 32–36)
MCV RBC AUTO: 94.3 FL — SIGNIFICANT CHANGE UP (ref 80–100)
PHOSPHATE SERPL-MCNC: 4.9 MG/DL — HIGH (ref 2.4–4.7)
PLATELET # BLD AUTO: 290 K/UL — SIGNIFICANT CHANGE UP (ref 150–400)
POTASSIUM SERPL-MCNC: 4 MMOL/L — SIGNIFICANT CHANGE UP (ref 3.5–5.3)
POTASSIUM SERPL-MCNC: 4.5 MMOL/L — SIGNIFICANT CHANGE UP (ref 3.5–5.3)
POTASSIUM SERPL-SCNC: 4 MMOL/L — SIGNIFICANT CHANGE UP (ref 3.5–5.3)
POTASSIUM SERPL-SCNC: 4.5 MMOL/L — SIGNIFICANT CHANGE UP (ref 3.5–5.3)
PROTHROM AB SERPL-ACNC: 16.6 SEC — HIGH (ref 10–12.9)
RBC # BLD: 4.04 M/UL — SIGNIFICANT CHANGE UP (ref 3.8–5.2)
RBC # FLD: 15.5 % — HIGH (ref 10.3–14.5)
SODIUM SERPL-SCNC: 137 MMOL/L — SIGNIFICANT CHANGE UP (ref 135–145)
SODIUM SERPL-SCNC: 139 MMOL/L — SIGNIFICANT CHANGE UP (ref 135–145)
WBC # BLD: 8.36 K/UL — SIGNIFICANT CHANGE UP (ref 3.8–10.5)
WBC # FLD AUTO: 8.36 K/UL — SIGNIFICANT CHANGE UP (ref 3.8–10.5)

## 2019-12-28 PROCEDURE — 71045 X-RAY EXAM CHEST 1 VIEW: CPT | Mod: 26

## 2019-12-28 PROCEDURE — 99222 1ST HOSP IP/OBS MODERATE 55: CPT

## 2019-12-28 PROCEDURE — 99232 SBSQ HOSP IP/OBS MODERATE 35: CPT

## 2019-12-28 PROCEDURE — 99233 SBSQ HOSP IP/OBS HIGH 50: CPT

## 2019-12-28 RX ORDER — WARFARIN SODIUM 2.5 MG/1
7.5 TABLET ORAL ONCE
Refills: 0 | Status: COMPLETED | OUTPATIENT
Start: 2019-12-28 | End: 2019-12-28

## 2019-12-28 RX ORDER — I.V. FAT EMULSION 20 G/100ML
0.91 EMULSION INTRAVENOUS
Qty: 50 | Refills: 0 | Status: DISCONTINUED | OUTPATIENT
Start: 2019-12-28 | End: 2019-12-28

## 2019-12-28 RX ORDER — MAGNESIUM SULFATE 500 MG/ML
2 VIAL (ML) INJECTION ONCE
Refills: 0 | Status: COMPLETED | OUTPATIENT
Start: 2019-12-28 | End: 2019-12-28

## 2019-12-28 RX ORDER — ELECTROLYTE SOLUTION,INJ
1 VIAL (ML) INTRAVENOUS
Refills: 0 | Status: DISCONTINUED | OUTPATIENT
Start: 2019-12-28 | End: 2019-12-28

## 2019-12-28 RX ADMIN — Medication 10 MILLIGRAM(S): at 21:36

## 2019-12-28 RX ADMIN — WARFARIN SODIUM 7.5 MILLIGRAM(S): 2.5 TABLET ORAL at 21:37

## 2019-12-28 RX ADMIN — Medication 0.25 MILLIGRAM(S): at 01:17

## 2019-12-28 RX ADMIN — OCTREOTIDE ACETATE 100 MICROGRAM(S): 200 INJECTION, SOLUTION INTRAVENOUS; SUBCUTANEOUS at 11:07

## 2019-12-28 RX ADMIN — Medication 0.25 MILLIGRAM(S): at 21:45

## 2019-12-28 RX ADMIN — PANTOPRAZOLE SODIUM 40 MILLIGRAM(S): 20 TABLET, DELAYED RELEASE ORAL at 04:35

## 2019-12-28 RX ADMIN — SIMETHICONE 80 MILLIGRAM(S): 80 TABLET, CHEWABLE ORAL at 11:02

## 2019-12-28 RX ADMIN — HEPARIN SODIUM 1300 UNIT(S)/HR: 5000 INJECTION INTRAVENOUS; SUBCUTANEOUS at 09:14

## 2019-12-28 RX ADMIN — OCTREOTIDE ACETATE 100 MICROGRAM(S): 200 INJECTION, SOLUTION INTRAVENOUS; SUBCUTANEOUS at 21:38

## 2019-12-28 RX ADMIN — ESCITALOPRAM OXALATE 10 MILLIGRAM(S): 10 TABLET, FILM COATED ORAL at 11:02

## 2019-12-28 RX ADMIN — I.V. FAT EMULSION 31.25 GM/KG/DAY: 20 EMULSION INTRAVENOUS at 18:13

## 2019-12-28 RX ADMIN — METHOCARBAMOL 500 MILLIGRAM(S): 500 TABLET, FILM COATED ORAL at 12:20

## 2019-12-28 RX ADMIN — Medication 650 MILLIGRAM(S): at 11:08

## 2019-12-28 RX ADMIN — MEROPENEM 100 MILLIGRAM(S): 1 INJECTION INTRAVENOUS at 01:17

## 2019-12-28 RX ADMIN — Medication 650 MILLIGRAM(S): at 04:35

## 2019-12-28 RX ADMIN — MEROPENEM 100 MILLIGRAM(S): 1 INJECTION INTRAVENOUS at 16:56

## 2019-12-28 RX ADMIN — Medication 650 MILLIGRAM(S): at 05:35

## 2019-12-28 RX ADMIN — Medication 650 MILLIGRAM(S): at 12:00

## 2019-12-28 RX ADMIN — MEROPENEM 100 MILLIGRAM(S): 1 INJECTION INTRAVENOUS at 09:16

## 2019-12-28 RX ADMIN — GABAPENTIN 100 MILLIGRAM(S): 400 CAPSULE ORAL at 11:07

## 2019-12-28 RX ADMIN — Medication 325 MILLIGRAM(S): at 21:36

## 2019-12-28 RX ADMIN — Medication 50 GRAM(S): at 11:03

## 2019-12-28 RX ADMIN — OCTREOTIDE ACETATE 100 MICROGRAM(S): 200 INJECTION, SOLUTION INTRAVENOUS; SUBCUTANEOUS at 04:34

## 2019-12-28 RX ADMIN — Medication 325 MILLIGRAM(S): at 04:34

## 2019-12-28 RX ADMIN — Medication 2000 UNIT(S): at 11:02

## 2019-12-28 RX ADMIN — Medication 50 MILLIGRAM(S): at 21:36

## 2019-12-28 RX ADMIN — Medication 1 GRAM(S): at 16:56

## 2019-12-28 RX ADMIN — GABAPENTIN 100 MILLIGRAM(S): 400 CAPSULE ORAL at 04:35

## 2019-12-28 RX ADMIN — GABAPENTIN 100 MILLIGRAM(S): 400 CAPSULE ORAL at 21:37

## 2019-12-28 RX ADMIN — Medication 325 MILLIGRAM(S): at 11:02

## 2019-12-28 RX ADMIN — Medication 1 GRAM(S): at 04:35

## 2019-12-28 RX ADMIN — METHOCARBAMOL 500 MILLIGRAM(S): 500 TABLET, FILM COATED ORAL at 04:35

## 2019-12-28 RX ADMIN — Medication 1 EACH: at 18:13

## 2019-12-28 NOTE — PROGRESS NOTE ADULT - SUBJECTIVE AND OBJECTIVE BOX
HEALTH ISSUES - PROBLEM Dx:    ac pancreatitis with Pancreaticopleural fistula, group A hemolytic strep pl effusion, severe malnutrition, IJ thrombus, freq NSVTs    INTERVAL HPI/ OVERNIGHT EVENTS:    pt looking markedly improved in appearance, countenance, interaction since admission  has been on bowel rest since hospitalization with clears only  now asking for food    REVIEW OF SYSTEMS:    denies sob, cough fever, abd pain, n/v    Vital Signs Last 24 Hrs  T(C): 36.7 (28 Dec 2019 07:55), Max: 36.9 (27 Dec 2019 21:37)  T(F): 98 (28 Dec 2019 07:55), Max: 98.5 (27 Dec 2019 21:37)  HR: 116 (28 Dec 2019 11:16) (104 - 116)  BP: 117/86 (28 Dec 2019 11:16) (104/69 - 126/84)  BP(mean): --  RR: 18 (28 Dec 2019 11:16) (17 - 18)  SpO2: 98% (28 Dec 2019 11:16) (97% - 98%)    PHYSICAL EXAM-  General: NAD, Alert  ENT: MMM, no thrush  Neck: Supple, No JVD  Lungs: non labored breathing; +chest wound vac good seal C/D/I  Cardio: +s1/s2  Abdomen: Soft, Nontender, Nondistended; Bowel sounds present  Extremities: No calf tenderness    MEDICATIONS  (STANDING):  amitriptyline 10 milliGRAM(s) Oral at bedtime  chlorhexidine 2% Cloths 1 Application(s) Topical <User Schedule>  chlorhexidine 4% Liquid 1 Application(s) Topical <User Schedule>  cholecalciferol 2000 Unit(s) Oral daily  dextrose 10%. 1000 milliLiter(s) (100 mL/Hr) IV Continuous <Continuous>  escitalopram 10 milliGRAM(s) Oral daily  fat emulsion (Plant Based) 20% Infusion 0.91 Gm/kG/Day (31.25 mL/Hr) IV Continuous <Continuous>  ferrous    sulfate 325 milliGRAM(s) Oral three times a day  gabapentin 100 milliGRAM(s) Oral three times a day  heparin  Infusion. 1300 Unit(s)/Hr (13 mL/Hr) IV Continuous <Continuous>  lidocaine   Patch 1 Patch Transdermal daily  meropenem  IVPB 1000 milliGRAM(s) IV Intermittent every 8 hours  octreotide  Injectable 100 MICROGram(s) SubCutaneous every 8 hours  pantoprazole    Tablet 40 milliGRAM(s) Oral before breakfast  Parenteral Nutrition - Adult 1 Each (83 mL/Hr) TPN Continuous <Continuous>  Parenteral Nutrition - Adult 1 Each (83 mL/Hr) TPN Continuous <Continuous>  sucralfate 1 Gram(s) Oral two times a day  traZODone 50 milliGRAM(s) Oral at bedtime  warfarin 7.5 milliGRAM(s) Oral once    MEDICATIONS  (PRN):  acetaminophen   Tablet .. 650 milliGRAM(s) Oral every 6 hours PRN Mild Pain (1 - 3)  ALPRAZolam 0.25 milliGRAM(s) Oral three times a day PRN anxiety  heparin  Injectable 4000 Unit(s) IV Push every 6 hours PRN For aPTT less than 40  heparin  Injectable 2000 Unit(s) IV Push every 6 hours PRN For aPTT between 40 - 57  melatonin 3 milliGRAM(s) Oral at bedtime PRN Insomnia  methocarbamol 500 milliGRAM(s) Oral every 6 hours PRN Muscle Spasm  ondansetron Injectable 4 milliGRAM(s) IV Push every 4 hours PRN Nausea and/or Vomiting  simethicone 80 milliGRAM(s) Chew three times a day PRN Gas  sodium chloride 0.9% lock flush 10 milliLiter(s) IV Push every 1 hour PRN Pre/post blood products, medications, blood draw, and to maintain line patency      LABS:                        11.5   8.36  )-----------( 290      ( 28 Dec 2019 08:24 )             38.1     12-28    139  |  102  |  12.0  ----------------------------<  180<H>  4.0   |  23.0  |  0.42<L>    Ca    9.4      28 Dec 2019 08:24  Phos  4.9     12-28  Mg     1.7     12-28    TPro  7.0  /  Alb  3.1<L>  /  TBili  <0.2<L>  /  DBili  x   /  AST  <5  /  ALT  11  /  AlkPhos  363<H>  12-27    PT/INR - ( 28 Dec 2019 08:24 )   PT: 16.6 sec;   INR: 1.43 ratio         PTT - ( 28 Dec 2019 08:24 )  PTT:80.9 sec

## 2019-12-28 NOTE — CHART NOTE - NSCHARTNOTEFT_GEN_A_CORE
Source: Patient [x ]  Family [ ]   other [x ]EMR    Current Diet: clears with TPN      PO intake:  < 50% [ ]   50-75%  [x ]   %  [ ]  other     Source for PO intake [x ] Patient [ ] family [ x] chart [ ] staff [ ] other    Enteral /Parenteral Nutrition: PN providing a total of 2200 Kcal and 85gm protein     Current Weight:   (12/16)  134#, 12/20 122#, 123# 12/22, 12/27 97#, 12/25 121#  (12/7) 123#  (12/6) 121#  (12/5) 119#  (12/1) 114#  (11/30) 127#          Pertinent Medications: MEDICATIONS  (STANDING):  amitriptyline 10 milliGRAM(s) Oral at bedtime  chlorhexidine 2% Cloths 1 Application(s) Topical <User Schedule>  chlorhexidine 4% Liquid 1 Application(s) Topical <User Schedule>  cholecalciferol 2000 Unit(s) Oral daily  dextrose 10%. 1000 milliLiter(s) (100 mL/Hr) IV Continuous <Continuous>  escitalopram 10 milliGRAM(s) Oral daily  fat emulsion (Plant Based) 20% Infusion 0.91 Gm/kG/Day (31.25 mL/Hr) IV Continuous <Continuous>  ferrous    sulfate 325 milliGRAM(s) Oral three times a day  gabapentin 100 milliGRAM(s) Oral three times a day  heparin  Infusion. 1300 Unit(s)/Hr (13 mL/Hr) IV Continuous <Continuous>  lidocaine   Patch 1 Patch Transdermal daily  meropenem  IVPB 1000 milliGRAM(s) IV Intermittent every 8 hours  octreotide  Injectable 100 MICROGram(s) SubCutaneous every 8 hours  pantoprazole    Tablet 40 milliGRAM(s) Oral before breakfast  Parenteral Nutrition - Adult 1 Each (83 mL/Hr) TPN Continuous <Continuous>  Parenteral Nutrition - Adult 1 Each (83 mL/Hr) TPN Continuous <Continuous>  sucralfate 1 Gram(s) Oral two times a day  traZODone 50 milliGRAM(s) Oral at bedtime  warfarin 7.5 milliGRAM(s) Oral once    MEDICATIONS  (PRN):  acetaminophen   Tablet .. 650 milliGRAM(s) Oral every 6 hours PRN Mild Pain (1 - 3)  ALPRAZolam 0.25 milliGRAM(s) Oral three times a day PRN anxiety  heparin  Injectable 4000 Unit(s) IV Push every 6 hours PRN For aPTT less than 40  heparin  Injectable 2000 Unit(s) IV Push every 6 hours PRN For aPTT between 40 - 57  melatonin 3 milliGRAM(s) Oral at bedtime PRN Insomnia  methocarbamol 500 milliGRAM(s) Oral every 6 hours PRN Muscle Spasm  ondansetron Injectable 4 milliGRAM(s) IV Push every 4 hours PRN Nausea and/or Vomiting  simethicone 80 milliGRAM(s) Chew three times a day PRN Gas  sodium chloride 0.9% lock flush 10 milliLiter(s) IV Push every 1 hour PRN Pre/post blood products, medications, blood draw, and to maintain line patency    Pertinent Labs: CBC Full  -  ( 28 Dec 2019 08:24 )  WBC Count : 8.36 K/uL  RBC Count : 4.04 M/uL  Hemoglobin : 11.5 g/dL  Hematocrit : 38.1 %  Platelet Count - Automated : 290 K/uL  Mean Cell Volume : 94.3 fl  Mean Cell Hemoglobin : 28.5 pg  Mean Cell Hemoglobin Concentration : 30.2 gm/dL  Auto Neutrophil # : x  Auto Lymphocyte # : x  Auto Monocyte # : x  Auto Eosinophil # : x  Auto Basophil # : x  Auto Neutrophil % : x  Auto Lymphocyte % : x  Auto Monocyte % : x  Auto Eosinophil % : x  Auto Basophil % : x    12-28 Na139 mmol/L Glu 180 mg/dL<H> K+ 4.0 mmol/L Cr  0.42 mg/dL<L> BUN 12.0 mg/dL Phos 4.9 mg/dL<H> Alb n/a   PAB n/a             Skin:     Nutrition focused physical exam conducted - found signs of malnutrition [ ]absent [ x]present    Subcutaneous fat loss: [x ] Orbital fat pads region, [ ]Buccal fat region, [ ]Triceps region,  [ ]Ribs region    Muscle wasting: [x ]Temples region, [x ]Clavicle region, [ x]Shoulder region, [ ]Scapula region, [ ]Interosseous region,  [ ]thigh region, [ ]Calf region    Estimated Needs:   [x ] no change since previous assessment  [ ] recalculated:     Current Nutrition Diagnosis: 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 and Multifocal PNA  OCtreotide/ TPN started by GI with pt on clears PO, Heme- transitioned to Coumadin, 12/9/19 PICC line placed.  Pt remains at high nutrition risk secondary to Severe-acute protein calorie malnutrition related to inadequate protein energy intake with decreased appetite and altered GI function in setting of large right pleural effusion, s/p Right VATS decortication & washout, empyema and now with findings consistent with pancreatitis as evidenced by pt with previous 25# unintentional wt loss (17%) x3 months, previously meeting <50% estimated energy intake > 7 days.  Pt also with mild muscle wasting and mild fat loss and now with +fluid accumulation (now improved). Pt continues on clear liquid diet, and receiving TPN (which provides 2200kcal, 85gm protein) when receiving lipids. Pt to cont wound vac per thoracic surgery team.   Pt reports wanting more solid food.      Recommendations:   1. Continue with TPN at this time   2. Check wt daily to monitor trends   3. Check TG weekly while on TPN   4. Encourage clears and advance as tolerated     Monitoring and Evaluation:   [x ] PO intake [ x] Tolerance to diet prescription [X] Weights  [X] Follow up per protocol [X] Labs:

## 2019-12-28 NOTE — PROGRESS NOTE ADULT - ASSESSMENT
Patient with pancreatitis, pancreatic fistula. On TPN and octreotide. Doing well. TPN reordered. MRI with contrast to be done early next week. If doing ok, will advance diet. Discussed at length with the patient

## 2019-12-28 NOTE — PROGRESS NOTE ADULT - ASSESSMENT
34 year old woman with no known major comorbidities (had not gone to see a doctor for 15 years) who presented to [facility] on 11/22/2019 with progressive sob, weakness, weight loss, She was found to have a large pleural effusion. Initially 1.5 L of dark serous fluid was removed. Chest tube placed shortly thereafter, 3 additional liters was removed. pleural fluid cultures + for group A hemolytic strep,CBC showed a leukocytosis of 40k and CTA imaging revealed a 2.1cm partially cystic mass in the body/tail of the pancreas with possible upstream ductal dilatation. On 11/23/2019 the patient had a CT chest/abdomen/pelvis that found to have a clot in the right IJ and was started on a heparin gtt   s/p right VATS decort & washout debridement of CT site & VAC placement on 11/27/19  Persistent drainage from chest tube site so repeat CT C/A/P performed 12/4 which showed new cystic mass in pancreas and liver concerning for pancreatitis  Zosyn changed to meropenem on 12/6  for broader coverage given pancreatitis  Copious drainage from anterior chest tube site, so VAC removed and ostomy appliance applied for high output on 12/6.   12/7 Right chest tube removed.   Repeat CT scan showed a persistent Right hydropneumothorax with air anteriorly  12/8 Ostomy appliance removed and dressing applied.   12/9 s.p PICC placement & Right anterior pig tail catheter & right lateral wound vac placement by thoracic surgery   MRCP significant for Peripancreatic fluid and edema consistent with pancreatitis. Cystic lesions in the pancreatic head with the largest measuring 2.1 cm, likely acute peripancreatic collections, increased in size since 12/4/2019, with a tract of fluid extending from the 2.1 cm cystic lesion into the mediastinum and towards the pleural space on the right suspicious for a pancreatic pleural fistula.  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 and Multifocal PNA  OCtreotide/ TPN started by GI with pt being on clears PO, Heme- transitioned to Coumadin with  iv heparin bridging   12/9/19 PICC line placed,H  rt leg swelling- no DVT    # Rt pl effusion/ Empyema  - being followed by Thoracic and ID, chest wound vac in place   - c/w Abx as per ID   - cont wound vac per thoracic surgery team   - rpt imaging with stable small effusion without change    # sinus tachycardia with freq NSVT on monitor in past 24 hrs  - replace Mg to keep level >2  - cardio f/u requested today  - patient asymptomatic    #Acute pancreatitis and pancreatic cyst with pancreaticopleural fistula  - D/W GI- to cont bowel rest for about 4-6 weeks total time. Since > 4 weeks now plan to repeat MRI on Monday to assess fistula. and then make decisions about diet  - GI order TPN daily   - cont clear diet     #  Hyperphosphatemia  - to be adjusted with TPN today by GI    #Loose stools sec to being on clear liq diet  - GI PCR negative    #Rt IJ thrombus  - Heparin bridging with Coumadin until INR therapeutic  - cont daily INR and warfarin order   - coumadin for 3 month per hematology     #Anemia of chronic disease  - s/p multiple transfusions   - hb stable     #insomnia/ anxiety   - trazadone and melatonin    #severe protein calorie malnutrition  on nutritional supplements and TPN    #DVT prophylaxis  -coumadin 34 year old woman with no known major comorbidities (had not gone to see a doctor for 15 years) who presented to [facility] on 11/22/2019 with progressive sob, weakness, weight loss, She was found to have a large pleural effusion. Initially 1.5 L of dark serous fluid was removed. Chest tube placed shortly thereafter, 3 additional liters was removed. pleural fluid cultures + for group A hemolytic strep,CBC showed a leukocytosis of 40k and CTA imaging revealed a 2.1cm partially cystic mass in the body/tail of the pancreas with possible upstream ductal dilatation. On 11/23/2019 the patient had a CT chest/abdomen/pelvis that found to have a clot in the right IJ and was started on a heparin gtt   s/p right VATS decort & washout debridement of CT site & VAC placement on 11/27/19  Persistent drainage from chest tube site so repeat CT C/A/P performed 12/4 which showed new cystic mass in pancreas and liver concerning for pancreatitis  Zosyn changed to meropenem on 12/6  for broader coverage given pancreatitis  Copious drainage from anterior chest tube site, so VAC removed and ostomy appliance applied for high output on 12/6.   12/7 Right chest tube removed.   Repeat CT scan showed a persistent Right hydropneumothorax with air anteriorly  12/8 Ostomy appliance removed and dressing applied.   12/9 s.p PICC placement & Right anterior pig tail catheter & right lateral wound vac placement by thoracic surgery   MRCP significant for Peripancreatic fluid and edema consistent with pancreatitis. Cystic lesions in the pancreatic head with the largest measuring 2.1 cm, likely acute peripancreatic collections, increased in size since 12/4/2019, with a tract of fluid extending from the 2.1 cm cystic lesion into the mediastinum and towards the pleural space on the right suspicious for a pancreatic pleural fistula.  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 and Multifocal PNA  OCtreotide/ TPN started by GI with pt being on clears PO, Heme- transitioned to Coumadin with  iv heparin bridging   12/9/19 PICC line placed,H  rt leg swelling- no DVT    # Rt pl effusion/ Empyema  - being followed by Thoracic and ID, chest wound vac in place   - c/w Abx as per ID   - cont wound vac per thoracic surgery team   - rpt imaging with stable small effusion without change    # sinus tachycardia with freq NSVT on monitor in past 24 hrs  - replace Mg to keep level >2  - cardio f/u requested today  - patient asymptomatic  - TFTs ordered  - if persistent will do CTA in AM. pt already on full A/C  - rpt limited ECHO per cardiology    #Acute pancreatitis and pancreatic cyst with pancreaticopleural fistula  - D/W GI- to cont bowel rest for about 4-6 weeks total time. Since > 4 weeks now plan to repeat MRI on Monday to assess fistula. and then make decisions about diet  - GI order TPN daily   - cont clear diet     #  Hyperphosphatemia  - to be adjusted with TPN today by GI    #Loose stools sec to being on clear liq diet  - GI PCR negative    #Rt IJ thrombus  - Heparin bridging with Coumadin until INR therapeutic  - cont daily INR and warfarin order   - coumadin for 3 month per hematology     #Anemia of chronic disease  - s/p multiple transfusions   - hb stable     #insomnia/ anxiety   - trazadone and melatonin    #severe protein calorie malnutrition  on nutritional supplements and TPN    #DVT prophylaxis  -coumadin

## 2019-12-28 NOTE — PROGRESS NOTE ADULT - SUBJECTIVE AND OBJECTIVE BOX
INTERVAL HPI/OVERNIGHT EVENTS:FU for TPN, pancreatic fistula. No specific complaints. Eager to advance diet    MEDICATIONS  (STANDING):  amitriptyline 10 milliGRAM(s) Oral at bedtime  chlorhexidine 2% Cloths 1 Application(s) Topical <User Schedule>  chlorhexidine 4% Liquid 1 Application(s) Topical <User Schedule>  cholecalciferol 2000 Unit(s) Oral daily  dextrose 10%. 1000 milliLiter(s) (100 mL/Hr) IV Continuous <Continuous>  escitalopram 10 milliGRAM(s) Oral daily  fat emulsion (Plant Based) 20% Infusion 0.91 Gm/kG/Day (31.25 mL/Hr) IV Continuous <Continuous>  ferrous    sulfate 325 milliGRAM(s) Oral three times a day  gabapentin 100 milliGRAM(s) Oral three times a day  heparin  Infusion. 1300 Unit(s)/Hr (13 mL/Hr) IV Continuous <Continuous>  lidocaine   Patch 1 Patch Transdermal daily  meropenem  IVPB 1000 milliGRAM(s) IV Intermittent every 8 hours  octreotide  Injectable 100 MICROGram(s) SubCutaneous every 8 hours  pantoprazole    Tablet 40 milliGRAM(s) Oral before breakfast  Parenteral Nutrition - Adult 1 Each (83 mL/Hr) TPN Continuous <Continuous>  Parenteral Nutrition - Adult 1 Each (83 mL/Hr) TPN Continuous <Continuous>  sucralfate 1 Gram(s) Oral two times a day  traZODone 50 milliGRAM(s) Oral at bedtime  warfarin 7.5 milliGRAM(s) Oral once    MEDICATIONS  (PRN):  acetaminophen   Tablet .. 650 milliGRAM(s) Oral every 6 hours PRN Mild Pain (1 - 3)  ALPRAZolam 0.25 milliGRAM(s) Oral three times a day PRN anxiety  heparin  Injectable 4000 Unit(s) IV Push every 6 hours PRN For aPTT less than 40  heparin  Injectable 2000 Unit(s) IV Push every 6 hours PRN For aPTT between 40 - 57  melatonin 3 milliGRAM(s) Oral at bedtime PRN Insomnia  methocarbamol 500 milliGRAM(s) Oral every 6 hours PRN Muscle Spasm  ondansetron Injectable 4 milliGRAM(s) IV Push every 4 hours PRN Nausea and/or Vomiting  simethicone 80 milliGRAM(s) Chew three times a day PRN Gas  sodium chloride 0.9% lock flush 10 milliLiter(s) IV Push every 1 hour PRN Pre/post blood products, medications, blood draw, and to maintain line patency      Allergies    No Known Allergies    Intolerances    pt requests chicken broth (no beef) and CHERRY italian ice (no lemon) (Unknown)      Vital Signs Last 24 Hrs  T(C): 37 (28 Dec 2019 15:30), Max: 37 (28 Dec 2019 15:30)  T(F): 98.6 (28 Dec 2019 15:30), Max: 98.6 (28 Dec 2019 15:30)  HR: 112 (28 Dec 2019 15:30) (105 - 116)  BP: 126/82 (28 Dec 2019 15:30) (104/69 - 126/84)  BP(mean): --  RR: 17 (28 Dec 2019 15:30) (17 - 18)  SpO2: 98% (28 Dec 2019 15:30) (97% - 98%)    LABS:                        11.5   8.36  )-----------( 290      ( 28 Dec 2019 08:24 )             38.1     12-28    139  |  102  |  12.0  ----------------------------<  180<H>  4.0   |  23.0  |  0.42<L>    Ca    9.4      28 Dec 2019 08:24  Phos  4.9     12-28  Mg     1.7     12-28    TPro  7.0  /  Alb  3.1<L>  /  TBili  <0.2<L>  /  DBili  x   /  AST  <5  /  ALT  11  /  AlkPhos  363<H>  12-27    PT/INR - ( 28 Dec 2019 08:24 )   PT: 16.6 sec;   INR: 1.43 ratio         PTT - ( 28 Dec 2019 08:24 )  PTT:80.9 sec      RADIOLOGY & ADDITIONAL TESTS:

## 2019-12-29 LAB
ANION GAP SERPL CALC-SCNC: 12 MMOL/L — SIGNIFICANT CHANGE UP (ref 5–17)
ANION GAP SERPL CALC-SCNC: 13 MMOL/L — SIGNIFICANT CHANGE UP (ref 5–17)
ANION GAP SERPL CALC-SCNC: 13 MMOL/L — SIGNIFICANT CHANGE UP (ref 5–17)
APTT BLD: 134.2 SEC — CRITICAL HIGH (ref 27.5–36.3)
APTT BLD: 65.4 SEC — HIGH (ref 27.5–36.3)
BUN SERPL-MCNC: 10 MG/DL — SIGNIFICANT CHANGE UP (ref 8–20)
BUN SERPL-MCNC: 10 MG/DL — SIGNIFICANT CHANGE UP (ref 8–20)
BUN SERPL-MCNC: 9 MG/DL — SIGNIFICANT CHANGE UP (ref 8–20)
CALCIUM SERPL-MCNC: 8.7 MG/DL — SIGNIFICANT CHANGE UP (ref 8.6–10.2)
CALCIUM SERPL-MCNC: 8.9 MG/DL — SIGNIFICANT CHANGE UP (ref 8.6–10.2)
CALCIUM SERPL-MCNC: 9.2 MG/DL — SIGNIFICANT CHANGE UP (ref 8.6–10.2)
CHLORIDE SERPL-SCNC: 104 MMOL/L — SIGNIFICANT CHANGE UP (ref 98–107)
CHLORIDE SERPL-SCNC: 105 MMOL/L — SIGNIFICANT CHANGE UP (ref 98–107)
CHLORIDE SERPL-SCNC: 106 MMOL/L — SIGNIFICANT CHANGE UP (ref 98–107)
CO2 SERPL-SCNC: 22 MMOL/L — SIGNIFICANT CHANGE UP (ref 22–29)
CO2 SERPL-SCNC: 23 MMOL/L — SIGNIFICANT CHANGE UP (ref 22–29)
CO2 SERPL-SCNC: 24 MMOL/L — SIGNIFICANT CHANGE UP (ref 22–29)
CREAT SERPL-MCNC: 0.32 MG/DL — LOW (ref 0.5–1.3)
CREAT SERPL-MCNC: 0.33 MG/DL — LOW (ref 0.5–1.3)
CREAT SERPL-MCNC: 0.34 MG/DL — LOW (ref 0.5–1.3)
GLUCOSE BLDC GLUCOMTR-MCNC: 105 MG/DL — HIGH (ref 70–99)
GLUCOSE BLDC GLUCOMTR-MCNC: 131 MG/DL — HIGH (ref 70–99)
GLUCOSE BLDC GLUCOMTR-MCNC: 146 MG/DL — HIGH (ref 70–99)
GLUCOSE SERPL-MCNC: 122 MG/DL — HIGH (ref 70–115)
GLUCOSE SERPL-MCNC: 164 MG/DL — HIGH (ref 70–115)
GLUCOSE SERPL-MCNC: 181 MG/DL — HIGH (ref 70–115)
INR BLD: 1.5 RATIO — HIGH (ref 0.88–1.16)
MAGNESIUM SERPL-MCNC: 1.7 MG/DL — SIGNIFICANT CHANGE UP (ref 1.6–2.6)
MAGNESIUM SERPL-MCNC: 2.4 MG/DL — SIGNIFICANT CHANGE UP (ref 1.6–2.6)
PHOSPHATE SERPL-MCNC: 3.3 MG/DL — SIGNIFICANT CHANGE UP (ref 2.4–4.7)
PHOSPHATE SERPL-MCNC: 4.1 MG/DL — SIGNIFICANT CHANGE UP (ref 2.4–4.7)
POTASSIUM SERPL-MCNC: 3.9 MMOL/L — SIGNIFICANT CHANGE UP (ref 3.5–5.3)
POTASSIUM SERPL-MCNC: 3.9 MMOL/L — SIGNIFICANT CHANGE UP (ref 3.5–5.3)
POTASSIUM SERPL-MCNC: 4.4 MMOL/L — SIGNIFICANT CHANGE UP (ref 3.5–5.3)
POTASSIUM SERPL-SCNC: 3.9 MMOL/L — SIGNIFICANT CHANGE UP (ref 3.5–5.3)
POTASSIUM SERPL-SCNC: 3.9 MMOL/L — SIGNIFICANT CHANGE UP (ref 3.5–5.3)
POTASSIUM SERPL-SCNC: 4.4 MMOL/L — SIGNIFICANT CHANGE UP (ref 3.5–5.3)
PROTHROM AB SERPL-ACNC: 17.5 SEC — HIGH (ref 10–12.9)
SODIUM SERPL-SCNC: 140 MMOL/L — SIGNIFICANT CHANGE UP (ref 135–145)
SODIUM SERPL-SCNC: 140 MMOL/L — SIGNIFICANT CHANGE UP (ref 135–145)
SODIUM SERPL-SCNC: 142 MMOL/L — SIGNIFICANT CHANGE UP (ref 135–145)
T3 SERPL-MCNC: 147 NG/DL — SIGNIFICANT CHANGE UP (ref 80–200)
T4 AB SER-ACNC: 5.4 UG/DL — SIGNIFICANT CHANGE UP (ref 4.5–12)
TSH SERPL-MCNC: 0.78 UIU/ML — SIGNIFICANT CHANGE UP (ref 0.27–4.2)

## 2019-12-29 PROCEDURE — 99232 SBSQ HOSP IP/OBS MODERATE 35: CPT

## 2019-12-29 RX ORDER — I.V. FAT EMULSION 20 G/100ML
0.9 EMULSION INTRAVENOUS
Qty: 50 | Refills: 0 | Status: DISCONTINUED | OUTPATIENT
Start: 2019-12-29 | End: 2019-12-29

## 2019-12-29 RX ORDER — POTASSIUM CHLORIDE 20 MEQ
10 PACKET (EA) ORAL
Refills: 0 | Status: COMPLETED | OUTPATIENT
Start: 2019-12-29 | End: 2019-12-29

## 2019-12-29 RX ORDER — HEPARIN SODIUM 5000 [USP'U]/ML
1100 INJECTION INTRAVENOUS; SUBCUTANEOUS
Qty: 25000 | Refills: 0 | Status: DISCONTINUED | OUTPATIENT
Start: 2019-12-29 | End: 2020-01-04

## 2019-12-29 RX ORDER — HEPARIN SODIUM 5000 [USP'U]/ML
2000 INJECTION INTRAVENOUS; SUBCUTANEOUS EVERY 6 HOURS
Refills: 0 | Status: DISCONTINUED | OUTPATIENT
Start: 2019-12-29 | End: 2020-01-04

## 2019-12-29 RX ORDER — ALPRAZOLAM 0.25 MG
0.25 TABLET ORAL THREE TIMES A DAY
Refills: 0 | Status: DISCONTINUED | OUTPATIENT
Start: 2019-12-29 | End: 2020-01-05

## 2019-12-29 RX ORDER — MAGNESIUM SULFATE 500 MG/ML
2 VIAL (ML) INJECTION ONCE
Refills: 0 | Status: COMPLETED | OUTPATIENT
Start: 2019-12-29 | End: 2019-12-29

## 2019-12-29 RX ORDER — WARFARIN SODIUM 2.5 MG/1
7.5 TABLET ORAL ONCE
Refills: 0 | Status: COMPLETED | OUTPATIENT
Start: 2019-12-29 | End: 2019-12-29

## 2019-12-29 RX ORDER — HEPARIN SODIUM 5000 [USP'U]/ML
4000 INJECTION INTRAVENOUS; SUBCUTANEOUS EVERY 6 HOURS
Refills: 0 | Status: DISCONTINUED | OUTPATIENT
Start: 2019-12-29 | End: 2020-01-04

## 2019-12-29 RX ORDER — ELECTROLYTE SOLUTION,INJ
1 VIAL (ML) INTRAVENOUS
Refills: 0 | Status: DISCONTINUED | OUTPATIENT
Start: 2019-12-29 | End: 2019-12-29

## 2019-12-29 RX ADMIN — Medication 325 MILLIGRAM(S): at 05:32

## 2019-12-29 RX ADMIN — Medication 100 MILLIEQUIVALENT(S): at 09:07

## 2019-12-29 RX ADMIN — Medication 325 MILLIGRAM(S): at 21:46

## 2019-12-29 RX ADMIN — METHOCARBAMOL 500 MILLIGRAM(S): 500 TABLET, FILM COATED ORAL at 19:16

## 2019-12-29 RX ADMIN — Medication 50 MILLIGRAM(S): at 21:46

## 2019-12-29 RX ADMIN — PANTOPRAZOLE SODIUM 40 MILLIGRAM(S): 20 TABLET, DELAYED RELEASE ORAL at 05:34

## 2019-12-29 RX ADMIN — Medication 1 GRAM(S): at 05:33

## 2019-12-29 RX ADMIN — METHOCARBAMOL 500 MILLIGRAM(S): 500 TABLET, FILM COATED ORAL at 10:12

## 2019-12-29 RX ADMIN — I.V. FAT EMULSION 31.25 GM/KG/DAY: 20 EMULSION INTRAVENOUS at 18:49

## 2019-12-29 RX ADMIN — HEPARIN SODIUM 1100 UNIT(S)/HR: 5000 INJECTION INTRAVENOUS; SUBCUTANEOUS at 18:48

## 2019-12-29 RX ADMIN — OCTREOTIDE ACETATE 100 MICROGRAM(S): 200 INJECTION, SOLUTION INTRAVENOUS; SUBCUTANEOUS at 05:32

## 2019-12-29 RX ADMIN — Medication 1 GRAM(S): at 19:11

## 2019-12-29 RX ADMIN — MEROPENEM 100 MILLIGRAM(S): 1 INJECTION INTRAVENOUS at 01:27

## 2019-12-29 RX ADMIN — WARFARIN SODIUM 7.5 MILLIGRAM(S): 2.5 TABLET ORAL at 21:46

## 2019-12-29 RX ADMIN — Medication 50 GRAM(S): at 05:27

## 2019-12-29 RX ADMIN — ESCITALOPRAM OXALATE 10 MILLIGRAM(S): 10 TABLET, FILM COATED ORAL at 09:08

## 2019-12-29 RX ADMIN — OCTREOTIDE ACETATE 100 MICROGRAM(S): 200 INJECTION, SOLUTION INTRAVENOUS; SUBCUTANEOUS at 21:46

## 2019-12-29 RX ADMIN — HEPARIN SODIUM 0 UNIT(S)/HR: 5000 INJECTION INTRAVENOUS; SUBCUTANEOUS at 08:51

## 2019-12-29 RX ADMIN — Medication 650 MILLIGRAM(S): at 11:02

## 2019-12-29 RX ADMIN — Medication 650 MILLIGRAM(S): at 19:16

## 2019-12-29 RX ADMIN — Medication 0.25 MILLIGRAM(S): at 12:19

## 2019-12-29 RX ADMIN — CHLORHEXIDINE GLUCONATE 1 APPLICATION(S): 213 SOLUTION TOPICAL at 05:36

## 2019-12-29 RX ADMIN — MEROPENEM 100 MILLIGRAM(S): 1 INJECTION INTRAVENOUS at 09:09

## 2019-12-29 RX ADMIN — Medication 325 MILLIGRAM(S): at 14:03

## 2019-12-29 RX ADMIN — MEROPENEM 100 MILLIGRAM(S): 1 INJECTION INTRAVENOUS at 19:10

## 2019-12-29 RX ADMIN — CHLORHEXIDINE GLUCONATE 1 APPLICATION(S): 213 SOLUTION TOPICAL at 05:33

## 2019-12-29 RX ADMIN — Medication 0.25 MILLIGRAM(S): at 19:17

## 2019-12-29 RX ADMIN — Medication 2000 UNIT(S): at 14:03

## 2019-12-29 RX ADMIN — Medication 650 MILLIGRAM(S): at 19:21

## 2019-12-29 RX ADMIN — Medication 100 MILLIEQUIVALENT(S): at 09:06

## 2019-12-29 RX ADMIN — OCTREOTIDE ACETATE 100 MICROGRAM(S): 200 INJECTION, SOLUTION INTRAVENOUS; SUBCUTANEOUS at 14:03

## 2019-12-29 RX ADMIN — GABAPENTIN 100 MILLIGRAM(S): 400 CAPSULE ORAL at 14:03

## 2019-12-29 RX ADMIN — GABAPENTIN 100 MILLIGRAM(S): 400 CAPSULE ORAL at 05:33

## 2019-12-29 RX ADMIN — Medication 650 MILLIGRAM(S): at 10:12

## 2019-12-29 RX ADMIN — GABAPENTIN 100 MILLIGRAM(S): 400 CAPSULE ORAL at 21:46

## 2019-12-29 RX ADMIN — Medication 10 MILLIGRAM(S): at 21:46

## 2019-12-29 RX ADMIN — Medication 1 EACH: at 18:51

## 2019-12-29 NOTE — PROGRESS NOTE ADULT - SUBJECTIVE AND OBJECTIVE BOX
INTERVAL HPI/OVERNIGHT EVENTS:FU for pancreatic fistula. No new complaints. On TPN. On clear liquid diet    MEDICATIONS  (STANDING):  amitriptyline 10 milliGRAM(s) Oral at bedtime  chlorhexidine 2% Cloths 1 Application(s) Topical <User Schedule>  chlorhexidine 4% Liquid 1 Application(s) Topical <User Schedule>  cholecalciferol 2000 Unit(s) Oral daily  dextrose 10%. 1000 milliLiter(s) (100 mL/Hr) IV Continuous <Continuous>  escitalopram 10 milliGRAM(s) Oral daily  fat emulsion (Plant Based) 20% Infusion 0.9 Gm/kG/Day (31.25 mL/Hr) IV Continuous <Continuous>  ferrous    sulfate 325 milliGRAM(s) Oral three times a day  gabapentin 100 milliGRAM(s) Oral three times a day  heparin  Infusion. 1300 Unit(s)/Hr (13 mL/Hr) IV Continuous <Continuous>  lidocaine   Patch 1 Patch Transdermal daily  meropenem  IVPB 1000 milliGRAM(s) IV Intermittent every 8 hours  octreotide  Injectable 100 MICROGram(s) SubCutaneous every 8 hours  pantoprazole    Tablet 40 milliGRAM(s) Oral before breakfast  Parenteral Nutrition - Adult 1 Each (83 mL/Hr) TPN Continuous <Continuous>  Parenteral Nutrition - Adult 1 Each (83 mL/Hr) TPN Continuous <Continuous>  sucralfate 1 Gram(s) Oral two times a day  traZODone 50 milliGRAM(s) Oral at bedtime  warfarin 7.5 milliGRAM(s) Oral once    MEDICATIONS  (PRN):  acetaminophen   Tablet .. 650 milliGRAM(s) Oral every 6 hours PRN Mild Pain (1 - 3)  ALPRAZolam 0.25 milliGRAM(s) Oral three times a day PRN anxiety  heparin  Injectable 4000 Unit(s) IV Push every 6 hours PRN For aPTT less than 40  heparin  Injectable 2000 Unit(s) IV Push every 6 hours PRN For aPTT between 40 - 57  melatonin 3 milliGRAM(s) Oral at bedtime PRN Insomnia  methocarbamol 500 milliGRAM(s) Oral every 6 hours PRN Muscle Spasm  ondansetron Injectable 4 milliGRAM(s) IV Push every 4 hours PRN Nausea and/or Vomiting  simethicone 80 milliGRAM(s) Chew three times a day PRN Gas  sodium chloride 0.9% lock flush 10 milliLiter(s) IV Push every 1 hour PRN Pre/post blood products, medications, blood draw, and to maintain line patency      Allergies    No Known Allergies    Intolerances    pt requests chicken broth (no beef) and CHERRY italian ice (no lemon) (Unknown)      Vital Signs Last 24 Hrs  T(C): 37 (29 Dec 2019 07:45), Max: 37 (28 Dec 2019 15:30)  T(F): 98.6 (29 Dec 2019 07:45), Max: 98.6 (28 Dec 2019 15:30)  HR: 112 (29 Dec 2019 07:45) (110 - 112)  BP: 123/79 (29 Dec 2019 07:45) (102/68 - 126/82)  BP(mean): --  RR: 18 (29 Dec 2019 07:45) (17 - 18)  SpO2: 97% (29 Dec 2019 07:45) (97% - 100%)    LABS:                        11.5   8.36  )-----------( 290      ( 28 Dec 2019 08:24 )             38.1     12-29    142  |  106  |  9.0  ----------------------------<  122<H>  4.4   |  23.0  |  0.34<L>    Ca    9.2      29 Dec 2019 12:01  Phos  4.1     12-29  Mg     2.4     12-29      PT/INR - ( 29 Dec 2019 07:38 )   PT: 17.5 sec;   INR: 1.50 ratio         PTT - ( 29 Dec 2019 07:38 )  PTT:134.2 sec      RADIOLOGY & ADDITIONAL TESTS:

## 2019-12-29 NOTE — PROVIDER CONTACT NOTE (CRITICAL VALUE NOTIFICATION) - SITUATION
1. 7.5 mg Coumadin ordered for tonight. No current INR resulted.   2. Heparin gtt not scanned from prior shift, last aptt 76.5.  3. Blood glucose of 74
Vanco trough 27.5
lab called with aptt 134.2
pt on heparin infusion, current aPTT is critical value

## 2019-12-29 NOTE — PROGRESS NOTE ADULT - SUBJECTIVE AND OBJECTIVE BOX
[facility]                                                        Office: [address] [address]                                                       Telephone: [phone]. Fax:[phone]                                                                             PROGRESS NOTE    Subjective:  Patient has no complaints     Review of symptoms:   Cardiac:  No chest pain. No dyspnea. +palpitations.  Respiratory:no cough. No dyspnea  Gastrointestinal: No diarrhea. No abdominal pain. No bleeding.   Neuro: No focal neuro complaints.      	  Vitals:  T(C): 36.7 (12-29-19 @ 15:36), Max: 37 (12-29-19 @ 07:45)  HR: 102 (12-29-19 @ 15:36) (102 - 112)  BP: 99/66 (12-29-19 @ 15:36) (99/66 - 123/79)  RR: 16 (12-29-19 @ 15:36) (16 - 18)  SpO2: 97% (12-29-19 @ 15:36) (97% - 100%)  Wt(kg): --  I&O's Summary    28 Dec 2019 07:01  -  29 Dec 2019 07:00  --------------------------------------------------------  IN: 400 mL / OUT: 0 mL / NET: 400 mL    29 Dec 2019 07:01  -  29 Dec 2019 19:38  --------------------------------------------------------  IN: 1975 mL / OUT: 0 mL / NET: 1975 mL          PHYSICAL EXAM:  CHAPERONE:JESUS SAENZ RN, PATIENTS NURSE  Appearance: Comfortable. No acute distress  HEENT:  Head and neck: Atraumatic. Normocephalic. , Neck is supple. No JVD,   Neurologic: Alert and awake, Grossly nonfocal.   Lymphatic: No cervical lymphadenopathy  Cardiovascular: Normal S1 S2, No murmurs. No JVD,   Respiratory: Lungs clear to auscultation  Gastrointestinal:  Soft, Non-tender, + BS  Lower Extremities: No edema  Psychiatry: Patient is calm. No agitation.  Skin: No rashes.    CURRENT MEDICATIONS:    MEDICATIONS  (STANDING):  amitriptyline 10 milliGRAM(s) Oral at bedtime  chlorhexidine 2% Cloths 1 Application(s) Topical <User Schedule>  chlorhexidine 4% Liquid 1 Application(s) Topical <User Schedule>  cholecalciferol 2000 Unit(s) Oral daily  dextrose 10%. 1000 milliLiter(s) (100 mL/Hr) IV Continuous <Continuous>  escitalopram 10 milliGRAM(s) Oral daily  fat emulsion (Plant Based) 20% Infusion 0.9 Gm/kG/Day (31.25 mL/Hr) IV Continuous <Continuous>  ferrous    sulfate 325 milliGRAM(s) Oral three times a day  gabapentin 100 milliGRAM(s) Oral three times a day  heparin  Infusion. 1100 Unit(s)/Hr (11 mL/Hr) IV Continuous <Continuous>  lidocaine   Patch 1 Patch Transdermal daily  meropenem  IVPB 1000 milliGRAM(s) IV Intermittent every 8 hours  octreotide  Injectable 100 MICROGram(s) SubCutaneous every 8 hours  pantoprazole    Tablet 40 milliGRAM(s) Oral before breakfast  Parenteral Nutrition - Adult 1 Each (83 mL/Hr) TPN Continuous <Continuous>  Parenteral Nutrition - Adult 1 Each (83 mL/Hr) TPN Continuous <Continuous>  sucralfate 1 Gram(s) Oral two times a day  traZODone 50 milliGRAM(s) Oral at bedtime  warfarin 7.5 milliGRAM(s) Oral once      LABS:	 	                          11.5   8.36  )-----------( 290      ( 28 Dec 2019 08:24 )             38.1     12-29    142  |  106  |  9.0  ----------------------------<  122<H>  4.4   |  23.0  |  0.34<L>    Ca    9.2      29 Dec 2019 12:01  Phos  4.1     12-29  Mg     2.4     12-29      proBNP:   Lipid Profile:           TELEMETRY: Reviewed  - No significant arrhythmias    ECG:

## 2019-12-29 NOTE — PROVIDER CONTACT NOTE (CRITICAL VALUE NOTIFICATION) - ACTION/TREATMENT ORDERED:
1. STAT INR to be ordered prior to administering 7.5mg coumadin.   2. Aptt therapeutic x2, next aptt to be drawn in AM. As per GLO Herring, heparin gtt to be re-evaluated in AM pending aptt result. Will continue to monitor.  3. Patient asymptomatic. Continue TPN and fats. Continue blood glucose checks q 6 hours.
Follow Full Anticoagulation Nomogram Protocol.
Notify CTICU Provider of Vanco level,will hold evening level. Awaiting Pharm to re-evaluate order.
none at present
see MAR for interventions
Helene del valle
follow nomogram as per protocol
MD in agreement

## 2019-12-29 NOTE — CHART NOTE - NSCHARTNOTEFT_GEN_A_CORE
PA NOTE-MED    Called by RN Due to Sinus Tachycardia with  Recurrent Episodes of Non-Sustained V-Tach  Pt has been experiencing these episodes since 12/27 and has been seen by Cardiology who recommends keeping Potassium >4 and Mag > 2   Pt Denies: CP SOB N/V Dizzyness   Pt is 35 yo Female admitted 2/2  pneumonia , pancreatitis , pancreatic- pleural fistula with Left Chest Tube 2/2 Empyema of Lt chest pleura   Denies: CP SOB Dizzyness N/V     T(C): 36.4 (28 Dec 2019 21:34), Max: 37 (28 Dec 2019 15:30)  T(F): 97.5 (28 Dec 2019 21:34), Max: 98.6 (28 Dec 2019 15:30)  HR: 110 (28 Dec 2019 21:34) (105 - 116)  BP: 102/68 (28 Dec 2019 21:34) (102/68 - 126/82)  RR: 18 (28 Dec 2019 21:34) (17 - 18)  SpO2: 100% (28 Dec 2019 21:34) (98% - 100%)    LABS: 12/28                     140  |  105  |  10.0  ----------------------------<  181<H>  3.9   |  22.0  |  0.33<L>    Phos  3.3     12-28  Mg     1.7     12-28    Pt given Mag 2 gms 12/28 no repeat done  General: WDWN Female lying in Bed NAD   Denies: CP SOB N/V DIZZYNESS     Cardiac: S1S2+ Tachy Occasional Irreg Beat   Lungs: CTA Sl Decrease BS No Wheezing/Rales/Rhonchi B/L Chest Tube in Place   Integument: Pink, warm/dry  Ext: No C/C/E x 4 Ext     A/P  Eval Pt with 2 day H/O Sinus Tachycardia with occasional runs of unsustained V-Tach  Rx'd Stat Labs: Potassium/Mag/Phos   Will Follow Labs  Continue to Monitor Pt  Call PA if any changes

## 2019-12-29 NOTE — PROGRESS NOTE ADULT - ASSESSMENT
Pancreatic fistula: Improving. Perform MRI abdomen with contrast. Order tomorrow. If no more leak and improved pancreatic tissue, start low fat diet and then taper off TPN.

## 2019-12-29 NOTE — PROVIDER CONTACT NOTE (CRITICAL VALUE NOTIFICATION) - BACKGROUND
Patient on heparin gtt for thrombus. Plan is to bridge to coumadin as per Dr. Rosa. Patient receiving continuous TPN.
pancreaticopleural fistula, group A hemolytic strep, pleural eff

## 2019-12-29 NOTE — PROGRESS NOTE ADULT - SUBJECTIVE AND OBJECTIVE BOX
HEALTH ISSUES - PROBLEM Dx:    ac pancreatitis with Pancreaticopleural fistula, group A hemolytic strep pl effusion, severe malnutrition, IJ thrombus, freq NSVTs    INTERVAL HPI/ OVERNIGHT EVENTS:    pt looking markedly improved in appearance, countenance, interaction since admission  has been on bowel rest since hospitalization with clears only  now asking for food    REVIEW OF SYSTEMS:    denies sob, cough fever, abd pain, n/v    TELE: NSVTs in past 24 hrs, but much reduced in freq compared to prior 24 hours    Vital Signs Last 24 Hrs  T(C): 37 (29 Dec 2019 07:45), Max: 37 (28 Dec 2019 15:30)  T(F): 98.6 (29 Dec 2019 07:45), Max: 98.6 (28 Dec 2019 15:30)  HR: 112 (29 Dec 2019 07:45) (110 - 116)  BP: 123/79 (29 Dec 2019 07:45) (102/68 - 126/82)  BP(mean): --  RR: 18 (29 Dec 2019 07:45) (17 - 18)  SpO2: 97% (29 Dec 2019 07:45) (97% - 100%)    PHYSICAL EXAM-  General: NAD, Alert  ENT: MMM, no thrush  Neck: Supple, No JVD  Lungs: non labored breathing; +chest wound vac good seal C/D/I  Cardio: +s1/s2  Abdomen: Soft, Nontender, Nondistended; Bowel sounds present  Extremities: No calf tenderness    MEDICATIONS  (STANDING):  amitriptyline 10 milliGRAM(s) Oral at bedtime  chlorhexidine 2% Cloths 1 Application(s) Topical <User Schedule>  chlorhexidine 4% Liquid 1 Application(s) Topical <User Schedule>  cholecalciferol 2000 Unit(s) Oral daily  dextrose 10%. 1000 milliLiter(s) (100 mL/Hr) IV Continuous <Continuous>  escitalopram 10 milliGRAM(s) Oral daily  fat emulsion (Plant Based) 20% Infusion 0.9 Gm/kG/Day (31.25 mL/Hr) IV Continuous <Continuous>  ferrous    sulfate 325 milliGRAM(s) Oral three times a day  gabapentin 100 milliGRAM(s) Oral three times a day  heparin  Infusion. 1300 Unit(s)/Hr (13 mL/Hr) IV Continuous <Continuous>  lidocaine   Patch 1 Patch Transdermal daily  meropenem  IVPB 1000 milliGRAM(s) IV Intermittent every 8 hours  octreotide  Injectable 100 MICROGram(s) SubCutaneous every 8 hours  pantoprazole    Tablet 40 milliGRAM(s) Oral before breakfast  Parenteral Nutrition - Adult 1 Each (83 mL/Hr) TPN Continuous <Continuous>  Parenteral Nutrition - Adult 1 Each (83 mL/Hr) TPN Continuous <Continuous>  sucralfate 1 Gram(s) Oral two times a day  traZODone 50 milliGRAM(s) Oral at bedtime  warfarin 7.5 milliGRAM(s) Oral once    MEDICATIONS  (PRN):  acetaminophen   Tablet .. 650 milliGRAM(s) Oral every 6 hours PRN Mild Pain (1 - 3)  ALPRAZolam 0.25 milliGRAM(s) Oral three times a day PRN anxiety  heparin  Injectable 4000 Unit(s) IV Push every 6 hours PRN For aPTT less than 40  heparin  Injectable 2000 Unit(s) IV Push every 6 hours PRN For aPTT between 40 - 57  melatonin 3 milliGRAM(s) Oral at bedtime PRN Insomnia  methocarbamol 500 milliGRAM(s) Oral every 6 hours PRN Muscle Spasm  ondansetron Injectable 4 milliGRAM(s) IV Push every 4 hours PRN Nausea and/or Vomiting  simethicone 80 milliGRAM(s) Chew three times a day PRN Gas  sodium chloride 0.9% lock flush 10 milliLiter(s) IV Push every 1 hour PRN Pre/post blood products, medications, blood draw, and to maintain line patency      LABS:                        11.5   8.36  )-----------( 290      ( 28 Dec 2019 08:24 )             38.1     12-29    140  |  104  |  10.0  ----------------------------<  164<H>  3.9   |  24.0  |  0.32<L>    Ca    8.9      29 Dec 2019 07:38  Phos  4.1     12-29  Mg     2.4     12-29      PT/INR - ( 29 Dec 2019 07:38 )   PT: 17.5 sec;   INR: 1.50 ratio         PTT - ( 29 Dec 2019 07:38 )  PTT:134.2 sec

## 2019-12-29 NOTE — PROGRESS NOTE ADULT - ASSESSMENT
34 year old woman with no known major comorbidities (had not gone to see a doctor for 15 years) who presented to [facility] on 11/22/2019 with progressive sob, weakness, weight loss, She was found to have a large pleural effusion. Initially 1.5 L of dark serous fluid was removed. Chest tube placed shortly thereafter, 3 additional liters was removed. pleural fluid cultures + for group A hemolytic strep,CBC showed a leukocytosis of 40k and CTA imaging revealed a 2.1cm partially cystic mass in the body/tail of the pancreas with possible upstream ductal dilatation. On 11/23/2019 the patient had a CT chest/abdomen/pelvis that found to have a clot in the right IJ and was started on a heparin gtt   s/p right VATS decort & washout debridement of CT site & VAC placement on 11/27/19  Persistent drainage from chest tube site so repeat CT C/A/P performed 12/4 which showed new cystic mass in pancreas and liver concerning for pancreatitis  Zosyn changed to meropenem on 12/6  for broader coverage given pancreatitis  Copious drainage from anterior chest tube site, so VAC removed and ostomy appliance applied for high output on 12/6.   12/7 Right chest tube removed.   Repeat CT scan showed a persistent Right hydropneumothorax with air anteriorly  12/8 Ostomy appliance removed and dressing applied.   12/9 s.p PICC placement & Right anterior pig tail catheter & right lateral wound vac placement by thoracic surgery   MRCP significant for Peripancreatic fluid and edema consistent with pancreatitis. Cystic lesions in the pancreatic head with the largest measuring 2.1 cm, likely acute peripancreatic collections, increased in size since 12/4/2019, with a tract of fluid extending from the 2.1 cm cystic lesion into the mediastinum and towards the pleural space on the right suspicious for a pancreatic pleural fistula.  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 and Multifocal PNA  OCtreotide/ TPN started by GI with pt being on clears PO, Heme- transitioned to Coumadin with  iv heparin bridging   12/9/19 PICC line placed,H  rt leg swelling- no DVT    # Rt pl effusion/ Empyema  - being followed by Thoracic and ID, chest wound vac in place   - c/w Abx as per ID   - cont wound vac per thoracic surgery team   - rpt imaging with stable small effusion without change    # sinus tachycardia with freq NSVT on monitor in past 24 hrs  - decreased in frequency  - repleting potassium to keep >4  - replaced Mg to keep level >2  - patient asymptomatic  - TFTs ordered  - if persistent will do CTA in AM. 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 Since > 4 weeks now plan to repeat MRI on Monday to assess fistula. and then make decisions about diet  - GI order TPN daily   - cont clear diet     #  Hyperphosphatemia  - resolved  - adjusted with TPN today by GI    #Loose stools sec to being on clear liq diet  - GI PCR negative    #Rt IJ thrombus  - Heparin bridging with Coumadin until INR therapeutic  - cont daily INR and warfarin order   - coumadin for 3 month per hematology     #Anemia of chronic disease  - s/p multiple transfusions   - hb stable     #insomnia/ anxiety   - trazadone and melatonin    #severe protein calorie malnutrition  on nutritional supplements and TPN    #DVT prophylaxis  -coumadin

## 2019-12-29 NOTE — CHART NOTE - NSCHARTNOTEFT_GEN_A_CORE
Called by RN for few beats of nonsustained Vtach. Patient currently asymptomatic without any complaints. Potassium and Magnesium results this AM WNL. Stat BMP, Magnesium and EKG ordered. Patient seen by Cardiology and planned for Echo. Will continue to monitor. Called by RN for few beats of nonsustained Vtach. Potassium and Magnesium results this AM WNL. Stat BMP, Magnesium and EKG ordered. Patient seen by Cardiology and planned for Echo.     Patient seen and examined at bedside. Pt currently asymptomatic without any complaints. She is declining lab work or EKG at this time. Will continue to monitor. BMP and Mg ordered for AM. Called by RN for few beats of nonsustained Vtach. Vitals: /88, , RR 18, SpO2 96%, Temp 99.6F. Potassium and Magnesium results this AM WNL. Stat BMP, Magnesium and EKG ordered. Patient seen by Cardiology and planned for Echo.     Patient seen and examined at bedside. Pt currently asymptomatic without any complaints. She is declining lab work or EKG at this time. Will continue to monitor. BMP and Mg ordered for AM.

## 2019-12-30 LAB
ANION GAP SERPL CALC-SCNC: 11 MMOL/L — SIGNIFICANT CHANGE UP (ref 5–17)
APTT BLD: 49 SEC — HIGH (ref 27.5–36.3)
APTT BLD: 85.3 SEC — HIGH (ref 27.5–36.3)
APTT BLD: 94.7 SEC — HIGH (ref 27.5–36.3)
BUN SERPL-MCNC: 9 MG/DL — SIGNIFICANT CHANGE UP (ref 8–20)
CALCIUM SERPL-MCNC: 8.6 MG/DL — SIGNIFICANT CHANGE UP (ref 8.6–10.2)
CHLORIDE SERPL-SCNC: 106 MMOL/L — SIGNIFICANT CHANGE UP (ref 98–107)
CO2 SERPL-SCNC: 22 MMOL/L — SIGNIFICANT CHANGE UP (ref 22–29)
CREAT SERPL-MCNC: 0.3 MG/DL — LOW (ref 0.5–1.3)
GLUCOSE BLDC GLUCOMTR-MCNC: 103 MG/DL — HIGH (ref 70–99)
GLUCOSE BLDC GLUCOMTR-MCNC: 119 MG/DL — HIGH (ref 70–99)
GLUCOSE SERPL-MCNC: 129 MG/DL — HIGH (ref 70–115)
HCT VFR BLD CALC: 33.9 % — LOW (ref 34.5–45)
HGB BLD-MCNC: 11.8 G/DL — SIGNIFICANT CHANGE UP (ref 11.5–15.5)
INR BLD: 1.42 RATIO — HIGH (ref 0.88–1.16)
MAGNESIUM SERPL-MCNC: 1.6 MG/DL — SIGNIFICANT CHANGE UP (ref 1.6–2.6)
MCHC RBC-ENTMCNC: 32.6 PG — SIGNIFICANT CHANGE UP (ref 27–34)
MCHC RBC-ENTMCNC: 34.8 GM/DL — SIGNIFICANT CHANGE UP (ref 32–36)
MCV RBC AUTO: 93.6 FL — SIGNIFICANT CHANGE UP (ref 80–100)
PLATELET # BLD AUTO: 346 K/UL — SIGNIFICANT CHANGE UP (ref 150–400)
POTASSIUM SERPL-MCNC: 3.9 MMOL/L — SIGNIFICANT CHANGE UP (ref 3.5–5.3)
POTASSIUM SERPL-SCNC: 3.9 MMOL/L — SIGNIFICANT CHANGE UP (ref 3.5–5.3)
PROTHROM AB SERPL-ACNC: 16.5 SEC — HIGH (ref 10–12.9)
RBC # BLD: 3.62 M/UL — LOW (ref 3.8–5.2)
RBC # FLD: 15.4 % — HIGH (ref 10.3–14.5)
SODIUM SERPL-SCNC: 139 MMOL/L — SIGNIFICANT CHANGE UP (ref 135–145)
WBC # BLD: 9.24 K/UL — SIGNIFICANT CHANGE UP (ref 3.8–10.5)
WBC # FLD AUTO: 9.24 K/UL — SIGNIFICANT CHANGE UP (ref 3.8–10.5)

## 2019-12-30 PROCEDURE — 99254 IP/OBS CNSLTJ NEW/EST MOD 60: CPT

## 2019-12-30 PROCEDURE — 99232 SBSQ HOSP IP/OBS MODERATE 35: CPT

## 2019-12-30 PROCEDURE — 99233 SBSQ HOSP IP/OBS HIGH 50: CPT

## 2019-12-30 PROCEDURE — 93308 TTE F-UP OR LMTD: CPT | Mod: 26

## 2019-12-30 RX ORDER — METOPROLOL TARTRATE 50 MG
12.5 TABLET ORAL EVERY 12 HOURS
Refills: 0 | Status: DISCONTINUED | OUTPATIENT
Start: 2019-12-30 | End: 2019-12-30

## 2019-12-30 RX ORDER — MAGNESIUM SULFATE 500 MG/ML
2 VIAL (ML) INJECTION
Refills: 0 | Status: COMPLETED | OUTPATIENT
Start: 2019-12-30 | End: 2019-12-30

## 2019-12-30 RX ORDER — POTASSIUM CHLORIDE 20 MEQ
10 PACKET (EA) ORAL
Refills: 0 | Status: COMPLETED | OUTPATIENT
Start: 2019-12-30 | End: 2019-12-30

## 2019-12-30 RX ORDER — I.V. FAT EMULSION 20 G/100ML
0.9 EMULSION INTRAVENOUS
Qty: 50 | Refills: 0 | Status: DISCONTINUED | OUTPATIENT
Start: 2019-12-30 | End: 2019-12-30

## 2019-12-30 RX ORDER — MAGNESIUM OXIDE 400 MG ORAL TABLET 241.3 MG
400 TABLET ORAL
Refills: 0 | Status: DISCONTINUED | OUTPATIENT
Start: 2019-12-30 | End: 2019-12-31

## 2019-12-30 RX ORDER — ELECTROLYTE SOLUTION,INJ
1 VIAL (ML) INTRAVENOUS
Refills: 0 | Status: DISCONTINUED | OUTPATIENT
Start: 2019-12-30 | End: 2019-12-30

## 2019-12-30 RX ORDER — WARFARIN SODIUM 2.5 MG/1
7.5 TABLET ORAL ONCE
Refills: 0 | Status: COMPLETED | OUTPATIENT
Start: 2019-12-30 | End: 2019-12-30

## 2019-12-30 RX ORDER — METOPROLOL TARTRATE 50 MG
12.5 TABLET ORAL EVERY 12 HOURS
Refills: 0 | Status: DISCONTINUED | OUTPATIENT
Start: 2019-12-30 | End: 2020-01-02

## 2019-12-30 RX ADMIN — Medication 100 MILLIEQUIVALENT(S): at 11:18

## 2019-12-30 RX ADMIN — Medication 325 MILLIGRAM(S): at 14:12

## 2019-12-30 RX ADMIN — Medication 650 MILLIGRAM(S): at 08:32

## 2019-12-30 RX ADMIN — GABAPENTIN 100 MILLIGRAM(S): 400 CAPSULE ORAL at 21:51

## 2019-12-30 RX ADMIN — HEPARIN SODIUM 1200 UNIT(S)/HR: 5000 INJECTION INTRAVENOUS; SUBCUTANEOUS at 02:22

## 2019-12-30 RX ADMIN — HEPARIN SODIUM 1200 UNIT(S)/HR: 5000 INJECTION INTRAVENOUS; SUBCUTANEOUS at 18:08

## 2019-12-30 RX ADMIN — MAGNESIUM OXIDE 400 MG ORAL TABLET 400 MILLIGRAM(S): 241.3 TABLET ORAL at 18:11

## 2019-12-30 RX ADMIN — OCTREOTIDE ACETATE 100 MICROGRAM(S): 200 INJECTION, SOLUTION INTRAVENOUS; SUBCUTANEOUS at 16:20

## 2019-12-30 RX ADMIN — HEPARIN SODIUM 1200 UNIT(S)/HR: 5000 INJECTION INTRAVENOUS; SUBCUTANEOUS at 09:35

## 2019-12-30 RX ADMIN — I.V. FAT EMULSION 31.25 GM/KG/DAY: 20 EMULSION INTRAVENOUS at 18:13

## 2019-12-30 RX ADMIN — WARFARIN SODIUM 7.5 MILLIGRAM(S): 2.5 TABLET ORAL at 21:45

## 2019-12-30 RX ADMIN — Medication 0.25 MILLIGRAM(S): at 11:27

## 2019-12-30 RX ADMIN — Medication 50 GRAM(S): at 11:18

## 2019-12-30 RX ADMIN — CHLORHEXIDINE GLUCONATE 1 APPLICATION(S): 213 SOLUTION TOPICAL at 06:30

## 2019-12-30 RX ADMIN — CHLORHEXIDINE GLUCONATE 1 APPLICATION(S): 213 SOLUTION TOPICAL at 06:29

## 2019-12-30 RX ADMIN — Medication 325 MILLIGRAM(S): at 21:44

## 2019-12-30 RX ADMIN — HEPARIN SODIUM 2000 UNIT(S): 5000 INJECTION INTRAVENOUS; SUBCUTANEOUS at 02:23

## 2019-12-30 RX ADMIN — MAGNESIUM OXIDE 400 MG ORAL TABLET 400 MILLIGRAM(S): 241.3 TABLET ORAL at 12:37

## 2019-12-30 RX ADMIN — GABAPENTIN 100 MILLIGRAM(S): 400 CAPSULE ORAL at 14:12

## 2019-12-30 RX ADMIN — Medication 1 GRAM(S): at 07:20

## 2019-12-30 RX ADMIN — Medication 650 MILLIGRAM(S): at 18:54

## 2019-12-30 RX ADMIN — PANTOPRAZOLE SODIUM 40 MILLIGRAM(S): 20 TABLET, DELAYED RELEASE ORAL at 06:30

## 2019-12-30 RX ADMIN — Medication 50 GRAM(S): at 11:28

## 2019-12-30 RX ADMIN — MEROPENEM 100 MILLIGRAM(S): 1 INJECTION INTRAVENOUS at 08:25

## 2019-12-30 RX ADMIN — MEROPENEM 100 MILLIGRAM(S): 1 INJECTION INTRAVENOUS at 18:07

## 2019-12-30 RX ADMIN — Medication 12.5 MILLIGRAM(S): at 11:19

## 2019-12-30 RX ADMIN — Medication 1 EACH: at 18:17

## 2019-12-30 RX ADMIN — Medication 325 MILLIGRAM(S): at 06:30

## 2019-12-30 RX ADMIN — METHOCARBAMOL 500 MILLIGRAM(S): 500 TABLET, FILM COATED ORAL at 18:12

## 2019-12-30 RX ADMIN — GABAPENTIN 100 MILLIGRAM(S): 400 CAPSULE ORAL at 06:30

## 2019-12-30 RX ADMIN — MEROPENEM 100 MILLIGRAM(S): 1 INJECTION INTRAVENOUS at 01:50

## 2019-12-30 RX ADMIN — Medication 650 MILLIGRAM(S): at 18:12

## 2019-12-30 RX ADMIN — Medication 650 MILLIGRAM(S): at 09:30

## 2019-12-30 RX ADMIN — ESCITALOPRAM OXALATE 10 MILLIGRAM(S): 10 TABLET, FILM COATED ORAL at 08:26

## 2019-12-30 RX ADMIN — Medication 50 MILLIGRAM(S): at 21:46

## 2019-12-30 RX ADMIN — Medication 2000 UNIT(S): at 08:25

## 2019-12-30 RX ADMIN — Medication 12.5 MILLIGRAM(S): at 18:11

## 2019-12-30 RX ADMIN — Medication 100 MILLIEQUIVALENT(S): at 14:12

## 2019-12-30 RX ADMIN — METHOCARBAMOL 500 MILLIGRAM(S): 500 TABLET, FILM COATED ORAL at 08:32

## 2019-12-30 RX ADMIN — Medication 1 GRAM(S): at 18:12

## 2019-12-30 NOTE — CONSULT NOTE ADULT - ATTENDING COMMENTS
Thank you for the courtesy of this consult . Will follow .
Luis Glass MD  Clinical Cardiac Electrophysiology
Patient seen and examined by me.  I have discussed my recommendation with the PA which are outlined above.  Will sign off.

## 2019-12-30 NOTE — PROGRESS NOTE ADULT - SUBJECTIVE AND OBJECTIVE BOX
34 year old woman with no known major comorbidities (had not gone to see a doctor for 15 years) who presented to [facility] on 11/22/2019 with progressive sob, weakness, weight loss, She was found to have a large pleural effusion. Initially 1.5 L of dark serous fluid was removed.  Chest tube placed shortly thereafter, 3 additional liters was removed. CBC showed a leukocytosis of 40k and   CTA imaging revealed a 2.1cm partially cystic mass in the body/tail of the pancreas with possible upstream ductal dilatation. On 11/23/2019 the patient had a CT chest/abdomen/pelvis that found to have a clot in the right IJ and was started on a heparin gtt.   Chest tube out; on parenteral feedings; taking clear liquids    Transferred to [facility] on 11/24/19.  MRCP performed on 11/26.  Underwent Debridement, wound, with dressing replacement  Wound VAC placement  Pleural biopsy  Lung decortication  Bronchoscopy, with lung decortication using VATS on 11/27/19.  Pt  has a history of alcohol abuse.     We were consulted for evaluation and management of the RIJ DVT.      INTERVAL HISTORY:   On TPN. No fevers. No bleeding. INR subtherapeutic    MEDICATIONS  (STANDING):  chlorhexidine 2% Cloths 1 Application(s) Topical <User Schedule>  chlorhexidine 4% Liquid 1 Application(s) Topical <User Schedule>  cholecalciferol 2000 Unit(s) Oral daily  dextrose 10%. 1000 milliLiter(s) (100 mL/Hr) IV Continuous <Continuous>  escitalopram 10 milliGRAM(s) Oral daily  fat emulsion (Plant Based) 20% Infusion 0.9 Gm/kG/Day (31.25 mL/Hr) IV Continuous <Continuous>  ferrous    sulfate 325 milliGRAM(s) Oral three times a day  gabapentin 100 milliGRAM(s) Oral three times a day  heparin  Infusion. 1100 Unit(s)/Hr (11 mL/Hr) IV Continuous <Continuous>  lidocaine   Patch 1 Patch Transdermal daily  magnesium oxide 400 milliGRAM(s) Oral three times a day with meals  meropenem  IVPB 1000 milliGRAM(s) IV Intermittent every 8 hours  metoprolol tartrate 12.5 milliGRAM(s) Oral every 12 hours  octreotide  Injectable 100 MICROGram(s) SubCutaneous every 8 hours  pantoprazole    Tablet 40 milliGRAM(s) Oral before breakfast  Parenteral Nutrition - Adult 1 Each (83 mL/Hr) TPN Continuous <Continuous>  sucralfate 1 Gram(s) Oral two times a day  traZODone 50 milliGRAM(s) Oral at bedtime    MEDICATIONS  (PRN):  acetaminophen   Tablet .. 650 milliGRAM(s) Oral every 6 hours PRN Mild Pain (1 - 3)  ALPRAZolam 0.25 milliGRAM(s) Oral three times a day PRN anxiety  heparin  Injectable 4000 Unit(s) IV Push every 6 hours PRN For aPTT less than 40  heparin  Injectable 2000 Unit(s) IV Push every 6 hours PRN For aPTT between 40 - 57  melatonin 3 milliGRAM(s) Oral at bedtime PRN Insomnia  methocarbamol 500 milliGRAM(s) Oral every 6 hours PRN Muscle Spasm  ondansetron Injectable 4 milliGRAM(s) IV Push every 4 hours PRN Nausea and/or Vomiting  simethicone 80 milliGRAM(s) Chew three times a day PRN Gas  sodium chloride 0.9% lock flush 10 milliLiter(s) IV Push every 1 hour PRN Pre/post blood products, medications, blood draw, and to maintain line patency    Vital Signs Last 24 Hrs  T(C): 36.9 (30 Dec 2019 21:42), Max: 37 (30 Dec 2019 17:14)  T(F): 98.5 (30 Dec 2019 21:42), Max: 98.6 (30 Dec 2019 17:14)  HR: 92 (30 Dec 2019 21:42) (92 - 108)  BP: 109/76 (30 Dec 2019 21:42) (109/76 - 142/97)  BP(mean): --  RR: 18 (30 Dec 2019 21:42) (18 - 20)  SpO2: 98% (30 Dec 2019 21:42) (96% - 98%)  Abd: soft, nd,  Resp: no distress  cv: rrr  ext: no edema                                     11.8   9.24  )-----------( 346      ( 30 Dec 2019 01:54 )             33.9     12-30    139  |  106  |  9.0  ----------------------------<  129<H>  3.9   |  22.0  |  0.30<L>    Ca    8.6      30 Dec 2019 03:33  Phos  4.1     12-29  Mg     1.6     12-30

## 2019-12-30 NOTE — PROGRESS NOTE ADULT - SUBJECTIVE AND OBJECTIVE BOX
HEALTH ISSUES - PROBLEM Dx:    ac pancreatitis with Pancreaticopleural fistula, group A hemolytic strep pl effusion, severe malnutrition, IJ thrombus, freq NSVTs    INTERVAL HPI/ OVERNIGHT EVENTS:    pt looking markedly improved in appearance, countenance, interaction since admission  has been on bowel rest since hospitalization with clears only  now asking for food    REVIEW OF SYSTEMS:    denies sob, cough fever, abd pain, n/v    TELE: NSVTs in past 24 hrs, but much reduced in length and freq of episodes     Vital Signs Last 24 Hrs  T(C): 36.8 (30 Dec 2019 08:12), Max: 37 (29 Dec 2019 19:54)  T(F): 98.2 (30 Dec 2019 08:12), Max: 98.6 (29 Dec 2019 19:54)  HR: 106 (30 Dec 2019 11:34) (88 - 108)  BP: 142/97 (30 Dec 2019 11:34) (99/66 - 145/88)  BP(mean): 100 (29 Dec 2019 21:55) (100 - 100)  RR: 20 (30 Dec 2019 08:12) (16 - 20)  SpO2: 96% (30 Dec 2019 08:12) (96% - 98%)    PHYSICAL EXAM-  General: NAD, Alert  ENT: MMM, no thrush  Neck: Supple, No JVD  Lungs: non labored breathing; +chest wound vac good seal C/D/I  Cardio: +s1/s2  Abdomen: Soft, Nontender, Nondistended; Bowel sounds present  Extremities: No calf tenderness    MEDICATIONS  (STANDING):  chlorhexidine 2% Cloths 1 Application(s) Topical <User Schedule>  chlorhexidine 4% Liquid 1 Application(s) Topical <User Schedule>  cholecalciferol 2000 Unit(s) Oral daily  dextrose 10%. 1000 milliLiter(s) (100 mL/Hr) IV Continuous <Continuous>  escitalopram 10 milliGRAM(s) Oral daily  fat emulsion (Plant Based) 20% Infusion 0.9 Gm/kG/Day (31.25 mL/Hr) IV Continuous <Continuous>  ferrous    sulfate 325 milliGRAM(s) Oral three times a day  gabapentin 100 milliGRAM(s) Oral three times a day  heparin  Infusion. 1100 Unit(s)/Hr (11 mL/Hr) IV Continuous <Continuous>  lidocaine   Patch 1 Patch Transdermal daily  magnesium oxide 400 milliGRAM(s) Oral three times a day with meals  meropenem  IVPB 1000 milliGRAM(s) IV Intermittent every 8 hours  metoprolol tartrate 12.5 milliGRAM(s) Oral every 12 hours  octreotide  Injectable 100 MICROGram(s) SubCutaneous every 8 hours  pantoprazole    Tablet 40 milliGRAM(s) Oral before breakfast  Parenteral Nutrition - Adult 1 Each (83 mL/Hr) TPN Continuous <Continuous>  Parenteral Nutrition - Adult 1 Each (83 mL/Hr) TPN Continuous <Continuous>  sucralfate 1 Gram(s) Oral two times a day  traZODone 50 milliGRAM(s) Oral at bedtime  warfarin 7.5 milliGRAM(s) Oral once    MEDICATIONS  (PRN):  acetaminophen   Tablet .. 650 milliGRAM(s) Oral every 6 hours PRN Mild Pain (1 - 3)  ALPRAZolam 0.25 milliGRAM(s) Oral three times a day PRN anxiety  heparin  Injectable 4000 Unit(s) IV Push every 6 hours PRN For aPTT less than 40  heparin  Injectable 2000 Unit(s) IV Push every 6 hours PRN For aPTT between 40 - 57  melatonin 3 milliGRAM(s) Oral at bedtime PRN Insomnia  methocarbamol 500 milliGRAM(s) Oral every 6 hours PRN Muscle Spasm  ondansetron Injectable 4 milliGRAM(s) IV Push every 4 hours PRN Nausea and/or Vomiting  simethicone 80 milliGRAM(s) Chew three times a day PRN Gas  sodium chloride 0.9% lock flush 10 milliLiter(s) IV Push every 1 hour PRN Pre/post blood products, medications, blood draw, and to maintain line patency      LABS:                        11.8   9.24  )-----------( 346      ( 30 Dec 2019 01:54 )             33.9     12-30    139  |  106  |  9.0  ----------------------------<  129<H>  3.9   |  22.0  |  0.30<L>    Ca    8.6      30 Dec 2019 03:33  Phos  4.1     12-29  Mg     1.6     12-30      PT/INR - ( 30 Dec 2019 01:54 )   PT: 16.5 sec;   INR: 1.42 ratio         PTT - ( 30 Dec 2019 08:50 )  PTT:94.7 sec

## 2019-12-30 NOTE — PROGRESS NOTE ADULT - ASSESSMENT
Doing well.  Tolerates TPN.  Lytes all acceptable.    Will reorder TPN.  Same formulation.    Will request MRI of abdomen and chest for follow up of the Pancreatico-pleural fistula.

## 2019-12-30 NOTE — PROGRESS NOTE ADULT - SUBJECTIVE AND OBJECTIVE BOX
[facility]  INFECTIOUS DISEASES AND INTERNAL MEDICINE at [place]  =======================================================  Carlos Alberto Soto MD  Diplomates American Board of Internal Medicine and Infectious Diseases  Tel: [phone]      Fax: [phone]  =======================================================    HERACLIO MOLINA [id number]  Follow up: R pleural effusion  no fevers  tolerating more liquids  no drainage from the wound vac      Allergies:  Ensure TID (Unknown)  No Known Allergies    Antibiotics:  meropenem  IVPB 1000 milliGRAM(s) IV Intermittent every 8 hours    Other medications:  chlorhexidine 2% Cloths 1 Application(s) Topical <User Schedule>  chlorhexidine 4% Liquid 1 Application(s) Topical <User Schedule>  cholecalciferol 2000 Unit(s) Oral daily  dextrose 10%. 1000 milliLiter(s) IV Continuous <Continuous>  escitalopram 10 milliGRAM(s) Oral daily  fat emulsion (Plant Based) 20% Infusion 0.9 Gm/kG/Day IV Continuous <Continuous>  ferrous    sulfate 325 milliGRAM(s) Oral three times a day  gabapentin 100 milliGRAM(s) Oral three times a day  heparin  Infusion. 1100 Unit(s)/Hr IV Continuous <Continuous>  lidocaine   Patch 1 Patch Transdermal daily  magnesium oxide 400 milliGRAM(s) Oral three times a day with meals  metoprolol tartrate 12.5 milliGRAM(s) Oral every 12 hours  octreotide  Injectable 100 MICROGram(s) SubCutaneous every 8 hours  pantoprazole    Tablet 40 milliGRAM(s) Oral before breakfast  Parenteral Nutrition - Adult 1 Each TPN Continuous <Continuous>  Parenteral Nutrition - Adult 1 Each TPN Continuous <Continuous>  potassium chloride  10 mEq/100 mL IVPB 10 milliEquivalent(s) IV Intermittent every 1 hour  sucralfate 1 Gram(s) Oral two times a day  traZODone 50 milliGRAM(s) Oral at bedtime  warfarin 7.5 milliGRAM(s) Oral once    REVIEW OF SYSTEMS:  CONSTITUTIONAL:  No Fever or chills  HEENT:   No diplopia or blurred vision.  No earache, sore throat or runny nose.  CARDIOVASCULAR:  No pressure, squeezing, strangling, tightness, heaviness or aching about the chest, neck, axilla or epigastrium.  RESPIRATORY:  No cough, shortness of breath  GASTROINTESTINAL:  No nausea, vomiting or diarrhea.  GENITOURINARY:  No dysuria, frequency or urgency.   MUSCULOSKELETAL:  no joint aches, no muscle pain  SKIN:  No change in skin, hair or nails.  NEUROLOGIC:  No Headaches, seizures   PSYCHIATRIC:  No disorder of thought or mood.  ENDOCRINE:  No heat or cold intolerance  HEMATOLOGICAL:  No easy bruising or bleeding.     Physical Exam:  T(F): 98.2 (30 Dec 2019 08:12), Max: 98.6 (29 Dec 2019 19:54)  HR: 106 (30 Dec 2019 11:34)  BP: 142/97 (30 Dec 2019 11:34)  RR: 20 (30 Dec 2019 08:12)  SpO2: 96% (30 Dec 2019 08:12) (96% - 98%)  temp max in last 48H T(F): , Max: 98.6 (12-28-19 @ 15:30)    GEN: NAD, pleasant  HEENT: normocephalic and atraumatic. EOMI. PERRL.  Anicteric   NECK: Supple.   LUNGS: Clear to auscultation. Wound vac on site of old chest tube  HEART: Regular rate and rhythm   ABDOMEN: Soft, nontender, and nondistended.  Positive bowel sounds.    : No CVA tenderness  EXTREMITIES: Without any edema.  MSK: no joint swelling  NEUROLOGIC: No focal deficits  PSYCHIATRIC: Appropriate affect .  SKIN: No Rash         Labs:                        11.8   9.24  )-----------( 346      ( 30 Dec 2019 01:54 )             33.9       WBC Count: 9.24 K/uL (12-30-19 @ 01:54)  WBC Count: 8.36 K/uL (12-28-19 @ 08:24)  WBC Count: 9.13 K/uL (12-26-19 @ 08:42)      12-30    139  |  106  |  9.0  ----------------------------<  129<H>  3.9   |  22.0  |  0.30<L>    Ca    8.6      30 Dec 2019 03:33  Phos  4.1     12-29  Mg     1.6     12-30        GI PCR Panel, Stool (collected 12-26-19 @ 21:40)  Source: .Stool  Final Report (12-27-19 @ 02:23):    GI PCR Results: NOT detected    *******Please Note:*******    GI panel PCR evaluates for:    Campylobacter, Plesiomonas shigelloides, Salmonella,    Vibrio, Yersinia enterocolitica, Enteroaggregative    Escherichia coli (EAEC), Enteropathogenic E.coli (EPEC),    Enterotoxigenic E. coli (ETEC) lt/st, Shiga-like    toxin-producing E. coli (STEC) stx1/stx2,    Shigella/ Enteroinvasive E. coli (EIEC), Cryptosporidium,    Cyclospora cayetanensis, Entamoeba histolytica,    Giardia lamblia, Adenovirus F 40/41, Astrovirus,    Norovirus GI/GII, Rotavirus A, Sapovirus

## 2019-12-30 NOTE — CONSULT NOTE ADULT - PROVIDER SPECIALTY LIST ADULT
Electrophysiology
Gastroenterology
Heme/Onc
Cardiology
Surgery
Endocrinology
Endocrinology
Infectious Disease
Nephrology
Hospitalist
Pain Medicine

## 2019-12-30 NOTE — PROGRESS NOTE ADULT - ASSESSMENT
34 year old female with no known major comorbidities who is being treated for right pleural effusion/empyema/multifocal pna found to have a RIJ thrombus currently on heparin drip.    1) RIJ venous thrombosis -provoked event that occurred in context of her severe and complex lung illness/infection/empyema that may have led to developing the thrombus. Plan on anticoagulation for 3 months. Plan for warfarin noted as outpatient. Persistently subtherapeutic, consider increase warfarin dose.   2) Multifactorial anemia; Hb11.8 today. monitor.  3) Reactive leukocytosis, improved.  WBC 9.24k today  4) Pancreatic Fistula-care per managing service/ID/GI

## 2019-12-30 NOTE — PROGRESS NOTE ADULT - ASSESSMENT
34 y.o. F who was  [place] [facility] [place] [facility] on [place]22 for complaints of weakness, 25 pound weight loss & flu like symptoms & found to have a large right pleural effusion,   pleural fluid cultures + for group A hemolytic strep, + RIJ thormbus   11/23 CT of the chest/abd/pelvis indicated a clot in the Right IJ, pt started on heparin gtt.   s/p right VATS decort & washout debridement of CT site & VAC placement on 11/27/19.   Persistent drainage from chest tube site so repeat CT C/A/P performed 12/4 which showed new cystic mass in pancreas and liver concerning for pancreatitis  Zosyn changed to meropenem on 12/6  for broader coverage given pancreatitis  Copious drainage from anterior chest tube site, so VAC removed and ostomy appliance applied for high output on 12/6.   12/7 Right chest tube removed.   Repeat CT scan showed a persistent Right hydropneumothorax with air anteriorly  12/8 Ostomy appliance removed and dressing applied.   12/9 s.p PICC placement & Right anterior pig tail catheter & right lateral wound vac placement   MRCP significant for Peripancreatic fluid and edema consistent with pancreatitis. Cystic lesions in the pancreatic head with the largest measuring 2.1 cm, likely acute peripancreatic collections, increased in size since 12/4/2019, with a tract of fluid extending from the 2.1 cm cystic lesion into the mediastinum and towards the pleural space on the right suspicious for a pancreatic pleural fistula.    R pleural effusion  Pancreatitis  R pancreaticopleural fistula   Alcohol abuse   WBC elevation - resolved  Acute renal failure - resolved    - All cultures negative , blood, urine, pleural fluid and chest tube insertion site.  - Leukocytosis now resolved  - No fever   ENCOURAGE PO intake.  - Continue Meropenem 1gm q8h for necrotizing pancreatitis and fistula with pleural space. GI and surgery following.   - Has 2 PICC lines, on TPN and ABx, watch for superinfection  - MRI results reviewed    - Plan for antibiotics till 1/3/20 to complete 4 weeks from Chest tube removal     Will follow

## 2019-12-30 NOTE — CHART NOTE - NSCHARTNOTEFT_GEN_A_CORE
Patient due for Vac Change today. Upon removal of vac two black sponges were retrieved, inspection of both wounds show superficial well healing prior incisions measuring [id number] deep with granulation tissue noted. No foul odor, discharge, erythema or warmth to surrounding skin is noted. At this time wound vac is no longer indicated, removed at bedside and wounds were dressed with dry gauze and tegaderm. Thoracic surgery will sign off at this time, please re-consult with any concerns or changes.      Discussed with Dr. Castanon

## 2019-12-30 NOTE — PROGRESS NOTE ADULT - SUBJECTIVE AND OBJECTIVE BOX
Patient seen and examined; chart reviewed.  Feels well.  Tolerating CLD.  No food yet.  No fever.  No abdominal pain or hyperglycemia.  Glucoses running 105 to 132.  No coverage.      PAST MEDICAL & SURGICAL HISTORY:      ROS:  No Heartburn, regurgitation, dysphagia, odynophagia.  No dyspepsia  No abdominal pain.    No Nausea, vomiting.  No Bleeding.  No hematemesis.   No diarrhea.    No hematochesia.  No weight loss, anorexia.  No edema.      MEDICATIONS  (STANDING):  chlorhexidine 2% Cloths 1 Application(s) Topical <User Schedule>  chlorhexidine 4% Liquid 1 Application(s) Topical <User Schedule>  cholecalciferol 2000 Unit(s) Oral daily  dextrose 10%. 1000 milliLiter(s) (100 mL/Hr) IV Continuous <Continuous>  escitalopram 10 milliGRAM(s) Oral daily  fat emulsion (Plant Based) 20% Infusion 0.9 Gm/kG/Day (31.05 mL/Hr) IV Continuous <Continuous>  ferrous    sulfate 325 milliGRAM(s) Oral three times a day  gabapentin 100 milliGRAM(s) Oral three times a day  heparin  Infusion. 1100 Unit(s)/Hr (11 mL/Hr) IV Continuous <Continuous>  lidocaine   Patch 1 Patch Transdermal daily  magnesium oxide 400 milliGRAM(s) Oral three times a day with meals  magnesium sulfate  IVPB 2 Gram(s) IV Intermittent every 1 hour  meropenem  IVPB 1000 milliGRAM(s) IV Intermittent every 8 hours  metoprolol tartrate 12.5 milliGRAM(s) Oral every 12 hours  octreotide  Injectable 100 MICROGram(s) SubCutaneous every 8 hours  pantoprazole    Tablet 40 milliGRAM(s) Oral before breakfast  Parenteral Nutrition - Adult 1 Each (83 mL/Hr) TPN Continuous <Continuous>  Parenteral Nutrition - Adult 1 Each (83 mL/Hr) TPN Continuous <Continuous>  potassium chloride  10 mEq/100 mL IVPB 10 milliEquivalent(s) IV Intermittent every 1 hour  sucralfate 1 Gram(s) Oral two times a day  traZODone 50 milliGRAM(s) Oral at bedtime  warfarin 7.5 milliGRAM(s) Oral once    MEDICATIONS  (PRN):  acetaminophen   Tablet .. 650 milliGRAM(s) Oral every 6 hours PRN Mild Pain (1 - 3)  ALPRAZolam 0.25 milliGRAM(s) Oral three times a day PRN anxiety  heparin  Injectable 4000 Unit(s) IV Push every 6 hours PRN For aPTT less than 40  heparin  Injectable 2000 Unit(s) IV Push every 6 hours PRN For aPTT between 40 - 57  melatonin 3 milliGRAM(s) Oral at bedtime PRN Insomnia  methocarbamol 500 milliGRAM(s) Oral every 6 hours PRN Muscle Spasm  ondansetron Injectable 4 milliGRAM(s) IV Push every 4 hours PRN Nausea and/or Vomiting  simethicone 80 milliGRAM(s) Chew three times a day PRN Gas  sodium chloride 0.9% lock flush 10 milliLiter(s) IV Push every 1 hour PRN Pre/post blood products, medications, blood draw, and to maintain line patency      Allergies  No Known Allergies    Intolerances    pt requests chicken broth (no beef) and CHERRY italian ice (no lemon) (Unknown)      Vital Signs Last 24 Hrs  T(C): 36.8 (30 Dec 2019 08:12), Max: 37 (29 Dec 2019 19:54)  T(F): 98.2 (30 Dec 2019 08:12), Max: 98.6 (29 Dec 2019 19:54)  HR: 108 (30 Dec 2019 08:12) (88 - 108)  BP: 125/85 (30 Dec 2019 08:12) (99/66 - 145/88)  BP(mean): 100 (29 Dec 2019 21:55) (100 - 100)  RR: 20 (30 Dec 2019 08:12) (16 - 20)  SpO2: 96% (30 Dec 2019 08:12) (96% - 98%)    PHYSICAL EXAM:    GENERAL: NAD, well-groomed, well-developed  HEAD:  Atraumatic, Normocephalic  EYES: EOMI, PERRLA, conjunctiva and sclera clear  ENMT: No tonsillar erythema, exudates, or enlargement; Moist mucous membranes, Good dentition, No lesions  NECK: Supple, No JVD, Normal thyroid  CHEST/LUNG: Clear to percussion bilaterally; No rales, rhonchi, wheezing, or rubs  HEART: Regular rate and rhythm; No murmurs, rubs, or gallops  ABDOMEN: Soft, Nontender, Nondistended; Bowel sounds present  EXTREMITIES:  2+ Peripheral Pulses, No clubbing, cyanosis, or edema;  LUE double lumen PICC.  Intact.    LYMPH: No lymphadenopathy noted  SKIN: No rashes or lesions      LABS:                        11.8   9.24  )-----------( 346      ( 30 Dec 2019 01:54 )             33.9     12-30    139  |  106  |  9.0  ----------------------------<  129<H>  3.9   |  22.0  |  0.30<L>    Ca    8.6      30 Dec 2019 03:33  Phos  4.1     12-29  Mg     1.6     12-30      PT/INR - ( 30 Dec 2019 01:54 )   PT: 16.5 sec;   INR: 1.42 ratio         PTT - ( 30 Dec 2019 08:50 )  PTT:94.7 sec         RADIOLOGY & ADDITIONAL STUDIES:

## 2019-12-30 NOTE — PROGRESS NOTE ADULT - ASSESSMENT
34 year old woman with no known major comorbidities (had not gone to see a doctor for 15 years) who presented to [facility] on 11/22/2019 with progressive sob, weakness, weight loss, She was found to have a large pleural effusion. Initially 1.5 L of dark serous fluid was removed. Chest tube placed shortly thereafter, 3 additional liters was removed. pleural fluid cultures + for group A hemolytic strep,CBC showed a leukocytosis of 40k and CTA imaging revealed a 2.1cm partially cystic mass in the body/tail of the pancreas with possible upstream ductal dilatation. On 11/23/2019 the patient had a CT chest/abdomen/pelvis that found to have a clot in the right IJ and was started on a heparin gtt   s/p right VATS decort & washout debridement of CT site & VAC placement on 11/27/19  Persistent drainage from chest tube site so repeat CT C/A/P performed 12/4 which showed new cystic mass in pancreas and liver concerning for pancreatitis  Zosyn changed to meropenem on 12/6  for broader coverage given pancreatitis  Copious drainage from anterior chest tube site, so VAC removed and ostomy appliance applied for high output on 12/6.   12/7 Right chest tube removed.   Repeat CT scan showed a persistent Right hydropneumothorax with air anteriorly  12/8 Ostomy appliance removed and dressing applied.   12/9 s.p PICC placement & Right anterior pig tail catheter & right lateral wound vac placement by thoracic surgery   MRCP significant for Peripancreatic fluid and edema consistent with pancreatitis. Cystic lesions in the pancreatic head with the largest measuring 2.1 cm, likely acute peripancreatic collections, increased in size since 12/4/2019, with a tract of fluid extending from the 2.1 cm cystic lesion into the mediastinum and towards the pleural space on the right suspicious for a pancreatic pleural fistula.  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 and Multifocal PNA  OCtreotide/ TPN started by GI with pt being on clears PO, Heme- transitioned to Coumadin with  iv heparin bridging   12/9/19 PICC line placed,  rt leg swelling- no DVT  NSVTs since post 12/25/19; EP consulted; lopressor started  rpt MRI abd on 12/30/19    # sinus tachycardia with freq NSVT on monitor in past 24 hrs  - decreased in frequency  - repleting potassium to keep >4  - replaced Mg to keep level >2  - patient asymptomatic  - TFTs ordered  - if persistent will do CTA in AM. pt already on full A/C  - rpt limited ECHO per cardiology  - appreciate EP input  - ELavil d/kala as it could contribute to tachycardia/ SVT  - Mag and K repleted today again      # Rt pl effusion/ Empyema  - being followed by Thoracic and ID, chest wound vac in place   - c/w Abx as per ID   - cont wound vac per thoracic surgery team, wound healed well. Need to transition to wet to dry dressing. But await to monitor with advancement of diet  - rpt imaging with stable small effusion without change    #Acute pancreatitis and pancreatic cyst with pancreaticopleural fistula  - D/W GI- to cont bowel rest for about 4-6 weeks total time. Since > 4 weeks now plan to repeat MRI on 12/30/19 to assess fistula/ pancreatitis/ pancreatic ducts. and then make decisions about diet  - GI order TPN daily   - cont clear diet     #  Hyperphosphatemia  - resolved  - adjusted with TPN today by GI    #Loose stools sec to being on clear liq diet  - GI PCR negative    #Rt IJ thrombus  - Heparin bridging with Coumadin until INR therapeutic  - cont daily INR and warfarin order   - coumadin for 3 month per hematology     #Anemia of chronic disease  - s/p multiple transfusions   - hb stable     #insomnia/ anxiety   - trazadone and melatonin    #severe protein calorie malnutrition  on nutritional supplements and TPN    #DVT prophylaxis  -coumadin

## 2019-12-30 NOTE — CONSULT NOTE ADULT - SUBJECTIVE AND OBJECTIVE BOX
In summary:    34 year old female patient with no significant past medical history, hasn't seen a MD in >15yrs, who initially presented to [facility] [place] on 11/22 with weakness, 25lb weight loss of several months, flu like symptoms, on work up found to have a large right pleural effusion.  Pulmonology contact for thoracentesis, 1.5 L of dark serous fluid was removed.  Chest tube placed shortly thereafter, 3 liters was removed at that point in time. CTA imaging also revealed a 2.1cm partially cystic mass in the body/tail of the pancreas with possible upstream ductal dilatation. 11/23 patient had CT chest/abdomen/pelvis and was also found to have a clot in the right IJ and was started on a heparin gtt. Patient s/p right VATS decort & washout debridement of CT site & VAC placement on 11/27/19. Persistent drainage from chest tube site so repeat CT C/A/P performed 12/4 which showed new cystic mass in pancreas and liver concerning for pancreatitis. TPN initiated.     Noted on telemetry to have persistent sinus tachycardia with frequent bouts of short ventricular ectopy and NSVT. She reports symptoms of mild palpitations with these events but otherwise feels fine. As an outpatient she admits to occasional palpitations but denies any episodes of syncope. She admits to one remote episode of syncope which occurred years ago on a hot summers day during which she didn't eat or drink anything. Currently has an appetite but does not like the food provided to her.     PAST MEDICAL & SURGICAL HISTORY: None    REVIEW OF SYSTEMS: current  General: - fever or chills, + Fatigue  Skin/Breast: - rashes  Ophthalmologic: - blurred vision	  ENMT: - sore throat  Respiratory and Thorax:	- cough, - dyspnea  Cardiovascular: - chest pain, +palpitations  Gastrointestinal:	-N/V/D/C  Genitourinary: - dysuria  Musculoskeletal:	 - arthritis  Neurological: - weaknesses  Psychiatric: +anxiety, 	    MEDICATIONS  (STANDING):  amitriptyline 10 milliGRAM(s) Oral at bedtime  chlorhexidine 2% Cloths 1 Application(s) Topical <User Schedule>  chlorhexidine 4% Liquid 1 Application(s) Topical <User Schedule>  cholecalciferol 2000 Unit(s) Oral daily  dextrose 10%. 1000 milliLiter(s) (100 mL/Hr) IV Continuous <Continuous>  escitalopram 10 milliGRAM(s) Oral daily  ferrous    sulfate 325 milliGRAM(s) Oral three times a day  gabapentin 100 milliGRAM(s) Oral three times a day  heparin  Infusion. 1100 Unit(s)/Hr (11 mL/Hr) IV Continuous <Continuous>  lidocaine   Patch 1 Patch Transdermal daily  magnesium oxide 400 milliGRAM(s) Oral three times a day with meals  meropenem  IVPB 1000 milliGRAM(s) IV Intermittent every 8 hours  metoprolol tartrate 12.5 milliGRAM(s) Oral every 12 hours  octreotide  Injectable 100 MICROGram(s) SubCutaneous every 8 hours  pantoprazole    Tablet 40 milliGRAM(s) Oral before breakfast  Parenteral Nutrition - Adult 1 Each (83 mL/Hr) TPN Continuous <Continuous>  sucralfate 1 Gram(s) Oral two times a day  traZODone 50 milliGRAM(s) Oral at bedtime    MEDICATIONS  (PRN):  acetaminophen   Tablet .. 650 milliGRAM(s) Oral every 6 hours PRN Mild Pain (1 - 3)  ALPRAZolam 0.25 milliGRAM(s) Oral three times a day PRN anxiety  heparin  Injectable 4000 Unit(s) IV Push every 6 hours PRN For aPTT less than 40  heparin  Injectable 2000 Unit(s) IV Push every 6 hours PRN For aPTT between 40 - 57  melatonin 3 milliGRAM(s) Oral at bedtime PRN Insomnia  methocarbamol 500 milliGRAM(s) Oral every 6 hours PRN Muscle Spasm  ondansetron Injectable 4 milliGRAM(s) IV Push every 4 hours PRN Nausea and/or Vomiting  simethicone 80 milliGRAM(s) Chew three times a day PRN Gas  sodium chloride 0.9% lock flush 10 milliLiter(s) IV Push every 1 hour PRN Pre/post blood products, medications, blood draw, and to maintain line patency    Allergies  No Known Allergies    Intolerances  pt requests chicken broth (no beef) and CHERRY italian ice (no lemon) (Unknown)    SOCIAL HISTORY: Former smoker, x18 years 1/3 PPD, 3 drinks/day, +marijuana     FAMILY HISTORY: NO family hx of atrial fibrillation or cardiomyopathy    Vital Signs Last 24 Hrs  T(C): 36.8 (30 Dec 2019 08:12), Max: 37 (29 Dec 2019 19:54)  T(F): 98.2 (30 Dec 2019 08:12), Max: 98.6 (29 Dec 2019 19:54)  HR: 108 (30 Dec 2019 08:12) (88 - 108)  BP: 125/85 (30 Dec 2019 08:12) (99/66 - 145/88)  BP(mean): 100 (29 Dec 2019 21:55) (100 - 100)  RR: 20 (30 Dec 2019 08:12) (16 - 20)  SpO2: 96% (30 Dec 2019 08:12) (96% - 98%)    Physical Exam:  Constitutional: AAOx3, NAD  Neck: supple, No JVD  Cardiovascular: +S1S2 RRR, no murmurs, rubs, gallops. Sinus tachycardia on exam  Pulmonary: Coarse breath sounds at bases with crackles R>L, unlabored  Abdomen: +BS, soft NTND  Extremities: no edema b/l,  Neuro: non focal, speech clear, THACKER x 4  Psych: Appropriate mood and affect    LABS:                       11.8   9.24  )-----------( 346      ( 30 Dec 2019 01:54 )             33.9     139  |  106  |  9.0  ----------------------------<  129<H>  3.9   |  22.0  |  0.30<L>  Ca    8.6      30 Dec 2019 03:33  Phos  4.1     12-29  Mg     1.6     12-30  PT/INR - ( 30 Dec 2019 01:54 )   PT: 16.5 sec;   INR: 1.42 ratio    PTT - ( 30 Dec 2019 08:50 )  PTT:94.7 sec    RADIOLOGY & ADDITIONAL STUDIES:  TTE 11/25/19  Summary:   1. Left ventricular ejection fraction, by visual estimation, is >75%.   2. Hyperdynamic global left ventricular systolic function.   3. Spectral Doppler shows impaired relaxation pattern of left ventricular myocardial filling (Grade I diastolic dysfunction).   4. Mild thickening of the anterior and posterior mitral valve leaflets.   5. Trace mitral valve regurgitation.    CXR 12/28/19  Single frontal view of the chest demonstrates small right lower lobe effusion/atelectasis, unchanged. The cardiomediastinal silhouette is normal. No acute osseous abnormalities. Overlying EKG leads and wires are noted. Left-sided PICC line catheter tip in the right atrium. Right axillary midline catheter. Right hemidiaphragm is mildly elevated.  IMPRESSION: No interval change.    CT chest 12/7/19  IMPRESSION: Interval removal of right chest tube with tract from prior   chest tube now containing air.  Right hydropneumothorax again noted. The   previously identified loculated fluid and air anteriorly has demonstrated   resolution of the fluid component in this region with air now filling the   same size space.  Skin defect with subcutaneous air and complex fluid right lateral chest   wall again noted.  New skin defect right lower anterolateral chest wall with air tract to   the right pleural space now noted.  Slight increase in fluid tracking in the right major fissure.  Areas of atelectasis in the right lung and opacities in the left upper   lobe are unchanged.  Thickened distal esophagus again noted may be on the basis of esophagitis.  Persistent renal nephrograms bilaterally may be seen in the setting of   acute tubular necrosis given recent intravenous contrast administration.   Correlation with serum creatinine recommended.    A/P  34 year old female patient with no past history with prolonged hospitalization for acute pancreatitis with Pancreaticopleural fistula, group A hemolytic strep plueral effusions, severe malnutrition, IJ thrombus now on anticoagulation who is consulted for sinus tachycardia and ventricular ectopy.     Short runs on NSVT noted.     - Obtain limited TTE  - Start Lopressor 12.5mg PO BID  - Magnesium 400mg PO TID  - Maintain K>4, Mag >2  - No acute EP intervention at this time. [facility] CARDIAC ELECTROPHYSIOLOGY                                                       [facility] [place]                                                      Office: [address], [facility]                                                       Telephone: [phone]. Fax:[phone]    In summary:    34 year old female patient with no significant past medical history, hasn't seen a MD in >15yrs, who [place] [facility] [place] with [place], 25lb weight loss of several months, flu like symptoms, on work up found to have a large right pleural effusion.  Pulmonology contact for thoracentesis, 1.5 L of dark serous fluid was removed.  Chest tube placed shortly thereafter, 3 liters was removed at that point in time. CTA imaging also revealed a 2.1cm partially cystic mass in the body/tail of the pancreas with possible upstream ductal dilatation. 11/23 patient had CT chest/abdomen/pelvis and was also found to have a clot in the right IJ and was started on a heparin gtt. Patient s/p right VATS decort & washout debridement of CT site & VAC placement on 11/27/19. Persistent drainage from chest tube site so repeat CT C/A/P performed 12/4 which showed new cystic mass in pancreas and liver concerning for pancreatitis. TPN initiated.     Noted on telemetry to have persistent sinus tachycardia with frequent bouts of short ventricular ectopy and NSVT. She reports symptoms of mild palpitations with these events but otherwise feels fine. As an outpatient she admits to occasional palpitations but denies any episodes of syncope. She admits to one remote episode of syncope which occurred years ago on a hot summers day during which she didn't eat or drink anything. Currently has an appetite but does not like the food provided to her.     PAST MEDICAL & SURGICAL HISTORY: None    REVIEW OF SYSTEMS: current  General: - fever or chills, + Fatigue  Skin/Breast: - rashes  Ophthalmologic: - blurred vision	  ENMT: - sore throat  Respiratory and Thorax:	- cough, - dyspnea  Cardiovascular: - chest pain, +palpitations  Gastrointestinal:	-N/V/D/C  Genitourinary: - dysuria  Musculoskeletal:	 - arthritis  Neurological: - weaknesses  Psychiatric: +anxiety, 	    MEDICATIONS  (STANDING):  amitriptyline 10 milliGRAM(s) Oral at bedtime  chlorhexidine 2% Cloths 1 Application(s) Topical <User Schedule>  chlorhexidine 4% Liquid 1 Application(s) Topical <User Schedule>  cholecalciferol 2000 Unit(s) Oral daily  dextrose 10%. 1000 milliLiter(s) (100 mL/Hr) IV Continuous <Continuous>  escitalopram 10 milliGRAM(s) Oral daily  ferrous    sulfate 325 milliGRAM(s) Oral three times a day  gabapentin 100 milliGRAM(s) Oral three times a day  heparin  Infusion. 1100 Unit(s)/Hr (11 mL/Hr) IV Continuous <Continuous>  lidocaine   Patch 1 Patch Transdermal daily  magnesium oxide 400 milliGRAM(s) Oral three times a day with meals  meropenem  IVPB 1000 milliGRAM(s) IV Intermittent every 8 hours  metoprolol tartrate 12.5 milliGRAM(s) Oral every 12 hours  octreotide  Injectable 100 MICROGram(s) SubCutaneous every 8 hours  pantoprazole    Tablet 40 milliGRAM(s) Oral before breakfast  Parenteral Nutrition - Adult 1 Each (83 mL/Hr) TPN Continuous <Continuous>  sucralfate 1 Gram(s) Oral two times a day  traZODone 50 milliGRAM(s) Oral at bedtime    MEDICATIONS  (PRN):  acetaminophen   Tablet .. 650 milliGRAM(s) Oral every 6 hours PRN Mild Pain (1 - 3)  ALPRAZolam 0.25 milliGRAM(s) Oral three times a day PRN anxiety  heparin  Injectable 4000 Unit(s) IV Push every 6 hours PRN For aPTT less than 40  heparin  Injectable 2000 Unit(s) IV Push every 6 hours PRN For aPTT between 40 - 57  melatonin 3 milliGRAM(s) Oral at bedtime PRN Insomnia  methocarbamol 500 milliGRAM(s) Oral every 6 hours PRN Muscle Spasm  ondansetron Injectable 4 milliGRAM(s) IV Push every 4 hours PRN Nausea and/or Vomiting  simethicone 80 milliGRAM(s) Chew three times a day PRN Gas  sodium chloride 0.9% lock flush 10 milliLiter(s) IV Push every 1 hour PRN Pre/post blood products, medications, blood draw, and to maintain line patency    Allergies  No Known Allergies    Intolerances  pt requests chicken broth (no beef) and CHERRY italian ice (no lemon) (Unknown)    SOCIAL HISTORY: Former smoker, x18 years 1/3 PPD, 3 drinks/day, +marijuana     FAMILY HISTORY: NO family hx of atrial fibrillation, cardiomyopathy or sudden cardiac death    Vital Signs Last 24 Hrs  T(C): 36.8 (30 Dec 2019 08:12), Max: 37 (29 Dec 2019 19:54)  T(F): 98.2 (30 Dec 2019 08:12), Max: 98.6 (29 Dec 2019 19:54)  HR: 108 (30 Dec 2019 08:12) (88 - 108)  BP: 125/85 (30 Dec 2019 08:12) (99/66 - 145/88)  BP(mean): 100 (29 Dec 2019 21:55) (100 - 100)  RR: 20 (30 Dec 2019 08:12) (16 - 20)  SpO2: 96% (30 Dec 2019 08:12) (96% - 98%)    Physical Exam:  Constitutional: AAOx3, NAD  Neck: supple, No JVD  Cardiovascular: +S1S2 RRR, no murmurs, rubs, gallops. Sinus tachycardia on exam  Pulmonary: Coarse breath sounds at bases with crackles R>L, unlabored  Abdomen: +BS, soft NTND  Extremities: no edema b/l,  Neuro: non focal, speech clear, THACKER x 4  Psych: Appropriate mood and affect    LABS:                       11.8   9.24  )-----------( 346      ( 30 Dec 2019 01:54 )             33.9     139  |  106  |  9.0  ----------------------------<  129<H>  3.9   |  22.0  |  0.30<L>  Ca    8.6      30 Dec 2019 03:33  Phos  4.1     12-29  Mg     1.6     12-30  PT/INR - ( 30 Dec 2019 01:54 )   PT: 16.5 sec;   INR: 1.42 ratio    PTT - ( 30 Dec 2019 08:50 )  PTT:94.7 sec    RADIOLOGY & ADDITIONAL STUDIES:  TTE 11/25/19  Summary:   1. Left ventricular ejection fraction, by visual estimation, is >75%.   2. Hyperdynamic global left ventricular systolic function.   3. Spectral Doppler shows impaired relaxation pattern of left ventricular myocardial filling (Grade I diastolic dysfunction).   4. Mild thickening of the anterior and posterior mitral valve leaflets.   5. Trace mitral valve regurgitation.    CXR 12/28/19  Single frontal view of the chest demonstrates small right lower lobe effusion/atelectasis, unchanged. The cardiomediastinal silhouette is normal. No acute osseous abnormalities. Overlying EKG leads and wires are noted. Left-sided PICC line catheter tip in the right atrium. Right axillary midline catheter. Right hemidiaphragm is mildly elevated.  IMPRESSION: No interval change.    CT chest 12/7/19  IMPRESSION: Interval removal of right chest tube with tract from prior   chest tube now containing air.  Right hydropneumothorax again noted. The   previously identified loculated fluid and air anteriorly has demonstrated   resolution of the fluid component in this region with air now filling the   same size space.  Skin defect with subcutaneous air and complex fluid right lateral chest   wall again noted.  New skin defect right lower anterolateral chest wall with air tract to   the right pleural space now noted.  Slight increase in fluid tracking in the right major fissure.  Areas of atelectasis in the right lung and opacities in the left upper   lobe are unchanged.  Thickened distal esophagus again noted may be on the basis of esophagitis.  Persistent renal nephrograms bilaterally may be seen in the setting of   acute tubular necrosis given recent intravenous contrast administration.   Correlation with serum creatinine recommended.    A/P  34 year old female patient with history of alcohol use with prolonged hospitalization for acute pancreatitis with Pancreaticopleural fistula, group A hemolytic strep pleural effusions, severe malnutrition, IJ thrombus now on anticoagulation who is consulted for sinus tachycardia and ventricular ectopy.     Short runs on NSVT noted, all appear monomorphic lasting up to 4-5 beats at a time with RB superior axis suggestive of papillary muscle source. Her magnesium was improved yesterday during the day but was below 2 again today. It is most likely that this is a triggered arrhythmia driven by her overall underlying infectious/inflammatory process. Recommend aggressive electrolyte repletion. Can also start Metoprolol to help suppress arrhythmia.    Lastly, amitriptyline can cause sinus tachycardia and so if patient can tolerate being off of this medication, would consider discontinuing it.    Recommendations:  - Obtain limited TTE to monitor EF  - Start Lopressor 12.5mg PO BID  - Magnesium 400mg PO TID  - Maintain K>4, Mag >2  - No acute EP intervention at this time. [place] CARDIAC ELECTROPHYSIOLOGY                                                       [facility] [place]                                                      Office: [address] [facility], [facility]                                                       Telephone: [phone]. Fax:[phone]    In summary:    34 year old female patient with no significant past medical history, hasn't seen a MD in >15yrs, who initially presented to [facility] [place] on 11/22 with weakness, 25lb weight loss of several months, flu like symptoms, on work up found to have a large right pleural effusion.  Pulmonology contact for thoracentesis, 1.5 L of dark serous fluid was removed.  Chest tube placed shortly thereafter, 3 liters was removed at that point in time. CTA imaging also revealed a 2.1cm partially cystic mass in the body/tail of the pancreas with possible upstream ductal dilatation. 11/23 patient had CT chest/abdomen/pelvis and was also found to have a clot in the right IJ and was started on a heparin gtt. Patient s/p right VATS decort & washout debridement of CT site & VAC placement on 11/27/19. Persistent drainage from chest tube site so repeat CT C/A/P performed 12/4 which showed new cystic mass in pancreas and liver concerning for pancreatitis. TPN initiated.     Noted on telemetry to have persistent sinus tachycardia with frequent bouts of short ventricular ectopy and NSVT. She reports symptoms of mild palpitations with these events but otherwise feels fine. As an outpatient she admits to occasional palpitations but denies any episodes of syncope. She admits to one remote episode of syncope which occurred years ago on a hot summers day during which she didn't eat or drink anything. Currently has an appetite but does not like the food provided to her.     PAST MEDICAL & SURGICAL HISTORY: None    REVIEW OF SYSTEMS: current  General: - fever or chills, + Fatigue  Skin/Breast: - rashes  Ophthalmologic: - blurred vision	  ENMT: - sore throat  Respiratory and Thorax:	- cough, - dyspnea  Cardiovascular: - chest pain, +palpitations  Gastrointestinal:	-N/V/D/C  Genitourinary: - dysuria  Musculoskeletal:	 - arthritis  Neurological: - weaknesses  Psychiatric: +anxiety, 	    MEDICATIONS  (STANDING):  amitriptyline 10 milliGRAM(s) Oral at bedtime  chlorhexidine 2% Cloths 1 Application(s) Topical <User Schedule>  chlorhexidine 4% Liquid 1 Application(s) Topical <User Schedule>  cholecalciferol 2000 Unit(s) Oral daily  dextrose 10%. 1000 milliLiter(s) (100 mL/Hr) IV Continuous <Continuous>  escitalopram 10 milliGRAM(s) Oral daily  ferrous    sulfate 325 milliGRAM(s) Oral three times a day  gabapentin 100 milliGRAM(s) Oral three times a day  heparin  Infusion. 1100 Unit(s)/Hr (11 mL/Hr) IV Continuous <Continuous>  lidocaine   Patch 1 Patch Transdermal daily  magnesium oxide 400 milliGRAM(s) Oral three times a day with meals  meropenem  IVPB 1000 milliGRAM(s) IV Intermittent every 8 hours  metoprolol tartrate 12.5 milliGRAM(s) Oral every 12 hours  octreotide  Injectable 100 MICROGram(s) SubCutaneous every 8 hours  pantoprazole    Tablet 40 milliGRAM(s) Oral before breakfast  Parenteral Nutrition - Adult 1 Each (83 mL/Hr) TPN Continuous <Continuous>  sucralfate 1 Gram(s) Oral two times a day  traZODone 50 milliGRAM(s) Oral at bedtime    MEDICATIONS  (PRN):  acetaminophen   Tablet .. 650 milliGRAM(s) Oral every 6 hours PRN Mild Pain (1 - 3)  ALPRAZolam 0.25 milliGRAM(s) Oral three times a day PRN anxiety  heparin  Injectable 4000 Unit(s) IV Push every 6 hours PRN For aPTT less than 40  heparin  Injectable 2000 Unit(s) IV Push every 6 hours PRN For aPTT between 40 - 57  melatonin 3 milliGRAM(s) Oral at bedtime PRN Insomnia  methocarbamol 500 milliGRAM(s) Oral every 6 hours PRN Muscle Spasm  ondansetron Injectable 4 milliGRAM(s) IV Push every 4 hours PRN Nausea and/or Vomiting  simethicone 80 milliGRAM(s) Chew three times a day PRN Gas  sodium chloride 0.9% lock flush 10 milliLiter(s) IV Push every 1 hour PRN Pre/post blood products, medications, blood draw, and to maintain line patency    Allergies  No Known Allergies    Intolerances  pt requests chicken broth (no beef) and CHERRY italian ice (no lemon) (Unknown)    SOCIAL HISTORY: Former smoker, x18 years 1/3 PPD, 3 drinks/day, +marijuana     FAMILY HISTORY: NO family hx of atrial fibrillation, cardiomyopathy or sudden cardiac death    Vital Signs Last 24 Hrs  T(C): 36.8 (30 Dec 2019 08:12), Max: 37 (29 Dec 2019 19:54)  T(F): 98.2 (30 Dec 2019 08:12), Max: 98.6 (29 Dec 2019 19:54)  HR: 108 (30 Dec 2019 08:12) (88 - 108)  BP: 125/85 (30 Dec 2019 08:12) (99/66 - 145/88)  BP(mean): 100 (29 Dec 2019 21:55) (100 - 100)  RR: 20 (30 Dec 2019 08:12) (16 - 20)  SpO2: 96% (30 Dec 2019 08:12) (96% - 98%)    Physical Exam:  Constitutional: AAOx3, NAD  Neck: supple, No JVD  Cardiovascular: +S1S2 RRR, no murmurs, rubs, gallops. Sinus tachycardia on exam  Pulmonary: Coarse breath sounds at bases with crackles R>L, unlabored  Abdomen: +BS, soft NTND  Extremities: no edema b/l,  Neuro: non focal, speech clear, THACKER x 4  Psych: Appropriate mood and affect    LABS:                       11.8   9.24  )-----------( 346      ( 30 Dec 2019 01:54 )             33.9     139  |  106  |  9.0  ----------------------------<  129<H>  3.9   |  22.0  |  0.30<L>  Ca    8.6      30 Dec 2019 03:33  Phos  4.1     12-29  Mg     1.6     12-30  PT/INR - ( 30 Dec 2019 01:54 )   PT: 16.5 sec;   INR: 1.42 ratio    PTT - ( 30 Dec 2019 08:50 )  PTT:94.7 sec    RADIOLOGY & ADDITIONAL STUDIES:  TTE 11/25/19  Summary:   1. Left ventricular ejection fraction, by visual estimation, is >75%.   2. Hyperdynamic global left ventricular systolic function.   3. Spectral Doppler shows impaired relaxation pattern of left ventricular myocardial filling (Grade I diastolic dysfunction).   4. Mild thickening of the anterior and posterior mitral valve leaflets.   5. Trace mitral valve regurgitation.    CXR 12/28/19  Single frontal view of the chest demonstrates small right lower lobe effusion/atelectasis, unchanged. The cardiomediastinal silhouette is normal. No acute osseous abnormalities. Overlying EKG leads and wires are noted. Left-sided PICC line catheter tip in the right atrium. Right axillary midline catheter. Right hemidiaphragm is mildly elevated.  IMPRESSION: No interval change.    CT chest 12/7/19  IMPRESSION: Interval removal of right chest tube with tract from prior   chest tube now containing air.  Right hydropneumothorax again noted. The   previously identified loculated fluid and air anteriorly has demonstrated   resolution of the fluid component in this region with air now filling the   same size space.  Skin defect with subcutaneous air and complex fluid right lateral chest   wall again noted.  New skin defect right lower anterolateral chest wall with air tract to   the right pleural space now noted.  Slight increase in fluid tracking in the right major fissure.  Areas of atelectasis in the right lung and opacities in the left upper   lobe are unchanged.  Thickened distal esophagus again noted may be on the basis of esophagitis.  Persistent renal nephrograms bilaterally may be seen in the setting of   acute tubular necrosis given recent intravenous contrast administration.   Correlation with serum creatinine recommended.    A/P  34 year old female patient with history of alcohol use with prolonged hospitalization for acute pancreatitis with Pancreaticopleural fistula, group A hemolytic strep pleural effusions, severe malnutrition, IJ thrombus now on anticoagulation who is consulted for sinus tachycardia and ventricular ectopy.     Short runs on NSVT noted, all appear monomorphic lasting up to 4-5 beats at a time with RB superior axis suggestive of papillary muscle source. Her magnesium was improved yesterday during the day but was below 2 again today. It is most likely that this is a triggered arrhythmia driven by her overall underlying infectious/inflammatory process. Recommend aggressive electrolyte repletion. Can also start Metoprolol to help suppress arrhythmia.    Lastly, amitriptyline can cause sinus tachycardia and so if patient can tolerate being off of this medication, would consider discontinuing it.    Recommendations:  - Obtain limited TTE to monitor EF  - Start Lopressor 12.5mg PO BID  - Magnesium 400mg PO TID  - Maintain K>4, Mag >2

## 2019-12-31 LAB
ANION GAP SERPL CALC-SCNC: 10 MMOL/L — SIGNIFICANT CHANGE UP (ref 5–17)
APTT BLD: 91.8 SEC — HIGH (ref 27.5–36.3)
BUN SERPL-MCNC: 6 MG/DL — LOW (ref 8–20)
CALCIUM SERPL-MCNC: 8.9 MG/DL — SIGNIFICANT CHANGE UP (ref 8.6–10.2)
CHLORIDE SERPL-SCNC: 106 MMOL/L — SIGNIFICANT CHANGE UP (ref 98–107)
CO2 SERPL-SCNC: 23 MMOL/L — SIGNIFICANT CHANGE UP (ref 22–29)
CREAT SERPL-MCNC: 0.26 MG/DL — LOW (ref 0.5–1.3)
GLUCOSE BLDC GLUCOMTR-MCNC: 103 MG/DL — HIGH (ref 70–99)
GLUCOSE BLDC GLUCOMTR-MCNC: 106 MG/DL — HIGH (ref 70–99)
GLUCOSE SERPL-MCNC: 122 MG/DL — HIGH (ref 70–115)
HCT VFR BLD CALC: 34.6 % — SIGNIFICANT CHANGE UP (ref 34.5–45)
HGB BLD-MCNC: 10.8 G/DL — LOW (ref 11.5–15.5)
INR BLD: 1.49 RATIO — HIGH (ref 0.88–1.16)
MAGNESIUM SERPL-MCNC: 1.6 MG/DL — SIGNIFICANT CHANGE UP (ref 1.6–2.6)
MCHC RBC-ENTMCNC: 29.3 PG — SIGNIFICANT CHANGE UP (ref 27–34)
MCHC RBC-ENTMCNC: 31.2 GM/DL — LOW (ref 32–36)
MCV RBC AUTO: 94 FL — SIGNIFICANT CHANGE UP (ref 80–100)
PHOSPHATE SERPL-MCNC: 3.8 MG/DL — SIGNIFICANT CHANGE UP (ref 2.4–4.7)
PLATELET # BLD AUTO: 281 K/UL — SIGNIFICANT CHANGE UP (ref 150–400)
POTASSIUM SERPL-MCNC: 3.8 MMOL/L — SIGNIFICANT CHANGE UP (ref 3.5–5.3)
POTASSIUM SERPL-SCNC: 3.8 MMOL/L — SIGNIFICANT CHANGE UP (ref 3.5–5.3)
PROTHROM AB SERPL-ACNC: 17.4 SEC — HIGH (ref 10–12.9)
RBC # BLD: 3.68 M/UL — LOW (ref 3.8–5.2)
RBC # FLD: 15.4 % — HIGH (ref 10.3–14.5)
SODIUM SERPL-SCNC: 139 MMOL/L — SIGNIFICANT CHANGE UP (ref 135–145)
WBC # BLD: 9.57 K/UL — SIGNIFICANT CHANGE UP (ref 3.8–10.5)
WBC # FLD AUTO: 9.57 K/UL — SIGNIFICANT CHANGE UP (ref 3.8–10.5)

## 2019-12-31 PROCEDURE — 99233 SBSQ HOSP IP/OBS HIGH 50: CPT

## 2019-12-31 PROCEDURE — 99232 SBSQ HOSP IP/OBS MODERATE 35: CPT

## 2019-12-31 RX ORDER — POTASSIUM CHLORIDE 20 MEQ
20 PACKET (EA) ORAL
Refills: 0 | Status: COMPLETED | OUTPATIENT
Start: 2019-12-31 | End: 2019-12-31

## 2019-12-31 RX ORDER — MAGNESIUM SULFATE 500 MG/ML
2 VIAL (ML) INJECTION
Refills: 0 | Status: COMPLETED | OUTPATIENT
Start: 2019-12-31 | End: 2019-12-31

## 2019-12-31 RX ORDER — I.V. FAT EMULSION 20 G/100ML
0.9 EMULSION INTRAVENOUS
Qty: 50 | Refills: 0 | Status: DISCONTINUED | OUTPATIENT
Start: 2019-12-31 | End: 2020-01-01

## 2019-12-31 RX ORDER — ELECTROLYTE SOLUTION,INJ
1 VIAL (ML) INTRAVENOUS
Refills: 0 | Status: DISCONTINUED | OUTPATIENT
Start: 2019-12-31 | End: 2020-01-01

## 2019-12-31 RX ORDER — FERROUS SULFATE 325(65) MG
325 TABLET ORAL DAILY
Refills: 0 | Status: DISCONTINUED | OUTPATIENT
Start: 2019-12-31 | End: 2020-01-07

## 2019-12-31 RX ORDER — WARFARIN SODIUM 2.5 MG/1
7.5 TABLET ORAL ONCE
Refills: 0 | Status: COMPLETED | OUTPATIENT
Start: 2019-12-31 | End: 2019-12-31

## 2019-12-31 RX ADMIN — ONDANSETRON 4 MILLIGRAM(S): 8 TABLET, FILM COATED ORAL at 12:08

## 2019-12-31 RX ADMIN — OCTREOTIDE ACETATE 100 MICROGRAM(S): 200 INJECTION, SOLUTION INTRAVENOUS; SUBCUTANEOUS at 18:01

## 2019-12-31 RX ADMIN — Medication 1 GRAM(S): at 18:09

## 2019-12-31 RX ADMIN — Medication 12.5 MILLIGRAM(S): at 18:00

## 2019-12-31 RX ADMIN — PANTOPRAZOLE SODIUM 40 MILLIGRAM(S): 20 TABLET, DELAYED RELEASE ORAL at 05:30

## 2019-12-31 RX ADMIN — ONDANSETRON 4 MILLIGRAM(S): 8 TABLET, FILM COATED ORAL at 08:36

## 2019-12-31 RX ADMIN — Medication 650 MILLIGRAM(S): at 09:31

## 2019-12-31 RX ADMIN — GABAPENTIN 100 MILLIGRAM(S): 400 CAPSULE ORAL at 23:05

## 2019-12-31 RX ADMIN — Medication 2000 UNIT(S): at 11:50

## 2019-12-31 RX ADMIN — Medication 100 MILLILITER(S): at 18:11

## 2019-12-31 RX ADMIN — Medication 50 GRAM(S): at 20:31

## 2019-12-31 RX ADMIN — Medication 20 MILLIEQUIVALENT(S): at 18:00

## 2019-12-31 RX ADMIN — Medication 20 MILLIEQUIVALENT(S): at 20:31

## 2019-12-31 RX ADMIN — MEROPENEM 100 MILLIGRAM(S): 1 INJECTION INTRAVENOUS at 00:03

## 2019-12-31 RX ADMIN — MAGNESIUM OXIDE 400 MG ORAL TABLET 400 MILLIGRAM(S): 241.3 TABLET ORAL at 08:31

## 2019-12-31 RX ADMIN — ESCITALOPRAM OXALATE 10 MILLIGRAM(S): 10 TABLET, FILM COATED ORAL at 11:50

## 2019-12-31 RX ADMIN — Medication 50 MILLIGRAM(S): at 23:05

## 2019-12-31 RX ADMIN — GABAPENTIN 100 MILLIGRAM(S): 400 CAPSULE ORAL at 05:30

## 2019-12-31 RX ADMIN — MEROPENEM 100 MILLIGRAM(S): 1 INJECTION INTRAVENOUS at 18:00

## 2019-12-31 RX ADMIN — MAGNESIUM OXIDE 400 MG ORAL TABLET 400 MILLIGRAM(S): 241.3 TABLET ORAL at 11:49

## 2019-12-31 RX ADMIN — SIMETHICONE 80 MILLIGRAM(S): 80 TABLET, CHEWABLE ORAL at 18:09

## 2019-12-31 RX ADMIN — Medication 325 MILLIGRAM(S): at 05:30

## 2019-12-31 RX ADMIN — HEPARIN SODIUM 1200 UNIT(S)/HR: 5000 INJECTION INTRAVENOUS; SUBCUTANEOUS at 09:55

## 2019-12-31 RX ADMIN — Medication 50 GRAM(S): at 17:59

## 2019-12-31 RX ADMIN — Medication 650 MILLIGRAM(S): at 08:44

## 2019-12-31 RX ADMIN — CHLORHEXIDINE GLUCONATE 1 APPLICATION(S): 213 SOLUTION TOPICAL at 05:32

## 2019-12-31 RX ADMIN — METHOCARBAMOL 500 MILLIGRAM(S): 500 TABLET, FILM COATED ORAL at 18:08

## 2019-12-31 RX ADMIN — MEROPENEM 100 MILLIGRAM(S): 1 INJECTION INTRAVENOUS at 08:32

## 2019-12-31 RX ADMIN — CHLORHEXIDINE GLUCONATE 1 APPLICATION(S): 213 SOLUTION TOPICAL at 05:38

## 2019-12-31 RX ADMIN — Medication 0.25 MILLIGRAM(S): at 18:00

## 2019-12-31 RX ADMIN — METHOCARBAMOL 500 MILLIGRAM(S): 500 TABLET, FILM COATED ORAL at 08:44

## 2019-12-31 RX ADMIN — Medication 12.5 MILLIGRAM(S): at 05:30

## 2019-12-31 RX ADMIN — Medication 1 GRAM(S): at 05:30

## 2019-12-31 RX ADMIN — WARFARIN SODIUM 7.5 MILLIGRAM(S): 2.5 TABLET ORAL at 23:05

## 2019-12-31 RX ADMIN — OCTREOTIDE ACETATE 100 MICROGRAM(S): 200 INJECTION, SOLUTION INTRAVENOUS; SUBCUTANEOUS at 00:03

## 2019-12-31 RX ADMIN — Medication 325 MILLIGRAM(S): at 13:31

## 2019-12-31 RX ADMIN — OCTREOTIDE ACETATE 100 MICROGRAM(S): 200 INJECTION, SOLUTION INTRAVENOUS; SUBCUTANEOUS at 23:05

## 2019-12-31 RX ADMIN — GABAPENTIN 100 MILLIGRAM(S): 400 CAPSULE ORAL at 13:31

## 2019-12-31 NOTE — PROGRESS NOTE ADULT - SUBJECTIVE AND OBJECTIVE BOX
Patient seen and examined; chart reviewed.  No fever.  Glucoses running 103 to 129.  No coverage.  Feels well.  Had vacuum drain from right chest wound removed yesterday.  Site looks good.  Breathing comfortably.  Minimal abdominal discomfort.  Tolerates clear liquids.      PAST MEDICAL & SURGICAL HISTORY:      ROS:  No Heartburn, regurgitation, dysphagia, odynophagia.  No dyspepsia  No abdominal pain.    No Nausea, vomiting.  No Bleeding.  No hematemesis.   No diarrhea.    No hematochesia.  No weight loss, anorexia.  No edema.      MEDICATIONS  (STANDING):  chlorhexidine 2% Cloths 1 Application(s) Topical <User Schedule>  chlorhexidine 4% Liquid 1 Application(s) Topical <User Schedule>  cholecalciferol 2000 Unit(s) Oral daily  dextrose 10%. 1000 milliLiter(s) (100 mL/Hr) IV Continuous <Continuous>  escitalopram 10 milliGRAM(s) Oral daily  fat emulsion (Plant Based) 20% Infusion 0.9 Gm/kG/Day (31.05 mL/Hr) IV Continuous <Continuous>  ferrous    sulfate 325 milliGRAM(s) Oral three times a day  gabapentin 100 milliGRAM(s) Oral three times a day  heparin  Infusion. 1100 Unit(s)/Hr (11 mL/Hr) IV Continuous <Continuous>  lidocaine   Patch 1 Patch Transdermal daily  magnesium oxide 400 milliGRAM(s) Oral three times a day with meals  meropenem  IVPB 1000 milliGRAM(s) IV Intermittent every 8 hours  metoprolol tartrate 12.5 milliGRAM(s) Oral every 12 hours  octreotide  Injectable 100 MICROGram(s) SubCutaneous every 8 hours  pantoprazole    Tablet 40 milliGRAM(s) Oral before breakfast  Parenteral Nutrition - Adult 1 Each (83 mL/Hr) TPN Continuous <Continuous>  Parenteral Nutrition - Adult 1 Each (83 mL/Hr) TPN Continuous <Continuous>  sucralfate 1 Gram(s) Oral two times a day  traZODone 50 milliGRAM(s) Oral at bedtime    MEDICATIONS  (PRN):  acetaminophen   Tablet .. 650 milliGRAM(s) Oral every 6 hours PRN Mild Pain (1 - 3)  ALPRAZolam 0.25 milliGRAM(s) Oral three times a day PRN anxiety  heparin  Injectable 4000 Unit(s) IV Push every 6 hours PRN For aPTT less than 40  heparin  Injectable 2000 Unit(s) IV Push every 6 hours PRN For aPTT between 40 - 57  melatonin 3 milliGRAM(s) Oral at bedtime PRN Insomnia  methocarbamol 500 milliGRAM(s) Oral every 6 hours PRN Muscle Spasm  ondansetron Injectable 4 milliGRAM(s) IV Push every 4 hours PRN Nausea and/or Vomiting  simethicone 80 milliGRAM(s) Chew three times a day PRN Gas  sodium chloride 0.9% lock flush 10 milliLiter(s) IV Push every 1 hour PRN Pre/post blood products, medications, blood draw, and to maintain line patency      Allergies    No Known Allergies    Intolerances    pt requests chicken broth (no beef) and CHERRY italian ice (no lemon) (Unknown)      Vital Signs Last 24 Hrs  T(C): 36.7 (31 Dec 2019 08:06), Max: 37 (30 Dec 2019 17:14)  T(F): 98.1 (31 Dec 2019 08:06), Max: 98.6 (30 Dec 2019 17:14)  HR: 88 (31 Dec 2019 08:06) (88 - 106)  BP: 132/87 (31 Dec 2019 08:06) (109/76 - 142/97)  BP(mean): --  RR: 18 (31 Dec 2019 08:06) (18 - 18)  SpO2: 95% (31 Dec 2019 08:06) (95% - 98%)    PHYSICAL EXAM:    GENERAL: NAD, well-groomed, well-developed  HEAD:  Atraumatic, Normocephalic  EYES: EOMI, PERRLA, conjunctiva and sclera clear  ENMT: No tonsillar erythema, exudates, or enlargement; Moist mucous membranes, Good dentition, No lesions  NECK: Supple, No JVD, Normal thyroid  CHEST/LUNG: Clear to percussion bilaterally; No rales, rhonchi, wheezing, or rubs  HEART: Regular rate and rhythm; No murmurs, rubs, or gallops  ABDOMEN: Soft, Nontender, Nondistended; Bowel sounds present  EXTREMITIES:  2+ Peripheral Pulses, No clubbing, cyanosis, or edema  LYMPH: No lymphadenopathy noted  SKIN: No rashes or lesions      LABS:                        10.8   9.57  )-----------( 281      ( 31 Dec 2019 08:17 )             34.6     12-30    139  |  106  |  9.0  ----------------------------<  129<H>  3.9   |  22.0  |  0.30<L>    Ca    8.6      30 Dec 2019 03:33  Mg     1.6     12-30      PT/INR - ( 31 Dec 2019 08:17 )   PT: 17.4 sec;   INR: 1.49 ratio         PTT - ( 31 Dec 2019 08:17 )  PTT:91.8 sec         RADIOLOGY & ADDITIONAL STUDIES:  Awaiting MRI of the chest and abd.

## 2019-12-31 NOTE — PROGRESS NOTE ADULT - ASSESSMENT
Tolerates TPN well.  Lytes all good.  TPN reordered.    Awaiting MRI of chest and abdomen.  Assess pleural fistula.

## 2019-12-31 NOTE — PROGRESS NOTE ADULT - ASSESSMENT
34 year old female with no known major comorbidities who is being treated for right pleural effusion/empyema/multifocal PNA found to have a RIJ thrombus currently on heparin drip bridge to warfarin.    - RIJ venous thrombosis -provoked event that occurred in context of her severe and complex lung illness/infection/empyema that may have led to developing the thrombus. Plan on anticoagulation for 3 months. Plan for warfarin noted as outpatient.   - Multifactorial anemia; slightly down to 10.8 today. monitor.  - Reactive leukocytosis, improved.    - Pancreatic Fistula-care per managing service/ID/GI

## 2019-12-31 NOTE — PROGRESS NOTE ADULT - SUBJECTIVE AND OBJECTIVE BOX
Patient seen today in bed. No acute overnight events. Results of yesterdays testing discussed with patient and understood.     TELE: SR with rates in the high 80-90s    MEDICATIONS  (STANDING):  chlorhexidine 2% Cloths 1 Application(s) Topical <User Schedule>  chlorhexidine 4% Liquid 1 Application(s) Topical <User Schedule>  cholecalciferol 2000 Unit(s) Oral daily  dextrose 10%. 1000 milliLiter(s) (100 mL/Hr) IV Continuous <Continuous>  escitalopram 10 milliGRAM(s) Oral daily  fat emulsion (Plant Based) 20% Infusion 0.9 Gm/kG/Day (31.05 mL/Hr) IV Continuous <Continuous>  ferrous    sulfate 325 milliGRAM(s) Oral three times a day  gabapentin 100 milliGRAM(s) Oral three times a day  heparin  Infusion. 1100 Unit(s)/Hr (11 mL/Hr) IV Continuous <Continuous>  lidocaine   Patch 1 Patch Transdermal daily  magnesium oxide 400 milliGRAM(s) Oral three times a day with meals  meropenem  IVPB 1000 milliGRAM(s) IV Intermittent every 8 hours  metoprolol tartrate 12.5 milliGRAM(s) Oral every 12 hours  octreotide  Injectable 100 MICROGram(s) SubCutaneous every 8 hours  pantoprazole    Tablet 40 milliGRAM(s) Oral before breakfast  Parenteral Nutrition - Adult 1 Each (83 mL/Hr) TPN Continuous <Continuous>  Parenteral Nutrition - Adult 1 Each (83 mL/Hr) TPN Continuous <Continuous>  sucralfate 1 Gram(s) Oral two times a day  traZODone 50 milliGRAM(s) Oral at bedtime    MEDICATIONS  (PRN):  acetaminophen   Tablet .. 650 milliGRAM(s) Oral every 6 hours PRN Mild Pain (1 - 3)  ALPRAZolam 0.25 milliGRAM(s) Oral three times a day PRN anxiety  heparin  Injectable 4000 Unit(s) IV Push every 6 hours PRN For aPTT less than 40  heparin  Injectable 2000 Unit(s) IV Push every 6 hours PRN For aPTT between 40 - 57  melatonin 3 milliGRAM(s) Oral at bedtime PRN Insomnia  methocarbamol 500 milliGRAM(s) Oral every 6 hours PRN Muscle Spasm  ondansetron Injectable 4 milliGRAM(s) IV Push every 4 hours PRN Nausea and/or Vomiting  simethicone 80 milliGRAM(s) Chew three times a day PRN Gas  sodium chloride 0.9% lock flush 10 milliLiter(s) IV Push every 1 hour PRN Pre/post blood products, medications, blood draw, and to maintain line patency    Allergies  No Known Allergies    Intolerances  pt requests chicken broth (no beef) and CHERRY italian ice (no lemon) (Unknown)    PAST MEDICAL & SURGICAL HISTORY:    Vital Signs Last 24 Hrs  T(C): 36.7 (31 Dec 2019 08:06), Max: 37 (30 Dec 2019 17:14)  T(F): 98.1 (31 Dec 2019 08:06), Max: 98.6 (30 Dec 2019 17:14)  HR: 88 (31 Dec 2019 08:06) (88 - 106)  BP: 132/87 (31 Dec 2019 08:06) (109/76 - 142/97)  RR: 18 (31 Dec 2019 08:06) (18 - 18)  SpO2: 95% (31 Dec 2019 08:06) (95% - 98%)    Physical Exam:  Constitutional: AAOx3, NAD  Neck: supple, No JVD  Cardiovascular: +S1S2 RRR, no murmurs, rubs, gallops. Sinus tachycardia on exam  Pulmonary: Coarse breath sounds at bases with crackles R>L, unlabored  Abdomen: +BS, soft NTND  Extremities: no edema b/l,  Neuro: non focal, speech clear, THACKER x 4  Psych: Appropriate mood and affect    LABS:                        10.8   9.57  )-----------( 281      ( 31 Dec 2019 08:17 )             34.6     139  |  106  |  6.0<L>  ----------------------------<  122<H>  3.8   |  23.0  |  0.26<L>  Ca    8.9      31 Dec 2019 08:17  Phos  3.8     12-31  Mg     1.6     12-31  PT/INR - ( 31 Dec 2019 08:17 )   PT: 17.4 sec;   INR: 1.49 ratio    PTT - ( 31 Dec 2019 08:17 )  PTT:91.8 sec    RADIOLOGY & ADDITIONAL TESTS:  Echo 12/30/19  Summary:   1. Normal left atrial size.   2. Normal wall motion. Left ventricular ejection fraction, by visual estimation, is 65 to 70%.   3. Normal right atrial size.   4. Normal right ventricular size and function.   5. No significant valvular abnormality.   6. There is no evidence of pericardial effusion.    A/P  34 year old female patient with history of alcohol use with prolonged hospitalization for acute pancreatitis with Pancreaticopleural fistula, group A hemolytic strep pleural effusions, severe malnutrition, IJ thrombus now on anticoagulation who is consulted for sinus tachycardia and ventricular ectopy.     - Patient tolerating beta blocker so far.   - No further EP recommendations. Patient seen today in bed. No acute overnight events. Results of yesterdays testing discussed with patient and understood.     TELE: SR with rates in the high 80-90s. Still with occasional NSVT (4-5 beats).    MEDICATIONS  (STANDING):  chlorhexidine 2% Cloths 1 Application(s) Topical <User Schedule>  chlorhexidine 4% Liquid 1 Application(s) Topical <User Schedule>  cholecalciferol 2000 Unit(s) Oral daily  dextrose 10%. 1000 milliLiter(s) (100 mL/Hr) IV Continuous <Continuous>  escitalopram 10 milliGRAM(s) Oral daily  fat emulsion (Plant Based) 20% Infusion 0.9 Gm/kG/Day (31.05 mL/Hr) IV Continuous <Continuous>  ferrous    sulfate 325 milliGRAM(s) Oral three times a day  gabapentin 100 milliGRAM(s) Oral three times a day  heparin  Infusion. 1100 Unit(s)/Hr (11 mL/Hr) IV Continuous <Continuous>  lidocaine   Patch 1 Patch Transdermal daily  magnesium oxide 400 milliGRAM(s) Oral three times a day with meals  meropenem  IVPB 1000 milliGRAM(s) IV Intermittent every 8 hours  metoprolol tartrate 12.5 milliGRAM(s) Oral every 12 hours  octreotide  Injectable 100 MICROGram(s) SubCutaneous every 8 hours  pantoprazole    Tablet 40 milliGRAM(s) Oral before breakfast  Parenteral Nutrition - Adult 1 Each (83 mL/Hr) TPN Continuous <Continuous>  Parenteral Nutrition - Adult 1 Each (83 mL/Hr) TPN Continuous <Continuous>  sucralfate 1 Gram(s) Oral two times a day  traZODone 50 milliGRAM(s) Oral at bedtime    MEDICATIONS  (PRN):  acetaminophen   Tablet .. 650 milliGRAM(s) Oral every 6 hours PRN Mild Pain (1 - 3)  ALPRAZolam 0.25 milliGRAM(s) Oral three times a day PRN anxiety  heparin  Injectable 4000 Unit(s) IV Push every 6 hours PRN For aPTT less than 40  heparin  Injectable 2000 Unit(s) IV Push every 6 hours PRN For aPTT between 40 - 57  melatonin 3 milliGRAM(s) Oral at bedtime PRN Insomnia  methocarbamol 500 milliGRAM(s) Oral every 6 hours PRN Muscle Spasm  ondansetron Injectable 4 milliGRAM(s) IV Push every 4 hours PRN Nausea and/or Vomiting  simethicone 80 milliGRAM(s) Chew three times a day PRN Gas  sodium chloride 0.9% lock flush 10 milliLiter(s) IV Push every 1 hour PRN Pre/post blood products, medications, blood draw, and to maintain line patency    Allergies  No Known Allergies    Intolerances  pt requests chicken broth (no beef) and CHERRY italian ice (no lemon) (Unknown)    PAST MEDICAL & SURGICAL HISTORY:    Vital Signs Last 24 Hrs  T(C): 36.7 (31 Dec 2019 08:06), Max: 37 (30 Dec 2019 17:14)  T(F): 98.1 (31 Dec 2019 08:06), Max: 98.6 (30 Dec 2019 17:14)  HR: 88 (31 Dec 2019 08:06) (88 - 106)  BP: 132/87 (31 Dec 2019 08:06) (109/76 - 142/97)  RR: 18 (31 Dec 2019 08:06) (18 - 18)  SpO2: 95% (31 Dec 2019 08:06) (95% - 98%)    Physical Exam:  Constitutional: AAOx3, NAD  Neck: supple, No JVD  Cardiovascular: +S1S2 RRR, no murmurs, rubs, gallops. Sinus tachycardia on exam  Pulmonary: CTAB anteriorly  Abdomen: +BS, soft NTND  Extremities: no edema b/l,  Neuro: non focal, speech clear, THACKER x 4  Psych: Appropriate mood and affect    LABS:                        10.8   9.57  )-----------( 281      ( 31 Dec 2019 08:17 )             34.6     139  |  106  |  6.0<L>  ----------------------------<  122<H>  3.8   |  23.0  |  0.26<L>  Ca    8.9      31 Dec 2019 08:17  Phos  3.8     12-31  Mg     1.6     12-31  PT/INR - ( 31 Dec 2019 08:17 )   PT: 17.4 sec;   INR: 1.49 ratio    PTT - ( 31 Dec 2019 08:17 )  PTT:91.8 sec    RADIOLOGY & ADDITIONAL TESTS:  Echo 12/30/19  Summary:   1. Normal left atrial size.   2. Normal wall motion. Left ventricular ejection fraction, by visual estimation, is 65 to 70%.   3. Normal right atrial size.   4. Normal right ventricular size and function.   5. No significant valvular abnormality.   6. There is no evidence of pericardial effusion.    A/P  34 year old female patient with history of alcohol use with prolonged hospitalization for acute pancreatitis with Pancreaticopleural fistula, group A hemolytic strep pleural effusions, severe malnutrition, IJ thrombus now on anticoagulation who is consulted for sinus tachycardia and ventricular ectopy.     - Patient tolerating beta blocker so far.   - Continue with aggressive electrolyte repletion K>4, Mg>2  - No further EP recommendations.     Luis Glass MD  Clinical Cardiac Electrophysiology

## 2019-12-31 NOTE — PROGRESS NOTE ADULT - ASSESSMENT
34 y.o. F who was  transferred from [facility] to [facility] on 11/22 for complaints of weakness, 25 pound weight loss & flu like symptoms & found to have a large right pleural effusion,   pleural fluid cultures + for group A hemolytic strep, + RIJ thormbus   11/23 CT of the chest/abd/pelvis indicated a clot in the Right IJ, pt started on heparin gtt.   s/p right VATS decort & washout debridement of CT site & VAC placement on 11/27/19.   Persistent drainage from chest tube site so repeat CT C/A/P performed 12/4 which showed new cystic mass in pancreas and liver concerning for pancreatitis  Zosyn changed to meropenem on 12/6  for broader coverage given pancreatitis  Copious drainage from anterior chest tube site, so VAC removed and ostomy appliance applied for high output on 12/6.   12/7 Right chest tube removed.   Repeat CT scan showed a persistent Right hydropneumothorax with air anteriorly  12/8 Ostomy appliance removed and dressing applied.   12/9 s.p PICC placement & Right anterior pig tail catheter & right lateral wound vac placement   MRCP significant for Peripancreatic fluid and edema consistent with pancreatitis. Cystic lesions in the pancreatic head with the largest measuring 2.1 cm, likely acute peripancreatic collections, increased in size since 12/4/2019, with a tract of fluid extending from the 2.1 cm cystic lesion into the mediastinum and towards the pleural space on the right suspicious for a pancreatic pleural fistula.    R pleural effusion  Pancreatitis  R pancreaticopleural fistula   Alcohol abuse   WBC elevation - resolved  Acute renal failure - resolved    - All cultures negative , blood, urine, pleural fluid and chest tube insertion site.  - Leukocytosis now resolved  - No fever   ENCOURAGE PO intake.  - Continue Meropenem 1gm q8h for necrotizing pancreatitis and fistula with pleural space. GI and surgery following.   - Has 2 PICC lines, on TPN and ABx, watch for superinfection  - REPEAT MRI PENDING    - Plan for antibiotics till 1/3/20 to complete 4 weeks from Chest tube removal     Will follow

## 2019-12-31 NOTE — PROGRESS NOTE ADULT - SUBJECTIVE AND OBJECTIVE BOX
Patient seen and examined . Feels good , tolerating clear liquid diet well . had 2 loose stools , no other complaints .     CC : loose stools         MEDICATIONS  (STANDING):  chlorhexidine 2% Cloths 1 Application(s) Topical <User Schedule>  chlorhexidine 4% Liquid 1 Application(s) Topical <User Schedule>  cholecalciferol 2000 Unit(s) Oral daily  dextrose 10%. 1000 milliLiter(s) (100 mL/Hr) IV Continuous <Continuous>  escitalopram 10 milliGRAM(s) Oral daily  fat emulsion (Plant Based) 20% Infusion 0.9 Gm/kG/Day (31.05 mL/Hr) IV Continuous <Continuous>  ferrous    sulfate 325 milliGRAM(s) Oral three times a day  gabapentin 100 milliGRAM(s) Oral three times a day  heparin  Infusion. 1100 Unit(s)/Hr (11 mL/Hr) IV Continuous <Continuous>  lidocaine   Patch 1 Patch Transdermal daily  magnesium oxide 400 milliGRAM(s) Oral three times a day with meals  meropenem  IVPB 1000 milliGRAM(s) IV Intermittent every 8 hours  metoprolol tartrate 12.5 milliGRAM(s) Oral every 12 hours  octreotide  Injectable 100 MICROGram(s) SubCutaneous every 8 hours  pantoprazole    Tablet 40 milliGRAM(s) Oral before breakfast  Parenteral Nutrition - Adult 1 Each (83 mL/Hr) TPN Continuous <Continuous>  Parenteral Nutrition - Adult 1 Each (83 mL/Hr) TPN Continuous <Continuous>  sucralfate 1 Gram(s) Oral two times a day  traZODone 50 milliGRAM(s) Oral at bedtime    MEDICATIONS  (PRN):  acetaminophen   Tablet .. 650 milliGRAM(s) Oral every 6 hours PRN Mild Pain (1 - 3)  ALPRAZolam 0.25 milliGRAM(s) Oral three times a day PRN anxiety  heparin  Injectable 4000 Unit(s) IV Push every 6 hours PRN For aPTT less than 40  heparin  Injectable 2000 Unit(s) IV Push every 6 hours PRN For aPTT between 40 - 57  melatonin 3 milliGRAM(s) Oral at bedtime PRN Insomnia  methocarbamol 500 milliGRAM(s) Oral every 6 hours PRN Muscle Spasm  ondansetron Injectable 4 milliGRAM(s) IV Push every 4 hours PRN Nausea and/or Vomiting  simethicone 80 milliGRAM(s) Chew three times a day PRN Gas  sodium chloride 0.9% lock flush 10 milliLiter(s) IV Push every 1 hour PRN Pre/post blood products, medications, blood draw, and to maintain line patency      LABS:                          10.8   9.57  )-----------( 281      ( 31 Dec 2019 08:17 )             34.6         139  |  106  |  6.0<L>  ----------------------------<  122<H>  3.8   |  23.0  |  0.26<L>    Ca    8.9      31 Dec 2019 08:17  Phos  3.8       Mg     1.6           PT/INR - ( 31 Dec 2019 08:17 )   PT: 17.4 sec;   INR: 1.49 ratio         PTT - ( 31 Dec 2019 08:17 )  PTT:91.8 sec      RADIOLOGY & ADDITIONAL TESTS:  < from: Xray Chest 1 View- PORTABLE-Routine (19 @ 06:07) >   EXAM:  XR CHEST PORTABLE ROUTINE 1V                          PROCEDURE DATE:  2019          INTERPRETATION:  TECHNIQUE: Single portable view of the chest.    COMPARISON: 2019    CLINICAL HISTORY: Rt VATSCTS    FINDINGS:    Single frontal view of the chest demonstrates small right lower lobe effusion/atelectasis, unchanged. The cardiomediastinal silhouette is normal. No acute osseous abnormalities. Overlying EKG leads and wires are noted. Left-sided PICC line catheter tip in the right atrium. Right axillary midline catheter. Right hemidiaphragm is mildly elevated.    IMPRESSION: No interval change.                AUDREY MEYER M.D., ATTENDING RADIOLOGIST  This document has been electronically signed. Dec 28 2019 11:37AM        < end of copied text >    < from: TTE Echo Limited or F/U (19 @ 13:15) >    EXAM:  ECHO TRANSTHORACIC;F U OR LTD      PROCEDURE DATE:  Dec 30 2019   .      INTERPRETATION:  REPORT:    TRANSTHORACIC ECHOCARDIOGRAM REPORT         Patient Name:   HERACLIO MOLINA Patient Location: Inpatient  Medical Rec #:  [id number]        Accession #:      [id number]  Account #:      [id number]        Height:           65.7 in 167.0 cm  YOB: 1985       Weight:           121.3 lb 55.00 kg  Patient Age:    34 years        BSA:              1.61 m²  Patient Gender: F BP:               125/85 mmHg       Date of Exam:        2019 1:15:28 PM  Sonographer:         Eris Saucedo  Referring Physician: Iwona Rosa MD    Procedure:     Follow up or Limited Echocardiogram.  Indications:   Abnormal electrocardiogram [ECG] [EKG] - R94.31  Diagnosis:     Abnormal electrocardiogram [ECG] [EKG] - R94.31  Study Details: Technically adequate study.         2D AND M-MODE MEASUREMENTS (normal ranges within parentheses):  Left Ventricle:                  Normal   Aorta/Left Atrium:          Normal  IVSd (2D):              0.96 cm (0.7-1.1) Left Atrium (2D):  3.03 cm (1.9-4.0)  LVPWd (2D):             0.89 cm (0.7-1.1) Right Ventricle:  LVIDd (2D):             4.28 cm (3.4-5.7) RVd (2D):        2.73 cm  LVIDs (2D):           2.19 cm  LV FS (2D):             48.7 %   (>25%)  Relative Wall Thickness  0.42    (<0.42)    LV SYSTOLIC FUNCTION BY 2D PLANIMETRY (MOD):  EF-Biplane: 69 %    SPECTRAL DOPPLER ANALYSIS (where applicable):  Aortic Valve: AoV Max Bunny: 1.28 m/s AoV Peak P.6 mmHg AoV Mean PG:    LVOT Vmax: 0.90 m/s LVOT VTI:  LVOT Diameter:       PHYSICIAN INTERPRETATION:  Left Ventricle: The left ventricular internal cavity size is normal.  Global LV systolic function was normal. Left ventricular ejection fraction, by visual estimation, is 65 to 70%.  Right Ventricle: Normal right ventricular size and function.  Left Atrium: Normal left atrial size.  Right Atrium: Normal right atrial size.  Pericardium: There is no evidence of pericardial effusion.  Mitral Valve: Trace mitral valve regurgitation is seen.  Tricuspid Valve: Trivial tricuspid regurgitation is visualized.  Aortic Valve: The aortic valve is trileaflet. No evidence of aortic valve regurgitation is seen.  Pulmonic Valve: Structurally normal pulmonic valve, with normal leaflet excursion. Trace pulmonic valve regurgitation.  Aorta: The aortic root is normal in size and structure.  Pulmonary Artery: The main pulmonary artery is normal in size.  Venous: The inferior vena cava was normal sized, with respiratory size variation greater than 50%.  In comparison to the previous echocardiogram(s): The left ventricular function LVEF on  was hyperdynaminc.       Summary:   1. Normal left atrial size.   2. Normal wall motion. Left ventricular ejection fraction, by visual estimation, is 65 to 70%.   3. Normal right atrial size.   4. Normal right ventricular size and function.   5. No significant valvular abnormality.   6. There is no evidence of pericardial effusion.    MD Roel, RPVI Electronically signed on 2019 at 6:56:27 PM              *** Final ***                  MERT ANDERSON M.D., ATTENDING CARDIOLOGY  This document has been electronically signed. Dec 30 2019  1:15PM        < end of copied text >          REVIEW OF SYSTEMS:    Loose stools , all other systems are reviewed and are negative .     Vital Signs Last 24 Hrs  T(C): 36.7 (31 Dec 2019 08:06), Max: 37 (30 Dec 2019 17:14)  T(F): 98.1 (31 Dec 2019 08:06), Max: 98.6 (30 Dec 2019 17:14)  HR: 88 (31 Dec 2019 08:06) (88 - 95)  BP: 132/87 (31 Dec 2019 08:06) (109/76 - 132/87)  BP(mean): --  RR: 18 (31 Dec 2019 08:06) (18 - 18)  SpO2: 95% (31 Dec 2019 08:06) (95% - 98%)    PHYSICAL EXAM:    GENERAL: NAD, well-groomed, well-developed  HEAD:  Atraumatic, Normocephalic  EYES: EOMI, PERRLA, conjunctiva and sclera clear  NECK: Supple, No JVD, Normal thyroid  NERVOUS SYSTEM:  Alert & Oriented X3, no focal deficit  CHEST/LUNG: CTA b/l ,  no  rales, rhonchi, wheezing, or rubs  HEART: Regular rate and rhythm; No murmurs, rubs, or gallops  ABDOMEN: Soft, mild epigastric tenderness + .  Nondistended; Bowel sounds present  EXTREMITIES:  2+ Peripheral Pulses, No clubbing, cyanosis, or edema  LYMPH: No lymphadenopathy noted  SKIN: No rashes or lesions

## 2019-12-31 NOTE — PROGRESS NOTE ADULT - ASSESSMENT
34 year old woman with no known major comorbidities (had not gone to see a doctor for 15 years) who presented to [facility], [place] on 11/22/2019 with progressive sob, weakness, weight loss, She was found to have a large pleural effusion. Initially 1.5 L of dark serous fluid was removed. Chest tube placed shortly thereafter, 3 additional liters was removed. pleural fluid cultures + for group A hemolytic strep,CBC showed a leukocytosis of 40k and CTA imaging revealed a 2.1cm partially cystic mass in the body/tail of the pancreas with possible upstream ductal dilatation. On 11/23/2019 the patient had a CT chest/abdomen/pelvis that found to have a clot in the right IJ and was started on a heparin gtt   s/p right VATS decort & washout debridement of CT site & VAC placement on 11/27/19  Persistent drainage from chest tube site so repeat CT C/A/P performed 12/4 which showed new cystic mass in pancreas and liver concerning for pancreatitis  Zosyn changed to meropenem on 12/6  for broader coverage given pancreatitis  Copious drainage from anterior chest tube site, so VAC removed and ostomy appliance applied for high output on 12/6.   12/7 Right chest tube removed.   Repeat CT scan showed a persistent Right hydropneumothorax with air anteriorly  12/8 Ostomy appliance removed and dressing applied.   12/9 s.p PICC placement & Right anterior pig tail catheter & right lateral wound vac placement by thoracic surgery   MRCP significant for Peripancreatic fluid and edema consistent with pancreatitis. Cystic lesions in the pancreatic head with the largest measuring 2.1 cm, likely acute peripancreatic collections, increased in size since 12/4/2019, with a tract of fluid extending from the 2.1 cm cystic lesion into the mediastinum and towards the pleural space on the right suspicious for a pancreatic pleural fistula.  MRI showed  pancreatic / pleural fistula and Multifocal PNA  OCtreotide/ TPN started by GI with pt being on clears PO, Heme- transitioned to Coumadin with  iv heparin bridging   12/9/19 PICC line placed, b/l   rt leg swelling- no DVT  NSVTs since post 12/25/19; EP consulted; lopressor started    Above noted and appreciated . 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 Since > 4 weeks now plan to repeat MRI  to assess fistula/ pancreatitis/ pancreatic ducts. and then make decisions about diet  - GI order TPN daily   - cont clear diet for now     #  Hyperphosphatemia  - resolved  - adjusted with TPN today by GI    #Loose stools sec to being on clear liq diet , PO Mag changed to iv Mag   - GI PCR negative    #Rt IJ thrombus  - Heparin bridging with Coumadin until INR therapeutic , follow INR in am   - cont daily INR and warfarin order   - coumadin for 3 month per hematology     #Anemia of chronic disease  - s/p multiple transfusions   - hb stable     #insomnia/ anxiety   - Trazadone and melatonin    #severe protein calorie malnutrition  on nutritional supplements and TPN    #DVT prophylaxis  - on coumadin

## 2020-01-01 LAB
ANION GAP SERPL CALC-SCNC: 11 MMOL/L — SIGNIFICANT CHANGE UP (ref 5–17)
APTT BLD: 113.7 SEC — HIGH (ref 27.5–36.3)
BUN SERPL-MCNC: 6 MG/DL — LOW (ref 8–20)
CALCIUM SERPL-MCNC: 9.2 MG/DL — SIGNIFICANT CHANGE UP (ref 8.6–10.2)
CHLORIDE SERPL-SCNC: 103 MMOL/L — SIGNIFICANT CHANGE UP (ref 98–107)
CO2 SERPL-SCNC: 23 MMOL/L — SIGNIFICANT CHANGE UP (ref 22–29)
CREAT SERPL-MCNC: 0.31 MG/DL — LOW (ref 0.5–1.3)
GLUCOSE SERPL-MCNC: 125 MG/DL — HIGH (ref 70–115)
HCT VFR BLD CALC: 34.6 % — SIGNIFICANT CHANGE UP (ref 34.5–45)
HGB BLD-MCNC: 11.8 G/DL — SIGNIFICANT CHANGE UP (ref 11.5–15.5)
INR BLD: 1.54 RATIO — HIGH (ref 0.88–1.16)
MAGNESIUM SERPL-MCNC: 1.7 MG/DL — SIGNIFICANT CHANGE UP (ref 1.6–2.6)
MCHC RBC-ENTMCNC: 31.9 PG — SIGNIFICANT CHANGE UP (ref 27–34)
MCHC RBC-ENTMCNC: 34.1 GM/DL — SIGNIFICANT CHANGE UP (ref 32–36)
MCV RBC AUTO: 93.5 FL — SIGNIFICANT CHANGE UP (ref 80–100)
PHOSPHATE SERPL-MCNC: 4 MG/DL — SIGNIFICANT CHANGE UP (ref 2.4–4.7)
PLATELET # BLD AUTO: 345 K/UL — SIGNIFICANT CHANGE UP (ref 150–400)
POTASSIUM SERPL-MCNC: 4 MMOL/L — SIGNIFICANT CHANGE UP (ref 3.5–5.3)
POTASSIUM SERPL-SCNC: 4 MMOL/L — SIGNIFICANT CHANGE UP (ref 3.5–5.3)
PROTHROM AB SERPL-ACNC: 18 SEC — HIGH (ref 10–12.9)
RBC # BLD: 3.7 M/UL — LOW (ref 3.8–5.2)
RBC # FLD: 15.8 % — HIGH (ref 10.3–14.5)
SODIUM SERPL-SCNC: 137 MMOL/L — SIGNIFICANT CHANGE UP (ref 135–145)
WBC # BLD: 10.56 K/UL — HIGH (ref 3.8–10.5)
WBC # FLD AUTO: 10.56 K/UL — HIGH (ref 3.8–10.5)

## 2020-01-01 PROCEDURE — 74183 MRI ABD W/O CNTR FLWD CNTR: CPT | Mod: 26

## 2020-01-01 PROCEDURE — 99233 SBSQ HOSP IP/OBS HIGH 50: CPT

## 2020-01-01 PROCEDURE — 99232 SBSQ HOSP IP/OBS MODERATE 35: CPT

## 2020-01-01 RX ORDER — TRAMADOL HYDROCHLORIDE 50 MG/1
25 TABLET ORAL THREE TIMES A DAY
Refills: 0 | Status: DISCONTINUED | OUTPATIENT
Start: 2020-01-01 | End: 2020-01-07

## 2020-01-01 RX ORDER — WARFARIN SODIUM 2.5 MG/1
10 TABLET ORAL ONCE
Refills: 0 | Status: COMPLETED | OUTPATIENT
Start: 2020-01-01 | End: 2020-01-01

## 2020-01-01 RX ORDER — ELECTROLYTE SOLUTION,INJ
1 VIAL (ML) INTRAVENOUS
Refills: 0 | Status: DISCONTINUED | OUTPATIENT
Start: 2020-01-01 | End: 2020-01-02

## 2020-01-01 RX ORDER — MAGNESIUM SULFATE 500 MG/ML
2 VIAL (ML) INJECTION ONCE
Refills: 0 | Status: COMPLETED | OUTPATIENT
Start: 2020-01-01 | End: 2020-01-01

## 2020-01-01 RX ORDER — I.V. FAT EMULSION 20 G/100ML
0.9 EMULSION INTRAVENOUS
Qty: 50 | Refills: 0 | Status: DISCONTINUED | OUTPATIENT
Start: 2020-01-01 | End: 2020-01-01

## 2020-01-01 RX ADMIN — LIDOCAINE 1 PATCH: 4 CREAM TOPICAL at 19:00

## 2020-01-01 RX ADMIN — METHOCARBAMOL 500 MILLIGRAM(S): 500 TABLET, FILM COATED ORAL at 12:01

## 2020-01-01 RX ADMIN — MEROPENEM 100 MILLIGRAM(S): 1 INJECTION INTRAVENOUS at 17:53

## 2020-01-01 RX ADMIN — Medication 325 MILLIGRAM(S): at 12:03

## 2020-01-01 RX ADMIN — Medication 50 GRAM(S): at 18:24

## 2020-01-01 RX ADMIN — MEROPENEM 100 MILLIGRAM(S): 1 INJECTION INTRAVENOUS at 08:53

## 2020-01-01 RX ADMIN — Medication 50 MILLIGRAM(S): at 21:10

## 2020-01-01 RX ADMIN — ESCITALOPRAM OXALATE 10 MILLIGRAM(S): 10 TABLET, FILM COATED ORAL at 12:02

## 2020-01-01 RX ADMIN — MEROPENEM 100 MILLIGRAM(S): 1 INJECTION INTRAVENOUS at 00:28

## 2020-01-01 RX ADMIN — LIDOCAINE 1 PATCH: 4 CREAM TOPICAL at 12:04

## 2020-01-01 RX ADMIN — Medication 1 GRAM(S): at 18:03

## 2020-01-01 RX ADMIN — I.V. FAT EMULSION 31.05 GM/KG/DAY: 20 EMULSION INTRAVENOUS at 03:41

## 2020-01-01 RX ADMIN — GABAPENTIN 100 MILLIGRAM(S): 400 CAPSULE ORAL at 04:00

## 2020-01-01 RX ADMIN — GABAPENTIN 100 MILLIGRAM(S): 400 CAPSULE ORAL at 14:01

## 2020-01-01 RX ADMIN — Medication 650 MILLIGRAM(S): at 12:28

## 2020-01-01 RX ADMIN — I.V. FAT EMULSION 31.25 GM/KG/DAY: 20 EMULSION INTRAVENOUS at 21:11

## 2020-01-01 RX ADMIN — Medication 12.5 MILLIGRAM(S): at 18:00

## 2020-01-01 RX ADMIN — TRAMADOL HYDROCHLORIDE 25 MILLIGRAM(S): 50 TABLET ORAL at 14:01

## 2020-01-01 RX ADMIN — Medication 0.25 MILLIGRAM(S): at 12:02

## 2020-01-01 RX ADMIN — TRAMADOL HYDROCHLORIDE 25 MILLIGRAM(S): 50 TABLET ORAL at 21:45

## 2020-01-01 RX ADMIN — Medication 12.5 MILLIGRAM(S): at 04:00

## 2020-01-01 RX ADMIN — WARFARIN SODIUM 10 MILLIGRAM(S): 2.5 TABLET ORAL at 21:10

## 2020-01-01 RX ADMIN — HEPARIN SODIUM 1100 UNIT(S)/HR: 5000 INJECTION INTRAVENOUS; SUBCUTANEOUS at 15:53

## 2020-01-01 RX ADMIN — Medication 0.25 MILLIGRAM(S): at 02:00

## 2020-01-01 RX ADMIN — Medication 650 MILLIGRAM(S): at 11:58

## 2020-01-01 RX ADMIN — PANTOPRAZOLE SODIUM 40 MILLIGRAM(S): 20 TABLET, DELAYED RELEASE ORAL at 04:00

## 2020-01-01 RX ADMIN — TRAMADOL HYDROCHLORIDE 25 MILLIGRAM(S): 50 TABLET ORAL at 21:11

## 2020-01-01 RX ADMIN — Medication 2000 UNIT(S): at 12:03

## 2020-01-01 RX ADMIN — CHLORHEXIDINE GLUCONATE 1 APPLICATION(S): 213 SOLUTION TOPICAL at 04:00

## 2020-01-01 RX ADMIN — GABAPENTIN 100 MILLIGRAM(S): 400 CAPSULE ORAL at 21:10

## 2020-01-01 RX ADMIN — Medication 1 EACH: at 03:41

## 2020-01-01 RX ADMIN — OCTREOTIDE ACETATE 100 MICROGRAM(S): 200 INJECTION, SOLUTION INTRAVENOUS; SUBCUTANEOUS at 11:57

## 2020-01-01 RX ADMIN — Medication 1 GRAM(S): at 04:00

## 2020-01-01 NOTE — PROGRESS NOTE ADULT - PROBLEM SELECTOR PROBLEM 1
Empyema
Empyema
Pancreatic cyst
Protein-calorie malnutrition
Protein-calorie malnutrition
Right-sided chest wall pain
Acute postoperative pain
Empyema
Internal jugular vein thrombosis, right
Pain managed using patient-controlled analgesia (PCA)
Pleural effusion on right
Right-sided chest wall pain
Right-sided chest wall pain
Empyema
Pleural effusion on right

## 2020-01-01 NOTE — PROGRESS NOTE ADULT - PROBLEM SELECTOR PLAN 1
- severe pancreatitis with pleural pancreatic fistula.   - mri shows improvement , but not resolution of peripancreatic fluid  - will discuss case with Dr Castanon and decide about diet advancement. Continue clears until tomorrow. TPN renewed

## 2020-01-01 NOTE — PROGRESS NOTE ADULT - ASSESSMENT
34 year old woman with no known major comorbidities (had not gone to see a doctor for 15 years) who presented to [facility] on 11/22/2019 with progressive sob, weakness, weight loss, She was found to have a large pleural effusion. Initially 1.5 L of dark serous fluid was removed. Chest tube placed shortly thereafter, 3 additional liters was removed. pleural fluid cultures + for group A hemolytic strep,CBC showed a leukocytosis of 40k and CTA imaging revealed a 2.1cm partially cystic mass in the body/tail of the pancreas with possible upstream ductal dilatation. On 11/23/2019 the patient had a CT chest/abdomen/pelvis that found to have a clot in the right IJ and was started on a heparin gtt   s/p right VATS decort & washout debridement of CT site & VAC placement on 11/27/19  Persistent drainage from chest tube site so repeat CT C/A/P performed 12/4 which showed new cystic mass in pancreas and liver concerning for pancreatitis  Zosyn changed to meropenem on 12/6  for broader coverage given pancreatitis  Copious drainage from anterior chest tube site, so VAC removed and ostomy appliance applied for high output on 12/6.   12/7 Right chest tube removed.   Repeat CT scan showed a persistent Right hydropneumothorax with air anteriorly  12/8 Ostomy appliance removed and dressing applied.   12/9 s.p PICC placement & Right anterior pig tail catheter & right lateral wound vac placement by thoracic surgery   MRCP significant for Peripancreatic fluid and edema consistent with pancreatitis. Cystic lesions in the pancreatic head with the largest measuring 2.1 cm, likely acute peripancreatic collections, increased in size since 12/4/2019, with a tract of fluid extending from the 2.1 cm cystic lesion into the mediastinum and towards the pleural space on the right suspicious for a pancreatic pleural fistula.  MRI showed  pancreatic / pleural fistula and Multifocal PNA  OCtreotide/ TPN started by GI with pt being on clears PO, Heme- transitioned to Coumadin with  iv heparin bridging   12/9/19 PICC line placed, b/l   Above noted and appreciated . Today feels better , no loose stools , had 1 formed stool so far ,  on monitor HR in 90's - 100's  3 run of NSVT , patient asymptomatic .     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 Since > 4 weeks MRI abdomen noted , pancreatitis resolving , pancreatic cyst decreasing ,   - GI order TPN daily   - advance diet as per GI     #  Hyperphosphatemia  - resolved  - adjusted with TPN today by GI    #Loose stools sec to being on clear liq diet , PO Mag changed to iv Mag - today better     #Rt IJ thrombus  - Heparin bridging with Coumadin until INR therapeutic , follow INR in am   - cont daily INR and warfarin order   - coumadin for 3 month per hematology     #Anemia of chronic disease  - s/p multiple transfusions   - hb stable     #insomnia/ anxiety   - Trazadone and melatonin    #severe protein calorie malnutrition  on nutritional supplements and TPN , check prealbumin level in am     #DVT prophylaxis  - on coumadin/ heparin drip     # Vit D deficiency - on oral Supplements

## 2020-01-01 NOTE — PROGRESS NOTE ADULT - SUBJECTIVE AND OBJECTIVE BOX
Patient is a 34y old  Female who presents with a chief complaint of empyema (31 Dec 2019 19:18)      HPI:  Patient has no abdominal pain. Tolerating clear liquids. MRI shows improvement of peripancreatic collections, but not resolution. No fevers    REVIEW OF SYSTEMS:  Constitutional: No fever, weight loss or fatigue  ENMT:  No difficulty hearing, tinnitus, vertigo; No sinus or throat pain  Respiratory: No cough, wheezing, chills or hemoptysis  Cardiovascular: No chest pain, palpitations, dizziness or leg swelling  Gastrointestinal: No abdominal or epigastric pain. No nausea, vomiting or hematemesis; No diarrhea or constipation. No melena or hematochezia.  Skin: No itching, burning, rashes or lesions   Musculoskeletal: No joint pain or swelling; No muscle, back or extremity pain    PAST MEDICAL & SURGICAL HISTORY:      FAMILY HISTORY:      SOCIAL HISTORY:  Smoking Status: [ ] Current, [ ] Former, [ ] Never  Pack Years:  [X  ] EtOH  [  ] IVDA    MEDICATIONS:  MEDICATIONS  (STANDING):  chlorhexidine 2% Cloths 1 Application(s) Topical <User Schedule>  chlorhexidine 4% Liquid 1 Application(s) Topical <User Schedule>  cholecalciferol 2000 Unit(s) Oral daily  dextrose 10%. 1000 milliLiter(s) (100 mL/Hr) IV Continuous <Continuous>  escitalopram 10 milliGRAM(s) Oral daily  fat emulsion (Plant Based) 20% Infusion 0.9 Gm/kG/Day (31.25 mL/Hr) IV Continuous <Continuous>  ferrous    sulfate 325 milliGRAM(s) Oral daily  gabapentin 100 milliGRAM(s) Oral three times a day  heparin  Infusion. 1100 Unit(s)/Hr (11 mL/Hr) IV Continuous <Continuous>  lidocaine   Patch 1 Patch Transdermal daily  meropenem  IVPB 1000 milliGRAM(s) IV Intermittent every 8 hours  metoprolol tartrate 12.5 milliGRAM(s) Oral every 12 hours  octreotide  Injectable 100 MICROGram(s) SubCutaneous every 8 hours  pantoprazole    Tablet 40 milliGRAM(s) Oral before breakfast  Parenteral Nutrition - Adult 1 Each (83 mL/Hr) TPN Continuous <Continuous>  Parenteral Nutrition - Adult 1 Each (83 mL/Hr) TPN Continuous <Continuous>  sucralfate 1 Gram(s) Oral two times a day  traZODone 50 milliGRAM(s) Oral at bedtime  warfarin 10 milliGRAM(s) Oral once    MEDICATIONS  (PRN):  acetaminophen   Tablet .. 650 milliGRAM(s) Oral every 6 hours PRN Mild Pain (1 - 3)  ALPRAZolam 0.25 milliGRAM(s) Oral three times a day PRN anxiety  heparin  Injectable 4000 Unit(s) IV Push every 6 hours PRN For aPTT less than 40  heparin  Injectable 2000 Unit(s) IV Push every 6 hours PRN For aPTT between 40 - 57  melatonin 3 milliGRAM(s) Oral at bedtime PRN Insomnia  methocarbamol 500 milliGRAM(s) Oral every 6 hours PRN Muscle Spasm  ondansetron Injectable 4 milliGRAM(s) IV Push every 4 hours PRN Nausea and/or Vomiting  simethicone 80 milliGRAM(s) Chew three times a day PRN Gas  sodium chloride 0.9% lock flush 10 milliLiter(s) IV Push every 1 hour PRN Pre/post blood products, medications, blood draw, and to maintain line patency      Allergies    No Known Allergies    Intolerances    pt requests chicken broth (no beef) and CHERRY italian ice (no lemon) (Unknown)      Vital Signs Last 24 Hrs  T(C): 36.5 (01 Jan 2020 08:00), Max: 37.3 (31 Dec 2019 16:03)  T(F): 97.7 (01 Jan 2020 08:00), Max: 99.1 (31 Dec 2019 16:03)  HR: 103 (01 Jan 2020 08:00) (90 - 103)  BP: 103/65 (01 Jan 2020 08:00) (103/65 - 136/84)  BP(mean): --  RR: 18 (01 Jan 2020 08:00) (18 - 20)  SpO2: 95% (01 Jan 2020 08:00) (95% - 96%)    12-31 @ 07:01  -  01-01 @ 07:00  --------------------------------------------------------  IN: 1295 mL / OUT: 0 mL / NET: 1295 mL          PHYSICAL EXAM:    General: Well developed; well nourished; in no acute distress  HEENT: MMM, conjunctiva and sclera clear  H- RRR  L- CTA  Gastrointestinal: Soft, non-tender non-distended; Normal bowel sounds; No rebound or guarding  Extremities: Normal range of motion, No clubbing, cyanosis or edema  Neurological: Alert and oriented x3  Skin: Warm and dry. No obvious rash      LABS:                        11.8   10.56 )-----------( 345      ( 01 Jan 2020 09:06 )             34.6     01 Jan 2020 09:05    137    |  103    |  6.0    ----------------------------<  125    4.0     |  23.0   |  0.31     Ca    9.2        01 Jan 2020 09:05  Phos  4.0       01 Jan 2020 09:05  Mg     1.7       01 Jan 2020 09:05                RADIOLOGY & ADDITIONAL STUDIES:     < from: MR Abdomen w/ Oral Cont and w/wo IV Cont (01.01.20 @ 05:14) >  MPRESSION:   1. No biliary ductal dilatation or choledocholithiasis.   2. Cystic lesion in the caudate lobe has resolved.   3. The peripherally enhancing pancreatic neck structure has slightly decreased   in size compared to the prior study. The peripancreatic structure has resolved   or decreased in size.      < end of copied text >

## 2020-01-01 NOTE — PROGRESS NOTE ADULT - ASSESSMENT
34 y.o. F who was  transferred from [facility] to [facility] on 11/22 for complaints of weakness, 25 pound weight loss & flu like symptoms & found to have a large right pleural effusion,   pleural fluid cultures + for group A hemolytic strep, + RIJ thormbus   11/23 CT of the chest/abd/pelvis indicated a clot in the Right IJ, pt started on heparin gtt.   s/p right VATS decort & washout debridement of CT site & VAC placement on 11/27/19.   Persistent drainage from chest tube site so repeat CT C/A/P performed 12/4 which showed new cystic mass in pancreas and liver concerning for pancreatitis  Zosyn changed to meropenem on 12/6  for broader coverage given pancreatitis  Copious drainage from anterior chest tube site, so VAC removed and ostomy appliance applied for high output on 12/6.   12/7 Right chest tube removed.   Repeat CT scan showed a persistent Right hydropneumothorax with air anteriorly  12/8 Ostomy appliance removed and dressing applied.   12/9 s.p PICC placement & Right anterior pig tail catheter & right lateral wound vac placement   MRCP significant for Peripancreatic fluid and edema consistent with pancreatitis. Cystic lesions in the pancreatic head with the largest measuring 2.1 cm, likely acute peripancreatic collections, increased in size since 12/4/2019, with a tract of fluid extending from the 2.1 cm cystic lesion into the mediastinum and towards the pleural space on the right suspicious for a pancreatic pleural fistula.    R pleural effusion  Pancreatitis  R pancreaticopleural fistula   Alcohol abuse   WBC elevation - resolved  Acute renal failure - resolved    - All cultures negative , blood, urine, pleural fluid and chest tube insertion site.  - Leukocytosis now resolved  - No fever   ENCOURAGE PO intake.  - Continue Meropenem 1gm q8h for necrotizing pancreatitis and fistula with pleural space. GI and surgery following.   - Has 2 PICC lines, on TPN and ABx, watch for superinfection  - REPEAT MRI Prelim appreciated:  1. No biliary ductal dilatation or choledocholithiasis.   2. Cystic lesion in the caudate lobe has resolved.   3. The peripherally enhancing pancreatic neck structure has slightly decreased in size compared to the prior study. The peripancreatic structure has resolved or decreased in size.    WILL FOLLOW OFFICIAL REPORT  - Plan for antibiotics till 1/3/20 to complete 4 weeks from Chest tube removal     IF stable after Abx, can plan for discharge over weekend from ID perspective

## 2020-01-01 NOTE — PROGRESS NOTE ADULT - SUBJECTIVE AND OBJECTIVE BOX
[facility]  INFECTIOUS DISEASES AND INTERNAL MEDICINE at [place]  =======================================================  Carlos Alberto Soto MD  Diplomates American Board of Internal Medicine and Infectious Diseases  Tel: [phone]      Fax: [phone]  =======================================================    HERACLIO MOLINA [id number]  Follow up: R pleural effusion  no fevers  Wound vac removed  s/p MRI this AM      Allergies:  Ensure TID (Unknown)  No Known Allergies      Antibiotics:  meropenem  IVPB 1000 milliGRAM(s) IV Intermittent every 8 hours    Other medications:  chlorhexidine 2% Cloths 1 Application(s) Topical <User Schedule>  chlorhexidine 4% Liquid 1 Application(s) Topical <User Schedule>  cholecalciferol 2000 Unit(s) Oral daily  dextrose 10%. 1000 milliLiter(s) IV Continuous <Continuous>  escitalopram 10 milliGRAM(s) Oral daily  ferrous    sulfate 325 milliGRAM(s) Oral daily  gabapentin 100 milliGRAM(s) Oral three times a day  heparin  Infusion. 1100 Unit(s)/Hr IV Continuous <Continuous>  lidocaine   Patch 1 Patch Transdermal daily  metoprolol tartrate 12.5 milliGRAM(s) Oral every 12 hours  octreotide  Injectable 100 MICROGram(s) SubCutaneous every 8 hours  pantoprazole    Tablet 40 milliGRAM(s) Oral before breakfast  Parenteral Nutrition - Adult 1 Each TPN Continuous <Continuous>  sucralfate 1 Gram(s) Oral two times a day  traZODone 50 milliGRAM(s) Oral at bedtime  warfarin 10 milliGRAM(s) Oral once      REVIEW OF SYSTEMS:  CONSTITUTIONAL:  No Fever or chills  HEENT:   No diplopia or blurred vision.  No earache, sore throat or runny nose.  CARDIOVASCULAR:  No pressure, squeezing, strangling, tightness, heaviness or aching about the chest, neck, axilla or epigastrium.  RESPIRATORY:  No cough, shortness of breath  GASTROINTESTINAL:  No nausea, vomiting or diarrhea.  GENITOURINARY:  No dysuria, frequency or urgency.   MUSCULOSKELETAL:  no joint aches, no muscle pain  SKIN:  No change in skin, hair or nails.  NEUROLOGIC:  No Headaches, seizures   PSYCHIATRIC:  No disorder of thought or mood.  ENDOCRINE:  No heat or cold intolerance  HEMATOLOGICAL:  No easy bruising or bleeding.     Physical Exam:  T(F): 98.1 (31 Dec 2019 08:06), Max: 98.6 (30 Dec 2019 17:14)  HR: 88 (31 Dec 2019 08:06)  BP: 132/87 (31 Dec 2019 08:06)  RR: 18 (31 Dec 2019 08:06)  SpO2: 95% (31 Dec 2019 08:06) (95% - 98%)  temp max in last 48H T(F): , Max: 98.6 (12-29-19 @ 19:54)      GEN: NAD, pleasant  HEENT: normocephalic and atraumatic. EOMI. PERRL.  Anicteric   NECK: Supple.   LUNGS: Clear to auscultation. Wound vac on site of old chest tube  HEART: Regular rate and rhythm   ABDOMEN: Soft, nontender, and nondistended.  Positive bowel sounds.    : No CVA tenderness  EXTREMITIES: Without any edema.  MSK: no joint swelling  NEUROLOGIC: No focal deficits  PSYCHIATRIC: Appropriate affect .  SKIN: No Rash         Labs:                        11.8   10.56 )-----------( 345      ( 01 Jan 2020 09:06 )             34.6       WBC Count: 10.56 K/uL (01-01-20 @ 09:06)  WBC Count: 9.57 K/uL (12-31-19 @ 08:17)  WBC Count: 9.24 K/uL (12-30-19 @ 01:54)  WBC Count: 8.36 K/uL (12-28-19 @ 08:24)      01-01    137  |  103  |  6.0<L>  ----------------------------<  125<H>  4.0   |  23.0  |  0.31<L>    Ca    9.2      01 Jan 2020 09:05  Phos  4.0     01-01  Mg     1.7     01-01        GI PCR Panel, Stool (collected 12-26-19 @ 21:40)  Source: .Stool  Final Report (12-27-19 @ 02:23):    GI PCR Results: NOT detected    *******Please Note:*******    GI panel PCR evaluates for:    Campylobacter, Plesiomonas shigelloides, Salmonella,    Vibrio, Yersinia enterocolitica, Enteroaggregative    Escherichia coli (EAEC), Enteropathogenic E.coli (EPEC),    Enterotoxigenic E. coli (ETEC) lt/st, Shiga-like    toxin-producing E. coli (STEC) stx1/stx2,    Shigella/ Enteroinvasive E. coli (EIEC), Cryptosporidium,    Cyclospora cayetanensis, Entamoeba histolytica,    Giardia lamblia, Adenovirus F 40/41, Astrovirus,    Norovirus GI/GII, Rotavirus A, Sapovirus    < from: MR Abdomen w/ Oral Cont and w/wo IV Cont (01.01.20 @ 05:14) >    ******PRELIMINARY REPORT******    ******PRELIMINARY REPORT******           EXAM:  MR ABDOMEN OC WAW IC                          PROCEDURE DATE:  01/01/2020        ******PRELIMINARY REPORT******    ******PRELIMINARY REPORT******          INTERPRETATION:  [facility] RADIOLOGIST PRELIMINARY REPORT    PROCEDURE INFORMATION:   Exam: MR Abdomen Without and With Contrast   Exam date and time: 1/1/2020 2:19 AM   Age: 34 years old   Clinical indication: Condition or disease; Pancreatic condition; Pancreatitis    TECHNIQUE:   Imaging protocol: MR of the abdomen without and with intravenous contrast.   3D rendering: MIP and/or 3D reconstructed images were created by the   technologist.   Contrast material: GADAVIST; Contrast volume: 5 ml; Contrast route: IV;      COMPARISON:   MR ABDOMEN WITH IV CONTRAST 12/20/2019 3:50 PM     FINDINGS:   Liver: Cystic lesion in the caudate lobe has resolved.   Gallbladder and bile ducts: No biliary ductal dilatation or   choledocholithiasis. Equivocal small amount of gallbladder sludge. No   cholecystitis.   Pancreas: The peripherally enhancing pancreatic neck structure has slightly   decreased in size compared to the prior study. The peripancreatic structure has   resolved or decreased in size.   Spleen: Unremarkable. No splenomegaly.   Adrenals: Unremarkable. No mass.   Kidneys and ureters: Unremarkable. No solid mass. No hydronephrosis.   Stomach and bowel: Unremarkable.   Intraperitoneal space: No fluid collection.   Arteries: No abdominal aortic aneurysm.  Bones/joints: Unremarkable.    Soft tissues: Unremarkable.     IMPRESSION:   1. No biliary ductal dilatation or choledocholithiasis.   2. Cystic lesion in the caudate lobe has resolved.   3. The peripherally enhancing pancreatic neck structure has slightly decreased   in size compared to the prior study. The peripancreatic structure has resolved   or decreased in size.          ******PRELIMINARY REPORT******    ******PRELIMINARY REPORT******              FRANCISCO FUENTES M.D.;AD RADIOLOGIST          < end of copied text >

## 2020-01-01 NOTE — PROGRESS NOTE ADULT - SUBJECTIVE AND OBJECTIVE BOX
Patient seen and examined . Comfortable , had one formed BM today , no pain , no sob .    CC : abdominal pain well controlled today , loose stools resolved , no sob       MEDICATIONS  (STANDING):  chlorhexidine 2% Cloths 1 Application(s) Topical <User Schedule>  chlorhexidine 4% Liquid 1 Application(s) Topical <User Schedule>  cholecalciferol 2000 Unit(s) Oral daily  dextrose 10%. 1000 milliLiter(s) (100 mL/Hr) IV Continuous <Continuous>  escitalopram 10 milliGRAM(s) Oral daily  fat emulsion (Plant Based) 20% Infusion 0.9 Gm/kG/Day (31.25 mL/Hr) IV Continuous <Continuous>  ferrous    sulfate 325 milliGRAM(s) Oral daily  gabapentin 100 milliGRAM(s) Oral three times a day  heparin  Infusion. 1100 Unit(s)/Hr (11 mL/Hr) IV Continuous <Continuous>  lidocaine   Patch 1 Patch Transdermal daily  meropenem  IVPB 1000 milliGRAM(s) IV Intermittent every 8 hours  metoprolol tartrate 12.5 milliGRAM(s) Oral every 12 hours  octreotide  Injectable 100 MICROGram(s) SubCutaneous every 8 hours  pantoprazole    Tablet 40 milliGRAM(s) Oral before breakfast  Parenteral Nutrition - Adult 1 Each (83 mL/Hr) TPN Continuous <Continuous>  Parenteral Nutrition - Adult 1 Each (83 mL/Hr) TPN Continuous <Continuous>  sucralfate 1 Gram(s) Oral two times a day  traZODone 50 milliGRAM(s) Oral at bedtime  warfarin 10 milliGRAM(s) Oral once    MEDICATIONS  (PRN):  acetaminophen   Tablet .. 650 milliGRAM(s) Oral every 6 hours PRN Mild Pain (1 - 3)  ALPRAZolam 0.25 milliGRAM(s) Oral three times a day PRN anxiety  heparin  Injectable 4000 Unit(s) IV Push every 6 hours PRN For aPTT less than 40  heparin  Injectable 2000 Unit(s) IV Push every 6 hours PRN For aPTT between 40 - 57  melatonin 3 milliGRAM(s) Oral at bedtime PRN Insomnia  ondansetron Injectable 4 milliGRAM(s) IV Push every 4 hours PRN Nausea and/or Vomiting  simethicone 80 milliGRAM(s) Chew three times a day PRN Gas  sodium chloride 0.9% lock flush 10 milliLiter(s) IV Push every 1 hour PRN Pre/post blood products, medications, blood draw, and to maintain line patency  traMADol 25 milliGRAM(s) Oral three times a day PRN Moderate Pain (4 - 6)      LABS:                          11.8   10.56 )-----------( 345      ( 01 Jan 2020 09:06 )             34.6     01-01    137  |  103  |  6.0<L>  ----------------------------<  125<H>  4.0   |  23.0  |  0.31<L>    Ca    9.2      01 Jan 2020 09:05  Phos  4.0     01-01  Mg     1.7     01-01      PT/INR - ( 01 Jan 2020 09:06 )   PT: 18.0 sec;   INR: 1.54 ratio         PTT - ( 01 Jan 2020 09:06 )  PTT:113.7 sec      RADIOLOGY & ADDITIONAL TESTS:    < from: MR Abdomen w/ Oral Cont and w/wo IV Cont (01.01.20 @ 05:14) >   EXAM:  MR ABDOMEN OC WAW IC                          PROCEDURE DATE:  01/01/2020          INTERPRETATION:  Clinical Information:  Pancreatitis and pancreatic cyst. Pancreaticopleural fistula.    Comparison:MRI of the abdomen dated 12/20/2019     Procedure: Pre and dynamic post contrast evaluation of the abdomen was performed. 5 ml of Gadavist was administered. 2.5 cc were discarded. 3D MRCP was also performed. Image quality is satisfactory.    Findings:    The liver demonstrates normal signal.    There is a small amount of pelvic ascites. Cystic lesion adjacent to the caudate lobe has resolved.    Intrahepatic and extrahepatic bile ducts are normal in caliber. There is no evidence of choledocholithiasis.    The gallbladder is normal.  < from: Xray Chest 1 View- PORTABLE-Routine (12.28.19 @ 06:07) >   EXAM:  XR CHEST PORTABLE ROUTINE 1V                          PROCEDURE DATE:  12/28/2019          INTERPRETATION:  TECHNIQUE: Single portable view of the chest.    COMPARISON: 12/27/2019    CLINICAL HISTORY: Rt VATSCTS    FINDINGS:    Single frontal view of the chest demonstrates small right lower lobe effusion/atelectasis, unchanged. The cardiomediastinal silhouette is normal. No acute osseous abnormalities. Overlying EKG leads and wires are noted. Left-sided PICC line catheter tip in the right atrium. Right axillary midline catheter. Right hemidiaphragm is mildly elevated.    IMPRESSION: No interval change.                AUDREY MEYER M.D., ATTENDING RADIOLOGIST  This document has been electronically signed. Dec 28 2019 11:37AM        < end of copied text >    The pancreas demonstrates heterogeneity in the head, neck, improved since the prior exam compatible with the improving pancreatitis. Improving edema in the periportal and gastrohepatic ligament region. Complex cystic lesion in the pancreatic neck measuring 1.0 cm previously measured 1.2 cm.  Pancreatic duct is normal in caliber.    The spleen is normal.      The adrenal glands are normal.      The kidneys are normal.      There is no adenopathy in the abdomen.    Decrease in previously identified edema and fluid in the mediastinum adjacent to the distal esophagus.  Atelectasis right lung base again noted.      IMPRESSION: Findings compatible with pancreatitis again noted including heterogeneity of the pancreatic head and neck, improved. Decreasein size of the complex cystic lesion in the pancreatic neck. Decrease in edema in the gastrohepatic ligament and periportal regions with resolution of previously identified cystic lesion in the periportal region.           MAT CARREON M.D.,ATTENDING RADIOLOGIST  This document has been electronically signed. Jan 1 2020 11:59AM    < end of copied text >            REVIEW OF SYSTEMS:    As above , all others sytems reviewed and are negative .     Vital Signs Last 24 Hrs  T(C): 36.7 (01 Jan 2020 15:51), Max: 36.9 (31 Dec 2019 20:44)  T(F): 98.1 (01 Jan 2020 15:51), Max: 98.4 (31 Dec 2019 20:44)  HR: 103 (01 Jan 2020 15:51) (90 - 103)  BP: 96/56 (01 Jan 2020 15:51) (96/56 - 136/84)  BP(mean): --  RR: 18 (01 Jan 2020 15:51) (18 - 18)  SpO2: 99% (01 Jan 2020 15:51) (95% - 99%)    PHYSICAL EXAM:    GENERAL: NAD, well-groomed, well-developed  HEAD:  Atraumatic, Normocephalic  EYES: EOMI, PERRLA, conjunctiva and sclera clear  NECK: Supple, No JVD, Normal thyroid  NERVOUS SYSTEM:  Alert & Oriented X3, no focal deficit  CHEST/LUNG: CTA b/l ,  no  rales, rhonchi, wheezing, or rubs  HEART: Regular rate and rhythm; No murmurs, rubs, or gallops  ABDOMEN: Soft, Nontender, Nondistended; Bowel sounds present  EXTREMITIES:  2+ Peripheral Pulses, No clubbing, cyanosis, or edema , B/L UE PICC line +   LYMPH: No lymphadenopathy noted  SKIN: No rashes or lesions

## 2020-01-02 LAB
ALBUMIN SERPL ELPH-MCNC: 3.1 G/DL — LOW (ref 3.3–5.2)
ALP SERPL-CCNC: 317 U/L — HIGH (ref 40–120)
ALT FLD-CCNC: 37 U/L — HIGH
ANION GAP SERPL CALC-SCNC: 12 MMOL/L — SIGNIFICANT CHANGE UP (ref 5–17)
APTT BLD: 106.8 SEC — HIGH (ref 27.5–36.3)
APTT BLD: 126.3 SEC — CRITICAL HIGH (ref 27.5–36.3)
APTT BLD: 81.9 SEC — HIGH (ref 27.5–36.3)
AST SERPL-CCNC: 37 U/L — HIGH
BILIRUB SERPL-MCNC: <0.2 MG/DL — LOW (ref 0.4–2)
BUN SERPL-MCNC: 6 MG/DL — LOW (ref 8–20)
CALCIUM SERPL-MCNC: 9.4 MG/DL — SIGNIFICANT CHANGE UP (ref 8.6–10.2)
CHLORIDE SERPL-SCNC: 102 MMOL/L — SIGNIFICANT CHANGE UP (ref 98–107)
CO2 SERPL-SCNC: 24 MMOL/L — SIGNIFICANT CHANGE UP (ref 22–29)
CREAT SERPL-MCNC: 0.31 MG/DL — LOW (ref 0.5–1.3)
GLUCOSE BLDC GLUCOMTR-MCNC: 95 MG/DL — SIGNIFICANT CHANGE UP (ref 70–99)
GLUCOSE SERPL-MCNC: 111 MG/DL — SIGNIFICANT CHANGE UP (ref 70–115)
HCT VFR BLD CALC: 33.7 % — LOW (ref 34.5–45)
HGB BLD-MCNC: 10.8 G/DL — LOW (ref 11.5–15.5)
MAGNESIUM SERPL-MCNC: 1.5 MG/DL — LOW (ref 1.8–2.6)
MCHC RBC-ENTMCNC: 30.3 PG — SIGNIFICANT CHANGE UP (ref 27–34)
MCHC RBC-ENTMCNC: 32 GM/DL — SIGNIFICANT CHANGE UP (ref 32–36)
MCV RBC AUTO: 94.4 FL — SIGNIFICANT CHANGE UP (ref 80–100)
PHOSPHATE SERPL-MCNC: 5 MG/DL — HIGH (ref 2.4–4.7)
PLATELET # BLD AUTO: 267 K/UL — SIGNIFICANT CHANGE UP (ref 150–400)
POTASSIUM SERPL-MCNC: 4.2 MMOL/L — SIGNIFICANT CHANGE UP (ref 3.5–5.3)
POTASSIUM SERPL-SCNC: 4.2 MMOL/L — SIGNIFICANT CHANGE UP (ref 3.5–5.3)
PREALB SERPL-MCNC: 26 MG/DL — SIGNIFICANT CHANGE UP (ref 18–38)
PROT SERPL-MCNC: 6.1 G/DL — LOW (ref 6.6–8.7)
RBC # BLD: 3.57 M/UL — LOW (ref 3.8–5.2)
RBC # FLD: 15.6 % — HIGH (ref 10.3–14.5)
SODIUM SERPL-SCNC: 138 MMOL/L — SIGNIFICANT CHANGE UP (ref 135–145)
WBC # BLD: 7.24 K/UL — SIGNIFICANT CHANGE UP (ref 3.8–10.5)
WBC # FLD AUTO: 7.24 K/UL — SIGNIFICANT CHANGE UP (ref 3.8–10.5)

## 2020-01-02 PROCEDURE — 99232 SBSQ HOSP IP/OBS MODERATE 35: CPT

## 2020-01-02 PROCEDURE — 99233 SBSQ HOSP IP/OBS HIGH 50: CPT

## 2020-01-02 RX ORDER — LIDOCAINE 4 G/100G
1 CREAM TOPICAL DAILY
Refills: 0 | Status: DISCONTINUED | OUTPATIENT
Start: 2020-01-02 | End: 2020-01-07

## 2020-01-02 RX ORDER — WARFARIN SODIUM 2.5 MG/1
10 TABLET ORAL DAILY
Refills: 0 | Status: COMPLETED | OUTPATIENT
Start: 2020-01-02 | End: 2020-01-04

## 2020-01-02 RX ORDER — GABAPENTIN 400 MG/1
100 CAPSULE ORAL THREE TIMES A DAY
Refills: 0 | Status: DISCONTINUED | OUTPATIENT
Start: 2020-01-02 | End: 2020-01-07

## 2020-01-02 RX ORDER — I.V. FAT EMULSION 20 G/100ML
0.9 EMULSION INTRAVENOUS
Qty: 50 | Refills: 0 | Status: DISCONTINUED | OUTPATIENT
Start: 2020-01-02 | End: 2020-01-03

## 2020-01-02 RX ORDER — ELECTROLYTE SOLUTION,INJ
1 VIAL (ML) INTRAVENOUS
Refills: 0 | Status: DISCONTINUED | OUTPATIENT
Start: 2020-01-02 | End: 2020-01-03

## 2020-01-02 RX ORDER — METOPROLOL TARTRATE 50 MG
25 TABLET ORAL
Refills: 0 | Status: DISCONTINUED | OUTPATIENT
Start: 2020-01-02 | End: 2020-01-02

## 2020-01-02 RX ORDER — METOPROLOL TARTRATE 50 MG
25 TABLET ORAL
Refills: 0 | Status: DISCONTINUED | OUTPATIENT
Start: 2020-01-02 | End: 2020-01-07

## 2020-01-02 RX ORDER — MAGNESIUM SULFATE 500 MG/ML
2 VIAL (ML) INJECTION
Refills: 0 | Status: COMPLETED | OUTPATIENT
Start: 2020-01-02 | End: 2020-01-02

## 2020-01-02 RX ORDER — PANTOPRAZOLE SODIUM 20 MG/1
40 TABLET, DELAYED RELEASE ORAL
Refills: 0 | Status: DISCONTINUED | OUTPATIENT
Start: 2020-01-02 | End: 2020-01-07

## 2020-01-02 RX ADMIN — Medication 1 EACH: at 05:34

## 2020-01-02 RX ADMIN — TRAMADOL HYDROCHLORIDE 25 MILLIGRAM(S): 50 TABLET ORAL at 12:19

## 2020-01-02 RX ADMIN — Medication 325 MILLIGRAM(S): at 12:16

## 2020-01-02 RX ADMIN — HEPARIN SODIUM 900 UNIT(S)/HR: 5000 INJECTION INTRAVENOUS; SUBCUTANEOUS at 20:03

## 2020-01-02 RX ADMIN — WARFARIN SODIUM 10 MILLIGRAM(S): 2.5 TABLET ORAL at 22:31

## 2020-01-02 RX ADMIN — ESCITALOPRAM OXALATE 10 MILLIGRAM(S): 10 TABLET, FILM COATED ORAL at 12:16

## 2020-01-02 RX ADMIN — Medication 50 GRAM(S): at 22:31

## 2020-01-02 RX ADMIN — GABAPENTIN 100 MILLIGRAM(S): 400 CAPSULE ORAL at 22:31

## 2020-01-02 RX ADMIN — Medication 1 GRAM(S): at 18:41

## 2020-01-02 RX ADMIN — GABAPENTIN 100 MILLIGRAM(S): 400 CAPSULE ORAL at 13:21

## 2020-01-02 RX ADMIN — Medication 50 GRAM(S): at 20:00

## 2020-01-02 RX ADMIN — PANTOPRAZOLE SODIUM 40 MILLIGRAM(S): 20 TABLET, DELAYED RELEASE ORAL at 18:40

## 2020-01-02 RX ADMIN — TRAMADOL HYDROCHLORIDE 25 MILLIGRAM(S): 50 TABLET ORAL at 10:38

## 2020-01-02 RX ADMIN — Medication 50 MILLIGRAM(S): at 22:32

## 2020-01-02 RX ADMIN — OCTREOTIDE ACETATE 100 MICROGRAM(S): 200 INJECTION, SOLUTION INTRAVENOUS; SUBCUTANEOUS at 22:31

## 2020-01-02 RX ADMIN — TRAMADOL HYDROCHLORIDE 25 MILLIGRAM(S): 50 TABLET ORAL at 21:19

## 2020-01-02 RX ADMIN — TRAMADOL HYDROCHLORIDE 25 MILLIGRAM(S): 50 TABLET ORAL at 20:18

## 2020-01-02 RX ADMIN — MEROPENEM 100 MILLIGRAM(S): 1 INJECTION INTRAVENOUS at 00:37

## 2020-01-02 RX ADMIN — Medication 0.25 MILLIGRAM(S): at 00:39

## 2020-01-02 RX ADMIN — CHLORHEXIDINE GLUCONATE 1 APPLICATION(S): 213 SOLUTION TOPICAL at 05:33

## 2020-01-02 RX ADMIN — Medication 25 MILLIGRAM(S): at 18:41

## 2020-01-02 RX ADMIN — OCTREOTIDE ACETATE 100 MICROGRAM(S): 200 INJECTION, SOLUTION INTRAVENOUS; SUBCUTANEOUS at 00:37

## 2020-01-02 RX ADMIN — HEPARIN SODIUM 1000 UNIT(S)/HR: 5000 INJECTION INTRAVENOUS; SUBCUTANEOUS at 00:35

## 2020-01-02 RX ADMIN — Medication 1 GRAM(S): at 05:32

## 2020-01-02 RX ADMIN — Medication 12.5 MILLIGRAM(S): at 05:33

## 2020-01-02 RX ADMIN — Medication 0.25 MILLIGRAM(S): at 13:21

## 2020-01-02 RX ADMIN — MEROPENEM 100 MILLIGRAM(S): 1 INJECTION INTRAVENOUS at 18:39

## 2020-01-02 RX ADMIN — OCTREOTIDE ACETATE 100 MICROGRAM(S): 200 INJECTION, SOLUTION INTRAVENOUS; SUBCUTANEOUS at 12:15

## 2020-01-02 RX ADMIN — TRAMADOL HYDROCHLORIDE 25 MILLIGRAM(S): 50 TABLET ORAL at 07:00

## 2020-01-02 RX ADMIN — TRAMADOL HYDROCHLORIDE 25 MILLIGRAM(S): 50 TABLET ORAL at 13:19

## 2020-01-02 RX ADMIN — HEPARIN SODIUM 900 UNIT(S)/HR: 5000 INJECTION INTRAVENOUS; SUBCUTANEOUS at 10:33

## 2020-01-02 RX ADMIN — GABAPENTIN 100 MILLIGRAM(S): 400 CAPSULE ORAL at 05:32

## 2020-01-02 RX ADMIN — Medication 50 GRAM(S): at 21:22

## 2020-01-02 RX ADMIN — TRAMADOL HYDROCHLORIDE 25 MILLIGRAM(S): 50 TABLET ORAL at 06:30

## 2020-01-02 RX ADMIN — Medication 2000 UNIT(S): at 12:15

## 2020-01-02 RX ADMIN — MEROPENEM 100 MILLIGRAM(S): 1 INJECTION INTRAVENOUS at 10:17

## 2020-01-02 RX ADMIN — PANTOPRAZOLE SODIUM 40 MILLIGRAM(S): 20 TABLET, DELAYED RELEASE ORAL at 05:32

## 2020-01-02 RX ADMIN — LIDOCAINE 1 PATCH: 4 CREAM TOPICAL at 00:00

## 2020-01-02 NOTE — PROGRESS NOTE ADULT - SUBJECTIVE AND OBJECTIVE BOX
34 year old woman with no known major comorbidities (had not gone to see a doctor for 15 years) who presented to [facility] on 11/22/2019 with progressive sob, weakness, weight loss, She was found to have a large pleural effusion. Initially 1.5 L of dark serous fluid was removed.  Chest tube placed shortly thereafter, 3 additional liters was removed. CBC showed a leukocytosis of 40k and   CTA imaging revealed a 2.1cm partially cystic mass in the body/tail of the pancreas with possible upstream ductal dilatation. On 11/23/2019 the patient had a CT chest/abdomen/pelvis that found to have a clot in the right IJ and was started on a heparin gtt.   Chest tube out; on parenteral feedings; taking clear liquids    Transferred to [facility] on 11/24/19.  MRCP performed on 11/26.  Underwent Debridement, wound, with dressing replacement  Wound VAC placement  Pleural biopsy  Lung decortication  Bronchoscopy, with lung decortication using VATS on 11/27/19.  Pt  has a history of alcohol abuse.     We were consulted for evaluation and management of the RIJ DVT.    On TPN. No fevers. No bleeding. INR 1.5 subtherapeutic; still on heparin drip      MEDICATIONS  (STANDING):  chlorhexidine 2% Cloths 1 Application(s) Topical <User Schedule>  chlorhexidine 4% Liquid 1 Application(s) Topical <User Schedule>  cholecalciferol 2000 Unit(s) Oral daily  dextrose 10%. 1000 milliLiter(s) (100 mL/Hr) IV Continuous <Continuous>  escitalopram 10 milliGRAM(s) Oral daily  fat emulsion (Plant Based) 20% Infusion 0.9 Gm/kG/Day (31.05 mL/Hr) IV Continuous <Continuous>  ferrous    sulfate 325 milliGRAM(s) Oral daily  gabapentin 100 milliGRAM(s) Oral three times a day  heparin  Infusion. 1100 Unit(s)/Hr (11 mL/Hr) IV Continuous <Continuous>  lidocaine   Patch 1 Patch Transdermal daily  magnesium sulfate  IVPB 2 Gram(s) IV Intermittent every 2 hours  meropenem  IVPB 1000 milliGRAM(s) IV Intermittent every 8 hours  metoprolol tartrate 12.5 milliGRAM(s) Oral every 12 hours  octreotide  Injectable 100 MICROGram(s) SubCutaneous every 8 hours  pantoprazole    Tablet 40 milliGRAM(s) Oral before breakfast  Parenteral Nutrition - Adult 1 Each (83 mL/Hr) TPN Continuous <Continuous>  Parenteral Nutrition - Adult 1 Each (83 mL/Hr) TPN Continuous <Continuous>  potassium chloride    Tablet ER 20 milliEquivalent(s) Oral every 2 hours  sucralfate 1 Gram(s) Oral two times a day  traZODone 50 milliGRAM(s) Oral at bedtime  warfarin 7.5 milliGRAM(s) Oral once    MEDICATIONS  (PRN):  acetaminophen   Tablet .. 650 milliGRAM(s) Oral every 6 hours PRN Mild Pain (1 - 3)  ALPRAZolam 0.25 milliGRAM(s) Oral three times a day PRN anxiety  heparin  Injectable 4000 Unit(s) IV Push every 6 hours PRN For aPTT less than 40  heparin  Injectable 2000 Unit(s) IV Push every 6 hours PRN For aPTT between 40 - 57  melatonin 3 milliGRAM(s) Oral at bedtime PRN Insomnia  methocarbamol 500 milliGRAM(s) Oral every 6 hours PRN Muscle Spasm  ondansetron Injectable 4 milliGRAM(s) IV Push every 4 hours PRN Nausea and/or Vomiting  simethicone 80 milliGRAM(s) Chew three times a day PRN Gas  sodium chloride 0.9% lock flush 10 milliLiter(s) IV Push every 1 hour PRN Pre/post blood products, medications, blood draw, and to maintain line patency      Allergies    No Known Allergies    Intolerances          Vital Signs Last 24 Hrs  T(C): 37.3 (31 Dec 2019 16:03), Max: 37.3 (31 Dec 2019 16:03)  T(F): 99.1 (31 Dec 2019 16:03), Max: 99.1 (31 Dec 2019 16:03)  HR: 94 (31 Dec 2019 16:03) (88 - 94)  BP: 135/91 (31 Dec 2019 16:03) (109/76 - 135/91)  BP(mean): --  RR: 20 (31 Dec 2019 16:03) (18 - 20)  SpO2: 95% (31 Dec 2019 08:06) (95% - 98%)    PHYSICAL EXAM  General: adult in NAD  CV: RR  Lungs: positive air movement b/l ant lungs,clear to auscultation, no wheezes, no rales  Abdomen: soft, NT  Ext: no clubbing cyanosis or edema        LABS:  WBC 7.24; H/H 10.8/33.7, plt ct 267,000                        10.8   9.57  )-----------( 281      ( 31 Dec 2019 08:17 )             34.6     Serial CBC's  12-31 @ 08:17  Hct-34.6 / Hgb-10.8 / Plat-281 / RBC-3.68 / WBC-9.57          Serial CBC's  12-30 @ 01:54  Hct-33.9 / Hgb-11.8 / Plat-346 / RBC-3.62 / WBC-9.24            12-31    139  |  106  |  6.0<L>  ----------------------------<  122<H>  3.8   |  23.0  |  0.26<L>    Ca    8.9      31 Dec 2019 08:17  Phos  3.8     12-31  Mg     1.6     12-31        PT/INR - ( 31 Dec 2019 08:17 )   PT: 17.4 sec;   INR: 1.49 ratio         PTT - ( 31 Dec 2019 08:17 )  PTT:91.8 sec    Hematocrit: 34.6 % (12-31 @ 08:17)  WBC Count: 9.57 K/uL (12-31 @ 08:17)            RADIOLOGY & ADDITIONAL STUDIES:

## 2020-01-02 NOTE — PROGRESS NOTE ADULT - ASSESSMENT
34 year old female patient with history of alcohol use with prolonged hospitalization for acute pancreatitis with Pancreaticopleural fistula, group A hemolytic strep pleural effusions, severe malnutrition, IJ thrombus now on anticoagulation who is consulted for sinus tachycardia and ventricular ectopy.    Sinus tachycardia improving off of Amitriptyline and with beta blockade. She still has ventricular ectopy that is also improving. Please continue to treat hypomagnesemia aggressively for goal Mg >2. Please increase Metoprolol to 25mg BID.    Recommendations:  - Increase Metoprolol to 25mg BID  - Continue with aggressive electrolyte repletion K>4, Mg>2  - Monitor on telemetry     Luis Glass MD  Clinical Cardiac Electrophysiology

## 2020-01-02 NOTE — PROGRESS NOTE ADULT - ATTENDING COMMENTS
I saw and examined the patient, and were physically present for the key portions of the Evaluation and Management service provided. I agree with the above history, physical, and plan which I have reviewed and edited where appropriate.  CC- pancretico- pleural fistula, hypomagnesemia, nsvt  P/E- aaox 3, cta milagros, s1 s2 + soft bs + nt/nd  A/P- replete magnesium  increase lopressor to 25 mg  adv diet to full liquid today  cont coumadin 10 mg with heaprin bridge

## 2020-01-02 NOTE — PROGRESS NOTE ADULT - SUBJECTIVE AND OBJECTIVE BOX
CC: 35y/o female with chief complaint of abdominal pain    INTERVAL HPI/OVERNIGHT EVENTS:  Patient seen and examined at bedside. No new issues, no events overnight. Resting comfortably and feeling better, states abdominal pain has improved. Denies chest pain, SOB, n/v/d/c,    dizziness, headaches.     REVIEW OF SYSTEMS:  All remaining ROS negative unless specified above.     Allergies    No Known Allergies    Intolerances    pt requests chicken broth (no beef) and CHERRY italian ice (no lemon) (Unknown)    HEALTH ISSUES - PROBLEM Dx:  Protein-calorie malnutrition: Protein-calorie malnutrition  Right ankle swelling: Right ankle swelling  RONAK (acute kidney injury): RONAK (acute kidney injury)  Acute postoperative pain: Acute postoperative pain  Acute stress reaction causing mixed disturbance of emotion and conduct  Heartburn: Heartburn  Hypokalemia: Hypokalemia  Alcohol abuse: Alcohol abuse  Hypomagnesemia: Hypomagnesemia  Prophylactic measure: Prophylactic measure  Anemia due to blood loss: Anemia due to blood loss  Pleural effusion on right: Pleural effusion on right  Pain managed using patient-controlled analgesia (PCA): Pain managed using patient-controlled analgesia (PCA)  Anxiety  Right-sided chest wall pain: Right-sided chest wall pain  Pancreatic cyst: Pancreatic cyst  Hyponatremia: Hyponatremia  Internal jugular vein thrombosis, right: Internal jugular vein thrombosis, right  Empyema: Empyema        PAST MEDICAL & SURGICAL HISTORY:    Vital Signs Last 24 Hrs  T(C): 36.6 (02 Jan 2020 08:56), Max: 37.2 (01 Jan 2020 21:19)  T(F): 97.9 (02 Jan 2020 08:56), Max: 98.9 (01 Jan 2020 21:19)  HR: 86 (02 Jan 2020 08:56) (86 - 103)  BP: 102/66 (02 Jan 2020 08:56) (96/56 - 102/66)  BP(mean): --  RR: 18 (02 Jan 2020 08:56) (18 - 18)  SpO2: 98% (02 Jan 2020 08:56) (98% - 99%)    PHYSICAL EXAM:  GENERAL: Pt lying in bed comfortably in NAD, seen ambulating on unit  HEAD:  Atraumatic, Normocephalic  EYES: EOMI, PERRLA, conjunctiva and sclera clear  ENT: Moist mucous membranes  NECK: Supple, No JVD  CHEST/LUNG: Clear to auscultation bilaterally; No rales, rhonchi, wheezing, or rubs. Unlabored respirations  HEART: Regular rate and rhythm; No murmurs, rubs, or gallops  ABDOMEN: Bowel sounds present; Soft, Nontender, Nondistended. No guarding or rigidity   EXTREMITIES:  2+ Peripheral Pulses, brisk capillary refill. No clubbing, cyanosis, or edema, B/L UE PICC line +   NERVOUS SYSTEM:  Alert & Oriented X3, speech clear, FROM x 4 extremities. No deficits   SKIN: Warm, dry  PSYCH: Normal mood and affect    LABS:                        10.8   7.24  )-----------( 267      ( 02 Jan 2020 09:39 )             33.7     01-02    138  |  102  |  6.0<L>  ----------------------------<  111  4.2   |  24.0  |  0.31<L>    Ca    9.4      02 Jan 2020 09:39  Phos  5.0     01-02  Mg     1.5     01-02    TPro  6.1<L>  /  Alb  3.1<L>  /  TBili  <0.2<L>  /  DBili  x   /  AST  37<H>  /  ALT  37<H>  /  AlkPhos  317<H>  01-02    PT/INR - ( 01 Jan 2020 09:06 )   PT: 18.0 sec;   INR: 1.54 ratio         PTT - ( 02 Jan 2020 09:39 )  PTT:106.8 sec  CAPILLARY BLOOD GLUCOSE      POCT Blood Glucose.: 95 mg/dL (02 Jan 2020 06:35)      RADIOLOGY & ADDITIONAL TESTS:    < from: MR Abdomen w/ Oral Cont and w/wo IV Cont (01.01.20 @ 05:14) >   EXAM:  MR ABDOMEN OC WAW IC                          PROCEDURE DATE:  01/01/2020          INTERPRETATION:  Clinical Information:  Pancreatitis and pancreatic cyst. Pancreaticopleural fistula.    Comparison:MRI of the abdomen dated 12/20/2019     Procedure: Pre and dynamic post contrast evaluation of the abdomen was performed. 5 ml of Gadavist was administered. 2.5 cc were discarded. 3D MRCP was also performed. Image quality is satisfactory.    Findings:    The liver demonstrates normal signal.    There is a small amount of pelvic ascites. Cystic lesion adjacent to the caudate lobe has resolved.    Intrahepatic and extrahepatic bile ducts are normal in caliber. There is no evidence of choledocholithiasis.    The gallbladder is normal.  < from: Xray Chest 1 View- PORTABLE-Routine (12.28.19 @ 06:07) >   EXAM:  XR CHEST PORTABLE ROUTINE 1V                          PROCEDURE DATE:  12/28/2019          INTERPRETATION:  TECHNIQUE: Single portable view of the chest.    COMPARISON: 12/27/2019    CLINICAL HISTORY: Rt VATSCTS    FINDINGS:    Single frontal view of the chest demonstrates small right lower lobe effusion/atelectasis, unchanged. The cardiomediastinal silhouette is normal. No acute osseous abnormalities. Overlying EKG leads and wires are noted. Left-sided PICC line catheter tip in the right atrium. Right axillary midline catheter. Right hemidiaphragm is mildly elevated.    IMPRESSION: No interval change.                AUDREY MEYER M.D., ATTENDING RADIOLOGIST  This document has been electronically signed. Dec 28 2019 11:37AM        < end of copied text >    The pancreas demonstrates heterogeneity in the head, neck, improved since the prior exam compatible with the improving pancreatitis. Improving edema in the periportal and gastrohepatic ligament region. Complex cystic lesion in the pancreatic neck measuring 1.0 cm previously measured 1.2 cm.  Pancreatic duct is normal in caliber.    The spleen is normal.      The adrenal glands are normal.      The kidneys are normal.      There is no adenopathy in the abdomen.    Decrease in previously identified edema and fluid in the mediastinum adjacent to the distal esophagus.  Atelectasis right lung base again noted.      IMPRESSION: Findings compatible with pancreatitis again noted including heterogeneity of the pancreatic head and neck, improved. Decreasein size of the complex cystic lesion in the pancreatic neck. Decrease in edema in the gastrohepatic ligament and periportal regions with resolution of previously identified cystic lesion in the periportal region.           MAT CARREON M.D.,ATTENDING RADIOLOGIST  This document has been electronically signed. Jan 1 2020 11:59AM    < end of copied text > CC: NSVT, electrolyte imablance, pancreatitis, pancreatico- pleural fistula    INTERVAL HPI/ OVERNIGHT EVENTS:  Patient seen and examined at bedside. No new issues, no events overnight. Resting comfortably and feeling better, states abdominal pain has improved. Denies chest pain, SOB, n/v/d/c,    dizziness, headaches.     REVIEW OF SYSTEMS:  All remaining ROS negative unless specified above.     HEALTH ISSUES - PROBLEM Dx:  Protein-calorie malnutrition: Protein-calorie malnutrition  Right ankle swelling: Right ankle swelling  RONAK (acute kidney injury): RONAK (acute kidney injury)  Acute postoperative pain: Acute postoperative pain  Acute stress reaction causing mixed disturbance of emotion and conduct  Heartburn: Heartburn  Hypokalemia: Hypokalemia  Alcohol abuse: Alcohol abuse  Hypomagnesemia: Hypomagnesemia  Prophylactic measure: Prophylactic measure  Anemia due to blood loss: Anemia due to blood loss  Pleural effusion on right: Pleural effusion on right  Pain managed using patient-controlled analgesia (PCA): Pain managed using patient-controlled analgesia (PCA)  Anxiety  Right-sided chest wall pain: Right-sided chest wall pain  Pancreatic cyst: Pancreatic cyst  Hyponatremia: Hyponatremia  Internal jugular vein thrombosis, right: Internal jugular vein thrombosis, right  Empyema: Empyema      Vital Signs Last 24 Hrs  T(C): 36.6 (02 Jan 2020 08:56), Max: 37.2 (01 Jan 2020 21:19)  T(F): 97.9 (02 Jan 2020 08:56), Max: 98.9 (01 Jan 2020 21:19)  HR: 86 (02 Jan 2020 08:56) (86 - 103)  BP: 102/66 (02 Jan 2020 08:56) (96/56 - 102/66)  BP(mean): --  RR: 18 (02 Jan 2020 08:56) (18 - 18)  SpO2: 98% (02 Jan 2020 08:56) (98% - 99%)    PHYSICAL EXAM:  GENERAL: Pt lying in bed comfortably in NAD, seen ambulating on unit  HEAD:  Atraumatic, Normocephalic  EYES: EOMI, PERRLA, conjunctiva and sclera clear  ENT: Moist mucous membranes  NECK: Supple, No JVD  CHEST/LUNG: Clear to auscultation bilaterally; No rales, rhonchi, wheezing, or rubs. Unlabored respirations  HEART: Regular rate and rhythm; No murmurs, rubs, or gallops  ABDOMEN: Bowel sounds present; Soft, Nontender, Nondistended. No guarding or rigidity   EXTREMITIES:  2+ Peripheral Pulses, brisk capillary refill. No clubbing, cyanosis, or edema, B/L UE PICC line +   NERVOUS SYSTEM:  Alert & Oriented X3, speech clear, FROM x 4 extremities. No deficits   SKIN: Warm, dry  PSYCH: Normal mood and affect    LABS:                        10.8   7.24  )-----------( 267      ( 02 Jan 2020 09:39 )             33.7     01-02    138  |  102  |  6.0<L>  ----------------------------<  111  4.2   |  24.0  |  0.31<L>    Ca    9.4      02 Jan 2020 09:39  Phos  5.0     01-02  Mg     1.5     01-02    TPro  6.1<L>  /  Alb  3.1<L>  /  TBili  <0.2<L>  /  DBili  x   /  AST  37<H>  /  ALT  37<H>  /  AlkPhos  317<H>  01-02    PT/INR - ( 01 Jan 2020 09:06 )   PT: 18.0 sec;   INR: 1.54 ratio         PTT - ( 02 Jan 2020 09:39 )  PTT:106.8 sec  CAPILLARY BLOOD GLUCOSE      POCT Blood Glucose.: 95 mg/dL (02 Jan 2020 06:35)      RADIOLOGY & ADDITIONAL TESTS:    < from: MR Abdomen w/ Oral Cont and w/wo IV Cont (01.01.20 @ 05:14) >   EXAM:  MR ABDOMEN OC WAW IC                          PROCEDURE DATE:  01/01/2020          INTERPRETATION:  Clinical Information:  Pancreatitis and pancreatic cyst. Pancreaticopleural fistula.    Comparison:MRI of the abdomen dated 12/20/2019     Procedure: Pre and dynamic post contrast evaluation of the abdomen was performed. 5 ml of Gadavist was administered. 2.5 cc were discarded. 3D MRCP was also performed. Image quality is satisfactory.    Findings:    The liver demonstrates normal signal.    There is a small amount of pelvic ascites. Cystic lesion adjacent to the caudate lobe has resolved.    Intrahepatic and extrahepatic bile ducts are normal in caliber. There is no evidence of choledocholithiasis.    The gallbladder is normal.  < from: Xray Chest 1 View- PORTABLE-Routine (12.28.19 @ 06:07) >   EXAM:  XR CHEST PORTABLE ROUTINE 1V                          PROCEDURE DATE:  12/28/2019          INTERPRETATION:  TECHNIQUE: Single portable view of the chest.    COMPARISON: 12/27/2019    CLINICAL HISTORY: Rt VATSCTS    FINDINGS:    Single frontal view of the chest demonstrates small right lower lobe effusion/atelectasis, unchanged. The cardiomediastinal silhouette is normal. No acute osseous abnormalities. Overlying EKG leads and wires are noted. Left-sided PICC line catheter tip in the right atrium. Right axillary midline catheter. Right hemidiaphragm is mildly elevated.    IMPRESSION: No interval change.                AUDREY MEYER M.D., ATTENDING RADIOLOGIST  This document has been electronically signed. Dec 28 2019 11:37AM        < end of copied text >    The pancreas demonstrates heterogeneity in the head, neck, improved since the prior exam compatible with the improving pancreatitis. Improving edema in the periportal and gastrohepatic ligament region. Complex cystic lesion in the pancreatic neck measuring 1.0 cm previously measured 1.2 cm.  Pancreatic duct is normal in caliber.    The spleen is normal.      The adrenal glands are normal.      The kidneys are normal.      There is no adenopathy in the abdomen.    Decrease in previously identified edema and fluid in the mediastinum adjacent to the distal esophagus.  Atelectasis right lung base again noted.      IMPRESSION: Findings compatible with pancreatitis again noted including heterogeneity of the pancreatic head and neck, improved. Decreasein size of the complex cystic lesion in the pancreatic neck. Decrease in edema in the gastrohepatic ligament and periportal regions with resolution of previously identified cystic lesion in the periportal region.           MAT CARREON M.D.,ATTENDING RADIOLOGIST  This document has been electronically signed. Jan 1 2020 11:59AM    < end of copied text >

## 2020-01-02 NOTE — PROGRESS NOTE ADULT - SUBJECTIVE AND OBJECTIVE BOX
[facility]                                                       [facility] [place]                                                      Office: [address], [facility]                                                       Telephone: [phone]. Fax:[phone]    Subjective: Patient feels well. Started PO diet. Denies chest pain, palpitations, dizziness, lightheadedness.    TELE: Improved ventricular ectopy but still with occasional NSVT, longest 7 beats (~180 bpm).    MEDICATIONS  (STANDING):  chlorhexidine 2% Cloths 1 Application(s) Topical <User Schedule>  chlorhexidine 4% Liquid 1 Application(s) Topical <User Schedule>  cholecalciferol 2000 Unit(s) Oral daily  dextrose 10%. 1000 milliLiter(s) (100 mL/Hr) IV Continuous <Continuous>  escitalopram 10 milliGRAM(s) Oral daily  fat emulsion (Plant Based) 20% Infusion 0.9 Gm/kG/Day (31.05 mL/Hr) IV Continuous <Continuous>  ferrous    sulfate 325 milliGRAM(s) Oral daily  gabapentin 100 milliGRAM(s) Oral three times a day  heparin  Infusion. 1100 Unit(s)/Hr (11 mL/Hr) IV Continuous <Continuous>  lidocaine   Patch 1 Patch Transdermal daily  meropenem  IVPB 1000 milliGRAM(s) IV Intermittent every 8 hours  metoprolol tartrate 12.5 milliGRAM(s) Oral every 12 hours  octreotide  Injectable 100 MICROGram(s) SubCutaneous every 8 hours  pantoprazole    Tablet 40 milliGRAM(s) Oral before breakfast  Parenteral Nutrition - Adult 1 Each (83 mL/Hr) TPN Continuous <Continuous>  Parenteral Nutrition - Adult 1 Each (83 mL/Hr) TPN Continuous <Continuous>  sucralfate 1 Gram(s) Oral two times a day  traZODone 50 milliGRAM(s) Oral at bedtime    MEDICATIONS  (PRN):  acetaminophen   Tablet .. 650 milliGRAM(s) Oral every 6 hours PRN Mild Pain (1 - 3)  ALPRAZolam 0.25 milliGRAM(s) Oral three times a day PRN anxiety  heparin  Injectable 4000 Unit(s) IV Push every 6 hours PRN For aPTT less than 40  heparin  Injectable 2000 Unit(s) IV Push every 6 hours PRN For aPTT between 40 - 57  melatonin 3 milliGRAM(s) Oral at bedtime PRN Insomnia  ondansetron Injectable 4 milliGRAM(s) IV Push every 4 hours PRN Nausea and/or Vomiting  simethicone 80 milliGRAM(s) Chew three times a day PRN Gas  sodium chloride 0.9% lock flush 10 milliLiter(s) IV Push every 1 hour PRN Pre/post blood products, medications, blood draw, and to maintain line patency  traMADol 25 milliGRAM(s) Oral three times a day PRN Moderate Pain (4 - 6)    Allergies  No Known Allergies    Vital Signs Last 24 Hrs  T(C): 36.6 (02 Jan 2020 16:06), Max: 37.2 (01 Jan 2020 21:19)  T(F): 97.8 (02 Jan 2020 16:06), Max: 98.9 (01 Jan 2020 21:19)  HR: 98 (02 Jan 2020 16:06) (86 - 101)  BP: 96/66 (02 Jan 2020 16:06) (96/63 - 102/66)  BP(mean): --  RR: 20 (02 Jan 2020 16:06) (18 - 20)  SpO2: 98% (02 Jan 2020 08:56) (98% - 98%)    Physical Exam:  Constitutional: NAD, AAOx3  Cardiovascular: +S1S2 RRR  Pulmonary: CTA b/l, unlabored  GI: soft NTND +BS  Extremities: no pedal edema  Neuro: non focal, THACKER x4    LABS:             10.8   7.24  )-----------( 267      ( 02 Jan 2020 09:39 )             33.7     01-02    138  |  102  |  6.0<L>  ----------------------------<  111  4.2   |  24.0  |  0.31<L>    Ca    9.4      02 Jan 2020 09:39  Phos  5.0     01-02  Mg     1.5     01-02    TPro  6.1<L>  /  Alb  3.1<L>  /  TBili  <0.2<L>  /  DBili  x   /  AST  37<H>  /  ALT  37<H>  /  AlkPhos  317<H>  01-02    PT/INR - ( 01 Jan 2020 09:06 )   PT: 18.0 sec;   INR: 1.54 ratio      PTT - ( 02 Jan 2020 09:39 )  PTT:106.8 sec

## 2020-01-02 NOTE — PROGRESS NOTE ADULT - SUBJECTIVE AND OBJECTIVE BOX
Patient seen and examined; chart reviewed.  Events noted.  Feels well.  Glucose 125.  Lytes OK Phos 5.  K good at 4.2.  Mag 1.5.    No fever.  PICC Line intact    PAST MEDICAL & SURGICAL HISTORY:      ROS:  No Heartburn, regurgitation, dysphagia, odynophagia.  No dyspepsia  No abdominal pain.    No Nausea, vomiting.  No Bleeding.  No hematemesis.   No diarrhea.    No hematochesia.  No weight loss, anorexia.  No edema.      MEDICATIONS  (STANDING):  chlorhexidine 2% Cloths 1 Application(s) Topical <User Schedule>  chlorhexidine 4% Liquid 1 Application(s) Topical <User Schedule>  cholecalciferol 2000 Unit(s) Oral daily  dextrose 10%. 1000 milliLiter(s) (100 mL/Hr) IV Continuous <Continuous>  escitalopram 10 milliGRAM(s) Oral daily  fat emulsion (Plant Based) 20% Infusion 0.9 Gm/kG/Day (31.05 mL/Hr) IV Continuous <Continuous>  ferrous    sulfate 325 milliGRAM(s) Oral daily  gabapentin 100 milliGRAM(s) Oral three times a day  heparin  Infusion. 1100 Unit(s)/Hr (11 mL/Hr) IV Continuous <Continuous>  lidocaine   Patch 1 Patch Transdermal daily  meropenem  IVPB 1000 milliGRAM(s) IV Intermittent every 8 hours  metoprolol tartrate 12.5 milliGRAM(s) Oral every 12 hours  octreotide  Injectable 100 MICROGram(s) SubCutaneous every 8 hours  pantoprazole    Tablet 40 milliGRAM(s) Oral before breakfast  Parenteral Nutrition - Adult 1 Each (83 mL/Hr) TPN Continuous <Continuous>  Parenteral Nutrition - Adult 1 Each (83 mL/Hr) TPN Continuous <Continuous>  sucralfate 1 Gram(s) Oral two times a day  traZODone 50 milliGRAM(s) Oral at bedtime    MEDICATIONS  (PRN):  acetaminophen   Tablet .. 650 milliGRAM(s) Oral every 6 hours PRN Mild Pain (1 - 3)  ALPRAZolam 0.25 milliGRAM(s) Oral three times a day PRN anxiety  heparin  Injectable 4000 Unit(s) IV Push every 6 hours PRN For aPTT less than 40  heparin  Injectable 2000 Unit(s) IV Push every 6 hours PRN For aPTT between 40 - 57  melatonin 3 milliGRAM(s) Oral at bedtime PRN Insomnia  ondansetron Injectable 4 milliGRAM(s) IV Push every 4 hours PRN Nausea and/or Vomiting  simethicone 80 milliGRAM(s) Chew three times a day PRN Gas  sodium chloride 0.9% lock flush 10 milliLiter(s) IV Push every 1 hour PRN Pre/post blood products, medications, blood draw, and to maintain line patency  traMADol 25 milliGRAM(s) Oral three times a day PRN Moderate Pain (4 - 6)      Allergies    No Known Allergies    Intolerances    pt requests chicken broth (no beef) and CHERRY italian ice (no lemon) (Unknown)      Vital Signs Last 24 Hrs  T(C): 36.6 (02 Jan 2020 08:56), Max: 37.2 (01 Jan 2020 21:19)  T(F): 97.9 (02 Jan 2020 08:56), Max: 98.9 (01 Jan 2020 21:19)  HR: 86 (02 Jan 2020 08:56) (86 - 103)  BP: 102/66 (02 Jan 2020 08:56) (96/56 - 102/66)  BP(mean): --  RR: 18 (02 Jan 2020 08:56) (18 - 18)  SpO2: 98% (02 Jan 2020 08:56) (98% - 99%)    PHYSICAL EXAM:    GENERAL: NAD, well-groomed, well-developed  HEAD:  Atraumatic, Normocephalic  EYES: EOMI, PERRLA, conjunctiva and sclera clear  ENMT: No tonsillar erythema, exudates, or enlargement; Moist mucous membranes, Good dentition, No lesions  NECK: Supple, No JVD, Normal thyroid  CHEST/LUNG: Clear to percussion bilaterally; No rales, rhonchi, wheezing, or rubs  HEART: Regular rate and rhythm; No murmurs, rubs, or gallops  ABDOMEN: Soft, Nontender, Nondistended; Bowel sounds present  EXTREMITIES:  2+ Peripheral Pulses, No clubbing, cyanosis, or edema  LYMPH: No lymphadenopathy noted  SKIN: No rashes or lesions      LABS:                        10.8   7.24  )-----------( 267      ( 02 Jan 2020 09:39 )             33.7     01-02    138  |  102  |  6.0<L>  ----------------------------<  111  4.2   |  24.0  |  0.31<L>    Ca    9.4      02 Jan 2020 09:39  Phos  5.0     01-02  Mg     1.5     01-02    TPro  6.1<L>  /  Alb  3.1<L>  /  TBili  <0.2<L>  /  DBili  x   /  AST  37<H>  /  ALT  37<H>  /  AlkPhos  317<H>  01-02    PT/INR - ( 01 Jan 2020 09:06 )   PT: 18.0 sec;   INR: 1.54 ratio         PTT - ( 02 Jan 2020 09:39 )  PTT:106.8 sec         RADIOLOGY & ADDITIONAL STUDIES:  MRI/ MRCP:  Minor pancreatitis.  Small cyst in mid neck of the pancreas.

## 2020-01-02 NOTE — PROGRESS NOTE ADULT - SUBJECTIVE AND OBJECTIVE BOX
[facility]  INFECTIOUS DISEASES AND INTERNAL MEDICINE at [place]  =======================================================  Carlos Alberto Soto MD  Diplomates American Board of Internal Medicine and Infectious Diseases  Tel: [phone]      Fax: [phone]  =======================================================    HERACLIO MOLINA [id number]  Follow up: R pleural effusion  no fevers  no new issues    Allergies:  Ensure TID (Unknown)  No Known Allergies    Antibiotics:  meropenem  IVPB 1000 milliGRAM(s) IV Intermittent every 8 hours    Other medications:  chlorhexidine 2% Cloths 1 Application(s) Topical <User Schedule>  chlorhexidine 4% Liquid 1 Application(s) Topical <User Schedule>  cholecalciferol 2000 Unit(s) Oral daily  dextrose 10%. 1000 milliLiter(s) IV Continuous <Continuous>  escitalopram 10 milliGRAM(s) Oral daily  ferrous    sulfate 325 milliGRAM(s) Oral daily  gabapentin 100 milliGRAM(s) Oral three times a day  heparin  Infusion. 1100 Unit(s)/Hr IV Continuous <Continuous>  lidocaine   Patch 1 Patch Transdermal daily  metoprolol tartrate 12.5 milliGRAM(s) Oral every 12 hours  octreotide  Injectable 100 MICROGram(s) SubCutaneous every 8 hours  pantoprazole    Tablet 40 milliGRAM(s) Oral before breakfast  Parenteral Nutrition - Adult 1 Each TPN Continuous <Continuous>  sucralfate 1 Gram(s) Oral two times a day  traZODone 50 milliGRAM(s) Oral at bedtime      REVIEW OF SYSTEMS:  CONSTITUTIONAL:  No Fever or chills  HEENT:   No diplopia or blurred vision.  No earache, sore throat or runny nose.  CARDIOVASCULAR:  No pressure, squeezing, strangling, tightness, heaviness or aching about the chest, neck, axilla or epigastrium.  RESPIRATORY:  No cough, shortness of breath  GASTROINTESTINAL:  No nausea, vomiting or diarrhea.  GENITOURINARY:  No dysuria, frequency or urgency.   MUSCULOSKELETAL:  no joint aches, no muscle pain  SKIN:  No change in skin, hair or nails.  NEUROLOGIC:  No Headaches, seizures   PSYCHIATRIC:  No disorder of thought or mood.  ENDOCRINE:  No heat or cold intolerance  HEMATOLOGICAL:  No easy bruising or bleeding.     Physical Exam:  T(F): 97.9 (02 Jan 2020 08:56), Max: 98.9 (01 Jan 2020 21:19)  HR: 86 (02 Jan 2020 08:56)  BP: 102/66 (02 Jan 2020 08:56)  RR: 18 (02 Jan 2020 08:56)  SpO2: 98% (02 Jan 2020 08:56) (98% - 99%)  temp max in last 48H T(F): , Max: 99.1 (12-31-19 @ 16:03)    GEN: NAD, pleasant  HEENT: normocephalic and atraumatic. EOMI. PERRL.  Anicteric   NECK: Supple.   LUNGS: Clear to auscultation. Wound vac on site of old chest tube  HEART: Regular rate and rhythm   ABDOMEN: Soft, nontender, and nondistended.  Positive bowel sounds.    : No CVA tenderness  EXTREMITIES: Without any edema.  MSK: no joint swelling  NEUROLOGIC: No focal deficits  PSYCHIATRIC: Appropriate affect .  SKIN: No Rash       Labs:                        11.8   10.56 )-----------( 345      ( 01 Jan 2020 09:06 )             34.6       WBC Count: 10.56 K/uL (01-01-20 @ 09:06)  WBC Count: 9.57 K/uL (12-31-19 @ 08:17)  WBC Count: 9.24 K/uL (12-30-19 @ 01:54)      01-01    137  |  103  |  6.0<L>  ----------------------------<  125<H>  4.0   |  23.0  |  0.31<L>    Ca    9.2      01 Jan 2020 09:05  Phos  4.0     01-01  Mg     1.7     01-01        GI PCR Panel, Stool (collected 12-26-19 @ 21:40)  Source: .Stool  Final Report (12-27-19 @ 02:23):    GI PCR Results: NOT detected    *******Please Note:*******    GI panel PCR evaluates for:    Campylobacter, Plesiomonas shigelloides, Salmonella,    Vibrio, Yersinia enterocolitica, Enteroaggregative    Escherichia coli (EAEC), Enteropathogenic E.coli (EPEC),    Enterotoxigenic E. coli (ETEC) lt/st, Shiga-like    toxin-producing E. coli (STEC) stx1/stx2,    Shigella/ Enteroinvasive E. coli (EIEC), Cryptosporidium,    Cyclospora cayetanensis, Entamoeba histolytica,    Giardia lamblia, Adenovirus F 40/41, Astrovirus,    Norovirus GI/GII, Rotavirus A, Sapovirus        < from: MR Abdomen w/ Oral Cont and w/wo IV Cont (01.01.20 @ 05:14) >  IMPRESSION: Findings compatible with pancreatitis again noted including heterogeneity of the pancreatic head and neck, improved. Decreasein size of the complex cystic lesion in the pancreatic neck. Decrease in edema in the gastrohepatic ligament and periportal regions with resolution of previously identified cystic lesion in the periportal region.     < end of copied text >

## 2020-01-02 NOTE — PROGRESS NOTE ADULT - ASSESSMENT
34 year old female with no known major comorbidities who is being treated for right pleural effusion/empyema/multifocal PNA found to have a RIJ thrombus currently on heparin drip bridge to warfarin.    - RIJ venous thrombosis -provoked event that occurred in context of her severe and complex lung illness/infection/empyema that may have led to developing the thrombus. Plan on anticoagulation for 3 months. Plan for warfarin noted as outpatient. Slowly becoming therapeutic  - Multifactorial anemia; slightly down to 10.8 today. monitor.  - Reactive leukocytosis, improved.    - Pancreatic Fistula-care per managing service/ID/GI

## 2020-01-02 NOTE — PROGRESS NOTE ADULT - ASSESSMENT
34 y.o. F who was  transferred from [facility] to [facility] on 11/22 for complaints of weakness, 25 pound weight loss & flu like symptoms & found to have a large right pleural effusion,   pleural fluid cultures + for group A hemolytic strep, + RIJ thormbus   11/23 CT of the chest/abd/pelvis indicated a clot in the Right IJ, pt started on heparin gtt.   s/p right VATS decort & washout debridement of CT site & VAC placement on 11/27/19.   Persistent drainage from chest tube site so repeat CT C/A/P performed 12/4 which showed new cystic mass in pancreas and liver concerning for pancreatitis  Zosyn changed to meropenem on 12/6  for broader coverage given pancreatitis  Copious drainage from anterior chest tube site, so VAC removed and ostomy appliance applied for high output on 12/6.   12/7 Right chest tube removed.   Repeat CT scan showed a persistent Right hydropneumothorax with air anteriorly  12/8 Ostomy appliance removed and dressing applied.   12/9 s.p PICC placement & Right anterior pig tail catheter & right lateral wound vac placement   MRCP significant for Peripancreatic fluid and edema consistent with pancreatitis. Cystic lesions in the pancreatic head with the largest measuring 2.1 cm, likely acute peripancreatic collections, increased in size since 12/4/2019, with a tract of fluid extending from the 2.1 cm cystic lesion into the mediastinum and towards the pleural space on the right suspicious for a pancreatic pleural fistula.    R pleural effusion  Pancreatitis  R pancreaticopleural fistula   Alcohol abuse   WBC elevation - resolved  Acute renal failure - resolved    - All cultures negative , blood, urine, pleural fluid and chest tube insertion site.  - Leukocytosis now resolved  - No fever   ENCOURAGE PO intake.  - Continue Meropenem 1gm q8h for necrotizing pancreatitis and fistula with pleural space. GI and surgery following.   - Has 2 PICC lines, on TPN and ABx, watch for superinfection  - REPEAT MRI showed improvement in pancreatitis.     - Plan for antibiotics till 1/3/20 to complete 4 weeks from Chest tube removal     - advance of diet per surgical team

## 2020-01-02 NOTE — PROGRESS NOTE ADULT - ASSESSMENT
34 year old woman with no known major comorbidities (had not gone to see a doctor for 15 years) who presented to [facility] on 11/22/2019 with progressive sob, weakness, weight loss, She was found to have a large pleural effusion. Initially 1.5 L of dark serous fluid was removed. Chest tube placed shortly thereafter, 3 additional liters was removed. pleural fluid cultures + for group A hemolytic strep,CBC showed a leukocytosis of 40k and CTA imaging revealed a 2.1cm partially cystic mass in the body/tail of the pancreas with possible upstream ductal dilatation. On 11/23/2019 the patient had a CT chest/abdomen/pelvis that found to have a clot in the right IJ and was started on a heparin gtt   s/p right VATS decort & washout debridement of CT site & VAC placement on 11/27/19  Persistent drainage from chest tube site so repeat CT C/A/P performed 12/4 which showed new cystic mass in pancreas and liver concerning for pancreatitis  Zosyn changed to meropenem on 12/6  for broader coverage given pancreatitis  Copious drainage from anterior chest tube site, so VAC removed and ostomy appliance applied for high output on 12/6.   12/7 Right chest tube removed.   Repeat CT scan showed a persistent Right hydropneumothorax with air anteriorly  12/8 Ostomy appliance removed and dressing applied.   12/9 s.p PICC placement & Right anterior pig tail catheter & right lateral wound vac placement by thoracic surgery   MRCP significant for Peripancreatic fluid and edema consistent with pancreatitis. Cystic lesions in the pancreatic head with the largest measuring 2.1 cm, likely acute peripancreatic collections, increased in size since 12/4/2019, with a tract of fluid extending from the 2.1 cm cystic lesion into the mediastinum and towards the pleural space on the right suspicious for a pancreatic pleural fistula.  MRI showed  pancreatic / pleural fistula and Multifocal PNA  Octreotide/ TPN started by GI with pt being on clears PO, Heme- transitioned to Coumadin with  iv heparin bridging   12/9/19 PICC line placed, b/l       # sinus tachycardia with freq NSVT on monitor   - decreased in frequency, likely to resolve once electrolytes are stable  - repleting potassium to keep >4  - replaced Mg to keep level >2  - patient asymptomatic  - rpt limited ECHO per cardiology - nl   - appreciate EP input  - ELavil d/kala as it could contribute to tachycardia/ SVT      # Rt pl effusion/ Empyema  - being followed by Thoracic and ID, chest wound vac removed   -  Abx as per ID - till 1/3 to finish 4 wks after CT removal , ID appreciated    - rpt imaging with stable small effusion without change      #Acute pancreatitis and pancreatic cyst with pancreaticopleural fistula  - As per GI- to cont bowel rest for about 4-6 weeks total time. Since > 4 weeks MRI abdomen noted , pancreatitis resolving , pancreatic cyst decreasing ,   - GI order TPN daily   - advance diet as per GI, TPN reordered and diet will be advanced to full liquids for trial as per GI    #  Hyperphosphatemia  - resolved  - continue to monitor labs    #Loose stools sec to being on clear liq diet , PO Mag changed to iv Mag   - resolved    #Rt IJ thrombus  - Heparin bridging with Coumadin until INR therapeutic , follow INR in am   - cont daily INR and warfarin order   - coumadin for 3 month per hematology     #Anemia of chronic disease  - s/p multiple transfusions   - hb stable     #insomnia/ anxiety   - Trazadone and melatonin    #severe protein calorie malnutrition  - on nutritional supplements and TPN   - Prealbumin level 26, WNL    #DVT prophylaxis  - on coumadin/ heparin drip   - seen ambulating on unit    # Vit D deficiency - on oral Supplements 34 year old woman with no known major comorbidities (had not gone to see a doctor for 15 years) who presented to [facility] on 11/22/2019 with progressive sob, weakness, weight loss, She was found to have a large pleural effusion. Initially 1.5 L of dark serous fluid was removed. Chest tube placed shortly thereafter, 3 additional liters was removed. pleural fluid cultures + for group A hemolytic strep,CBC showed a leukocytosis of 40k and CTA imaging revealed a 2.1cm partially cystic mass in the body/tail of the pancreas with possible upstream ductal dilatation. On 11/23/2019 the patient had a CT chest/abdomen/pelvis that found to have a clot in the right IJ and was started on a heparin gtt   s/p right VATS decort & washout debridement of CT site & VAC placement on 11/27/19  Persistent drainage from chest tube site so repeat CT C/A/P performed 12/4 which showed new cystic mass in pancreas and liver concerning for pancreatitis  Zosyn changed to meropenem on 12/6  for broader coverage given pancreatitis  Copious drainage from anterior chest tube site, so VAC removed and ostomy appliance applied for high output on 12/6.   12/7 Right chest tube removed.   Repeat CT scan showed a persistent Right hydropneumothorax with air anteriorly  12/8 Ostomy appliance removed and dressing applied.   12/9 s.p PICC placement & Right anterior pig tail catheter & right lateral wound vac placement by thoracic surgery   MRCP significant for Peripancreatic fluid and edema consistent with pancreatitis. Cystic lesions in the pancreatic head with the largest measuring 2.1 cm, likely acute peripancreatic collections, increased in size since 12/4/2019, with a tract of fluid extending from the 2.1 cm cystic lesion into the mediastinum and towards the pleural space on the right suspicious for a pancreatic pleural fistula.  MRI showed  pancreatic / pleural fistula and Multifocal PNA  Octreotide/ TPN started by GI with pt being on clears PO, Heme- transitioned to Coumadin with  iv heparin bridging   12/9/19 PICC line placed, b/l       # sinus tachycardia with freq NSVT on monitor   - decreased in frequency, likely to resolve once electrolytes are stable  - repleting potassium to keep >4  - replaced Mg to keep level >2  - patient asymptomatic  - rpt limited ECHO per cardiology - nl   - appreciate EP input  - ELavil d/kala as it could contribute to tachycardia/ SVT      # Rt pl effusion/ Empyema  - being followed by Thoracic and ID, chest wound vac removed   -  Abx as per ID - till 1/3 to finish 4 wks after CT removal , ID appreciated    - rpt imaging with stable small effusion without change      #Acute pancreatitis and pancreatic cyst with pancreaticopleural fistula  - As per GI- to cont bowel rest for about 4-6 weeks total time. Since > 4 weeks MRI abdomen noted , pancreatitis resolving , pancreatic cyst decreasing ,   - GI order TPN daily   - advance diet as per GI, TPN reordered and diet will be advanced to full liquids for trial as per GI    #Rt IJ thrombus  - Heparin bridging with Coumadin until INR therapeutic , follow INR in am   - cont daily INR and warfarin order   - coumadin for 3 month per hematology, has completed 1 month already    #Anemia of chronic disease  - s/p multiple transfusions   - hb stable     #insomnia/ anxiety   - Trazadone and melatonin    #severe protein calorie malnutrition  - on nutritional supplements and TPN   - Prealbumin level 26, WNL    #DVT prophylaxis  - on coumadin/ heparin drip   - seen ambulating on unit    # Vit D deficiency - on oral Supplements

## 2020-01-02 NOTE — PROGRESS NOTE ADULT - ASSESSMENT
Doing well.  TPN tolerated.  Merepenem to conclude tomorrow.  Not toxic.  TPN reordered.  Same formulation.  Low K Phos OK.    Pancreatitis controlled.    Plan Trial of advancement of diet to Full liquids.

## 2020-01-03 LAB
ANION GAP SERPL CALC-SCNC: 10 MMOL/L — SIGNIFICANT CHANGE UP (ref 5–17)
APTT BLD: 93.2 SEC — HIGH (ref 27.5–36.3)
BUN SERPL-MCNC: 8 MG/DL — SIGNIFICANT CHANGE UP (ref 8–20)
CALCIUM SERPL-MCNC: 9.3 MG/DL — SIGNIFICANT CHANGE UP (ref 8.6–10.2)
CHLORIDE SERPL-SCNC: 101 MMOL/L — SIGNIFICANT CHANGE UP (ref 98–107)
CO2 SERPL-SCNC: 26 MMOL/L — SIGNIFICANT CHANGE UP (ref 22–29)
CREAT SERPL-MCNC: 0.38 MG/DL — LOW (ref 0.5–1.3)
GLUCOSE BLDC GLUCOMTR-MCNC: 105 MG/DL — HIGH (ref 70–99)
GLUCOSE BLDC GLUCOMTR-MCNC: 109 MG/DL — HIGH (ref 70–99)
GLUCOSE SERPL-MCNC: 113 MG/DL — SIGNIFICANT CHANGE UP (ref 70–115)
HCT VFR BLD CALC: 36.4 % — SIGNIFICANT CHANGE UP (ref 34.5–45)
HGB BLD-MCNC: 11.3 G/DL — LOW (ref 11.5–15.5)
INR BLD: 1.57 RATIO — HIGH (ref 0.88–1.16)
MAGNESIUM SERPL-MCNC: 3 MG/DL — HIGH (ref 1.6–2.6)
MCHC RBC-ENTMCNC: 29.2 PG — SIGNIFICANT CHANGE UP (ref 27–34)
MCHC RBC-ENTMCNC: 31 GM/DL — LOW (ref 32–36)
MCV RBC AUTO: 94.1 FL — SIGNIFICANT CHANGE UP (ref 80–100)
PHOSPHATE SERPL-MCNC: 4.5 MG/DL — SIGNIFICANT CHANGE UP (ref 2.4–4.7)
PLATELET # BLD AUTO: 294 K/UL — SIGNIFICANT CHANGE UP (ref 150–400)
POTASSIUM SERPL-MCNC: 4 MMOL/L — SIGNIFICANT CHANGE UP (ref 3.5–5.3)
POTASSIUM SERPL-SCNC: 4 MMOL/L — SIGNIFICANT CHANGE UP (ref 3.5–5.3)
PROTHROM AB SERPL-ACNC: 18.3 SEC — HIGH (ref 10–12.9)
RBC # BLD: 3.87 M/UL — SIGNIFICANT CHANGE UP (ref 3.8–5.2)
RBC # FLD: 15.7 % — HIGH (ref 10.3–14.5)
SODIUM SERPL-SCNC: 137 MMOL/L — SIGNIFICANT CHANGE UP (ref 135–145)
WBC # BLD: 8.88 K/UL — SIGNIFICANT CHANGE UP (ref 3.8–10.5)
WBC # FLD AUTO: 8.88 K/UL — SIGNIFICANT CHANGE UP (ref 3.8–10.5)

## 2020-01-03 PROCEDURE — 99232 SBSQ HOSP IP/OBS MODERATE 35: CPT

## 2020-01-03 PROCEDURE — 99233 SBSQ HOSP IP/OBS HIGH 50: CPT

## 2020-01-03 RX ORDER — ELECTROLYTE SOLUTION,INJ
1 VIAL (ML) INTRAVENOUS
Refills: 0 | Status: DISCONTINUED | OUTPATIENT
Start: 2020-01-03 | End: 2020-01-03

## 2020-01-03 RX ADMIN — GABAPENTIN 100 MILLIGRAM(S): 400 CAPSULE ORAL at 06:17

## 2020-01-03 RX ADMIN — TRAMADOL HYDROCHLORIDE 25 MILLIGRAM(S): 50 TABLET ORAL at 11:48

## 2020-01-03 RX ADMIN — MEROPENEM 100 MILLIGRAM(S): 1 INJECTION INTRAVENOUS at 18:16

## 2020-01-03 RX ADMIN — MEROPENEM 100 MILLIGRAM(S): 1 INJECTION INTRAVENOUS at 00:40

## 2020-01-03 RX ADMIN — CHLORHEXIDINE GLUCONATE 1 APPLICATION(S): 213 SOLUTION TOPICAL at 06:17

## 2020-01-03 RX ADMIN — Medication 2000 UNIT(S): at 11:20

## 2020-01-03 RX ADMIN — Medication 325 MILLIGRAM(S): at 11:21

## 2020-01-03 RX ADMIN — Medication 1 GRAM(S): at 06:18

## 2020-01-03 RX ADMIN — OCTREOTIDE ACETATE 100 MICROGRAM(S): 200 INJECTION, SOLUTION INTRAVENOUS; SUBCUTANEOUS at 20:58

## 2020-01-03 RX ADMIN — Medication 0.25 MILLIGRAM(S): at 11:19

## 2020-01-03 RX ADMIN — Medication 1 GRAM(S): at 18:15

## 2020-01-03 RX ADMIN — Medication 1 EACH: at 18:30

## 2020-01-03 RX ADMIN — Medication 50 MILLIGRAM(S): at 21:00

## 2020-01-03 RX ADMIN — Medication 25 MILLIGRAM(S): at 18:15

## 2020-01-03 RX ADMIN — WARFARIN SODIUM 10 MILLIGRAM(S): 2.5 TABLET ORAL at 21:00

## 2020-01-03 RX ADMIN — Medication 1 EACH: at 05:16

## 2020-01-03 RX ADMIN — Medication 0.25 MILLIGRAM(S): at 18:13

## 2020-01-03 RX ADMIN — HEPARIN SODIUM 900 UNIT(S)/HR: 5000 INJECTION INTRAVENOUS; SUBCUTANEOUS at 03:38

## 2020-01-03 RX ADMIN — SIMETHICONE 80 MILLIGRAM(S): 80 TABLET, CHEWABLE ORAL at 18:15

## 2020-01-03 RX ADMIN — TRAMADOL HYDROCHLORIDE 25 MILLIGRAM(S): 50 TABLET ORAL at 18:43

## 2020-01-03 RX ADMIN — Medication 25 MILLIGRAM(S): at 06:18

## 2020-01-03 RX ADMIN — GABAPENTIN 100 MILLIGRAM(S): 400 CAPSULE ORAL at 21:00

## 2020-01-03 RX ADMIN — ESCITALOPRAM OXALATE 10 MILLIGRAM(S): 10 TABLET, FILM COATED ORAL at 11:20

## 2020-01-03 RX ADMIN — TRAMADOL HYDROCHLORIDE 25 MILLIGRAM(S): 50 TABLET ORAL at 18:13

## 2020-01-03 RX ADMIN — TRAMADOL HYDROCHLORIDE 25 MILLIGRAM(S): 50 TABLET ORAL at 11:18

## 2020-01-03 RX ADMIN — I.V. FAT EMULSION 31.05 GM/KG/DAY: 20 EMULSION INTRAVENOUS at 05:17

## 2020-01-03 RX ADMIN — GABAPENTIN 100 MILLIGRAM(S): 400 CAPSULE ORAL at 12:57

## 2020-01-03 RX ADMIN — MEROPENEM 100 MILLIGRAM(S): 1 INJECTION INTRAVENOUS at 11:19

## 2020-01-03 RX ADMIN — OCTREOTIDE ACETATE 100 MICROGRAM(S): 200 INJECTION, SOLUTION INTRAVENOUS; SUBCUTANEOUS at 11:19

## 2020-01-03 RX ADMIN — PANTOPRAZOLE SODIUM 40 MILLIGRAM(S): 20 TABLET, DELAYED RELEASE ORAL at 06:17

## 2020-01-03 NOTE — PROGRESS NOTE ADULT - CONSTITUTIONAL
detailed exam
detailed exam
Well-developed, well nourished
detailed exam

## 2020-01-03 NOTE — PROGRESS NOTE ADULT - RESPIRATORY
detailed exam
detailed exam
Breath Sounds equal & clear to percussion & auscultation, no accessory muscle use
detailed exam
detailed exam

## 2020-01-03 NOTE — PROGRESS NOTE ADULT - ASSESSMENT
34 year old woman with no known major comorbidities (had not gone to see a doctor for 15 years) who presented to [facility] on 11/22/2019 with progressive sob, weakness, weight loss, She was found to have a large pleural effusion. Initially 1.5 L of dark serous fluid was removed. Chest tube placed shortly thereafter, 3 additional liters was removed. pleural fluid cultures + for group A hemolytic strep,CBC showed a leukocytosis of 40k and CTA imaging revealed a 2.1cm partially cystic mass in the body/tail of the pancreas with possible upstream ductal dilatation. On 11/23/2019 the patient had a CT chest/abdomen/pelvis that found to have a clot in the right IJ and was started on a heparin gtt   s/p right VATS decort & washout debridement of CT site & VAC placement on 11/27/19  Persistent drainage from chest tube site so repeat CT C/A/P performed 12/4 which showed new cystic mass in pancreas and liver concerning for pancreatitis  Zosyn changed to meropenem on 12/6  for broader coverage given pancreatitis  Copious drainage from anterior chest tube site, so VAC removed and ostomy appliance applied for high output on 12/6.   12/7 Right chest tube removed.   Repeat CT scan showed a persistent Right hydropneumothorax with air anteriorly  12/8 Ostomy appliance removed and dressing applied.   12/9 s.p PICC placement & Right anterior pig tail catheter & right lateral wound vac placement by thoracic surgery   MRCP significant for Peripancreatic fluid and edema consistent with pancreatitis. Cystic lesions in the pancreatic head with the largest measuring 2.1 cm, likely acute peripancreatic collections, increased in size since 12/4/2019, with a tract of fluid extending from the 2.1 cm cystic lesion into the mediastinum and towards the pleural space on the right suspicious for a pancreatic pleural fistula.  MRI showed  pancreatic / pleural fistula and Multifocal PNA  Octreotide/ TPN started by GI with pt being on clears PO, Heme- transitioned to Coumadin with  iv heparin bridging   12/9/19 PICC line placed, b/l   antr chest wall- chest tube site wound vac removed on 12/30/19  runs of NSVT on tele for which aggressive lyte supplement started and lopressor started. Rpt MR abdomen showed improved pancreas status and full liq diet started on 1/2/20 and reg on 1/3.       # sinus tachycardia with freq NSVT on monitor   - decreased in frequency, likely to resolve once electrolytes are stable  - repleting potassium to keep >4  - replaced Mg to keep level >2  - patient asymptomatic  - rpt limited ECHO per cardiology - nl   - appreciate EP input. Lopressor started at 12.5 and then increased to 25 mg  - ELavil d/kala as it could contribute to tachycardia/ SVT  - when tolerating PO diet, she needs to be on Mg PO      # Rt pl effusion/ Empyema  - being followed by Thoracic and ID, chest wound vac removed   - Abx as per ID - till 1/3 to finish 4 wks after CT removal , ID appreciated    - rpt imaging with stable small effusion without change  - IF clinically indicated can do MR chest to assess fistula      #Acute pancreatitis and pancreatic cyst with pancreaticopleural fistula  - As per GI- to cont bowel rest for about 4-6 weeks total time. Since > 4 weeks MRI abdomen noted , pancreatitis resolving , pancreatic cyst decreasing ,   - GI order TPN daily   - advance diet as per GI, TPN reordered and diet will be advanced to full liquids on 1/2/20 and then to reg on 1/3/20. IF tolerating PO well, will wean off   TPN    #Rt IJ thrombus  - Heparin bridging with Coumadin until INR therapeutic , follow INR in am   - cont daily INR and warfarin order   - coumadin for 3 month per hematology, has completed 1 month already    #Anemia of chronic disease  - s/p multiple transfusions   - hb stable     #insomnia/ anxiety   - Trazadone and melatonin    #severe protein calorie malnutrition  - on nutritional supplements and TPN   - Prealbumin level 26, WNL    #DVT prophylaxis  - on coumadin/ heparin drip   - seen ambulating on unit    # Vit D deficiency - on oral Supplements

## 2020-01-03 NOTE — PROGRESS NOTE ADULT - SUBJECTIVE AND OBJECTIVE BOX
INTERVAL HPI/OVERNIGHT EVENTS:FU for TPN. She had full liquid diet yesterday. NO particular complaints. Will be completing the antibiotics for the pneumonia today. Labs reviewed.     MEDICATIONS  (STANDING):  chlorhexidine 2% Cloths 1 Application(s) Topical <User Schedule>  chlorhexidine 4% Liquid 1 Application(s) Topical <User Schedule>  cholecalciferol 2000 Unit(s) Oral daily  dextrose 10%. 1000 milliLiter(s) (100 mL/Hr) IV Continuous <Continuous>  escitalopram 10 milliGRAM(s) Oral daily  ferrous    sulfate 325 milliGRAM(s) Oral daily  gabapentin 100 milliGRAM(s) Oral three times a day  heparin  Infusion. 1100 Unit(s)/Hr (11 mL/Hr) IV Continuous <Continuous>  lidocaine   Patch 1 Patch Transdermal daily  meropenem  IVPB 1000 milliGRAM(s) IV Intermittent every 8 hours  metoprolol tartrate 25 milliGRAM(s) Oral two times a day  octreotide  Injectable 100 MICROGram(s) SubCutaneous every 8 hours  pantoprazole    Tablet 40 milliGRAM(s) Oral before breakfast  Parenteral Nutrition - Adult 1 Each (83 mL/Hr) TPN Continuous <Continuous>  sucralfate 1 Gram(s) Oral two times a day  traZODone 50 milliGRAM(s) Oral at bedtime  warfarin 10 milliGRAM(s) Oral daily    MEDICATIONS  (PRN):  acetaminophen   Tablet .. 650 milliGRAM(s) Oral every 6 hours PRN Mild Pain (1 - 3)  ALPRAZolam 0.25 milliGRAM(s) Oral three times a day PRN anxiety  heparin  Injectable 4000 Unit(s) IV Push every 6 hours PRN For aPTT less than 40  heparin  Injectable 2000 Unit(s) IV Push every 6 hours PRN For aPTT between 40 - 57  melatonin 3 milliGRAM(s) Oral at bedtime PRN Insomnia  ondansetron Injectable 4 milliGRAM(s) IV Push every 4 hours PRN Nausea and/or Vomiting  simethicone 80 milliGRAM(s) Chew three times a day PRN Gas  sodium chloride 0.9% lock flush 10 milliLiter(s) IV Push every 1 hour PRN Pre/post blood products, medications, blood draw, and to maintain line patency  traMADol 25 milliGRAM(s) Oral three times a day PRN Moderate Pain (4 - 6)      Allergies    No Known Allergies    Intolerances    pt requests chicken broth (no beef) and CHERRY italian ice (no lemon) (Unknown)      Vital Signs Last 24 Hrs  T(C): 36.7 (02 Jan 2020 23:00), Max: 36.7 (02 Jan 2020 23:00)  T(F): 98 (02 Jan 2020 23:00), Max: 98 (02 Jan 2020 23:00)  HR: 93 (03 Jan 2020 05:40) (68 - 98)  BP: 102/66 (03 Jan 2020 05:40) (96/66 - 109/69)  BP(mean): --  RR: 20 (02 Jan 2020 23:00) (18 - 20)  SpO2: 98% (02 Jan 2020 23:00) (98% - 98%)    LABS:                        11.3   8.88  )-----------( 294      ( 03 Jan 2020 02:45 )             36.4     01-03    137  |  101  |  8.0  ----------------------------<  113  4.0   |  26.0  |  0.38<L>    Ca    9.3      03 Jan 2020 02:45  Phos  4.5     01-03  Mg     3.0     01-03    TPro  6.1<L>  /  Alb  3.1<L>  /  TBili  <0.2<L>  /  DBili  x   /  AST  37<H>  /  ALT  37<H>  /  AlkPhos  317<H>  01-02    PT/INR - ( 03 Jan 2020 02:45 )   PT: 18.3 sec;   INR: 1.57 ratio         PTT - ( 03 Jan 2020 02:45 )  PTT:93.2 sec      RADIOLOGY & ADDITIONAL TESTS:< from: MR Abdomen w/ Oral Cont and w/wo IV Cont (01.01.20 @ 05:14) >     EXAM:  MR ABDOMEN OC WAW IC                          PROCEDURE DATE:  01/01/2020          INTERPRETATION:  Clinical Information:  Pancreatitis and pancreatic cyst. Pancreaticopleural fistula.    Comparison:MRI of the abdomen dated 12/20/2019     Procedure: Pre and dynamic post contrast evaluation of the abdomen was performed. 5 ml of Gadavist was administered. 2.5 cc were discarded. 3D MRCP was also performed. Image quality is satisfactory.    Findings:    The liver demonstrates normal signal.    There is a small amount of pelvic ascites. Cystic lesion adjacent to the caudate lobe has resolved.    Intrahepatic and extrahepatic bile ducts are normal in caliber. There is no evidence of choledocholithiasis.    The gallbladder is normal.    The pancreas demonstrates heterogeneity in the head, neck, improved since the prior exam compatible with the improving pancreatitis. Improving edema in the periportal and gastrohepatic ligament region. Complex cystic lesion in the pancreatic neck measuring 1.0 cm previously measured 1.2 cm.  Pancreatic duct is normal in caliber.    The spleen is normal.      The adrenal glands are normal.      The kidneys are normal.      There is no adenopathy in the abdomen.    Decrease in previously identified edema and fluid in the mediastinum adjacent to the distal esophagus.  Atelectasis right lung base again noted.      IMPRESSION: Findings compatible with pancreatitis again noted including heterogeneity of the pancreatic head and neck, improved. Decreasein size of the complex cystic lesion in the pancreatic neck. Decrease in edema in the gastrohepatic ligament and periportal regions with resolution of previously identified cystic lesion in the periportal region.                     MAT CARREON M.D.,ATTENDING RADIOLOGIST  This document has been electronically signed. Jan 1 2020 11:59AM                  < end of copied text >

## 2020-01-03 NOTE — PROGRESS NOTE ADULT - SUBJECTIVE AND OBJECTIVE BOX
[facility]  INFECTIOUS DISEASES AND INTERNAL MEDICINE at [place]  =======================================================  Carlos Alberto Soto MD  Diplomates American Board of Internal Medicine and Infectious Diseases  Tel: [phone]      Fax: [phone]  =======================================================    HER[place] MOLINA [id number]  Follow up: R pleural effusion  feeling well  tolerating diet this AM, had pancakes, and sausages, too spicy      Allergies:  Ensure TID (Unknown)  No Known Allergies    Antibiotics:  meropenem  IVPB 1000 milliGRAM(s) IV Intermittent every 8 hours    Other medications:  chlorhexidine 2% Cloths 1 Application(s) Topical <User Schedule>  chlorhexidine 4% Liquid 1 Application(s) Topical <User Schedule>  cholecalciferol 2000 Unit(s) Oral daily  dextrose 10%. 1000 milliLiter(s) IV Continuous <Continuous>  escitalopram 10 milliGRAM(s) Oral daily  ferrous    sulfate 325 milliGRAM(s) Oral daily  gabapentin 100 milliGRAM(s) Oral three times a day  heparin  Infusion. 1100 Unit(s)/Hr IV Continuous <Continuous>  lidocaine   Patch 1 Patch Transdermal daily  metoprolol tartrate 25 milliGRAM(s) Oral two times a day  octreotide  Injectable 100 MICROGram(s) SubCutaneous every 8 hours  pantoprazole    Tablet 40 milliGRAM(s) Oral before breakfast  Parenteral Nutrition - Adult 1 Each TPN Continuous <Continuous>  Parenteral Nutrition - Adult 1 Each TPN Continuous <Continuous>  sucralfate 1 Gram(s) Oral two times a day  traZODone 50 milliGRAM(s) Oral at bedtime  warfarin 10 milliGRAM(s) Oral daily      REVIEW OF SYSTEMS:  CONSTITUTIONAL:  No Fever or chills  HEENT:   No diplopia or blurred vision.  No earache, sore throat or runny nose.  CARDIOVASCULAR:  No pressure, squeezing, strangling, tightness, heaviness or aching about the chest, neck, axilla or epigastrium.  RESPIRATORY:  No cough, shortness of breath  GASTROINTESTINAL:  No nausea, vomiting or diarrhea.  GENITOURINARY:  No dysuria, frequency or urgency.   MUSCULOSKELETAL:  no joint aches, no muscle pain  SKIN:  No change in skin, hair or nails.  NEUROLOGIC:  No Headaches, seizures   PSYCHIATRIC:  No disorder of thought or mood.  ENDOCRINE:  No heat or cold intolerance  HEMATOLOGICAL:  No easy bruising or bleeding.     Physical Exam:  T(F): 98 (03 Jan 2020 08:43), Max: 98 (02 Jan 2020 23:00)  HR: 83 (03 Jan 2020 08:43)  BP: 98/63 (03 Jan 2020 08:43)  RR: 18 (03 Jan 2020 08:43)  SpO2: 96% (03 Jan 2020 08:43) (96% - 98%)  temp max in last 48H T(F): , Max: 98.9 (01-01-20 @ 21:19)    GEN: NAD, pleasant  HEENT: normocephalic and atraumatic. EOMI. PERRL.  Anicteric   NECK: Supple.   LUNGS: Clear to auscultation.  HEART: Regular rate and rhythm   ABDOMEN: Soft, nontender, and nondistended.  Positive bowel sounds.    : No CVA tenderness  EXTREMITIES: Without any edema.  MSK: no joint swelling  NEUROLOGIC: No focal deficits  PSYCHIATRIC: Appropriate affect .  SKIN: No Rash       Labs:                        11.3   8.88  )-----------( 294      ( 03 Jan 2020 02:45 )             36.4       WBC Count: 8.88 K/uL (01-03-20 @ 02:45)  WBC Count: 7.24 K/uL (01-02-20 @ 09:39)  WBC Count: 10.56 K/uL (01-01-20 @ 09:06)  WBC Count: 9.57 K/uL (12-31-19 @ 08:17)  WBC Count: 9.24 K/uL (12-30-19 @ 01:54)      01-03    137  |  101  |  8.0  ----------------------------<  113  4.0   |  26.0  |  0.38<L>    Ca    9.3      03 Jan 2020 02:45  Phos  4.5     01-03  Mg     3.0     01-03    TPro  6.1<L>  /  Alb  3.1<L>  /  TBili  <0.2<L>  /  DBili  x   /  AST  37<H>  /  ALT  37<H>  /  AlkPhos  317<H>  01-02

## 2020-01-03 NOTE — PROGRESS NOTE ADULT - SUBJECTIVE AND OBJECTIVE BOX
HEALTH ISSUES - PROBLEM Dx:    CC: NSVT, electrolyte imablance, pancreatitis, pancreatico- pleural fistula    INTERVAL HPI/ OVERNIGHT EVENTS:  Patient seen and examined at bedside. No new issues, no events overnight. Resting comfortably and feeling better, states abdominal pain has improved. Denies chest pain, SOB, n/v/d/c,    dizziness, headaches.     REVIEW OF SYSTEMS:  All remaining ROS negative unless specified above.     Vital Signs Last 24 Hrs  T(C): 36.7 (03 Jan 2020 08:43), Max: 36.7 (02 Jan 2020 23:00)  T(F): 98 (03 Jan 2020 08:43), Max: 98 (02 Jan 2020 23:00)  HR: 83 (03 Jan 2020 08:43) (68 - 98)  BP: 98/63 (03 Jan 2020 08:43) (96/66 - 109/69)  BP(mean): --  RR: 18 (03 Jan 2020 08:43) (18 - 20)  SpO2: 96% (03 Jan 2020 08:43) (96% - 98%)    PHYSICAL EXAM-  GENERAL: Pt lying in bed comfortably in NAD, seen ambulating on unit  HEAD:  Atraumatic, Normocephalic  EYES: EOMI, PERRLA, conjunctiva and sclera clear  ENT: Moist mucous membranes  NECK: Supple, No JVD  CHEST/LUNG: Clear to auscultation bilaterally; No rales, rhonchi, wheezing, or rubs. Unlabored respirations  HEART: Regular rate and rhythm; No murmurs, rubs, or gallops  ABDOMEN: Bowel sounds present; Soft, Nontender, Nondistended. No guarding or rigidity   EXTREMITIES:  2+ Peripheral Pulses, brisk capillary refill. No clubbing, cyanosis, or edema, B/L UE PICC line +   NERVOUS SYSTEM:  Alert & Oriented X3, speech clear, FROM x 4 extremities. No deficits   SKIN: Warm, dry  PSYCH: Normal mood and affect    MEDICATIONS  (STANDING):  chlorhexidine 2% Cloths 1 Application(s) Topical <User Schedule>  chlorhexidine 4% Liquid 1 Application(s) Topical <User Schedule>  cholecalciferol 2000 Unit(s) Oral daily  dextrose 10%. 1000 milliLiter(s) (100 mL/Hr) IV Continuous <Continuous>  escitalopram 10 milliGRAM(s) Oral daily  ferrous    sulfate 325 milliGRAM(s) Oral daily  gabapentin 100 milliGRAM(s) Oral three times a day  heparin  Infusion. 1100 Unit(s)/Hr (11 mL/Hr) IV Continuous <Continuous>  lidocaine   Patch 1 Patch Transdermal daily  meropenem  IVPB 1000 milliGRAM(s) IV Intermittent every 8 hours  metoprolol tartrate 25 milliGRAM(s) Oral two times a day  octreotide  Injectable 100 MICROGram(s) SubCutaneous every 8 hours  pantoprazole    Tablet 40 milliGRAM(s) Oral before breakfast  Parenteral Nutrition - Adult 1 Each (83 mL/Hr) TPN Continuous <Continuous>  Parenteral Nutrition - Adult 1 Each (83 mL/Hr) TPN Continuous <Continuous>  sucralfate 1 Gram(s) Oral two times a day  traZODone 50 milliGRAM(s) Oral at bedtime  warfarin 10 milliGRAM(s) Oral daily    MEDICATIONS  (PRN):  acetaminophen   Tablet .. 650 milliGRAM(s) Oral every 6 hours PRN Mild Pain (1 - 3)  ALPRAZolam 0.25 milliGRAM(s) Oral three times a day PRN anxiety  heparin  Injectable 4000 Unit(s) IV Push every 6 hours PRN For aPTT less than 40  heparin  Injectable 2000 Unit(s) IV Push every 6 hours PRN For aPTT between 40 - 57  melatonin 3 milliGRAM(s) Oral at bedtime PRN Insomnia  ondansetron Injectable 4 milliGRAM(s) IV Push every 4 hours PRN Nausea and/or Vomiting  simethicone 80 milliGRAM(s) Chew three times a day PRN Gas  sodium chloride 0.9% lock flush 10 milliLiter(s) IV Push every 1 hour PRN Pre/post blood products, medications, blood draw, and to maintain line patency  traMADol 25 milliGRAM(s) Oral three times a day PRN Moderate Pain (4 - 6)      LABS:                        11.3   8.88  )-----------( 294      ( 03 Jan 2020 02:45 )             36.4     01-03    137  |  101  |  8.0  ----------------------------<  113  4.0   |  26.0  |  0.38<L>    Ca    9.3      03 Jan 2020 02:45  Phos  4.5     01-03  Mg     3.0     01-03    TPro  6.1<L>  /  Alb  3.1<L>  /  TBili  <0.2<L>  /  DBili  x   /  AST  37<H>  /  ALT  37<H>  /  AlkPhos  317<H>  01-02    PT/INR - ( 03 Jan 2020 02:45 )   PT: 18.3 sec;   INR: 1.57 ratio      PTT - ( 03 Jan 2020 02:45 )  PTT:93.2 sec

## 2020-01-03 NOTE — PROGRESS NOTE ADULT - ASSESSMENT
34 y.o. F who was  [place] [facility] [place] [facility] [place]22 for complaints of weakness, 25 pound weight loss & flu like symptoms & found to have a large right pleural effusion,   pleural fluid cultures + for group A hemolytic strep, + RIJ thormbus   11/23 CT of the chest/abd/pelvis indicated a clot in the Right IJ, pt started on heparin gtt.   s/p right VATS decort & washout debridement of CT site & VAC placement on 11/27/19.   Persistent drainage from chest tube site so repeat CT C/A/P performed 12/4 which showed new cystic mass in pancreas and liver concerning for pancreatitis  Zosyn changed to meropenem on 12/6  for broader coverage given pancreatitis  Copious drainage from anterior chest tube site, so VAC removed and ostomy appliance applied for high output on 12/6.   12/7 Right chest tube removed.   Repeat CT scan showed a persistent Right hydropneumothorax with air anteriorly  12/8 Ostomy appliance removed and dressing applied.   12/9 s.p PICC placement & Right anterior pig tail catheter & right lateral wound vac placement   MRCP significant for Peripancreatic fluid and edema consistent with pancreatitis. Cystic lesions in the pancreatic head with the largest measuring 2.1 cm, likely acute peripancreatic collections, increased in size since 12/4/2019, with a tract of fluid extending from the 2.1 cm cystic lesion into the mediastinum and towards the pleural space on the right suspicious for a pancreatic pleural fistula.      Pancreatitis  R pancreaticopleural fistula   Alcohol abuse   R pleural effusion - resolved  WBC elevation - resolved  Acute renal failure - resolved    - All cultures negative , blood, urine, pleural fluid and chest tube insertion site.  - Leukocytosis now resolved  - fevers resolved    - REPEAT MRI showed improvement in pancreatitis.  - TO COMPLETE Meropenem 1gm q8h for today,  1/3/20 to complete 4 weeks from Chest tube removal     - Has 2 PICC lines, on TPN and ABx, watch for superinfection    ENCOURAGE PO intake.  - advance of diet per surgical team      no contraindications to discharge to community from ID standpoint, when stable from GI and medical standpoint.

## 2020-01-03 NOTE — PROGRESS NOTE ADULT - ASSESSMENT
34 year old female patient with history of alcohol use with prolonged hospitalization for acute pancreatitis with Pancreaticopleural fistula, group A hemolytic strep pleural effusions, severe malnutrition, IJ thrombus now on anticoagulation who is consulted for sinus tachycardia and ventricular ectopy.    Sinus tachycardia improving off of Amitriptyline and with beta blockade. Ventricular ectopy has improved. Although her serum levels are improved, I suspect this does not reflect intracellular Magnesium stores. Given history of EtOH use and now poor PO intake requiring TPN, recommend continued aggressive Mg supplementation. Once she has less diarrhea, would start standing PO Magnesium.    Patient should follow up in EP clinic 1 month after discharge for monitoring of arrhythmia and further work up if needed.    Recommendations:  - Continue Metoprolol to 25mg BID  - Continue with aggressive electrolyte repletion K>4, Mg>2 (add PO Mg once she can tolerate)  - Monitor on telemetry  - Outpatient follow up in 1 month after discharge    Discussed with Dr. Rosa.    Thank you for this consultation. EP will sign off. Please contact EP team with any questions.    Luis Glass MD  Clinical Cardiac Electrophysiology

## 2020-01-03 NOTE — PROGRESS NOTE ADULT - SUBJECTIVE AND OBJECTIVE BOX
[place] CARDIAC ELECTROPHYSIOLOGY                                                       [facility]                                                      Office: [address] [address]                                                       Telephone: [phone]. Fax:[phone]    Subjective: Patient continues to do well and is advancing diet. Denies chest pain, palpitations, dizziness, lightheadedness.    TELE: NSR. Reduced ventricular ectopy. At most 5 beat run of NSVT. All monomorphic.    MEDICATIONS  (STANDING):  chlorhexidine 2% Cloths 1 Application(s) Topical <User Schedule>  chlorhexidine 4% Liquid 1 Application(s) Topical <User Schedule>  cholecalciferol 2000 Unit(s) Oral daily  dextrose 10%. 1000 milliLiter(s) (100 mL/Hr) IV Continuous <Continuous>  escitalopram 10 milliGRAM(s) Oral daily  ferrous    sulfate 325 milliGRAM(s) Oral daily  gabapentin 100 milliGRAM(s) Oral three times a day  heparin  Infusion. 1100 Unit(s)/Hr (11 mL/Hr) IV Continuous <Continuous>  lidocaine   Patch 1 Patch Transdermal daily  meropenem  IVPB 1000 milliGRAM(s) IV Intermittent every 8 hours  metoprolol tartrate 25 milliGRAM(s) Oral two times a day  octreotide  Injectable 100 MICROGram(s) SubCutaneous every 8 hours  pantoprazole    Tablet 40 milliGRAM(s) Oral before breakfast  Parenteral Nutrition - Adult 1 Each (83 mL/Hr) TPN Continuous <Continuous>  Parenteral Nutrition - Adult 1 Each (83 mL/Hr) TPN Continuous <Continuous>  sucralfate 1 Gram(s) Oral two times a day  traZODone 50 milliGRAM(s) Oral at bedtime  warfarin 10 milliGRAM(s) Oral daily    MEDICATIONS  (PRN):  acetaminophen   Tablet .. 650 milliGRAM(s) Oral every 6 hours PRN Mild Pain (1 - 3)  ALPRAZolam 0.25 milliGRAM(s) Oral three times a day PRN anxiety  heparin  Injectable 4000 Unit(s) IV Push every 6 hours PRN For aPTT less than 40  heparin  Injectable 2000 Unit(s) IV Push every 6 hours PRN For aPTT between 40 - 57  melatonin 3 milliGRAM(s) Oral at bedtime PRN Insomnia  ondansetron Injectable 4 milliGRAM(s) IV Push every 4 hours PRN Nausea and/or Vomiting  simethicone 80 milliGRAM(s) Chew three times a day PRN Gas  sodium chloride 0.9% lock flush 10 milliLiter(s) IV Push every 1 hour PRN Pre/post blood products, medications, blood draw, and to maintain line patency  traMADol 25 milliGRAM(s) Oral three times a day PRN Moderate Pain (4 - 6)      Allergies  No Known Allergies    Vital Signs Last 24 Hrs  T(C): 36.7 (03 Jan 2020 08:43), Max: 36.7 (02 Jan 2020 23:00)  T(F): 98 (03 Jan 2020 08:43), Max: 98 (02 Jan 2020 23:00)  HR: 83 (03 Jan 2020 08:43) (68 - 98)  BP: 98/63 (03 Jan 2020 08:43) (96/66 - 109/69)  BP(mean): --  RR: 18 (03 Jan 2020 08:43) (18 - 20)  SpO2: 96% (03 Jan 2020 08:43) (96% - 98%)    Physical Exam:  Constitutional: NAD, AAOx3  Cardiovascular: +S1S2 RRR  Pulmonary: CTA b/l, unlabored  GI: soft ND +BS, + diffuse abdominal tenderness  Extremities: no pedal edema  Neuro: non focal, THACKER x4    LABS:                        11.3   8.88  )-----------( 294      ( 03 Jan 2020 02:45 )             36.4     01-03    137  |  101  |  8.0  ----------------------------<  113  4.0   |  26.0  |  0.38<L>    Ca    9.3      03 Jan 2020 02:45  Phos  4.5     01-03  Mg     3.0     01-03    TPro  6.1<L>  /  Alb  3.1<L>  /  TBili  <0.2<L>  /  DBili  x   /  AST  37<H>  /  ALT  37<H>  /  AlkPhos  317<H>  01-02    PT/INR - ( 03 Jan 2020 02:45 )   PT: 18.3 sec;   INR: 1.57 ratio         PTT - ( 03 Jan 2020 02:45 )  PTT:93.2 sec

## 2020-01-03 NOTE — CHART NOTE - NSCHARTNOTEFT_GEN_A_CORE
Source: Patient [ ]  Family [ ]   other [ x]sleeping    Current Diet: full liquid and TPN    PO intake:  < 50% [ ]   50-75%  [ ]   %  [ ]  other :  ~50%    Source for PO intake [ ] Patient [ ] family [x ] chart [ ] staff [ ] other    Enteral /Parenteral Nutrition:  PN providing a total of 2200 Kcal and 85gm protein when lipids provided    Current Weight:   (12/16)  134#, 12/20 122#, 123# 12/22, 12/27 97#, 12/25 121#  (12/7) 123#  (12/6) 121#  (12/5) 119#  (12/1) 114#  (11/30) 127#      % Weight Change     Pertinent Medications: MEDICATIONS  (STANDING):  chlorhexidine 2% Cloths 1 Application(s) Topical <User Schedule>  chlorhexidine 4% Liquid 1 Application(s) Topical <User Schedule>  cholecalciferol 2000 Unit(s) Oral daily  dextrose 10%. 1000 milliLiter(s) (100 mL/Hr) IV Continuous <Continuous>  escitalopram 10 milliGRAM(s) Oral daily  ferrous    sulfate 325 milliGRAM(s) Oral daily  gabapentin 100 milliGRAM(s) Oral three times a day  heparin  Infusion. 1100 Unit(s)/Hr (11 mL/Hr) IV Continuous <Continuous>  lidocaine   Patch 1 Patch Transdermal daily  meropenem  IVPB 1000 milliGRAM(s) IV Intermittent every 8 hours  metoprolol tartrate 25 milliGRAM(s) Oral two times a day  octreotide  Injectable 100 MICROGram(s) SubCutaneous every 8 hours  pantoprazole    Tablet 40 milliGRAM(s) Oral before breakfast  Parenteral Nutrition - Adult 1 Each (83 mL/Hr) TPN Continuous <Continuous>  Parenteral Nutrition - Adult 1 Each (83 mL/Hr) TPN Continuous <Continuous>  sucralfate 1 Gram(s) Oral two times a day  traZODone 50 milliGRAM(s) Oral at bedtime  warfarin 10 milliGRAM(s) Oral daily    MEDICATIONS  (PRN):  acetaminophen   Tablet .. 650 milliGRAM(s) Oral every 6 hours PRN Mild Pain (1 - 3)  ALPRAZolam 0.25 milliGRAM(s) Oral three times a day PRN anxiety  heparin  Injectable 4000 Unit(s) IV Push every 6 hours PRN For aPTT less than 40  heparin  Injectable 2000 Unit(s) IV Push every 6 hours PRN For aPTT between 40 - 57  melatonin 3 milliGRAM(s) Oral at bedtime PRN Insomnia  ondansetron Injectable 4 milliGRAM(s) IV Push every 4 hours PRN Nausea and/or Vomiting  simethicone 80 milliGRAM(s) Chew three times a day PRN Gas  sodium chloride 0.9% lock flush 10 milliLiter(s) IV Push every 1 hour PRN Pre/post blood products, medications, blood draw, and to maintain line patency  traMADol 25 milliGRAM(s) Oral three times a day PRN Moderate Pain (4 - 6)    Pertinent Labs: CBC Full  -  ( 03 Jan 2020 02:45 )  WBC Count : 8.88 K/uL  RBC Count : 3.87 M/uL  Hemoglobin : 11.3 g/dL  Hematocrit : 36.4 %  Platelet Count - Automated : 294 K/uL  Mean Cell Volume : 94.1 fl  Mean Cell Hemoglobin : 29.2 pg  Mean Cell Hemoglobin Concentration : 31.0 gm/dL  Auto Neutrophil # : x  Auto Lymphocyte # : x  Auto Monocyte # : x  Auto Eosinophil # : x  Auto Basophil # : x  Auto Neutrophil % : x  Auto Lymphocyte % : x  Auto Monocyte % : x  Auto Eosinophil % : x  Auto Basophil % : x      01-03 Na137 mmol/L Glu 113 mg/dL K+ 4.0 mmol/L Cr  0.38 mg/dL<L> BUN 8.0 mg/dL Phos 4.5 mg/dL Alb n/a   PAB n/a           Skin:     Nutrition focused physical exam previously conducted - found signs of malnutrition [ ]absent [ x]present    Subcutaneous fat loss: [x ] Orbital fat pads region, [ ]Buccal fat region, [ ]Triceps region,  [ ]Ribs region    Muscle wasting: [ x]Temples region, [ x]Clavicle region, [ x]Shoulder region, [ ]Scapula region, [ ]Interosseous region,  [ ]thigh region, [ ]Calf region    Estimated Needs:   [x] no change since previous assessment  [ ] recalculated:     Current Nutrition Diagnosis: 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 and Multifocal PNA  OCtreotide/ TPN started by GI with pt on clears PO, Heme- transitioned to Coumadin, 12/9/19 PICC line placed.  Pt remains at high nutrition risk secondary to Severe-acute protein calorie malnutrition related to inadequate protein energy intake with decreased appetite and altered GI function in setting of large right pleural effusion, s/p Right VATS decortication & washout, empyema and now with findings consistent with pancreatitis as evidenced by pt with previous 25# unintentional wt loss (17%) x3 months, previously meeting <50% estimated energy intake > 7 days.  Pt also with mild muscle wasting and mild fat loss and now with +fluid accumulation (now improved). Pt now on full liquid diet, and receiving TPN (which provides 2200kcal, 85gm protein) when receiving lipids. Pt to cont wound vac per thoracic surgery team.  Pt tolerating  full liquids ~50%.    Recommendations:   1. Continue with TPN at this time and taper off as diet is upgraded  2. Rec low fat diet small meals and portions  3. Check wt daily to monitor trends   4. Check TG weekly while on TPN         Monitoring and Evaluation:   [ x] PO intake [ x] Tolerance to diet prescription [X] Weights  [X] Follow up per protocol [X] Labs:

## 2020-01-03 NOTE — PROGRESS NOTE ADULT - ASSESSMENT
Malnutrition/pancreatic fistula/pancreatitis: Will advance diet to low fat diet. Recommended to eat small portions. At this time, will wait how she does with diet advancement. If tolerates diet, then will taper off TPN. Discussed about refeeding syndrome, although less likely as patient has been on liquid diet.   Will continue to monitor lytes. Magnesium supplementation to be adjusted.

## 2020-01-04 LAB
ALBUMIN SERPL ELPH-MCNC: 3.5 G/DL — SIGNIFICANT CHANGE UP (ref 3.3–5.2)
ALP SERPL-CCNC: 341 U/L — HIGH (ref 40–120)
ALT FLD-CCNC: 45 U/L — HIGH
ANION GAP SERPL CALC-SCNC: 12 MMOL/L — SIGNIFICANT CHANGE UP (ref 5–17)
APTT BLD: 78.8 SEC — HIGH (ref 27.5–36.3)
AST SERPL-CCNC: 44 U/L — HIGH
BILIRUB SERPL-MCNC: <0.2 MG/DL — LOW (ref 0.4–2)
BUN SERPL-MCNC: 9 MG/DL — SIGNIFICANT CHANGE UP (ref 8–20)
CALCIUM SERPL-MCNC: 9.3 MG/DL — SIGNIFICANT CHANGE UP (ref 8.6–10.2)
CHLORIDE SERPL-SCNC: 103 MMOL/L — SIGNIFICANT CHANGE UP (ref 98–107)
CO2 SERPL-SCNC: 24 MMOL/L — SIGNIFICANT CHANGE UP (ref 22–29)
CREAT SERPL-MCNC: 0.29 MG/DL — LOW (ref 0.5–1.3)
GLUCOSE BLDC GLUCOMTR-MCNC: 123 MG/DL — HIGH (ref 70–99)
GLUCOSE BLDC GLUCOMTR-MCNC: 130 MG/DL — HIGH (ref 70–99)
GLUCOSE BLDC GLUCOMTR-MCNC: 91 MG/DL — SIGNIFICANT CHANGE UP (ref 70–99)
GLUCOSE SERPL-MCNC: 112 MG/DL — SIGNIFICANT CHANGE UP (ref 70–115)
HCT VFR BLD CALC: 33.1 % — LOW (ref 34.5–45)
HGB BLD-MCNC: 10.7 G/DL — LOW (ref 11.5–15.5)
INR BLD: 2.12 RATIO — HIGH (ref 0.88–1.16)
MAGNESIUM SERPL-MCNC: 1.4 MG/DL — LOW (ref 1.6–2.6)
MCHC RBC-ENTMCNC: 30 PG — SIGNIFICANT CHANGE UP (ref 27–34)
MCHC RBC-ENTMCNC: 32.3 GM/DL — SIGNIFICANT CHANGE UP (ref 32–36)
MCV RBC AUTO: 92.7 FL — SIGNIFICANT CHANGE UP (ref 80–100)
PHOSPHATE SERPL-MCNC: 5.1 MG/DL — HIGH (ref 2.4–4.7)
PLATELET # BLD AUTO: 255 K/UL — SIGNIFICANT CHANGE UP (ref 150–400)
POTASSIUM SERPL-MCNC: 4.2 MMOL/L — SIGNIFICANT CHANGE UP (ref 3.5–5.3)
POTASSIUM SERPL-SCNC: 4.2 MMOL/L — SIGNIFICANT CHANGE UP (ref 3.5–5.3)
PROT SERPL-MCNC: 6.3 G/DL — LOW (ref 6.6–8.7)
PROTHROM AB SERPL-ACNC: 25 SEC — HIGH (ref 10–12.9)
RBC # BLD: 3.57 M/UL — LOW (ref 3.8–5.2)
RBC # FLD: 15.7 % — HIGH (ref 10.3–14.5)
SODIUM SERPL-SCNC: 139 MMOL/L — SIGNIFICANT CHANGE UP (ref 135–145)
WBC # BLD: 8.24 K/UL — SIGNIFICANT CHANGE UP (ref 3.8–10.5)
WBC # FLD AUTO: 8.24 K/UL — SIGNIFICANT CHANGE UP (ref 3.8–10.5)

## 2020-01-04 PROCEDURE — 99232 SBSQ HOSP IP/OBS MODERATE 35: CPT

## 2020-01-04 PROCEDURE — 99233 SBSQ HOSP IP/OBS HIGH 50: CPT

## 2020-01-04 PROCEDURE — 71046 X-RAY EXAM CHEST 2 VIEWS: CPT | Mod: 26

## 2020-01-04 RX ORDER — I.V. FAT EMULSION 20 G/100ML
0.9 EMULSION INTRAVENOUS
Qty: 50 | Refills: 0 | Status: DISCONTINUED | OUTPATIENT
Start: 2020-01-04 | End: 2020-01-04

## 2020-01-04 RX ORDER — MAGNESIUM SULFATE 500 MG/ML
2 VIAL (ML) INJECTION ONCE
Refills: 0 | Status: COMPLETED | OUTPATIENT
Start: 2020-01-04 | End: 2020-01-04

## 2020-01-04 RX ORDER — MAGNESIUM OXIDE 400 MG ORAL TABLET 241.3 MG
400 TABLET ORAL
Refills: 0 | Status: COMPLETED | OUTPATIENT
Start: 2020-01-04 | End: 2020-01-07

## 2020-01-04 RX ORDER — ELECTROLYTE SOLUTION,INJ
1 VIAL (ML) INTRAVENOUS
Refills: 0 | Status: DISCONTINUED | OUTPATIENT
Start: 2020-01-04 | End: 2020-01-04

## 2020-01-04 RX ADMIN — CHLORHEXIDINE GLUCONATE 1 APPLICATION(S): 213 SOLUTION TOPICAL at 05:51

## 2020-01-04 RX ADMIN — TRAMADOL HYDROCHLORIDE 25 MILLIGRAM(S): 50 TABLET ORAL at 22:30

## 2020-01-04 RX ADMIN — SIMETHICONE 80 MILLIGRAM(S): 80 TABLET, CHEWABLE ORAL at 13:37

## 2020-01-04 RX ADMIN — Medication 1 EACH: at 20:08

## 2020-01-04 RX ADMIN — GABAPENTIN 100 MILLIGRAM(S): 400 CAPSULE ORAL at 13:35

## 2020-01-04 RX ADMIN — Medication 50 GRAM(S): at 18:14

## 2020-01-04 RX ADMIN — Medication 0.25 MILLIGRAM(S): at 13:35

## 2020-01-04 RX ADMIN — ESCITALOPRAM OXALATE 10 MILLIGRAM(S): 10 TABLET, FILM COATED ORAL at 12:38

## 2020-01-04 RX ADMIN — TRAMADOL HYDROCHLORIDE 25 MILLIGRAM(S): 50 TABLET ORAL at 21:46

## 2020-01-04 RX ADMIN — Medication 2000 UNIT(S): at 12:39

## 2020-01-04 RX ADMIN — Medication 25 MILLIGRAM(S): at 18:16

## 2020-01-04 RX ADMIN — OCTREOTIDE ACETATE 100 MICROGRAM(S): 200 INJECTION, SOLUTION INTRAVENOUS; SUBCUTANEOUS at 23:09

## 2020-01-04 RX ADMIN — HEPARIN SODIUM 900 UNIT(S)/HR: 5000 INJECTION INTRAVENOUS; SUBCUTANEOUS at 07:31

## 2020-01-04 RX ADMIN — Medication 1 GRAM(S): at 18:16

## 2020-01-04 RX ADMIN — PANTOPRAZOLE SODIUM 40 MILLIGRAM(S): 20 TABLET, DELAYED RELEASE ORAL at 05:52

## 2020-01-04 RX ADMIN — Medication 325 MILLIGRAM(S): at 12:38

## 2020-01-04 RX ADMIN — WARFARIN SODIUM 10 MILLIGRAM(S): 2.5 TABLET ORAL at 21:47

## 2020-01-04 RX ADMIN — TRAMADOL HYDROCHLORIDE 25 MILLIGRAM(S): 50 TABLET ORAL at 13:07

## 2020-01-04 RX ADMIN — Medication 25 MILLIGRAM(S): at 05:52

## 2020-01-04 RX ADMIN — Medication 0.25 MILLIGRAM(S): at 21:46

## 2020-01-04 RX ADMIN — OCTREOTIDE ACETATE 100 MICROGRAM(S): 200 INJECTION, SOLUTION INTRAVENOUS; SUBCUTANEOUS at 13:36

## 2020-01-04 RX ADMIN — Medication 50 MILLIGRAM(S): at 21:47

## 2020-01-04 RX ADMIN — OCTREOTIDE ACETATE 100 MICROGRAM(S): 200 INJECTION, SOLUTION INTRAVENOUS; SUBCUTANEOUS at 18:16

## 2020-01-04 RX ADMIN — TRAMADOL HYDROCHLORIDE 25 MILLIGRAM(S): 50 TABLET ORAL at 12:39

## 2020-01-04 RX ADMIN — Medication 1 GRAM(S): at 05:52

## 2020-01-04 RX ADMIN — I.V. FAT EMULSION 31.05 GM/KG/DAY: 20 EMULSION INTRAVENOUS at 20:08

## 2020-01-04 RX ADMIN — GABAPENTIN 100 MILLIGRAM(S): 400 CAPSULE ORAL at 21:46

## 2020-01-04 RX ADMIN — GABAPENTIN 100 MILLIGRAM(S): 400 CAPSULE ORAL at 05:52

## 2020-01-04 NOTE — PROGRESS NOTE ADULT - SUBJECTIVE AND OBJECTIVE BOX
On service Note   follow up for NSVT , pancreatitis , pancreo pleural effusion   she is in good mood , tolerating diet   she wants to go home , Gi follow up appreciated     Vital Signs Last 24 Hrs  T(C): 36.7 (04 Jan 2020 00:00), Max: 36.7 (04 Jan 2020 00:00)  T(F): 98.1 (04 Jan 2020 00:00), Max: 98.1 (04 Jan 2020 00:00)  HR: 99 (04 Jan 2020 04:56) (97 - 104)  BP: 101/67 (04 Jan 2020 04:56) (101/67 - 126/80)  BP(mean): --  RR: 18 (04 Jan 2020 00:00) (18 - 18)  SpO2: 97% (04 Jan 2020 00:00) (97% - 97%)    PHYSICAL EXAM-  GENERAL: Pt lying in bed comfortably in NAD, seen ambulating on unit  NECK: Supple, No JVD  CHEST/LUNG: Clear to auscultation bilaterally; No rales, rhonchi, wheezing  HEART: Regular rate and rhythm; s1 /s2 No murmurs  ABDOMEN: Bowel sounds present; Soft, Nontender, Nondistended  EXTREMITIES:  No clubbing, cyanosis, or edema, B/L UE PICC line +   NERVOUS SYSTEM:  Alert & Oriented X3, speech clear, FROM x 4 extremities. No deficits     MEDICATIONS  (PRN):  acetaminophen   Tablet .. 650 milliGRAM(s) Oral every 6 hours PRN Mild Pain (1 - 3)  ALPRAZolam 0.25 milliGRAM(s) Oral three times a day PRN anxiety  heparin  Injectable 4000 Unit(s) IV Push every 6 hours PRN For aPTT less than 40  heparin  Injectable 2000 Unit(s) IV Push every 6 hours PRN For aPTT between 40 - 57  melatonin 3 milliGRAM(s) Oral at bedtime PRN Insomnia  ondansetron Injectable 4 milliGRAM(s) IV Push every 4 hours PRN Nausea and/or Vomiting  simethicone 80 milliGRAM(s) Chew three times a day PRN Gas  sodium chloride 0.9% lock flush 10 milliLiter(s) IV Push every 1 hour PRN Pre/post blood products, medications, blood draw, and to maintain line patency  traMADol 25 milliGRAM(s) Oral three times a day PRN Moderate Pain (4 - 6)                          10.7   8.24  )-----------( 255      ( 04 Jan 2020 06:59 )             33.1   01-04    139  |  103  |  9.0  ----------------------------<  112  4.2   |  24.0  |  0.29<L>    Ca    9.3      04 Jan 2020 06:59  Phos  5.1     01-04  Mg     1.4     01-04    TPro  6.3<L>  /  Alb  3.5  /  TBili  <0.2<L>  /  DBili  x   /  AST  44<H>  /  ALT  45<H>  /  AlkPhos  341<H>  01-04

## 2020-01-04 NOTE — PROGRESS NOTE ADULT - ASSESSMENT
Tolerates TPN well.  No pulmonary sx with diet advancement to DASH.  Lytes all OK today.  Phos still high and MG low.  Will adjust in todays TPN.  Ordered.    CXR today to check for recurrent pleural effusion.

## 2020-01-04 NOTE — PROGRESS NOTE ADULT - ASSESSMENT
34 year old woman with no known major comorbidities (had not gone to see a doctor for 15 years) who presented to [facility] on 11/22/2019 with progressive sob, weakness, weight loss, She was found to have a large pleural effusion. Initially 1.5 L of dark serous fluid was removed. Chest tube placed shortly thereafter, 3 additional liters was removed. pleural fluid cultures + for group A hemolytic strep,CBC showed a leukocytosis of 40k and CTA imaging revealed a 2.1cm partially cystic mass in the body/tail of the pancreas with possible upstream ductal dilatation. On 11/23/2019 the patient had a CT chest/abdomen/pelvis that found to have a clot in the right IJ and was started on a heparin gtt   s/p right VATS decort & washout debridement of CT site & VAC placement on 11/27/19  Persistent drainage from chest tube site so repeat CT C/A/P performed 12/4 which showed new cystic mass in pancreas and liver concerning for pancreatitis  Zosyn changed to meropenem on 12/6  for broader coverage given pancreatitis  Copious drainage from anterior chest tube site, so VAC removed and ostomy appliance applied for high output on 12/6.   12/7 Right chest tube removed.   Repeat CT scan showed a persistent Right hydropneumothorax with air anteriorly  12/8 Ostomy appliance removed and dressing applied.   12/9 s.p PICC placement & Right anterior pig tail catheter & right lateral wound vac placement by thoracic surgery   MRCP significant for Peripancreatic fluid and edema consistent with pancreatitis. Cystic lesions in the pancreatic head with the largest measuring 2.1 cm, likely acute peripancreatic collections, increased in size since 12/4/2019, with a tract of fluid extending from the 2.1 cm cystic lesion into the mediastinum and towards the pleural space on the right suspicious for a pancreatic pleural fistula.  MRI showed  pancreatic / pleural fistula and Multifocal PNA  Octreotide/ TPN started by GI with pt being on clears PO, Heme- transitioned to Coumadin with  iv heparin bridging   12/9/19 PICC line placed, b/l   antr chest wall- chest tube site wound vac removed on 12/30/19  runs of NSVT on tele for which aggressive lyte supplement started and lopressor started. Rpt MR abdomen showed improved pancreas status and full liq diet started on 1/2/20 and reg on 1/3.       1- sinus tachycardia with freq NSVT on monitor   -- replete electrolytes   - patient asymptomatic  - rpt limited ECHO per cardiology - nl   - appreciate EP input. Lopressor started at 12.5 and then increased to 25 mg  - Elavil d/kala as it could contribute to tachycardia/ SVT      2- Rt pl effusion/ Empyema  - being followed by Thoracic and ID, chest wound vac removed   - Abx as per ID - till 1/3 to finish 4 wks after CT removal , ID appreciated    - rpt imaging with stable small effusion without change  - IF clinically indicated can do MR chest to assess fistula      3-Acute pancreatitis and pancreatic cyst with pancreaticopleural fistula  - As per GI- to cont bowel rest for about 4-6 weeks total time. Since > 4 weeks MRI abdomen noted , pancreatitis resolving , pancreatic cyst decreasing   -  TPN daily per GI   diet advanced    will wean off   TPN    4-Rt IJ thrombus  INR is over 2 today stop iv heparin   cont  warfarin     5-Anemia of chronic disease  - s/p multiple transfusions   - hb stable     6-insomnia/ anxiety   - Trazadone and melatonin    7severe protein calorie malnutrition  - on nutritional supplements and TPN   - Prealbumin level 26, WNL    home soon   - on coumadin/ heparin drip   - seen ambulating on unit    # Vit D deficiency - on oral Supplements

## 2020-01-04 NOTE — PROGRESS NOTE ADULT - SUBJECTIVE AND OBJECTIVE BOX
Patient seen and examined; chart reviewed.  Feels pretty good.  Had a loose BM yesterday.  Diet advanced to DASH Tolerated a portion of the diet.  Notes abdominal fullness.  Denies SOB or cough or fever.  No pleuritic pain.  Glucoses all good, 125 range.      PAST MEDICAL & SURGICAL HISTORY:      ROS:  No Heartburn, regurgitation, dysphagia, odynophagia.  No dyspepsia  No abdominal pain.    No Nausea, vomiting.  No Bleeding.  No hematemesis.   No diarrhea.    No hematochesia.  No weight loss, anorexia.  No edema.      MEDICATIONS  (STANDING):  chlorhexidine 2% Cloths 1 Application(s) Topical <User Schedule>  chlorhexidine 4% Liquid 1 Application(s) Topical <User Schedule>  cholecalciferol 2000 Unit(s) Oral daily  dextrose 10%. 1000 milliLiter(s) (100 mL/Hr) IV Continuous <Continuous>  escitalopram 10 milliGRAM(s) Oral daily  fat emulsion (Plant Based) 20% Infusion 0.9 Gm/kG/Day (31.05 mL/Hr) IV Continuous <Continuous>  ferrous    sulfate 325 milliGRAM(s) Oral daily  gabapentin 100 milliGRAM(s) Oral three times a day  heparin  Infusion. 1100 Unit(s)/Hr (11 mL/Hr) IV Continuous <Continuous>  lidocaine   Patch 1 Patch Transdermal daily  metoprolol tartrate 25 milliGRAM(s) Oral two times a day  octreotide  Injectable 100 MICROGram(s) SubCutaneous every 8 hours  pantoprazole    Tablet 40 milliGRAM(s) Oral before breakfast  Parenteral Nutrition - Adult 1 Each (83 mL/Hr) TPN Continuous <Continuous>  Parenteral Nutrition - Adult 1 Each (83 mL/Hr) TPN Continuous <Continuous>  sucralfate 1 Gram(s) Oral two times a day  traZODone 50 milliGRAM(s) Oral at bedtime  warfarin 10 milliGRAM(s) Oral daily    MEDICATIONS  (PRN):  acetaminophen   Tablet .. 650 milliGRAM(s) Oral every 6 hours PRN Mild Pain (1 - 3)  ALPRAZolam 0.25 milliGRAM(s) Oral three times a day PRN anxiety  heparin  Injectable 4000 Unit(s) IV Push every 6 hours PRN For aPTT less than 40  heparin  Injectable 2000 Unit(s) IV Push every 6 hours PRN For aPTT between 40 - 57  melatonin 3 milliGRAM(s) Oral at bedtime PRN Insomnia  ondansetron Injectable 4 milliGRAM(s) IV Push every 4 hours PRN Nausea and/or Vomiting  simethicone 80 milliGRAM(s) Chew three times a day PRN Gas  sodium chloride 0.9% lock flush 10 milliLiter(s) IV Push every 1 hour PRN Pre/post blood products, medications, blood draw, and to maintain line patency  traMADol 25 milliGRAM(s) Oral three times a day PRN Moderate Pain (4 - 6)      Allergies    No Known Allergies    Intolerances    pt requests chicken broth (no beef) and CHERRY italian ice (no lemon) (Unknown)      Vital Signs Last 24 Hrs  T(C): 36.7 (04 Jan 2020 00:00), Max: 36.8 (03 Jan 2020 16:00)  T(F): 98.1 (04 Jan 2020 00:00), Max: 98.3 (03 Jan 2020 16:00)  HR: 99 (04 Jan 2020 04:56) (96 - 104)  BP: 101/67 (04 Jan 2020 04:56) (100/65 - 126/80)  BP(mean): --  RR: 18 (04 Jan 2020 00:00) (18 - 18)  SpO2: 97% (04 Jan 2020 00:00) (97% - 97%)    PHYSICAL EXAM:    GENERAL: NAD, well-groomed, well-developed  HEAD:  Atraumatic, Normocephalic  EYES: EOMI, PERRLA, conjunctiva and sclera clear  ENMT: No tonsillar erythema, exudates, or enlargement; Moist mucous membranes, Good dentition, No lesions  NECK: Supple, No JVD, Normal thyroid  CHEST/LUNG: Clear to percussion bilaterally; No rales, rhonchi, wheezing, or rubs  HEART: Regular rate and rhythm; No murmurs, rubs, or gallops  ABDOMEN: Soft, Nontender, Nondistended; Bowel sounds present  EXTREMITIES:  2+ Peripheral Pulses, No clubbing, cyanosis, or edema; LUE PICC intact.    LYMPH: No lymphadenopathy noted  SKIN: No rashes or lesions      LABS:                        10.7   8.24  )-----------( 255      ( 04 Jan 2020 06:59 )             33.1     01-04    139  |  103  |  9.0  ----------------------------<  112  4.2   |  24.0  |  0.29<L>    Ca    9.3      04 Jan 2020 06:59  Phos  5.1     01-04  Mg     1.4     01-04    TPro  6.3<L>  /  Alb  3.5  /  TBili  <0.2<L>  /  DBili  x   /  AST  44<H>  /  ALT  45<H>  /  AlkPhos  341<H>  01-04    PT/INR - ( 04 Jan 2020 06:59 )   PT: 25.0 sec;   INR: 2.12 ratio         PTT - ( 04 Jan 2020 06:59 )  PTT:78.8 sec         RADIOLOGY & ADDITIONAL STUDIES:

## 2020-01-05 LAB
ANION GAP SERPL CALC-SCNC: 11 MMOL/L — SIGNIFICANT CHANGE UP (ref 5–17)
APTT BLD: 42.6 SEC — HIGH (ref 27.5–36.3)
BUN SERPL-MCNC: 10 MG/DL — SIGNIFICANT CHANGE UP (ref 8–20)
CALCIUM SERPL-MCNC: 9.5 MG/DL — SIGNIFICANT CHANGE UP (ref 8.6–10.2)
CHLORIDE SERPL-SCNC: 100 MMOL/L — SIGNIFICANT CHANGE UP (ref 98–107)
CO2 SERPL-SCNC: 25 MMOL/L — SIGNIFICANT CHANGE UP (ref 22–29)
CREAT SERPL-MCNC: 0.31 MG/DL — LOW (ref 0.5–1.3)
GLUCOSE BLDC GLUCOMTR-MCNC: 100 MG/DL — HIGH (ref 70–99)
GLUCOSE BLDC GLUCOMTR-MCNC: 110 MG/DL — HIGH (ref 70–99)
GLUCOSE SERPL-MCNC: 117 MG/DL — HIGH (ref 70–115)
HCT VFR BLD CALC: 35 % — SIGNIFICANT CHANGE UP (ref 34.5–45)
HGB BLD-MCNC: 12.4 G/DL — SIGNIFICANT CHANGE UP (ref 11.5–15.5)
INR BLD: 1.82 RATIO — HIGH (ref 0.88–1.16)
MAGNESIUM SERPL-MCNC: 1.6 MG/DL — SIGNIFICANT CHANGE UP (ref 1.6–2.6)
MCHC RBC-ENTMCNC: 33.3 PG — SIGNIFICANT CHANGE UP (ref 27–34)
MCHC RBC-ENTMCNC: 35.4 GM/DL — SIGNIFICANT CHANGE UP (ref 32–36)
MCV RBC AUTO: 94.1 FL — SIGNIFICANT CHANGE UP (ref 80–100)
PHOSPHATE SERPL-MCNC: 4.9 MG/DL — HIGH (ref 2.4–4.7)
PLATELET # BLD AUTO: 345 K/UL — SIGNIFICANT CHANGE UP (ref 150–400)
POTASSIUM SERPL-MCNC: 4.2 MMOL/L — SIGNIFICANT CHANGE UP (ref 3.5–5.3)
POTASSIUM SERPL-SCNC: 4.2 MMOL/L — SIGNIFICANT CHANGE UP (ref 3.5–5.3)
PROTHROM AB SERPL-ACNC: 21.3 SEC — HIGH (ref 10–12.9)
RBC # BLD: 3.72 M/UL — LOW (ref 3.8–5.2)
RBC # FLD: 15.9 % — HIGH (ref 10.3–14.5)
SODIUM SERPL-SCNC: 136 MMOL/L — SIGNIFICANT CHANGE UP (ref 135–145)
WBC # BLD: 9.3 K/UL — SIGNIFICANT CHANGE UP (ref 3.8–10.5)
WBC # FLD AUTO: 9.3 K/UL — SIGNIFICANT CHANGE UP (ref 3.8–10.5)

## 2020-01-05 PROCEDURE — 99233 SBSQ HOSP IP/OBS HIGH 50: CPT

## 2020-01-05 PROCEDURE — 99232 SBSQ HOSP IP/OBS MODERATE 35: CPT

## 2020-01-05 RX ORDER — ENOXAPARIN SODIUM 100 MG/ML
55 INJECTION SUBCUTANEOUS
Refills: 0 | Status: DISCONTINUED | OUTPATIENT
Start: 2020-01-05 | End: 2020-01-06

## 2020-01-05 RX ORDER — I.V. FAT EMULSION 20 G/100ML
0.9 EMULSION INTRAVENOUS
Qty: 50 | Refills: 0 | Status: DISCONTINUED | OUTPATIENT
Start: 2020-01-05 | End: 2020-01-05

## 2020-01-05 RX ORDER — WARFARIN SODIUM 2.5 MG/1
12.5 TABLET ORAL ONCE
Refills: 0 | Status: COMPLETED | OUTPATIENT
Start: 2020-01-05 | End: 2020-01-05

## 2020-01-05 RX ORDER — ELECTROLYTE SOLUTION,INJ
1 VIAL (ML) INTRAVENOUS
Refills: 0 | Status: DISCONTINUED | OUTPATIENT
Start: 2020-01-05 | End: 2020-01-05

## 2020-01-05 RX ADMIN — Medication 2000 UNIT(S): at 11:12

## 2020-01-05 RX ADMIN — TRAMADOL HYDROCHLORIDE 25 MILLIGRAM(S): 50 TABLET ORAL at 14:26

## 2020-01-05 RX ADMIN — Medication 0.25 MILLIGRAM(S): at 18:48

## 2020-01-05 RX ADMIN — OCTREOTIDE ACETATE 100 MICROGRAM(S): 200 INJECTION, SOLUTION INTRAVENOUS; SUBCUTANEOUS at 18:47

## 2020-01-05 RX ADMIN — GABAPENTIN 100 MILLIGRAM(S): 400 CAPSULE ORAL at 13:51

## 2020-01-05 RX ADMIN — MAGNESIUM OXIDE 400 MG ORAL TABLET 400 MILLIGRAM(S): 241.3 TABLET ORAL at 18:46

## 2020-01-05 RX ADMIN — Medication 25 MILLIGRAM(S): at 18:45

## 2020-01-05 RX ADMIN — PANTOPRAZOLE SODIUM 40 MILLIGRAM(S): 20 TABLET, DELAYED RELEASE ORAL at 05:37

## 2020-01-05 RX ADMIN — Medication 1 GRAM(S): at 18:45

## 2020-01-05 RX ADMIN — GABAPENTIN 100 MILLIGRAM(S): 400 CAPSULE ORAL at 05:37

## 2020-01-05 RX ADMIN — Medication 1 GRAM(S): at 05:37

## 2020-01-05 RX ADMIN — SIMETHICONE 80 MILLIGRAM(S): 80 TABLET, CHEWABLE ORAL at 18:45

## 2020-01-05 RX ADMIN — Medication 50 MILLIGRAM(S): at 22:00

## 2020-01-05 RX ADMIN — OCTREOTIDE ACETATE 100 MICROGRAM(S): 200 INJECTION, SOLUTION INTRAVENOUS; SUBCUTANEOUS at 11:14

## 2020-01-05 RX ADMIN — TRAMADOL HYDROCHLORIDE 25 MILLIGRAM(S): 50 TABLET ORAL at 13:51

## 2020-01-05 RX ADMIN — TRAMADOL HYDROCHLORIDE 25 MILLIGRAM(S): 50 TABLET ORAL at 21:59

## 2020-01-05 RX ADMIN — CHLORHEXIDINE GLUCONATE 1 APPLICATION(S): 213 SOLUTION TOPICAL at 05:37

## 2020-01-05 RX ADMIN — I.V. FAT EMULSION 31.05 GM/KG/DAY: 20 EMULSION INTRAVENOUS at 18:43

## 2020-01-05 RX ADMIN — WARFARIN SODIUM 12.5 MILLIGRAM(S): 2.5 TABLET ORAL at 22:26

## 2020-01-05 RX ADMIN — GABAPENTIN 100 MILLIGRAM(S): 400 CAPSULE ORAL at 22:00

## 2020-01-05 RX ADMIN — ESCITALOPRAM OXALATE 10 MILLIGRAM(S): 10 TABLET, FILM COATED ORAL at 11:12

## 2020-01-05 RX ADMIN — Medication 25 MILLIGRAM(S): at 05:37

## 2020-01-05 RX ADMIN — TRAMADOL HYDROCHLORIDE 25 MILLIGRAM(S): 50 TABLET ORAL at 23:00

## 2020-01-05 RX ADMIN — Medication 325 MILLIGRAM(S): at 11:12

## 2020-01-05 RX ADMIN — Medication 1 EACH: at 18:42

## 2020-01-05 RX ADMIN — ENOXAPARIN SODIUM 55 MILLIGRAM(S): 100 INJECTION SUBCUTANEOUS at 18:45

## 2020-01-05 RX ADMIN — MAGNESIUM OXIDE 400 MG ORAL TABLET 400 MILLIGRAM(S): 241.3 TABLET ORAL at 11:18

## 2020-01-05 RX ADMIN — OCTREOTIDE ACETATE 100 MICROGRAM(S): 200 INJECTION, SOLUTION INTRAVENOUS; SUBCUTANEOUS at 23:26

## 2020-01-05 NOTE — PROGRESS NOTE ADULT - ASSESSMENT
34 year old woman with no known major comorbidities (had not gone to see a doctor for 15 years) who presented to [facility] on 11/22/2019 with progressive sob, weakness, weight loss, She was found to have a large pleural effusion. Initially 1.5 L of dark serous fluid was removed. Chest tube placed shortly thereafter, 3 additional liters was removed. pleural fluid cultures + for group A hemolytic strep,CBC showed a leukocytosis of 40k and CTA imaging revealed a 2.1cm partially cystic mass in the body/tail of the pancreas with possible upstream ductal dilatation. On 11/23/2019 the patient had a CT chest/abdomen/pelvis that found to have a clot in the right IJ and was started on a heparin gtt   s/p right VATS decort & washout debridement of CT site & VAC placement on 11/27/19  Persistent drainage from chest tube site so repeat CT C/A/P performed 12/4 which showed new cystic mass in pancreas and liver concerning for pancreatitis  Zosyn changed to meropenem on 12/6  for broader coverage given pancreatitis  Copious drainage from anterior chest tube site, so VAC removed and ostomy appliance applied for high output on 12/6.   12/7 Right chest tube removed.   Repeat CT scan showed a persistent Right hydropneumothorax with air anteriorly  12/8 Ostomy appliance removed and dressing applied.   12/9 s.p PICC placement & Right anterior pig tail catheter & right lateral wound vac placement by thoracic surgery   MRCP significant for Peripancreatic fluid and edema consistent with pancreatitis. Cystic lesions in the pancreatic head with the largest measuring 2.1 cm, likely acute peripancreatic collections, increased in size since 12/4/2019, with a tract of fluid extending from the 2.1 cm cystic lesion into the mediastinum and towards the pleural space on the right suspicious for a pancreatic pleural fistula.  MRI showed  pancreatic / pleural fistula and Multifocal PNA  Octreotide/ TPN started by GI with pt being on clears PO, Heme- transitioned to Coumadin with  iv heparin bridging   12/9/19 PICC line placed, b/l   antr chest wall- chest tube site wound vac removed on 12/30/19  runs of NSVT on tele for which aggressive lyte supplement started and lopressor started. Rpt MR abdomen showed improved pancreas status and full liq diet started on 1/2/20 and reg on 1/3.       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 Since > 4 weeks MRI abdomen noted , pancreatitis resolving , pancreatic cyst decreasing   -  TPN daily per GI   diet advanced    will wean off TPN as needed     4-Rt IJ thrombus  INR is over 2 today stop iv heparin   cont  warfarin     5-Anemia of chronic disease  - s/p multiple transfusions   - hb stable     6-insomnia/ anxiety   - Trazadone and melatonin    7severe protein calorie malnutrition  - on nutritional supplements and TPN   - Prealbumin level 26, WNL    home soon   - on coumadin/ heparin drip   - seen ambulating on unit    # Vit D deficiency - on oral Supplements

## 2020-01-05 NOTE — PROGRESS NOTE ADULT - SUBJECTIVE AND OBJECTIVE BOX
follow up for NSVT , pancreatitis , pancreo pleural fistula   she is feeling well , no complaints       Vital Signs Last 24 Hrs  T(C): 36.6 (05 Jan 2020 08:34), Max: 36.8 (04 Jan 2020 20:30)  T(F): 97.8 (05 Jan 2020 08:34), Max: 98.2 (04 Jan 2020 20:30)  HR: 109 (05 Jan 2020 08:34) (82 - 109)  BP: 89/59 (05 Jan 2020 08:34) (89/59 - 114/76)  BP(mean): --  RR: 20 (05 Jan 2020 08:34) (18 - 20)  SpO2: 95% (05 Jan 2020 05:37) (95% - 95%)      GENERAL: Pt lying in bed comfortably in NAD  CHEST/LUNG: Clear to auscultation bilaterally; diminished Bs on the right lower lung   HEART: Regular rate and rhythm; s1 /s2 No murmurs  ABDOMEN: Bowel sounds present; Soft, Nontender, Nondistended  EXTREMITIES:  No clubbing, cyanosis, or edema, B/L UE PICC line +   NERVOUS SYSTEM:  Alert & Oriented X3, speech clear, FROM x 4 extremities. No deficits                             10.7   8.24  )-----------( 255      ( 04 Jan 2020 06:59 )             33.1   01-04    139  |  103  |  9.0  ----------------------------<  112  4.2   |  24.0  |  0.29<L>    Ca    9.3      04 Jan 2020 06:59  Phos  5.1     01-04  Mg     1.4     01-04    TPro  6.3<L>  /  Alb  3.5  /  TBili  <0.2<L>  /  DBili  x   /  AST  44<H>  /  ALT  45<H>  /  AlkPhos  341<H>  01-04

## 2020-01-05 NOTE — PROGRESS NOTE ADULT - SUBJECTIVE AND OBJECTIVE BOX
Patient seen and examined; chart reviewed.  Feels pretty well.  Denies cough or SOB.  No sputum.  CXR changes at right base are chronic.    Tolerates diet advancement.  Eating most of tray.  Had a normal BM today.  No abdominal pain with refeeding.      PAST MEDICAL & SURGICAL HISTORY:      ROS:  No Heartburn, regurgitation, dysphagia, odynophagia.  No dyspepsia  No abdominal pain.    No Nausea, vomiting.  No Bleeding.  No hematemesis.   No diarrhea.    No hematochesia.  No weight loss, anorexia.  No edema.      MEDICATIONS  (STANDING):  chlorhexidine 2% Cloths 1 Application(s) Topical <User Schedule>  chlorhexidine 4% Liquid 1 Application(s) Topical <User Schedule>  cholecalciferol 2000 Unit(s) Oral daily  escitalopram 10 milliGRAM(s) Oral daily  fat emulsion (Plant Based) 20% Infusion 0.9 Gm/kG/Day (31.05 mL/Hr) IV Continuous <Continuous>  ferrous    sulfate 325 milliGRAM(s) Oral daily  gabapentin 100 milliGRAM(s) Oral three times a day  lidocaine   Patch 1 Patch Transdermal daily  magnesium oxide 400 milliGRAM(s) Oral three times a day with meals  metoprolol tartrate 25 milliGRAM(s) Oral two times a day  octreotide  Injectable 100 MICROGram(s) SubCutaneous every 8 hours  pantoprazole    Tablet 40 milliGRAM(s) Oral before breakfast  Parenteral Nutrition - Adult 1 Each (83 mL/Hr) TPN Continuous <Continuous>  Parenteral Nutrition - Adult 1 Each (83 mL/Hr) TPN Continuous <Continuous>  sucralfate 1 Gram(s) Oral two times a day  traZODone 50 milliGRAM(s) Oral at bedtime    MEDICATIONS  (PRN):  acetaminophen   Tablet .. 650 milliGRAM(s) Oral every 6 hours PRN Mild Pain (1 - 3)  ALPRAZolam 0.25 milliGRAM(s) Oral three times a day PRN anxiety  melatonin 3 milliGRAM(s) Oral at bedtime PRN Insomnia  ondansetron Injectable 4 milliGRAM(s) IV Push every 4 hours PRN Nausea and/or Vomiting  simethicone 80 milliGRAM(s) Chew three times a day PRN Gas  sodium chloride 0.9% lock flush 10 milliLiter(s) IV Push every 1 hour PRN Pre/post blood products, medications, blood draw, and to maintain line patency  traMADol 25 milliGRAM(s) Oral three times a day PRN Moderate Pain (4 - 6)      Allergies    No Known Allergies    Intolerances    pt requests chicken broth (no beef) and CHERRY italian ice (no lemon) (Unknown)      Vital Signs Last 24 Hrs  T(C): 36.6 (05 Jan 2020 08:34), Max: 36.8 (04 Jan 2020 20:30)  T(F): 97.8 (05 Jan 2020 08:34), Max: 98.2 (04 Jan 2020 20:30)  HR: 109 (05 Jan 2020 08:34) (82 - 109)  BP: 89/59 (05 Jan 2020 08:34) (89/59 - 114/76)  BP(mean): --  RR: 20 (05 Jan 2020 08:34) (18 - 20)  SpO2: 95% (05 Jan 2020 05:37) (95% - 95%)    PHYSICAL EXAM:    GENERAL: NAD, well-groomed, well-developed  HEAD:  Atraumatic, Normocephalic  EYES: EOMI, PERRLA, conjunctiva and sclera clear  ENMT: No tonsillar erythema, exudates, or enlargement; Moist mucous membranes, Good dentition, No lesions  NECK: Supple, No JVD, Normal thyroid  CHEST/LUNG: Clear to percussion bilaterally; No rales, rhonchi, wheezing, or rubs  HEART: Regular rate and rhythm; No murmurs, rubs, or gallops  ABDOMEN: Soft, Nontender, Nondistended; Bowel sounds present  EXTREMITIES:  2+ Peripheral Pulses, No clubbing, cyanosis, or edema  LYMPH: No lymphadenopathy noted  SKIN: No rashes or lesions      LABS:                        12.4   9.30  )-----------( 345      ( 05 Jan 2020 06:10 )             35.0     01-05    136  |  100  |  10.0  ----------------------------<  117<H>  4.2   |  25.0  |  0.31<L>    Ca    9.5      05 Jan 2020 06:10  Phos  4.9     01-05  Mg     1.6     01-05    TPro  6.3<L>  /  Alb  3.5  /  TBili  <0.2<L>  /  DBili  x   /  AST  44<H>  /  ALT  45<H>  /  AlkPhos  341<H>  01-04    PT/INR - ( 05 Jan 2020 06:10 )   PT: 21.3 sec;   INR: 1.82 ratio         PTT - ( 05 Jan 2020 06:10 )  PTT:42.6 sec         RADIOLOGY & ADDITIONAL STUDIES:

## 2020-01-05 NOTE — PROGRESS NOTE ADULT - ASSESSMENT
Improved.  Will reorder CXR for AM.  Reorder TPN overnight.  Same formulation.  Holding on K Phos.  Continue with DASH Diet.

## 2020-01-05 NOTE — PROGRESS NOTE ADULT - ASSESSMENT
34 year old female with no known major comorbidities who is being treated for right pleural effusion/empyema/multifocal PNA found to have a RIJ thrombus currently on heparin drip bridge to warfarin.    - RIJ venous thrombosis -provoked event that occurred in context of her severe and complex lung illness/infection/empyema that may have led to developing the thrombus. Plan on anticoagulation for 3 months. Plan for warfarin noted as outpatient. INR now therapeutic   - Multifactorial anemia; stable after transfusions; cont to monitor.  - Reactive leukocytosis, improved.    - Pancreatic Fistula-care per managing service/ID/GI ; pt on TPN; advance diet

## 2020-01-06 LAB
ALBUMIN SERPL ELPH-MCNC: 3.6 G/DL — SIGNIFICANT CHANGE UP (ref 3.3–5.2)
ALP SERPL-CCNC: 347 U/L — HIGH (ref 40–120)
ALT FLD-CCNC: 35 U/L — HIGH
ANION GAP SERPL CALC-SCNC: 11 MMOL/L — SIGNIFICANT CHANGE UP (ref 5–17)
AST SERPL-CCNC: 34 U/L — HIGH
BILIRUB SERPL-MCNC: <0.2 MG/DL — LOW (ref 0.4–2)
BUN SERPL-MCNC: 11 MG/DL — SIGNIFICANT CHANGE UP (ref 8–20)
CALCIUM SERPL-MCNC: 9.6 MG/DL — SIGNIFICANT CHANGE UP (ref 8.6–10.2)
CHLORIDE SERPL-SCNC: 99 MMOL/L — SIGNIFICANT CHANGE UP (ref 98–107)
CO2 SERPL-SCNC: 25 MMOL/L — SIGNIFICANT CHANGE UP (ref 22–29)
CREAT SERPL-MCNC: 0.38 MG/DL — LOW (ref 0.5–1.3)
GLUCOSE BLDC GLUCOMTR-MCNC: 129 MG/DL — HIGH (ref 70–99)
GLUCOSE SERPL-MCNC: 110 MG/DL — SIGNIFICANT CHANGE UP (ref 70–115)
HCT VFR BLD CALC: 38.5 % — SIGNIFICANT CHANGE UP (ref 34.5–45)
HGB BLD-MCNC: 12.3 G/DL — SIGNIFICANT CHANGE UP (ref 11.5–15.5)
INR BLD: 2.12 RATIO — HIGH (ref 0.88–1.16)
MAGNESIUM SERPL-MCNC: 1.5 MG/DL — LOW (ref 1.6–2.6)
MCHC RBC-ENTMCNC: 30.4 PG — SIGNIFICANT CHANGE UP (ref 27–34)
MCHC RBC-ENTMCNC: 31.9 GM/DL — LOW (ref 32–36)
MCV RBC AUTO: 95.1 FL — SIGNIFICANT CHANGE UP (ref 80–100)
PHOSPHATE SERPL-MCNC: 5.1 MG/DL — HIGH (ref 2.4–4.7)
PLATELET # BLD AUTO: 329 K/UL — SIGNIFICANT CHANGE UP (ref 150–400)
POTASSIUM SERPL-MCNC: 4.1 MMOL/L — SIGNIFICANT CHANGE UP (ref 3.5–5.3)
POTASSIUM SERPL-SCNC: 4.1 MMOL/L — SIGNIFICANT CHANGE UP (ref 3.5–5.3)
PROT SERPL-MCNC: 6.5 G/DL — LOW (ref 6.6–8.7)
PROTHROM AB SERPL-ACNC: 25 SEC — HIGH (ref 10–12.9)
RBC # BLD: 4.05 M/UL — SIGNIFICANT CHANGE UP (ref 3.8–5.2)
RBC # FLD: 15.6 % — HIGH (ref 10.3–14.5)
SODIUM SERPL-SCNC: 135 MMOL/L — SIGNIFICANT CHANGE UP (ref 135–145)
WBC # BLD: 10.4 K/UL — SIGNIFICANT CHANGE UP (ref 3.8–10.5)
WBC # FLD AUTO: 10.4 K/UL — SIGNIFICANT CHANGE UP (ref 3.8–10.5)

## 2020-01-06 PROCEDURE — 99233 SBSQ HOSP IP/OBS HIGH 50: CPT

## 2020-01-06 PROCEDURE — 99232 SBSQ HOSP IP/OBS MODERATE 35: CPT

## 2020-01-06 PROCEDURE — 71046 X-RAY EXAM CHEST 2 VIEWS: CPT | Mod: 26

## 2020-01-06 RX ORDER — DEXTROSE 10 % IN WATER 10 %
1000 INTRAVENOUS SOLUTION INTRAVENOUS
Refills: 0 | Status: DISCONTINUED | OUTPATIENT
Start: 2020-01-06 | End: 2020-01-07

## 2020-01-06 RX ORDER — ALPRAZOLAM 0.25 MG
0.25 TABLET ORAL THREE TIMES A DAY
Refills: 0 | Status: DISCONTINUED | OUTPATIENT
Start: 2020-01-06 | End: 2020-01-07

## 2020-01-06 RX ORDER — MAGNESIUM SULFATE 500 MG/ML
2 VIAL (ML) INJECTION
Refills: 0 | Status: COMPLETED | OUTPATIENT
Start: 2020-01-06 | End: 2020-01-06

## 2020-01-06 RX ORDER — WARFARIN SODIUM 2.5 MG/1
12 TABLET ORAL ONCE
Refills: 0 | Status: COMPLETED | OUTPATIENT
Start: 2020-01-06 | End: 2020-01-06

## 2020-01-06 RX ADMIN — Medication 1 GRAM(S): at 05:03

## 2020-01-06 RX ADMIN — Medication 2000 UNIT(S): at 12:57

## 2020-01-06 RX ADMIN — OCTREOTIDE ACETATE 100 MICROGRAM(S): 200 INJECTION, SOLUTION INTRAVENOUS; SUBCUTANEOUS at 17:12

## 2020-01-06 RX ADMIN — Medication 100 MILLILITER(S): at 21:59

## 2020-01-06 RX ADMIN — MAGNESIUM OXIDE 400 MG ORAL TABLET 400 MILLIGRAM(S): 241.3 TABLET ORAL at 12:55

## 2020-01-06 RX ADMIN — Medication 50 GRAM(S): at 17:13

## 2020-01-06 RX ADMIN — Medication 0.25 MILLIGRAM(S): at 15:12

## 2020-01-06 RX ADMIN — MAGNESIUM OXIDE 400 MG ORAL TABLET 400 MILLIGRAM(S): 241.3 TABLET ORAL at 17:12

## 2020-01-06 RX ADMIN — MAGNESIUM OXIDE 400 MG ORAL TABLET 400 MILLIGRAM(S): 241.3 TABLET ORAL at 09:48

## 2020-01-06 RX ADMIN — Medication 50 MILLIGRAM(S): at 21:54

## 2020-01-06 RX ADMIN — ONDANSETRON 4 MILLIGRAM(S): 8 TABLET, FILM COATED ORAL at 05:10

## 2020-01-06 RX ADMIN — TRAMADOL HYDROCHLORIDE 25 MILLIGRAM(S): 50 TABLET ORAL at 13:55

## 2020-01-06 RX ADMIN — GABAPENTIN 100 MILLIGRAM(S): 400 CAPSULE ORAL at 05:03

## 2020-01-06 RX ADMIN — Medication 0.25 MILLIGRAM(S): at 22:07

## 2020-01-06 RX ADMIN — CHLORHEXIDINE GLUCONATE 1 APPLICATION(S): 213 SOLUTION TOPICAL at 05:03

## 2020-01-06 RX ADMIN — Medication 25 MILLIGRAM(S): at 05:03

## 2020-01-06 RX ADMIN — GABAPENTIN 100 MILLIGRAM(S): 400 CAPSULE ORAL at 15:21

## 2020-01-06 RX ADMIN — OCTREOTIDE ACETATE 100 MICROGRAM(S): 200 INJECTION, SOLUTION INTRAVENOUS; SUBCUTANEOUS at 09:48

## 2020-01-06 RX ADMIN — WARFARIN SODIUM 12 MILLIGRAM(S): 2.5 TABLET ORAL at 21:54

## 2020-01-06 RX ADMIN — Medication 25 MILLIGRAM(S): at 17:12

## 2020-01-06 RX ADMIN — Medication 325 MILLIGRAM(S): at 12:55

## 2020-01-06 RX ADMIN — ESCITALOPRAM OXALATE 10 MILLIGRAM(S): 10 TABLET, FILM COATED ORAL at 12:58

## 2020-01-06 RX ADMIN — TRAMADOL HYDROCHLORIDE 25 MILLIGRAM(S): 50 TABLET ORAL at 12:55

## 2020-01-06 RX ADMIN — Medication 1 GRAM(S): at 17:12

## 2020-01-06 RX ADMIN — Medication 50 GRAM(S): at 15:13

## 2020-01-06 RX ADMIN — GABAPENTIN 100 MILLIGRAM(S): 400 CAPSULE ORAL at 21:54

## 2020-01-06 RX ADMIN — SIMETHICONE 80 MILLIGRAM(S): 80 TABLET, CHEWABLE ORAL at 17:18

## 2020-01-06 RX ADMIN — TRAMADOL HYDROCHLORIDE 25 MILLIGRAM(S): 50 TABLET ORAL at 23:28

## 2020-01-06 RX ADMIN — CHLORHEXIDINE GLUCONATE 1 APPLICATION(S): 213 SOLUTION TOPICAL at 05:11

## 2020-01-06 RX ADMIN — OCTREOTIDE ACETATE 100 MICROGRAM(S): 200 INJECTION, SOLUTION INTRAVENOUS; SUBCUTANEOUS at 21:54

## 2020-01-06 RX ADMIN — ENOXAPARIN SODIUM 55 MILLIGRAM(S): 100 INJECTION SUBCUTANEOUS at 05:09

## 2020-01-06 RX ADMIN — PANTOPRAZOLE SODIUM 40 MILLIGRAM(S): 20 TABLET, DELAYED RELEASE ORAL at 05:03

## 2020-01-06 RX ADMIN — TRAMADOL HYDROCHLORIDE 25 MILLIGRAM(S): 50 TABLET ORAL at 22:07

## 2020-01-06 NOTE — PROGRESS NOTE ADULT - ASSESSMENT
Improved status.  Still with basilar decreased breath sounds on right.   CXR thus far shows no progression. Hopefully pancreatico pleural fistula has closed.    Eating nearly normal from regular diet.    Minimal diarrhea.  No bleeding.    Will stop TPN today and substitute with a 12 hr infusion of D10 W to assure no hypoglycemia  post TPN.    Same Reg diet.    For repeat on CXR today.  Hope for DC tomorrow if all goes well.

## 2020-01-06 NOTE — PROGRESS NOTE ADULT - SUBJECTIVE AND OBJECTIVE BOX
Patient seen and examined; chart reviewed.  Feels good.  Tolerated diet advancement.  Minimal LUQ abd pain.  No fever.  No SOB or cough.  no Pleuritic pain.  Tolerates TPN well Glucoses running low 100 range.  Eager for discharge.  Now fully anticoagulated.      PAST MEDICAL & SURGICAL HISTORY:      ROS:  No Heartburn, regurgitation, dysphagia, odynophagia.  No dyspepsia  No abdominal pain.    No Nausea, vomiting.  No Bleeding.  No hematemesis.   No diarrhea.    No hematochesia.  No weight loss, anorexia.  No edema.      MEDICATIONS  (STANDING):  chlorhexidine 2% Cloths 1 Application(s) Topical <User Schedule>  chlorhexidine 4% Liquid 1 Application(s) Topical <User Schedule>  cholecalciferol 2000 Unit(s) Oral daily  dextrose 10%. 1000 milliLiter(s) (100 mL/Hr) IV Continuous <Continuous>  enoxaparin Injectable 55 milliGRAM(s) SubCutaneous two times a day  escitalopram 10 milliGRAM(s) Oral daily  ferrous    sulfate 325 milliGRAM(s) Oral daily  gabapentin 100 milliGRAM(s) Oral three times a day  lidocaine   Patch 1 Patch Transdermal daily  magnesium oxide 400 milliGRAM(s) Oral three times a day with meals  metoprolol tartrate 25 milliGRAM(s) Oral two times a day  octreotide  Injectable 100 MICROGram(s) SubCutaneous every 8 hours  pantoprazole    Tablet 40 milliGRAM(s) Oral before breakfast  Parenteral Nutrition - Adult 1 Each (83 mL/Hr) TPN Continuous <Continuous>  sucralfate 1 Gram(s) Oral two times a day  traZODone 50 milliGRAM(s) Oral at bedtime    MEDICATIONS  (PRN):  acetaminophen   Tablet .. 650 milliGRAM(s) Oral every 6 hours PRN Mild Pain (1 - 3)  melatonin 3 milliGRAM(s) Oral at bedtime PRN Insomnia  ondansetron Injectable 4 milliGRAM(s) IV Push every 4 hours PRN Nausea and/or Vomiting  simethicone 80 milliGRAM(s) Chew three times a day PRN Gas  sodium chloride 0.9% lock flush 10 milliLiter(s) IV Push every 1 hour PRN Pre/post blood products, medications, blood draw, and to maintain line patency  traMADol 25 milliGRAM(s) Oral three times a day PRN Moderate Pain (4 - 6)      Allergies    No Known Allergies    Intolerances    pt requests chicken broth (no beef) and CHERRY italian ice (no lemon) (Unknown)      Vital Signs Last 24 Hrs  T(C): 36.8 (05 Jan 2020 19:34), Max: 36.8 (05 Jan 2020 19:34)  T(F): 98.3 (05 Jan 2020 19:34), Max: 98.3 (05 Jan 2020 19:34)  HR: 90 (06 Jan 2020 05:00) (90 - 109)  BP: 100/68 (06 Jan 2020 05:00) (89/59 - 114/66)  BP(mean): --  RR: 18 (05 Jan 2020 19:34) (18 - 20)  SpO2: 95% (05 Jan 2020 19:34) (95% - 96%)    PHYSICAL EXAM:    GENERAL: NAD, well-groomed, well-developed  HEAD:  Atraumatic, Normocephalic  EYES: EOMI, PERRLA, conjunctiva and sclera clear  ENMT: No tonsillar erythema, exudates, or enlargement; Moist mucous membranes, Good dentition, No lesions  NECK: Supple, No JVD, Normal thyroid  CHEST/LUNG: Clear to percussion bilaterally; No rales, rhonchi, wheezing, or rubs  HEART: Regular rate and rhythm; No murmurs, rubs, or gallops  ABDOMEN: Soft, Nontender, Nondistended; Bowel sounds present  EXTREMITIES:  2+ Peripheral Pulses, No clubbing, cyanosis, or edema  LYMPH: No lymphadenopathy noted  SKIN: No rashes or lesions      LABS:                        12.3   10.40 )-----------( 329      ( 06 Jan 2020 07:31 )             38.5     01-06    135  |  99  |  11.0  ----------------------------<  110  4.1   |  25.0  |  0.38<L>    Ca    9.6      06 Jan 2020 07:31  Phos  5.1     01-06  Mg     1.5     01-06    TPro  6.5<L>  /  Alb  3.6  /  TBili  <0.2<L>  /  DBili  x   /  AST  x   /  ALT  x   /  AlkPhos  347<H>  01-06    PT/INR - ( 06 Jan 2020 07:31 )   PT: 25.0 sec;   INR: 2.12 ratio         PTT - ( 05 Jan 2020 06:10 )  PTT:42.6 sec         RADIOLOGY & ADDITIONAL STUDIES:

## 2020-01-06 NOTE — PROGRESS NOTE ADULT - SUBJECTIVE AND OBJECTIVE BOX
follow up for NSVT , pancreatitis , pancreo pleural fistula , electrolyte imbalance   pt is seen still anxious to go home   GI fallow up noted , explained to her in details       Vital Signs Last 24 Hrs  T(C): 36.7 (06 Jan 2020 08:26), Max: 36.8 (05 Jan 2020 19:34)  T(F): 98.1 (06 Jan 2020 08:26), Max: 98.3 (05 Jan 2020 19:34)  HR: 86 (06 Jan 2020 08:26) (86 - 103)  BP: 100/68 (06 Jan 2020 05:00) (100/68 - 114/66)  BP(mean): --  RR: 20 (06 Jan 2020 08:26) (18 - 20)  SpO2: 95% (05 Jan 2020 19:34) (95% - 96%)    GENERAL: Pt lying in bed comfortably in NAD  CHEST/LUNG: Clear to auscultation bilaterally; rales on the R lower lung   HEART: Regular rate and rhythm; s1 /s2 No murmurs  ABDOMEN: Bowel sounds present; Soft, Nontender, Nondistended  EXTREMITIES:  No clubbing, cyanosis, or edema, B/L UE PICC line +                                        12.3   10.40 )-----------( 329      ( 06 Jan 2020 07:31 )             38.5   01-06    135  |  99  |  11.0  ----------------------------<  110  4.1   |  25.0  |  0.38<L>    Ca    9.6      06 Jan 2020 07:31  Phos  5.1     01-06  Mg     1.5     01-06    TPro  6.5<L>  /  Alb  3.6  /  TBili  <0.2<L>  /  DBili  x   /  AST  34<H>  /  ALT  35<H>  /  AlkPhos  347<H>  01-06

## 2020-01-06 NOTE — PROGRESS NOTE ADULT - ASSESSMENT
34 year old woman with no known major comorbidities (had not gone to see a doctor for 15 years) who presented to [facility] on 11/22/2019 with progressive sob, weakness, weight loss, She was found to have a large pleural effusion. Initially 1.5 L of dark serous fluid was removed. Chest tube placed shortly thereafter, 3 additional liters was removed. pleural fluid cultures + for group A hemolytic strep,CBC showed a leukocytosis of 40k and CTA imaging revealed a 2.1cm partially cystic mass in the body/tail of the pancreas with possible upstream ductal dilatation. On 11/23/2019 the patient had a CT chest/abdomen/pelvis that found to have a clot in the right IJ and was started on a heparin gtt   s/p right VATS decort & washout debridement of CT site & VAC placement on 11/27/19  Persistent drainage from chest tube site so repeat CT C/A/P performed 12/4 which showed new cystic mass in pancreas and liver concerning for pancreatitis  Zosyn changed to meropenem on 12/6  for broader coverage given pancreatitis  Copious drainage from anterior chest tube site, so VAC removed and ostomy appliance applied for high output on 12/6.   12/7 Right chest tube removed.   Repeat CT scan showed a persistent Right hydropneumothorax with air anteriorly  12/8 Ostomy appliance removed and dressing applied.   12/9 s.p PICC placement & Right anterior pig tail catheter & right lateral wound vac placement by thoracic surgery   MRCP significant for Peripancreatic fluid and edema consistent with pancreatitis. Cystic lesions in the pancreatic head with the largest measuring 2.1 cm, likely acute peripancreatic collections, increased in size since 12/4/2019, with a tract of fluid extending from the 2.1 cm cystic lesion into the mediastinum and towards the pleural space on the right suspicious for a pancreatic pleural fistula.  MRI showed  pancreatic / pleural fistula and Multifocal PNA  Octreotide/ TPN started by GI with pt being on clears PO, Heme- transitioned to Coumadin with  iv heparin bridging   12/9/19 PICC line placed, b/l   antr chest wall- chest tube site wound vac removed on 12/30/19  runs of NSVT on tele for which aggressive lyte supplement started and lopressor started. Rpt MR abdomen showed improved pancreas status and full liq diet started on 1/2/20 and reg on 1/3. 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 Since > 4 weeks MRI abdomen noted , pancreatitis resolving , pancreatic cyst decreasing   -  TPN daily per GI likely home soon   diet advanced        4-Rt IJ thrombus  INR is over 2   cont daily warfarin and inr monitoring       5-Anemia of chronic disease  - s/p multiple transfusions   - hb stable     6-insomnia/ anxiety   on trazadone and melatonin    7- severe protein calorie malnutrition  - on nutritional supplements and TPN   - Prealbumin level 26, WNL    home soon

## 2020-01-07 ENCOUNTER — TRANSCRIPTION ENCOUNTER (OUTPATIENT)
Age: 35
End: 2020-01-07

## 2020-01-07 VITALS
SYSTOLIC BLOOD PRESSURE: 96 MMHG | DIASTOLIC BLOOD PRESSURE: 62 MMHG | RESPIRATION RATE: 18 BRPM | OXYGEN SATURATION: 97 % | TEMPERATURE: 98 F | HEART RATE: 93 BPM

## 2020-01-07 LAB
ALBUMIN SERPL ELPH-MCNC: 3.7 G/DL — SIGNIFICANT CHANGE UP (ref 3.3–5.2)
ALP SERPL-CCNC: 377 U/L — HIGH (ref 40–120)
ALT FLD-CCNC: 46 U/L — HIGH
ANION GAP SERPL CALC-SCNC: 15 MMOL/L — SIGNIFICANT CHANGE UP (ref 5–17)
AST SERPL-CCNC: 52 U/L — HIGH
BILIRUB SERPL-MCNC: 0.2 MG/DL — LOW (ref 0.4–2)
BUN SERPL-MCNC: 11 MG/DL — SIGNIFICANT CHANGE UP (ref 8–20)
CALCIUM SERPL-MCNC: 9.6 MG/DL — SIGNIFICANT CHANGE UP (ref 8.6–10.2)
CHLORIDE SERPL-SCNC: 97 MMOL/L — LOW (ref 98–107)
CO2 SERPL-SCNC: 25 MMOL/L — SIGNIFICANT CHANGE UP (ref 22–29)
CREAT SERPL-MCNC: 0.48 MG/DL — LOW (ref 0.5–1.3)
GLUCOSE BLDC GLUCOMTR-MCNC: 117 MG/DL — HIGH (ref 70–99)
GLUCOSE SERPL-MCNC: 117 MG/DL — HIGH (ref 70–115)
HCT VFR BLD CALC: 36.1 % — SIGNIFICANT CHANGE UP (ref 34.5–45)
HGB BLD-MCNC: 11.8 G/DL — SIGNIFICANT CHANGE UP (ref 11.5–15.5)
INR BLD: 2.46 RATIO — HIGH (ref 0.88–1.16)
MCHC RBC-ENTMCNC: 29.9 PG — SIGNIFICANT CHANGE UP (ref 27–34)
MCHC RBC-ENTMCNC: 32.7 GM/DL — SIGNIFICANT CHANGE UP (ref 32–36)
MCV RBC AUTO: 91.6 FL — SIGNIFICANT CHANGE UP (ref 80–100)
PLATELET # BLD AUTO: 280 K/UL — SIGNIFICANT CHANGE UP (ref 150–400)
POTASSIUM SERPL-MCNC: 4 MMOL/L — SIGNIFICANT CHANGE UP (ref 3.5–5.3)
POTASSIUM SERPL-SCNC: 4 MMOL/L — SIGNIFICANT CHANGE UP (ref 3.5–5.3)
PROT SERPL-MCNC: 6.9 G/DL — SIGNIFICANT CHANGE UP (ref 6.6–8.7)
PROTHROM AB SERPL-ACNC: 29.1 SEC — HIGH (ref 10–12.9)
RBC # BLD: 3.94 M/UL — SIGNIFICANT CHANGE UP (ref 3.8–5.2)
RBC # FLD: 15.6 % — HIGH (ref 10.3–14.5)
SODIUM SERPL-SCNC: 137 MMOL/L — SIGNIFICANT CHANGE UP (ref 135–145)
WBC # BLD: 11.09 K/UL — HIGH (ref 3.8–10.5)
WBC # FLD AUTO: 11.09 K/UL — HIGH (ref 3.8–10.5)

## 2020-01-07 PROCEDURE — 99233 SBSQ HOSP IP/OBS HIGH 50: CPT

## 2020-01-07 PROCEDURE — 99239 HOSP IP/OBS DSCHRG MGMT >30: CPT

## 2020-01-07 RX ORDER — GABAPENTIN 400 MG/1
1 CAPSULE ORAL
Qty: 45 | Refills: 0
Start: 2020-01-07 | End: 2020-01-21

## 2020-01-07 RX ORDER — SUCRALFATE 1 G
1 TABLET ORAL
Qty: 60 | Refills: 0
Start: 2020-01-07 | End: 2020-02-05

## 2020-01-07 RX ORDER — METOPROLOL TARTRATE 50 MG
1 TABLET ORAL
Qty: 60 | Refills: 0
Start: 2020-01-07 | End: 2020-02-05

## 2020-01-07 RX ORDER — ESCITALOPRAM OXALATE 10 MG/1
1 TABLET, FILM COATED ORAL
Qty: 30 | Refills: 0
Start: 2020-01-07 | End: 2020-02-05

## 2020-01-07 RX ORDER — WARFARIN SODIUM 2.5 MG/1
1 TABLET ORAL
Qty: 30 | Refills: 0
Start: 2020-01-07 | End: 2020-02-05

## 2020-01-07 RX ORDER — CHOLECALCIFEROL (VITAMIN D3) 125 MCG
2000 CAPSULE ORAL
Qty: 30 | Refills: 0
Start: 2020-01-07 | End: 2020-02-05

## 2020-01-07 RX ORDER — FOLIC ACID 0.8 MG
1 TABLET ORAL
Qty: 30 | Refills: 0
Start: 2020-01-07 | End: 2020-02-05

## 2020-01-07 RX ORDER — TRAZODONE HCL 50 MG
1 TABLET ORAL
Qty: 30 | Refills: 0
Start: 2020-01-07 | End: 2020-02-05

## 2020-01-07 RX ADMIN — OCTREOTIDE ACETATE 100 MICROGRAM(S): 200 INJECTION, SOLUTION INTRAVENOUS; SUBCUTANEOUS at 08:18

## 2020-01-07 RX ADMIN — GABAPENTIN 100 MILLIGRAM(S): 400 CAPSULE ORAL at 06:00

## 2020-01-07 RX ADMIN — Medication 1 GRAM(S): at 06:00

## 2020-01-07 RX ADMIN — Medication 2000 UNIT(S): at 12:37

## 2020-01-07 RX ADMIN — Medication 0.25 MILLIGRAM(S): at 08:23

## 2020-01-07 RX ADMIN — PANTOPRAZOLE SODIUM 40 MILLIGRAM(S): 20 TABLET, DELAYED RELEASE ORAL at 06:00

## 2020-01-07 RX ADMIN — MAGNESIUM OXIDE 400 MG ORAL TABLET 400 MILLIGRAM(S): 241.3 TABLET ORAL at 08:18

## 2020-01-07 RX ADMIN — MAGNESIUM OXIDE 400 MG ORAL TABLET 400 MILLIGRAM(S): 241.3 TABLET ORAL at 12:36

## 2020-01-07 RX ADMIN — CHLORHEXIDINE GLUCONATE 1 APPLICATION(S): 213 SOLUTION TOPICAL at 06:27

## 2020-01-07 RX ADMIN — ESCITALOPRAM OXALATE 10 MILLIGRAM(S): 10 TABLET, FILM COATED ORAL at 12:36

## 2020-01-07 RX ADMIN — Medication 25 MILLIGRAM(S): at 06:00

## 2020-01-07 RX ADMIN — TRAMADOL HYDROCHLORIDE 25 MILLIGRAM(S): 50 TABLET ORAL at 12:53

## 2020-01-07 NOTE — PROGRESS NOTE ADULT - SUBJECTIVE AND OBJECTIVE BOX
Patient seen and examined; chart reviewed.  Events noted.  No hypoglycemia with DC of the TPN.  Tolerated D 10 infusion well.  AM Glucose: 117.  Feels OK.  Last BM was yesterday.  Denies abdominal pain.  No fever.  Breathing comfortably.    CXR reviewed.  No change.      PAST MEDICAL & SURGICAL HISTORY:      ROS:  No Heartburn, regurgitation, dysphagia, odynophagia.  No dyspepsia  No abdominal pain.    No Nausea, vomiting.  No Bleeding.  No hematemesis.   No diarrhea.    No hematochesia.  No weight loss, anorexia.  No edema.      MEDICATIONS  (STANDING):  chlorhexidine 2% Cloths 1 Application(s) Topical <User Schedule>  chlorhexidine 4% Liquid 1 Application(s) Topical <User Schedule>  cholecalciferol 2000 Unit(s) Oral daily  escitalopram 10 milliGRAM(s) Oral daily  ferrous    sulfate 325 milliGRAM(s) Oral daily  gabapentin 100 milliGRAM(s) Oral three times a day  lidocaine   Patch 1 Patch Transdermal daily  magnesium oxide 400 milliGRAM(s) Oral three times a day with meals  metoprolol tartrate 25 milliGRAM(s) Oral two times a day  octreotide  Injectable 100 MICROGram(s) SubCutaneous every 8 hours  pantoprazole    Tablet 40 milliGRAM(s) Oral before breakfast  sucralfate 1 Gram(s) Oral two times a day  traZODone 50 milliGRAM(s) Oral at bedtime    MEDICATIONS  (PRN):  acetaminophen   Tablet .. 650 milliGRAM(s) Oral every 6 hours PRN Mild Pain (1 - 3)  ALPRAZolam 0.25 milliGRAM(s) Oral three times a day PRN anxiety  melatonin 3 milliGRAM(s) Oral at bedtime PRN Insomnia  ondansetron Injectable 4 milliGRAM(s) IV Push every 4 hours PRN Nausea and/or Vomiting  simethicone 80 milliGRAM(s) Chew three times a day PRN Gas  sodium chloride 0.9% lock flush 10 milliLiter(s) IV Push every 1 hour PRN Pre/post blood products, medications, blood draw, and to maintain line patency  traMADol 25 milliGRAM(s) Oral three times a day PRN Moderate Pain (4 - 6)      Allergies    No Known Allergies    Intolerances    pt requests chicken broth (no beef) and CHERRY italian ice (no lemon) (Unknown)      Vital Signs Last 24 Hrs  T(C): 36.8 (07 Jan 2020 07:45), Max: 36.8 (06 Jan 2020 15:44)  T(F): 98.2 (07 Jan 2020 07:45), Max: 98.3 (06 Jan 2020 15:44)  HR: 93 (07 Jan 2020 07:45) (90 - 100)  BP: 96/62 (07 Jan 2020 07:45) (96/62 - 102/78)  BP(mean): --  RR: 18 (07 Jan 2020 07:45) (18 - 19)  SpO2: 97% (07 Jan 2020 07:45) (97% - 97%)    PHYSICAL EXAM:    GENERAL: NAD, well-groomed, well-developed  HEAD:  Atraumatic, Normocephalic  EYES: EOMI, PERRLA, conjunctiva and sclera clear  ENMT: No tonsillar erythema, exudates, or enlargement; Moist mucous membranes, Good dentition, No lesions  NECK: Supple, No JVD, Normal thyroid  CHEST/LUNG: Clear to percussion bilaterally; No rales, rhonchi, wheezing, or rubs  HEART: Regular rate and rhythm; No murmurs, rubs, or gallops  ABDOMEN: Soft, Nontender, Nondistended; Bowel sounds present  EXTREMITIES:  2+ Peripheral Pulses, No clubbing, cyanosis, or edema  LYMPH: No lymphadenopathy noted  SKIN: No rashes or lesions      LABS:                        11.8   11.09 )-----------( 280      ( 07 Jan 2020 07:10 )             36.1     01-07    137  |  97<L>  |  11.0  ----------------------------<  117<H>  4.0   |  25.0  |  0.48<L>    Ca    9.6      07 Jan 2020 07:10  Phos  5.1     01-06  Mg     1.5     01-06    TPro  6.9  /  Alb  3.7  /  TBili  0.2<L>  /  DBili  x   /  AST  52<H>  /  ALT  46<H>  /  AlkPhos  377<H>  01-07    PT/INR - ( 07 Jan 2020 07:10 )   PT: 29.1 sec;   INR: 2.46 ratio                  RADIOLOGY & ADDITIONAL STUDIES:  CXR:  No change in right basilar scarring fibrosis.    Otherwise neg.

## 2020-01-07 NOTE — PROGRESS NOTE ADULT - SUBJECTIVE AND OBJECTIVE BOX
34 year old woman with no known major comorbidities (had not gone to see a doctor for 15 years) who presented to [facility] on 11/22/2019 with progressive sob, weakness, weight loss, She was found to have a large pleural effusion. Initially 1.5 L of dark serous fluid was removed.  Chest tube placed shortly thereafter, 3 additional liters was removed. CBC showed a leukocytosis of 40k and   CTA imaging revealed a 2.1cm partially cystic mass in the body/tail of the pancreas with possible upstream ductal dilatation. On 11/23/2019 the patient had a CT chest/abdomen/pelvis that found to have a clot in the right IJ and was started on a heparin gtt.   Chest tube out; on parenteral feedings; taking clear liquids    Transferred to [facility] on 11/24/19.  MRCP performed on 11/26.  Underwent Debridement, wound, with dressing replacement  Wound VAC placement  Pleural biopsy  Lung decortication  Bronchoscopy, with lung decortication using VATS on 11/27/19.  Pt  has a history of alcohol abuse.   INTERVAL HISTORY    denies active bleeding. no fevers.     MEDICATIONS  (STANDING):  chlorhexidine 2% Cloths 1 Application(s) Topical <User Schedule>  chlorhexidine 4% Liquid 1 Application(s) Topical <User Schedule>  cholecalciferol 2000 Unit(s) Oral daily  dextrose 10%. 1000 milliLiter(s) (100 mL/Hr) IV Continuous <Continuous>  escitalopram 10 milliGRAM(s) Oral daily  ferrous    sulfate 325 milliGRAM(s) Oral daily  gabapentin 100 milliGRAM(s) Oral three times a day  lidocaine   Patch 1 Patch Transdermal daily  magnesium oxide 400 milliGRAM(s) Oral three times a day with meals  metoprolol tartrate 25 milliGRAM(s) Oral two times a day  octreotide  Injectable 100 MICROGram(s) SubCutaneous every 8 hours  pantoprazole    Tablet 40 milliGRAM(s) Oral before breakfast  sucralfate 1 Gram(s) Oral two times a day  traZODone 50 milliGRAM(s) Oral at bedtime          Allergies    No Known Allergies    Intolerances    Vital Signs Last 24 Hrs  T(C): 36.7 (06 Jan 2020 22:14), Max: 36.8 (06 Jan 2020 15:44)  T(F): 98 (06 Jan 2020 22:14), Max: 98.3 (06 Jan 2020 15:44)  HR: 92 (07 Jan 2020 06:11) (86 - 100)  BP: 102/78 (07 Jan 2020 06:11) (97/79 - 102/78)  BP(mean): --  RR: 18 (06 Jan 2020 22:14) (18 - 20)  SpO2: 97% (06 Jan 2020 22:14) (97% - 97%)      PHYSICAL EXAM  General: adult in NAD  CV: RR  Lungs: positive air movement b/l ant lungs,clear to auscultation, no wheezes, no rales  Abdomen: soft, NT  Ext: no clubbing cyanosis or edema                  12.3                 135  | 25.0 | 11.0         10.40 >-----------< 329     ------------------------< 110                   38.5                 4.1  | 99   | 0.38                                         Ca 9.6   Mg 1.5   Ph 5.1    PT/INR - ( 06 Jan 2020 07:31 )   PT: 25.0 sec;   INR: 2.12 ratio

## 2020-01-07 NOTE — PROGRESS NOTE ADULT - ASSESSMENT
34 year old woman with no known major comorbidities (had not gone to see a doctor for 15 years) who presented to [facility] on 11/22/2019 with progressive sob, weakness, weight loss, She was found to have a large pleural effusion. Initially 1.5 L of dark serous fluid was removed. Chest tube placed shortly thereafter, 3 additional liters was removed. pleural fluid cultures + for group A hemolytic strep,CBC showed a leukocytosis of 40k and CTA imaging revealed a 2.1cm partially cystic mass in the body/tail of the pancreas with possible upstream ductal dilatation. On 11/23/2019 the patient had a CT chest/abdomen/pelvis that found to have a clot in the right IJ and was started on a heparin gtt   s/p right VATS decort & washout debridement of CT site & VAC placement on 11/27/19  Persistent drainage from chest tube site so repeat CT C/A/P performed 12/4 which showed new cystic mass in pancreas and liver concerning for pancreatitis  Zosyn changed to meropenem on 12/6  for broader coverage given pancreatitis  Copious drainage from anterior chest tube site, so VAC removed and ostomy appliance applied for high output on 12/6.   12/7 Right chest tube removed.   Repeat CT scan showed a persistent Right hydropneumothorax with air anteriorly  12/8 Ostomy appliance removed and dressing applied.   12/9 s.p PICC placement & Right anterior pig tail catheter & right lateral wound vac placement by thoracic surgery   MRCP significant for Peripancreatic fluid and edema consistent with pancreatitis. Cystic lesions in the pancreatic head with the largest measuring 2.1 cm, likely acute peripancreatic collections, increased in size since 12/4/2019, with a tract of fluid extending from the 2.1 cm cystic lesion into the mediastinum and towards the pleural space on the right suspicious for a pancreatic pleural fistula.  MRI showed  pancreatic / pleural fistula and Multifocal PNA  Octreotide/ TPN started by GI with pt being on clears PO, Heme- transitioned to Coumadin with  iv heparin bridging   12/9/19 PICC line placed, b/l   antr chest wall- chest tube site wound vac removed on 12/30/19  runs of NSVT on tele for which aggressive lyte supplement started and lopressor started. Rpt MR abdomen showed improved pancreas status and full liq diet started on 1/2/20 and reg on 1/3. 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 Since > 4 weeks MRI abdomen noted , pancreatitis resolving , pancreatic cyst decreasing   diet advanced tolerating        4-Rt IJ thrombus  INR is over 2   cont warfarin       5-Anemia of chronic disease  - s/p multiple transfusions   - hb stable     6-insomnia/ anxiety   on trazadone and melatonin    7- severe protein calorie malnutrition  - on nutritional supplements and TPN   - Prealbumin level 26, WNL    home soon

## 2020-01-07 NOTE — PROGRESS NOTE ADULT - SUBJECTIVE AND OBJECTIVE BOX
follow up for NSVT , pancreatitis , pancreo pleural fistula , electrolyte imbalance   pt is seen in am , she is asymptomatic , no pain reported   no SOB     Vital Signs Last 24 Hrs  T(C): 36.8 (07 Jan 2020 07:45), Max: 36.8 (06 Jan 2020 15:44)  T(F): 98.2 (07 Jan 2020 07:45), Max: 98.3 (06 Jan 2020 15:44)  HR: 93 (07 Jan 2020 07:45) (90 - 100)  BP: 96/62 (07 Jan 2020 07:45) (96/62 - 102/78)  BP(mean): --  RR: 18 (07 Jan 2020 07:45) (18 - 19)  SpO2: 97% (07 Jan 2020 07:45) (97% - 97%)    GENERAL: Pt lying in bed comfortably in NAD  CHEST/LUNG: Clear to auscultation bilaterally; rales on the R lower lung   HEART: Regular rate and rhythm; s1 /s2 No murmurs  ABDOMEN: Bowel sounds present; Soft, Nontender, Nondistended  EXTREMITIES:  No clubbing, cyanosis, or edema                            11.8   11.09 )-----------( 280      ( 07 Jan 2020 07:10 )             36.1   01-07    137  |  97<L>  |  11.0  ----------------------------<  117<H>  4.0   |  25.0  |  0.48<L>    Ca    9.6      07 Jan 2020 07:10  Phos  5.1     01-06  Mg     1.5     01-06    TPro  6.9  /  Alb  3.7  /  TBili  0.2<L>  /  DBili  x   /  AST  52<H>  /  ALT  46<H>  /  AlkPhos  377<H>  01-07

## 2020-01-07 NOTE — DISCHARGE NOTE NURSING/CASE MANAGEMENT/SOCIAL WORK - NSDCPNINST_GEN_ALL_CORE
Follow up with physician appointments. Report to Emergency Department for any signs of infection, fever, chills or shortness of breath.

## 2020-01-07 NOTE — PROGRESS NOTE ADULT - ASSESSMENT
34 year old female with no known major comorbidities who is being treated for right pleural effusion/empyema/multifocal PNA found to have a RIJ thrombus currently on heparin drip bridge to warfarin.    - RIJ venous thrombosis -provoked event that occurred in context of her severe and complex lung illness/infection/empyema that may have led to developing the thrombus. Plan on anticoagulation for 3 months. Plan for warfarin noted as outpatient. INR now therapeutic   - Multifactorial anemia; stable after transfusions; cont to monitor.  - Reactive leukocytosis, improved.    - Pancreatic Fistula-care per managing service/ID/GI ; pt on TPN;

## 2020-01-07 NOTE — PROGRESS NOTE ADULT - REASON FOR ADMISSION
Empyema
Large right pleural effusion
empyema
sob
-
-
Empyema
Empyema - R
Hyponatremia
Pancreatitis
Pleural effusion
Pleural effusion
RONAK
SOB
SOB
abd pain
empyema
hyponatremia
hyponatremia
pacreatic cyst
pleural effusion
sepsis
sepsis
Pleural effusion
Pleural effusion
Right empyema, pancreatic cyst
SOB, Empyema
SOB, Empyema
pancreatitis.  Pancreatico-pleural fistula
Pancreatic fistula
Pancreatitis with fistula to pleura
Pancreatitis with fistula to pleura.
Pancreatitis with pancreatic - pleural fistula.
Pancreatitis;  Pancreatic- pleural fistula.
TPN.  Pancreatitis.  Pancreatic pleural fistula.
TPN. Pancreatitis
pancreatitis
pancreatitis
pancreatitis c fistula
pancreatitis;  Pancreatico-pleural fistula.
pancreatitis; Pancreatic - pleural fistula.
sob, pancreatitis
empyema
Empyema of Lt chest pleura
Pleural Effusion
empyema of left chest pleura
Empyema
Right pleural effusion/Empyema
pleural effusion
pleural effusion
Weakness weight loss, Right pleural effusion.
pleural effusion
empyema
right pleural effusion
Empyema
Empyema

## 2020-01-07 NOTE — CHART NOTE - NSCHARTNOTESELECT_GEN_ALL_CORE
Event Note
Event Note/[facility]
Event Note/Thoracic Surgery
Event Note/Thoracic Surgery
Event Note/Vac Dressing Change
Nutrition Services
Off Service Note
Off Service Note
Thoracic Surgery/Event Note
Transfer Note
VAC change/Event Note
Vac Placement/Event Note
yes...

## 2020-01-07 NOTE — PROGRESS NOTE ADULT - NSHPATTENDINGPLANDISCUSS_GEN_ALL_CORE
CT surgery team
Dr Bermudez
patient and medical team
Dr Rosa.
Nursing,
Nursing.
Nursing.  Ms Hancock.
TANG Roberto MD
Dr. Ayala
Fay Alvarez in AM rounds
Fay on morning rounds
Dr. Castanon
Dr. Castanon & Dr. Aponte

## 2020-01-07 NOTE — DISCHARGE NOTE NURSING/CASE MANAGEMENT/SOCIAL WORK - PATIENT PORTAL LINK FT
You can access the FollowMyHealth Patient Portal offered by [facility] by registering at the following website: [email or web address]. By joining [facility]’s FollowMyHealth portal, you will also be able to view your health information using other applications (apps) compatible with our system.

## 2020-01-07 NOTE — CHART NOTE - NSCHARTNOTEFT_GEN_A_CORE
Patient seen and examined at bedside. Left upper arm region cleaned with Alcohol swabs prior to removal. PICC line removed and pressure applied for 5-10 minutes with gauze. Hemostasis achieved, no signs of active bleeding noted. Gauze/tape used to create pressure dressing to maintain pressure on PICC line site. Patient tolerated well without complications. Site is warm, dry, compartments compressable and soft. Will continue to follow. RN to call/page if any changes.

## 2020-01-07 NOTE — PROGRESS NOTE ADULT - ASSESSMENT
Completed course of TPN uneventfully.  Will DC TPN lines and ready for DC from GI POV.    No GI meds for discharge.  GI office follow up in 1 month.    Abstention from Tobacco and alcohol is a must.

## 2020-01-10 LAB — COMPLEMENT FACTOR I RESULT: SIGNIFICANT CHANGE UP

## 2020-01-18 LAB
CULTURE RESULTS: SIGNIFICANT CHANGE UP
SPECIMEN SOURCE: SIGNIFICANT CHANGE UP

## 2020-03-10 NOTE — PROGRESS NOTE ADULT - SUBJECTIVE AND OBJECTIVE BOX
Significant recent/past 24 hr events:  VAC dressing changed today: Wounds D/I with no signs of infection, both measuring 2cmx0.5cmx0.25cm and elliptical in shape. Healthy granulation tissue noted.  Pt currently in NAD and without acute complaints. Denies N/V/D HA, dizziness, blurry vision, numbness/tingling, SOB, cough, chest pain, palpitations, abd pain or urinary sx's.     *PICC line fell out in AM and replaced this afternoon.    Subjective:    Review of Systems  ROS negative x 10 systems except as noted above    Patient is a 34y old  Female who presents with a chief complaint of pancreatitis (27 Dec 2019 09:14)    HPI:  [place] [facility]  [place] with weakness, 25lb weight loss of several months, flu like symptoms, on work up found to have a large right pleural effusion.  Pulmonology contact for thoracentesis, 1.5 L of dark serous fluid was removed.  Chest tube placed shortly thereafter, 3 liters was removed at that point in time. Patient had some abnormalities in their lab work, found to have a leukocytosis with wbc of 40, platelet count of 793, Na 120, chloride 73, lactate of 4.7, amylase 268,  and CTA imaging also revealed a 2.1cm partially cystic mass in the body/tail of the pancreas with possible upstream ductal dilatation. 11/23 patient had CT chest/abdomen/pelvis and was also found to have a clot in the right IJ and was started on a heparin gtt. Patient does state that she has had a recent cough, and complains of right sided neck pain. 11/14 Patient transferred to [facility]. (25 Nov 2019 00:10)    Vitals   ICU Vital Signs Last 24 Hrs  T(C): 36.6 (27 Dec 2019 08:48), Max: 36.7 (26 Dec 2019 20:20)  T(F): 97.8 (27 Dec 2019 08:48), Max: 98 (26 Dec 2019 20:20)  HR: 114 (27 Dec 2019 08:48) (112 - 114)  BP: 119/77 (27 Dec 2019 08:48) (117/72 - 123/82)  BP(mean): --  ABP: --  ABP(mean): --  RR: 18 (27 Dec 2019 08:48) (18 - 18)  SpO2: 99% (26 Dec 2019 20:20) (97% - 99%)    I&O's Detail    26 Dec 2019 07:01  -  27 Dec 2019 07:00  --------------------------------------------------------  IN:    dextrose 10%.: 200 mL    fat emulsion (Plant Based) 20% Infusion: 279.9 mL    fat emulsion (Plant Based) 20% Infusion: 31.1 mL    heparin  Infusion.: 291 mL    Oral Fluid: 720 mL    Solution: 50 mL    TPN (Total Parenteral Nutrition): 1992 mL  Total IN: 3564 mL    OUT:  Total OUT: 0 mL    Total NET: 3564 mL    LABS                        11.3   9.13  )-----------( 300      ( 26 Dec 2019 08:42 )             36.4     12-27    138  |  102  |  9.0  ----------------------------<  88  5.1   |  23.0  |  0.33<L>    Ca    9.7      27 Dec 2019 02:55  Phos  4.7     12-27  Mg     1.8     12-27    TPro  7.0  /  Alb  3.1<L>  /  TBili  <0.2<L>  /  DBili  x   /  AST  <5  /  ALT  11  /  AlkPhos  363<H>  12-27    LIVER FUNCTIONS - ( 27 Dec 2019 02:55 )  Alb: 3.1 g/dL / Pro: 7.0 g/dL / ALK PHOS: 363 U/L / ALT: 11 U/L / AST: <5 U/L / GGT: x           PT/INR - ( 27 Dec 2019 11:27 )   PT: 14.6 sec;   INR: 1.26 ratio    PTT - ( 27 Dec 2019 11:27 )  PTT:70.6 sec    POCT Blood Glucose.: 115 mg/dL (12-27-19 @ 11:10)  POCT Blood Glucose.: 139 mg/dL (12-27-19 @ 08:09)  POCT Blood Glucose.: 110 mg/dL (12-26-19 @ 23:41)  POCT Blood Glucose.: 104 mg/dL (12-26-19 @ 18:34)    MEDICATIONS  (STANDING):  amitriptyline 10 milliGRAM(s) Oral at bedtime  chlorhexidine 2% Cloths 1 Application(s) Topical <User Schedule>  chlorhexidine 4% Liquid 1 Application(s) Topical <User Schedule>  cholecalciferol 2000 Unit(s) Oral daily  dextrose 10%. 1000 milliLiter(s) (100 mL/Hr) IV Continuous <Continuous>  escitalopram 10 milliGRAM(s) Oral daily  fat emulsion (Plant Based) 20% Infusion 0.9 Gm/kG/Day (31.25 mL/Hr) IV Continuous <Continuous>  ferrous    sulfate 325 milliGRAM(s) Oral three times a day  gabapentin 100 milliGRAM(s) Oral three times a day  heparin  Infusion. 1300 Unit(s)/Hr (13 mL/Hr) IV Continuous <Continuous>  lidocaine   Patch 1 Patch Transdermal daily  meropenem  IVPB 1000 milliGRAM(s) IV Intermittent every 8 hours  octreotide  Injectable 100 MICROGram(s) SubCutaneous every 8 hours  pantoprazole    Tablet 40 milliGRAM(s) Oral before breakfast  Parenteral Nutrition - Adult 1 Each (83 mL/Hr) TPN Continuous <Continuous>  Parenteral Nutrition - Adult 1 Each (83 mL/Hr) TPN Continuous <Continuous>  sucralfate 1 Gram(s) Oral two times a day  traZODone 50 milliGRAM(s) Oral at bedtime    MEDICATIONS  (PRN):  acetaminophen   Tablet .. 650 milliGRAM(s) Oral every 6 hours PRN Mild Pain (1 - 3)  ALPRAZolam 0.25 milliGRAM(s) Oral three times a day PRN anxiety  heparin  Injectable 4000 Unit(s) IV Push every 6 hours PRN For aPTT less than 40  heparin  Injectable 2000 Unit(s) IV Push every 6 hours PRN For aPTT between 40 - 57  melatonin 3 milliGRAM(s) Oral at bedtime PRN Insomnia  methocarbamol 500 milliGRAM(s) Oral every 6 hours PRN Muscle Spasm  ondansetron Injectable 4 milliGRAM(s) IV Push every 4 hours PRN Nausea and/or Vomiting  simethicone 80 milliGRAM(s) Chew three times a day PRN Gas  sodium chloride 0.9% lock flush 10 milliLiter(s) IV Push every 1 hour PRN Pre/post blood products, medications, blood draw, and to maintain line patency    Allergies:  Ensure TID (Unknown)    No Known Allergies  pt requests chicken broth (no beef) and CHERRY italian ice (no lemon) (Unknown)    Physical Exam:   Constitutional: NAD  Neck: supple,  No JVD. trachea midline  Respiratory: Breath Sounds equal & clear bilaterally to auscultation, no accessory muscle use noted. No wheezing, rales or rhonchi noted  Cardiovascular: Sinus Tac. normal S1, S2; no murmurs or rub  Gastrointestinal: Soft, non-tender, non distended, normal bowel sounds  Extremities: THACKER x 4, no peripheral edema, no cyanosis, no clubbing   Vascular: Equal and normal pulses: 2+ peripheral pulses throughout  Neurological: A+O x 3; speech clear and intact; no gross sensory/motor deficits  Skin: warm, dry, well perfused, no rashes  Tubes/Lines: VAC dressing-changed today (both measuring 2cmx0.5cmx0.25cm and elliptical in shape.) Yes

## 2020-09-18 ENCOUNTER — INPATIENT (INPATIENT)
Facility: HOSPITAL | Age: 35
LOS: 2 days | Discharge: HOME | End: 2020-09-21
Attending: STUDENT IN AN ORGANIZED HEALTH CARE EDUCATION/TRAINING PROGRAM
Payer: MEDICAID

## 2020-09-18 ENCOUNTER — OUTPATIENT (OUTPATIENT)
Dept: OUTPATIENT SERVICES | Facility: HOSPITAL | Age: 35
LOS: 1 days | End: 2020-09-18

## 2020-09-18 PROCEDURE — 74177 CT ABD & PELVIS W/CONTRAST: CPT | Mod: 26

## 2020-09-18 PROCEDURE — 76705 ECHO EXAM OF ABDOMEN: CPT | Mod: 26

## 2020-09-18 PROCEDURE — 71045 X-RAY EXAM CHEST 1 VIEW: CPT | Mod: 26

## 2020-09-18 PROCEDURE — 99285 EMERGENCY DEPT VISIT HI MDM: CPT

## 2020-09-18 PROCEDURE — 93010 ELECTROCARDIOGRAM REPORT: CPT

## 2020-09-18 PROCEDURE — 71275 CT ANGIOGRAPHY CHEST: CPT | Mod: 26

## 2020-09-19 ENCOUNTER — OUTPATIENT (OUTPATIENT)
Dept: OUTPATIENT SERVICES | Facility: HOSPITAL | Age: 35
LOS: 1 days | End: 2020-09-19

## 2020-09-19 PROCEDURE — 93970 EXTREMITY STUDY: CPT | Mod: 26

## 2020-09-20 ENCOUNTER — OUTPATIENT (OUTPATIENT)
Dept: OUTPATIENT SERVICES | Facility: HOSPITAL | Age: 35
LOS: 1 days | End: 2020-09-20

## 2020-09-20 PROCEDURE — 93306 TTE W/DOPPLER COMPLETE: CPT | Mod: 26

## 2020-09-20 PROCEDURE — 74181 MRI ABDOMEN W/O CONTRAST: CPT | Mod: 26

## 2020-09-21 ENCOUNTER — OUTPATIENT (OUTPATIENT)
Dept: OUTPATIENT SERVICES | Facility: HOSPITAL | Age: 35
LOS: 1 days | End: 2020-09-21

## 2020-09-22 PROCEDURE — 74177 CT ABD & PELVIS W/CONTRAST: CPT | Mod: 26

## 2020-09-22 PROCEDURE — 99285 EMERGENCY DEPT VISIT HI MDM: CPT

## 2020-09-23 ENCOUNTER — OUTPATIENT (OUTPATIENT)
Dept: OUTPATIENT SERVICES | Facility: HOSPITAL | Age: 35
LOS: 1 days | End: 2020-09-23

## 2020-09-23 ENCOUNTER — INPATIENT (INPATIENT)
Facility: HOSPITAL | Age: 35
LOS: 0 days | Discharge: ANOTHER HOSPITAL | End: 2020-09-24
Admitting: STUDENT IN AN ORGANIZED HEALTH CARE EDUCATION/TRAINING PROGRAM
Payer: MEDICAID

## 2020-09-24 ENCOUNTER — OUTPATIENT (OUTPATIENT)
Dept: OUTPATIENT SERVICES | Facility: HOSPITAL | Age: 35
LOS: 1 days | End: 2020-09-24

## 2020-10-29 PROCEDURE — 71045 X-RAY EXAM CHEST 1 VIEW: CPT

## 2020-10-29 PROCEDURE — 86200 CCP ANTIBODY: CPT

## 2020-10-29 PROCEDURE — 93971 EXTREMITY STUDY: CPT

## 2020-10-29 PROCEDURE — 83615 LACTATE (LD) (LDH) ENZYME: CPT

## 2020-10-29 PROCEDURE — 82570 ASSAY OF URINE CREATININE: CPT

## 2020-10-29 PROCEDURE — 87633 RESP VIRUS 12-25 TARGETS: CPT

## 2020-10-29 PROCEDURE — 86304 IMMUNOASSAY TUMOR CA 125: CPT

## 2020-10-29 PROCEDURE — 84145 PROCALCITONIN (PCT): CPT

## 2020-10-29 PROCEDURE — 76942 ECHO GUIDE FOR BIOPSY: CPT

## 2020-10-29 PROCEDURE — 84703 CHORIONIC GONADOTROPIN ASSAY: CPT

## 2020-10-29 PROCEDURE — 84133 ASSAY OF URINE POTASSIUM: CPT

## 2020-10-29 PROCEDURE — 80053 COMPREHEN METABOLIC PANEL: CPT

## 2020-10-29 PROCEDURE — 36415 COLL VENOUS BLD VENIPUNCTURE: CPT

## 2020-10-29 PROCEDURE — 71250 CT THORAX DX C-: CPT

## 2020-10-29 PROCEDURE — 86850 RBC ANTIBODY SCREEN: CPT

## 2020-10-29 PROCEDURE — 84436 ASSAY OF TOTAL THYROXINE: CPT

## 2020-10-29 PROCEDURE — 87640 STAPH A DNA AMP PROBE: CPT

## 2020-10-29 PROCEDURE — 84134 ASSAY OF PREALBUMIN: CPT

## 2020-10-29 PROCEDURE — 83540 ASSAY OF IRON: CPT

## 2020-10-29 PROCEDURE — 97163 PT EVAL HIGH COMPLEX 45 MIN: CPT

## 2020-10-29 PROCEDURE — 87086 URINE CULTURE/COLONY COUNT: CPT

## 2020-10-29 PROCEDURE — 84100 ASSAY OF PHOSPHORUS: CPT

## 2020-10-29 PROCEDURE — 73620 X-RAY EXAM OF FOOT: CPT

## 2020-10-29 PROCEDURE — 87075 CULTR BACTERIA EXCEPT BLOOD: CPT

## 2020-10-29 PROCEDURE — 74181 MRI ABDOMEN W/O CONTRAST: CPT

## 2020-10-29 PROCEDURE — 84480 ASSAY TRIIODOTHYRONINE (T3): CPT

## 2020-10-29 PROCEDURE — 80048 BASIC METABOLIC PNL TOTAL CA: CPT

## 2020-10-29 PROCEDURE — 83930 ASSAY OF BLOOD OSMOLALITY: CPT

## 2020-10-29 PROCEDURE — 86036 ANCA SCREEN EACH ANTIBODY: CPT

## 2020-10-29 PROCEDURE — 80202 ASSAY OF VANCOMYCIN: CPT

## 2020-10-29 PROCEDURE — 84540 ASSAY OF URINE/UREA-N: CPT

## 2020-10-29 PROCEDURE — 71046 X-RAY EXAM CHEST 2 VIEWS: CPT

## 2020-10-29 PROCEDURE — 82150 ASSAY OF AMYLASE: CPT

## 2020-10-29 PROCEDURE — 86140 C-REACTIVE PROTEIN: CPT

## 2020-10-29 PROCEDURE — 87641 MR-STAPH DNA AMP PROBE: CPT

## 2020-10-29 PROCEDURE — 80051 ELECTROLYTE PANEL: CPT

## 2020-10-29 PROCEDURE — 82550 ASSAY OF CK (CPK): CPT

## 2020-10-29 PROCEDURE — 74220 X-RAY XM ESOPHAGUS 1CNTRST: CPT

## 2020-10-29 PROCEDURE — 74183 MRI ABD W/O CNTR FLWD CNTR: CPT

## 2020-10-29 PROCEDURE — 93970 EXTREMITY STUDY: CPT

## 2020-10-29 PROCEDURE — 81025 URINE PREGNANCY TEST: CPT

## 2020-10-29 PROCEDURE — 84478 ASSAY OF TRIGLYCERIDES: CPT

## 2020-10-29 PROCEDURE — 93925 LOWER EXTREMITY STUDY: CPT

## 2020-10-29 PROCEDURE — 87040 BLOOD CULTURE FOR BACTERIA: CPT

## 2020-10-29 PROCEDURE — 82247 BILIRUBIN TOTAL: CPT

## 2020-10-29 PROCEDURE — 83002 ASSAY OF GONADOTROPIN (LH): CPT

## 2020-10-29 PROCEDURE — 93005 ELECTROCARDIOGRAM TRACING: CPT

## 2020-10-29 PROCEDURE — 86431 RHEUMATOID FACTOR QUANT: CPT

## 2020-10-29 PROCEDURE — 84443 ASSAY THYROID STIM HORMONE: CPT

## 2020-10-29 PROCEDURE — 82306 VITAMIN D 25 HYDROXY: CPT

## 2020-10-29 PROCEDURE — 82330 ASSAY OF CALCIUM: CPT

## 2020-10-29 PROCEDURE — 82024 ASSAY OF ACTH: CPT

## 2020-10-29 PROCEDURE — 87070 CULTURE OTHR SPECIMN AEROBIC: CPT

## 2020-10-29 PROCEDURE — 86703 HIV-1/HIV-2 1 RESULT ANTBDY: CPT

## 2020-10-29 PROCEDURE — 93306 TTE W/DOPPLER COMPLETE: CPT

## 2020-10-29 PROCEDURE — 83003 ASSAY GROWTH HORMONE (HGH): CPT

## 2020-10-29 PROCEDURE — 85610 PROTHROMBIN TIME: CPT

## 2020-10-29 PROCEDURE — 86038 ANTINUCLEAR ANTIBODIES: CPT

## 2020-10-29 PROCEDURE — 82436 ASSAY OF URINE CHLORIDE: CPT

## 2020-10-29 PROCEDURE — 81001 URINALYSIS AUTO W/SCOPE: CPT

## 2020-10-29 PROCEDURE — 71260 CT THORAX DX C+: CPT

## 2020-10-29 PROCEDURE — 87581 M.PNEUMON DNA AMP PROBE: CPT

## 2020-10-29 PROCEDURE — 87116 MYCOBACTERIA CULTURE: CPT

## 2020-10-29 PROCEDURE — 74018 RADEX ABDOMEN 1 VIEW: CPT

## 2020-10-29 PROCEDURE — 80076 HEPATIC FUNCTION PANEL: CPT

## 2020-10-29 PROCEDURE — 82607 VITAMIN B-12: CPT

## 2020-10-29 PROCEDURE — 84105 ASSAY OF URINE PHOSPHORUS: CPT

## 2020-10-29 PROCEDURE — 86923 COMPATIBILITY TEST ELECTRIC: CPT

## 2020-10-29 PROCEDURE — 97116 GAIT TRAINING THERAPY: CPT

## 2020-10-29 PROCEDURE — 87798 DETECT AGENT NOS DNA AMP: CPT

## 2020-10-29 PROCEDURE — 82746 ASSAY OF FOLIC ACID SERUM: CPT

## 2020-10-29 PROCEDURE — 83935 ASSAY OF URINE OSMOLALITY: CPT

## 2020-10-29 PROCEDURE — P9016: CPT

## 2020-10-29 PROCEDURE — 83001 ASSAY OF GONADOTROPIN (FSH): CPT

## 2020-10-29 PROCEDURE — 93308 TTE F-UP OR LMTD: CPT

## 2020-10-29 PROCEDURE — 87102 FUNGUS ISOLATION CULTURE: CPT

## 2020-10-29 PROCEDURE — 84146 ASSAY OF PROLACTIN: CPT

## 2020-10-29 PROCEDURE — 76000 FLUOROSCOPY <1 HR PHYS/QHP: CPT

## 2020-10-29 PROCEDURE — 86901 BLOOD TYPING SEROLOGIC RH(D): CPT

## 2020-10-29 PROCEDURE — 97110 THERAPEUTIC EXERCISES: CPT

## 2020-10-29 PROCEDURE — 82627 DEHYDROEPIANDROSTERONE: CPT

## 2020-10-29 PROCEDURE — 82962 GLUCOSE BLOOD TEST: CPT

## 2020-10-29 PROCEDURE — 76775 US EXAM ABDO BACK WALL LIM: CPT

## 2020-10-29 PROCEDURE — 86225 DNA ANTIBODY NATIVE: CPT

## 2020-10-29 PROCEDURE — C1751: CPT

## 2020-10-29 PROCEDURE — 86376 MICROSOMAL ANTIBODY EACH: CPT

## 2020-10-29 PROCEDURE — 86301 IMMUNOASSAY TUMOR CA 19-9: CPT

## 2020-10-29 PROCEDURE — 87206 SMEAR FLUORESCENT/ACID STAI: CPT

## 2020-10-29 PROCEDURE — 87177 OVA AND PARASITES SMEARS: CPT

## 2020-10-29 PROCEDURE — 77012 CT SCAN FOR NEEDLE BIOPSY: CPT

## 2020-10-29 PROCEDURE — 84300 ASSAY OF URINE SODIUM: CPT

## 2020-10-29 PROCEDURE — 83550 IRON BINDING TEST: CPT

## 2020-10-29 PROCEDURE — 84484 ASSAY OF TROPONIN QUANT: CPT

## 2020-10-29 PROCEDURE — 87507 IADNA-DNA/RNA PROBE TQ 12-25: CPT

## 2020-10-29 PROCEDURE — 74177 CT ABD & PELVIS W/CONTRAST: CPT

## 2020-10-29 PROCEDURE — 88305 TISSUE EXAM BY PATHOLOGIST: CPT

## 2020-10-29 PROCEDURE — 82670 ASSAY OF TOTAL ESTRADIOL: CPT

## 2020-10-29 PROCEDURE — 36430 TRANSFUSION BLD/BLD COMPNT: CPT

## 2020-10-29 PROCEDURE — 87486 CHLMYD PNEUM DNA AMP PROBE: CPT

## 2020-10-29 PROCEDURE — 82787 IGG 1 2 3 OR 4 EACH: CPT

## 2020-10-29 PROCEDURE — 74182 MRI ABDOMEN W/CONTRAST: CPT

## 2020-10-29 PROCEDURE — 84466 ASSAY OF TRANSFERRIN: CPT

## 2020-10-29 PROCEDURE — 83690 ASSAY OF LIPASE: CPT

## 2020-10-29 PROCEDURE — 80061 LIPID PANEL: CPT

## 2020-10-29 PROCEDURE — 83605 ASSAY OF LACTIC ACID: CPT

## 2020-10-29 PROCEDURE — 86900 BLOOD TYPING SEROLOGIC ABO: CPT

## 2020-10-29 PROCEDURE — 85730 THROMBOPLASTIN TIME PARTIAL: CPT

## 2020-10-29 PROCEDURE — 82248 BILIRUBIN DIRECT: CPT

## 2020-10-29 PROCEDURE — 87015 SPECIMEN INFECT AGNT CONCNTJ: CPT

## 2020-10-29 PROCEDURE — 82272 OCCULT BLD FECES 1-3 TESTS: CPT

## 2020-10-29 PROCEDURE — 85652 RBC SED RATE AUTOMATED: CPT

## 2020-10-29 PROCEDURE — 83735 ASSAY OF MAGNESIUM: CPT

## 2020-10-29 PROCEDURE — 85027 COMPLETE CBC AUTOMATED: CPT

## 2020-11-05 ENCOUNTER — RESULT REVIEW (OUTPATIENT)
Age: 35
End: 2020-11-05

## 2020-11-05 ENCOUNTER — APPOINTMENT (OUTPATIENT)
Dept: CT IMAGING | Facility: CLINIC | Age: 35
End: 2020-11-05
Payer: MEDICAID

## 2020-11-05 PROCEDURE — Q9967E: CUSTOM

## 2020-11-05 PROCEDURE — 74177 CT ABD & PELVIS W/CONTRAST: CPT

## 2020-11-05 PROCEDURE — 99072 ADDL SUPL MATRL&STAF TM PHE: CPT

## 2020-11-16 NOTE — PROGRESS NOTE BEHAVIORAL HEALTH - NSBHFUPSUICINTERVAL_PSY_A_CORE
FOOT AND ANKLE HISTORY AND PHYSICAL        Patient: Harinder Allen                   MRN: [id number]         SSN: [id number]  YOB: 1968            AGE: 46 y.o. SEX: male      Patient scheduled for:  Amputation of left great toe; irrigation and debridement; cultures  Date of surgery: 11/17/20   Location of Surgery: [facility]   Surgeon: Juventino Alvarez. MD Jaycob  ANESTHESIA TYPE:  General                     ORDERS PLACED DURING H&P:    Orders Placed This Encounter    XR CHEST PA LAT     Standing Status:   Future     Standing Expiration Date:   5/21/2021     Scheduling Instructions:      Please recheck to confirm if:      1. Patient has Allergry to IV contrast dye      2. Patient is pregnant     Order Specific Question:   Reason for Exam     Answer:   Preop Risk stratificatiion    NOVEL CORONAVIRUS (COVID-19)     Standing Status:   Future     Standing Expiration Date:   11/16/2021     Scheduling Instructions:      1) Due to current limited availability of the COVID-19 PCR test, tests will be prioritized and may not be completed.              2) Order only if the test result will change clinical management or necessary for a return to mission-critical employment decision.              3) Print and instruct patient to adhere to CDC home isolation program. (Link Above)              4) Set up or refer patient for a monitoring program.              5) Have patient sign up for and leverage [email or web address] (if not previously done). Order Specific Question:   Is this test for diagnosis or screening? Answer:   Screening     Order Specific Question:   Symptomatic for COVID-19 as defined by CDC? Answer:   No     Order Specific Question:   Hospitalized for COVID-19? Answer:   No     Order Specific Question:   Admitted to ICU for COVID-19? Answer:   Unknown     Order Specific Question:   Employed in healthcare setting?      Answer:   Unknown     Order Specific
Question:   Resident in a congregate (group) care setting? Answer:   Unknown     Order Specific Question:   Previously tested for COVID-19? Answer:   Unknown    METABOLIC PANEL, COMPREHENSIVE     Standing Status:   Future     Standing Expiration Date:   11/17/2021    HEMOGLOBIN A1C WITH EAG     Standing Status:   Future     Standing Expiration Date:   11/17/2021    EKG, 12 LEAD, SUBSEQUENT     preop     Standing Status:   Future     Standing Expiration Date:   5/17/2021     Order Specific Question:   Reason for Exam:     Answer:   risk stratify    HYDROcodone-acetaminophen (NORCO) 7.5-325 mg per tablet     Sig: Take 1-2 Tabs by mouth every eight (8) hours as needed for Pain (AFTER SURGERY, NOT BEFORE) for up to 7 days. Max Daily Amount: 6 Tabs. Dispense:  42 Tab     Refill:  0    ondansetron (ZOFRAN ODT) 4 mg disintegrating tablet     Sig: Take 1 Tab by mouth every eight (8) hours as needed for Nausea or Vomiting. Indications: prevent nausea and vomiting after surgery     Dispense:  30 Tab     Refill:  0    aspirin (ASPIRIN) 325 mg tablet     Sig: Take 1 Tab by mouth two (2) times daily (after meals). Dispense:  60 Tab     Refill:  1    polyethylene glycol (Miralax) 17 gram packet     Sig: Take 1 Packet by mouth daily. Dispense:  10 Packet     Refill:  1        Post Operative Prescriptions have  been e-scribed during the H&P           HISTORY:     The patient was seen in the office today for a preoperative history and physical for an upcoming above listed surgery. The patient is a pleasant 46 y.o. male who has a history of  osteomyelitis, of the great toe, of the left great toe proximal phalanx. MRI reports this in a separate note from today.   Because of his persistent wound, and inability to heal this, recommendations for amputation of left great toe really through the MTP or joint articulation site.     Options would be a partial amputation of this  LEFT great toe to the proximal
phalanx half of that, leaving residual bone for anticipated better balance, by little bit better biomechanics, but other wise he would probably need more prolonged antibiotics, should not leave any residual bone in this great toe phalanx. Next chief complaint, is worsening pain in the right great toe now point to the MTP region denies any fever shakes chills night sweats.     My overall plan would be for her LEFT great toe amputation again more than likely through the MTP joint, as well as this will be a MTP joint disarticulation amputation.     Due to the current findings, affected activity of daily living and continued pain and discomfort, surgical intervention is indicated. The alternatives, risks, and complications, including but not limited to infection, blood loss, need for blood transfusion, neurovascular damage, al-incisional numbness, subcutaneous hematoma, bone fracture, anesthetic complications, DVT, PE, death, RSD, postoperative stiffness and pain, possible surgical scar, delayed healing and nonhealing, reflexive sympathetic dystrophy, damage to blood vessels and nerves, need for more surgery, MI, and stroke, failure of hardware, gait disturbances  have been discussed. The patient understands and wishes to proceed with surgery. The patient was counseled at length about the risks of polo Covid-19 during their perioperative period and any recovery window from their procedure. The patient was made aware that polo Covid-19  may worsen their prognosis for recovering from their procedure and lend to a higher morbidity and/or mortality risk. All material risks, benefits, and reasonable alternatives including postponing the procedure were discussed. The patient does wish to proceed with the procedure at this time.          PAST MEDICAL HISTORY:     Past Medical History:   Diagnosis Date    Left toe [facility])        CURRENT MEDICATIONS:     Current Outpatient Medications   Medication
Sig Dispense Refill    HYDROcodone-acetaminophen (NORCO) 7.5-325 mg per tablet Take 1-2 Tabs by mouth every eight (8) hours as needed for Pain (AFTER SURGERY, NOT BEFORE) for up to 7 days. Max Daily Amount: 6 Tabs. 42 Tab 0    ondansetron (ZOFRAN ODT) 4 mg disintegrating tablet Take 1 Tab by mouth every eight (8) hours as needed for Nausea or Vomiting. Indications: prevent nausea and vomiting after surgery 30 Tab 0    aspirin (ASPIRIN) 325 mg tablet Take 1 Tab by mouth two (2) times daily (after meals). 60 Tab 1    polyethylene glycol (Miralax) 17 gram packet Take 1 Packet by mouth daily. 10 Packet 1    ciprofloxacin HCl (CIPRO) 750 mg tablet Take 1 Tab by mouth two (2) times a day. 14 Tab 1    silver-foam bandage (AQUACEL AG FOAM) 1.2 %- 3.2\" X 3.2\" bndg 1 Each by Apply Externally route daily. 1 Box 0       ALLERGIES:     Allergies   Allergen Reactions    Bactrim [Sulfamethoprim Ds] Rash         SURGICAL HISTORY:     Past Surgical History:   Procedure Laterality Date    FOOT/TOES SURGERY PROC UNLISTED         SOCIAL HISTORY:     Social History     Socioeconomic History    Marital status:      Spouse name: Not on file    Number of children: Not on file    Years of education: Not on file    Highest education level: Not on file   Tobacco Use    Smoking status: Current Some Day Smoker    Smokeless tobacco: Never Used   Substance and Sexual Activity    Alcohol use: Yes     Comment: social    Drug use: No       FAMILY HISTORY:     Family History   Problem Relation Age of Onset    Cancer Father     Diabetes Brother     Heart Disease Other     Hypertension Neg Hx     Stroke Neg Hx        REVIEW OF SYSTEMS:     Negative for fevers, chills, chest pain, shortness of breath, weight loss, recent illness     General: Negative for fever and chills. No unexpected change in weight. Denies fatigue. No change in appetite. Skin: Negative for rash or itching.    HEENT: Negative for congestion, sore throat,
neck pain and neck stiffness. No change in vision or hearing. Hasn't noted any enlarged lymph nodes in the neck. Cardiovascular:  Negative for chest pain and palpitations. Has not noted pedal edema. Respiratory: Negative for cough, colds, sinus, hemoptysis, shortness of breath and wheezing. Gastrointestinal: Negative for nausea and vomiting, rectal bleeding, coffee ground emesis, abdominal pain, diarrhea and constipation. Genitourinary: Negative for dysuria, frequency urgency, or burning on micturition. No flank pain, no foul smelling urine, no difficulty with initiating urination. Hematological: Negative for bleeding or easy bruising. Musculoskeletal: Negative for arthralgias, back pain or neck pain. Neurological: Negative for dizziness, seizures or syncopal episodes. Denies headaches. Endocrine: Denies excessive thirst.  No heat/cold intolerance. Psychiatric: Negative for depression or insomnia. PHYSICAL EXAMINATION:     VITALS:   Visit Vitals  BP (!) 154/99 (BP 1 Location: Right arm, BP Patient Position: Sitting)   Pulse 84   Temp 97.3 °F (36.3 °C) (Temporal)   Resp 16   Ht 6' 2\" (1.88 m)   Wt 230 lb (104.3 kg)   SpO2 99%   BMI 29.53 kg/m²       Pain Assessment  11/16/2020   Location of Pain Foot   Pain Location Comment -   Location Modifiers Left   Severity of Pain 8   Quality of Pain Throbbing   Quality of Pain Comment swelling   Duration of Pain Persistent   Frequency of Pain Constant   Date Pain First Started -   Aggravating Factors Stairs;Standing;Walking;Bending   Limiting Behavior -   Relieving Factors Nothing   Relieving Factors Comment -   Result of Injury No   Work-Related Injury -   Type of Injury -        GEN:  Well developed, well nourished 46 y.o. male in no acute distress. PSYCH: Alert an oriented to person, place and time. Mood, memory, affect, behavior and judgment normal. Speech normal in context and clarity. HEENT: Normocephalic and atraumatic. Eyes: Conjunctivae and EOM are
normal.Pupils are equal, round, and reactive to light. External ear normal appearance, external nose normal appearing. Mouth/Throat: Oropharynx is clear and moist, able to handle oral secretions w/out difficulty, airway patent. NECK: Supple. Normal ROM, No lymphadenopathy. Trachea is midline. No bruising, swelling or deformity  RESP: Clear to auscultation bilaterally. No audible wheezing from mouth. No rales, rhonchi. Normal effort and breath sounds. No respiratory use of accessory muscles during breathing or distress  CHEST/ABDOMEN: Observation reveals: No audible wheezing from mouth. No accessory use of chest muscles during breathing. Non tender abdomen  CARDIO:  Normal rate, regular rhythm and normal heart sounds. No MGR. ABDOMEN: Non-tender, non-distended, normoactive bowel sounds in all four quadrants. There is no tenderness. There is no rebound and no guarding. BACK: No CVA or spinal tenderness  BREAST:  Deferred  PELVIC:    Deferred   RECTAL:  Deferred   :           Deferred  EXTREMITIES: EXAMINATION OF:  ANKLE/FOOT LEFT     Psychiatry: Alert, oriented x 3 (name,place,time of day); speech normal in context and clarity, memory intact grossly, no involuntary movements - tremors, no dementia  Gait: slow  Tenderness: moderate  LEFT great toe   Cutaneous: There are some swelling, redness to the dorsal part of the LEFT great toe, there is a wound, to the plantar lateral portion of the distal end of thisLEFT great toe, where he had this IP joint disarticulation amputation surgery many years ago  Joint Motion: Diminished range of motion, great toe MTP  Joint / Tendon Stability: No Ankle or Subtalar instability or joint laxity.                                              No peroneal sublux ability or dislocation  Alignment: neutral Hindfoot  Neuro Motor/Sensory: NL/diminished incision monofilament testing, plantar forefoot  Vascular: NL foot/ankle pulses,   Lymphatics: No extremity lymphedema, No calf
swelling, no tenderness to calf muscles. RADIOGRAPHS & DIAGNOSTIC STUDIES:     MRI Results (most recent):  Results from Orders Only encounter on 11/11/20   MRI FOOT LT WO CONT    Narrative EXAM: MRI of the Left  foot     INDICATION: Right toe wound    TECHNIQUE: Multiplanar multisequence MR imaging of the left  foot    IV Contrast: None    COMPARISON: None    FINDINGS:   Osseous Structures: There is a plantar-based ulcer in the distal aspect of the  residual right first toe. There is a osteomyelitis of the majority of the distal  half of the proximal phalanx of the first toe. The distal phalanx previously had  been resected. The remainder the osseous structures are unremarkable. No  evidence of septic arthritis. Sesamoids are present and unremarkable. Ligaments: No gross ligamentous pathology appreciated. Tendons/Muscle: Likely denervation changes with atrophy of the foot consistent  with diabetes. Soft Tissues/Other:  As mentioned previously, there is an ulcer in the plantar  aspect of the first toe. No discrete fluid collection identified. Impression IMPRESSION:   1. Osteomyelitis of the first proximal phalanx. LABS:         No visits with results within 3 Day(s) from this visit.    Latest known visit with results is:   Hospital Outpatient Visit on 11/11/2020   Component Date Value Ref Range Status    C-Reactive protein 11/11/2020 6.3* 0 - 0.3 mg/dL Final    WBC 11/11/2020 6.6  4.6 - 13.2 K/uL Final    RBC 11/11/2020 4.66* 4.70 - 5.50 M/uL Final    HGB 11/11/2020 16.6* 13.0 - 16.0 g/dL Final    HCT 11/11/2020 46.2  36.0 - 48.0 % Final    MCV 11/11/2020 99.1* 74.0 - 97.0 FL Final    MCH 11/11/2020 35.6* 24.0 - 34.0 PG Final    MCHC 11/11/2020 35.9  31.0 - 37.0 g/dL Final    RDW 11/11/2020 12.5  11.6 - 14.5 % Final    PLATELET 39/93/3168 922  135 - 420 K/uL Final    MPV 11/11/2020 10.3  9.2 - 11.8 FL Final    NEUTROPHILS 11/11/2020 61  40 - 73 % Final    LYMPHOCYTES
11/11/2020 26  21 - 52 % Final    MONOCYTES 11/11/2020 12* 3 - 10 % Final    EOSINOPHILS 11/11/2020 1  0 - 5 % Final    BASOPHILS 11/11/2020 0  0 - 2 % Final    ABS. NEUTROPHILS 11/11/2020 4.1  1.8 - 8.0 K/UL Final    ABS. LYMPHOCYTES 11/11/2020 1.7  0.9 - 3.6 K/UL Final    ABS. MONOCYTES 11/11/2020 0.8  0.05 - 1.2 K/UL Final    ABS. EOSINOPHILS 11/11/2020 0.1  0.0 - 0.4 K/UL Final    ABS. BASOPHILS 11/11/2020 0.0  0.0 - 0.1 K/UL Final    DF 11/11/2020 AUTOMATED    Final    Sed rate, automated 11/11/2020 28* 0 - 20 mm/hr Final    Prothrombin time 11/11/2020 13.4  11.5 - 15.2 sec Final    INR 11/11/2020 1.0  0.8 - 1.2   Final    Comment:            INR Therapeutic Ranges         (on stable oral anticoagulant):     INDICATION                INR  DVT/PE/Atrial Fib          2.0-3.0  MI/Mechanical Heart Valve  2.5-3.5          ASSESSMENT:     Encounter Diagnoses     ICD-10-CM ICD-9-CM   1. Other chronic [facility]el[facility]  M86.672 730.17   2. Pre-operative examination  Z01.818 V72.84        PLAN:     Again, the alternatives, risks, and complications, as well as expected outcome were discussed. The patient understands and agrees to proceed with the above listed surgery pending completion of pre-operative labs and diagnostic studies. Patient will be admitted for observation following surgery. Infections disease will be consulted for medical management.     PLAN:    Follow-up and Dispositions    · Return in about 1 week (around 11/23/2020) for post surgical evaluation. Citlalli Whitehead MPA, PA-C  11/16/2020  1:27 PM    Gala Trotter MD  11/17/2020  7:09 PM
none known

## 2021-01-19 NOTE — BEHAVIORAL HEALTH ASSESSMENT NOTE - NSBHCONSULTFOLLOW_PSY_A_CORE
Otezla Pregnancy And Lactation Text: This medication is Pregnancy Category C and it isn't known if it is safe during pregnancy. It is unknown if it is excreted in breast milk. Signing off - call with questions…

## 2021-06-16 NOTE — PROGRESS NOTE ADULT - SUBJECTIVE AND OBJECTIVE BOX
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  No earache, sore throat or runny nose.  CARDIOVASCULAR:  No pressure, squeezing, strangling, tightness, heaviness or aching about the chest, neck, axilla or epigastrium.  RESPIRATORY:  No cough, shortness of breath  GASTROINTESTINAL:  No nausea, vomiting or diarrhea.  GENITOURINARY:  No dysuria, frequency or urgency. No Blood in urine  MUSCULOSKELETAL:  no joint aches, no muscle pain  SKIN:  No change in skin, hair or nails.  NEUROLOGIC:  No Headaches, seizures or weakness.  PSYCHIATRIC:  No disorder of thought or mood.  ENDOCRINE:  No heat or cold intolerance  HEMATOLOGICAL:  No easy bruising or bleeding.   =======================================================  Allergies  No Known Allergies    Antibiotics:  meropenem  IVPB 1000 milliGRAM(s) IV Intermittent every 12 hours    Other medications:  amitriptyline 10 milliGRAM(s) Oral at bedtime  ascorbic acid 500 milliGRAM(s) Oral daily  cholecalciferol 2000 Unit(s) Oral daily  ferrous    sulfate 325 milliGRAM(s) Oral three times a day  folic acid 1 milliGRAM(s) Oral daily  gabapentin 100 milliGRAM(s) Oral three times a day  heparin  Infusion. 1300 Unit(s)/Hr IV Continuous <Continuous>  lidocaine   Patch 1 Patch Transdermal daily  multivitamin 1 Tablet(s) Oral daily  octreotide  Injectable 100 MICROGram(s) SubCutaneous every 8 hours  pantoprazole    Tablet 40 milliGRAM(s) Oral before breakfast  saccharomyces boulardii 250 milliGRAM(s) Oral two times a day  thiamine 100 milliGRAM(s) Oral daily  traMADol 50 milliGRAM(s) Oral every 8 hours    =====================================================  Physical Exam:  ============  T(F): 99 (10 Dec 2019 08:00), Max: 99.1 (10 Dec 2019 00:30)  HR: 104 (10 Dec 2019 12:00)  BP: 120/93 (10 Dec 2019 12:00)  RR: 20 (10 Dec 2019 12:00)  SpO2: 98% (10 Dec 2019 12:00) (94% - 100%)  temp max in last 48H T(F): , Max: 99.1 (12-10-19 @ 00:30)    General:  No acute distress.   Eye: Pupils are equal, round and reactive to light, Extraocular movements are intact, Normal conjunctiva.  HENT: Normocephalic, Oral mucosa is moist, No pharyngeal erythema, No sinus tenderness.  Neck: Supple, No lymphadenopathy.  Respiratory: decreased breath sounds right lung  RIGHT chest with WOUND VAC in place  Cardiovascular: Normal rate, Regular rhythm,   + edema of legs  Gastrointestinal: Soft, mild right abd tenderness  Normal bowel sounds.  Genitourinary: No costovertebral angle tenderness.  Lymphatics: No lymphadenopathy neck,   Musculoskeletal: Normal range of motion, Normal strength.  Integumentary: No rash.  Neurologic: Alert, Oriented, No focal deficits, Cranial Nerves II-XII are grossly intact.  Psychiatric: Appropriate mood & affect.    =======================================================  11/22/19 blood cx negative x 2  11/22/19 - body fluid cx with Moderate Alpha hemolytic strep x 2 sets  Alpha hemolytic strep  		[facility]	E Test  Ceftriaxone  			S (0.19)   Clindamycin 	S    Erythromycin 	S   Penicillin 	S   Vancomycin 	S     =======================================  Labs:                        8.5    18.65 )-----------( 493      ( 10 Dec 2019 06:03 )             27.2       WBC Count: 18.65 K/uL (12-10-19 @ 06:03)  WBC Count: 18.59 K/uL (12-10-19 @ 03:06)  WBC Count: 21.14 K/uL (12-09-19 @ 03:08)  WBC Count: 17.07 K/uL (12-08-19 @ 18:28)  WBC Count: 14.46 K/uL (12-07-19 @ 18:53)  WBC Count: 13.79 K/uL (12-07-19 @ 05:42)  WBC Count: 15.80 K/uL (12-06-19 @ 21:13)  WBC Count: 25.73 K/uL (12-06-19 @ 03:55)  WBC Count: 20.66 K/uL (12-05-19 @ 19:52)      12-10    137  |  101  |  9.0  ----------------------------<  97  4.1   |  23.0  |  1.06    Ca    7.7<L>      10 Dec 2019 03:06  Phos  4.1     12-10  Mg     2.4     12-10    TPro  5.0<L>  /  Alb  1.9<L>  /  TBili  <0.2<L>  /  DBili  x   /  AST  15  /  ALT  9   /  AlkPhos  201<H>  12-10      Culture - Urine (collected 12-04-19 @ 10:24)  Source: .Urine  Final Report (12-05-19 @ 11:51):    No growth    Culture - Other (collected 12-03-19 @ 14:03)  Source: .Other chest tube insertion site (pus/purulent)  Final Report (12-05-19 @ 09:15):    No growth at 2 days.    Culture - Blood (collected 12-03-19 @ 13:30)  Source: .Blood  Final Report (12-08-19 @ 15:00):    No growth at 5 days.    Culture - Blood (collected 12-03-19 @ 13:30)  Source: .Blood  Final Report (12-08-19 @ 15:00):    No growth at 5 days.    Culture - Acid Fast - Tissue w/Smear (collected 11-28-19 @ 20:40)  Source: .Tissue chest wound    Culture - Fungal, Tissue (collected 11-27-19 @ 20:12)  Source: .Tissue chest wound - Fungal culture    Culture - Tissue with Gram Stain (collected 11-27-19 @ 20:12)  Source: .Tissue chest wound  Gram Stain (11-27-19 @ 21:27):    Few White blood cells    No organisms seen  Final Report (12-02-19 @ 16:00):    No growth at 5 days. 80

## 2021-11-04 ENCOUNTER — EMERGENCY (EMERGENCY)
Facility: HOSPITAL | Age: 36
LOS: 1 days | End: 2021-11-04
Admitting: STUDENT IN AN ORGANIZED HEALTH CARE EDUCATION/TRAINING PROGRAM
Payer: MEDICAID

## 2021-11-04 ENCOUNTER — OUTPATIENT (OUTPATIENT)
Dept: OUTPATIENT SERVICES | Facility: HOSPITAL | Age: 36
LOS: 1 days | End: 2021-11-04

## 2021-11-04 PROCEDURE — 71045 X-RAY EXAM CHEST 1 VIEW: CPT | Mod: 26

## 2021-11-04 PROCEDURE — 99285 EMERGENCY DEPT VISIT HI MDM: CPT

## 2021-11-04 PROCEDURE — 70450 CT HEAD/BRAIN W/O DYE: CPT | Mod: 26

## 2021-11-04 PROCEDURE — 74177 CT ABD & PELVIS W/CONTRAST: CPT | Mod: 26

## 2021-11-04 PROCEDURE — 93010 ELECTROCARDIOGRAM REPORT: CPT

## 2021-11-04 PROCEDURE — 71275 CT ANGIOGRAPHY CHEST: CPT | Mod: 26

## 2021-11-05 ENCOUNTER — OUTPATIENT (OUTPATIENT)
Dept: OUTPATIENT SERVICES | Facility: HOSPITAL | Age: 36
LOS: 1 days | End: 2021-11-05

## 2022-01-03 NOTE — PROGRESS NOTE ADULT - PROBLEM SELECTOR PROBLEM 2
Right-sided chest wall pain [employer] Counseling:  I discussed with the patient the risks of mycophenolate mofetil including but not limited to infection/immunosuppression, GI upset, hypokalemia, hypercholesterolemia, bone marrow suppression, lymphoproliferative disorders, malignancy, GI ulceration/bleed/perforation, colitis, interstitial lung disease, kidney failure, progressive multifocal leukoencephalopathy, and birth defects.  The patient understands that monitoring is required including a baseline creatinine and regular CBC testing. In addition, patient must alert us immediately if symptoms of infection or other concerning signs are noted.

## 2022-03-08 NOTE — PROGRESS NOTE BEHAVIORAL HEALTH - SECONDARY DX1
3/8/2022        RE: Lindsey Bermudez  [address]  [facility] [id number]        [facility]   March 8, 2022      Chief Complaint   Patient presents with     Nursing Home Acute     flank pain, constipation, medication questions       HPI:    Lindsey Bermudez is a 89 year old  (11/6/1931), who is being seen today for an episodic care visit at [facility] TCU where she is doing rehab after a hospitalization with cholecystitis s/p lap michelle (2/16/22).      I met her on 3/3 and she had R flank plain that seemed MSK in etiology and I started some Aspercreme with lidocaine. BIMS 12/15. Retired . She is an assist of 1 with cares     Today, Ms. Mena is seen in her room. The R flank pain is better - the Aspercreme is helping. She had a list for me today. Continues to have constipation. Is passing gas. Discussed options and settled on increasing Senna and adding fiber. No abdominal pain. Slept in her bed last night - had been in chair a couple of nights due to the flank pain. No dyspnea. She takes her thyroid pill at home with breakfast - no milk. Is currently being woke up at 0530 to take it. Discussed we can move it to breakfast and then space out the calcium supplements. No concerns today per discussion with nursing.     ALLERGIES: Adhesive tape-silicones [adhesive tape]    Past Medical, Surgical, Family and Social History reviewed and updated in EPIC.    MEDICATIONS  Current Outpatient Medications   Medication Sig Dispense Refill     ACE/ARB/ARNI NOT PRESCRIBED (INTENTIONAL) Please choose reason not prescribed from choices below.       acetaminophen (TYLENOL) 325 MG tablet Take 2 tablets (650 mg) by mouth every 6 hours as needed for mild pain or other (and adjunct with moderate or severe pain or per patient request)       amLODIPine (NORVASC) 2.5 MG tablet [AMLODIPINE (NORVASC) 2.5 MG TABLET] Take 1 tablet (2.5 mg total) by mouth daily. 90 tablet 3 
    aspirin 81 MG EC tablet [ASPIRIN 81 MG EC TABLET] Take 81 mg by mouth daily.       bisacodyl (DULCOLAX) 10 MG suppository Place 1 suppository (10 mg) rectally daily as needed for constipation (Use if Magnesium hydroxide (MILK of MAGNESIA) not effective after 24 hours. May discontinue if patient having bowel movement.)       calcium citrate-vitamin D (CITRACAL+D) 315-200 mg-unit per tablet [CALCIUM CITRATE-VITAMIN D (CITRACAL+D) 315-200 MG-UNIT PER TABLET] Take 1 tablet by mouth 2 (two) times a day.       cyanocobalamin 1000 MCG tablet [CYANOCOBALAMIN 1000 MCG TABLET] Take 1 tablet (1,000 mcg total) by mouth daily.  0     folic acid (FOLVITE) 1 MG tablet [FOLIC ACID (FOLVITE) 1 MG TABLET] Take 1 tablet (1 mg total) by mouth daily. 30 tablet 0     levothyroxine (SYNTHROID, LEVOTHROID) 100 MCG tablet [LEVOTHYROXINE (SYNTHROID, LEVOTHROID) 100 MCG TABLET] TAKE 1 TABLET(100 MCG) BY MOUTH DAILY 90 tablet 2     lidocaine (ASPERCREME LIDOCAINE) 4 % external cream Apply topically 2 times daily And BID for back/side pain       melatonin 1 MG TABS tablet Take 1 tablet (1 mg) by mouth nightly as needed for sleep       pantoprazole (PROTONIX) 40 MG EC tablet Take 1 tablet (40 mg) by mouth every morning (before breakfast)       senna-docusate (SENOKOT-S/PERICOLACE) 8.6-50 MG tablet Take 1 tablet by mouth 2 times daily as needed for constipation (Patient taking differently: Take 1 tablet by mouth daily And 1 tab BID PRN)       simvastatin (ZOCOR) 20 MG tablet Take 1 tablet (20 mg) by mouth At Bedtime 90 tablet 1     Medications reviewed:  Medications reconciled to facility chart and changes were made to reflect current medications as identified as above med list. Below are the changes that were made:   Medications stopped since last EPIC medication reconciliation:   There are no discontinued medications.  Medications started since last [facility] medication reconciliation:  No orders of the defined types were placed in this 
"encounter.      REVIEW OF SYSTEMS:  4 point ROS neg other than the symptoms noted above in the HPI. BIMS 12/15    PHYSICAL EXAM:  /65   Pulse 67   Temp 98  F (36.7  C)   Resp 18   Ht 1.651 m (5' 5\")   Wt 75.6 kg (166 lb 11.2 oz)   SpO2 97%   BMI 27.74 kg/m    Gen: sitting in recliner, alert, cooperative and in no acute distress  Resp: breathing non labored, no tachypnea   GI: abdomen soft, not-tender, +BS  Ext: mild dependent LE edema  Neuro: CX II-XII grossly in tact; ROM in all four extremities grossly in tact  Psych: alert and oriented to self and general situation    LABS & IMAGING  Recent Labs and Imaging:  Reviewed as per Epic    ASSESSMENT/PLAN:    Right Side/Flank Pain, Improving  Suspect this is MSK from the fall and is helped with topical analgesia.  -- aspercreme + lido BID and BID PRN to R flank/side    Slow Transit Constipation  Ongoing. Belly soft and + bowel sounds  -- increase Senna-S to 1 tab BID scheduled and continue 1 tab BID PRN  -- add daily fiber supplement  -- adjust bowel regimen as needed     HTN  SBPs 120s-130s  -- amlodipine 2.5 mg daily   -- follow BPs and adjust medications as needed    Hypothyroidisim  TSH 3.01 in September  -- OK to take levothyroxine 100 mcg with breakfast; no dairy    CKD, Stage III  Secondary Hyperparathyroid  Baseline Cr around 1.6. She follows with [facility] - most recent visit in Sept and labs at goal for the secondary hyperpara.   -- Ca+D supplement   -- to be given lung and dinner given levothyroxine with    breakfast   -- periodic BMP        Electronically signed by  Keri Yo MD                          Sincerely,        Keri Yo MD    "
Anxiety
Anxiety

## 2022-05-04 ENCOUNTER — EMERGENCY (EMERGENCY)
Facility: HOSPITAL | Age: 37
LOS: 1 days | Discharge: HOME | End: 2022-05-04
Admitting: EMERGENCY MEDICINE
Payer: MEDICAID

## 2022-05-04 PROCEDURE — 93010 ELECTROCARDIOGRAM REPORT: CPT

## 2022-05-04 PROCEDURE — G1004: CPT

## 2022-05-04 PROCEDURE — 99285 EMERGENCY DEPT VISIT HI MDM: CPT

## 2022-05-04 PROCEDURE — 74177 CT ABD & PELVIS W/CONTRAST: CPT | Mod: 26,ME

## 2022-05-04 PROCEDURE — 70450 CT HEAD/BRAIN W/O DYE: CPT | Mod: 26,MF

## 2022-05-06 DIAGNOSIS — F41.9 ANXIETY DISORDER, UNSPECIFIED: ICD-10-CM

## 2022-05-06 DIAGNOSIS — K85.90 ACUTE PANCREATITIS WITHOUT NECROSIS OR INFECTION, UNSPECIFIED: ICD-10-CM

## 2022-05-06 DIAGNOSIS — N93.9 ABNORMAL UTERINE AND VAGINAL BLEEDING, UNSPECIFIED: ICD-10-CM

## 2022-05-06 DIAGNOSIS — R42 DIZZINESS AND GIDDINESS: ICD-10-CM

## 2022-05-06 DIAGNOSIS — F10.10 ALCOHOL ABUSE, UNCOMPLICATED: ICD-10-CM

## 2022-05-20 ENCOUNTER — EMERGENCY (EMERGENCY)
Facility: HOSPITAL | Age: 37
LOS: 1 days | End: 2022-05-20
Admitting: EMERGENCY MEDICINE
Payer: MEDICAID

## 2022-05-20 PROCEDURE — 99285 EMERGENCY DEPT VISIT HI MDM: CPT

## 2022-05-20 PROCEDURE — 74177 CT ABD & PELVIS W/CONTRAST: CPT | Mod: 26,MA

## 2022-05-21 PROCEDURE — 93010 ELECTROCARDIOGRAM REPORT: CPT

## 2022-05-24 DIAGNOSIS — Z20.822 CONTACT WITH AND (SUSPECTED) EXPOSURE TO COVID-19: ICD-10-CM

## 2022-05-24 DIAGNOSIS — D73.5 INFARCTION OF SPLEEN: ICD-10-CM

## 2022-05-24 DIAGNOSIS — K86.1 OTHER CHRONIC PANCREATITIS: ICD-10-CM

## 2022-05-24 DIAGNOSIS — Z79.01 LONG TERM (CURRENT) USE OF ANTICOAGULANTS: ICD-10-CM

## 2022-05-24 DIAGNOSIS — R11.2 NAUSEA WITH VOMITING, UNSPECIFIED: ICD-10-CM

## 2022-05-24 DIAGNOSIS — K85.90 ACUTE PANCREATITIS WITHOUT NECROSIS OR INFECTION, UNSPECIFIED: ICD-10-CM

## 2022-05-24 DIAGNOSIS — E72.12 METHYLENETETRAHYDROFOLATE REDUCTASE DEFICIENCY: ICD-10-CM

## 2022-05-24 DIAGNOSIS — F17.200 NICOTINE DEPENDENCE, UNSPECIFIED, UNCOMPLICATED: ICD-10-CM

## 2022-05-24 DIAGNOSIS — R10.13 EPIGASTRIC PAIN: ICD-10-CM

## 2022-05-24 DIAGNOSIS — K86.81 EXOCRINE PANCREATIC INSUFFICIENCY: ICD-10-CM

## 2022-10-06 NOTE — PROGRESS NOTE ADULT - SUBJECTIVE AND OBJECTIVE BOX
34 year old woman with no known major comorbidities (had not gone to see a doctor for 15 years) who presented to [facility] on 11/22/2019 with progressive sob, weakness, weight loss, She was found to have a large pleural effusion. Initially 1.5 L of dark serous fluid was removed.  Chest tube placed shortly thereafter, 3 additional liters was removed. CBC showed a leukocytosis of 40k and   CTA imaging revealed a 2.1cm partially cystic mass in the body/tail of the pancreas with possible upstream ductal dilatation. On 11/23/2019 the patient had a CT chest/abdomen/pelvis that found to have a clot in the right IJ and was started on a heparin gtt.   Chest tube out; on parenteral feedings; taking clear liquids    Transferred to [facility] on 11/24/19.  MRCP performed on 11/26.  Underwent Debridement, wound, with dressing replacement  Wound VAC placement  Pleural biopsy  Lung decortication  Bronchoscopy, with lung decortication using VATS on 11/27/19.  Pt  has a history of alcohol abuse.     We were consulted for evaluation and management of the RIJ DVT.    On TPN. No fevers. No bleeding. INR 1.49 subtherapeutic      MEDICATIONS  (STANDING):  chlorhexidine 2% Cloths 1 Application(s) Topical <User Schedule>  chlorhexidine 4% Liquid 1 Application(s) Topical <User Schedule>  cholecalciferol 2000 Unit(s) Oral daily  dextrose 10%. 1000 milliLiter(s) (100 mL/Hr) IV Continuous <Continuous>  escitalopram 10 milliGRAM(s) Oral daily  fat emulsion (Plant Based) 20% Infusion 0.9 Gm/kG/Day (31.05 mL/Hr) IV Continuous <Continuous>  ferrous    sulfate 325 milliGRAM(s) Oral daily  gabapentin 100 milliGRAM(s) Oral three times a day  heparin  Infusion. 1100 Unit(s)/Hr (11 mL/Hr) IV Continuous <Continuous>  lidocaine   Patch 1 Patch Transdermal daily  magnesium sulfate  IVPB 2 Gram(s) IV Intermittent every 2 hours  meropenem  IVPB 1000 milliGRAM(s) IV Intermittent every 8 hours  metoprolol tartrate 12.5 milliGRAM(s) Oral every 12 hours  octreotide  Injectable 100 MICROGram(s) SubCutaneous every 8 hours  pantoprazole    Tablet 40 milliGRAM(s) Oral before breakfast  Parenteral Nutrition - Adult 1 Each (83 mL/Hr) TPN Continuous <Continuous>  Parenteral Nutrition - Adult 1 Each (83 mL/Hr) TPN Continuous <Continuous>  potassium chloride    Tablet ER 20 milliEquivalent(s) Oral every 2 hours  sucralfate 1 Gram(s) Oral two times a day  traZODone 50 milliGRAM(s) Oral at bedtime  warfarin 7.5 milliGRAM(s) Oral once    MEDICATIONS  (PRN):  acetaminophen   Tablet .. 650 milliGRAM(s) Oral every 6 hours PRN Mild Pain (1 - 3)  ALPRAZolam 0.25 milliGRAM(s) Oral three times a day PRN anxiety  heparin  Injectable 4000 Unit(s) IV Push every 6 hours PRN For aPTT less than 40  heparin  Injectable 2000 Unit(s) IV Push every 6 hours PRN For aPTT between 40 - 57  melatonin 3 milliGRAM(s) Oral at bedtime PRN Insomnia  methocarbamol 500 milliGRAM(s) Oral every 6 hours PRN Muscle Spasm  ondansetron Injectable 4 milliGRAM(s) IV Push every 4 hours PRN Nausea and/or Vomiting  simethicone 80 milliGRAM(s) Chew three times a day PRN Gas  sodium chloride 0.9% lock flush 10 milliLiter(s) IV Push every 1 hour PRN Pre/post blood products, medications, blood draw, and to maintain line patency      Allergies    No Known Allergies    Intolerances    pt requests chicken broth (no beef) and CHERRY italian ice (no lemon) (Unknown)      Vital Signs Last 24 Hrs  T(C): 37.3 (31 Dec 2019 16:03), Max: 37.3 (31 Dec 2019 16:03)  T(F): 99.1 (31 Dec 2019 16:03), Max: 99.1 (31 Dec 2019 16:03)  HR: 94 (31 Dec 2019 16:03) (88 - 94)  BP: 135/91 (31 Dec 2019 16:03) (109/76 - 135/91)  BP(mean): --  RR: 20 (31 Dec 2019 16:03) (18 - 20)  SpO2: 95% (31 Dec 2019 08:06) (95% - 98%)    PHYSICAL EXAM  General: adult in NAD  CV: RR  Lungs: positive air movement b/l ant lungs,clear to auscultation, no wheezes, no rales  Abdomen: soft, NT  Ext: no clubbing cyanosis or edema        LABS:                          10.8   9.57  )-----------( 281      ( 31 Dec 2019 08:17 )             34.6     Serial CBC's  12-31 @ 08:17  Hct-34.6 / Hgb-10.8 / Plat-281 / RBC-3.68 / WBC-9.57          Serial CBC's  12-30 @ 01:54  Hct-33.9 / Hgb-11.8 / Plat-346 / RBC-3.62 / WBC-9.24            12-31    139  |  106  |  6.0<L>  ----------------------------<  122<H>  3.8   |  23.0  |  0.26<L>    Ca    8.9      31 Dec 2019 08:17  Phos  3.8     12-31  Mg     1.6     12-31        PT/INR - ( 31 Dec 2019 08:17 )   PT: 17.4 sec;   INR: 1.49 ratio         PTT - ( 31 Dec 2019 08:17 )  PTT:91.8 sec    Hematocrit: 34.6 % (12-31 @ 08:17)  WBC Count: 9.57 K/uL (12-31 @ 08:17)            RADIOLOGY & ADDITIONAL STUDIES: General Sunscreen Counseling: I recommended a broad spectrum sunscreen with a SPF of 30 or higher.  I explained that SPF 30 sunscreens block approximately 97 percent of the sun's harmful rays.  Sunscreens should be applied at least 15 minutes prior to expected sun exposure and then every 2 hours after that as long as sun exposure continues. If swimming or exercising sunscreen should be reapplied every 45 minutes to an hour after getting wet or sweating.  One ounce, or the equivalent of a shot glass full of sunscreen, is adequate to protect the skin not covered by a bathing suit. I also recommended a lip balm with a sunscreen as well. Sun protective clothing can be used in lieu of sunscreen but must be worn the entire time you are exposed to the sun's rays. Detail Level: Detailed

## 2022-11-18 NOTE — PROGRESS NOTE ADULT - SUBJECTIVE AND OBJECTIVE BOX
Patient seen and examined on ICU , c/o mild R sided chest pain , had 2 BM today , tolerating CLD , c/o b/l foot edema , no other complaints .     CC : R sided chest pain , b/l foot swelling         MEDICATIONS  (STANDING):  amitriptyline 10 milliGRAM(s) Oral at bedtime  ascorbic acid 500 milliGRAM(s) Oral daily  chlorhexidine 2% Cloths 1 Application(s) Topical <User Schedule>  cholecalciferol 2000 Unit(s) Oral daily  escitalopram 10 milliGRAM(s) Oral daily  fat emulsion (Plant Based) 20% Infusion 0.9 Gm/kG/Day (31.05 mL/Hr) IV Continuous <Continuous>  ferrous    sulfate 325 milliGRAM(s) Oral three times a day  folic acid 1 milliGRAM(s) Oral daily  gabapentin 100 milliGRAM(s) Oral three times a day  heparin  Infusion. 1300 Unit(s)/Hr (13 mL/Hr) IV Continuous <Continuous>  lactobacillus acidophilus 1 Tablet(s) Oral two times a day  lidocaine   Patch 1 Patch Transdermal daily  magnesium oxide 400 milliGRAM(s) Oral daily  meropenem  IVPB 1000 milliGRAM(s) IV Intermittent every 8 hours  multivitamin 1 Tablet(s) Oral daily  octreotide  Injectable 100 MICROGram(s) SubCutaneous every 8 hours  pantoprazole    Tablet 40 milliGRAM(s) Oral before breakfast  Parenteral Nutrition - Adult 1 Each (83 mL/Hr) TPN Continuous <Continuous>  Parenteral Nutrition - Adult 1 Each (42 mL/Hr) TPN Continuous <Continuous>  thiamine 100 milliGRAM(s) Oral daily  traMADol 50 milliGRAM(s) Oral every 8 hours    MEDICATIONS  (PRN):  ALPRAZolam 0.25 milliGRAM(s) Oral every 8 hours PRN anxiety  aluminum hydroxide/magnesium hydroxide/simethicone Suspension 30 milliLiter(s) Oral every 4 hours PRN Dyspepsia  heparin  Injectable 4000 Unit(s) IV Push every 6 hours PRN For aPTT less than 40  heparin  Injectable 2000 Unit(s) IV Push every 6 hours PRN For aPTT between 40 - 57  HYDROmorphone   Tablet 4 milliGRAM(s) Oral every 6 hours PRN Severe Pain (7 - 10)  HYDROmorphone   Tablet 2 milliGRAM(s) Oral every 6 hours PRN Moderate Pain (4 - 6)  methocarbamol 500 milliGRAM(s) Oral every 6 hours PRN Muscle Spasm  ondansetron Injectable 4 milliGRAM(s) IV Push every 4 hours PRN Nausea and/or Vomiting  sodium chloride 0.9% lock flush 10 milliLiter(s) IV Push every 1 hour PRN Pre/post blood products, medications, blood draw, and to maintain line patency      LABS:                          7.7    16.93 )-----------( 424      ( 12 Dec 2019 12:51 )             25.8     12-12    141  |  105  |  4.0<L>  ----------------------------<  129<H>  4.3   |  26.0  |  0.65    Ca    7.6<L>      12 Dec 2019 12:51  Phos  3.1     12-12  Mg     1.6     12-12    TPro  4.8<L>  /  Alb  1.8<L>  /  TBili  <0.2<L>  /  DBili  x   /  AST  12  /  ALT  7   /  AlkPhos  184<H>  12-11    PTT - ( 12 Dec 2019 02:51 )  PTT:64.0 sec      RADIOLOGY & ADDITIONAL TESTS:      REVIEW OF SYSTEMS:    CONSTITUTIONAL: No fever, weight loss, or fatigue  EYES: No eye pain, visual disturbances, or discharge  ENMT:  No difficulty hearing, tinnitus, vertigo; No sinus or throat pain  NECK: No pain or stiffness  RESPIRATORY: No cough, wheezing, chills or hemoptysis; No shortness of breath  CARDIOVASCULAR: No chest pain, palpitations, dizziness, or leg swelling  GASTROINTESTINAL: No abdominal or epigastric pain. No nausea, vomiting, or hematemesis; No diarrhea or constipation. No melena or hematochezia.  GENITOURINARY: No dysuria, frequency, hematuria, or incontinence  NEUROLOGICAL: No headaches, memory loss, loss of strength, numbness, or tremors  SKIN: No itching, burning, rashes, or lesions   LYMPH NODES: No enlarged glands  ENDOCRINE: No heat or cold intolerance; No hair loss  MUSCULOSKELETAL:   PSYCHIATRIC: No depression, anxiety, mood swings, or difficulty sleeping  HEME/LYMPH: No easy bruising, or bleeding gums  ALLERGY AND IMMUNOLOGIC: No hives or eczema    Vital Signs Last 24 Hrs  T(C): 36.8 (12 Dec 2019 12:00), Max: 37.3 (11 Dec 2019 19:41)  T(F): 98.2 (12 Dec 2019 12:00), Max: 99.1 (11 Dec 2019 19:41)  HR: 108 (12 Dec 2019 12:00) (86 - 108)  BP: 116/62 (12 Dec 2019 12:00) (116/62 - 130/92)  BP(mean): 88 (12 Dec 2019 06:00) (88 - 106)  RR: 15 (12 Dec 2019 12:00) (15 - 17)  SpO2: 98% (12 Dec 2019 12:00) (95% - 100%)  PHYSICAL EXAM:    GENERAL: NAD, well-groomed, well-developed  HEAD:  Atraumatic, Normocephalic  EYES: EOMI, PERRLA, conjunctiva and sclera clear  NECK: Supple, No JVD, Normal thyroid  NERVOUS SYSTEM:  Alert & Oriented X3, no focal deficit  CHEST/LUNG: CTA b/l ,  no  rales, rhonchi, wheezing, or rubs  HEART: Regular rate and rhythm; No murmurs, rubs, or gallops  ABDOMEN: Soft, Nontender, Nondistended; Bowel sounds present  EXTREMITIES:  2+ Peripheral Pulses, No clubbing, cyanosis, or edema  LYMPH: No lymphadenopathy noted  SKIN: No rashes or lesions Patient

## 2022-12-28 NOTE — PROGRESS NOTE ADULT - ASSESSMENT
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   TPN and AM labs reordered    Thanks no

## 2023-02-22 ENCOUNTER — RESULT REVIEW (OUTPATIENT)
Age: 38
End: 2023-02-22

## 2023-02-22 ENCOUNTER — APPOINTMENT (OUTPATIENT)
Dept: MRI IMAGING | Facility: CLINIC | Age: 38
End: 2023-02-22
Payer: MEDICAID

## 2023-02-22 PROCEDURE — 74183 MRI ABD W/O CNTR FLWD CNTR: CPT

## 2023-02-22 PROCEDURE — A9585: CPT | Mod: JW

## 2023-04-11 NOTE — PATIENT PROFILE ADULT - BILL PAYMENT
Take Omeprazole either first thing in AM on empty stomach or 30 minutes before largest meal   Raspy voice may be from silent reflux; we will want to take medication for 1 full month; if voice does not improve then we will get you to ENT for them to do a scope to look at vocal cords  Use steroid cream to area on lateral aspect of the left leg-2 times a day until area improves; same cream to use again if needed  Follow up in 4-6 weeks for full physical and to see if voice has improved   
no

## 2023-06-20 NOTE — BEHAVIORAL HEALTH ASSESSMENT NOTE - ADULT OR CHILD PROTECTIVE SERVICES INVOLVEMENT
I have intermittent midsternal chest pain x45 minutes; I threw up x3; denies SOB; denies diaphoresis. This happened to me about 2 weeks ago and I also threw up. When I press it hurts more and I feel it go to my back No

## 2023-07-20 NOTE — PROGRESS NOTE ADULT - PROVIDER SPECIALTY LIST ADULT
Infectious Disease Please call our clinic at [phone] or send a message on the [facility] portal if there are any changes to the plan described below, for example,if you are not contacted for the requested tests, referral(s) within one week, if you are unable to receive the medications prescribed, or if you feel you need to change the treatment course for any reason.     TESTING:  -- MRI    REFERRALS:  -- none at this time but can do a sleep study in the future    PREVENTION (use daily regardless of headache):  -- start magnesium in ONE of the following preparations -               1. Magnesium oxide 800mg daily (the most common over the counter kind, may causes loose stools)              2. Magnesium citrate 400-500mg daily (harder to find, but more neutral on the bowels)              3. Magnesium glycinate 400mg daily (hardest to find, look online, but most bowel-neutral, best absorbed)     AS-NEEDED TREATMENT (use total no more than 10 days per month unless otherwise stated):  -- START Nurtec once daily as needed. To break current cycle, take once daily for 3 days then every other day for a week. Then as needed after that  -- START tizanidine at night. This is a muscle relaxer and it will also make you potentially sleepy. Start with half a tablet to see how you respond but can take up to a whole tablet if needed. Do not take with baclofen and/or flexeril

## 2024-08-19 NOTE — PROVIDER CONTACT NOTE (CRITICAL VALUE NOTIFICATION) - NAME OF MD/NP/PA/DO NOTIFIED:
What Type Of Note Output Would You Prefer (Optional)?: Standard Output
GLO Herring
How Severe Is Your Skin Lesion?: moderate
Has Your Skin Lesion Been Treated?: not been treated
Is This A New Presentation, Or A Follow-Up?: Skin Lesions
Aminah Gary, PA
Dr. Ayala
Dr. Bermudez
KENDELL Person NP [facility]
Keith CODY
Silvia CODY
[facility] Stack, [facility]
dr deluca
oncologist, CT surgery PA